# Patient Record
Sex: FEMALE | Race: WHITE | NOT HISPANIC OR LATINO | Employment: OTHER | URBAN - METROPOLITAN AREA
[De-identification: names, ages, dates, MRNs, and addresses within clinical notes are randomized per-mention and may not be internally consistent; named-entity substitution may affect disease eponyms.]

---

## 2020-11-03 PROBLEM — I48.91 AF (ATRIAL FIBRILLATION) (HCC): Status: RESOLVED | Noted: 2020-11-03 | Resolved: 2020-11-03

## 2023-10-10 PROBLEM — E11.9 DM2 (DIABETES MELLITUS, TYPE 2) (MULTI): Status: ACTIVE | Noted: 2023-10-10

## 2023-10-10 PROBLEM — I25.10 CAD (CORONARY ARTERY DISEASE): Status: ACTIVE | Noted: 2023-10-10

## 2023-10-10 PROBLEM — I10 HTN (HYPERTENSION), BENIGN: Status: ACTIVE | Noted: 2023-10-10

## 2023-10-10 PROBLEM — K21.9 GERD (GASTROESOPHAGEAL REFLUX DISEASE): Status: ACTIVE | Noted: 2023-10-10

## 2023-10-10 PROBLEM — F32.A ANXIETY AND DEPRESSION: Status: ACTIVE | Noted: 2023-10-10

## 2023-10-10 PROBLEM — E78.5 HYPERLIPIDEMIA: Status: ACTIVE | Noted: 2023-10-10

## 2023-10-10 PROBLEM — R53.81 PHYSICAL DECONDITIONING: Status: ACTIVE | Noted: 2023-10-10

## 2023-10-10 PROBLEM — S06.5XAA SDH (SUBDURAL HEMATOMA) (MULTI): Status: ACTIVE | Noted: 2023-10-10

## 2023-10-10 PROBLEM — I48.91 ATRIAL FIBRILLATION (MULTI): Status: ACTIVE | Noted: 2023-10-10

## 2023-10-10 PROBLEM — F41.9 ANXIETY AND DEPRESSION: Status: ACTIVE | Noted: 2023-10-10

## 2023-10-10 PROBLEM — I50.9 CHF (CONGESTIVE HEART FAILURE) (MULTI): Status: ACTIVE | Noted: 2023-10-10

## 2023-10-10 PROBLEM — M10.9 GOUT, ARTHRITIS: Status: ACTIVE | Noted: 2023-10-10

## 2023-10-10 RX ORDER — ATORVASTATIN CALCIUM 20 MG/1
1 TABLET, FILM COATED ORAL DAILY
COMMUNITY
End: 2023-12-26 | Stop reason: HOSPADM

## 2023-10-10 RX ORDER — FLUOXETINE 20 MG/1
1 TABLET ORAL DAILY
COMMUNITY

## 2023-10-10 RX ORDER — FUROSEMIDE 20 MG/1
1 TABLET ORAL DAILY
COMMUNITY

## 2023-10-10 RX ORDER — CETIRIZINE HYDROCHLORIDE 10 MG/1
1 TABLET ORAL DAILY
COMMUNITY
End: 2023-12-26 | Stop reason: HOSPADM

## 2023-10-10 RX ORDER — LEVOTHYROXINE SODIUM 75 UG/1
1 TABLET ORAL DAILY
COMMUNITY

## 2023-10-10 RX ORDER — GLIPIZIDE 10 MG/1
TABLET ORAL
COMMUNITY
End: 2024-01-25 | Stop reason: HOSPADM

## 2023-10-10 RX ORDER — CYANOCOBALAMIN (VITAMIN B-12) 500 MCG
1 TABLET ORAL DAILY
COMMUNITY

## 2023-10-10 RX ORDER — CARVEDILOL 12.5 MG/1
TABLET ORAL 2 TIMES DAILY
COMMUNITY

## 2023-10-11 ENCOUNTER — ANCILLARY PROCEDURE (OUTPATIENT)
Dept: RADIOLOGY | Facility: CLINIC | Age: 78
End: 2023-10-11
Payer: MEDICARE

## 2023-10-11 ENCOUNTER — OFFICE VISIT (OUTPATIENT)
Dept: ORTHOPEDIC SURGERY | Facility: CLINIC | Age: 78
End: 2023-10-11
Payer: MEDICARE

## 2023-10-11 DIAGNOSIS — S32.040S COMPRESSION FRACTURE OF L4 LUMBAR VERTEBRA, SEQUELA: ICD-10-CM

## 2023-10-11 DIAGNOSIS — S32.030S COMPRESSION FRACTURE OF L3 LUMBAR VERTEBRA, SEQUELA: ICD-10-CM

## 2023-10-11 PROCEDURE — 99214 OFFICE O/P EST MOD 30 MIN: CPT | Performed by: PHYSICIAN ASSISTANT

## 2023-10-11 PROCEDURE — 72020 X-RAY EXAM OF SPINE 1 VIEW: CPT | Performed by: ORTHOPAEDIC SURGERY

## 2023-10-11 PROCEDURE — 99214 OFFICE O/P EST MOD 30 MIN: CPT | Mod: PO | Performed by: PHYSICIAN ASSISTANT

## 2023-10-11 PROCEDURE — 70120 X-RAY EXAM OF MASTOIDS: CPT | Mod: FY

## 2023-10-11 NOTE — PROGRESS NOTES
New patient to us established patient to the practice seen as a consult in the hospital patient is here with her son after a fall and admission to the hospital for compression fracture she states she still has some low back pain.  There is no radiculopathy to the legs or paresthesia no bowel or bladder complaints no saddle anesthesia she has an LSO brace which causes more pain when she wears due to hernia surgery she has had she denies any type of spine surgery.  Her pain is about a 5 on a scale 1-10 she is in a nursing facility at the present    Past medical history review of systems reviewed and no change    Physical exam insert const no lymphangitis or lymphadenopathy in the examined extremities.  Good perfusion to the extremities ×4.  Radial and dorsalis pedis pulses 2+.  Capillary refill to all 4 digits brisk.  No distal edema x 4.  Patient ambulates with a roller walker examination of the neck reveals no swelling, step-off, or point tenderness.  Range of motion with flexion, extension, side bending and rotation is well maintained without crepitance, instability, or exacerbation of pain.  Strength is within normal limits.  There is decreased range of motion flexion extension rotation lumbar spine tender in the lumbar spinal musculature good strength no instability examination of the upper extremities reveals no point tenderness, swelling, or deformity.  Range of motion of the shoulders, elbows, wrists, and fingers are all full without crepitance, instability, or exacerbation of pain.  Strength is 5/5 throughout.  Examination of the lower extremities reveals no point tenderness, swelling, or deformity.  Range of motion of the hips, knees, and ankles are full without crepitance, instability, or exacerbation of pain.  Strength is 5/5 throughout.  No redness, abrasions, or lesions on all 4 extremities, head and neck, or trunk.  Gross sensation intact in the extremities ×4.  Deep tendon reflexes 2+ and symmetric  bilaterally.  Fermín, clonus, and Babinski were negative.  Straight leg raise negative.  Affect normal.  Alert and oriented ×3.  Coordination normal.    CAT scans cervical thoracic and lumbar spine and reports were reviewed from the hospital showing L3 and L4 superior endplate compression fractures.  X-rays taken today lateral view shows L3 and L4 superior endplate compression fractures with no increase in fracture compared to prior CT scan.    Assessment this is a patient with acute compression fractures L3 and L4 we talked to the son and the patient extensively about treatment options.  We talked about continuing with the brace and giving this time to heal versus a kyphoplasty and the risk of adjacent level fractures with the procedure we also talked to her about not wearing the brace if it is causing more discomfort and limiting her bending and twisting and lifting    Plan at this point or just can give this some more time to heal we will get a treat this conservatively.  She is can wear the brace as needed if is not causing discomfort otherwise she is to limit her activity with ambulation.  I will get a follow-up with the patient as needed if she has an increase in pain she will return she will need a repeat lateral x-ray

## 2023-11-08 ENCOUNTER — OFFICE VISIT (OUTPATIENT)
Dept: WOUND CARE | Facility: CLINIC | Age: 78
End: 2023-11-08
Payer: MEDICARE

## 2023-11-08 PROCEDURE — 99213 OFFICE O/P EST LOW 20 MIN: CPT | Mod: PO,25

## 2023-11-08 PROCEDURE — 11042 DBRDMT SUBQ TIS 1ST 20SQCM/<: CPT | Mod: PO

## 2023-11-22 ENCOUNTER — APPOINTMENT (OUTPATIENT)
Dept: WOUND CARE | Facility: CLINIC | Age: 78
End: 2023-11-22
Payer: MEDICARE

## 2023-12-06 ENCOUNTER — APPOINTMENT (OUTPATIENT)
Dept: RADIOLOGY | Facility: HOSPITAL | Age: 78
DRG: 330 | End: 2023-12-06
Payer: MEDICARE

## 2023-12-06 ENCOUNTER — APPOINTMENT (OUTPATIENT)
Dept: CARDIOLOGY | Facility: HOSPITAL | Age: 78
DRG: 330 | End: 2023-12-06
Payer: MEDICARE

## 2023-12-06 ENCOUNTER — HOSPITAL ENCOUNTER (INPATIENT)
Facility: HOSPITAL | Age: 78
LOS: 20 days | Discharge: NURSING, REHAB OR LONG-TERM CARE | DRG: 330 | End: 2023-12-26
Attending: EMERGENCY MEDICINE | Admitting: INTERNAL MEDICINE
Payer: MEDICARE

## 2023-12-06 DIAGNOSIS — K56.609 SMALL BOWEL OBSTRUCTION (MULTI): Primary | ICD-10-CM

## 2023-12-06 LAB
ALBUMIN SERPL BCP-MCNC: 3.8 G/DL (ref 3.4–5)
ALP SERPL-CCNC: 64 U/L (ref 33–136)
ALT SERPL W P-5'-P-CCNC: 9 U/L (ref 7–45)
ANION GAP SERPL CALC-SCNC: 16 MMOL/L (ref 10–20)
AST SERPL W P-5'-P-CCNC: 21 U/L (ref 9–39)
BASOPHILS # BLD AUTO: 0.02 X10*3/UL (ref 0–0.1)
BASOPHILS NFR BLD AUTO: 0.3 %
BILIRUB DIRECT SERPL-MCNC: 0.2 MG/DL (ref 0–0.3)
BILIRUB SERPL-MCNC: 1.2 MG/DL (ref 0–1.2)
BUN SERPL-MCNC: 72 MG/DL (ref 6–23)
CALCIUM SERPL-MCNC: 9.2 MG/DL (ref 8.6–10.3)
CARDIAC TROPONIN I PNL SERPL HS: 30 NG/L (ref 0–13)
CARDIAC TROPONIN I PNL SERPL HS: 32 NG/L (ref 0–13)
CARDIAC TROPONIN I PNL SERPL HS: 33 NG/L (ref 0–13)
CHLORIDE SERPL-SCNC: 88 MMOL/L (ref 98–107)
CO2 SERPL-SCNC: 33 MMOL/L (ref 21–32)
CREAT SERPL-MCNC: 1.81 MG/DL (ref 0.5–1.05)
EOSINOPHIL # BLD AUTO: 0.05 X10*3/UL (ref 0–0.4)
EOSINOPHIL NFR BLD AUTO: 0.9 %
ERYTHROCYTE [DISTWIDTH] IN BLOOD BY AUTOMATED COUNT: 15.2 % (ref 11.5–14.5)
GFR SERPL CREATININE-BSD FRML MDRD: 29 ML/MIN/1.73M*2
GLUCOSE BLD MANUAL STRIP-MCNC: 108 MG/DL (ref 74–99)
GLUCOSE BLD MANUAL STRIP-MCNC: 127 MG/DL (ref 74–99)
GLUCOSE SERPL-MCNC: 168 MG/DL (ref 74–99)
HCT VFR BLD AUTO: 37.9 % (ref 36–46)
HGB BLD-MCNC: 12.9 G/DL (ref 12–16)
HOLD SPECIMEN: NORMAL
HOLD SPECIMEN: NORMAL
IMM GRANULOCYTES # BLD AUTO: 0.02 X10*3/UL (ref 0–0.5)
IMM GRANULOCYTES NFR BLD AUTO: 0.3 % (ref 0–0.9)
LACTATE SERPL-SCNC: 1.5 MMOL/L (ref 0.4–2)
LACTATE SERPL-SCNC: 2.5 MMOL/L (ref 0.4–2)
LIPASE SERPL-CCNC: 60 U/L (ref 9–82)
LYMPHOCYTES # BLD AUTO: 0.74 X10*3/UL (ref 0.8–3)
LYMPHOCYTES NFR BLD AUTO: 12.8 %
MAGNESIUM SERPL-MCNC: 1.61 MG/DL (ref 1.6–2.4)
MCH RBC QN AUTO: 29.7 PG (ref 26–34)
MCHC RBC AUTO-ENTMCNC: 34 G/DL (ref 32–36)
MCV RBC AUTO: 87 FL (ref 80–100)
MONOCYTES # BLD AUTO: 0.4 X10*3/UL (ref 0.05–0.8)
MONOCYTES NFR BLD AUTO: 6.9 %
NEUTROPHILS # BLD AUTO: 4.56 X10*3/UL (ref 1.6–5.5)
NEUTROPHILS NFR BLD AUTO: 78.8 %
NRBC BLD-RTO: 0 /100 WBCS (ref 0–0)
PLATELET # BLD AUTO: 279 X10*3/UL (ref 150–450)
POTASSIUM SERPL-SCNC: 3.3 MMOL/L (ref 3.5–5.3)
PROT SERPL-MCNC: 6.9 G/DL (ref 6.4–8.2)
RBC # BLD AUTO: 4.35 X10*6/UL (ref 4–5.2)
SODIUM SERPL-SCNC: 134 MMOL/L (ref 136–145)
TSH SERPL-ACNC: 3.34 MIU/L (ref 0.44–3.98)
WBC # BLD AUTO: 5.8 X10*3/UL (ref 4.4–11.3)

## 2023-12-06 PROCEDURE — 96375 TX/PRO/DX INJ NEW DRUG ADDON: CPT

## 2023-12-06 PROCEDURE — 85025 COMPLETE CBC W/AUTO DIFF WBC: CPT | Performed by: EMERGENCY MEDICINE

## 2023-12-06 PROCEDURE — 71045 X-RAY EXAM CHEST 1 VIEW: CPT

## 2023-12-06 PROCEDURE — 84443 ASSAY THYROID STIM HORMONE: CPT | Performed by: INTERNAL MEDICINE

## 2023-12-06 PROCEDURE — 80048 BASIC METABOLIC PNL TOTAL CA: CPT | Performed by: EMERGENCY MEDICINE

## 2023-12-06 PROCEDURE — 71045 X-RAY EXAM CHEST 1 VIEW: CPT | Mod: FY

## 2023-12-06 PROCEDURE — 84484 ASSAY OF TROPONIN QUANT: CPT | Performed by: INTERNAL MEDICINE

## 2023-12-06 PROCEDURE — 36415 COLL VENOUS BLD VENIPUNCTURE: CPT | Performed by: EMERGENCY MEDICINE

## 2023-12-06 PROCEDURE — 83605 ASSAY OF LACTIC ACID: CPT | Performed by: EMERGENCY MEDICINE

## 2023-12-06 PROCEDURE — 74176 CT ABD & PELVIS W/O CONTRAST: CPT

## 2023-12-06 PROCEDURE — 93005 ELECTROCARDIOGRAM TRACING: CPT

## 2023-12-06 PROCEDURE — 82248 BILIRUBIN DIRECT: CPT | Performed by: EMERGENCY MEDICINE

## 2023-12-06 PROCEDURE — 74176 CT ABD & PELVIS W/O CONTRAST: CPT | Performed by: RADIOLOGY

## 2023-12-06 PROCEDURE — 1210000001 HC SEMI-PRIVATE ROOM DAILY

## 2023-12-06 PROCEDURE — 84484 ASSAY OF TROPONIN QUANT: CPT | Performed by: EMERGENCY MEDICINE

## 2023-12-06 PROCEDURE — 2500000004 HC RX 250 GENERAL PHARMACY W/ HCPCS (ALT 636 FOR OP/ED): Performed by: EMERGENCY MEDICINE

## 2023-12-06 PROCEDURE — 2500000005 HC RX 250 GENERAL PHARMACY W/O HCPCS: Performed by: INTERNAL MEDICINE

## 2023-12-06 PROCEDURE — 82947 ASSAY GLUCOSE BLOOD QUANT: CPT

## 2023-12-06 PROCEDURE — 99285 EMERGENCY DEPT VISIT HI MDM: CPT | Performed by: EMERGENCY MEDICINE

## 2023-12-06 PROCEDURE — 99222 1ST HOSP IP/OBS MODERATE 55: CPT | Performed by: SURGERY

## 2023-12-06 PROCEDURE — 71045 X-RAY EXAM CHEST 1 VIEW: CPT | Performed by: RADIOLOGY

## 2023-12-06 PROCEDURE — 2500000004 HC RX 250 GENERAL PHARMACY W/ HCPCS (ALT 636 FOR OP/ED): Performed by: INTERNAL MEDICINE

## 2023-12-06 PROCEDURE — 83735 ASSAY OF MAGNESIUM: CPT | Performed by: EMERGENCY MEDICINE

## 2023-12-06 PROCEDURE — 99223 1ST HOSP IP/OBS HIGH 75: CPT | Performed by: INTERNAL MEDICINE

## 2023-12-06 PROCEDURE — 96374 THER/PROPH/DIAG INJ IV PUSH: CPT

## 2023-12-06 PROCEDURE — 83690 ASSAY OF LIPASE: CPT | Performed by: EMERGENCY MEDICINE

## 2023-12-06 RX ORDER — ONDANSETRON 4 MG/1
4 TABLET, FILM COATED ORAL EVERY 8 HOURS PRN
Status: DISCONTINUED | OUTPATIENT
Start: 2023-12-06 | End: 2023-12-26 | Stop reason: HOSPADM

## 2023-12-06 RX ORDER — DEXTROSE MONOHYDRATE 100 MG/ML
0.3 INJECTION, SOLUTION INTRAVENOUS ONCE AS NEEDED
Status: DISCONTINUED | OUTPATIENT
Start: 2023-12-06 | End: 2023-12-26 | Stop reason: HOSPADM

## 2023-12-06 RX ORDER — ONDANSETRON HYDROCHLORIDE 2 MG/ML
4 INJECTION, SOLUTION INTRAVENOUS ONCE
Status: COMPLETED | OUTPATIENT
Start: 2023-12-06 | End: 2023-12-06

## 2023-12-06 RX ORDER — SODIUM CHLORIDE 9 MG/ML
75 INJECTION, SOLUTION INTRAVENOUS CONTINUOUS
Status: DISCONTINUED | OUTPATIENT
Start: 2023-12-06 | End: 2023-12-07

## 2023-12-06 RX ORDER — INSULIN LISPRO 100 [IU]/ML
0-5 INJECTION, SOLUTION INTRAVENOUS; SUBCUTANEOUS EVERY 4 HOURS
Status: DISCONTINUED | OUTPATIENT
Start: 2023-12-06 | End: 2023-12-22

## 2023-12-06 RX ORDER — DEXTROSE 50 % IN WATER (D50W) INTRAVENOUS SYRINGE
25
Status: DISCONTINUED | OUTPATIENT
Start: 2023-12-06 | End: 2023-12-26 | Stop reason: HOSPADM

## 2023-12-06 RX ORDER — POTASSIUM CHLORIDE 14.9 MG/ML
20 INJECTION INTRAVENOUS
Status: COMPLETED | OUTPATIENT
Start: 2023-12-06 | End: 2023-12-06

## 2023-12-06 RX ORDER — ONDANSETRON HYDROCHLORIDE 2 MG/ML
4 INJECTION, SOLUTION INTRAVENOUS EVERY 8 HOURS PRN
Status: DISCONTINUED | OUTPATIENT
Start: 2023-12-06 | End: 2023-12-26 | Stop reason: HOSPADM

## 2023-12-06 RX ORDER — METOPROLOL TARTRATE 1 MG/ML
2.5 INJECTION, SOLUTION INTRAVENOUS EVERY 6 HOURS
Status: DISCONTINUED | OUTPATIENT
Start: 2023-12-06 | End: 2023-12-26 | Stop reason: HOSPADM

## 2023-12-06 RX ADMIN — POTASSIUM CHLORIDE 20 MEQ: 200 INJECTION, SOLUTION INTRAVENOUS at 20:30

## 2023-12-06 RX ADMIN — ONDANSETRON 4 MG: 2 INJECTION INTRAMUSCULAR; INTRAVENOUS at 13:27

## 2023-12-06 RX ADMIN — METOPROLOL TARTRATE 2.5 MG: 5 INJECTION INTRAVENOUS at 17:46

## 2023-12-06 RX ADMIN — SODIUM CHLORIDE 500 ML: 9 INJECTION, SOLUTION INTRAVENOUS at 12:50

## 2023-12-06 RX ADMIN — PIPERACILLIN SODIUM AND TAZOBACTAM SODIUM 3.38 G: 3; .375 INJECTION, SOLUTION INTRAVENOUS at 14:04

## 2023-12-06 RX ADMIN — SODIUM CHLORIDE 1000 ML: 9 INJECTION, SOLUTION INTRAVENOUS at 13:27

## 2023-12-06 RX ADMIN — SODIUM CHLORIDE 75 ML/HR: 9 INJECTION, SOLUTION INTRAVENOUS at 17:46

## 2023-12-06 RX ADMIN — POTASSIUM CHLORIDE 20 MEQ: 200 INJECTION, SOLUTION INTRAVENOUS at 17:46

## 2023-12-06 ASSESSMENT — PAIN SCALES - GENERAL
PAINLEVEL_OUTOF10: 5 - MODERATE PAIN
PAINLEVEL_OUTOF10: 9

## 2023-12-06 ASSESSMENT — COLUMBIA-SUICIDE SEVERITY RATING SCALE - C-SSRS
6. HAVE YOU EVER DONE ANYTHING, STARTED TO DO ANYTHING, OR PREPARED TO DO ANYTHING TO END YOUR LIFE?: NO
2. HAVE YOU ACTUALLY HAD ANY THOUGHTS OF KILLING YOURSELF?: NO
1. IN THE PAST MONTH, HAVE YOU WISHED YOU WERE DEAD OR WISHED YOU COULD GO TO SLEEP AND NOT WAKE UP?: NO

## 2023-12-06 ASSESSMENT — ACTIVITIES OF DAILY LIVING (ADL)
TOILETING: NEEDS ASSISTANCE
WALKS IN HOME: NEEDS ASSISTANCE
HEARING - RIGHT EAR: FUNCTIONAL
FEEDING YOURSELF: NEEDS ASSISTANCE
JUDGMENT_ADEQUATE_SAFELY_COMPLETE_DAILY_ACTIVITIES: YES
PATIENT'S MEMORY ADEQUATE TO SAFELY COMPLETE DAILY ACTIVITIES?: YES
BATHING: NEEDS ASSISTANCE
HEARING - LEFT EAR: FUNCTIONAL
ADEQUATE_TO_COMPLETE_ADL: YES
DRESSING YOURSELF: NEEDS ASSISTANCE
GROOMING: NEEDS ASSISTANCE
ASSISTIVE_DEVICE: DENTURES PARTIAL;CANE

## 2023-12-06 ASSESSMENT — ENCOUNTER SYMPTOMS
VOMITING: 1
EYES NEGATIVE: 1
ABDOMINAL PAIN: 1
CONSTITUTIONAL NEGATIVE: 1
CARDIOVASCULAR NEGATIVE: 1
NAUSEA: 1
MUSCULOSKELETAL NEGATIVE: 1
RESPIRATORY NEGATIVE: 1
ENDOCRINE NEGATIVE: 1
ALLERGIC/IMMUNOLOGIC NEGATIVE: 1

## 2023-12-06 ASSESSMENT — PAIN DESCRIPTION - FREQUENCY: FREQUENCY: CONSTANT/CONTINUOUS

## 2023-12-06 ASSESSMENT — PAIN DESCRIPTION - LOCATION: LOCATION: ABDOMEN

## 2023-12-06 ASSESSMENT — PAIN - FUNCTIONAL ASSESSMENT: PAIN_FUNCTIONAL_ASSESSMENT: 0-10

## 2023-12-06 ASSESSMENT — PAIN DESCRIPTION - DESCRIPTORS: DESCRIPTORS: ACHING;PRESSURE

## 2023-12-06 ASSESSMENT — PAIN DESCRIPTION - PAIN TYPE: TYPE: ACUTE PAIN

## 2023-12-06 NOTE — H&P
History Of Present Illness  Harper Laar is a 78 y.o. female presenting with past medical history of type 2 diabetes mellitus, fatty liver, hyperlipidemia, hypertension, A-fib status post ablation, hypothyroidism, anxiety and depression, history of GI bleed comes to the hospital with abdominal pain and vomiting for past few days.  The patient is a poor historian and per chart the patient was seen by the PCP and was given Zofran which seemed to have been ineffective.  Patient states she had a bowel movement yesterday.  She also reports crampy abdominal pain.  She denies any fever or chills chest pain or cough.  CT abdomen pelvis in ED was concerning for small bowel obstruction and the patient was seen by Dr. Lozada in ED and plans for NG tube insertion at this time.  Patient is admitted for further care and management.     Past Medical History  Past Medical History:   Diagnosis Date    Atrial fibrillation (CMS/HCC)     Diabetes mellitus (CMS/HCC)        Surgical History  Past Surgical History:   Procedure Laterality Date    CHOLECYSTECTOMY      HIATAL HERNIA REPAIR      HYSTERECTOMY          Social History  She reports that she has never smoked. She has never been exposed to tobacco smoke. She has never used smokeless tobacco. She reports that she does not drink alcohol and does not use drugs.    Family History  No family history on file.     Allergies  Diphenhydramine hcl, Morphine, Pentazocine, Propoxyphene, Propoxyphene n-acetaminophen, and Ranitidine    Review of Systems   Constitutional: Negative.    HENT: Negative.     Eyes: Negative.    Respiratory: Negative.     Cardiovascular: Negative.    Gastrointestinal:  Positive for abdominal pain, nausea and vomiting.   Endocrine: Negative.    Musculoskeletal: Negative.    Skin: Negative.    Allergic/Immunologic: Negative.    All other systems reviewed and are negative.       Physical Exam     Last Recorded Vitals  Blood pressure 121/61, pulse 89, temperature 36 °C  "(96.8 °F), temperature source Temporal, resp. rate 20, height 1.6 m (5' 3\"), weight 97.5 kg (215 lb), SpO2 100 %.    Relevant Results        78 y.o. female presenting with past medical history of type 2 diabetes mellitus, fatty liver, hyperlipidemia, hypertension, A-fib status post ablation, hypothyroidism, anxiety and depression, history of GI bleed comes to the hospital with abdominal pain and vomiting for past few days.     Assessment/Plan   Principal Problem:    Small bowel obstruction (CMS/HCC)      Small bowel obstruction  -As seen on CT scan of abdomen pelvis.  -Continue with IV hydration and maintain n.p.o. status at this time.  -Surgery has been consulted and NG tube is planned at this time.  -No acute surgical intervention at this time.    SOCORRO with underlying CKD stage III  -Creatinine noted 1.81 baseline is around 1.1.   -Continue with IV hydration.    Hypokalemia  -Replace IV and monitor.     Elevated troponin  Atrial fibrillation  -Serial troponin and serial EKGs.   -Since NPO will add IV lopressor every 6 hours. Rate not well controlled.   -Consult cardiology.    Hypothyroidism  -Check TSH.  Last 1 about 3 months ago was 2.70.  -Resume oral Synthroid once able.    Chronic systolic congestive heart failure  -Last echo from 2021 shows LVEF of 40%.  -Gentle IV fluid rehydration.    Diabetes mellitus type 2  -Hold home oral antidiabetics  -Continue with sliding scale insulin n.p.o. protocol.    VTE prophylaxis: SCDs patient history of GI bleed        I spent 45 minutes in the professional and overall care of this patient.      Brittany Pearson MD    "

## 2023-12-06 NOTE — CONSULTS
"Reason For Consult  SBO    History Of Present Illness    Harper Lara is a 78 y.o. female with SBO; patient is a poor historian; history of open cholecystectomy, hysterectomy, SBR and VHR repair x 2. In June 2010, she underwent laparotomy, small bowel resection and repair of recurrent ventral hernia at the [facility]. Per her, she was brought to the hospital by her granddaughter. She reports diffuse, sharp, constant abdominal pain for at least 3-4 days with no relieving factor; \"I just hurt everywhere\". Reports two vomiting episodes today. Per her, she has not had any bowel movement for 1 wk. Denies fevers and chills. CT abd/pelvis shows fat containing ventral hernia and dilated loops of small bowel consistent with SBO.    Past Medical History  Type 2 diabetes mellitus, fatty liver, hyperlipidemia, hypertension, A-fib status post ablation, hypothyroidism, anxiety and depression, history of GI bleed    Surgical History  Open cholecystedctomy, hysterectomy and VHR repair x 2 and SBR ( In June 2010, she underwent laparotomy, small bowel resection and repair of recurrent ventral hernia at the [facility])       Social History  She reports that she has never smoked. She has never been exposed to tobacco smoke. She has never used smokeless tobacco. She reports that she does not drink alcohol and does not use drugs.; she lives with      Family History  No family history on file.     Allergies  Diphenhydramine hcl, Morphine, Pentazocine, Propoxyphene, Propoxyphene n-acetaminophen, and Ranitidine    Review of Systems  Constitutional: Negative.    HENT: Negative.     Eyes: Negative.    Respiratory: Negative.     Cardiovascular: Negative.    Gastrointestinal:  Positive for abdominal pain, nausea and vomiting.   Endocrine: Negative.    Musculoskeletal: Negative.    Skin: Negative.    Allergic/Immunologic: Negative.    All other systems reviewed and are negative.       Physical Exam  Constitutional: no acute " "distress, well appearing and well nourished  Eyes: conjunctiva and lids with no erythema, swelling or discharge; EOMI  Ears, Nose, Mouth and Throat: external inspection of ears and nose are normal  Neck: supple, no mass or adenopathy, full range of motion; thyroid not enlarged and no palpable nodules  Pulmonary: normal respiratory effort; clear to auscultation bilaterally, no wheezes or bronchi   Cardiovascular: regular rate and rhythm, no murmurs or extra-heart sounds; pedal pulses are normal; no extremities edema or varicosities, no peripheral edema  Abdomen: soft, distended, moderatel diffuse tenderness; healed right subcostal and midline incision, no palpable hernia defect,  Musculoskeletal: digits and nails normal without clubbing or cyanosis; Joints, bones and muscles are normal with normal range of motion  Skin: normal without rashes or lesion  Neurologic: cranial nerve II-XII intact grossly  Psychiatric: oriented to person, place and time     Last Recorded Vitals  Blood pressure 135/60, pulse 88, temperature 36 °C (96.8 °F), temperature source Temporal, resp. rate 16, height 1.6 m (5' 3\"), weight 97.5 kg (215 lb), SpO2 97 %.    Relevant Results  Results for orders placed or performed during the hospital encounter of 12/06/23 (from the past 24 hour(s))   Lipase   Result Value Ref Range    Lipase 60 9 - 82 U/L   Lactate   Result Value Ref Range    Lactate 2.5 (H) 0.4 - 2.0 mmol/L   Hepatic Function Panel   Result Value Ref Range    Albumin 3.8 3.4 - 5.0 g/dL    Bilirubin, Total 1.2 0.0 - 1.2 mg/dL    Bilirubin, Direct 0.2 0.0 - 0.3 mg/dL    Alkaline Phosphatase 64 33 - 136 U/L    ALT 9 7 - 45 U/L    AST 21 9 - 39 U/L    Total Protein 6.9 6.4 - 8.2 g/dL   Magnesium   Result Value Ref Range    Magnesium 1.61 1.60 - 2.40 mg/dL   Basic Metabolic Panel   Result Value Ref Range    Glucose 168 (H) 74 - 99 mg/dL    Sodium 134 (L) 136 - 145 mmol/L    Potassium 3.3 (L) 3.5 - 5.3 mmol/L    Chloride 88 (L) 98 - 107 mmol/L "    Bicarbonate 33 (H) 21 - 32 mmol/L    Anion Gap 16 10 - 20 mmol/L    Urea Nitrogen 72 (H) 6 - 23 mg/dL    Creatinine 1.81 (H) 0.50 - 1.05 mg/dL    eGFR 29 (L) >60 mL/min/1.73m*2    Calcium 9.2 8.6 - 10.3 mg/dL   CBC and Auto Differential   Result Value Ref Range    WBC 5.8 4.4 - 11.3 x10*3/uL    nRBC 0.0 0.0 - 0.0 /100 WBCs    RBC 4.35 4.00 - 5.20 x10*6/uL    Hemoglobin 12.9 12.0 - 16.0 g/dL    Hematocrit 37.9 36.0 - 46.0 %    MCV 87 80 - 100 fL    MCH 29.7 26.0 - 34.0 pg    MCHC 34.0 32.0 - 36.0 g/dL    RDW 15.2 (H) 11.5 - 14.5 %    Platelets 279 150 - 450 x10*3/uL    Neutrophils % 78.8 40.0 - 80.0 %    Immature Granulocytes %, Automated 0.3 0.0 - 0.9 %    Lymphocytes % 12.8 13.0 - 44.0 %    Monocytes % 6.9 2.0 - 10.0 %    Eosinophils % 0.9 0.0 - 6.0 %    Basophils % 0.3 0.0 - 2.0 %    Neutrophils Absolute 4.56 1.60 - 5.50 x10*3/uL    Immature Granulocytes Absolute, Automated 0.02 0.00 - 0.50 x10*3/uL    Lymphocytes Absolute 0.74 (L) 0.80 - 3.00 x10*3/uL    Monocytes Absolute 0.40 0.05 - 0.80 x10*3/uL    Eosinophils Absolute 0.05 0.00 - 0.40 x10*3/uL    Basophils Absolute 0.02 0.00 - 0.10 x10*3/uL   Troponin I, High Sensitivity   Result Value Ref Range    Troponin I, High Sensitivity 30 (H) 0 - 13 ng/L   Light Blue Top   Result Value Ref Range    Extra Tube Hold for add-ons.    Lavender Top   Result Value Ref Range    Extra Tube Hold for add-ons.    Lactate   Result Value Ref Range    Lactate 1.5 0.4 - 2.0 mmol/L   Troponin I, High Sensitivity   Result Value Ref Range    Troponin I, High Sensitivity 33 (H) 0 - 13 ng/L   TSH with reflex to Free T4 if abnormal   Result Value Ref Range    Thyroid Stimulating Hormone 3.34 0.44 - 3.98 mIU/L    XR chest 1 view    Result Date: 12/6/2023  Interpreted By:  Jonah Sweet, STUDY: XR CHEST 1 VIEW;  12/6/2023 3:16 pm   INDICATION: Signs/Symptoms:confirm NG placement.   COMPARISON: Earlier same day   ACCESSION NUMBER(S): [id number]   ORDERING CLINICIAN: ANTONIO KILPATRICK    FINDINGS: Please see the impression       1.  NG tube ends in the gastric fundus.   Signed by: Jonah Sweet 12/6/2023 3:20 PM Dictation workstation:   [id number]    CT abdomen pelvis wo IV contrast    Result Date: 12/6/2023  Interpreted By:  Carlos Augustin, STUDY: CT ABDOMEN PELVIS WO IV CONTRAST;  12/6/2023 11:59 am   INDICATION: Signs/Symptoms:vomiting.   COMPARISON: Selected examinations as far back as July 25, 2021 CT abdomen and pelvis including September 20, 2023 CT chest abdomen and pelvis.   ACCESSION NUMBER(S): [id number]   ORDERING CLINICIAN: ANTONIO KILPATRICK   TECHNIQUE: CT of the abdomen and pelvis was performed. Contiguous axial images were obtained at 3 mm slice thickness through the abdomen and pelvis. Coronal and sagittal reconstructions at 3 mm slice thickness were performed.  No intravenous or oral contrast agents were administered.   FINDINGS: Please note that the evaluation of vessels, lymph nodes and organs is limited without intravenous contrast.   LOWER CHEST: Newly seen elevation left hemidiaphragm related to gastric distension. No pneumonia, edema, or effusion. There is increased mural prominence distal esophagus of indeterminate cause, potentially related to underdistention or inflammation.   ABDOMEN:   LIVER: The liver demonstrates homogeneous attenuation without evidence of focal liver lesions.   BILE DUCTS: Unchanged prominence of the intra and extrahepatic bile ducts without evident obstructing mass or calculus compatible with age and prior cholecystectomy.   GALLBLADDER: Absent   PANCREAS: Grossly unchanged with partial fatty replacement.   SPLEEN: Within normal limits.   ADRENAL GLANDS: Within normal limits.   Unchanged small homogeneous hypodensities most compatible with cysts. No evident hydronephrosis, or calculus.   PELVIS:   BLADDER: Unremarkable   REPRODUCTIVE ORGANS: No evident pelvic mass. The uterus and ovaries are not identified.   BOWEL: There is newly seen marked  dilation of the stomach and portion of the small bowel with decompressed distal small bowel. There is evident tapering anterior pelvis just to the left of midline series 21, image 120. The pattern favors a mechanical obstruction potentially related to adhesion. There is a small nearby left anterior fat containing hernia series 21, image 104. There is mild fluid within the hernia sac image 111 there are multiple adjacent densities likely suture and or calcifications anterior peritoneal cavity. No apparent pneumatosis. The dilated fluid-filled small bowel measures up to 5.7 cm caliber series 21, image 90. The decompressed distal small bowel measures less than 1 cm. The colon is predominantly decompressed. Patient is status post surgery with suture near the ileocolonic junction.   VESSELS: Normal caliber. Minimal scattered arterial vascular calcifications.   PERITONEUM/RETROPERITONEUM/LYMPH NODES: No ascites or free air, no fluid collection.  No enlarged mesenteric lymph nodes.   BONES: Compared with September 20 newly seen mild inferior compressive deformity L2 with new bandlike sclerosis midportion compatible with impaction deformity or healing response without significant retropulsion. Stable deformity superior L3. There is osseous demineralization. There are scattered degenerative changes.       1.  Findings compatible with acute mechanical small bowel obstruction potentially related to adhesion. 2. There is an adjacent small left-sided ventral wall hernia which contains minimal fat and fluid within the hernia sac which may or may not be related. Attention recommended on clinical assessment. 3. Newly seen mild superior likely recent superior L2 compressive deformity.     Signed by: Carlos Augustin 12/6/2023 12:24 PM Dictation workstation:   [id number]    XR chest 1 view    Result Date: 12/6/2023  Interpreted By:  Carlos Augustin, STUDY: XR CHEST 1 VIEW;  12/6/2023 11:58 am   INDICATION: .   COMPARISON: December  19, 2021 chest radiograph. September 20, 2023 CT chest abdomen and pelvis. Same day CT abdomen and pelvis   ACCESSION NUMBER(S): [id number]   ORDERING CLINICIAN: ANTONIO KILPATRICK   FINDINGS: AP radiograph of the chest was provided.       CARDIOMEDIASTINAL SILHOUETTE: Unchanged cardiomediastinal silhouette.   LUNGS: Lungs are clear.   ABDOMEN: Newly seen marked gaseous distention of the stomach distention of portion of the bowel.   BONES: No acute osseous changes.       1.  Abnormal distention of the stomach and bowel. 2. The lungs are clear.       MACRO: None   Signed by: Carlos Augustin 12/6/2023 12:07 PM Dictation workstation:   [id number]       Assessment/Plan   78-year-old female patient history of multiple medical problems and multiple abdominal surgeries (open cholecystectomy, hysterectomy, small bowel resection and recurrent ventral hernia repair x 2) with SBO.  I have reviewed the labs and CT abdomen/pelvis.  Dilated loops of small bowel.  Nasogastric tube will be placed/IVF/ serial abdominal exam.  We talked about indications of surgery which are evidence of sepsis or failure of the bowel obstruction to resolve.  All questions answered.        Manuel Lozada MD

## 2023-12-06 NOTE — ED PROVIDER NOTES
HPI   Chief Complaint   Patient presents with    Abdominal Pain     Abdominal pain and vomiting.  States she saw her PCP on Monday and he changed her medication.       HPI: 78-year-old female poor historian states that she has been having abdominal pain and vomiting for 4 days.  She states that she saw her doctor at [facility] on Monday.  They gave her Zofran.  She states that she does not think it has been helping.  She is denying chest pain.  She denies any shortness of breath.  She denies any swelling in her legs.  She denies any falls.  She denies any numbness tingling or weakness.    Family HX: Denies any significant/pertinent family history.  Social Hx: Denies ETOH or drug use.  Review of systems:  Gen.: No weight loss, fatigue, anorexia, insomnia, fever.   Eyes: No vision loss, double vision, drainage, eye pain.   ENT: No pharyngitis, dry mouth.   Cardiac: No chest pain, palpitations, syncope, near syncope.   Pulmonary: No shortness of breath, cough, hemoptysis.   Heme/lymph: No swollen glands, fever, bleeding.   GI: No abdominal pain, change in bowel habits, melena, hematemesis, hematochezia, nausea, vomiting, diarrhea.   : No discharge, dysuria, frequency, urgency, hematuria.   Musculoskeletal: No limb pain, joint pain, joint swelling.   Skin: No rashes.   Psych: No depression, anxiety, suicidality, homicidality.   Review of systems is otherwise negative unless stated above or in history of present illness.    Physical Exam:    Appearance: Alert, oriented , cooperative,  in no acute distress. Well nourished & well hydrated.    Skin: Intact,  dry skin, no lesions, rash, petechiae or purpura.     Eyes: PERRLA, EOMs intact,  Conjunctiva pink with no redness or exudates. Eyelids without lesions. No scleral icterus.     ENT: Hearing grossly intact. External auditory canals patent, tympanic membranes intact with visible landmarks. Nares patent, mucus membranes moist. Dentition without lesions. Pharynx clear, uvula  midline.     Neck: Supple, without meningismus. Thyroid not palpable. Trachea at midline. No lymphadenopathy.    Pulmonary: Clear bilaterally with good chest wall excursion. No rales, rhonchi or wheezing. No accessory muscle use or stridor.    Cardiac: Irregular without murmur, rub, gallop or extrasystole. No JVD, Carotids without bruits.    Abdomen: Diffusely tender no palpable organomegaly.  No rebound or guarding.  No CVA tenderness.    Genitourinary: Exam deferred.    Musculoskeletal: Full range of motion. no pain, edema, or deformity. Pulses full and equal. No cyanosis, clubbing, or edema.    Neurological:  Cranial nerves II through XII are grossly intact, finger-nose touch is normal, normal sensation, no weakness, no focal findings identified.    Psychiatric: Appropriate mood and affect.     Medical Decision-Making:  Testing: EKG was obtained nightly interpreted by me shows A-fib rate of 89 without evidence of obvious ST elevations and a bifascicular block.  Abs are reviewed.  CT abdomen pelvis was concerning for bowel obstruction.  I did speak to Dr. Lozada.  He did evaluate the patient at the bedside.  At this time it is not felt that this is a surgical abdomen.  Patient will have an NG tube.  She was empirically covered with Zosyn given IV fluids and will be admitted to Dr. Gonzalez here with medicine  Treatment:   Reevaluation:   Plan: AdmitPatient and family/friend/caregiver are in agreement with this plan.   Impression:   1. sbo  2.  Labs Reviewed  LACTATE - Abnormal     Lactate                       2.5 (*)                  Narrative: Venipuncture immediately after or during the administration of Metamizole may lead to falsely low results. Testing should be performed immediately                prior to Metamizole dosing.  BASIC METABOLIC PANEL - Abnormal     Glucose                       168 (*)                Sodium                        134 (*)                Potassium                     3.3 (*)                 Chloride                      88 (*)                 Bicarbonate                   33 (*)                 Anion Gap                     16                     Urea Nitrogen                 72 (*)                 Creatinine                    1.81 (*)               eGFR                          29 (*)                 Calcium                       9.2                 CBC WITH AUTO DIFFERENTIAL - Abnormal     WBC                           5.8                    nRBC                          0.0                    RBC                           4.35                   Hemoglobin                    12.9                   Hematocrit                    37.9                   MCV                           87                     MCH                           29.7                   MCHC                          34.0                   RDW                           15.2 (*)               Platelets                     279                    Neutrophils %                 78.8                   Immature Granulocytes %, Automated   0.3                    Lymphocytes %                 12.8                   Monocytes %                   6.9                    Eosinophils %                 0.9                    Basophils %                   0.3                    Neutrophils Absolute          4.56                   Immature Granulocytes Absolute, Au*   0.02                   Lymphocytes Absolute          0.74 (*)               Monocytes Absolute            0.40                   Eosinophils Absolute          0.05                   Basophils Absolute            0.02                TROPONIN I, HIGH SENSITIVITY - Abnormal     Troponin I, High Sensitivity   30 (*)                   Narrative: Less than 99th percentile of normal range cutoff-                Female and children under 18 years old <14 ng/L; Male <21 ng/L: Negative                Repeat testing should be performed if clinically indicated.                                  Female and children under 18 years old 14-50 ng/L; Male 21-50 ng/L:                Consistent with possible cardiac damage and possible increased clinical                 risk. Serial measurements may help to assess extent of myocardial damage.                                 >50 ng/L: Consistent with cardiac damage, increased clinical risk and                myocardial infarction. Serial measurements may help assess extent of                 myocardial damage.                                  NOTE: Children less than 1 year old may have higher baseline troponin                 levels and results should be interpreted in conjunction with the overall                 clinical context.                                 NOTE: Troponin I testing is performed using a different                 testing methodology at [facility] than at other                 [facility]. Direct result comparisons should only                 be made within the same method.  TROPONIN I, HIGH SENSITIVITY - Abnormal     Troponin I, High Sensitivity   33 (*)                   Narrative: Less than 99th percentile of normal range cutoff-                Female and children under 18 years old <14 ng/L; Male <21 ng/L: Negative                Repeat testing should be performed if clinically indicated.                                 Female and children under 18 years old 14-50 ng/L; Male 21-50 ng/L:                Consistent with possible cardiac damage and possible increased clinical                 risk. Serial measurements may help to assess extent of myocardial damage.                                 >50 ng/L: Consistent with cardiac damage, increased clinical risk and                myocardial infarction. Serial measurements may help assess extent of                 myocardial damage.                                  NOTE: Children less than 1 year old may have higher baseline troponin                 levels and results should be  interpreted in conjunction with the overall                 clinical context.                                 NOTE: Troponin I testing is performed using a different                 testing methodology at [facility] than at other                 [facility] hospitals. Direct result comparisons should only                 be made within the same method.  LIPASE - Normal     Lipase                        60                       Narrative: Venipuncture immediately after or during the administration of Metamizole may lead to falsely low results. Testing should be performed immediately prior to Metamizole dosing.  HEPATIC FUNCTION PANEL - Normal     Albumin                       3.8                    Bilirubin, Total              1.2                    Bilirubin, Direct             0.2                    Alkaline Phosphatase          64                     ALT                           9                      AST                           21                     Total Protein                 6.9                 MAGNESIUM - Normal     Magnesium                     1.61                LACTATE - Normal     Lactate                       1.5                      Narrative: Venipuncture immediately after or during the administration of Metamizole may lead to falsely low results. Testing should be performed immediately                prior to Metamizole dosing.  URINALYSIS WITH REFLEX MICROSCOPIC AND CULTURE     CT abdomen pelvis wo IV contrast   Final Result    1.  Findings compatible with acute mechanical small bowel obstruction    potentially related to adhesion.    2. There is an adjacent small left-sided ventral wall hernia which    contains minimal fat and fluid within the hernia sac which may or may    not be related. Attention recommended on clinical assessment.    3. Newly seen mild superior likely recent superior L2 compressive    deformity.                Signed by: Carlos Augustin 12/6/2023 12:24 PM     Dictation workstation:   [id number]     XR chest 1 view   Final Result    1.  Abnormal distention of the stomach and bowel.    2. The lungs are clear.                      MACRO:    None          Signed by: Carlos Augustin 12/6/2023 12:07 PM    Dictation workstation:   [id number]                                  No data recorded                Patient History   Past Medical History:   Diagnosis Date    Atrial fibrillation (CMS/HCC)     Diabetes mellitus (CMS/HCC)      Past Surgical History:   Procedure Laterality Date    CHOLECYSTECTOMY      HIATAL HERNIA REPAIR      HYSTERECTOMY       No family history on file.  Social History     Tobacco Use    Smoking status: Never     Passive exposure: Never    Smokeless tobacco: Never   Vaping Use    Vaping Use: Never used   Substance Use Topics    Alcohol use: Never    Drug use: Never       Physical Exam   ED Triage Vitals [12/06/23 1117]   Temp Heart Rate Resp BP   36 °C (96.8 °F) 104 20 115/69      SpO2 Temp Source Heart Rate Source Patient Position   98 % Temporal Monitor --      BP Location FiO2 (%)     -- --       Physical Exam    ED Course & MDM   Diagnoses as of 12/06/23 1458   Small bowel obstruction (CMS/HCC)       Medical Decision Making      Procedure  Procedures     Teodora Gurrola MD  12/06/23 1450

## 2023-12-06 NOTE — H&P (VIEW-ONLY)
"Reason For Consult  SBO    History Of Present Illness    Harper Lara is a 78 y.o. female with SBO; patient is a poor historian; history of open cholecystectomy, hysterectomy, SBR and VHR repair x 2. In June 2010, she underwent laparotomy, small bowel resection and repair of recurrent ventral hernia at the [facility]. Per her, she was brought to the hospital by her granddaughter. She reports diffuse, sharp, constant abdominal pain for at least 3-4 days with no relieving factor; \"I just hurt everywhere\". Reports two vomiting episodes today. Per her, she has not had any bowel movement for 1 wk. Denies fevers and chills. CT abd/pelvis shows fat containing ventral hernia and dilated loops of small bowel consistent with SBO.    Past Medical History  Type 2 diabetes mellitus, fatty liver, hyperlipidemia, hypertension, A-fib status post ablation, hypothyroidism, anxiety and depression, history of GI bleed    Surgical History  Open cholecystedctomy, hysterectomy and VHR repair x 2 and SBR ( In June 2010, she underwent laparotomy, small bowel resection and repair of recurrent ventral hernia at the [facility])       Social History  She reports that she has never smoked. She has never been exposed to tobacco smoke. She has never used smokeless tobacco. She reports that she does not drink alcohol and does not use drugs.; she lives with      Family History  No family history on file.     Allergies  Diphenhydramine hcl, Morphine, Pentazocine, Propoxyphene, Propoxyphene n-acetaminophen, and Ranitidine    Review of Systems  Constitutional: Negative.    HENT: Negative.     Eyes: Negative.    Respiratory: Negative.     Cardiovascular: Negative.    Gastrointestinal:  Positive for abdominal pain, nausea and vomiting.   Endocrine: Negative.    Musculoskeletal: Negative.    Skin: Negative.    Allergic/Immunologic: Negative.    All other systems reviewed and are negative.       Physical Exam  Constitutional: no acute " "distress, well appearing and well nourished  Eyes: conjunctiva and lids with no erythema, swelling or discharge; EOMI  Ears, Nose, Mouth and Throat: external inspection of ears and nose are normal  Neck: supple, no mass or adenopathy, full range of motion; thyroid not enlarged and no palpable nodules  Pulmonary: normal respiratory effort; clear to auscultation bilaterally, no wheezes or bronchi   Cardiovascular: regular rate and rhythm, no murmurs or extra-heart sounds; pedal pulses are normal; no extremities edema or varicosities, no peripheral edema  Abdomen: soft, distended, moderatel diffuse tenderness; healed right subcostal and midline incision, no palpable hernia defect,  Musculoskeletal: digits and nails normal without clubbing or cyanosis; Joints, bones and muscles are normal with normal range of motion  Skin: normal without rashes or lesion  Neurologic: cranial nerve II-XII intact grossly  Psychiatric: oriented to person, place and time     Last Recorded Vitals  Blood pressure 135/60, pulse 88, temperature 36 °C (96.8 °F), temperature source Temporal, resp. rate 16, height 1.6 m (5' 3\"), weight 97.5 kg (215 lb), SpO2 97 %.    Relevant Results  Results for orders placed or performed during the hospital encounter of 12/06/23 (from the past 24 hour(s))   Lipase   Result Value Ref Range    Lipase 60 9 - 82 U/L   Lactate   Result Value Ref Range    Lactate 2.5 (H) 0.4 - 2.0 mmol/L   Hepatic Function Panel   Result Value Ref Range    Albumin 3.8 3.4 - 5.0 g/dL    Bilirubin, Total 1.2 0.0 - 1.2 mg/dL    Bilirubin, Direct 0.2 0.0 - 0.3 mg/dL    Alkaline Phosphatase 64 33 - 136 U/L    ALT 9 7 - 45 U/L    AST 21 9 - 39 U/L    Total Protein 6.9 6.4 - 8.2 g/dL   Magnesium   Result Value Ref Range    Magnesium 1.61 1.60 - 2.40 mg/dL   Basic Metabolic Panel   Result Value Ref Range    Glucose 168 (H) 74 - 99 mg/dL    Sodium 134 (L) 136 - 145 mmol/L    Potassium 3.3 (L) 3.5 - 5.3 mmol/L    Chloride 88 (L) 98 - 107 mmol/L "    Bicarbonate 33 (H) 21 - 32 mmol/L    Anion Gap 16 10 - 20 mmol/L    Urea Nitrogen 72 (H) 6 - 23 mg/dL    Creatinine 1.81 (H) 0.50 - 1.05 mg/dL    eGFR 29 (L) >60 mL/min/1.73m*2    Calcium 9.2 8.6 - 10.3 mg/dL   CBC and Auto Differential   Result Value Ref Range    WBC 5.8 4.4 - 11.3 x10*3/uL    nRBC 0.0 0.0 - 0.0 /100 WBCs    RBC 4.35 4.00 - 5.20 x10*6/uL    Hemoglobin 12.9 12.0 - 16.0 g/dL    Hematocrit 37.9 36.0 - 46.0 %    MCV 87 80 - 100 fL    MCH 29.7 26.0 - 34.0 pg    MCHC 34.0 32.0 - 36.0 g/dL    RDW 15.2 (H) 11.5 - 14.5 %    Platelets 279 150 - 450 x10*3/uL    Neutrophils % 78.8 40.0 - 80.0 %    Immature Granulocytes %, Automated 0.3 0.0 - 0.9 %    Lymphocytes % 12.8 13.0 - 44.0 %    Monocytes % 6.9 2.0 - 10.0 %    Eosinophils % 0.9 0.0 - 6.0 %    Basophils % 0.3 0.0 - 2.0 %    Neutrophils Absolute 4.56 1.60 - 5.50 x10*3/uL    Immature Granulocytes Absolute, Automated 0.02 0.00 - 0.50 x10*3/uL    Lymphocytes Absolute 0.74 (L) 0.80 - 3.00 x10*3/uL    Monocytes Absolute 0.40 0.05 - 0.80 x10*3/uL    Eosinophils Absolute 0.05 0.00 - 0.40 x10*3/uL    Basophils Absolute 0.02 0.00 - 0.10 x10*3/uL   Troponin I, High Sensitivity   Result Value Ref Range    Troponin I, High Sensitivity 30 (H) 0 - 13 ng/L   Light Blue Top   Result Value Ref Range    Extra Tube Hold for add-ons.    Lavender Top   Result Value Ref Range    Extra Tube Hold for add-ons.    Lactate   Result Value Ref Range    Lactate 1.5 0.4 - 2.0 mmol/L   Troponin I, High Sensitivity   Result Value Ref Range    Troponin I, High Sensitivity 33 (H) 0 - 13 ng/L   TSH with reflex to Free T4 if abnormal   Result Value Ref Range    Thyroid Stimulating Hormone 3.34 0.44 - 3.98 mIU/L    XR chest 1 view    Result Date: 12/6/2023  Interpreted By:  Jonah Sweet, STUDY: XR CHEST 1 VIEW;  12/6/2023 3:16 pm   INDICATION: Signs/Symptoms:confirm NG placement.   COMPARISON: Earlier same day   ACCESSION NUMBER(S): [id number]   ORDERING CLINICIAN: ANTONIO KILPATRICK    FINDINGS: Please see the impression       1.  NG tube ends in the gastric fundus.   Signed by: Jonah Sweet 12/6/2023 3:20 PM Dictation workstation:   [id number]    CT abdomen pelvis wo IV contrast    Result Date: 12/6/2023  Interpreted By:  Carlso Augustin, STUDY: CT ABDOMEN PELVIS WO IV CONTRAST;  12/6/2023 11:59 am   INDICATION: Signs/Symptoms:vomiting.   COMPARISON: Selected examinations as far back as July 25, 2021 CT abdomen and pelvis including September 20, 2023 CT chest abdomen and pelvis.   ACCESSION NUMBER(S): [id number]   ORDERING CLINICIAN: ANTONIO KILPATRICK   TECHNIQUE: CT of the abdomen and pelvis was performed. Contiguous axial images were obtained at 3 mm slice thickness through the abdomen and pelvis. Coronal and sagittal reconstructions at 3 mm slice thickness were performed.  No intravenous or oral contrast agents were administered.   FINDINGS: Please note that the evaluation of vessels, lymph nodes and organs is limited without intravenous contrast.   LOWER CHEST: Newly seen elevation left hemidiaphragm related to gastric distension. No pneumonia, edema, or effusion. There is increased mural prominence distal esophagus of indeterminate cause, potentially related to underdistention or inflammation.   ABDOMEN:   LIVER: The liver demonstrates homogeneous attenuation without evidence of focal liver lesions.   BILE DUCTS: Unchanged prominence of the intra and extrahepatic bile ducts without evident obstructing mass or calculus compatible with age and prior cholecystectomy.   GALLBLADDER: Absent   PANCREAS: Grossly unchanged with partial fatty replacement.   SPLEEN: Within normal limits.   ADRENAL GLANDS: Within normal limits.   Unchanged small homogeneous hypodensities most compatible with cysts. No evident hydronephrosis, or calculus.   PELVIS:   BLADDER: Unremarkable   REPRODUCTIVE ORGANS: No evident pelvic mass. The uterus and ovaries are not identified.   BOWEL: There is newly seen marked  dilation of the stomach and portion of the small bowel with decompressed distal small bowel. There is evident tapering anterior pelvis just to the left of midline series 21, image 120. The pattern favors a mechanical obstruction potentially related to adhesion. There is a small nearby left anterior fat containing hernia series 21, image 104. There is mild fluid within the hernia sac image 111 there are multiple adjacent densities likely suture and or calcifications anterior peritoneal cavity. No apparent pneumatosis. The dilated fluid-filled small bowel measures up to 5.7 cm caliber series 21, image 90. The decompressed distal small bowel measures less than 1 cm. The colon is predominantly decompressed. Patient is status post surgery with suture near the ileocolonic junction.   VESSELS: Normal caliber. Minimal scattered arterial vascular calcifications.   PERITONEUM/RETROPERITONEUM/LYMPH NODES: No ascites or free air, no fluid collection.  No enlarged mesenteric lymph nodes.   BONES: Compared with September 20 newly seen mild inferior compressive deformity L2 with new bandlike sclerosis midportion compatible with impaction deformity or healing response without significant retropulsion. Stable deformity superior L3. There is osseous demineralization. There are scattered degenerative changes.       1.  Findings compatible with acute mechanical small bowel obstruction potentially related to adhesion. 2. There is an adjacent small left-sided ventral wall hernia which contains minimal fat and fluid within the hernia sac which may or may not be related. Attention recommended on clinical assessment. 3. Newly seen mild superior likely recent superior L2 compressive deformity.     Signed by: Carlos Augustin 12/6/2023 12:24 PM Dictation workstation:   [id number]    XR chest 1 view    Result Date: 12/6/2023  Interpreted By:  Carlos Augustin, STUDY: XR CHEST 1 VIEW;  12/6/2023 11:58 am   INDICATION: .   COMPARISON: December  19, 2021 chest radiograph. September 20, 2023 CT chest abdomen and pelvis. Same day CT abdomen and pelvis   ACCESSION NUMBER(S): [id number]   ORDERING CLINICIAN: ANTONIO KILPATRICK   FINDINGS: AP radiograph of the chest was provided.       CARDIOMEDIASTINAL SILHOUETTE: Unchanged cardiomediastinal silhouette.   LUNGS: Lungs are clear.   ABDOMEN: Newly seen marked gaseous distention of the stomach distention of portion of the bowel.   BONES: No acute osseous changes.       1.  Abnormal distention of the stomach and bowel. 2. The lungs are clear.       MACRO: None   Signed by: Carlos Augustin 12/6/2023 12:07 PM Dictation workstation:   [id number]       Assessment/Plan   78-year-old female patient history of multiple medical problems and multiple abdominal surgeries (open cholecystectomy, hysterectomy, small bowel resection and recurrent ventral hernia repair x 2) with SBO.  I have reviewed the labs and CT abdomen/pelvis.  Dilated loops of small bowel.  Nasogastric tube will be placed/IVF/ serial abdominal exam.  We talked about indications of surgery which are evidence of sepsis or failure of the bowel obstruction to resolve.  All questions answered.        Manuel Lozada MD

## 2023-12-07 ENCOUNTER — APPOINTMENT (OUTPATIENT)
Dept: RADIOLOGY | Facility: HOSPITAL | Age: 78
DRG: 330 | End: 2023-12-07
Payer: MEDICARE

## 2023-12-07 LAB
ALBUMIN SERPL BCP-MCNC: 3 G/DL (ref 3.4–5)
ALP SERPL-CCNC: 48 U/L (ref 33–136)
ALT SERPL W P-5'-P-CCNC: 8 U/L (ref 7–45)
ANION GAP SERPL CALC-SCNC: 14 MMOL/L (ref 10–20)
AST SERPL W P-5'-P-CCNC: 16 U/L (ref 9–39)
BILIRUB SERPL-MCNC: 0.7 MG/DL (ref 0–1.2)
BUN SERPL-MCNC: 70 MG/DL (ref 6–23)
CALCIUM SERPL-MCNC: 8.3 MG/DL (ref 8.6–10.3)
CHLORIDE SERPL-SCNC: 97 MMOL/L (ref 98–107)
CO2 SERPL-SCNC: 32 MMOL/L (ref 21–32)
CREAT SERPL-MCNC: 1.74 MG/DL (ref 0.5–1.05)
ERYTHROCYTE [DISTWIDTH] IN BLOOD BY AUTOMATED COUNT: 15.3 % (ref 11.5–14.5)
GFR SERPL CREATININE-BSD FRML MDRD: 30 ML/MIN/1.73M*2
GLUCOSE BLD MANUAL STRIP-MCNC: 106 MG/DL (ref 74–99)
GLUCOSE BLD MANUAL STRIP-MCNC: 107 MG/DL (ref 74–99)
GLUCOSE BLD MANUAL STRIP-MCNC: 109 MG/DL (ref 74–99)
GLUCOSE BLD MANUAL STRIP-MCNC: 110 MG/DL (ref 74–99)
GLUCOSE BLD MANUAL STRIP-MCNC: 114 MG/DL (ref 74–99)
GLUCOSE BLD MANUAL STRIP-MCNC: 130 MG/DL (ref 74–99)
GLUCOSE SERPL-MCNC: 107 MG/DL (ref 74–99)
HCT VFR BLD AUTO: 29.8 % (ref 36–46)
HGB BLD-MCNC: 9.6 G/DL (ref 12–16)
HOLD SPECIMEN: NORMAL
MAGNESIUM SERPL-MCNC: 1.66 MG/DL (ref 1.6–2.4)
MCH RBC QN AUTO: 29.2 PG (ref 26–34)
MCHC RBC AUTO-ENTMCNC: 32.2 G/DL (ref 32–36)
MCV RBC AUTO: 91 FL (ref 80–100)
NRBC BLD-RTO: 0 /100 WBCS (ref 0–0)
PHOSPHATE SERPL-MCNC: 3.5 MG/DL (ref 2.5–4.9)
PLATELET # BLD AUTO: 175 X10*3/UL (ref 150–450)
POTASSIUM SERPL-SCNC: 3 MMOL/L (ref 3.5–5.3)
PROT SERPL-MCNC: 5.4 G/DL (ref 6.4–8.2)
RBC # BLD AUTO: 3.29 X10*6/UL (ref 4–5.2)
SODIUM SERPL-SCNC: 140 MMOL/L (ref 136–145)
WBC # BLD AUTO: 4.3 X10*3/UL (ref 4.4–11.3)

## 2023-12-07 PROCEDURE — 1210000001 HC SEMI-PRIVATE ROOM DAILY

## 2023-12-07 PROCEDURE — 85027 COMPLETE CBC AUTOMATED: CPT | Performed by: INTERNAL MEDICINE

## 2023-12-07 PROCEDURE — 80053 COMPREHEN METABOLIC PANEL: CPT | Performed by: INTERNAL MEDICINE

## 2023-12-07 PROCEDURE — 74250 X-RAY XM SM INT 1CNTRST STD: CPT

## 2023-12-07 PROCEDURE — 99232 SBSQ HOSP IP/OBS MODERATE 35: CPT | Performed by: INTERNAL MEDICINE

## 2023-12-07 PROCEDURE — 36415 COLL VENOUS BLD VENIPUNCTURE: CPT | Performed by: INTERNAL MEDICINE

## 2023-12-07 PROCEDURE — 84100 ASSAY OF PHOSPHORUS: CPT | Performed by: INTERNAL MEDICINE

## 2023-12-07 PROCEDURE — 82947 ASSAY GLUCOSE BLOOD QUANT: CPT

## 2023-12-07 PROCEDURE — 74250 X-RAY XM SM INT 1CNTRST STD: CPT | Performed by: RADIOLOGY

## 2023-12-07 PROCEDURE — 99231 SBSQ HOSP IP/OBS SF/LOW 25: CPT | Performed by: SURGERY

## 2023-12-07 PROCEDURE — 83735 ASSAY OF MAGNESIUM: CPT | Performed by: INTERNAL MEDICINE

## 2023-12-07 PROCEDURE — 2500000004 HC RX 250 GENERAL PHARMACY W/ HCPCS (ALT 636 FOR OP/ED): Performed by: INTERNAL MEDICINE

## 2023-12-07 PROCEDURE — 2550000001 HC RX 255 CONTRASTS: Performed by: REGISTERED NURSE

## 2023-12-07 PROCEDURE — 2500000005 HC RX 250 GENERAL PHARMACY W/O HCPCS: Performed by: INTERNAL MEDICINE

## 2023-12-07 RX ORDER — POTASSIUM CHLORIDE 14.9 MG/ML
20 INJECTION INTRAVENOUS ONCE
Status: COMPLETED | OUTPATIENT
Start: 2023-12-07 | End: 2023-12-07

## 2023-12-07 RX ORDER — SODIUM CHLORIDE AND POTASSIUM CHLORIDE 150; 450 MG/100ML; MG/100ML
150 INJECTION, SOLUTION INTRAVENOUS CONTINUOUS
Status: DISCONTINUED | OUTPATIENT
Start: 2023-12-07 | End: 2023-12-09

## 2023-12-07 RX ADMIN — METOPROLOL TARTRATE 2.5 MG: 5 INJECTION INTRAVENOUS at 12:37

## 2023-12-07 RX ADMIN — METOPROLOL TARTRATE 2.5 MG: 5 INJECTION INTRAVENOUS at 22:32

## 2023-12-07 RX ADMIN — POTASSIUM CHLORIDE 20 MEQ: 14.9 INJECTION, SOLUTION INTRAVENOUS at 12:24

## 2023-12-07 RX ADMIN — DIATRIZOATE MEGLUMINE AND DIATRIZOATE SODIUM 60 ML: 660; 100 LIQUID ORAL; RECTAL at 09:46

## 2023-12-07 RX ADMIN — DIATRIZOATE MEGLUMINE AND DIATRIZOATE SODIUM 60 ML: 660; 100 LIQUID ORAL; RECTAL at 10:00

## 2023-12-07 RX ADMIN — DIATRIZOATE MEGLUMINE AND DIATRIZOATE SODIUM 60 ML: 660; 100 LIQUID ORAL; RECTAL at 11:02

## 2023-12-07 RX ADMIN — DIATRIZOATE MEGLUMINE AND DIATRIZOATE SODIUM 60 ML: 660; 100 LIQUID ORAL; RECTAL at 10:17

## 2023-12-07 RX ADMIN — DIATRIZOATE MEGLUMINE AND DIATRIZOATE SODIUM 60 ML: 660; 100 LIQUID ORAL; RECTAL at 10:46

## 2023-12-07 RX ADMIN — ONDANSETRON 4 MG: 2 INJECTION INTRAMUSCULAR; INTRAVENOUS at 17:48

## 2023-12-07 RX ADMIN — POTASSIUM CHLORIDE AND SODIUM CHLORIDE 100 ML/HR: 450; 150 INJECTION, SOLUTION INTRAVENOUS at 12:19

## 2023-12-07 RX ADMIN — METOPROLOL TARTRATE 2.5 MG: 5 INJECTION INTRAVENOUS at 00:37

## 2023-12-07 RX ADMIN — METOPROLOL TARTRATE 2.5 MG: 5 INJECTION INTRAVENOUS at 06:13

## 2023-12-07 RX ADMIN — DIATRIZOATE MEGLUMINE AND DIATRIZOATE SODIUM 60 ML: 660; 100 LIQUID ORAL; RECTAL at 10:32

## 2023-12-07 SDOH — SOCIAL STABILITY: SOCIAL INSECURITY: WERE YOU ABLE TO COMPLETE ALL THE BEHAVIORAL HEALTH SCREENINGS?: YES

## 2023-12-07 SDOH — SOCIAL STABILITY: SOCIAL INSECURITY: ARE YOU OR HAVE YOU BEEN THREATENED OR ABUSED PHYSICALLY, EMOTIONALLY, OR SEXUALLY BY ANYONE?: NO

## 2023-12-07 SDOH — SOCIAL STABILITY: SOCIAL INSECURITY: ARE THERE ANY APPARENT SIGNS OF INJURIES/BEHAVIORS THAT COULD BE RELATED TO ABUSE/NEGLECT?: NO

## 2023-12-07 SDOH — SOCIAL STABILITY: SOCIAL INSECURITY: DO YOU FEEL ANYONE HAS EXPLOITED OR TAKEN ADVANTAGE OF YOU FINANCIALLY OR OF YOUR PERSONAL PROPERTY?: NO

## 2023-12-07 SDOH — SOCIAL STABILITY: SOCIAL INSECURITY: DOES ANYONE TRY TO KEEP YOU FROM HAVING/CONTACTING OTHER FRIENDS OR DOING THINGS OUTSIDE YOUR HOME?: NO

## 2023-12-07 SDOH — SOCIAL STABILITY: SOCIAL INSECURITY: ABUSE: ADULT

## 2023-12-07 SDOH — SOCIAL STABILITY: SOCIAL INSECURITY: DO YOU FEEL UNSAFE GOING BACK TO THE PLACE WHERE YOU ARE LIVING?: NO

## 2023-12-07 SDOH — SOCIAL STABILITY: SOCIAL INSECURITY: HAVE YOU HAD THOUGHTS OF HARMING ANYONE ELSE?: NO

## 2023-12-07 SDOH — SOCIAL STABILITY: SOCIAL INSECURITY: HAS ANYONE EVER THREATENED TO HURT YOUR FAMILY OR YOUR PETS?: NO

## 2023-12-07 ASSESSMENT — COGNITIVE AND FUNCTIONAL STATUS - GENERAL
MOVING TO AND FROM BED TO CHAIR: A LITTLE
TURNING FROM BACK TO SIDE WHILE IN FLAT BAD: A LITTLE
WALKING IN HOSPITAL ROOM: A LITTLE
PERSONAL GROOMING: A LITTLE
MOVING FROM LYING ON BACK TO SITTING ON SIDE OF FLAT BED WITH BEDRAILS: A LITTLE
TURNING FROM BACK TO SIDE WHILE IN FLAT BAD: A LITTLE
MOVING TO AND FROM BED TO CHAIR: A LITTLE
DRESSING REGULAR LOWER BODY CLOTHING: A LITTLE
DAILY ACTIVITIY SCORE: 19
HELP NEEDED FOR BATHING: A LITTLE
MOBILITY SCORE: 18
TOILETING: A LITTLE
HELP NEEDED FOR BATHING: A LITTLE
STANDING UP FROM CHAIR USING ARMS: A LITTLE
DRESSING REGULAR UPPER BODY CLOTHING: A LITTLE
WALKING IN HOSPITAL ROOM: A LITTLE
DRESSING REGULAR LOWER BODY CLOTHING: A LITTLE
DAILY ACTIVITIY SCORE: 19
PERSONAL GROOMING: A LITTLE
PATIENT BASELINE BEDBOUND: NO
STANDING UP FROM CHAIR USING ARMS: A LITTLE
CLIMB 3 TO 5 STEPS WITH RAILING: A LITTLE
MOBILITY SCORE: 18
TOILETING: A LITTLE
DRESSING REGULAR UPPER BODY CLOTHING: A LITTLE
CLIMB 3 TO 5 STEPS WITH RAILING: A LITTLE
MOVING FROM LYING ON BACK TO SITTING ON SIDE OF FLAT BED WITH BEDRAILS: A LITTLE

## 2023-12-07 ASSESSMENT — PATIENT HEALTH QUESTIONNAIRE - PHQ9
SUM OF ALL RESPONSES TO PHQ9 QUESTIONS 1 & 2: 0
1. LITTLE INTEREST OR PLEASURE IN DOING THINGS: NOT AT ALL
2. FEELING DOWN, DEPRESSED OR HOPELESS: NOT AT ALL

## 2023-12-07 ASSESSMENT — PAIN SCALES - GENERAL: PAINLEVEL_OUTOF10: 0 - NO PAIN

## 2023-12-07 ASSESSMENT — PAIN - FUNCTIONAL ASSESSMENT: PAIN_FUNCTIONAL_ASSESSMENT: 0-10

## 2023-12-07 ASSESSMENT — LIFESTYLE VARIABLES
HOW OFTEN DO YOU HAVE A DRINK CONTAINING ALCOHOL: NEVER
HOW OFTEN DO YOU HAVE 6 OR MORE DRINKS ON ONE OCCASION: NEVER
AUDIT-C TOTAL SCORE: 0
AUDIT-C TOTAL SCORE: 0
SKIP TO QUESTIONS 9-10: 1
HOW MANY STANDARD DRINKS CONTAINING ALCOHOL DO YOU HAVE ON A TYPICAL DAY: PATIENT DOES NOT DRINK

## 2023-12-07 NOTE — PROGRESS NOTES
"Harper Lara is a 78 y.o. female on day 1 of admission presenting with Small bowel obstruction (CMS/HCC).     Subjective   Patient resting comfortably in bed, no acute events overnight.       Objective     Physical Exam  Vitals reviewed.   Constitutional:       Appearance: Normal appearance.   HENT:      Head: Normocephalic.      Nose: Nose normal.      Mouth/Throat:      Mouth: Mucous membranes are moist.   Eyes:      Pupils: Pupils are equal, round, and reactive to light.   Cardiovascular:      Rate and Rhythm: Normal rate and regular rhythm.   Pulmonary:      Effort: Pulmonary effort is normal.      Breath sounds: Normal breath sounds.   Abdominal:      General: Abdomen is flat. Bowel sounds are decreased.      Palpations: Abdomen is soft.   Musculoskeletal:         General: Normal range of motion.      Cervical back: Normal range of motion.   Skin:     General: Skin is warm.      Capillary Refill: Capillary refill takes less than 2 seconds.   Neurological:      General: No focal deficit present.      Mental Status: She is alert and oriented to person, place, and time.   Psychiatric:         Mood and Affect: Mood normal.       Last Recorded Vitals  Blood pressure 124/60, pulse 83, temperature 36.3 °C (97.3 °F), resp. rate 16, height 1.6 m (5' 3\"), weight 97.5 kg (215 lb), SpO2 96 %.  Intake/Output last 3 Shifts:  I/O last 3 completed shifts:  In: 1650 (16.9 mL/kg) [IV Piggyback:1650]  Out: 2200 (22.6 mL/kg) [Emesis/NG output:300; Other:1900]  Weight: 97.5 kg     Relevant Results  Lab Results   Component Value Date    GLUCOSE 107 (H) 12/07/2023    CALCIUM 8.3 (L) 12/07/2023     12/07/2023    K 3.0 (L) 12/07/2023    CO2 32 12/07/2023    CL 97 (L) 12/07/2023    BUN 70 (H) 12/07/2023    CREATININE 1.74 (H) 12/07/2023      Lab Results   Component Value Date    WBC 4.3 (L) 12/07/2023    HGB 9.6 (L) 12/07/2023    HCT 29.8 (L) 12/07/2023    MCV 91 12/07/2023     12/07/2023     Assessment/Plan "   Subjective  Patient resting comfortably in bed.  Patient denies nausea, vomiting, abdominal pain.  Patient states she is not passing gas, last bowel movement was on Tuesday.  She states that she is definitely feeling better than she did yesterday.  Patient has 450 NG output in canister.  Spoke of POC with patient, and left her know an SBFT was ordered.     Impression  SBO    Plan  -Pain control  -Nausea control  -NPO/IVF/NG  -SBFT today    Principal Problem:    Small bowel obstruction (CMS/HCC)    Plan of care was discussed extensively with patient. Patient verbalized understanding through teach back method. All questions and concerns addressed upon examination.     Of note, this documentation is completed using the Dragon Dictation system (voice recognition software). There may be spelling and/or grammatical errors that were not corrected prior to final submission.      YARITZA Narvaez-CNP    Patient seen and examined. I have reviewed and discussed APRN note. SBFT in process; abdominal pain better; no flatus or bowel movement; continue NPO/IVF/NG; further recommendation after the SBFT result

## 2023-12-07 NOTE — PROGRESS NOTES
Harper Lara is a 78 y.o. female on day 1 of admission presenting with Small bowel obstruction (CMS/HCC).      Subjective   Patient seen and evaluated today.  She states she feels better since coming to the hospital.  She is undergoing small bowel series.  NG tube in place with significant amount of of greenish drainage.  Patient denies any mild pain and has not had any bowel movements.       Objective     Last Recorded Vitals  /62   Pulse 84   Temp 36.3 °C (97.3 °F)   Resp 16   Wt 97.5 kg (215 lb)   SpO2 96%   Intake/Output last 3 Shifts:    Intake/Output Summary (Last 24 hours) at 12/7/2023 1355  Last data filed at 12/7/2023 0700  Gross per 24 hour   Intake 1650 ml   Output 2450 ml   Net -800 ml       Admission Weight  Weight: 97.5 kg (215 lb) (12/06/23 1117)    Daily Weight  12/06/23 : 97.5 kg (215 lb)    Image Results  XR chest 1 view  Narrative: Interpreted By:  Jonah Sweet,   STUDY:  XR CHEST 1 VIEW;  12/6/2023 3:16 pm      INDICATION:  Signs/Symptoms:confirm NG placement.      COMPARISON:  Earlier same day      ACCESSION NUMBER(S):  [id number]      ORDERING CLINICIAN:  ANTONIO KILPATRICK      FINDINGS:  Please see the impression      Impression: 1.  NG tube ends in the gastric fundus.      Signed by: Jonah Sweet 12/6/2023 3:20 PM  Dictation workstation:   [id number]  CT abdomen pelvis wo IV contrast  Narrative: Interpreted By:  Carlos Augustin,   STUDY:  CT ABDOMEN PELVIS WO IV CONTRAST;  12/6/2023 11:59 am      INDICATION:  Signs/Symptoms:vomiting.      COMPARISON:  Selected examinations as far back as July 25, 2021 CT abdomen and  pelvis including September 20, 2023 CT chest abdomen and pelvis.      ACCESSION NUMBER(S):  [id number]      ORDERING CLINICIAN:  ANTONIO KILPATRICK      TECHNIQUE:  CT of the abdomen and pelvis was performed. Contiguous axial images  were obtained at 3 mm slice thickness through the abdomen and pelvis.  Coronal and sagittal reconstructions at 3 mm slice thickness  were  performed.  No intravenous or oral contrast agents were administered.      FINDINGS:  Please note that the evaluation of vessels, lymph nodes and organs is  limited without intravenous contrast.      LOWER CHEST:  Newly seen elevation left hemidiaphragm related to gastric  distension. No pneumonia, edema, or effusion. There is increased  mural prominence distal esophagus of indeterminate cause, potentially  related to underdistention or inflammation.      ABDOMEN:      LIVER:  The liver demonstrates homogeneous attenuation without evidence of  focal liver lesions.      BILE DUCTS:  Unchanged prominence of the intra and extrahepatic bile ducts without  evident obstructing mass or calculus compatible with age and prior  cholecystectomy.      GALLBLADDER:  Absent      PANCREAS:  Grossly unchanged with partial fatty replacement.      SPLEEN:  Within normal limits.      ADRENAL GLANDS:  Within normal limits.      Unchanged small homogeneous hypodensities most compatible with cysts.  No evident hydronephrosis, or calculus.      PELVIS:      BLADDER:  Unremarkable      REPRODUCTIVE ORGANS:  No evident pelvic mass. The uterus and ovaries are not identified.      BOWEL:  There is newly seen marked dilation of the stomach and portion of the  small bowel with decompressed distal small bowel. There is evident  tapering anterior pelvis just to the left of midline series 21, image  120. The pattern favors a mechanical obstruction potentially related  to adhesion. There is a small nearby left anterior fat containing  hernia series 21, image 104. There is mild fluid within the hernia  sac image 111 there are multiple adjacent densities likely suture and  or calcifications anterior peritoneal cavity. No apparent  pneumatosis. The dilated fluid-filled small bowel measures up to 5.7  cm caliber series 21, image 90. The decompressed distal small bowel  measures less than 1 cm. The colon is predominantly decompressed.  Patient is  status post surgery with suture near the ileocolonic  junction.      VESSELS:  Normal caliber. Minimal scattered arterial vascular calcifications.      PERITONEUM/RETROPERITONEUM/LYMPH NODES:  No ascites or free air, no fluid collection.  No enlarged mesenteric  lymph nodes.      BONES:  Compared with September 20 newly seen mild inferior compressive  deformity L2 with new bandlike sclerosis midportion compatible with  impaction deformity or healing response without significant  retropulsion. Stable deformity superior L3. There is osseous  demineralization. There are scattered degenerative changes.      Impression: 1.  Findings compatible with acute mechanical small bowel obstruction  potentially related to adhesion.  2. There is an adjacent small left-sided ventral wall hernia which  contains minimal fat and fluid within the hernia sac which may or may  not be related. Attention recommended on clinical assessment.  3. Newly seen mild superior likely recent superior L2 compressive  deformity.          Signed by: Carlos Augustin 12/6/2023 12:24 PM  Dictation workstation:   [id number]  XR chest 1 view  Narrative: Interpreted By:  Carlos Augustin,   STUDY:  XR CHEST 1 VIEW;  12/6/2023 11:58 am      INDICATION:  .      COMPARISON:  December 19, 2021 chest radiograph. September 20, 2023 CT chest  abdomen and pelvis. Same day CT abdomen and pelvis      ACCESSION NUMBER(S):  [id number]      ORDERING CLINICIAN:  ANTONIO KILPATRICK      FINDINGS:  AP radiograph of the chest was provided.              CARDIOMEDIASTINAL SILHOUETTE:  Unchanged cardiomediastinal silhouette.      LUNGS:  Lungs are clear.      ABDOMEN:  Newly seen marked gaseous distention of the stomach distention of  portion of the bowel.      BONES:  No acute osseous changes.      Impression: 1.  Abnormal distention of the stomach and bowel.  2. The lungs are clear.              MACRO:  None      Signed by: Carlos Augustin 12/6/2023 12:07 PM  Dictation  workstation:   [id number]      Physical Exam  Constitutional:       Appearance: She is obese.   HENT:      Head: Normocephalic and atraumatic.      Nose: Nose normal.      Mouth/Throat:      Mouth: Mucous membranes are dry.   Eyes:      Extraocular Movements: Extraocular movements intact.      Conjunctiva/sclera: Conjunctivae normal.   Cardiovascular:      Rate and Rhythm: Normal rate and regular rhythm.      Pulses: Normal pulses.      Heart sounds: Normal heart sounds.   Pulmonary:      Effort: Pulmonary effort is normal.      Breath sounds: Normal breath sounds.   Abdominal:      General: Bowel sounds are normal.      Palpations: Abdomen is soft.      Tenderness: There is abdominal tenderness.      Comments: NG tube in place   Musculoskeletal:         General: Normal range of motion.      Cervical back: Normal range of motion and neck supple.   Skin:     General: Skin is warm and dry.   Neurological:      Mental Status: She is alert. Mental status is at baseline.   Psychiatric:         Mood and Affect: Mood normal.         Relevant Results               Assessment/Plan          78 y.o. female presenting with past medical history of type 2 diabetes mellitus, fatty liver, hyperlipidemia, hypertension, A-fib status post ablation, hypothyroidism, anxiety and depression, history of GI bleed comes to the hospital with abdominal pain and vomiting for past few days.  Patient found to have SBO on the imaging.        Principal Problem:    Small bowel obstruction (CMS/HCC)    Small bowel obstruction  -As seen on CT scan of abdomen pelvis.  -Continue with IV hydration and maintain n.p.o. status at this time.  Avoid switch to potassium containing fluids.  -Surgery has been consulted and NG tube is planned at this time.  Patient undergoing small bowel follow-through today.  -No acute surgical intervention at this time.     SOCORRO with underlying CKD stage III  -Creatinine noted 1.81 baseline is around 1.1.  Slowly  improving  -Continue with IV hydration.     Hypokalemia  -Replace IV and monitor.      Elevated troponin  Atrial fibrillation  -Serial troponin and serial EKGs.   -Since NPO will add IV lopressor every 6 hours. Rate not well controlled.   -The patient is chest pain-free at this time.  Continue to monitor.     Hypothyroidism  -Check TSH.  Last 1 about 3 months ago was 2.70.  -Resume oral Synthroid once able.     Chronic systolic congestive heart failure  -Last echo from 2021 shows LVEF of 40%.  -Gentle IV fluid hydration     Diabetes mellitus type 2  -Hold home oral antidiabetics  -Continue with sliding scale insulin n.p.o. protocol.     VTE prophylaxis: SCDs patient history of GI bleed              Brittany Pearson MD

## 2023-12-08 ENCOUNTER — ANESTHESIA (OUTPATIENT)
Dept: OPERATING ROOM | Facility: HOSPITAL | Age: 78
DRG: 330 | End: 2023-12-08
Payer: MEDICARE

## 2023-12-08 ENCOUNTER — ANESTHESIA EVENT (OUTPATIENT)
Dept: OPERATING ROOM | Facility: HOSPITAL | Age: 78
DRG: 330 | End: 2023-12-08
Payer: MEDICARE

## 2023-12-08 ENCOUNTER — APPOINTMENT (OUTPATIENT)
Dept: RADIOLOGY | Facility: HOSPITAL | Age: 78
DRG: 330 | End: 2023-12-08
Payer: MEDICARE

## 2023-12-08 ENCOUNTER — PREP FOR PROCEDURE (OUTPATIENT)
Dept: SURGERY | Facility: HOSPITAL | Age: 78
End: 2023-12-08

## 2023-12-08 PROBLEM — N17.9 ACUTE KIDNEY INJURY (NONTRAUMATIC) (CMS-HCC): Status: ACTIVE | Noted: 2023-12-08

## 2023-12-08 PROBLEM — N18.9 CHRONIC RENAL INSUFFICIENCY: Status: ACTIVE | Noted: 2023-12-08

## 2023-12-08 LAB
ANION GAP SERPL CALC-SCNC: 21 MMOL/L (ref 10–20)
BASOPHILS # BLD AUTO: 0.02 X10*3/UL (ref 0–0.1)
BASOPHILS NFR BLD AUTO: 0.4 %
BUN SERPL-MCNC: 71 MG/DL (ref 6–23)
CALCIUM SERPL-MCNC: 9.2 MG/DL (ref 8.6–10.3)
CHLORIDE SERPL-SCNC: 97 MMOL/L (ref 98–107)
CO2 SERPL-SCNC: 32 MMOL/L (ref 21–32)
CREAT SERPL-MCNC: 1.79 MG/DL (ref 0.5–1.05)
EOSINOPHIL # BLD AUTO: 0.02 X10*3/UL (ref 0–0.4)
EOSINOPHIL NFR BLD AUTO: 0.4 %
ERYTHROCYTE [DISTWIDTH] IN BLOOD BY AUTOMATED COUNT: 15.7 % (ref 11.5–14.5)
GFR SERPL CREATININE-BSD FRML MDRD: 29 ML/MIN/1.73M*2
GLUCOSE BLD MANUAL STRIP-MCNC: 127 MG/DL (ref 74–99)
GLUCOSE BLD MANUAL STRIP-MCNC: 136 MG/DL (ref 74–99)
GLUCOSE BLD MANUAL STRIP-MCNC: 136 MG/DL (ref 74–99)
GLUCOSE BLD MANUAL STRIP-MCNC: 142 MG/DL (ref 74–99)
GLUCOSE BLD MANUAL STRIP-MCNC: 163 MG/DL (ref 74–99)
GLUCOSE SERPL-MCNC: 131 MG/DL (ref 74–99)
HCT VFR BLD AUTO: 34.9 % (ref 36–46)
HGB BLD-MCNC: 11 G/DL (ref 12–16)
HOLD SPECIMEN: NORMAL
IMM GRANULOCYTES # BLD AUTO: 0.04 X10*3/UL (ref 0–0.5)
IMM GRANULOCYTES NFR BLD AUTO: 0.8 % (ref 0–0.9)
LYMPHOCYTES # BLD AUTO: 0.7 X10*3/UL (ref 0.8–3)
LYMPHOCYTES NFR BLD AUTO: 14.3 %
MCH RBC QN AUTO: 28.8 PG (ref 26–34)
MCHC RBC AUTO-ENTMCNC: 31.5 G/DL (ref 32–36)
MCV RBC AUTO: 91 FL (ref 80–100)
MONOCYTES # BLD AUTO: 0.38 X10*3/UL (ref 0.05–0.8)
MONOCYTES NFR BLD AUTO: 7.8 %
NEUTROPHILS # BLD AUTO: 3.74 X10*3/UL (ref 1.6–5.5)
NEUTROPHILS NFR BLD AUTO: 76.3 %
NRBC BLD-RTO: 0 /100 WBCS (ref 0–0)
PLATELET # BLD AUTO: 212 X10*3/UL (ref 150–450)
POTASSIUM SERPL-SCNC: 3.4 MMOL/L (ref 3.5–5.3)
RBC # BLD AUTO: 3.82 X10*6/UL (ref 4–5.2)
SODIUM SERPL-SCNC: 147 MMOL/L (ref 136–145)
WBC # BLD AUTO: 4.9 X10*3/UL (ref 4.4–11.3)

## 2023-12-08 PROCEDURE — 7100000002 HC RECOVERY ROOM TIME - EACH INCREMENTAL 1 MINUTE: Performed by: SURGERY

## 2023-12-08 PROCEDURE — 82947 ASSAY GLUCOSE BLOOD QUANT: CPT

## 2023-12-08 PROCEDURE — 1210000001 HC SEMI-PRIVATE ROOM DAILY

## 2023-12-08 PROCEDURE — 36415 COLL VENOUS BLD VENIPUNCTURE: CPT | Performed by: INTERNAL MEDICINE

## 2023-12-08 PROCEDURE — 2500000004 HC RX 250 GENERAL PHARMACY W/ HCPCS (ALT 636 FOR OP/ED): Performed by: SURGERY

## 2023-12-08 PROCEDURE — 0DNU4ZZ RELEASE OMENTUM, PERCUTANEOUS ENDOSCOPIC APPROACH: ICD-10-PCS | Performed by: SURGERY

## 2023-12-08 PROCEDURE — 96372 THER/PROPH/DIAG INJ SC/IM: CPT | Performed by: SURGERY

## 2023-12-08 PROCEDURE — 0DNB4ZZ RELEASE ILEUM, PERCUTANEOUS ENDOSCOPIC APPROACH: ICD-10-PCS | Performed by: SURGERY

## 2023-12-08 PROCEDURE — 99232 SBSQ HOSP IP/OBS MODERATE 35: CPT | Performed by: INTERNAL MEDICINE

## 2023-12-08 PROCEDURE — 85025 COMPLETE CBC W/AUTO DIFF WBC: CPT | Performed by: INTERNAL MEDICINE

## 2023-12-08 PROCEDURE — 0DN84ZZ RELEASE SMALL INTESTINE, PERCUTANEOUS ENDOSCOPIC APPROACH: ICD-10-PCS | Performed by: SURGERY

## 2023-12-08 PROCEDURE — 44180 LAP ENTEROLYSIS: CPT | Performed by: SURGERY

## 2023-12-08 PROCEDURE — 3600000004 HC OR TIME - INITIAL BASE CHARGE - PROCEDURE LEVEL FOUR: Performed by: SURGERY

## 2023-12-08 PROCEDURE — 3700000002 HC GENERAL ANESTHESIA TIME - EACH INCREMENTAL 1 MINUTE: Performed by: SURGERY

## 2023-12-08 PROCEDURE — 2500000005 HC RX 250 GENERAL PHARMACY W/O HCPCS: Performed by: SURGERY

## 2023-12-08 PROCEDURE — A4217 STERILE WATER/SALINE, 500 ML: HCPCS | Performed by: SURGERY

## 2023-12-08 PROCEDURE — 2500000005 HC RX 250 GENERAL PHARMACY W/O HCPCS: Performed by: INTERNAL MEDICINE

## 2023-12-08 PROCEDURE — 2500000004 HC RX 250 GENERAL PHARMACY W/ HCPCS (ALT 636 FOR OP/ED): Performed by: INTERNAL MEDICINE

## 2023-12-08 PROCEDURE — 74018 RADEX ABDOMEN 1 VIEW: CPT | Performed by: RADIOLOGY

## 2023-12-08 PROCEDURE — 2720000007 HC OR 272 NO HCPCS: Performed by: SURGERY

## 2023-12-08 PROCEDURE — 80048 BASIC METABOLIC PNL TOTAL CA: CPT | Performed by: INTERNAL MEDICINE

## 2023-12-08 PROCEDURE — 2500000005 HC RX 250 GENERAL PHARMACY W/O HCPCS: Performed by: NURSE ANESTHETIST, CERTIFIED REGISTERED

## 2023-12-08 PROCEDURE — 2500000005 HC RX 250 GENERAL PHARMACY W/O HCPCS: Performed by: ANESTHESIOLOGY

## 2023-12-08 PROCEDURE — 3600000009 HC OR TIME - EACH INCREMENTAL 1 MINUTE - PROCEDURE LEVEL FOUR: Performed by: SURGERY

## 2023-12-08 PROCEDURE — 2500000004 HC RX 250 GENERAL PHARMACY W/ HCPCS (ALT 636 FOR OP/ED): Performed by: NURSE ANESTHETIST, CERTIFIED REGISTERED

## 2023-12-08 PROCEDURE — 7100000001 HC RECOVERY ROOM TIME - INITIAL BASE CHARGE: Performed by: SURGERY

## 2023-12-08 PROCEDURE — 94760 N-INVAS EAR/PLS OXIMETRY 1: CPT

## 2023-12-08 PROCEDURE — 3700000001 HC GENERAL ANESTHESIA TIME - INITIAL BASE CHARGE: Performed by: SURGERY

## 2023-12-08 PROCEDURE — 74018 RADEX ABDOMEN 1 VIEW: CPT

## 2023-12-08 RX ORDER — SUCCINYLCHOLINE CHLORIDE 100 MG/5ML
SYRINGE (ML) INTRAVENOUS AS NEEDED
Status: DISCONTINUED | OUTPATIENT
Start: 2023-12-08 | End: 2023-12-08

## 2023-12-08 RX ORDER — CEFAZOLIN SODIUM 2 G/100ML
2 INJECTION, SOLUTION INTRAVENOUS ONCE
Status: COMPLETED | OUTPATIENT
Start: 2023-12-08 | End: 2023-12-08

## 2023-12-08 RX ORDER — MEPERIDINE HYDROCHLORIDE 25 MG/ML
12.5 INJECTION INTRAMUSCULAR; INTRAVENOUS; SUBCUTANEOUS EVERY 10 MIN PRN
Status: DISCONTINUED | OUTPATIENT
Start: 2023-12-08 | End: 2023-12-08 | Stop reason: HOSPADM

## 2023-12-08 RX ORDER — SODIUM CHLORIDE, SODIUM LACTATE, POTASSIUM CHLORIDE, CALCIUM CHLORIDE 600; 310; 30; 20 MG/100ML; MG/100ML; MG/100ML; MG/100ML
100 INJECTION, SOLUTION INTRAVENOUS CONTINUOUS
Status: DISCONTINUED | OUTPATIENT
Start: 2023-12-08 | End: 2023-12-08 | Stop reason: HOSPADM

## 2023-12-08 RX ORDER — ETOMIDATE 2 MG/ML
INJECTION INTRAVENOUS AS NEEDED
Status: DISCONTINUED | OUTPATIENT
Start: 2023-12-08 | End: 2023-12-08

## 2023-12-08 RX ORDER — PHENYLEPHRINE HCL IN 0.9% NACL 1 MG/10 ML
SYRINGE (ML) INTRAVENOUS AS NEEDED
Status: DISCONTINUED | OUTPATIENT
Start: 2023-12-08 | End: 2023-12-08

## 2023-12-08 RX ORDER — SODIUM CHLORIDE, SODIUM LACTATE, POTASSIUM CHLORIDE, CALCIUM CHLORIDE 600; 310; 30; 20 MG/100ML; MG/100ML; MG/100ML; MG/100ML
INJECTION, SOLUTION INTRAVENOUS CONTINUOUS PRN
Status: DISCONTINUED | OUTPATIENT
Start: 2023-12-08 | End: 2023-12-08

## 2023-12-08 RX ORDER — SODIUM CHLORIDE 0.9 G/100ML
IRRIGANT IRRIGATION AS NEEDED
Status: DISCONTINUED | OUTPATIENT
Start: 2023-12-08 | End: 2023-12-08 | Stop reason: HOSPADM

## 2023-12-08 RX ORDER — PROPOFOL 10 MG/ML
INJECTION, EMULSION INTRAVENOUS AS NEEDED
Status: DISCONTINUED | OUTPATIENT
Start: 2023-12-08 | End: 2023-12-08

## 2023-12-08 RX ORDER — NYSTATIN 100000 [USP'U]/G
1 POWDER TOPICAL AS NEEDED
COMMUNITY

## 2023-12-08 RX ORDER — PANTOPRAZOLE SODIUM 40 MG/1
40 TABLET, DELAYED RELEASE ORAL
COMMUNITY
End: 2024-01-25 | Stop reason: HOSPADM

## 2023-12-08 RX ORDER — GENTAMICIN SULFATE 1 MG/G
1 OINTMENT TOPICAL 3 TIMES DAILY
COMMUNITY

## 2023-12-08 RX ORDER — LANOLIN ALCOHOL/MO/W.PET/CERES
1000 CREAM (GRAM) TOPICAL DAILY
COMMUNITY

## 2023-12-08 RX ORDER — LIDOCAINE HYDROCHLORIDE 20 MG/ML
INJECTION, SOLUTION INFILTRATION; PERINEURAL AS NEEDED
Status: DISCONTINUED | OUTPATIENT
Start: 2023-12-08 | End: 2023-12-08

## 2023-12-08 RX ORDER — ATORVASTATIN CALCIUM 40 MG/1
40 TABLET, FILM COATED ORAL DAILY
COMMUNITY

## 2023-12-08 RX ORDER — FENTANYL CITRATE 50 UG/ML
INJECTION, SOLUTION INTRAMUSCULAR; INTRAVENOUS AS NEEDED
Status: DISCONTINUED | OUTPATIENT
Start: 2023-12-08 | End: 2023-12-08

## 2023-12-08 RX ORDER — TRIAMTERENE AND HYDROCHLOROTHIAZIDE 37.5; 25 MG/1; MG/1
1 CAPSULE ORAL EVERY MORNING
COMMUNITY

## 2023-12-08 RX ORDER — HYDROMORPHONE HYDROCHLORIDE 1 MG/ML
INJECTION, SOLUTION INTRAMUSCULAR; INTRAVENOUS; SUBCUTANEOUS AS NEEDED
Status: DISCONTINUED | OUTPATIENT
Start: 2023-12-08 | End: 2023-12-08

## 2023-12-08 RX ORDER — METFORMIN HYDROCHLORIDE 500 MG/1
500 TABLET ORAL
COMMUNITY
End: 2024-01-25 | Stop reason: HOSPADM

## 2023-12-08 RX ORDER — AMLODIPINE BESYLATE 5 MG/1
5 TABLET ORAL DAILY
COMMUNITY

## 2023-12-08 RX ORDER — FAMOTIDINE 10 MG/ML
INJECTION INTRAVENOUS AS NEEDED
Status: DISCONTINUED | OUTPATIENT
Start: 2023-12-08 | End: 2023-12-08

## 2023-12-08 RX ORDER — HEPARIN SODIUM 5000 [USP'U]/ML
5000 INJECTION, SOLUTION INTRAVENOUS; SUBCUTANEOUS ONCE
Status: COMPLETED | OUTPATIENT
Start: 2023-12-08 | End: 2023-12-08

## 2023-12-08 RX ORDER — BENZTROPINE MESYLATE 1 MG/1
1 TABLET ORAL DAILY
COMMUNITY

## 2023-12-08 RX ORDER — ONDANSETRON 4 MG/1
4 TABLET, ORALLY DISINTEGRATING ORAL EVERY 8 HOURS PRN
Status: ON HOLD | COMMUNITY
End: 2023-12-26 | Stop reason: SDUPTHER

## 2023-12-08 RX ORDER — BISMUTH SUBSALICYLATE 262 MG
1 TABLET,CHEWABLE ORAL DAILY
COMMUNITY

## 2023-12-08 RX ORDER — CALCIUM CARBONATE 300MG(750)
400 TABLET,CHEWABLE ORAL DAILY
COMMUNITY

## 2023-12-08 RX ORDER — FENTANYL CITRATE 50 UG/ML
25 INJECTION, SOLUTION INTRAMUSCULAR; INTRAVENOUS EVERY 5 MIN PRN
Status: DISCONTINUED | OUTPATIENT
Start: 2023-12-08 | End: 2023-12-08 | Stop reason: HOSPADM

## 2023-12-08 RX ORDER — ROCURONIUM BROMIDE 10 MG/ML
INJECTION, SOLUTION INTRAVENOUS AS NEEDED
Status: DISCONTINUED | OUTPATIENT
Start: 2023-12-08 | End: 2023-12-08

## 2023-12-08 RX ORDER — FENTANYL CITRATE 50 UG/ML
50 INJECTION, SOLUTION INTRAMUSCULAR; INTRAVENOUS EVERY 5 MIN PRN
Status: DISCONTINUED | OUTPATIENT
Start: 2023-12-08 | End: 2023-12-08 | Stop reason: HOSPADM

## 2023-12-08 RX ORDER — DEXAMETHASONE SODIUM PHOSPHATE 4 MG/ML
INJECTION, SOLUTION INTRA-ARTICULAR; INTRALESIONAL; INTRAMUSCULAR; INTRAVENOUS; SOFT TISSUE AS NEEDED
Status: DISCONTINUED | OUTPATIENT
Start: 2023-12-08 | End: 2023-12-08

## 2023-12-08 RX ORDER — BUPIVACAINE HCL/EPINEPHRINE 0.25-.0005
VIAL (ML) INJECTION AS NEEDED
Status: DISCONTINUED | OUTPATIENT
Start: 2023-12-08 | End: 2023-12-08 | Stop reason: HOSPADM

## 2023-12-08 RX ADMIN — ETOMIDATE 20 MG: 2 INJECTION INTRAVENOUS at 15:04

## 2023-12-08 RX ADMIN — LIDOCAINE HYDROCHLORIDE 60 MG: 20 INJECTION, SOLUTION INFILTRATION; PERINEURAL at 15:04

## 2023-12-08 RX ADMIN — PROPOFOL 50 MG: 10 INJECTION, EMULSION INTRAVENOUS at 15:35

## 2023-12-08 RX ADMIN — POTASSIUM CHLORIDE AND SODIUM CHLORIDE 150 ML/HR: 450; 150 INJECTION, SOLUTION INTRAVENOUS at 21:38

## 2023-12-08 RX ADMIN — DEXAMETHASONE SODIUM PHOSPHATE 8 MG: 4 INJECTION, SOLUTION INTRAMUSCULAR; INTRAVENOUS at 15:27

## 2023-12-08 RX ADMIN — FENTANYL CITRATE 50 MCG: 50 INJECTION, SOLUTION INTRAMUSCULAR; INTRAVENOUS at 15:04

## 2023-12-08 RX ADMIN — ROCURONIUM BROMIDE 60 MG: 10 SOLUTION INTRAVENOUS at 15:04

## 2023-12-08 RX ADMIN — Medication 100 MG: at 15:04

## 2023-12-08 RX ADMIN — HYDROMORPHONE HYDROCHLORIDE 1 MG: 1 INJECTION, SOLUTION INTRAMUSCULAR; INTRAVENOUS; SUBCUTANEOUS at 15:35

## 2023-12-08 RX ADMIN — Medication 200 MCG: at 16:50

## 2023-12-08 RX ADMIN — ONDANSETRON 4 MG: 2 INJECTION INTRAMUSCULAR; INTRAVENOUS at 15:27

## 2023-12-08 RX ADMIN — FENTANYL CITRATE 50 MCG: 50 INJECTION, SOLUTION INTRAMUSCULAR; INTRAVENOUS at 15:41

## 2023-12-08 RX ADMIN — PROPOFOL 100 MG: 10 INJECTION, EMULSION INTRAVENOUS at 15:39

## 2023-12-08 RX ADMIN — HEPARIN SODIUM 5000 UNITS: 5000 INJECTION INTRAVENOUS; SUBCUTANEOUS at 13:06

## 2023-12-08 RX ADMIN — CEFAZOLIN SODIUM 2 G: 2 INJECTION, SOLUTION INTRAVENOUS at 15:03

## 2023-12-08 RX ADMIN — FENTANYL CITRATE 50 MCG: 50 INJECTION, SOLUTION INTRAMUSCULAR; INTRAVENOUS at 15:21

## 2023-12-08 RX ADMIN — PROPOFOL 50 MG: 10 INJECTION, EMULSION INTRAVENOUS at 15:33

## 2023-12-08 RX ADMIN — POTASSIUM CHLORIDE AND SODIUM CHLORIDE 100 ML/HR: 450; 150 INJECTION, SOLUTION INTRAVENOUS at 02:45

## 2023-12-08 RX ADMIN — FENTANYL CITRATE 50 MCG: 50 INJECTION, SOLUTION INTRAMUSCULAR; INTRAVENOUS at 15:48

## 2023-12-08 RX ADMIN — SODIUM CHLORIDE, SODIUM LACTATE, POTASSIUM CHLORIDE, AND CALCIUM CHLORIDE: 600; 310; 30; 20 INJECTION, SOLUTION INTRAVENOUS at 14:48

## 2023-12-08 RX ADMIN — SODIUM CHLORIDE, SODIUM LACTATE, POTASSIUM CHLORIDE, CALCIUM CHLORIDE: 600; 310; 30; 20 INJECTION, SOLUTION INTRAVENOUS at 15:46

## 2023-12-08 RX ADMIN — SODIUM CHLORIDE, SODIUM LACTATE, POTASSIUM CHLORIDE, AND CALCIUM CHLORIDE: 600; 310; 30; 20 INJECTION, SOLUTION INTRAVENOUS at 15:46

## 2023-12-08 RX ADMIN — METOPROLOL TARTRATE 2.5 MG: 5 INJECTION INTRAVENOUS at 04:46

## 2023-12-08 RX ADMIN — SODIUM CHLORIDE: 9 INJECTION, SOLUTION INTRAVENOUS at 15:07

## 2023-12-08 RX ADMIN — FAMOTIDINE 20 MG: 10 INJECTION, SOLUTION INTRAVENOUS at 15:27

## 2023-12-08 RX ADMIN — SUGAMMADEX 200 MG: 100 INJECTION, SOLUTION INTRAVENOUS at 17:37

## 2023-12-08 RX ADMIN — ROCURONIUM BROMIDE 40 MG: 10 SOLUTION INTRAVENOUS at 16:11

## 2023-12-08 ASSESSMENT — COGNITIVE AND FUNCTIONAL STATUS - GENERAL
HELP NEEDED FOR BATHING: TOTAL
MOBILITY SCORE: 6
DAILY ACTIVITIY SCORE: 7
TOILETING: TOTAL
CLIMB 3 TO 5 STEPS WITH RAILING: TOTAL
MOVING FROM LYING ON BACK TO SITTING ON SIDE OF FLAT BED WITH BEDRAILS: TOTAL
PERSONAL GROOMING: TOTAL
WALKING IN HOSPITAL ROOM: TOTAL
TURNING FROM BACK TO SIDE WHILE IN FLAT BAD: TOTAL
DRESSING REGULAR LOWER BODY CLOTHING: TOTAL
EATING MEALS: A LOT
STANDING UP FROM CHAIR USING ARMS: TOTAL
MOVING TO AND FROM BED TO CHAIR: TOTAL
DRESSING REGULAR UPPER BODY CLOTHING: TOTAL

## 2023-12-08 ASSESSMENT — PAIN SCALES - GENERAL: PAINLEVEL_OUTOF10: 0 - NO PAIN

## 2023-12-08 NOTE — INTERVAL H&P NOTE
H&P reviewed. The patient was examined and there are no changes to the H&P.    Since admission, patient continues to have high NG output; SBFT shows no progression of contrast in colon in 24 hrs and showing high grade SBO; I discussed findings with patient and family members (step daughter and ). She is consented for diagnostic laparoscopy, possible laparotomy, possible bowel resection. I explained to them the procedure, risks and complications which include but not limited to bleeding, infection, bowel injury, MI, stroke and blood clot; all questions answered and willing to proceed.

## 2023-12-08 NOTE — PROGRESS NOTES
"Harper Lara is a 78 y.o. female on day 2 of admission presenting with Small bowel obstruction (CMS/HCC).     Subjective   Patient was resting in bed. Nursing reported to Dr. Coe had 5L from NG tube overnight. Dr. Persaudncreased fluids.    Objective     Physical Exam  Constitutional:       General: She is not in acute distress.     Appearance: Normal appearance. She is not toxic-appearing.   HENT:      Head: Normocephalic and atraumatic.      Nose: Nose normal.      Mouth/Throat:      Pharynx: Oropharynx is clear.   Eyes:      Extraocular Movements: Extraocular movements intact.      Conjunctiva/sclera: Conjunctivae normal.      Pupils: Pupils are equal, round, and reactive to light.   Cardiovascular:      Rate and Rhythm: Normal rate and regular rhythm.      Pulses: Normal pulses.      Heart sounds: Normal heart sounds.   Pulmonary:      Effort: Pulmonary effort is normal.      Breath sounds: Normal breath sounds.   Abdominal:      General: Abdomen is flat. Bowel sounds are decreased. There is distension.      Palpations: Abdomen is soft.      Tenderness: There is no abdominal tenderness.      Comments: NG Tube with 300mL output   Musculoskeletal:         General: Normal range of motion.      Cervical back: Normal range of motion and neck supple.   Skin:     General: Skin is warm and dry.      Capillary Refill: Capillary refill takes less than 2 seconds.      Coloration: Skin is pale.   Neurological:      General: No focal deficit present.      Mental Status: She is alert and oriented to person, place, and time. Mental status is at baseline.   Psychiatric:         Mood and Affect: Mood normal.         Behavior: Behavior normal.         Last Recorded Vitals  Blood pressure 106/54, pulse 82, temperature 36.2 °C (97.2 °F), temperature source Temporal, resp. rate 16, height 1.6 m (5' 3\"), weight 97.5 kg (215 lb), SpO2 97 %.  Intake/Output last 3 Shifts:  I/O last 3 completed shifts:  In: 1083.3 (11.1 mL/kg) " [I.V.:983.3 (10.1 mL/kg); IV Piggyback:100]  Out: 7400 (75.9 mL/kg) [Urine:250 (0.1 mL/kg/hr); Emesis/NG output:6550; Other:600]  Weight: 97.5 kg     Relevant Results  Lab Results   Component Value Date    WBC 4.9 12/08/2023    HGB 11.0 (L) 12/08/2023    HCT 34.9 (L) 12/08/2023    MCV 91 12/08/2023     12/08/2023     Lab Results   Component Value Date    GLUCOSE 131 (H) 12/08/2023    CALCIUM 9.2 12/08/2023     (H) 12/08/2023    K 3.4 (L) 12/08/2023    CO2 32 12/08/2023    CL 97 (L) 12/08/2023    BUN 71 (H) 12/08/2023    CREATININE 1.79 (H) 12/08/2023     Results for orders placed or performed during the hospital encounter of 12/06/23 (from the past 24 hour(s))   POCT GLUCOSE   Result Value Ref Range    POCT Glucose 109 (H) 74 - 99 mg/dL   POCT GLUCOSE   Result Value Ref Range    POCT Glucose 107 (H) 74 - 99 mg/dL   POCT GLUCOSE   Result Value Ref Range    POCT Glucose 130 (H) 74 - 99 mg/dL   POCT GLUCOSE   Result Value Ref Range    POCT Glucose 142 (H) 74 - 99 mg/dL   POCT GLUCOSE   Result Value Ref Range    POCT Glucose 136 (H) 74 - 99 mg/dL   CBC and Auto Differential   Result Value Ref Range    WBC 4.9 4.4 - 11.3 x10*3/uL    nRBC 0.0 0.0 - 0.0 /100 WBCs    RBC 3.82 (L) 4.00 - 5.20 x10*6/uL    Hemoglobin 11.0 (L) 12.0 - 16.0 g/dL    Hematocrit 34.9 (L) 36.0 - 46.0 %    MCV 91 80 - 100 fL    MCH 28.8 26.0 - 34.0 pg    MCHC 31.5 (L) 32.0 - 36.0 g/dL    RDW 15.7 (H) 11.5 - 14.5 %    Platelets 212 150 - 450 x10*3/uL    Neutrophils % 76.3 40.0 - 80.0 %    Immature Granulocytes %, Automated 0.8 0.0 - 0.9 %    Lymphocytes % 14.3 13.0 - 44.0 %    Monocytes % 7.8 2.0 - 10.0 %    Eosinophils % 0.4 0.0 - 6.0 %    Basophils % 0.4 0.0 - 2.0 %    Neutrophils Absolute 3.74 1.60 - 5.50 x10*3/uL    Immature Granulocytes Absolute, Automated 0.04 0.00 - 0.50 x10*3/uL    Lymphocytes Absolute 0.70 (L) 0.80 - 3.00 x10*3/uL    Monocytes Absolute 0.38 0.05 - 0.80 x10*3/uL    Eosinophils Absolute 0.02 0.00 - 0.40 x10*3/uL     Basophils Absolute 0.02 0.00 - 0.10 x10*3/uL   Basic Metabolic Panel   Result Value Ref Range    Glucose 131 (H) 74 - 99 mg/dL    Sodium 147 (H) 136 - 145 mmol/L    Potassium 3.4 (L) 3.5 - 5.3 mmol/L    Chloride 97 (L) 98 - 107 mmol/L    Bicarbonate 32 21 - 32 mmol/L    Anion Gap 21 (H) 10 - 20 mmol/L    Urea Nitrogen 71 (H) 6 - 23 mg/dL    Creatinine 1.79 (H) 0.50 - 1.05 mg/dL    eGFR 29 (L) >60 mL/min/1.73m*2    Calcium 9.2 8.6 - 10.3 mg/dL   SST TOP   Result Value Ref Range    Extra Tube Hold for add-ons.    POCT GLUCOSE   Result Value Ref Range    POCT Glucose 127 (H) 74 - 99 mg/dL     No current facility-administered medications on file prior to encounter.     Current Outpatient Medications on File Prior to Encounter   Medication Sig Dispense Refill    atorvastatin (Lipitor) 20 mg tablet Take 1 tablet (20 mg) by mouth once daily.      calcium polycarbophiL (Fibercon) 625 mg tablet Take by mouth twice a day.      carvedilol (Coreg) 12.5 mg tablet Take by mouth twice a day.      cetirizine (ZyrTEC) 10 mg tablet Take 1 tablet (10 mg) by mouth once daily.      cholecalciferol (Vitamin D-3) 10 MCG (400 UNIT) tablet Take 1 tablet (10 mcg) by mouth once daily.      FLUoxetine (PROzac) 20 mg tablet Take 1 tablet (20 mg) by mouth once daily.      furosemide (Lasix) 20 mg tablet Take 1 tablet (20 mg) by mouth once daily.      glipiZIDE (Glucotrol) 10 mg tablet 1 tab(s) orally 2 times a day (before meals)      levothyroxine (Synthroid, Levoxyl) 75 mcg tablet Take 1 tablet (75 mcg) by mouth once daily.     === 12/06/23 ===    CT ABDOMEN PELVIS WO IV CONTRAST    - Impression -  1.  Findings compatible with acute mechanical small bowel obstruction  potentially related to adhesion.  2. There is an adjacent small left-sided ventral wall hernia which  contains minimal fat and fluid within the hernia sac which may or may  not be related. Attention recommended on clinical assessment.  3. Newly seen mild superior likely recent  superior L2 compressive  deformity.      Signed by: Carlos Augustin 12/6/2023 12:24 PM  Dictation workstation:   [id number]        Assessment/Plan   Principal Problem:    Small bowel obstruction (CMS/HCC)  Active Problems:    Acute kidney injury (nontraumatic) (CMS/HCC)    Chronic renal insufficiency    Assessment  Patient resting in bed this morning. She denies any nausea, vomiting, or abdominal pain. Patient also reports no flatus or bowel movements. Overnight, nursing informed Dr. Zamora patient had 5L output from NG tube. This morning she had 300mL output. SBFT was still pending this morning, called and spoke with radiologist to have a stat read which showed a high grade small bowel obstruction. After discussing with Dr. Lozada, patient was agreeable to surgery later today.    Impression  SBO    Plan  NPO/IVF/NG  Pain control  Nausea control  OR today for laparoscopic small bowel resection with possible colostomy.     Further recommendations per Dr. Neville Bales PA-C    Patient seen and examined. Persistent SBO on SBFT; going to surgery. I explained to her the procedure, risks and complications which include but not limited to bleeding, infection, bowel injury, MI, stroke and blood clot;

## 2023-12-08 NOTE — PROGRESS NOTES
Harper Lara is a 78 y.o. female on day 2 of admission presenting with Small bowel obstruction (CMS/HCC).      Subjective   Patient seen and evaluated today.  She states she feels better since coming to the hospital.  Underwent small bowel series yesterday that showed persistent bowel obstruction.  She has not had any bowel movement and is not passing gas.  Continues to have NG tube in place.  Plans for OR today.       Objective     Last Recorded Vitals  /56   Pulse 79   Temp 36.7 °C (98.1 °F)   Resp 16   Wt 97.5 kg (215 lb)   SpO2 96%   Intake/Output last 3 Shifts:    Intake/Output Summary (Last 24 hours) at 12/8/2023 1119  Last data filed at 12/8/2023 0401  Gross per 24 hour   Intake 983.33 ml   Output 6550 ml   Net -5566.67 ml         Admission Weight  Weight: 97.5 kg (215 lb) (12/06/23 1117)    Daily Weight  12/06/23 : 97.5 kg (215 lb)    Image Results  FL small bowel series  Narrative: Interpreted By:  Jimbo Andersen,   STUDY:  FL SMALL BOWEL SERIES; ;  12/7/2023 8:25 pm      INDICATION:  Signs/Symptoms:SBO.      COMPARISON:  CT abdomen/pelvis of 12/06/2023.      ACCESSION NUMBER(S):  [id number]      ORDERING CLINICIAN:  VIJI LUNA      TECHNIQUE:  Small bowel series was performed with administration of enteric  contrast through an NG tube. Sequential radiographs of the abdomen  were obtained over a 10 hour period.      FINDINGS:   view of the abdomen shows clear lung bases and NG tube tip  projecting at the gastric fundus level. There are moderately dilated  central abdominal small bowel segments similar in configuration to  prior CT with an overall bowel gas pattern suggestive of obstruction.  A right lower quadrant suture line is present.      Enteric contrast was administered through the NG tube and promptly  opacified the stomach. There was no progression of contrast from the  stomach over the initial 4 hours of the exam.      On 6 hour image, there was passage of a portion of the  contrast from  the stomach, now opacifying the moderately dilated central abdominal  small bowel segments. On subsequent images at 8 hour and 10 hour,  there has been no further progression of the enteric contrast. The  enteric contrast remains within the dilated stomach as well as within  the dilated central abdominal small bowel. Contrast has not reached  the distal decompressed small bowel segments or colon.      Impression: Small-bowel series compatible with high-grade small bowel obstruction  similar to prior CT.          MACRO:  None      Signed by: Jimbo Andersen 12/8/2023 8:03 AM  Dictation workstation:   [id number]      Physical Exam  Constitutional:       Appearance: She is obese.   HENT:      Head: Normocephalic and atraumatic.      Nose: Nose normal.      Mouth/Throat:      Mouth: Mucous membranes are dry.   Eyes:      Extraocular Movements: Extraocular movements intact.      Conjunctiva/sclera: Conjunctivae normal.   Cardiovascular:      Rate and Rhythm: Normal rate and regular rhythm.      Pulses: Normal pulses.      Heart sounds: Normal heart sounds.   Pulmonary:      Effort: Pulmonary effort is normal.      Breath sounds: Normal breath sounds.   Abdominal:      General: Bowel sounds are normal.      Palpations: Abdomen is soft.      Tenderness: There is abdominal tenderness.      Comments: NG tube in place   Musculoskeletal:         General: Normal range of motion.      Cervical back: Normal range of motion and neck supple.   Skin:     General: Skin is warm and dry.   Neurological:      Mental Status: She is alert. Mental status is at baseline.   Psychiatric:         Mood and Affect: Mood normal.         Relevant Results               Assessment/Plan          78 y.o. female presenting with past medical history of type 2 diabetes mellitus, fatty liver, hyperlipidemia, hypertension, A-fib status post ablation, hypothyroidism, anxiety and depression, history of GI bleed comes to the hospital with  abdominal pain and vomiting for past few days.  Patient found to have SBO on the imaging.  SBFT done showed high-grade obstruction.  Plans for OR        Principal Problem:    Small bowel obstruction (CMS/HCC)  Active Problems:    Acute kidney injury (nontraumatic) (CMS/HCC)    Chronic renal insufficiency    Small bowel obstruction  -As seen on CT scan of abdomen pelvis.  -Continue with IV hydration and maintain n.p.o. status at this time.  IV fluids switched to to potassium containing fluids.  -Surgery has been consulted and NG tube in place.  Patient underwent SBFT that showed high-grade obstruction.  -Per surgery OR today for laparoscopic small bowel resection with possible colostomy.      SOCORRO with underlying CKD stage III  -Creatinine noted 1.81 baseline is around 1.1.  Slowly improving  -Continue with IV hydration.     Hypokalemia  -Replace IV and monitor.      Elevated troponin  Atrial fibrillation  -Serial troponin and serial EKGs.   -Since NPO will add IV lopressor every 6 hours. Rate controlled at this time  -The patient is chest pain-free at this time.  Continue to monitor.     Hypothyroidism  -Resume oral Synthroid once able.     Chronic systolic congestive heart failure  -Last echo from 2021 shows LVEF of 40%.  -Monitor volume status     Diabetes mellitus type 2  -Hold home oral antidiabetics  -Continue with sliding scale insulin n.p.o. protocol.     VTE prophylaxis: SCDs patient history of GI bleed              Brittany Pearson MD

## 2023-12-08 NOTE — NURSING NOTE
Pt had approximately 5000cc ng output since 2030 last evening after SBFT. Dr. Ruiz notified and orders to increase IV fluids to 150cc/hr

## 2023-12-08 NOTE — ANESTHESIA PROCEDURE NOTES
Airway  Date/Time: 12/8/2023 3:04 PM  Urgency: elective    Airway not difficult    Staffing  Performed: CRNA   Authorized by: Salas Sharma DO    Performed by: ROSELYN Wesley  Patient location during procedure: OR    Indications and Patient Condition  Indications for airway management: anesthesia  Spontaneous Ventilation: absent  Sedation level: deep  Preoxygenated: yes  Patient position: sniffing  MILS maintained throughout  Mask difficulty assessment: 1 - vent by mask  Planned trial extubation    Final Airway Details  Final airway type: endotracheal airway      Successful airway: ETT  Cuffed: yes   Successful intubation technique: video laryngoscopy  Facilitating devices/methods: intubating stylet  Endotracheal tube insertion site: oral  Blade size: #3  ETT size (mm): 7.0  Cormack-Lehane Classification: grade IIa - partial view of glottis  Placement verified by: chest auscultation and capnometry   Cuff volume (mL): 7  Measured from: lips  ETT to lips (cm): 22  Number of attempts at approach: 1  Ventilation between attempts: BVM  Number of other approaches attempted: 0

## 2023-12-08 NOTE — ANESTHESIA PREPROCEDURE EVALUATION
Patient: Harper Lara    Procedure Information       Date/Time: 12/08/23 0900    Procedure: Resection Laparoscopy Small Intestine    Location: [facility]    Surgeons: Manuel Lozada MD            Relevant Problems   Cardiovascular   (+) Atrial fibrillation (CMS/HCC)   (+) CAD (coronary artery disease)   (+) CHF (congestive heart failure) (CMS/HCC)   (+) HTN (hypertension), benign   (+) Hyperlipidemia      Endocrine   (+) DM2 (diabetes mellitus, type 2) (CMS/HCC)      GI   (+) GERD (gastroesophageal reflux disease)      /Renal   (+) Acute kidney injury (nontraumatic) (CMS/HCC)   (+) Chronic renal insufficiency      Neuro/Psych   (+) Anxiety and depression      Musculoskeletal   (+) Gout, arthritis      Other   (+) Gout, arthritis       Clinical information reviewed:    Allergies                NPO Detail:  No data recorded     Physical Exam    Airway  Mallampati: I     Cardiovascular   Rhythm: regular  Rate: normal     Dental    Pulmonary - normal exam     Abdominal - normal exam           Anesthesia Plan    ASA 4 - emergent     general     intravenous induction   Postoperative administration of opioids is intended.  Anesthetic plan and risks discussed with patient.    
yes

## 2023-12-08 NOTE — ANESTHESIA PROCEDURE NOTES
Arterial Line:    Date/Time: 12/8/2023 12:40 PM    Staffing  Performed: attending   Authorized by: Salas Sharma DO    Performed by: Salas Sharma DO    An arterial line was placed. Procedure performed using surface landmarks.in the pre-op for the following indication(s): continuous blood pressure monitoring and blood sampling needed.    A 20 gauge (size), 1 and 3/4 inch (length), Angiocath (type) catheter was placed into the Left radial artery, secured by Tegadewolf   Seldinger technique used.  Events:  patient tolerated procedure well with no complications.

## 2023-12-08 NOTE — CONSULTS
RD consulted for MST = 2 (wt loss, poor appetite). Documented being a poor historian. Limited wt records available, noted 3.1 kg wt gain x1 year. Found with SBO. Has had high NG output. In OR at time of RD attempts to visit, no family in room. Per EMR, had abdominal pain and vomiting x4 days PTA. Seen by this RD in July 2023, where it was reported that had good appetite/intakes. Has pressure injury documented in bilateral buttocks, no stage documented. Stage 2 pressure injury documented in July. To continue to monitor for diet advancement and complete full nutritional assessment when pt available and as RD schedule allows.      12/12/22: 94.4 kg  12/6/23: 97.5 kg

## 2023-12-09 LAB
ANION GAP SERPL CALC-SCNC: 16 MMOL/L (ref 10–20)
BASOPHILS # BLD AUTO: 0.01 X10*3/UL (ref 0–0.1)
BASOPHILS # BLD AUTO: NORMAL 10*3/UL
BASOPHILS NFR BLD AUTO: 0.2 %
BASOPHILS NFR BLD AUTO: NORMAL %
BUN SERPL-MCNC: 59 MG/DL (ref 6–23)
CALCIUM SERPL-MCNC: 8.2 MG/DL (ref 8.6–10.3)
CHLORIDE SERPL-SCNC: 105 MMOL/L (ref 98–107)
CO2 SERPL-SCNC: 30 MMOL/L (ref 21–32)
CREAT SERPL-MCNC: 1.45 MG/DL (ref 0.5–1.05)
EOSINOPHIL # BLD AUTO: 0.01 X10*3/UL (ref 0–0.4)
EOSINOPHIL # BLD AUTO: NORMAL 10*3/UL
EOSINOPHIL NFR BLD AUTO: 0.2 %
EOSINOPHIL NFR BLD AUTO: NORMAL %
ERYTHROCYTE [DISTWIDTH] IN BLOOD BY AUTOMATED COUNT: 15.5 % (ref 11.5–14.5)
ERYTHROCYTE [DISTWIDTH] IN BLOOD BY AUTOMATED COUNT: NORMAL %
GFR SERPL CREATININE-BSD FRML MDRD: 37 ML/MIN/1.73M*2
GLUCOSE BLD MANUAL STRIP-MCNC: 102 MG/DL (ref 74–99)
GLUCOSE BLD MANUAL STRIP-MCNC: 116 MG/DL (ref 74–99)
GLUCOSE BLD MANUAL STRIP-MCNC: 132 MG/DL (ref 74–99)
GLUCOSE BLD MANUAL STRIP-MCNC: 134 MG/DL (ref 74–99)
GLUCOSE BLD MANUAL STRIP-MCNC: 141 MG/DL (ref 74–99)
GLUCOSE BLD MANUAL STRIP-MCNC: 168 MG/DL (ref 74–99)
GLUCOSE SERPL-MCNC: 110 MG/DL (ref 74–99)
HCT VFR BLD AUTO: 30.3 % (ref 36–46)
HCT VFR BLD AUTO: NORMAL %
HGB BLD-MCNC: 9.4 G/DL (ref 12–16)
HGB BLD-MCNC: NORMAL G/DL
HOLD SPECIMEN: NORMAL
HOLD SPECIMEN: NORMAL
IMM GRANULOCYTES # BLD AUTO: 0.07 X10*3/UL (ref 0–0.5)
IMM GRANULOCYTES # BLD AUTO: NORMAL 10*3/UL
IMM GRANULOCYTES NFR BLD AUTO: 1.2 % (ref 0–0.9)
IMM GRANULOCYTES NFR BLD AUTO: NORMAL %
LYMPHOCYTES # BLD AUTO: 0.97 X10*3/UL (ref 0.8–3)
LYMPHOCYTES # BLD AUTO: NORMAL 10*3/UL
LYMPHOCYTES NFR BLD AUTO: 16 %
LYMPHOCYTES NFR BLD AUTO: NORMAL %
MCH RBC QN AUTO: 29.4 PG (ref 26–34)
MCH RBC QN AUTO: NORMAL PG
MCHC RBC AUTO-ENTMCNC: 31 G/DL (ref 32–36)
MCHC RBC AUTO-ENTMCNC: NORMAL G/DL
MCV RBC AUTO: 95 FL (ref 80–100)
MCV RBC AUTO: NORMAL FL
MONOCYTES # BLD AUTO: 0.43 X10*3/UL (ref 0.05–0.8)
MONOCYTES # BLD AUTO: NORMAL 10*3/UL
MONOCYTES NFR BLD AUTO: 7.1 %
MONOCYTES NFR BLD AUTO: NORMAL %
NEUTROPHILS # BLD AUTO: 4.56 X10*3/UL (ref 1.6–5.5)
NEUTROPHILS # BLD AUTO: NORMAL 10*3/UL
NEUTROPHILS NFR BLD AUTO: 75.3 %
NEUTROPHILS NFR BLD AUTO: NORMAL %
NRBC BLD-RTO: 0 /100 WBCS (ref 0–0)
NRBC BLD-RTO: NORMAL /100{WBCS}
PLATELET # BLD AUTO: 184 X10*3/UL (ref 150–450)
PLATELET # BLD AUTO: NORMAL 10*3/UL
POTASSIUM SERPL-SCNC: 3.8 MMOL/L (ref 3.5–5.3)
RBC # BLD AUTO: 3.2 X10*6/UL (ref 4–5.2)
RBC # BLD AUTO: NORMAL 10*6/UL
SODIUM SERPL-SCNC: 147 MMOL/L (ref 136–145)
WBC # BLD AUTO: 6.1 X10*3/UL (ref 4.4–11.3)
WBC # BLD AUTO: NORMAL 10*3/UL

## 2023-12-09 PROCEDURE — 99231 SBSQ HOSP IP/OBS SF/LOW 25: CPT

## 2023-12-09 PROCEDURE — 36415 COLL VENOUS BLD VENIPUNCTURE: CPT | Performed by: INTERNAL MEDICINE

## 2023-12-09 PROCEDURE — 99232 SBSQ HOSP IP/OBS MODERATE 35: CPT | Performed by: INTERNAL MEDICINE

## 2023-12-09 PROCEDURE — 2500000005 HC RX 250 GENERAL PHARMACY W/O HCPCS: Performed by: INTERNAL MEDICINE

## 2023-12-09 PROCEDURE — 1210000001 HC SEMI-PRIVATE ROOM DAILY

## 2023-12-09 PROCEDURE — 2500000004 HC RX 250 GENERAL PHARMACY W/ HCPCS (ALT 636 FOR OP/ED): Performed by: SURGERY

## 2023-12-09 PROCEDURE — 85025 COMPLETE CBC W/AUTO DIFF WBC: CPT | Performed by: INTERNAL MEDICINE

## 2023-12-09 PROCEDURE — 80048 BASIC METABOLIC PNL TOTAL CA: CPT | Performed by: INTERNAL MEDICINE

## 2023-12-09 PROCEDURE — 82947 ASSAY GLUCOSE BLOOD QUANT: CPT

## 2023-12-09 PROCEDURE — 97161 PT EVAL LOW COMPLEX 20 MIN: CPT | Mod: GP | Performed by: PHYSICAL THERAPIST

## 2023-12-09 PROCEDURE — 2500000004 HC RX 250 GENERAL PHARMACY W/ HCPCS (ALT 636 FOR OP/ED): Performed by: INTERNAL MEDICINE

## 2023-12-09 PROCEDURE — 94760 N-INVAS EAR/PLS OXIMETRY 1: CPT

## 2023-12-09 PROCEDURE — 2500000002 HC RX 250 W HCPCS SELF ADMINISTERED DRUGS (ALT 637 FOR MEDICARE OP, ALT 636 FOR OP/ED): Performed by: INTERNAL MEDICINE

## 2023-12-09 RX ORDER — LEVOTHYROXINE SODIUM 20 UG/ML
50 INJECTION, SOLUTION INTRAVENOUS
Status: DISCONTINUED | OUTPATIENT
Start: 2023-12-09 | End: 2023-12-12

## 2023-12-09 RX ORDER — DEXTROSE MONOHYDRATE, SODIUM CHLORIDE, AND POTASSIUM CHLORIDE 50; 1.49; 2.25 G/1000ML; G/1000ML; G/1000ML
125 INJECTION, SOLUTION INTRAVENOUS CONTINUOUS
Status: DISCONTINUED | OUTPATIENT
Start: 2023-12-09 | End: 2023-12-11

## 2023-12-09 RX ADMIN — POTASSIUM CHLORIDE AND SODIUM CHLORIDE 150 ML/HR: 450; 150 INJECTION, SOLUTION INTRAVENOUS at 06:24

## 2023-12-09 RX ADMIN — POTASSIUM CHLORIDE, DEXTROSE MONOHYDRATE AND SODIUM CHLORIDE 125 ML/HR: 150; 5; 200 INJECTION, SOLUTION INTRAVENOUS at 22:16

## 2023-12-09 RX ADMIN — POTASSIUM CHLORIDE, DEXTROSE MONOHYDRATE AND SODIUM CHLORIDE 125 ML/HR: 150; 5; 200 INJECTION, SOLUTION INTRAVENOUS at 14:38

## 2023-12-09 RX ADMIN — INSULIN LISPRO 1 UNITS: 100 INJECTION, SOLUTION INTRAVENOUS; SUBCUTANEOUS at 21:49

## 2023-12-09 RX ADMIN — LEVOTHYROXINE SODIUM 50 MCG: 20 INJECTION, SOLUTION INTRAVENOUS at 13:46

## 2023-12-09 RX ADMIN — METOPROLOL TARTRATE 2.5 MG: 5 INJECTION INTRAVENOUS at 22:12

## 2023-12-09 RX ADMIN — METOPROLOL TARTRATE 2.5 MG: 5 INJECTION INTRAVENOUS at 17:26

## 2023-12-09 RX ADMIN — METOPROLOL TARTRATE 2.5 MG: 5 INJECTION INTRAVENOUS at 11:34

## 2023-12-09 ASSESSMENT — COGNITIVE AND FUNCTIONAL STATUS - GENERAL
EATING MEALS: TOTAL
PERSONAL GROOMING: TOTAL
MOVING TO AND FROM BED TO CHAIR: A LITTLE
MOBILITY SCORE: 15
MOVING TO AND FROM BED TO CHAIR: TOTAL
TURNING FROM BACK TO SIDE WHILE IN FLAT BAD: A LOT
MOVING FROM LYING ON BACK TO SITTING ON SIDE OF FLAT BED WITH BEDRAILS: A LITTLE
TURNING FROM BACK TO SIDE WHILE IN FLAT BAD: A LITTLE
CLIMB 3 TO 5 STEPS WITH RAILING: A LOT
STANDING UP FROM CHAIR USING ARMS: TOTAL
STANDING UP FROM CHAIR USING ARMS: A LOT
WALKING IN HOSPITAL ROOM: A LITTLE
MOVING FROM LYING ON BACK TO SITTING ON SIDE OF FLAT BED WITH BEDRAILS: A LITTLE
DAILY ACTIVITIY SCORE: 6
MOBILITY SCORE: 10
DRESSING REGULAR LOWER BODY CLOTHING: TOTAL
CLIMB 3 TO 5 STEPS WITH RAILING: TOTAL
WALKING IN HOSPITAL ROOM: TOTAL
DRESSING REGULAR UPPER BODY CLOTHING: TOTAL
HELP NEEDED FOR BATHING: TOTAL
TOILETING: TOTAL

## 2023-12-09 ASSESSMENT — PAIN SCALES - GENERAL
PAINLEVEL_OUTOF10: 0 - NO PAIN
PAIN_LEVEL: 2
PAINLEVEL_OUTOF10: 0 - NO PAIN

## 2023-12-09 ASSESSMENT — ACTIVITIES OF DAILY LIVING (ADL): LACK_OF_TRANSPORTATION: PATIENT DECLINED

## 2023-12-09 ASSESSMENT — PAIN - FUNCTIONAL ASSESSMENT: PAIN_FUNCTIONAL_ASSESSMENT: 0-10

## 2023-12-09 NOTE — CARE PLAN
The patient's goals for the shift include      The clinical goals for the shift include patient will be hemodynamically stable through shift      Problem: Pain  Goal: My pain/discomfort is manageable  12/9/2023 0512 by Radha Barrett RN  Outcome: Progressing  12/9/2023 0510 by Radha Barrett RN  Outcome: Progressing     Problem: Safety  Goal: Patient will be injury free during hospitalization  12/9/2023 0512 by Radha Barrett RN  Outcome: Progressing  12/9/2023 0510 by Radha Barrett RN  Outcome: Progressing  Goal: I will remain free of falls  12/9/2023 0512 by Radha Barrett RN  Outcome: Progressing  12/9/2023 0510 by Radha Barrett RN  Outcome: Progressing     Problem: Daily Care  Goal: Daily care needs are met  12/9/2023 0512 by Radha Barrett RN  Outcome: Progressing  12/9/2023 0510 by Radha Barrett RN  Outcome: Progressing     Problem: Psychosocial Needs  Goal: Demonstrates ability to cope with hospitalization/illness  12/9/2023 0512 by Radha Barrett RN  Outcome: Progressing  12/9/2023 0510 by Radha Barrett RN  Outcome: Progressing  Goal: Collaborate with me, my family, and caregiver to identify my specific goals  12/9/2023 0512 by Radha Barrett RN  Outcome: Progressing  12/9/2023 0510 by Radha Barrett RN  Outcome: Progressing     Problem: Discharge Barriers  Goal: My discharge needs are met  12/9/2023 0512 by Radha Barrett RN  Outcome: Progressing  12/9/2023 0510 by Radha Barrett RN  Outcome: Progressing     Problem: Skin  Goal: Decreased wound size/increased tissue granulation at next dressing change  12/9/2023 0512 by Radha Barrett RN  Outcome: Progressing  Flowsheets (Taken 12/9/2023 0512)  Decreased wound size/increased tissue granulation at next dressing change: Promote sleep for wound healing  12/9/2023 0511 by Radha Barrett RN  Flowsheets (Taken 12/9/2023 0511)  Decreased wound size/increased tissue granulation at  next dressing change: Promote sleep for wound healing  Goal: Participates in plan/prevention/treatment measures  12/9/2023 0512 by Radha Barrett RN  Outcome: Progressing  Flowsheets (Taken 12/9/2023 0511)  Participates in plan/prevention/treatment measures: Increase activity/out of bed for meals  12/9/2023 0511 by Radha Barrett RN  Flowsheets (Taken 12/9/2023 0511)  Participates in plan/prevention/treatment measures: Increase activity/out of bed for meals  Goal: Prevent/manage excess moisture  12/9/2023 0512 by Radha Barrett RN  Outcome: Progressing  Flowsheets (Taken 12/9/2023 0511)  Prevent/manage excess moisture: Cleanse incontinence/protect with barrier cream  12/9/2023 0511 by Radha Barrett RN  Flowsheets (Taken 12/9/2023 0511)  Prevent/manage excess moisture: Cleanse incontinence/protect with barrier cream  Goal: Prevent/minimize sheer/friction injuries  12/9/2023 0512 by Radha Barrett RN  Outcome: Progressing  Flowsheets (Taken 12/9/2023 0511)  Prevent/minimize sheer/friction injuries: Increase activity/out of bed for meals  12/9/2023 0511 by Radha Barrett RN  Flowsheets (Taken 12/9/2023 0511)  Prevent/minimize sheer/friction injuries: Increase activity/out of bed for meals  Goal: Promote/optimize nutrition  12/9/2023 0512 by Radha Barrett RN  Outcome: Progressing  Flowsheets (Taken 12/9/2023 0511)  Promote/optimize nutrition: Consume > 50% meals/supplements  12/9/2023 0511 by Radha Barrett RN  Flowsheets (Taken 12/9/2023 0511)  Promote/optimize nutrition: Consume > 50% meals/supplements  Goal: Promote skin healing  12/9/2023 0512 by Radha Barrett RN  Outcome: Progressing  Flowsheets (Taken 12/9/2023 0511)  Promote skin healing: Turn/reposition every 2 hours/use positioning/transfer devices  12/9/2023 0511 by Radha Barrett RN  Flowsheets (Taken 12/9/2023 0511)  Promote skin healing: Turn/reposition every 2 hours/use positioning/transfer devices

## 2023-12-09 NOTE — PROGRESS NOTES
Harper Lara is a 78 y.o. female on day 3 of admission presenting with Small bowel obstruction (CMS/HCC).      Subjective   Patient seen and evaluated today.  Patient is in tears today as she states that she wants to be able to walk around.  Support was provided.  The patient otherwise denies any bowel movements or flatus at this time.  NG tube is in place with greenish drainage noted.       Objective     Last Recorded Vitals  /58 (BP Location: Left arm, Patient Position: Lying)   Pulse 68   Temp 35.4 °C (95.7 °F) (Temporal)   Resp 14   Wt 97.5 kg (215 lb)   SpO2 97%   Intake/Output last 3 Shifts:    Intake/Output Summary (Last 24 hours) at 12/9/2023 1308  Last data filed at 12/8/2023 1800  Gross per 24 hour   Intake 7500 ml   Output 700 ml   Net 6800 ml         Admission Weight  Weight: 97.5 kg (215 lb) (12/06/23 1117)    Daily Weight  12/06/23 : 97.5 kg (215 lb)    Image Results  XR abdomen 1 view  Narrative: Interpreted By:  David Collado,   STUDY:  Abdominal series dated  12/8/2023.      INDICATION:  Enteric tube placement.      COMPARISON:  None.      ACCESSION NUMBER(S):  [id number]      ORDERING CLINICIAN:  GRZEGORZ TABOR      TECHNIQUE:  AP radiograph of the abdomen with subtraction imaging.      FINDINGS:  Exam is limited due to incomplete inclusion the entirety of the  abdomen in the field of view. There is an enteric tube with the tip  and side port projecting over the left upper quadrant. Reticular  increased density is seen over the left upper quadrant which may be  related to contrast installation. There is some increased density  over the lower abdomen which could relate to some of the oral  contrast from prior small-bowel follow-through; otherwise, the  majority of this contrast does not appear to be present. There is a  prominent loop of bowel over the mid abdomen measuring up to 5.6 cm.  This likely represents a loop of small bowel. Surgical changes are  seen over the right lower  quadrant. Visualized lung bases are clear.  Degenerative changes seen of the spine.      Impression: 1. Enteric tube as above.  2. Dilated loop of bowel in the mid abdomen likely representing a  dilated loop of small bowel compatible with obstructive process  further discussed on the 12/06/2023 CT and the 12/07/2023 small-bowel  follow-through.  3. Although there may be some retained oral contrast from prior  small-bowel follow-through the majority of this contrast is not  evident. It is uncertain whether this is due to passage versus  enteric tube suction of the contrast. Clinical correlation may be  helpful.      Signed by: David Collado 12/8/2023 9:42 PM  Dictation workstation:   [id number]  FL small bowel series  Narrative: Interpreted By:  Jimbo Andersen,   STUDY:  FL SMALL BOWEL SERIES; ;  12/7/2023 8:25 pm      INDICATION:  Signs/Symptoms:SBO.      COMPARISON:  CT abdomen/pelvis of 12/06/2023.      ACCESSION NUMBER(S):  [id number]      ORDERING CLINICIAN:  VIJI LUNA      TECHNIQUE:  Small bowel series was performed with administration of enteric  contrast through an NG tube. Sequential radiographs of the abdomen  were obtained over a 10 hour period.      FINDINGS:   view of the abdomen shows clear lung bases and NG tube tip  projecting at the gastric fundus level. There are moderately dilated  central abdominal small bowel segments similar in configuration to  prior CT with an overall bowel gas pattern suggestive of obstruction.  A right lower quadrant suture line is present.      Enteric contrast was administered through the NG tube and promptly  opacified the stomach. There was no progression of contrast from the  stomach over the initial 4 hours of the exam.      On 6 hour image, there was passage of a portion of the contrast from  the stomach, now opacifying the moderately dilated central abdominal  small bowel segments. On subsequent images at 8 hour and 10 hour,  there has been no  further progression of the enteric contrast. The  enteric contrast remains within the dilated stomach as well as within  the dilated central abdominal small bowel. Contrast has not reached  the distal decompressed small bowel segments or colon.      Impression: Small-bowel series compatible with high-grade small bowel obstruction  similar to prior CT.          MACRO:  None      Signed by: Jimbo Andersen 12/8/2023 8:03 AM  Dictation workstation:   [id number]      Physical Exam  Constitutional:       Appearance: She is obese.   HENT:      Head: Normocephalic and atraumatic.      Nose: Nose normal.      Mouth/Throat:      Mouth: Mucous membranes are dry.   Eyes:      Extraocular Movements: Extraocular movements intact.      Conjunctiva/sclera: Conjunctivae normal.   Cardiovascular:      Rate and Rhythm: Normal rate and regular rhythm.      Pulses: Normal pulses.      Heart sounds: Normal heart sounds.   Pulmonary:      Effort: Pulmonary effort is normal.      Breath sounds: Normal breath sounds.   Abdominal:      General: Bowel sounds are normal.      Palpations: Abdomen is soft.      Tenderness: There is abdominal tenderness.      Comments: NG tube in place   Musculoskeletal:         General: Normal range of motion.      Cervical back: Normal range of motion and neck supple.   Skin:     General: Skin is warm and dry.   Neurological:      Mental Status: She is alert. Mental status is at baseline.   Psychiatric:         Mood and Affect: Mood normal.         Relevant Results               Assessment/Plan          78 y.o. female presenting with past medical history of type 2 diabetes mellitus, fatty liver, hyperlipidemia, hypertension, A-fib status post ablation, hypothyroidism, anxiety and depression, history of GI bleed comes to the hospital with abdominal pain and vomiting for past few days.  Patient found to have SBO on the imaging.  SBFT done showed high-grade obstruction.  On 12/8/2023 the patient underwent  extensive laparoscopic lysis of adhesions for 2 hours and small intestinal resection and anastomosis.        Principal Problem:    Small bowel obstruction (CMS/HCC)  Active Problems:    Acute kidney injury (nontraumatic) (CMS/HCC)    Chronic renal insufficiency    Small bowel obstruction  -As seen on CT scan of abdomen pelvis and also on SBFT.  -Status post extensive laparoscopic lysis of adhesions and laparoscopic small intestine resection and anastomosis on 12/8/2023.  -Continue with IV hydration and maintain n.p.o. status at this time.  IV fluids switched to to potassium containing fluids.  -Surgery has been consulted and NG tube in place.  Awaiting return of bowel function at this time.     SOCORRO with underlying CKD stage III  -Creatinine noted 1.81 baseline is around 1.1.  Slowly improving  -Continue with IV hydration.    Hypernatremia  -Will adjust IV fluids and continue to maintain potassium and IV fluids.     Hypokalemia  -Replace IV and monitor.      Elevated troponin  Atrial fibrillation  -Serial troponin and serial EKGs.   -Since NPO will add IV lopressor every 6 hours. Rate controlled at this time  -The patient is chest pain-free at this time.  Continue to monitor.     Hypothyroidism  -Not able to resume oral Synthroid at this time.  Will continue with reduced dose of IV Synthroid.     Chronic systolic congestive heart failure  -Last echo from 2021 shows LVEF of 40%.  -Monitor volume status     Diabetes mellitus type 2  -Hold home oral antidiabetics  -Continue with sliding scale insulin n.p.o. protocol.     VTE prophylaxis: SCDs patient history of GI bleed              Brittany Pearson MD

## 2023-12-09 NOTE — PROGRESS NOTES
"Harper Lara is a 78 y.o. female on day 3 of admission presenting with Small bowel obstruction (CMS/HCC).     Subjective   Patient was resting in bed.  No acute events overnight.    Objective     Physical Exam  Constitutional:       General: She is not in acute distress.     Appearance: Normal appearance. She is not toxic-appearing.   HENT:      Head: Normocephalic and atraumatic.      Nose: Nose normal.      Mouth/Throat:      Pharynx: Oropharynx is clear.   Eyes:      Extraocular Movements: Extraocular movements intact.      Conjunctiva/sclera: Conjunctivae normal.      Pupils: Pupils are equal, round, and reactive to light.   Cardiovascular:      Rate and Rhythm: Normal rate and regular rhythm.      Pulses: Normal pulses.      Heart sounds: Normal heart sounds.   Pulmonary:      Effort: Pulmonary effort is normal.      Breath sounds: Normal breath sounds.   Abdominal:      General: Abdomen is flat. Bowel sounds are decreased. There is no distension.      Palpations: Abdomen is soft.      Tenderness: There is abdominal tenderness (Mild).      Comments: NG Tube    Musculoskeletal:         General: Normal range of motion.      Cervical back: Normal range of motion and neck supple.   Skin:     General: Skin is warm and dry.      Capillary Refill: Capillary refill takes less than 2 seconds.   Neurological:      General: No focal deficit present.      Mental Status: She is alert and oriented to person, place, and time. Mental status is at baseline.   Psychiatric:         Mood and Affect: Mood normal.         Behavior: Behavior normal.         Last Recorded Vitals  Blood pressure 119/58, pulse 68, temperature 35.4 °C (95.7 °F), temperature source Temporal, resp. rate 14, height 1.6 m (5' 3\"), weight 97.5 kg (215 lb), SpO2 97 %.  Intake/Output last 3 Shifts:  I/O last 3 completed shifts:  In: 8483.3 (87 mL/kg) [I.V.:7483.3 (76.7 mL/kg); IV Piggyback:1000]  Out: 7250 (74.3 mL/kg) [Urine:950 (0.3 mL/kg/hr); Emesis/NG " output:9140]  Weight: 97.5 kg     Relevant Results  Lab Results   Component Value Date    WBC 6.1 12/09/2023    HGB 9.4 (L) 12/09/2023    HCT 30.3 (L) 12/09/2023    MCV 95 12/09/2023     12/09/2023     Lab Results   Component Value Date    GLUCOSE 110 (H) 12/09/2023    CALCIUM 8.2 (L) 12/09/2023     (H) 12/09/2023    K 3.8 12/09/2023    CO2 30 12/09/2023     12/09/2023    BUN 59 (H) 12/09/2023    CREATININE 1.45 (H) 12/09/2023     Results for orders placed or performed during the hospital encounter of 12/06/23 (from the past 24 hour(s))   POCT GLUCOSE   Result Value Ref Range    POCT Glucose 163 (H) 74 - 99 mg/dL   POCT GLUCOSE   Result Value Ref Range    POCT Glucose 136 (H) 74 - 99 mg/dL   POCT GLUCOSE   Result Value Ref Range    POCT Glucose 134 (H) 74 - 99 mg/dL   POCT GLUCOSE   Result Value Ref Range    POCT Glucose 132 (H) 74 - 99 mg/dL   CBC and Auto Differential   Result Value Ref Range    WBC      nRBC      RBC      Hemoglobin      Hematocrit      MCV      MCH      MCHC      RDW      Platelets      Neutrophils %      Immature Granulocytes %, Automated      Lymphocytes %      Monocytes %      Eosinophils %      Basophils %      Neutrophils Absolute      Immature Granulocytes Absolute, Automated      Lymphocytes Absolute      Monocytes Absolute      Eosinophils Absolute      Basophils Absolute     SST TOP   Result Value Ref Range    Extra Tube Hold for add-ons.    POCT GLUCOSE   Result Value Ref Range    POCT Glucose 116 (H) 74 - 99 mg/dL   Basic Metabolic Panel   Result Value Ref Range    Glucose 110 (H) 74 - 99 mg/dL    Sodium 147 (H) 136 - 145 mmol/L    Potassium 3.8 3.5 - 5.3 mmol/L    Chloride 105 98 - 107 mmol/L    Bicarbonate 30 21 - 32 mmol/L    Anion Gap 16 10 - 20 mmol/L    Urea Nitrogen 59 (H) 6 - 23 mg/dL    Creatinine 1.45 (H) 0.50 - 1.05 mg/dL    eGFR 37 (L) >60 mL/min/1.73m*2    Calcium 8.2 (L) 8.6 - 10.3 mg/dL   CBC and Auto Differential   Result Value Ref Range    WBC 6.1  4.4 - 11.3 x10*3/uL    nRBC 0.0 0.0 - 0.0 /100 WBCs    RBC 3.20 (L) 4.00 - 5.20 x10*6/uL    Hemoglobin 9.4 (L) 12.0 - 16.0 g/dL    Hematocrit 30.3 (L) 36.0 - 46.0 %    MCV 95 80 - 100 fL    MCH 29.4 26.0 - 34.0 pg    MCHC 31.0 (L) 32.0 - 36.0 g/dL    RDW 15.5 (H) 11.5 - 14.5 %    Platelets 184 150 - 450 x10*3/uL    Neutrophils % 75.3 40.0 - 80.0 %    Immature Granulocytes %, Automated 1.2 (H) 0.0 - 0.9 %    Lymphocytes % 16.0 13.0 - 44.0 %    Monocytes % 7.1 2.0 - 10.0 %    Eosinophils % 0.2 0.0 - 6.0 %    Basophils % 0.2 0.0 - 2.0 %    Neutrophils Absolute 4.56 1.60 - 5.50 x10*3/uL    Immature Granulocytes Absolute, Automated 0.07 0.00 - 0.50 x10*3/uL    Lymphocytes Absolute 0.97 0.80 - 3.00 x10*3/uL    Monocytes Absolute 0.43 0.05 - 0.80 x10*3/uL    Eosinophils Absolute 0.01 0.00 - 0.40 x10*3/uL    Basophils Absolute 0.01 0.00 - 0.10 x10*3/uL     No current facility-administered medications on file prior to encounter.     Current Outpatient Medications on File Prior to Encounter   Medication Sig Dispense Refill    amLODIPine (Norvasc) 5 mg tablet Take 1 tablet (5 mg) by mouth once daily.      atorvastatin (Lipitor) 20 mg tablet Take 1 tablet (20 mg) by mouth once daily.      atorvastatin (Lipitor) 40 mg tablet Take 1 tablet (40 mg) by mouth once daily.      benztropine (Cogentin) 1 mg tablet Take 1 tablet (1 mg) by mouth once daily.      calcium polycarbophiL (Fibercon) 625 mg tablet Take by mouth twice a day.      carvedilol (Coreg) 12.5 mg tablet Take by mouth twice a day.      cetirizine (ZyrTEC) 10 mg tablet Take 1 tablet (10 mg) by mouth once daily.      cholecalciferol (Vitamin D-3) 10 MCG (400 UNIT) tablet Take 1 tablet (10 mcg) by mouth once daily.      cyanocobalamin (Vitamin B-12) 1,000 mcg tablet Take 1 tablet (1,000 mcg) by mouth once daily.      docosahexaenoic acid/epa (FISH OIL ORAL) Take 1 capsule by mouth once daily.      FLUoxetine (PROzac) 20 mg tablet Take 1 tablet (20 mg) by mouth once daily.       furosemide (Lasix) 20 mg tablet Take 1 tablet (20 mg) by mouth once daily.      gentamicin (Garamycin) 0.1 % ointment Apply 1 Application topically 3 times a day. To bed sores      glipiZIDE (Glucotrol) 10 mg tablet 1 tab(s) orally 2 times a day (before meals)      levothyroxine (Synthroid, Levoxyl) 75 mcg tablet Take 1 tablet (75 mcg) by mouth once daily.      magnesium oxide (Mag-Ox) 400 mg tablet Take 1 tablet (400 mg) by mouth once daily.      metFORMIN (Glucophage) 500 mg tablet Take 1 tablet (500 mg) by mouth 2 times a day with meals.      multivitamin tablet Take 1 tablet by mouth once daily.      nystatin (Mycostatin) 100,000 unit/gram powder Apply 1 Application topically if needed.      ondansetron ODT (Zofran-ODT) 4 mg disintegrating tablet Take 1 tablet (4 mg) by mouth every 8 hours if needed for nausea or vomiting.      pantoprazole (ProtoNix) 40 mg EC tablet Take 1 tablet (40 mg) by mouth once daily in the morning. Take before meals. Do not crush, chew, or split.      rivaroxaban (Xarelto) 15 mg tablet Take 1 tablet (15 mg) by mouth once daily in the evening. Take with meals. Take with food.      triamterene-hydrochlorothiazid (Dyazide) 37.5-25 mg capsule Take 1 capsule by mouth once daily in the morning.     === 12/06/23 ===    CT ABDOMEN PELVIS WO IV CONTRAST    - Impression -  1.  Findings compatible with acute mechanical small bowel obstruction  potentially related to adhesion.  2. There is an adjacent small left-sided ventral wall hernia which  contains minimal fat and fluid within the hernia sac which may or may  not be related. Attention recommended on clinical assessment.  3. Newly seen mild superior likely recent superior L2 compressive  deformity.      Signed by: Carlos Augustin 12/6/2023 12:24 PM  Dictation workstation:   [id number]        Assessment/Plan   Principal Problem:    Small bowel obstruction (CMS/HCC)  Active Problems:    Acute kidney injury (nontraumatic) (CMS/HCC)     Chronic renal insufficiency    Assessment  Patient resting in bed this morning. She denies any nausea, vomiting, or abdominal pain. Patient also reports no flatus or bowel movements.  Patient reports that her pain is well-controlled and that she is feeling better.  Abdominal incisions are clean, dry, intact.  Mild tenderness to palpation on examination.  Patient is eager to get up and walk.    Impression  POD 1 small intestine resection.    Plan  NPO/IVF/NG  Pain control  Nausea control  Encourage ambulation.  PT/OT consult  SW consult    Further recommendations per Dr. Teresita Bales PA-C

## 2023-12-09 NOTE — OP NOTE
EXTENSIVE LAPAROSCOPIC LYSIS OF ADHESIONS Operative Note     Date: 2023  OR Location: [facility]    Name: Harper Lara, : 1945, Age: 78 y.o., MRN: [id number], Sex: female    Diagnosis  Pre-op Diagnosis     * Small bowel obstruction (CMS/HCC) [K56.609] Post-op Diagnosis     * Small bowel obstruction (CMS/HCC) [K56.609]     Procedures  EXTENSIVE LAPAROSCOPIC LYSIS OF ADHESIONS for 2 hrs  56785 - [place] LAPAROSCOPY SMALL INTESTINE RESCJ & ANASTOMOSIS      Surgeons      * Manuel Lozada - Primary    Resident/Fellow/Other Assistant:  Surgeon(s) and Role:    Procedure Summary  Anesthesia: General  ASA: IV  Anesthesia Staff: Anesthesiologist: Alberto Blankenship MD; Salas Sharma DO  CRNA: Vero Mendoza APRN-CRNA; YARITZA Wesley-CRNA  Estimated Blood Loss: minimal   Intra-op Medications:   Medication Name Total Dose   insulin lispro (HumaLOG) injection 0-5 Units Cannot be calculated   heparin (porcine) injection 5,000 Units 5,000 Units              Anesthesia Record               Intraprocedure I/O Totals          Intake    NaCl 0.9 % 1000.00 mL    lactated Ringer's infusion 3300.00 mL    Total Intake 4300 mL          Specimen: No specimens collected     Staff:   Circulator: Marcy Rogers RN  Relief Circulator: Bhargavi Allen RN  Scrub Person: Danae Reno         Drains and/or Catheters:   NG/OG/Feeding Tube NG - [place] sump 16 Fr Right nostril (Active)   Tube Status Low intermittent suction 23 1803   Placement Verification Gastric contents 23 0750   Gastric Aspirate Grassy green (gastric) 23 0750   Distal Tube Measurement 70 cm 23 0750   Site Assessment Clean;Dry;Intact 23 0750   Drainage Appearance Green;Clear 23 0750   Response To Intervention No resistance met 23 1512   Tube Securement Taped to nostril center 23 0750   Output (mL) 1400 mL 23 0401       Tourniquet Times:         Implants:     Findings: dense bowel and greater omentum adhesions, small  bowel densely adherent to intra-peritoneal mesh, constricting adhesive band across the proximal TI causing obstruction; previous distal small bowel anastomosis intact    NDICATION FOR PROCEDURE:    79 y/o female patient with persistent SBO. History of open cholecystectomy, hysterectomy, laparotomy with SBR, two ventral hernias repair with mesh. She was admitted with SBO; NG placed; SBFT showed high grade SBO with no progression of contrast in colon after 24 hrs. The patient was consented for diagnostic laparoscopy, possible laparotomy, possible bowel resection and possible colostomy creation. I explained the procedure, the risks and complications which include but not limited to bleeding, infection, bowel injury, injury to the surrounding structures, Covid 19 infection, MI, stroke and blood clot. All questions were answered and willing to proceed.    DETAILS OF PROCEDURE:    After informed consent was obtained, the patient was brought into the operating room and placed in supine position.  Huddle and time-out were performed.  General anesthesia was obtained without difficulty. Bilateral arms were tucked and padded.  Arterial line placed. Healed right subcostal and midline incisions. The abdomen was prepped and draped in a normal sterile fashion using ChloraPrep.  She got 5000 units of subQ heparin and Ancef. A stab incision was made in LUQ. The 5 mm optical trocar was placed. Pneumoperitoneum was obtained with CO2 at 15 mmHg.  There was no evidence of iatrogenic injury. I placed 5 mm trocar in the left mid abdominal region. On inspection, loops of bowel and greater omentum plaster to abdominal wall. Using the Ligasure and suction device, the epigastric region was clear allowing placement of additional 5 mm trocar;  further adhesions taken down in the LLQ allowing for placement of another 5 mm trocar. Using the Endoshears, Ligasure and suction device, extensive lysis of adhesions performed freeing the loops of bowel  and greater omentum from abdominal wall. Part of the mesh was left on the small bowel to prevent enterotomy; 2 hrs was spent on lysis of adhesions. Hemostasis obtained with Ligasure and Bipolar. The patient was placed in Trendelenburg position with the right side up. Very dilated proximal loops of small bowel and decompressed terminal ileum. Starting at the distal TI after freeing it from pelvic sidewall, it was ran to the proximal TI where a adhesive constricting band was causing obstruction. The band was from the mesentery to the abdominal wall trapping the loop of small bowel. The bowel was viable and peristaltic. Other non-obstructing interloop adhesions were taken down. The bowel was inspected with no evidence of bowel injury; the operative field irrigated and aspirated. The CO2 was evacuated. Skin was reapproximated with 4-0 Monocryl in a subcuticular fashion. LiquiBand was placed. The instruments, sponge and needle counts were correct.  The patient was extubated.  I spoke to daughter. All questions.      Manuel Neville  Phone Number: [phone]
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
## 2023-12-09 NOTE — CARE PLAN
The patient's goals for the shift include      The clinical goals for the shift include patient will be hemodynamically stable through shift      Problem: Pain  Goal: My pain/discomfort is manageable  Outcome: Progressing     Problem: Safety  Goal: Patient will be injury free during hospitalization  Outcome: Progressing  Goal: I will remain free of falls  Outcome: Progressing     Problem: Daily Care  Goal: Daily care needs are met  Outcome: Progressing     Problem: Psychosocial Needs  Goal: Demonstrates ability to cope with hospitalization/illness  Outcome: Progressing  Goal: Collaborate with me, my family, and caregiver to identify my specific goals  Outcome: Progressing     Problem: Discharge Barriers  Goal: My discharge needs are met  Outcome: Progressing

## 2023-12-09 NOTE — CARE PLAN
Problem: Pain  Goal: My pain/discomfort is manageable  12/9/2023 1508 by Savannah Mcgill RN  Outcome: Progressing  12/9/2023 1506 by Savannah Mcgill RN  Flowsheets (Taken 12/9/2023 1506)  Resident's pain/discomfort is manageable:   Include resident/family/caregiver in decisions related to pain management   Offer non-pharmacological pain management interventions   Administer pain medication prior to activities that may trigger pain   Identify and avoid pain triggers     Problem: Safety  Goal: Patient will be injury free during hospitalization  Outcome: Progressing  Goal: I will remain free of falls  12/9/2023 1508 by Savannah Mcgill RN  Outcome: Progressing  12/9/2023 1506 by Savannah Mcgill RN  Flowsheets (Taken 12/9/2023 1506)  Resident will remain free of falls:   Apply bed/chair alarms as appropriate   Assess and monitor medications that may increase fall risk   Visual checks per facility policy   Consult with physical therapy as needed     Problem: Daily Care  Goal: Daily care needs are met  12/9/2023 1508 by Savannah Mcgill RN  Outcome: Progressing  12/9/2023 1506 by Savannah Mcgill RN  Flowsheets (Taken 12/9/2023 1506)  Daily care needs are met:   Assess and monitor ability to perform self care and identify potential discharge needs   Assist patient with activities of daily living as needed   Provide mouth care   Assess skin integrity/risk for skin breakdown   Encourage independent activity per ability   Include patient/family/caregiver in decisions related to daily care     Problem: Psychosocial Needs  Goal: Demonstrates ability to cope with hospitalization/illness  12/9/2023 1508 by Savannah Mcgill RN  Outcome: Progressing  12/9/2023 1506 by Savannah Mcgill RN  Flowsheets (Taken 12/9/2023 1506)  Demonstrates ability to cope with hospitalization/illness:   Encourage verbalization of feelings/concerns/expectations   Assist resident to identify and practice own strengths and abilities   Encourage  participation in diversional activities   Include resident/family/caregiver in decisions related to psychosocial needs   Provide low-stimulation environment as needed   Encourage resident to set and complete small goals for self   Reinforce positive adaptation of new coping behaviors  Goal: Collaborate with me, my family, and caregiver to identify my specific goals  12/9/2023 1508 by Savannah Mcgill RN  Outcome: Progressing  12/9/2023 1506 by Savannah Mcgill RN  Flowsheets (Taken 12/7/2023 0032 by Jamarcus Tellez RN)  Cultural Requests During Hospitalization: denies  Spiritual Requests During Hospitalization: denies     Problem: Discharge Barriers  Goal: My discharge needs are met  12/9/2023 1508 by Savannah Mcgill RN  Outcome: Progressing  12/9/2023 1506 by Savannah Mcgill RN  Flowsheets (Taken 12/9/2023 1506)  Resident's discharge needs are met:   Identify potential discharge barriers on admission and throughout stay   Involve resident/family/caregiver in discharge planning process     Problem: Skin  Goal: Decreased wound size/increased tissue granulation at next dressing change  12/9/2023 1508 by Savannah Mcgill RN  Outcome: Progressing  12/9/2023 1506 by Savannah Mcgill RN  Flowsheets (Taken 12/9/2023 1506)  Decreased wound size/increased tissue granulation at next dressing change: Promote sleep for wound healing  Goal: Participates in plan/prevention/treatment measures  12/9/2023 1508 by Savannah Mcgill RN  Outcome: Progressing  12/9/2023 1506 by Savannah Mcgill RN  Flowsheets (Taken 12/9/2023 1506)  Participates in plan/prevention/treatment measures:   Discuss with provider PT/OT consult   Elevate heels   Increase activity/out of bed for meals  Goal: Prevent/manage excess moisture  12/9/2023 1508 by Savannah Mcgill RN  Outcome: Progressing  12/9/2023 1506 by Savannah Mcgill RN  Flowsheets (Taken 12/9/2023 1506)  Prevent/manage excess moisture: Cleanse incontinence/protect with barrier cream  Goal:  Prevent/minimize sheer/friction injuries  12/9/2023 1508 by Savannah Mcgill RN  Outcome: Progressing  12/9/2023 1506 by Savannah Mcgill RN  Flowsheets (Taken 12/9/2023 1506)  Prevent/minimize sheer/friction injuries:   Complete micro-shifts as needed if patient unable. Adjust patient position to relieve pressure points, not a full turn   Increase activity/out of bed for meals   Use pull sheet   HOB 30 degrees or less   Turn/reposition every 2 hours/use positioning/transfer devices  Goal: Promote/optimize nutrition  12/9/2023 1508 by Savannah Mcgill RN  Outcome: Progressing  12/9/2023 1506 by Savannah Mcgill RN  Flowsheets (Taken 12/9/2023 1506)  Promote/optimize nutrition:   Consume > 50% meals/supplements   Offer water/supplements/favorite foods  Goal: Promote skin healing  12/9/2023 1508 by Savannah Mcglil RN  Outcome: Progressing  12/9/2023 1506 by Savannah Mcgill RN  Flowsheets (Taken 12/9/2023 1506)  Promote skin healing: Turn/reposition every 2 hours/use positioning/transfer devices     Problem: Pain - Adult  Goal: Verbalizes/displays adequate comfort level or baseline comfort level  12/9/2023 1508 by Savannah Mcgill RN  Outcome: Progressing  12/9/2023 1506 by Savannah Mcgill RN  Flowsheets (Taken 12/9/2023 1506)  Verbalizes/displays adequate comfort level or baseline comfort level:   Encourage patient to monitor pain and request assistance   Assess pain using appropriate pain scale   Administer analgesics based on type and severity of pain and evaluate response   Implement non-pharmacological measures as appropriate and evaluate response   Consider cultural and social influences on pain and pain management   Notify Licensed Independent Practitioner if interventions unsuccessful or patient reports new pain     Problem: Safety - Adult  Goal: Free from fall injury  12/9/2023 1508 by Savannah Mcgill RN  Outcome: Progressing  12/9/2023 1506 by Savannah Mcgill RN  Flowsheets (Taken 12/9/2023 1506)  Free  from fall injury: Instruct family/caregiver on patient safety     Problem: Discharge Planning  Goal: Discharge to home or other facility with appropriate resources  12/9/2023 1508 by Savannah Mcgill RN  Outcome: Progressing  12/9/2023 1506 by Savannah Mcgill RN  Flowsheets (Taken 12/9/2023 1506)  Discharge to home or other facility with appropriate resources:   Identify barriers to discharge with patient and caregiver   Arrange for needed discharge resources and transportation as appropriate   Identify discharge learning needs (meds, wound care, etc)   Refer to discharge planning if patient needs post-hospital services based on physician order or complex needs related to functional status, cognitive ability or social support system     Problem: Chronic Conditions and Co-morbidities  Goal: Patient's chronic conditions and co-morbidity symptoms are monitored and maintained or improved  12/9/2023 1508 by Savannah Mcgill RN  Outcome: Progressing  12/9/2023 1506 by Savannah Mcgill RN  Flowsheets (Taken 12/9/2023 1506)  Care Plan - Patient's Chronic Conditions and Co-Morbidity Symptoms are Monitored and Maintained or Improved:   Monitor and assess patient's chronic conditions and comorbid symptoms for stability, deterioration, or improvement   Collaborate with multidisciplinary team to address chronic and comorbid conditions and prevent exacerbation or deterioration   Update acute care plan with appropriate goals if chronic or comorbid symptoms are exacerbated and prevent overall improvement and discharge     Problem: Diabetes  Goal: Maintain electrolyte levels within acceptable range throughout shift  12/9/2023 1508 by Savannah Mcgill RN  Outcome: Progressing  12/9/2023 1506 by Savannah Mcgill RN  Flowsheets (Taken 12/9/2023 1506)  Maintain electrolyte levels within acceptable range throughout shift:   Med administration/monitoring of effect   Monitor urine output  Goal: Maintain glucose levels >70mg/dl to  <250mg/dl throughout shift  12/9/2023 1508 by Savannah Mcgill RN  Outcome: Progressing  12/9/2023 1506 by Savannah Mcgill RN  Flowsheets (Taken 12/9/2023 1506)  Maintain glucose levels >70mg/dl to <250mg/dl throughout shift: Med administration/monitoring of effect  Goal: Learn about and adhere to nutrition recommendations by end of shift  12/9/2023 1508 by Savannah Mcgill RN  Outcome: Progressing  12/9/2023 1506 by Savannah Mcgill RN  Flowsheets (Taken 12/9/2023 1506)  Learn about and adhere to nutrition recommendations by end of shift: Ensure/encourage compliance with appropriate diet  Goal: Vital signs within normal range for age by end of shift  12/9/2023 1508 by Savannah Mcgill RN  Outcome: Progressing  12/9/2023 1506 by Savannah Mcgill RN  Flowsheets (Taken 12/9/2023 1506)  Vital signs within normal range for age by end of shift: Med administration/monitoring of effect   The patient's goals for the shift include      The clinical goals for the shift include NG tube drainage will decrease by the end of this shift.  Patient made progress toward care plan goals.

## 2023-12-10 LAB
ANION GAP SERPL CALC-SCNC: 9 MMOL/L (ref 10–20)
BASOPHILS # BLD AUTO: 0.01 X10*3/UL (ref 0–0.1)
BASOPHILS NFR BLD AUTO: 0.2 %
BUN SERPL-MCNC: 41 MG/DL (ref 6–23)
CALCIUM SERPL-MCNC: 8.2 MG/DL (ref 8.6–10.3)
CHLORIDE SERPL-SCNC: 105 MMOL/L (ref 98–107)
CO2 SERPL-SCNC: 31 MMOL/L (ref 21–32)
CREAT SERPL-MCNC: 1.14 MG/DL (ref 0.5–1.05)
EOSINOPHIL # BLD AUTO: 0.04 X10*3/UL (ref 0–0.4)
EOSINOPHIL NFR BLD AUTO: 0.9 %
ERYTHROCYTE [DISTWIDTH] IN BLOOD BY AUTOMATED COUNT: 15.6 % (ref 11.5–14.5)
GFR SERPL CREATININE-BSD FRML MDRD: 50 ML/MIN/1.73M*2
GLUCOSE BLD MANUAL STRIP-MCNC: 144 MG/DL (ref 74–99)
GLUCOSE BLD MANUAL STRIP-MCNC: 150 MG/DL (ref 74–99)
GLUCOSE BLD MANUAL STRIP-MCNC: 163 MG/DL (ref 74–99)
GLUCOSE BLD MANUAL STRIP-MCNC: 174 MG/DL (ref 74–99)
GLUCOSE SERPL-MCNC: 149 MG/DL (ref 74–99)
HCT VFR BLD AUTO: 27.5 % (ref 36–46)
HGB BLD-MCNC: 8.5 G/DL (ref 12–16)
HOLD SPECIMEN: NORMAL
IMM GRANULOCYTES # BLD AUTO: 0.06 X10*3/UL (ref 0–0.5)
IMM GRANULOCYTES NFR BLD AUTO: 1.4 % (ref 0–0.9)
IRON SATN MFR SERPL: 11 % (ref 25–45)
IRON SERPL-MCNC: 16 UG/DL (ref 35–150)
LYMPHOCYTES # BLD AUTO: 1.05 X10*3/UL (ref 0.8–3)
LYMPHOCYTES NFR BLD AUTO: 24.2 %
MCH RBC QN AUTO: 28.9 PG (ref 26–34)
MCHC RBC AUTO-ENTMCNC: 30.9 G/DL (ref 32–36)
MCV RBC AUTO: 94 FL (ref 80–100)
MONOCYTES # BLD AUTO: 0.28 X10*3/UL (ref 0.05–0.8)
MONOCYTES NFR BLD AUTO: 6.5 %
NEUTROPHILS # BLD AUTO: 2.9 X10*3/UL (ref 1.6–5.5)
NEUTROPHILS NFR BLD AUTO: 66.8 %
NRBC BLD-RTO: 0 /100 WBCS (ref 0–0)
PLATELET # BLD AUTO: 149 X10*3/UL (ref 150–450)
POTASSIUM SERPL-SCNC: 3.6 MMOL/L (ref 3.5–5.3)
RBC # BLD AUTO: 2.94 X10*6/UL (ref 4–5.2)
SODIUM SERPL-SCNC: 141 MMOL/L (ref 136–145)
TIBC SERPL-MCNC: 147 UG/DL (ref 240–445)
UIBC SERPL-MCNC: 131 UG/DL (ref 110–370)
WBC # BLD AUTO: 4.3 X10*3/UL (ref 4.4–11.3)

## 2023-12-10 PROCEDURE — 99232 SBSQ HOSP IP/OBS MODERATE 35: CPT

## 2023-12-10 PROCEDURE — 2500000004 HC RX 250 GENERAL PHARMACY W/ HCPCS (ALT 636 FOR OP/ED): Performed by: INTERNAL MEDICINE

## 2023-12-10 PROCEDURE — 83540 ASSAY OF IRON: CPT | Performed by: INTERNAL MEDICINE

## 2023-12-10 PROCEDURE — 82728 ASSAY OF FERRITIN: CPT | Mod: ELYLAB | Performed by: INTERNAL MEDICINE

## 2023-12-10 PROCEDURE — 1210000001 HC SEMI-PRIVATE ROOM DAILY

## 2023-12-10 PROCEDURE — 82947 ASSAY GLUCOSE BLOOD QUANT: CPT | Mod: 59

## 2023-12-10 PROCEDURE — 82947 ASSAY GLUCOSE BLOOD QUANT: CPT | Mod: 91

## 2023-12-10 PROCEDURE — 36415 COLL VENOUS BLD VENIPUNCTURE: CPT | Performed by: INTERNAL MEDICINE

## 2023-12-10 PROCEDURE — 80048 BASIC METABOLIC PNL TOTAL CA: CPT | Performed by: INTERNAL MEDICINE

## 2023-12-10 PROCEDURE — 2500000005 HC RX 250 GENERAL PHARMACY W/O HCPCS: Performed by: INTERNAL MEDICINE

## 2023-12-10 PROCEDURE — 99232 SBSQ HOSP IP/OBS MODERATE 35: CPT | Performed by: INTERNAL MEDICINE

## 2023-12-10 PROCEDURE — 85025 COMPLETE CBC W/AUTO DIFF WBC: CPT | Performed by: INTERNAL MEDICINE

## 2023-12-10 RX ADMIN — POTASSIUM CHLORIDE, DEXTROSE MONOHYDRATE AND SODIUM CHLORIDE 125 ML/HR: 150; 5; 200 INJECTION, SOLUTION INTRAVENOUS at 06:28

## 2023-12-10 RX ADMIN — METOPROLOL TARTRATE 2.5 MG: 5 INJECTION INTRAVENOUS at 16:16

## 2023-12-10 RX ADMIN — METOPROLOL TARTRATE 2.5 MG: 5 INJECTION INTRAVENOUS at 10:57

## 2023-12-10 RX ADMIN — INSULIN LISPRO 1 UNITS: 100 INJECTION, SOLUTION INTRAVENOUS; SUBCUTANEOUS at 04:40

## 2023-12-10 RX ADMIN — POTASSIUM CHLORIDE, DEXTROSE MONOHYDRATE AND SODIUM CHLORIDE 125 ML/HR: 150; 5; 200 INJECTION, SOLUTION INTRAVENOUS at 22:15

## 2023-12-10 RX ADMIN — LEVOTHYROXINE SODIUM 50 MCG: 20 INJECTION, SOLUTION INTRAVENOUS at 09:05

## 2023-12-10 ASSESSMENT — COGNITIVE AND FUNCTIONAL STATUS - GENERAL
DAILY ACTIVITIY SCORE: 6
CLIMB 3 TO 5 STEPS WITH RAILING: A LOT
CLIMB 3 TO 5 STEPS WITH RAILING: A LOT
MOVING TO AND FROM BED TO CHAIR: A LOT
DAILY ACTIVITIY SCORE: 9
DRESSING REGULAR UPPER BODY CLOTHING: A LOT
MOVING TO AND FROM BED TO CHAIR: A LITTLE
TOILETING: TOTAL
MOBILITY SCORE: 15
EATING MEALS: TOTAL
DRESSING REGULAR LOWER BODY CLOTHING: A LOT
EATING MEALS: TOTAL
PERSONAL GROOMING: A LOT
STANDING UP FROM CHAIR USING ARMS: A LOT
TURNING FROM BACK TO SIDE WHILE IN FLAT BAD: A LOT
WALKING IN HOSPITAL ROOM: A LITTLE
DAILY ACTIVITIY SCORE: 9
MOVING FROM LYING ON BACK TO SITTING ON SIDE OF FLAT BED WITH BEDRAILS: A LITTLE
HELP NEEDED FOR BATHING: TOTAL
DRESSING REGULAR UPPER BODY CLOTHING: A LOT
CLIMB 3 TO 5 STEPS WITH RAILING: A LOT
TURNING FROM BACK TO SIDE WHILE IN FLAT BAD: A LOT
WALKING IN HOSPITAL ROOM: A LITTLE
MOBILITY SCORE: 14
DRESSING REGULAR UPPER BODY CLOTHING: TOTAL
EATING MEALS: TOTAL
STANDING UP FROM CHAIR USING ARMS: A LOT
MOBILITY SCORE: 14
TOILETING: TOTAL
STANDING UP FROM CHAIR USING ARMS: A LOT
HELP NEEDED FOR BATHING: TOTAL
MOVING TO AND FROM BED TO CHAIR: A LOT
TOILETING: TOTAL
TURNING FROM BACK TO SIDE WHILE IN FLAT BAD: A LOT
PERSONAL GROOMING: TOTAL
DRESSING REGULAR LOWER BODY CLOTHING: TOTAL
HELP NEEDED FOR BATHING: TOTAL
DRESSING REGULAR LOWER BODY CLOTHING: A LOT
WALKING IN HOSPITAL ROOM: A LITTLE
PERSONAL GROOMING: A LOT
MOVING FROM LYING ON BACK TO SITTING ON SIDE OF FLAT BED WITH BEDRAILS: A LITTLE
MOVING FROM LYING ON BACK TO SITTING ON SIDE OF FLAT BED WITH BEDRAILS: A LITTLE

## 2023-12-10 ASSESSMENT — PAIN SCALES - GENERAL
PAINLEVEL_OUTOF10: 0 - NO PAIN
PAINLEVEL_OUTOF10: 0 - NO PAIN

## 2023-12-10 ASSESSMENT — PAIN - FUNCTIONAL ASSESSMENT: PAIN_FUNCTIONAL_ASSESSMENT: 0-10

## 2023-12-10 NOTE — PROGRESS NOTES
Harper Lara is a 78 y.o. female on day 4 of admission presenting with Small bowel obstruction (CMS/HCC).      Subjective   Patient seen and evaluated today.  Patient states she has not had any bowel movements or any flatus.  Bowel sounds are hypoactive.        Objective     Last Recorded Vitals  /62   Pulse 67   Temp 36.1 °C (97 °F) (Temporal)   Resp 16   Wt 97.5 kg (215 lb)   SpO2 95%   Intake/Output last 3 Shifts:    Intake/Output Summary (Last 24 hours) at 12/10/2023 1402  Last data filed at 12/10/2023 1311  Gross per 24 hour   Intake 2818.75 ml   Output 500 ml   Net 2318.75 ml         Admission Weight  Weight: 97.5 kg (215 lb) (12/06/23 1117)    Daily Weight  12/06/23 : 97.5 kg (215 lb)    Image Results  XR abdomen 1 view  Narrative: Interpreted By:  David Collado,   STUDY:  Abdominal series dated  12/8/2023.      INDICATION:  Enteric tube placement.      COMPARISON:  None.      ACCESSION NUMBER(S):  [id number]      ORDERING CLINICIAN:  GRZEGORZ TABOR      TECHNIQUE:  AP radiograph of the abdomen with subtraction imaging.      FINDINGS:  Exam is limited due to incomplete inclusion the entirety of the  abdomen in the field of view. There is an enteric tube with the tip  and side port projecting over the left upper quadrant. Reticular  increased density is seen over the left upper quadrant which may be  related to contrast installation. There is some increased density  over the lower abdomen which could relate to some of the oral  contrast from prior small-bowel follow-through; otherwise, the  majority of this contrast does not appear to be present. There is a  prominent loop of bowel over the mid abdomen measuring up to 5.6 cm.  This likely represents a loop of small bowel. Surgical changes are  seen over the right lower quadrant. Visualized lung bases are clear.  Degenerative changes seen of the spine.      Impression: 1. Enteric tube as above.  2. Dilated loop of bowel in the mid abdomen likely  representing a  dilated loop of small bowel compatible with obstructive process  further discussed on the 12/06/2023 CT and the 12/07/2023 small-bowel  follow-through.  3. Although there may be some retained oral contrast from prior  small-bowel follow-through the majority of this contrast is not  evident. It is uncertain whether this is due to passage versus  enteric tube suction of the contrast. Clinical correlation may be  helpful.      Signed by: David Collado 12/8/2023 9:42 PM  Dictation workstation:   [id number]  FL small bowel series  Narrative: Interpreted By:  Jimbo Andersen,   STUDY:  FL SMALL BOWEL SERIES; ;  12/7/2023 8:25 pm      INDICATION:  Signs/Symptoms:SBO.      COMPARISON:  CT abdomen/pelvis of 12/06/2023.      ACCESSION NUMBER(S):  [id number]      ORDERING CLINICIAN:  VIJI LUNA      TECHNIQUE:  Small bowel series was performed with administration of enteric  contrast through an NG tube. Sequential radiographs of the abdomen  were obtained over a 10 hour period.      FINDINGS:   view of the abdomen shows clear lung bases and NG tube tip  projecting at the gastric fundus level. There are moderately dilated  central abdominal small bowel segments similar in configuration to  prior CT with an overall bowel gas pattern suggestive of obstruction.  A right lower quadrant suture line is present.      Enteric contrast was administered through the NG tube and promptly  opacified the stomach. There was no progression of contrast from the  stomach over the initial 4 hours of the exam.      On 6 hour image, there was passage of a portion of the contrast from  the stomach, now opacifying the moderately dilated central abdominal  small bowel segments. On subsequent images at 8 hour and 10 hour,  there has been no further progression of the enteric contrast. The  enteric contrast remains within the dilated stomach as well as within  the dilated central abdominal small bowel. Contrast has not  reached  the distal decompressed small bowel segments or colon.      Impression: Small-bowel series compatible with high-grade small bowel obstruction  similar to prior CT.          MACRO:  None      Signed by: Jimbo Andersen 12/8/2023 8:03 AM  Dictation workstation:   [id number]      Physical Exam  Constitutional:       Appearance: She is obese.   HENT:      Head: Normocephalic and atraumatic.      Nose: Nose normal.      Mouth/Throat:      Mouth: Mucous membranes are dry.   Eyes:      Extraocular Movements: Extraocular movements intact.      Conjunctiva/sclera: Conjunctivae normal.   Cardiovascular:      Rate and Rhythm: Normal rate and regular rhythm.      Pulses: Normal pulses.      Heart sounds: Normal heart sounds.   Pulmonary:      Effort: Pulmonary effort is normal.      Breath sounds: Normal breath sounds.   Abdominal:      Palpations: Abdomen is soft.      Tenderness: There is abdominal tenderness.      Comments: NG tube in place.   Musculoskeletal:         General: Normal range of motion.      Cervical back: Normal range of motion and neck supple.   Skin:     General: Skin is warm and dry.   Neurological:      Mental Status: She is alert. Mental status is at baseline.   Psychiatric:         Mood and Affect: Mood normal.         Relevant Results               Assessment/Plan          78 y.o. female presenting with past medical history of type 2 diabetes mellitus, fatty liver, hyperlipidemia, hypertension, A-fib status post ablation, hypothyroidism, anxiety and depression, history of GI bleed comes to the hospital with abdominal pain and vomiting for past few days.  Patient found to have SBO on the imaging.  SBFT done showed high-grade obstruction.  On 12/8/2023 the patient underwent extensive laparoscopic lysis of adhesions for 2 hours and small intestinal resection and anastomosis.        Principal Problem:    Small bowel obstruction (CMS/HCC)  Active Problems:    Acute kidney injury (nontraumatic)  (CMS/HCC)    Chronic renal insufficiency    Small bowel obstruction  -As seen on CT scan of abdomen pelvis and also on SBFT.  -Status post extensive laparoscopic lysis of adhesions and laparoscopic small intestine resection and anastomosis on 12/8/2023.  -Continue with IV hydration and maintain n.p.o. status at this time.  IV fluids switched to to potassium containing fluids.  -Surgery has been consulted and NG tube in place.  Awaiting return of bowel function at this time.     SOCORRO with underlying CKD stage III  -Creatinine noted 1.81 baseline is around 1.1.  Slowly improving  -Continue with IV hydration.    Hypernatremia  -Will adjust IV fluids and continue to maintain potassium and IV fluids.     Hypokalemia  -Replace IV and monitor.      Atrial fibrillation  -Since NPO will add IV lopressor every 6 hours. Rate controlled at this time  -The patient is chest pain-free at this time.  Continue to monitor.     Hypothyroidism  -Not able to resume oral Synthroid at this time.  Will continue with reduced dose of IV Synthroid.     Chronic systolic congestive heart failure  -Last echo from 2021 shows LVEF of 40%.  -Monitor volume status     Diabetes mellitus type 2  -Hold home oral antidiabetics  -Continue with sliding scale insulin n.p.o. protocol.     VTE prophylaxis: SCDs patient history of GI bleed    PT OT            Brittany Pearson MD

## 2023-12-10 NOTE — CARE PLAN
The patient's goals for the shift include  pain control    The clinical goals for the shift include NG tube drainage will decrease by the end of shift    Problem: Pain  Goal: My pain/discomfort is manageable  12/10/2023 1029 by Leda Crowley RN  Outcome: Progressing  12/10/2023 1029 by Leda Crowley RN  Outcome: Progressing     Problem: Safety  Goal: Patient will be injury free during hospitalization  12/10/2023 1029 by Leda Crowley RN  Outcome: Progressing  12/10/2023 1029 by Leda Crowley RN  Outcome: Progressing  Goal: I will remain free of falls  12/10/2023 1029 by Leda Crowley RN  Outcome: Progressing  12/10/2023 1029 by Leda Crowley RN  Outcome: Progressing     Problem: Daily Care  Goal: Daily care needs are met  12/10/2023 1029 by Leda Crowley RN  Outcome: Progressing  12/10/2023 1029 by Leda Crowley RN  Outcome: Progressing     Problem: Psychosocial Needs  Goal: Demonstrates ability to cope with hospitalization/illness  12/10/2023 1029 by Leda Crowley RN  Outcome: Progressing  12/10/2023 1029 by Leda Crowley RN  Outcome: Progressing  Goal: Collaborate with me, my family, and caregiver to identify my specific goals  12/10/2023 1029 by Leda Crowley RN  Outcome: Progressing  12/10/2023 1029 by Leda Crowley RN  Outcome: Progressing     Problem: Psychosocial Needs  Goal: Demonstrates ability to cope with hospitalization/illness  12/10/2023 1029 by Leda Crowley RN  Outcome: Progressing  12/10/2023 1029 by Leda Crwoley RN  Outcome: Progressing  Goal: Collaborate with me, my family, and caregiver to identify my specific goals  12/10/2023 1029 by Leda Crowley RN  Outcome: Progressing  12/10/2023 1029 by Leda Crowley RN  Outcome: Progressing     Problem: Discharge Barriers  Goal: My discharge needs are met  12/10/2023 1029 by Leda Crowley RN  Outcome: Progressing  12/10/2023 1029 by Leda Crowley RN  Outcome: Progressing     Problem: Skin  Goal: Decreased wound size/increased tissue  granulation at next dressing change  12/10/2023 1029 by Leda Crowley RN  Outcome: Progressing  12/10/2023 1029 by Leda Crowley RN  Outcome: Progressing  Flowsheets (Taken 12/10/2023 1029)  Decreased wound size/increased tissue granulation at next dressing change:   Promote sleep for wound healing   Protective dressings over bony prominences  Goal: Participates in plan/prevention/treatment measures  12/10/2023 1029 by Leda Crowley RN  Outcome: Progressing  12/10/2023 1029 by Leda Crowley RN  Outcome: Progressing  Flowsheets (Taken 12/10/2023 1029)  Participates in plan/prevention/treatment measures:   Elevate heels   Increase activity/out of bed for meals  Goal: Prevent/manage excess moisture  12/10/2023 1029 by Leda Crowley RN  Outcome: Progressing  12/10/2023 1029 by Leda Crowley RN  Outcome: Progressing  Flowsheets (Taken 12/10/2023 1029)  Prevent/manage excess moisture:   Monitor for/manage infection if present   Moisturize dry skin  Goal: Prevent/minimize sheer/friction injuries  12/10/2023 1029 by Leda Crowley RN  Outcome: Progressing  12/10/2023 1029 by Leda Crowley RN  Outcome: Progressing  Flowsheets (Taken 12/10/2023 1029)  Prevent/minimize sheer/friction injuries:   Increase activity/out of bed for meals   Use pull sheet   HOB 30 degrees or less   Turn/reposition every 2 hours/use positioning/transfer devices  Goal: Promote/optimize nutrition  12/10/2023 1029 by Leda Crowley RN  Outcome: Progressing  12/10/2023 1029 by Leda Crowley RN  Outcome: Progressing  Flowsheets (Taken 12/10/2023 1029)  Promote/optimize nutrition: Discuss with provider if NPO > 2 days  Goal: Promote skin healing  12/10/2023 1029 by Leda Crowley RN  Outcome: Progressing  12/10/2023 1029 by Leda Crowley RN  Outcome: Progressing  Flowsheets (Taken 12/10/2023 1029)  Promote skin healing:   Turn/reposition every 2 hours/use positioning/transfer devices   Assess skin/pad under line(s)/device(s)     Problem: Pain -  Adult  Goal: Verbalizes/displays adequate comfort level or baseline comfort level  12/10/2023 1029 by Leda Crowley RN  Outcome: Progressing  12/10/2023 1029 by Leda Crowley RN  Outcome: Progressing     Problem: Safety - Adult  Goal: Free from fall injury  12/10/2023 1029 by Leda Crowley RN  Outcome: Progressing  12/10/2023 1029 by Leda Crowley RN  Outcome: Progressing     Problem: Discharge Planning  Goal: Discharge to home or other facility with appropriate resources  12/10/2023 1029 by Leda Crowley RN  Outcome: Progressing  12/10/2023 1029 by Leda Crowley RN  Outcome: Progressing     Problem: Chronic Conditions and Co-morbidities  Goal: Patient's chronic conditions and co-morbidity symptoms are monitored and maintained or improved  12/10/2023 1029 by Leda Crowley RN  Outcome: Progressing  12/10/2023 1029 by Leda Crowley RN  Outcome: Progressing     Problem: Chronic Conditions and Co-morbidities  Goal: Patient's chronic conditions and co-morbidity symptoms are monitored and maintained or improved  12/10/2023 1029 by Leda Crowley RN  Outcome: Progressing  12/10/2023 1029 by Leda Crowley RN  Outcome: Progressing     Problem: Diabetes  Goal: Maintain electrolyte levels within acceptable range throughout shift  12/10/2023 1029 by Leda Crowley RN  Outcome: Progressing  12/10/2023 1029 by Leda Crowley RN  Outcome: Progressing  Goal: Maintain glucose levels >70mg/dl to <250mg/dl throughout shift  12/10/2023 1029 by Leda Crowley RN  Outcome: Progressing  12/10/2023 1029 by Leda Crowley RN  Outcome: Progressing  Goal: Learn about and adhere to nutrition recommendations by end of shift  12/10/2023 1029 by Leda Crowley RN  Outcome: Progressing  12/10/2023 1029 by Leda Crowley RN  Outcome: Progressing  Goal: Vital signs within normal range for age by end of shift  12/10/2023 1029 by Leda Crowley RN  Outcome: Progressing  12/10/2023 1029 by Leda Crowley RN  Outcome: Progressing

## 2023-12-10 NOTE — PROGRESS NOTES
"Harper Lara is a 78 y.o. female on day 4 of admission presenting with Small bowel obstruction (CMS/HCC).     Subjective   Patient was seen and examined this morning.  She is resting in bed.  She denies abdominal pain, nausea, vomiting, or any other symptoms.  She states she has not passed gas or had a bowel movement.       Objective     Physical Exam  Vitals reviewed.   Constitutional:       General: She is not in acute distress.  HENT:      Head: Normocephalic and atraumatic.      Mouth/Throat:      Mouth: Mucous membranes are moist.   Eyes:      Conjunctiva/sclera: Conjunctivae normal.   Pulmonary:      Effort: Pulmonary effort is normal. No respiratory distress.   Abdominal:      General: Abdomen is flat. Bowel sounds are decreased. There is no distension.      Palpations: Abdomen is soft.      Tenderness: There is abdominal tenderness (generalized). There is no guarding.      Comments: Abdominal incisions are clean, dry, and intact.  Approximately 750 cc dark brown output from NG tube.   Musculoskeletal:         General: No swelling.   Skin:     General: Skin is warm and dry.      Capillary Refill: Capillary refill takes less than 2 seconds.   Neurological:      Mental Status: She is alert.   Psychiatric:         Mood and Affect: Mood normal.         Behavior: Behavior normal.         Last Recorded Vitals  Blood pressure 137/62, pulse 67, temperature 36.1 °C (97 °F), temperature source Temporal, resp. rate 16, height 1.6 m (5' 3\"), weight 97.5 kg (215 lb), SpO2 95 %.  Intake/Output last 3 Shifts:  I/O last 3 completed shifts:  In: 1241.3 (12.7 mL/kg) [I.V.:1022.5 (10.5 mL/kg); IV Piggyback:218.8]  Out: 500 (5.1 mL/kg) [Emesis/NG output:500]  Weight: 97.5 kg     Relevant Results  Lab Results   Component Value Date    WBC 4.3 (L) 12/10/2023    HGB 8.5 (L) 12/10/2023    HCT 27.5 (L) 12/10/2023    MCV 94 12/10/2023     (L) 12/10/2023     Lab Results   Component Value Date    GLUCOSE 149 (H) 12/10/2023    " CALCIUM 8.2 (L) 12/10/2023     12/10/2023    K 3.6 12/10/2023    CO2 31 12/10/2023     12/10/2023    BUN 41 (H) 12/10/2023    CREATININE 1.14 (H) 12/10/2023        Scheduled medications  insulin lispro, 0-5 Units, subcutaneous, q4h  levothyroxine, 50 mcg, intravenous, q24h ORLY  metoprolol, 2.5 mg, intravenous, q6h      Continuous medications  potassium chloride-D5-0.2%NaCl, 125 mL/hr, Last Rate: 125 mL/hr (12/10/23 1311)      PRN medications  PRN medications: dextrose 10 % in water (D10W), dextrose, glucagon, ondansetron **OR** ondansetron        Assessment/Plan     Principal Problem:    Small bowel obstruction (CMS/HCC)  Active Problems:    Acute kidney injury (nontraumatic) (CMS/HCC)    Chronic renal insufficiency    Subjective  Patient resting in bed.  She denies abdominal pain, nausea, vomiting.  She has not passed gas or had a bowel movement yet.  On exam, bowel sounds are decreased, she has generalized abdominal tenderness, abdominal incisions are CDI, approximately 750 cc dark brown output from NG tube.  500 cc output from NG was recorded overnight.    Impression  POD 2 small intestine resection.     Plan  NPO/IVF/NG  Pain control  Nausea control  Encourage ambulation.  PT/OT consult  SW consult     Further recommendations per Dr. Teresita Duffy PA-C

## 2023-12-11 ENCOUNTER — APPOINTMENT (OUTPATIENT)
Dept: RADIOLOGY | Facility: HOSPITAL | Age: 78
DRG: 330 | End: 2023-12-11
Payer: MEDICARE

## 2023-12-11 LAB
ANION GAP SERPL CALC-SCNC: <7 MMOL/L (ref 10–20)
BASOPHILS # BLD AUTO: 0.01 X10*3/UL (ref 0–0.1)
BASOPHILS NFR BLD AUTO: 0.2 %
BUN SERPL-MCNC: 24 MG/DL (ref 6–23)
CALCIUM SERPL-MCNC: 8.1 MG/DL (ref 8.6–10.3)
CHLORIDE SERPL-SCNC: 104 MMOL/L (ref 98–107)
CHOLEST SERPL-MCNC: 73 MG/DL (ref 0–199)
CHOLESTEROL/HDL RATIO: 3.1
CO2 SERPL-SCNC: 28 MMOL/L (ref 21–32)
CREAT SERPL-MCNC: 0.91 MG/DL (ref 0.5–1.05)
EOSINOPHIL # BLD AUTO: 0.1 X10*3/UL (ref 0–0.4)
EOSINOPHIL NFR BLD AUTO: 2.1 %
ERYTHROCYTE [DISTWIDTH] IN BLOOD BY AUTOMATED COUNT: 15.5 % (ref 11.5–14.5)
FERRITIN SERPL-MCNC: 687 NG/ML (ref 8–150)
GFR SERPL CREATININE-BSD FRML MDRD: 66 ML/MIN/1.73M*2
GLUCOSE BLD MANUAL STRIP-MCNC: 122 MG/DL (ref 74–99)
GLUCOSE BLD MANUAL STRIP-MCNC: 122 MG/DL (ref 74–99)
GLUCOSE BLD MANUAL STRIP-MCNC: 131 MG/DL (ref 74–99)
GLUCOSE BLD MANUAL STRIP-MCNC: 141 MG/DL (ref 74–99)
GLUCOSE BLD MANUAL STRIP-MCNC: 141 MG/DL (ref 74–99)
GLUCOSE BLD MANUAL STRIP-MCNC: 153 MG/DL (ref 74–99)
GLUCOSE SERPL-MCNC: 144 MG/DL (ref 74–99)
HCT VFR BLD AUTO: 28.4 % (ref 36–46)
HDLC SERPL-MCNC: 23.7 MG/DL
HGB BLD-MCNC: 8.8 G/DL (ref 12–16)
HOLD SPECIMEN: NORMAL
IMM GRANULOCYTES # BLD AUTO: 0.04 X10*3/UL (ref 0–0.5)
IMM GRANULOCYTES NFR BLD AUTO: 0.8 % (ref 0–0.9)
LDLC SERPL CALC-MCNC: 18 MG/DL
LYMPHOCYTES # BLD AUTO: 0.91 X10*3/UL (ref 0.8–3)
LYMPHOCYTES NFR BLD AUTO: 19.1 %
MCH RBC QN AUTO: 28.8 PG (ref 26–34)
MCHC RBC AUTO-ENTMCNC: 31 G/DL (ref 32–36)
MCV RBC AUTO: 93 FL (ref 80–100)
MONOCYTES # BLD AUTO: 0.19 X10*3/UL (ref 0.05–0.8)
MONOCYTES NFR BLD AUTO: 4 %
NEUTROPHILS # BLD AUTO: 3.52 X10*3/UL (ref 1.6–5.5)
NEUTROPHILS NFR BLD AUTO: 73.8 %
NON HDL CHOLESTEROL: 49 MG/DL (ref 0–149)
NRBC BLD-RTO: 0 /100 WBCS (ref 0–0)
PLATELET # BLD AUTO: 146 X10*3/UL (ref 150–450)
POTASSIUM SERPL-SCNC: 4 MMOL/L (ref 3.5–5.3)
RBC # BLD AUTO: 3.06 X10*6/UL (ref 4–5.2)
SODIUM SERPL-SCNC: 134 MMOL/L (ref 136–145)
TRIGL SERPL-MCNC: 155 MG/DL (ref 0–149)
VLDL: 31 MG/DL (ref 0–40)
WBC # BLD AUTO: 4.8 X10*3/UL (ref 4.4–11.3)

## 2023-12-11 PROCEDURE — 2550000001 HC RX 255 CONTRASTS: Performed by: SURGERY

## 2023-12-11 PROCEDURE — 97530 THERAPEUTIC ACTIVITIES: CPT | Mod: GP,CQ

## 2023-12-11 PROCEDURE — 85025 COMPLETE CBC W/AUTO DIFF WBC: CPT | Performed by: INTERNAL MEDICINE

## 2023-12-11 PROCEDURE — 99232 SBSQ HOSP IP/OBS MODERATE 35: CPT | Performed by: INTERNAL MEDICINE

## 2023-12-11 PROCEDURE — 2500000004 HC RX 250 GENERAL PHARMACY W/ HCPCS (ALT 636 FOR OP/ED): Performed by: INTERNAL MEDICINE

## 2023-12-11 PROCEDURE — 80048 BASIC METABOLIC PNL TOTAL CA: CPT | Performed by: INTERNAL MEDICINE

## 2023-12-11 PROCEDURE — 74250 X-RAY XM SM INT 1CNTRST STD: CPT

## 2023-12-11 PROCEDURE — 2720000007 HC OR 272 NO HCPCS

## 2023-12-11 PROCEDURE — 80061 LIPID PANEL: CPT | Performed by: INTERNAL MEDICINE

## 2023-12-11 PROCEDURE — 1210000001 HC SEMI-PRIVATE ROOM DAILY

## 2023-12-11 PROCEDURE — 2500000005 HC RX 250 GENERAL PHARMACY W/O HCPCS: Performed by: INTERNAL MEDICINE

## 2023-12-11 PROCEDURE — 36415 COLL VENOUS BLD VENIPUNCTURE: CPT | Performed by: INTERNAL MEDICINE

## 2023-12-11 PROCEDURE — 76937 US GUIDE VASCULAR ACCESS: CPT

## 2023-12-11 PROCEDURE — 82947 ASSAY GLUCOSE BLOOD QUANT: CPT | Mod: 59

## 2023-12-11 PROCEDURE — 36569 INSJ PICC 5 YR+ W/O IMAGING: CPT

## 2023-12-11 RX ORDER — SODIUM CHLORIDE, SODIUM LACTATE, POTASSIUM CHLORIDE, CALCIUM CHLORIDE 600; 310; 30; 20 MG/100ML; MG/100ML; MG/100ML; MG/100ML
100 INJECTION, SOLUTION INTRAVENOUS CONTINUOUS
Status: DISCONTINUED | OUTPATIENT
Start: 2023-12-11 | End: 2023-12-11

## 2023-12-11 RX ORDER — LIDOCAINE HYDROCHLORIDE 10 MG/ML
5 INJECTION INFILTRATION; PERINEURAL ONCE
Status: DISCONTINUED | OUTPATIENT
Start: 2023-12-11 | End: 2023-12-17

## 2023-12-11 RX ORDER — DEXTROSE, SODIUM CHLORIDE, SODIUM LACTATE, POTASSIUM CHLORIDE, AND CALCIUM CHLORIDE 5; .6; .31; .03; .02 G/100ML; G/100ML; G/100ML; G/100ML; G/100ML
100 INJECTION, SOLUTION INTRAVENOUS CONTINUOUS
Status: DISCONTINUED | OUTPATIENT
Start: 2023-12-11 | End: 2023-12-11

## 2023-12-11 RX ADMIN — I.V. FAT EMULSION 250 ML: 20 EMULSION INTRAVENOUS at 21:40

## 2023-12-11 RX ADMIN — DIATRIZOATE MEGLUMINE AND DIATRIZOATE SODIUM 60 ML: 660; 100 LIQUID ORAL; RECTAL at 19:45

## 2023-12-11 RX ADMIN — LEVOTHYROXINE SODIUM 50 MCG: 20 INJECTION, SOLUTION INTRAVENOUS at 11:12

## 2023-12-11 RX ADMIN — DIATRIZOATE MEGLUMINE AND DIATRIZOATE SODIUM 60 ML: 660; 100 LIQUID ORAL; RECTAL at 19:00

## 2023-12-11 RX ADMIN — DIATRIZOATE MEGLUMINE AND DIATRIZOATE SODIUM 60 ML: 660; 100 LIQUID ORAL; RECTAL at 20:15

## 2023-12-11 RX ADMIN — SODIUM CHLORIDE, SODIUM LACTATE, POTASSIUM CHLORIDE, CALCIUM CHLORIDE AND DEXTROSE MONOHYDRATE 100 ML/HR: 5; 600; 310; 30; 20 INJECTION, SOLUTION INTRAVENOUS at 15:55

## 2023-12-11 RX ADMIN — POTASSIUM CHLORIDE, DEXTROSE MONOHYDRATE AND SODIUM CHLORIDE 125 ML/HR: 150; 5; 200 INJECTION, SOLUTION INTRAVENOUS at 05:49

## 2023-12-11 RX ADMIN — DIATRIZOATE MEGLUMINE AND DIATRIZOATE SODIUM 60 ML: 660; 100 LIQUID ORAL; RECTAL at 20:00

## 2023-12-11 RX ADMIN — ASCORBIC ACID, VITAMIN A PALMITATE, CHOLECALCIFEROL, THIAMINE HYDROCHLORIDE, RIBOFLAVIN-5 PHOSPHATE SODIUM, PYRIDOXINE HYDROCHLORIDE, NIACINAMIDE, DEXPANTHENOL, ALPHA-TOCOPHEROL ACETATE, VITAMIN K1, FOLIC ACID, BIOTIN, CYANOCOBALAMIN: 200; 3300; 200; 6; 3.6; 6; 40; 15; 10; 150; 600; 60; 5 INJECTION, SOLUTION INTRAVENOUS at 21:41

## 2023-12-11 RX ADMIN — DIATRIZOATE MEGLUMINE AND DIATRIZOATE SODIUM 60 ML: 660; 100 LIQUID ORAL; RECTAL at 19:30

## 2023-12-11 RX ADMIN — METOPROLOL TARTRATE 2.5 MG: 5 INJECTION INTRAVENOUS at 04:49

## 2023-12-11 RX ADMIN — METOPROLOL TARTRATE 2.5 MG: 5 INJECTION INTRAVENOUS at 23:24

## 2023-12-11 RX ADMIN — DIATRIZOATE MEGLUMINE AND DIATRIZOATE SODIUM 60 ML: 660; 100 LIQUID ORAL; RECTAL at 19:15

## 2023-12-11 ASSESSMENT — COGNITIVE AND FUNCTIONAL STATUS - GENERAL
TOILETING: TOTAL
WALKING IN HOSPITAL ROOM: A LITTLE
MOBILITY SCORE: 15
TURNING FROM BACK TO SIDE WHILE IN FLAT BAD: A LOT
CLIMB 3 TO 5 STEPS WITH RAILING: A LOT
DAILY ACTIVITIY SCORE: 9
EATING MEALS: TOTAL
MOBILITY SCORE: 15
MOVING FROM LYING ON BACK TO SITTING ON SIDE OF FLAT BED WITH BEDRAILS: A LOT
TURNING FROM BACK TO SIDE WHILE IN FLAT BAD: A LOT
STANDING UP FROM CHAIR USING ARMS: A LITTLE
DRESSING REGULAR UPPER BODY CLOTHING: A LOT
WALKING IN HOSPITAL ROOM: A LITTLE
HELP NEEDED FOR BATHING: TOTAL
MOVING TO AND FROM BED TO CHAIR: A LITTLE
STANDING UP FROM CHAIR USING ARMS: A LITTLE
DRESSING REGULAR LOWER BODY CLOTHING: A LOT
CLIMB 3 TO 5 STEPS WITH RAILING: A LOT
MOVING TO AND FROM BED TO CHAIR: A LITTLE
MOVING FROM LYING ON BACK TO SITTING ON SIDE OF FLAT BED WITH BEDRAILS: A LOT
PERSONAL GROOMING: A LOT

## 2023-12-11 ASSESSMENT — PAIN SCALES - GENERAL
PAINLEVEL_OUTOF10: 0 - NO PAIN
PAINLEVEL_OUTOF10: 0 - NO PAIN

## 2023-12-11 ASSESSMENT — PAIN - FUNCTIONAL ASSESSMENT
PAIN_FUNCTIONAL_ASSESSMENT: 0-10
PAIN_FUNCTIONAL_ASSESSMENT: 0-10

## 2023-12-11 NOTE — PROGRESS NOTES
Harper Lara is a 78 y.o. female on day 5 of admission presenting with Small bowel obstruction (CMS/HCC).      Subjective   Patient seen and evaluated today.  Patient states she has not had any bowel movements or any flatus.  Bowel sounds are hypoactive.  The patient noted to be in tears today.  Support was provided.       Objective     Last Recorded Vitals  /58   Pulse 71   Temp 36.7 °C (98.1 °F)   Resp 15   Wt 97.5 kg (215 lb)   SpO2 99%   Intake/Output last 3 Shifts:    Intake/Output Summary (Last 24 hours) at 12/11/2023 1321  Last data filed at 12/11/2023 0549  Gross per 24 hour   Intake 2079.16 ml   Output 2450 ml   Net -370.84 ml         Admission Weight  Weight: 97.5 kg (215 lb) (12/06/23 1117)    Daily Weight  12/06/23 : 97.5 kg (215 lb)    Image Results  XR abdomen 1 view  Narrative: Interpreted By:  David Collado,   STUDY:  Abdominal series dated  12/8/2023.      INDICATION:  Enteric tube placement.      COMPARISON:  None.      ACCESSION NUMBER(S):  [id number]      ORDERING CLINICIAN:  GRZEGORZ TABOR      TECHNIQUE:  AP radiograph of the abdomen with subtraction imaging.      FINDINGS:  Exam is limited due to incomplete inclusion the entirety of the  abdomen in the field of view. There is an enteric tube with the tip  and side port projecting over the left upper quadrant. Reticular  increased density is seen over the left upper quadrant which may be  related to contrast installation. There is some increased density  over the lower abdomen which could relate to some of the oral  contrast from prior small-bowel follow-through; otherwise, the  majority of this contrast does not appear to be present. There is a  prominent loop of bowel over the mid abdomen measuring up to 5.6 cm.  This likely represents a loop of small bowel. Surgical changes are  seen over the right lower quadrant. Visualized lung bases are clear.  Degenerative changes seen of the spine.      Impression: 1. Enteric tube as  above.  2. Dilated loop of bowel in the mid abdomen likely representing a  dilated loop of small bowel compatible with obstructive process  further discussed on the 12/06/2023 CT and the 12/07/2023 small-bowel  follow-through.  3. Although there may be some retained oral contrast from prior  small-bowel follow-through the majority of this contrast is not  evident. It is uncertain whether this is due to passage versus  enteric tube suction of the contrast. Clinical correlation may be  helpful.      Signed by: David Collado 12/8/2023 9:42 PM  Dictation workstation:   [id number]  FL small bowel series  Narrative: Interpreted By:  Jimbo Andersen,   STUDY:  FL SMALL BOWEL SERIES; ;  12/7/2023 8:25 pm      INDICATION:  Signs/Symptoms:SBO.      COMPARISON:  CT abdomen/pelvis of 12/06/2023.      ACCESSION NUMBER(S):  [id number]      ORDERING CLINICIAN:  VIJI LUNA      TECHNIQUE:  Small bowel series was performed with administration of enteric  contrast through an NG tube. Sequential radiographs of the abdomen  were obtained over a 10 hour period.      FINDINGS:   view of the abdomen shows clear lung bases and NG tube tip  projecting at the gastric fundus level. There are moderately dilated  central abdominal small bowel segments similar in configuration to  prior CT with an overall bowel gas pattern suggestive of obstruction.  A right lower quadrant suture line is present.      Enteric contrast was administered through the NG tube and promptly  opacified the stomach. There was no progression of contrast from the  stomach over the initial 4 hours of the exam.      On 6 hour image, there was passage of a portion of the contrast from  the stomach, now opacifying the moderately dilated central abdominal  small bowel segments. On subsequent images at 8 hour and 10 hour,  there has been no further progression of the enteric contrast. The  enteric contrast remains within the dilated stomach as well as within  the  dilated central abdominal small bowel. Contrast has not reached  the distal decompressed small bowel segments or colon.      Impression: Small-bowel series compatible with high-grade small bowel obstruction  similar to prior CT.          MACRO:  None      Signed by: Jimbo Andersen 12/8/2023 8:03 AM  Dictation workstation:   [id number]      Physical Exam  Constitutional:       Appearance: She is obese.   HENT:      Head: Normocephalic and atraumatic.      Nose: Nose normal.      Mouth/Throat:      Mouth: Mucous membranes are dry.   Eyes:      Extraocular Movements: Extraocular movements intact.      Conjunctiva/sclera: Conjunctivae normal.   Cardiovascular:      Rate and Rhythm: Normal rate and regular rhythm.      Pulses: Normal pulses.      Heart sounds: Normal heart sounds.   Pulmonary:      Effort: Pulmonary effort is normal.      Breath sounds: Normal breath sounds.   Abdominal:      Palpations: Abdomen is soft.      Tenderness: There is abdominal tenderness.      Comments: NG tube in place.   Musculoskeletal:         General: Normal range of motion.      Cervical back: Normal range of motion and neck supple.   Skin:     General: Skin is warm and dry.   Neurological:      Mental Status: She is alert. Mental status is at baseline.   Psychiatric:         Mood and Affect: Mood normal.         Relevant Results               Assessment/Plan          78 y.o. female presenting with past medical history of type 2 diabetes mellitus, fatty liver, hyperlipidemia, hypertension, A-fib status post ablation, hypothyroidism, anxiety and depression, history of GI bleed comes to the hospital with abdominal pain and vomiting for past few days.  Patient found to have SBO on the imaging.  SBFT done showed high-grade obstruction.  On 12/8/2023 the patient underwent extensive laparoscopic lysis of adhesions for 2 hours and small intestinal resection and anastomosis.  Patient is POD #3 and no return of bowel function yet.  Continue  with IV hydration.  Will reach out to dietary for  parenteral nutrition recommendations at this time.        Principal Problem:    Small bowel obstruction (CMS/HCC)  Active Problems:    Acute kidney injury (nontraumatic) (CMS/HCC)    Chronic renal insufficiency    Small bowel obstruction  -As seen on CT scan of abdomen pelvis and also on SBFT.  -Status post extensive laparoscopic lysis of adhesions and laparoscopic small intestine resection and anastomosis on 12/8/2023.  -Continue with IV hydration and maintain n.p.o. status at this time.  IV fluids switched to to potassium containing fluids.  -Surgery has been consulted and NG tube in place.  Awaiting return of bowel function at this time.  -Due to prolonged n.p.o. status will consult dietary for parenteral nutrition recommendations.     SOCORRO with underlying CKD stage III  -Creatinine noted 1.81 baseline is around 1.1.  Improved  -Continue with IV hydration.    Hypernatremia  -Will adjust IV fluids and continue to maintain potassium and IV fluids.     Hypokalemia  -Replace IV and monitor.      Atrial fibrillation  -Since NPO will add IV lopressor every 6 hours. Rate controlled at this time  -The patient is chest pain-free at this time.  Continue to monitor.     Hypothyroidism  -Not able to resume oral Synthroid at this time.  Will continue with reduced dose of IV Synthroid.     Chronic systolic congestive heart failure  -Last echo from 2021 shows LVEF of 40%.  -Monitor volume status     Diabetes mellitus type 2  -Hold home oral antidiabetics  -Continue with sliding scale insulin n.p.o. protocol.     VTE prophylaxis: SCDs patient history of GI bleed    PT OT            Brittany Pearson MD

## 2023-12-11 NOTE — PROGRESS NOTES
"Harper Lara is a 78 y.o. female on day 5 of admission presenting with Small bowel obstruction (CMS/HCC).     Subjective   Patient was seen and examined this morning.  No acute events overnight       Objective     Physical Exam  Vitals reviewed.   Constitutional:       General: She is not in acute distress.  HENT:      Head: Normocephalic and atraumatic.      Mouth/Throat:      Mouth: Mucous membranes are moist.   Eyes:      Conjunctiva/sclera: Conjunctivae normal.   Pulmonary:      Effort: Pulmonary effort is normal. No respiratory distress.   Abdominal:      General: Abdomen is flat. Bowel sounds are decreased. There is no distension.      Palpations: Abdomen is soft.      Tenderness: There is abdominal tenderness (generalized). There is no guarding.      Comments: Abdominal incisions are clean, dry, and intact.  Approximately 750 cc dark brown output from NG tube.   Musculoskeletal:         General: No swelling.   Skin:     General: Skin is warm and dry.      Capillary Refill: Capillary refill takes less than 2 seconds.   Neurological:      Mental Status: She is alert.   Psychiatric:         Mood and Affect: Mood normal.         Behavior: Behavior normal.         Last Recorded Vitals  Blood pressure 124/58, pulse 71, temperature 36.7 °C (98.1 °F), resp. rate 15, height 1.6 m (5' 3\"), weight 97.5 kg (215 lb), SpO2 99 %.  Intake/Output last 3 Shifts:  I/O last 3 completed shifts:  In: 4679.2 (48 mL/kg) [IV Piggyback:4679.2]  Out: 2950 (30.2 mL/kg) [Urine:650 (0.2 mL/kg/hr); Emesis/NG output:2300]  Weight: 97.5 kg     Relevant Results  Lab Results   Component Value Date    WBC 4.8 12/11/2023    HGB 8.8 (L) 12/11/2023    HCT 28.4 (L) 12/11/2023    MCV 93 12/11/2023     (L) 12/11/2023     Lab Results   Component Value Date    GLUCOSE 144 (H) 12/11/2023    CALCIUM 8.1 (L) 12/11/2023     (L) 12/11/2023    K 4.0 12/11/2023    CO2 28 12/11/2023     12/11/2023    BUN 24 (H) 12/11/2023    CREATININE " 0.91 12/11/2023        Scheduled medications  insulin lispro, 0-5 Units, subcutaneous, q4h  levothyroxine, 50 mcg, intravenous, q24h ORLY  metoprolol, 2.5 mg, intravenous, q6h      Continuous medications  dextrose 5 % and lactated Ringer's, 100 mL/hr      PRN medications  PRN medications: dextrose 10 % in water (D10W), dextrose, glucagon, ondansetron **OR** ondansetron        Assessment/Plan     Principal Problem:    Small bowel obstruction (CMS/HCC)  Active Problems:    Acute kidney injury (nontraumatic) (CMS/HCC)    Chronic renal insufficiency    Subjective  Patient resting in bed.  She denies abdominal pain, nausea, vomiting.  She has not passed gas or had a bowel movement yet.  On exam, bowel sounds are decreased, abdominal tenderness is improving, abdominal incisions are CDI, approximately 800 cc dark output from NG tube.  600 cc output from NG was recorded overnight.  Patient is eager to get up and ambulate, wants to improve so she can go home.     Impression  POD 3 small intestine resection.     Plan  NPO/IVF/NG -continue to measure I/O  Pain control  Nausea control  Encourage ambulation.  PT/OT consult  SW consult     Further recommendations per Dr. Teresita Bales, PAVicenteC    Patient is seen and examined.  I discussed and reviewed the APRN note.  Still no return of bowel function since extensive laparoscopic lysis of adhesions on Friday.  I will obtain small bowel follow-through.  Plans discussed with patient and

## 2023-12-11 NOTE — CONSULTS
"Nutrition Follow Up Assessment:   Nutrition Assessment    Reason for Assessment: TPN recommendations    Patient is a 78 y.o. female presenting with: small bowel obstruction    Nutrition History:  Energy Intake:  (NPO since 12/6)  Food and Nutrient History: RD consulted for nutrition assessment/recommendation and parenteral assessment/recommendation. S/p extensive lysis of adhesions, small intestine resection and anastomosis on 12/8. Remains NPO with NG to suction. Pt states last meal was 12/5, unable to remember what meal consisted of. Currently documented as being disoriented to situation. Reports wt loss, says was 300 lbs recently then later stated that last weighed this when children were born. As previously stated, wt records are limited though reflect a 3.1 kg wt gain x 1 year. Explained plans for PPN and lipids, pt denies questions at this time.  Vitamin/Herbal Supplement Use: 625 mg Fibercon BID, vitamin D, 1000 mcg vitamin B12, fish oil, 400 mg Mag-Ox, multivitamin  Food Allergies/Intolerances:  None  GI Symptoms: Abdominal pain  Oral Problems: Chewing difficulty - missing teeth. Will need Easy to Chew diet once solid diet indicated.       Anthropometrics:  Height: 160 cm (5' 3\")   Weight: 97.5 kg (215 lb)   BMI (Calculated): 38.09           Weight History:  Weight History / % Weight Change: 12/12/22: 94.4 kg; 12/6/23: 97.5 kg  Significant Weight Loss: No    Nutrition Focused Physical Exam Findings:  defer: RN and nursing assistant working with pt    Edema:  Edema: none  Physical Findings:  Skin: Positive (pressure injury documented in bilateral buttocks, no stage documented. Stage 2 pressure injury documented in July.)    Nutrition Significant Labs:  BMP Trend:   Results from last 7 days   Lab Units 12/11/23  0725 12/10/23  0558 12/09/23  0911 12/08/23  0709   GLUCOSE mg/dL 144* 149* 110* 131*   CALCIUM mg/dL 8.1* 8.2* 8.2* 9.2   SODIUM mmol/L 134* 141 147* 147*   POTASSIUM mmol/L 4.0 3.6 3.8 3.4*   CO2 " mmol/L 28 31 30 32   CHLORIDE mmol/L 104 105 105 97*   BUN mg/dL 24* 41* 59* 71*   CREATININE mg/dL 0.91 1.14* 1.45* 1.79*        Nutrition Specific Medications: Reviewed. Recommend MD adjust/discontinue IVF once PPN initiated      I/O:   Last BM Date: 12/05/23;          Dietary Orders (From admission, onward)       Start     Ordered    12/06/23 1640  NPO Diet; Effective now  Diet effective now         12/06/23 1639                     Estimated Needs:     MSJ with 1.2 activity factor, 1.2 stress factor    2065 kcal  Total Protein Estimated Needs (g): 68 g  Method for Estimating Needs: 52-68g (1-1.3 g/kg IBW)   Total Fluid Needs: per MD          Nutrition Diagnosis   Malnutrition Diagnosis  Patient has Malnutrition Diagnosis: No (insufficient data to diagnose at this time)    Nutrition Diagnosis  Patient has Nutrition Diagnosis: Yes  Diagnosis Status (1): New  Nutrition Diagnosis 1: Inadequate oral intake  Related to (1): altered GI function  As Evidenced by (1): NPO since 12/6, need for PN       Nutrition Interventions/Recommendations         Nutrition Prescription:  Individualized Nutrition Prescription Provided for : Recommend PPN (Clinimix 4.25% amino acids, 5% dextrose) with goal rate of 70 ml/hr (provides 1680 ml total volume, 571 kcal, 71g protein. Recommend 250 ml 20% lipids 3 times per week for an additional 214 kcal per day on average. Recommend drawing triglyceride level and discontinue lipids if triglycerides are >400 MD to adjust or discontinue IVF once PPN initiated.        Nutrition Interventions:   Food and/or Nutrient Delivery Interventions  Interventions: Parenteral nutrition/ IV fluids    Coordination of Nutrition Care by a Nutrition Professional  Collaboration and Referral of Nutrition Care: Collaboration by nutrition professional with other providers (RN at bedside; secure chat sent to MD with recommendations)    Nutrition Education:   Discussed why PPN and lipids were indicated.       Nutrition  Monitoring and Evaluation   Food/Nutrient Related History Monitoring  Monitoring and Evaluation Plan: Enteral and parenteral nutrition intake  Enteral and Parenteral Nutrition Intake: Parenteral nutrition intake, Parenteral nutrition formula/solution  Criteria: tolerate PPN and lipids without adverse effects on electrolytes or glucose    Body Composition/Growth/Weight History  Monitoring and Evaluation Plan: Weight  Weight: Measured weight  Criteria: maintain stable wt, reweigh pt at least every 5 days    Biochemical Data, Medical Tests and Procedures  Monitoring and Evaluation Plan: Electrolyte/renal panel, Glucose/endocrine profile  Criteria: WNL  Glucose/Endocrine Profile: Glucose, casual  Criteria: BG within desired limits    Time Spent/Follow-up Reminder:   Time Spent (min): 45 minutes  Last Date of Nutrition Visit: 12/11/23  Nutrition Follow-Up Needed?: 3-5 days  Follow up Comment: PPN and lipids 12/11

## 2023-12-11 NOTE — PROGRESS NOTES
"Physical Therapy    Physical Therapy Treatment    Patient Name: Harper Lara  MRN: [id number]  Today's Date: 12/11/2023  Time Calculation  Start Time: 1335  Stop Time: 1404  Time Calculation (min): 29 min       Assessment/Plan   End of Session Communication: Bedside nurse  End of Session Patient Position:  Patient up in chair after treatment.  Call light and tray in reach. No alarm box available.  RN aware.    PT Plan  Inpatient/Swing Bed or Outpatient: Inpatient  Treatment/Interventions: Bed mobility, Transfer training, Gait training, Balance training, Strengthening, Home exercise program, Therapeutic exercise, Therapeutic activity  PT Plan: Skilled PT  PT Frequency: 4 times per week  PT Discharge Recommendations: Moderate intensity level of continued care  Equipment Recommended upon Discharge: Wheeled walker PT Recommended Transfer Status: Assist x1, Assistive device    General Visit Information:   PT  Visit  PT Received On: 12/11/23    General  Prior to Session Communication:  Cleared by RN for PT.  Patient Position Received:  Patient presents in bed, eager to get up and participate with PT  General Comment: To ED 12/6 with abdominal pain and vomiting; pt. found to have SBO and SOCORRO on CKD. Pt. underwent extensive laporascopic lysis of adhesions with small intestine resection and anastomosis 12/8 with Dr. Lozada; CT abd- Findings compatible with acute mechanical small bowel obstruction  potentially related to adhesion. Newly seen mild superior likely recent superior L2 compressive  deformity.    General Observations:   General Observation:  Patient presents with NG tube connected to suction, purwick, IV.  Alarm not on at start of treatment    Precautions:  Medical Precautions: Fall precautions  Post-Surgical Precautions: Abdominal surgery precautions      Pain:  Pain Assessment: 0-10  Pain Score:  Rates pain 7-8/10 in her abdomen.  \"It hurts where they cut me\"    Cognition:  Overall Cognitive Status:  (pleasant and " cooperative)    Treatments:  Therapeutic Exercise Performed:  seated LE ther-ex: AP, heel raises, LAQ, marching, hip add (pillow squeeze) x10        Bed Mobility  Bed Mobility:  (supine-->sit: mod x1  HOB raised slightly.  Assist needed to bring trunk into upright position.  No c/o dizziness with change in position.)    Ambulation/Gait Training  Ambulation/Gait Training Performed:  (Amb limited by NG tube.  Patient amb 8' with ww and min x1.  Patient adopts fwd flexed posture, head down.  Decreased step length bilat.)    Transfers  Transfer:  sit-->stand: min x1  One stand performed from EOB with v/c for hand placement.    stand-->sit: min x1 to line up with chair and line management.  V/c to reach hands back prior to sitting.      Outcome Measures:  [facility] Basic Mobility  Turning from your back to your side while in a flat bed without using bedrails: A lot  Moving from lying on your back to sitting on the side of a flat bed without using bedrails: A lot  Moving to and from bed to chair (including a wheelchair): A little  Standing up from a chair using your arms (e.g. wheelchair or bedside chair): A little  To walk in hospital room: A little  Climbing 3-5 steps with railing: A lot  Basic Mobility - Total Score: 15    Education Documentation  Precautions, taught by Alison Boateng PTA at 12/11/2023  2:32 PM.  Learner: Patient  Readiness: Acceptance  Method: Explanation, Demonstration  Response: Verbalizes Understanding, Needs Reinforcement    Home Exercise Program, taught by Alison Boateng PTA at 12/11/2023  2:32 PM.  Learner: Patient  Readiness: Acceptance  Method: Explanation, Demonstration  Response: Verbalizes Understanding, Needs Reinforcement    Mobility Training, taught by Alison Boateng PTA at 12/11/2023  2:32 PM.  Learner: Patient  Readiness: Acceptance  Method: Explanation, Demonstration  Response: Verbalizes Understanding, Needs Reinforcement         Encounter Problems       Encounter Problems (Active)        Impaired mobility        Perform all bed mobility with supervision  (Progressing)       Start:  12/09/23    Expected End:  12/23/23            Perform all transfers with ww and SBA (Progressing)       Start:  12/09/23    Expected End:  12/23/23            Patient will ambulate >/= 50 ft. with ww and SBA (Progressing)       Start:  12/09/23    Expected End:  12/23/23            Patient will perform BLE HEP with supervision x10-20 reps x 1-2 sets  (Progressing)       Start:  12/09/23    Expected End:  12/23/23

## 2023-12-12 LAB
ANION GAP SERPL CALC-SCNC: 13 MMOL/L (ref 10–20)
BASOPHILS # BLD AUTO: 0.02 X10*3/UL (ref 0–0.1)
BASOPHILS NFR BLD AUTO: 0.3 %
BUN SERPL-MCNC: 25 MG/DL (ref 6–23)
CALCIUM SERPL-MCNC: 9 MG/DL (ref 8.6–10.3)
CHLORIDE SERPL-SCNC: 106 MMOL/L (ref 98–107)
CO2 SERPL-SCNC: 29 MMOL/L (ref 21–32)
CREAT SERPL-MCNC: 0.99 MG/DL (ref 0.5–1.05)
EOSINOPHIL # BLD AUTO: 0.08 X10*3/UL (ref 0–0.4)
EOSINOPHIL NFR BLD AUTO: 1 %
ERYTHROCYTE [DISTWIDTH] IN BLOOD BY AUTOMATED COUNT: 15.5 % (ref 11.5–14.5)
GFR SERPL CREATININE-BSD FRML MDRD: 59 ML/MIN/1.73M*2
GLUCOSE BLD MANUAL STRIP-MCNC: 124 MG/DL (ref 74–99)
GLUCOSE BLD MANUAL STRIP-MCNC: 127 MG/DL (ref 74–99)
GLUCOSE BLD MANUAL STRIP-MCNC: 135 MG/DL (ref 74–99)
GLUCOSE BLD MANUAL STRIP-MCNC: 136 MG/DL (ref 74–99)
GLUCOSE BLD MANUAL STRIP-MCNC: 141 MG/DL (ref 74–99)
GLUCOSE BLD MANUAL STRIP-MCNC: 144 MG/DL (ref 74–99)
GLUCOSE BLD MANUAL STRIP-MCNC: 176 MG/DL (ref 74–99)
GLUCOSE SERPL-MCNC: 145 MG/DL (ref 74–99)
HCT VFR BLD AUTO: 31.7 % (ref 36–46)
HGB BLD-MCNC: 10.3 G/DL (ref 12–16)
HOLD SPECIMEN: NORMAL
IMM GRANULOCYTES # BLD AUTO: 0.06 X10*3/UL (ref 0–0.5)
IMM GRANULOCYTES NFR BLD AUTO: 0.8 % (ref 0–0.9)
LYMPHOCYTES # BLD AUTO: 0.77 X10*3/UL (ref 0.8–3)
LYMPHOCYTES NFR BLD AUTO: 9.8 %
MCH RBC QN AUTO: 29.3 PG (ref 26–34)
MCHC RBC AUTO-ENTMCNC: 32.5 G/DL (ref 32–36)
MCV RBC AUTO: 90 FL (ref 80–100)
MONOCYTES # BLD AUTO: 0.21 X10*3/UL (ref 0.05–0.8)
MONOCYTES NFR BLD AUTO: 2.7 %
NEUTROPHILS # BLD AUTO: 6.7 X10*3/UL (ref 1.6–5.5)
NEUTROPHILS NFR BLD AUTO: 85.4 %
NRBC BLD-RTO: 0 /100 WBCS (ref 0–0)
PLATELET # BLD AUTO: 163 X10*3/UL (ref 150–450)
POTASSIUM SERPL-SCNC: 3.5 MMOL/L (ref 3.5–5.3)
RBC # BLD AUTO: 3.52 X10*6/UL (ref 4–5.2)
SODIUM SERPL-SCNC: 144 MMOL/L (ref 136–145)
WBC # BLD AUTO: 7.8 X10*3/UL (ref 4.4–11.3)

## 2023-12-12 PROCEDURE — 82947 ASSAY GLUCOSE BLOOD QUANT: CPT

## 2023-12-12 PROCEDURE — 36415 COLL VENOUS BLD VENIPUNCTURE: CPT | Performed by: INTERNAL MEDICINE

## 2023-12-12 PROCEDURE — 2500000005 HC RX 250 GENERAL PHARMACY W/O HCPCS: Performed by: INTERNAL MEDICINE

## 2023-12-12 PROCEDURE — 74250 X-RAY XM SM INT 1CNTRST STD: CPT | Performed by: RADIOLOGY

## 2023-12-12 PROCEDURE — 99233 SBSQ HOSP IP/OBS HIGH 50: CPT | Performed by: INTERNAL MEDICINE

## 2023-12-12 PROCEDURE — 2500000001 HC RX 250 WO HCPCS SELF ADMINISTERED DRUGS (ALT 637 FOR MEDICARE OP): Performed by: INTERNAL MEDICINE

## 2023-12-12 PROCEDURE — 97165 OT EVAL LOW COMPLEX 30 MIN: CPT | Mod: GO

## 2023-12-12 PROCEDURE — 85025 COMPLETE CBC W/AUTO DIFF WBC: CPT | Performed by: INTERNAL MEDICINE

## 2023-12-12 PROCEDURE — 80048 BASIC METABOLIC PNL TOTAL CA: CPT | Performed by: INTERNAL MEDICINE

## 2023-12-12 PROCEDURE — 97530 THERAPEUTIC ACTIVITIES: CPT | Mod: GP,CQ

## 2023-12-12 PROCEDURE — 1210000001 HC SEMI-PRIVATE ROOM DAILY

## 2023-12-12 RX ORDER — ATORVASTATIN CALCIUM 20 MG/1
40 TABLET, FILM COATED ORAL NIGHTLY
Status: DISCONTINUED | OUTPATIENT
Start: 2023-12-12 | End: 2023-12-26 | Stop reason: HOSPADM

## 2023-12-12 RX ORDER — LEVOTHYROXINE SODIUM 75 UG/1
75 TABLET ORAL DAILY
Status: DISCONTINUED | OUTPATIENT
Start: 2023-12-12 | End: 2023-12-26 | Stop reason: HOSPADM

## 2023-12-12 RX ADMIN — LEVOTHYROXINE SODIUM 50 MCG: 20 INJECTION, SOLUTION INTRAVENOUS at 11:52

## 2023-12-12 RX ADMIN — METOPROLOL TARTRATE 2.5 MG: 5 INJECTION INTRAVENOUS at 22:27

## 2023-12-12 RX ADMIN — INSULIN LISPRO 1 UNITS: 100 INJECTION, SOLUTION INTRAVENOUS; SUBCUTANEOUS at 20:35

## 2023-12-12 RX ADMIN — ATORVASTATIN CALCIUM 40 MG: 20 TABLET, FILM COATED ORAL at 20:18

## 2023-12-12 RX ADMIN — METOPROLOL TARTRATE 2.5 MG: 5 INJECTION INTRAVENOUS at 04:34

## 2023-12-12 RX ADMIN — ASCORBIC ACID, VITAMIN A PALMITATE, CHOLECALCIFEROL, THIAMINE HYDROCHLORIDE, RIBOFLAVIN-5 PHOSPHATE SODIUM, PYRIDOXINE HYDROCHLORIDE, NIACINAMIDE, DEXPANTHENOL, ALPHA-TOCOPHEROL ACETATE, VITAMIN K1, FOLIC ACID, BIOTIN, CYANOCOBALAMIN: 200; 3300; 200; 6; 3.6; 6; 40; 15; 10; 150; 600; 60; 5 INJECTION, SOLUTION INTRAVENOUS at 22:28

## 2023-12-12 ASSESSMENT — COGNITIVE AND FUNCTIONAL STATUS - GENERAL
STANDING UP FROM CHAIR USING ARMS: A LITTLE
MOVING FROM LYING ON BACK TO SITTING ON SIDE OF FLAT BED WITH BEDRAILS: A LOT
DRESSING REGULAR UPPER BODY CLOTHING: A LITTLE
TURNING FROM BACK TO SIDE WHILE IN FLAT BAD: A LOT
PERSONAL GROOMING: A LOT
WALKING IN HOSPITAL ROOM: A LOT
HELP NEEDED FOR BATHING: TOTAL
DAILY ACTIVITIY SCORE: 9
DAILY ACTIVITIY SCORE: 16
TURNING FROM BACK TO SIDE WHILE IN FLAT BAD: A LITTLE
MOBILITY SCORE: 15
MOVING TO AND FROM BED TO CHAIR: A LITTLE
STANDING UP FROM CHAIR USING ARMS: A LITTLE
DRESSING REGULAR UPPER BODY CLOTHING: A LOT
HELP NEEDED FOR BATHING: A LOT
MOBILITY SCORE: 15
DRESSING REGULAR LOWER BODY CLOTHING: A LOT
MOVING FROM LYING ON BACK TO SITTING ON SIDE OF FLAT BED WITH BEDRAILS: A LOT
TOILETING: A LOT
TURNING FROM BACK TO SIDE WHILE IN FLAT BAD: A LOT
CLIMB 3 TO 5 STEPS WITH RAILING: A LOT
MOBILITY SCORE: 14
CLIMB 3 TO 5 STEPS WITH RAILING: A LOT
WALKING IN HOSPITAL ROOM: A LITTLE
HELP NEEDED FOR BATHING: A LOT
PERSONAL GROOMING: A LITTLE
DRESSING REGULAR LOWER BODY CLOTHING: A LOT
EATING MEALS: TOTAL
PERSONAL GROOMING: A LOT
WALKING IN HOSPITAL ROOM: A LITTLE
DAILY ACTIVITIY SCORE: 15
MOVING TO AND FROM BED TO CHAIR: A LITTLE
MOVING FROM LYING ON BACK TO SITTING ON SIDE OF FLAT BED WITH BEDRAILS: A LOT
MOVING TO AND FROM BED TO CHAIR: A LITTLE
DRESSING REGULAR LOWER BODY CLOTHING: A LOT
TOILETING: A LOT
TOILETING: TOTAL
CLIMB 3 TO 5 STEPS WITH RAILING: TOTAL
DRESSING REGULAR UPPER BODY CLOTHING: A LITTLE
STANDING UP FROM CHAIR USING ARMS: A LITTLE

## 2023-12-12 ASSESSMENT — PAIN - FUNCTIONAL ASSESSMENT
PAIN_FUNCTIONAL_ASSESSMENT: 0-10

## 2023-12-12 ASSESSMENT — PAIN SCALES - GENERAL
PAINLEVEL_OUTOF10: 0 - NO PAIN
PAINLEVEL_OUTOF10: 0 - NO PAIN

## 2023-12-12 ASSESSMENT — ACTIVITIES OF DAILY LIVING (ADL)
BATHING_ASSISTANCE: MODERATE
ADL_ASSISTANCE: NEEDS ASSISTANCE

## 2023-12-12 NOTE — PROGRESS NOTES
12/12/23 1649   Discharge Planning   Living Arrangements Spouse/significant other   Support Systems Spouse/significant other   Type of Residence Private residence   Who is requesting discharge planning? Provider   Patient expects to be discharged to: pt preference is Home with [facility]   Does the patient need discharge transport arranged? Yes   RoundTrip coordination needed? Yes   Has discharge transport been arranged? No   Patient Choice   Provider Choice list and CMS website (https://medicare.gov/care-compare#search) for post-acute Quality and Resource Measure Data were provided and reviewed with: Patient     Met with pt sitting up in chair in room. [facility] PT 15 OT 16.  On 12/8/2023 the patient underwent extensive laparoscopic lysis of adhesions for 2 hours and small intestinal resection and anastomosis . IV clinimix. Surgical consult. Pt uses walker at home. Preference is for dc home & hopes to cont to improve with therapy before discharge but if SNF needed would consider  [facility]. Care transitions to cont to follow pt for dc needs.

## 2023-12-12 NOTE — PROGRESS NOTES
Harper Lara is a 78 y.o. female on day 6 of admission presenting with Small bowel obstruction (CMS/HCC).      Subjective   Had a BM last night. Tolerated clear liquids this AM. No abd pain. No n/v.       Objective     Last Recorded Vitals  /58 (Patient Position: Sitting)   Pulse 84   Temp 36.2 °C (97.2 °F) (Temporal)   Resp 16   Wt 97.5 kg (215 lb)   SpO2 96%   Intake/Output last 3 Shifts:  No intake or output data in the 24 hours ending 12/12/23 1331      Admission Weight  Weight: 97.5 kg (215 lb) (12/06/23 1117)    Daily Weight  12/06/23 : 97.5 kg (215 lb)    Image Results  FL small bowel series  Narrative: Interpreted By:  Salas Altamirano,   STUDY:  FL SMALL BOWEL SERIES;  12/12/2023 8:31 am      INDICATION:  Signs/Symptoms:SBO.      COMPARISON:  Previous small bowel series of 12/07/2023 and CT examination of  12/06/2023.      ACCESSION NUMBER(S):  [id number]      ORDERING CLINICIAN:  TARAN TRAYLOR      TECHNIQUE:  A small bowel series is performed utilizing 360 cc of Gastrografin  through a pre-existing nasogastric tube. Serial radiographs of the  abdomen and pelvis are performed following the infusion of contrast.      FINDINGS:  2 preliminary radiographs of the abdomen and pelvis demonstrate  surgical sutures in the right lower quadrant. Dilated air-filled  small bowel loops are identified in the left abdomen reaching 5.8 cm  in diameter. A nasogastric tube tip on the imaged performed 4:40 p.m.  is located in the distal esophagus. A subsequent radiograph performed  5:09 p.m. confirms the tip of the tube coiled in the stomach fundus.      Following the ingestion of contrast there is prompt opacification of  the stomach fundus and proximal body. Contrast remains in the  proximal stomach from 0 minutes 290 minutes after contrast ingestion.      A 3 hour portable radiograph demonstrates contrast throughout the  small bowel. There is mild small bowel dilatation reaching 4.2 cm.  Contrast is  identified in the colon from the cecum to the proximal  descending colon.      A 6 hour portable radiograph demonstrates contrast throughout the  stomach, small bowel, and colon to the level of the rectum.  Small-bowel distention in the left abdomen reaches 4.8 cm.      The visualized small and large bowel full patterns are within normal  limits. There is no obvious filling defects or displacement of  opacified bowel. There is no sign of contrast extravasation.      Impression: Initial delay passage of contrast from the stomach from 0-90 minutes.      On the 3 hour delayed image there is contrast throughout the small  bowel and colon. The small bowel transit time is likely between 2 and  3 hours.      Persistent proximal small bowel distention reaching 4.8 cm on delayed  images. This appearance could be related to adynamic postoperative  ileus. Residual small-bowel obstruction is less likely. Continued  clinical surveillance is recommended.      Signed by: Salas Altamirano 12/12/2023 9:48 AM  Dictation workstation:   [id number]      Physical Exam  Constitutional:       Appearance: She is obese.   HENT:      Head: Normocephalic and atraumatic.      Nose: Nose normal.      Mouth/Throat:      Mouth: Mucous membranes are dry.   Eyes:      Extraocular Movements: Extraocular movements intact.      Conjunctiva/sclera: Conjunctivae normal.   Cardiovascular:      Rate and Rhythm: Normal rate and regular rhythm.      Pulses: Normal pulses.      Heart sounds: Normal heart sounds.   Pulmonary:      Effort: Pulmonary effort is normal.      Breath sounds: Normal breath sounds.   Abdominal:      Palpations: Abdomen is soft.      Tenderness: There is abdominal tenderness.      Comments: NG tube in place.   Musculoskeletal:         General: Normal range of motion.      Cervical back: Normal range of motion and neck supple.   Skin:     General: Skin is warm and dry.   Neurological:      Mental Status: She is alert. Mental status  is at baseline.   Psychiatric:         Mood and Affect: Mood normal.         Relevant Results               Assessment/Plan          78 y.o. female presenting with past medical history of type 2 diabetes mellitus, fatty liver, hyperlipidemia, hypertension, A-fib status post ablation, hypothyroidism, anxiety and depression, history of GI bleed comes to the hospital with abdominal pain and vomiting for past few days.  Patient found to have SBO on the imaging.  SBFT done showed high-grade obstruction.  On 12/8/2023 the patient underwent extensive laparoscopic lysis of adhesions for 2 hours and small intestinal resection and anastomosis.  Patient is POD #3 and no return of bowel function yet.  Continue with IV hydration.  Will reach out to dietary for  parenteral nutrition recommendations at this time.        Principal Problem:    Small bowel obstruction (CMS/HCC)  Active Problems:    Acute kidney injury (nontraumatic) (CMS/HCC)    Chronic renal insufficiency    Small bowel obstruction  -As seen on CT scan of abdomen pelvis and also on SBFT.  -Status post extensive laparoscopic lysis of adhesions and laparoscopic small intestine resection and anastomosis on 12/8/2023.  -Continue with IV hydration and maintain n.p.o. status at this time.  IV fluids switched to to potassium containing fluids.  -Surgery has been consulted and NG tube in place.  Awaiting return of bowel function at this time.  -Due to prolonged n.p.o. status will consult dietary for parenteral nutrition recommendations.     SOCORRO with underlying CKD stage III  -Creatinine noted 1.81 baseline is around 1.1.  Improved  -Continue with IV hydration.    Hypernatremia  -Will adjust IV fluids and continue to maintain potassium and IV fluids.     Hypokalemia  -Replace IV and monitor.      Atrial fibrillation  -Since NPO will add IV lopressor every 6 hours. Rate controlled at this time  -The patient is chest pain-free at this time.  Continue to monitor.      Hypothyroidism  -Not able to resume oral Synthroid at this time.  Will continue with reduced dose of IV Synthroid.     Chronic systolic congestive heart failure  -Last echo from 2021 shows LVEF of 40%.  -Monitor volume status     Diabetes mellitus type 2  -Hold home oral antidiabetics  -Continue with sliding scale insulin n.p.o. protocol.     VTE prophylaxis: SCDs patient history of GI bleed    PT OT     12/12/23:  Had a BM  Abdominal pain resolved  Tolerating clears  Diet advancement per surgery  Finish PPN today but if intake increasing will discontinue  Will start to resume oral meds if able  D/w surgery resuming her xarelto or at least starting some DVT ppx       Mamadou Hall MD

## 2023-12-12 NOTE — PROGRESS NOTES
Occupational Therapy    Evaluation    Patient Name: Harper Lara  MRN: [id number]  Today's Date: 12/12/2023  Time Calculation  Start Time: 1136  Stop Time: 1149  Time Calculation (min): 13 min        Assessment:  Evaluation/Treatment Tolerance: Patient tolerated treatment well  End of Session Communication: Bedside nurse  End of Session Patient Position: Up in chair, Alarm on  OT Assessment Results: Decreased ADL status, Decreased endurance  Evaluation/Treatment Tolerance: Patient tolerated treatment well  Plan:  Treatment Interventions: ADL retraining, Endurance training  OT Frequency: 2 times per week  OT Discharge Recommendations: Moderate intensity level of continued care  OT - OK to Discharge: Yes (when medically stable)      Subjective   Current Problem:  1. Small bowel obstruction (CMS/HCC)  Case Request Operating Room: Resection Laparoscopy Small Intestine    Case Request Operating Room: Resection Laparoscopy Small Intestine        General:  General  Reason for Referral: ADL impairment  Referred By: Nii OT/PT 12/9  Past Medical History Relevant to Rehab: SDH, CKD, CHF, CAD, HLD, HTN  Family/Caregiver Present: No  Prior to Session Communication: Bedside nurse  Patient Position Received:  (up with PTA on BSC)  General Comment: To ED 12/6 with abdominal pain and vomiting; pt. found to have SBO and SOCORRO on CKD. Pt. underwent extensive laporascopic lysis of adhesions with small intestine resection and anastomosis 12/8 with Dr. Lozada; CT abd- Findings compatible with acute mechanical small bowel obstruction  potentially related to adhesion. Newly seen mild superior likely recent superior L2 compressive  deformity.  Precautions:  Medical Precautions: Fall precautions  Post-Surgical Precautions: Abdominal surgery precautions  Precautions Comment: Has NG; IV; telemetry       Pain:  Pain Assessment  Pain Assessment: 0-10  Pain Score:  (Did not numerically rate.)  Pain Type: Acute pain  Pain Location: Leg  (L)    Objective   Cognition:  Overall Cognitive Status: Within Functional Limits           Home Living:  Type of Home: House  Lives With: Spouse  Home Adaptive Equipment: Cane, Walker rolling or standard  Home Layout: One level  Bathroom Shower/Tub: Tub/shower unit  Bathroom Equipment: Grab bars in shower  Prior Function:  Level of [place]: Needs assistance with ADLs, Needs assistance with homemaking  Receives Help From: Neighbor  ADL Assistance: Needs assistance  Homemaking Assistance:  (shares cooking with spouse.  and neighbor take care of grocery shopping, cleaning, and laundry)  Ambulatory Assistance:  (uses WW inside, QC outside PTA)  IADL History:  IADL Comments: neighbor assists  ADL:  Eating Assistance: Independent  Grooming Assistance: Minimal  Bathing Assistance: Moderate  UE Dressing Assistance: Stand by  LE Dressing Assistance: Moderate  Toileting Assistance with Device: Moderate    Bed Mobility/Transfers: Bed Mobility  Bed Mobility: No    Transfers  Transfer: Yes  Transfer 1  Technique 1: Sit to stand  Transfer Device 1: Walker  Transfer Level of Assistance 1: Minimum assistance  Trials/Comments 1: Assist to steady and balance; VCs for hand placement  Transfers 2  Technique 2: Stand pivot  Transfer Device 2: Walker  Transfer Level of Assistance 2: Minimum assistance      Ambulation/Gait Training:  Ambulation/Gait Training  Ambulation/Gait Training Performed:  (~30 feet in room for ADL tasks with WW; Min A for steadying and balance.)       Standing Balance:  Dynamic Standing Balance  Dynamic Standing-Comments: fair during functional transfers, mobility, and ADLs     Strength:  Strength Comments: BUEs 3+/5 MMT       Extremities: RUE   RUE : Within Functional Limits   LUE: Within functional limits       Outcome Measures:[facility] Daily Activity  Putting on and taking off regular lower body clothing: A lot  Bathing (including washing, rinsing, drying): A lot  Putting on and taking off regular  upper body clothing: A little  Toileting, which includes using toilet, bedpan or urinal: A lot  Taking care of personal grooming such as brushing teeth: A little  Eating Meals: None  Daily Activity - Total Score: 16        Education Documentation  ADL Training, taught by Augustina Luna OT at 12/12/2023  2:54 PM.  Learner: Patient  Readiness: Eager  Method: Explanation  Response: Verbalizes Understanding, Needs Reinforcement    Education Comments  No comments found.        IP EDUCATION:  Education  Individual(s) Educated: Patient  Education Provided: Diagnosis & Precautions, Risk and benefits of OT discussed with patient or other, POC discussed and agreed upon  Plan of Care Discussed and Agreed Upon: yes    Goals:  Encounter Problems       Encounter Problems (Active)       OT Goals       Mod I for all functional transfers  (Progressing)       Start:  12/12/23    Expected End:  12/26/23            SBA for LB dressing with AE as needed  (Progressing)       Start:  12/12/23    Expected End:  12/26/23            SBA for toileting tasks and clothing mgmt  (Progressing)       Start:  12/12/23    Expected End:  12/26/23            Fair + to good dyn standing balance for ADL tasks  (Progressing)       Start:  12/12/23    Expected End:  12/26/23

## 2023-12-12 NOTE — PROGRESS NOTES
"Harper Lara is a 78 y.o. female on day 6 of admission presenting with Small bowel obstruction (CMS/HCC).     Subjective   Patient was seen and examined this morning.  She complains of mild abdominal pain.  She states last night she had 3 or 4 bowel movements but is unsure of the consistency.  She has not passed gas yet.  She denies fever, nausea, or vomiting.       Objective     Physical Exam  Vitals reviewed.   Constitutional:       General: She is not in acute distress.  HENT:      Head: Normocephalic and atraumatic.      Mouth/Throat:      Mouth: Mucous membranes are dry.   Eyes:      Conjunctiva/sclera: Conjunctivae normal.   Pulmonary:      Effort: Pulmonary effort is normal. No respiratory distress.   Abdominal:      General: Abdomen is flat.      Palpations: Abdomen is soft.      Tenderness: There is abdominal tenderness (diffuse). There is no guarding.      Comments: +BS x4.  Abdominal incisions are clean, dry, and intact.  NG tube is in place but canister is empty.  Output was not documented overnight during SBFT study.   Musculoskeletal:         General: No swelling.   Skin:     General: Skin is warm and dry.      Capillary Refill: Capillary refill takes less than 2 seconds.   Neurological:      Mental Status: She is alert.   Psychiatric:         Mood and Affect: Mood normal.         Behavior: Behavior normal.         Last Recorded Vitals  Blood pressure 130/63, pulse 75, temperature 36.2 °C (97.2 °F), resp. rate 17, height 1.6 m (5' 3\"), weight 97.5 kg (215 lb), SpO2 96 %.  Intake/Output last 3 Shifts:  I/O last 3 completed shifts:  In: 1697.9 (17.4 mL/kg) [IV Piggyback:1697.9]  Out: 1250 (12.8 mL/kg) [Urine:650 (0.2 mL/kg/hr); Emesis/NG output:600]  Weight: 97.5 kg     Relevant Results  Lab Results   Component Value Date    WBC 7.8 12/12/2023    HGB 10.3 (L) 12/12/2023    HCT 31.7 (L) 12/12/2023    MCV 90 12/12/2023     12/12/2023     Lab Results   Component Value Date    GLUCOSE 145 (H) " 12/12/2023    CALCIUM 9.0 12/12/2023     12/12/2023    K 3.5 12/12/2023    CO2 29 12/12/2023     12/12/2023    BUN 25 (H) 12/12/2023    CREATININE 0.99 12/12/2023        Scheduled medications  fat emulsion-plant based, 250 mL, intravenous, Once per day on Mon Wed Fri  insulin lispro, 0-5 Units, subcutaneous, q4h  levothyroxine, 50 mcg, intravenous, q24h ORLY  lidocaine, 5 mL, infiltration, Once  lidocaine, 5 mL, infiltration, Once  metoprolol, 2.5 mg, intravenous, q6h      Continuous medications  Adult Clinimix Parenteral Nutrition, 70 mL/hr, Last Rate: 70 mL/hr (12/11/23 2141)      PRN medications  PRN medications: dextrose 10 % in water (D10W), dextrose, glucagon, ondansetron **OR** ondansetron        Assessment/Plan     Principal Problem:    Small bowel obstruction (CMS/HCC)  Active Problems:    Acute kidney injury (nontraumatic) (CMS/HCC)    Chronic renal insufficiency    Subjective  Patient resting in bed.  She complains of mild abdominal pain.  She states last night she had 3 or 4 bowel movements, unsure of the consistency.  She states she has not passed gas.  She denies nausea or vomiting.  Hemoglobin was 8.8 yesterday and is improved at 10.3 today.  She had a large amount of output from her NG tube yesterday.  Small bowel follow-through images were reviewed and contrast was seen in the colon.  Final results showed initial delay of passage of contrast from the stomach for 90 minutes, on 3-hour delayed image there is contrast throughout small bowel and colon, persistent proximal small bowel distention could be related to postop ileus, residual SBO less likely. No output from NG tube has been documented overnight or this morning since the study.    Impression  POD 3 small intestine resection    Plan  NG tube clamped to suction and clear liquids ordered for clamp trial  IVF  Pain control  Nausea control  Encourage ambulation  PT/OT consult  Social Work consult    Further recommendations per   Neville Duffy PA-C    Patient seen and examined; multiple loose bowel movements; contrast in colon; will remove NG and start clear liquid diet with Ensure; plans discussed with patient

## 2023-12-12 NOTE — PROGRESS NOTES
Physical Therapy    Physical Therapy Treatment    Patient Name: Harper Lara  MRN: [id number]  Today's Date: 12/12/2023  Time Calculation  Start Time: 1117  Stop Time: 1144  Time Calculation (min): 27 min       Assessment/Plan   End of Session Communication: Bedside nurse (Notified RN patient used BSC)  End of Session Patient Position:  Patient in chair after treatment, call light and tray with lunch in reach. Chair alarm in place.    PT Plan  Inpatient/Swing Bed or Outpatient: Inpatient  Treatment/Interventions: Bed mobility, Transfer training, Gait training, Balance training, Strengthening, Home exercise program, Therapeutic exercise, Therapeutic activity  PT Plan: Skilled PT  PT Frequency: 4 times per week  PT Discharge Recommendations: Moderate intensity level of continued care  Equipment Recommended upon Discharge: Wheeled walker PT Recommended Transfer Status: Assist x1, Assistive device    General Visit Information:   PT  Visit  PT Received On: 12/12/23    General  Prior to Session Communication:  Cleared by RN for PT  Patient Position Received:  Presents in bed, eager to get up and participate with therapy    General Comment: To ED 12/6 with abdominal pain and vomiting; pt. found to have SBO and SOCORRO on CKD. Pt. underwent extensive laporascopic lysis of adhesions with small intestine resection and anastomosis 12/8 with Dr. Lozada; CT abd- Findings compatible with acute mechanical small bowel obstruction  potentially related to adhesion. Newly seen mild superior likely recent superior L2 compressive  deformity.    General Observations:   General Observation:  NG tube (clamped off); IV; alarm    Precautions:  Medical Precautions: Fall precautions  Post-Surgical Precautions: Abdominal surgery precautions    Pain:  Pain Assessment: 0-10  Pain Score:  (c/o left hip/thigh pain.  No abdominal pain)    Cognition:  Overall Cognitive Status:  (pleasant and cooperative)    Bed Mobility  Bed Mobility:  supine-->sit: mod  x1 for trunk with HOB raised and use of bedrail.  Denies dizziness with change in position, but requesting to sit EOB for a few minutes.    Ambulation/Gait Training  Ambulation/Gait Training Performed:  Patient amb bed-->BSC with ww and min x1.  She then amb an additional 30' with ww and min x1.  Kypotic posture, head down.  Decreased step length bilat.    Transfers  Transfer:  sit-->stand: min x1  Three stands performed (EOB x2; BSC)  Patient demos good hand placement without needing cues.      Outcome Measures:  [facility] Basic Mobility  Turning from your back to your side while in a flat bed without using bedrails: A lot  Moving from lying on your back to sitting on the side of a flat bed without using bedrails: A lot  Moving to and from bed to chair (including a wheelchair): A little  Standing up from a chair using your arms (e.g. wheelchair or bedside chair): A little  To walk in hospital room: A little  Climbing 3-5 steps with railing: A lot  Basic Mobility - Total Score: 15    Education Documentation  Precautions, taught by Alison Boateng PTA at 12/12/2023  1:48 PM.  Learner: Patient  Readiness: Acceptance  Method: Explanation  Response: Demonstrated Understanding, Needs Reinforcement    Home Exercise Program, taught by Alison Boateng PTA at 12/12/2023  1:48 PM.  Learner: Patient  Readiness: Acceptance  Method: Explanation  Response: Demonstrated Understanding, Needs Reinforcement    Mobility Training, taught by Alison Boateng PTA at 12/12/2023  1:48 PM.  Learner: Patient  Readiness: Acceptance  Method: Explanation  Response: Demonstrated Understanding, Needs Reinforcement      Encounter Problems       Encounter Problems (Active)       Impaired mobility        Perform all bed mobility with supervision  (Progressing)       Start:  12/09/23    Expected End:  12/23/23            Perform all transfers with ww and SBA (Progressing)       Start:  12/09/23    Expected End:  12/23/23            Patient will ambulate >/=  50 ft. with ww and SBA (Progressing)       Start:  12/09/23    Expected End:  12/23/23            Patient will perform BLE HEP with supervision x10-20 reps x 1-2 sets  (Not Progressing)       Start:  12/09/23    Expected End:  12/23/23

## 2023-12-12 NOTE — NURSING NOTE
Pt has been A&Ox4. Pt had to have IVs replaced by the IV Team under US. Pt. Has been cooperative and pleasant. Pt continues to be on intermittent suction with a grassy green output. Pt has been asking to eat. Dietitian came to talk to patient about nutrition. Pt went for testing down in Xray. Xray called to ask this nurse to come down to advance NG tube back to original measurement and to check IV due to the alarm sounding re: occulusion. This nurse addressed those issues.

## 2023-12-13 LAB
ANION GAP SERPL CALC-SCNC: 14 MMOL/L (ref 10–20)
BASOPHILS # BLD AUTO: 0.02 X10*3/UL (ref 0–0.1)
BASOPHILS NFR BLD AUTO: 0.2 %
BUN SERPL-MCNC: 33 MG/DL (ref 6–23)
CALCIUM SERPL-MCNC: 8.4 MG/DL (ref 8.6–10.3)
CHLORIDE SERPL-SCNC: 101 MMOL/L (ref 98–107)
CO2 SERPL-SCNC: 26 MMOL/L (ref 21–32)
CREAT SERPL-MCNC: 0.95 MG/DL (ref 0.5–1.05)
EOSINOPHIL # BLD AUTO: 0.13 X10*3/UL (ref 0–0.4)
EOSINOPHIL NFR BLD AUTO: 1.6 %
ERYTHROCYTE [DISTWIDTH] IN BLOOD BY AUTOMATED COUNT: 15.5 % (ref 11.5–14.5)
GFR SERPL CREATININE-BSD FRML MDRD: 62 ML/MIN/1.73M*2
GLUCOSE BLD MANUAL STRIP-MCNC: 124 MG/DL (ref 74–99)
GLUCOSE BLD MANUAL STRIP-MCNC: 131 MG/DL (ref 74–99)
GLUCOSE BLD MANUAL STRIP-MCNC: 139 MG/DL (ref 74–99)
GLUCOSE BLD MANUAL STRIP-MCNC: 140 MG/DL (ref 74–99)
GLUCOSE BLD MANUAL STRIP-MCNC: 148 MG/DL (ref 74–99)
GLUCOSE BLD MANUAL STRIP-MCNC: 172 MG/DL (ref 74–99)
GLUCOSE SERPL-MCNC: 150 MG/DL (ref 74–99)
HCT VFR BLD AUTO: 30.2 % (ref 36–46)
HGB BLD-MCNC: 9.7 G/DL (ref 12–16)
HOLD SPECIMEN: NORMAL
IMM GRANULOCYTES # BLD AUTO: 0.04 X10*3/UL (ref 0–0.5)
IMM GRANULOCYTES NFR BLD AUTO: 0.5 % (ref 0–0.9)
LYMPHOCYTES # BLD AUTO: 0.95 X10*3/UL (ref 0.8–3)
LYMPHOCYTES NFR BLD AUTO: 11.5 %
MCH RBC QN AUTO: 29.1 PG (ref 26–34)
MCHC RBC AUTO-ENTMCNC: 32.1 G/DL (ref 32–36)
MCV RBC AUTO: 91 FL (ref 80–100)
MONOCYTES # BLD AUTO: 0.28 X10*3/UL (ref 0.05–0.8)
MONOCYTES NFR BLD AUTO: 3.4 %
NEUTROPHILS # BLD AUTO: 6.81 X10*3/UL (ref 1.6–5.5)
NEUTROPHILS NFR BLD AUTO: 82.8 %
NRBC BLD-RTO: 0 /100 WBCS (ref 0–0)
PLATELET # BLD AUTO: 144 X10*3/UL (ref 150–450)
POTASSIUM SERPL-SCNC: 3.5 MMOL/L (ref 3.5–5.3)
RBC # BLD AUTO: 3.33 X10*6/UL (ref 4–5.2)
SODIUM SERPL-SCNC: 137 MMOL/L (ref 136–145)
WBC # BLD AUTO: 8.2 X10*3/UL (ref 4.4–11.3)

## 2023-12-13 PROCEDURE — 99232 SBSQ HOSP IP/OBS MODERATE 35: CPT

## 2023-12-13 PROCEDURE — 36415 COLL VENOUS BLD VENIPUNCTURE: CPT | Performed by: INTERNAL MEDICINE

## 2023-12-13 PROCEDURE — 80048 BASIC METABOLIC PNL TOTAL CA: CPT | Performed by: INTERNAL MEDICINE

## 2023-12-13 PROCEDURE — 94760 N-INVAS EAR/PLS OXIMETRY 1: CPT

## 2023-12-13 PROCEDURE — 82947 ASSAY GLUCOSE BLOOD QUANT: CPT | Mod: 59

## 2023-12-13 PROCEDURE — 1210000001 HC SEMI-PRIVATE ROOM DAILY

## 2023-12-13 PROCEDURE — 2500000004 HC RX 250 GENERAL PHARMACY W/ HCPCS (ALT 636 FOR OP/ED): Performed by: INTERNAL MEDICINE

## 2023-12-13 PROCEDURE — 2500000001 HC RX 250 WO HCPCS SELF ADMINISTERED DRUGS (ALT 637 FOR MEDICARE OP): Performed by: INTERNAL MEDICINE

## 2023-12-13 PROCEDURE — 97530 THERAPEUTIC ACTIVITIES: CPT | Mod: GP | Performed by: PHYSICAL THERAPIST

## 2023-12-13 PROCEDURE — 99233 SBSQ HOSP IP/OBS HIGH 50: CPT | Performed by: INTERNAL MEDICINE

## 2023-12-13 PROCEDURE — 85025 COMPLETE CBC W/AUTO DIFF WBC: CPT | Performed by: INTERNAL MEDICINE

## 2023-12-13 PROCEDURE — 2500000005 HC RX 250 GENERAL PHARMACY W/O HCPCS: Performed by: INTERNAL MEDICINE

## 2023-12-13 PROCEDURE — 97110 THERAPEUTIC EXERCISES: CPT | Mod: GP | Performed by: PHYSICAL THERAPIST

## 2023-12-13 RX ADMIN — RIVAROXABAN 15 MG: 15 TABLET, FILM COATED ORAL at 16:37

## 2023-12-13 RX ADMIN — METOPROLOL TARTRATE 2.5 MG: 5 INJECTION INTRAVENOUS at 16:36

## 2023-12-13 RX ADMIN — INSULIN LISPRO 1 UNITS: 100 INJECTION, SOLUTION INTRAVENOUS; SUBCUTANEOUS at 12:23

## 2023-12-13 RX ADMIN — METOPROLOL TARTRATE 2.5 MG: 5 INJECTION INTRAVENOUS at 10:51

## 2023-12-13 RX ADMIN — LEVOTHYROXINE SODIUM 75 MCG: 75 TABLET ORAL at 05:28

## 2023-12-13 RX ADMIN — I.V. FAT EMULSION 250 ML: 20 EMULSION INTRAVENOUS at 10:35

## 2023-12-13 RX ADMIN — METOPROLOL TARTRATE 2.5 MG: 5 INJECTION INTRAVENOUS at 04:31

## 2023-12-13 RX ADMIN — ONDANSETRON 4 MG: 2 INJECTION INTRAMUSCULAR; INTRAVENOUS at 13:27

## 2023-12-13 ASSESSMENT — PAIN SCALES - WONG BAKER: WONGBAKER_NUMERICALRESPONSE: NO HURT

## 2023-12-13 ASSESSMENT — PAIN SCALES - GENERAL: PAINLEVEL_OUTOF10: 0 - NO PAIN

## 2023-12-13 ASSESSMENT — COGNITIVE AND FUNCTIONAL STATUS - GENERAL
WALKING IN HOSPITAL ROOM: A LITTLE
CLIMB 3 TO 5 STEPS WITH RAILING: A LOT
MOVING FROM LYING ON BACK TO SITTING ON SIDE OF FLAT BED WITH BEDRAILS: A LOT
STANDING UP FROM CHAIR USING ARMS: A LITTLE
TURNING FROM BACK TO SIDE WHILE IN FLAT BAD: A LOT
MOBILITY SCORE: 15
MOVING TO AND FROM BED TO CHAIR: A LITTLE

## 2023-12-13 NOTE — PROGRESS NOTES
"Harper Lara is a 78 y.o. female on day 7 of admission presenting with Small bowel obstruction (CMS/HCC).  POD 5 laparoscopy with small intestine resection    Subjective   78-year-old female resting comfortably in bed.  No acute events overnight       Objective     Physical Exam  Vitals and nursing note reviewed.   Constitutional:       General: She is not in acute distress.     Appearance: Normal appearance.   HENT:      Head: Normocephalic and atraumatic.      Nose: Nose normal.      Mouth/Throat:      Mouth: Mucous membranes are moist.   Eyes:      Pupils: Pupils are equal, round, and reactive to light.   Cardiovascular:      Rate and Rhythm: Normal rate and regular rhythm.      Pulses: Normal pulses.      Heart sounds: Normal heart sounds, S1 normal and S2 normal.   Pulmonary:      Effort: Pulmonary effort is normal.      Breath sounds: Normal breath sounds.   Abdominal:      General: Bowel sounds are normal.      Palpations: Abdomen is soft.      Tenderness: There is abdominal tenderness.      Comments: Laparoscopy incisions are clean, dry, and intact.  Mild abdominal tenderness to palpation.   Musculoskeletal:         General: Normal range of motion.   Skin:     General: Skin is warm and dry.      Capillary Refill: Capillary refill takes less than 2 seconds.   Neurological:      General: No focal deficit present.      Mental Status: She is alert and oriented to person, place, and time.   Psychiatric:         Mood and Affect: Mood normal.         Behavior: Behavior normal. Behavior is cooperative.         Last Recorded Vitals  Blood pressure 126/60, pulse 80, temperature 36.6 °C (97.9 °F), resp. rate 18, height 1.6 m (5' 3\"), weight 97.5 kg (215 lb), SpO2 99 %.  Intake/Output last 3 Shifts:  I/O last 3 completed shifts:  In: 2537.4 (26 mL/kg)   Out: - (0 mL/kg)   Weight: 97.5 kg     Medications  Scheduled medications  atorvastatin, 40 mg, oral, Nightly  fat emulsion-plant based, 250 mL, intravenous, Once per " day on Mon Wed Fri  insulin lispro, 0-5 Units, subcutaneous, q4h  levothyroxine, 75 mcg, oral, Daily  lidocaine, 5 mL, infiltration, Once  lidocaine, 5 mL, infiltration, Once  metoprolol, 2.5 mg, intravenous, q6h      Continuous medications  Adult Clinimix Parenteral Nutrition, 70 mL/hr, Last Rate: 70 mL/hr (12/13/23 0623)      PRN medications  PRN medications: dextrose 10 % in water (D10W), dextrose, glucagon, ondansetron **OR** ondansetron   Relevant Results  Results for orders placed or performed during the hospital encounter of 12/06/23 (from the past 24 hour(s))   POCT GLUCOSE   Result Value Ref Range    POCT Glucose 141 (H) 74 - 99 mg/dL   POCT GLUCOSE   Result Value Ref Range    POCT Glucose 124 (H) 74 - 99 mg/dL   POCT GLUCOSE   Result Value Ref Range    POCT Glucose 176 (H) 74 - 99 mg/dL   POCT GLUCOSE   Result Value Ref Range    POCT Glucose 127 (H) 74 - 99 mg/dL   POCT GLUCOSE   Result Value Ref Range    POCT Glucose 148 (H) 74 - 99 mg/dL   Basic Metabolic Panel   Result Value Ref Range    Glucose 150 (H) 74 - 99 mg/dL    Sodium 137 136 - 145 mmol/L    Potassium 3.5 3.5 - 5.3 mmol/L    Chloride 101 98 - 107 mmol/L    Bicarbonate 26 21 - 32 mmol/L    Anion Gap 14 10 - 20 mmol/L    Urea Nitrogen 33 (H) 6 - 23 mg/dL    Creatinine 0.95 0.50 - 1.05 mg/dL    eGFR 62 >60 mL/min/1.73m*2    Calcium 8.4 (L) 8.6 - 10.3 mg/dL   CBC and Auto Differential   Result Value Ref Range    WBC 8.2 4.4 - 11.3 x10*3/uL    nRBC 0.0 0.0 - 0.0 /100 WBCs    RBC 3.33 (L) 4.00 - 5.20 x10*6/uL    Hemoglobin 9.7 (L) 12.0 - 16.0 g/dL    Hematocrit 30.2 (L) 36.0 - 46.0 %    MCV 91 80 - 100 fL    MCH 29.1 26.0 - 34.0 pg    MCHC 32.1 32.0 - 36.0 g/dL    RDW 15.5 (H) 11.5 - 14.5 %    Platelets 144 (L) 150 - 450 x10*3/uL    Neutrophils % 82.8 40.0 - 80.0 %    Immature Granulocytes %, Automated 0.5 0.0 - 0.9 %    Lymphocytes % 11.5 13.0 - 44.0 %    Monocytes % 3.4 2.0 - 10.0 %    Eosinophils % 1.6 0.0 - 6.0 %    Basophils % 0.2 0.0 - 2.0 %     Neutrophils Absolute 6.81 (H) 1.60 - 5.50 x10*3/uL    Immature Granulocytes Absolute, Automated 0.04 0.00 - 0.50 x10*3/uL    Lymphocytes Absolute 0.95 0.80 - 3.00 x10*3/uL    Monocytes Absolute 0.28 0.05 - 0.80 x10*3/uL    Eosinophils Absolute 0.13 0.00 - 0.40 x10*3/uL    Basophils Absolute 0.02 0.00 - 0.10 x10*3/uL   SST TOP   Result Value Ref Range    Extra Tube Hold for add-ons.    POCT GLUCOSE   Result Value Ref Range    POCT Glucose 124 (H) 74 - 99 mg/dL          Assessment/Plan   78-year-old female resting comfortably in bed.  States that she has some mild abdominal tenderness to palpation but is otherwise feeling much better.  She reports that she is tolerating clear liquid diet well without nausea or vomiting.    Principal Problem:    Small bowel obstruction (CMS/HCC)  Active Problems:    Acute kidney injury (nontraumatic) (CMS/HCC)    Chronic renal insufficiency    Plan  - Advance to full liquid diet  - May resume Xarelto  - KAT hose  - Pain control  - Nausea control  - Encourage ambulation  - Appreciate PT/OT consult         I spent 36 minutes in the professional and overall care of this patient.      Tiffany Judge, APRN-CNP     Patient seen and examined. I reviewed and discussed APRN note; having flatus and loose bowel movements; had nausea and vomiting after trying the full liquid diet today; patient made NPO; continue PPN; ok to resume Xarelto

## 2023-12-13 NOTE — PROGRESS NOTES
Physical Therapy    Physical Therapy Treatment    Patient Name: Harper Lara  MRN: [id number]  Today's Date: 12/13/2023  Time Calculation  Start Time: 1059  Stop Time: 1130  Time Calculation (min): 31 min       Assessment/Plan   PT Assessment  End of Session Communication: Physician, Bedside nurse  End of Session Patient Position: Up in chair, Alarm on  PT Plan  Inpatient/Swing Bed or Outpatient: Inpatient  PT Plan  Treatment/Interventions: Bed mobility, Transfer training, Gait training, Balance training, Strengthening, Home exercise program, Therapeutic exercise, Therapeutic activity  PT Plan: Skilled PT  PT Frequency: 4 times per week  PT Discharge Recommendations: Moderate intensity level of continued care  Equipment Recommended upon Discharge: Wheeled walker  PT Recommended Transfer Status: Assist x1, Assistive device  PT - OK to Discharge: Yes (to next level of care when medically stable)      General Visit Information:   PT  Visit  PT Received On: 12/13/23  General  Prior to Session Communication: Bedside nurse  Patient Position Received: Bed, 3 rail up, Alarm on (TPN, IV intact)  General Comment: Pt agreeable to OOB activity. Pt. requesting to use BSC prior to transferring to recliner.    Subjective   Precautions:  Precautions  Medical Precautions: Fall precautions  Post-Surgical Precautions: Abdominal surgery precautions  Vital Signs:       Objective   Pain:  Pain Assessment  Pain Score:  (Pt. did not rate LE pain, however, pain appears mild and does not appear to limit pt's mobility)  Pain Location: Leg (lateral thigh)  Pain Orientation: Left  Cognition:  Cognition  Overall Cognitive Status: Within Functional Limits  Orientation Level: Oriented X4 (however, appears forgetful)  Following Commands: Follows one step commands with increased time  Processing Speed: Delayed  Treatments:  Therapeutic Exercise  Therapeutic Exercise Performed: Yes (seated BLE APs, LAQ and hip flex x10-15 reps)    Therapeutic  Activity  Therapeutic Activity Performed: Yes (Static stand with ww support for ~1-2 min with SBA)    Bed Mobility  Bed Mobility: Yes (supine to sit with HOB slightly elevated and modAx1 for trunk lifting assistance)    Ambulation/Gait Training  Ambulation/Gait Training Performed: Yes (Bed to BSC and BSC to recliner ~3-5 ft x2 with ww and minAx1; flexed posture, slow pace; distance limited due to concern with stool incontinence)  Transfers  Transfer: Yes (sit to/from stand with ww and minAx1)    Outcome Measures:  [facility] Basic Mobility  Turning from your back to your side while in a flat bed without using bedrails: A lot  Moving from lying on your back to sitting on the side of a flat bed without using bedrails: A lot  Moving to and from bed to chair (including a wheelchair): A little  Standing up from a chair using your arms (e.g. wheelchair or bedside chair): A little  To walk in hospital room: A little  Climbing 3-5 steps with railing: A lot  Basic Mobility - Total Score: 15    Education Documentation  No documentation found.  Education Comments  No comments found.        OP EDUCATION:       Encounter Problems       Encounter Problems (Active)       Impaired mobility        Perform all bed mobility with supervision  (Progressing)       Start:  12/09/23    Expected End:  12/15/23            Perform all transfers with ww and SBA (Progressing)       Start:  12/09/23    Expected End:  12/15/23            Patient will ambulate >/= 50 ft. with ww and SBA (Progressing)       Start:  12/09/23    Expected End:  12/15/23            Patient will perform BLE HEP with supervision x10-20 reps x 1-2 sets  (Not Progressing)       Start:  12/09/23    Expected End:  12/15/23               Pain - Adult

## 2023-12-13 NOTE — PROGRESS NOTES
Harper Lara is a 78 y.o. female on day 7 of admission presenting with Small bowel obstruction (CMS/HCC).      Subjective   Had another BM this morning. Passing flatus. Tolerating liquids. No abd pain at rest. No chest pain or dyspnea.        Objective     Last Recorded Vitals  /63 (BP Location: Left arm, Patient Position: Sitting)   Pulse 72   Temp 36.5 °C (97.7 °F) (Temporal)   Resp 16   Wt 97.5 kg (215 lb)   SpO2 96%   Intake/Output last 3 Shifts:    Intake/Output Summary (Last 24 hours) at 12/13/2023 1213  Last data filed at 12/13/2023 0623  Gross per 24 hour   Intake 2537.43 ml   Output --   Net 2537.43 ml         Admission Weight  Weight: 97.5 kg (215 lb) (12/06/23 1117)    Daily Weight  12/06/23 : 97.5 kg (215 lb)    Image Results  FL small bowel series  Narrative: Interpreted By:  Salas Altamirano,   STUDY:  FL SMALL BOWEL SERIES;  12/12/2023 8:31 am      INDICATION:  Signs/Symptoms:SBO.      COMPARISON:  Previous small bowel series of 12/07/2023 and CT examination of  12/06/2023.      ACCESSION NUMBER(S):  [id number]      ORDERING CLINICIAN:  TARAN TRAYLOR      TECHNIQUE:  A small bowel series is performed utilizing 360 cc of Gastrografin  through a pre-existing nasogastric tube. Serial radiographs of the  abdomen and pelvis are performed following the infusion of contrast.      FINDINGS:  2 preliminary radiographs of the abdomen and pelvis demonstrate  surgical sutures in the right lower quadrant. Dilated air-filled  small bowel loops are identified in the left abdomen reaching 5.8 cm  in diameter. A nasogastric tube tip on the imaged performed 4:40 p.m.  is located in the distal esophagus. A subsequent radiograph performed  5:09 p.m. confirms the tip of the tube coiled in the stomach fundus.      Following the ingestion of contrast there is prompt opacification of  the stomach fundus and proximal body. Contrast remains in the  proximal stomach from 0 minutes 290 minutes after contrast  ingestion.      A 3 hour portable radiograph demonstrates contrast throughout the  small bowel. There is mild small bowel dilatation reaching 4.2 cm.  Contrast is identified in the colon from the cecum to the proximal  descending colon.      A 6 hour portable radiograph demonstrates contrast throughout the  stomach, small bowel, and colon to the level of the rectum.  Small-bowel distention in the left abdomen reaches 4.8 cm.      The visualized small and large bowel full patterns are within normal  limits. There is no obvious filling defects or displacement of  opacified bowel. There is no sign of contrast extravasation.      Impression: Initial delay passage of contrast from the stomach from 0-90 minutes.      On the 3 hour delayed image there is contrast throughout the small  bowel and colon. The small bowel transit time is likely between 2 and  3 hours.      Persistent proximal small bowel distention reaching 4.8 cm on delayed  images. This appearance could be related to adynamic postoperative  ileus. Residual small-bowel obstruction is less likely. Continued  clinical surveillance is recommended.      Signed by: Salas Altamirano 12/12/2023 9:48 AM  Dictation workstation:   [id number]      Physical Exam  Constitutional:       Appearance: She is obese.   HENT:      Head: Normocephalic and atraumatic.      Nose: Nose normal.      Mouth/Throat:      Mouth: Mucous membranes are dry.   Eyes:      Extraocular Movements: Extraocular movements intact.      Conjunctiva/sclera: Conjunctivae normal.   Cardiovascular:      Rate and Rhythm: Normal rate and regular rhythm.      Pulses: Normal pulses.      Heart sounds: Normal heart sounds.   Pulmonary:      Effort: Pulmonary effort is normal.      Breath sounds: Normal breath sounds.   Abdominal:      Palpations: Abdomen is soft.      Tenderness: There is abdominal tenderness.      Comments: NG tube in place.   Musculoskeletal:         General: Normal range of motion.       Cervical back: Normal range of motion and neck supple.   Skin:     General: Skin is warm and dry.   Neurological:      Mental Status: She is alert. Mental status is at baseline.   Psychiatric:         Mood and Affect: Mood normal.         Relevant Results               Assessment/Plan          78 y.o. female presenting with past medical history of type 2 diabetes mellitus, fatty liver, hyperlipidemia, hypertension, A-fib status post ablation, hypothyroidism, anxiety and depression, history of GI bleed comes to the hospital with abdominal pain and vomiting for past few days.  Patient found to have SBO on the imaging.  SBFT done showed high-grade obstruction.  On 12/8/2023 the patient underwent extensive laparoscopic lysis of adhesions for 2 hours and small intestinal resection and anastomosis.  Patient is POD #3 and no return of bowel function yet.  Continue with IV hydration.  Will reach out to dietary for  parenteral nutrition recommendations at this time.        Principal Problem:    Small bowel obstruction (CMS/HCC)  Active Problems:    Acute kidney injury (nontraumatic) (CMS/HCC)    Chronic renal insufficiency    Small bowel obstruction  -As seen on CT scan of abdomen pelvis and also on SBFT.  -Status post extensive laparoscopic lysis of adhesions and laparoscopic small intestine resection and anastomosis on 12/8/2023.  -Continue with IV hydration and maintain n.p.o. status at this time.  IV fluids switched to to potassium containing fluids.  -Surgery has been consulted and NG tube in place.  Awaiting return of bowel function at this time.  -Due to prolonged n.p.o. status will consult dietary for parenteral nutrition recommendations.     SOCORRO with underlying CKD stage III  -Creatinine noted 1.81 baseline is around 1.1.  Improved  -Continue with IV hydration.    Hypernatremia  -Will adjust IV fluids and continue to maintain potassium and IV fluids.     Hypokalemia  -Replace IV and monitor.      Atrial  fibrillation  -Since NPO will add IV lopressor every 6 hours. Rate controlled at this time  -The patient is chest pain-free at this time.  Continue to monitor.     Hypothyroidism  -Not able to resume oral Synthroid at this time.  Will continue with reduced dose of IV Synthroid.     Chronic systolic congestive heart failure  -Last echo from 2021 shows LVEF of 40%.  -Monitor volume status     Diabetes mellitus type 2  -Hold home oral antidiabetics  -Continue with sliding scale insulin n.p.o. protocol.     VTE prophylaxis: SCDs patient history of GI bleed    PT OT     12/13/23:  Bowel function returning  Diet advancing per surgery  Stop PPN  D/w PT, TCC, will plan for SNF at this point  D/w surg - ok to resume xarelto       Mamadou Hall MD

## 2023-12-13 NOTE — CARE PLAN
The patient's goals for the shift include      The clinical goals for the shift include patient will be hemodynamically stable through shift      Problem: Pain  Goal: My pain/discomfort is manageable  12/13/2023 0413 by Radha Barrett RN  Outcome: Progressing  12/13/2023 0413 by Radha Barrett RN  Outcome: Progressing     Problem: Safety  Goal: Patient will be injury free during hospitalization  12/13/2023 0413 by Radha Barrett RN  Outcome: Progressing  12/13/2023 0413 by Radha Barrett RN  Outcome: Progressing  Goal: I will remain free of falls  12/13/2023 0413 by Radha Barrett RN  Outcome: Progressing  12/13/2023 0413 by Radha Barrett RN  Outcome: Progressing     Problem: Daily Care  Goal: Daily care needs are met  12/13/2023 0413 by Radha Barrett RN  Outcome: Progressing  12/13/2023 0413 by Radha Barrett RN  Outcome: Progressing     Problem: Psychosocial Needs  Goal: Demonstrates ability to cope with hospitalization/illness  12/13/2023 0413 by Radha Barrett RN  Outcome: Progressing  12/13/2023 0413 by Radha Barrett RN  Outcome: Progressing  Goal: Collaborate with me, my family, and caregiver to identify my specific goals  12/13/2023 0413 by Radha Barrett RN  Outcome: Progressing  12/13/2023 0413 by Radha Barrett RN  Outcome: Progressing     Problem: Discharge Barriers  Goal: My discharge needs are met  12/13/2023 0413 by Radha Barrett RN  Outcome: Progressing  12/13/2023 0413 by Radha Barrett RN  Outcome: Progressing     Problem: Skin  Goal: Decreased wound size/increased tissue granulation at next dressing change  12/13/2023 0413 by Radha Barrett RN  Outcome: Progressing  Flowsheets (Taken 12/13/2023 0413)  Decreased wound size/increased tissue granulation at next dressing change: Promote sleep for wound healing  12/13/2023 0413 by Radha Barrett RN  Outcome: Progressing  Flowsheets (Taken 12/13/2023 0413)  Decreased  wound size/increased tissue granulation at next dressing change: Promote sleep for wound healing  Goal: Participates in plan/prevention/treatment measures  12/13/2023 0413 by Radha Barrett RN  Outcome: Progressing  Flowsheets (Taken 12/13/2023 0413)  Participates in plan/prevention/treatment measures: Increase activity/out of bed for meals  12/13/2023 0413 by Radha Barrett RN  Outcome: Progressing  Flowsheets (Taken 12/13/2023 0413)  Participates in plan/prevention/treatment measures: Increase activity/out of bed for meals  Goal: Prevent/manage excess moisture  12/13/2023 0413 by Radha Barrett RN  Outcome: Progressing  Flowsheets (Taken 12/13/2023 0413)  Prevent/manage excess moisture: Cleanse incontinence/protect with barrier cream  12/13/2023 0413 by Radha Barrett RN  Outcome: Progressing  Flowsheets (Taken 12/13/2023 0413)  Prevent/manage excess moisture: Cleanse incontinence/protect with barrier cream  Goal: Prevent/minimize sheer/friction injuries  12/13/2023 0413 by Radha Barrett RN  Outcome: Progressing  Flowsheets (Taken 12/13/2023 0413)  Prevent/minimize sheer/friction injuries: Complete micro-shifts as needed if patient unable. Adjust patient position to relieve pressure points, not a full turn  12/13/2023 0413 by Radha Barrett RN  Outcome: Progressing  Flowsheets (Taken 12/13/2023 0413)  Prevent/minimize sheer/friction injuries: Complete micro-shifts as needed if patient unable. Adjust patient position to relieve pressure points, not a full turn  Goal: Promote/optimize nutrition  12/13/2023 0413 by Radha Barrett RN  Outcome: Progressing  Flowsheets (Taken 12/13/2023 0413)  Promote/optimize nutrition: Consume > 50% meals/supplements  12/13/2023 0413 by Radha Barrett RN  Outcome: Progressing  Flowsheets (Taken 12/13/2023 0413)  Promote/optimize nutrition: Consume > 50% meals/supplements  Goal: Promote skin healing  12/13/2023 0413 by Radha Barrett  RN  Outcome: Progressing  Flowsheets (Taken 12/13/2023 0413)  Promote skin healing: Turn/reposition every 2 hours/use positioning/transfer devices  12/13/2023 0413 by Radha Barrett RN  Outcome: Progressing  Flowsheets (Taken 12/13/2023 0413)  Promote skin healing: Turn/reposition every 2 hours/use positioning/transfer devices     Problem: Pain - Adult  Goal: Verbalizes/displays adequate comfort level or baseline comfort level  12/13/2023 0413 by Radha Barrett RN  Outcome: Progressing  12/13/2023 0413 by Radha Barrett RN  Outcome: Progressing     Problem: Safety - Adult  Goal: Free from fall injury  12/13/2023 0413 by Radha Barrett RN  Outcome: Progressing  12/13/2023 0413 by Radha Barrett RN  Outcome: Progressing     Problem: Discharge Planning  Goal: Discharge to home or other facility with appropriate resources  12/13/2023 0413 by Radha Barrett RN  Outcome: Progressing  12/13/2023 0413 by Radha Barrett RN  Outcome: Progressing     Problem: Chronic Conditions and Co-morbidities  Goal: Patient's chronic conditions and co-morbidity symptoms are monitored and maintained or improved  12/13/2023 0413 by Radha Barrett RN  Outcome: Progressing  12/13/2023 0413 by Radha Barrett RN  Outcome: Progressing     Problem: Diabetes  Goal: Maintain electrolyte levels within acceptable range throughout shift  12/13/2023 0413 by Radha Barrett RN  Outcome: Progressing  12/13/2023 0413 by Radha Barrett RN  Outcome: Progressing  Goal: Maintain glucose levels >70mg/dl to <250mg/dl throughout shift  12/13/2023 0413 by Radha Barrett RN  Outcome: Progressing  12/13/2023 0413 by Radha Barrett RN  Outcome: Progressing  Goal: Learn about and adhere to nutrition recommendations by end of shift  12/13/2023 0413 by Radha Barrett RN  Outcome: Progressing  12/13/2023 0413 by Radha Barrett RN  Outcome: Progressing  Goal: Vital signs within normal range for age  by end of shift  12/13/2023 0413 by Radha Barrett RN  Outcome: Progressing  12/13/2023 0413 by Radha Barrett RN  Outcome: Progressing

## 2023-12-13 NOTE — CARE PLAN
The patient's goals for the shift include      The clinical goals for the shift include Patient will tolerate diet with no nausea/vomiting    Over the shift, the patient will continue to progress toward clinical goal and plans of care.

## 2023-12-13 NOTE — PROGRESS NOTES
12/13/23 1143   Discharge Planning   Home or Post Acute Services Post acute facilities (Rehab/SNF/etc)   Type of Post Acute Facility Services Skilled nursing   Patient expects to be discharged to: [facility]   Does the patient need discharge transport arranged? Yes   RoundTrip coordination needed? Yes   Has discharge transport been arranged? No   Patient Choice   Provider Choice list and CMS website (https://medicare.gov/care-compare#search) for post-acute Quality and Resource Measure Data were provided and reviewed with: Patient   Patient / Family choosing to utilize agency / facility established prior to hospitalization No     [facility] of 14/15, recommending moderate level intensity needs at dc. Pt wishes to speak with her  in private about dc to SNF. States she knows he will not want her to go but she feels she needs to. Pt originally chose [facility] but now states she prefers [facility]. Referral sent to both.

## 2023-12-14 LAB
ANION GAP SERPL CALC-SCNC: 12 MMOL/L (ref 10–20)
BASOPHILS # BLD AUTO: 0.03 X10*3/UL (ref 0–0.1)
BASOPHILS NFR BLD AUTO: 0.5 %
BUN SERPL-MCNC: 37 MG/DL (ref 6–23)
CALCIUM SERPL-MCNC: 7.9 MG/DL (ref 8.6–10.3)
CHLORIDE SERPL-SCNC: 99 MMOL/L (ref 98–107)
CO2 SERPL-SCNC: 27 MMOL/L (ref 21–32)
CREAT SERPL-MCNC: 1.02 MG/DL (ref 0.5–1.05)
EOSINOPHIL # BLD AUTO: 0.11 X10*3/UL (ref 0–0.4)
EOSINOPHIL NFR BLD AUTO: 1.9 %
ERYTHROCYTE [DISTWIDTH] IN BLOOD BY AUTOMATED COUNT: 15.4 % (ref 11.5–14.5)
GFR SERPL CREATININE-BSD FRML MDRD: 57 ML/MIN/1.73M*2
GLUCOSE BLD MANUAL STRIP-MCNC: 124 MG/DL (ref 74–99)
GLUCOSE BLD MANUAL STRIP-MCNC: 126 MG/DL (ref 74–99)
GLUCOSE BLD MANUAL STRIP-MCNC: 143 MG/DL (ref 74–99)
GLUCOSE BLD MANUAL STRIP-MCNC: 147 MG/DL (ref 74–99)
GLUCOSE BLD MANUAL STRIP-MCNC: 160 MG/DL (ref 74–99)
GLUCOSE BLD MANUAL STRIP-MCNC: 161 MG/DL (ref 74–99)
GLUCOSE SERPL-MCNC: 134 MG/DL (ref 74–99)
HCT VFR BLD AUTO: 27.3 % (ref 36–46)
HGB BLD-MCNC: 8.8 G/DL (ref 12–16)
HOLD SPECIMEN: NORMAL
IMM GRANULOCYTES # BLD AUTO: 0.03 X10*3/UL (ref 0–0.5)
IMM GRANULOCYTES NFR BLD AUTO: 0.5 % (ref 0–0.9)
LYMPHOCYTES # BLD AUTO: 1.06 X10*3/UL (ref 0.8–3)
LYMPHOCYTES NFR BLD AUTO: 18.3 %
MCH RBC QN AUTO: 29.1 PG (ref 26–34)
MCHC RBC AUTO-ENTMCNC: 32.2 G/DL (ref 32–36)
MCV RBC AUTO: 90 FL (ref 80–100)
MONOCYTES # BLD AUTO: 0.35 X10*3/UL (ref 0.05–0.8)
MONOCYTES NFR BLD AUTO: 6 %
NEUTROPHILS # BLD AUTO: 4.21 X10*3/UL (ref 1.6–5.5)
NEUTROPHILS NFR BLD AUTO: 72.8 %
NRBC BLD-RTO: 0 /100 WBCS (ref 0–0)
PLATELET # BLD AUTO: 134 X10*3/UL (ref 150–450)
POTASSIUM SERPL-SCNC: 3.3 MMOL/L (ref 3.5–5.3)
RBC # BLD AUTO: 3.02 X10*6/UL (ref 4–5.2)
SODIUM SERPL-SCNC: 135 MMOL/L (ref 136–145)
WBC # BLD AUTO: 5.8 X10*3/UL (ref 4.4–11.3)

## 2023-12-14 PROCEDURE — 97530 THERAPEUTIC ACTIVITIES: CPT | Mod: GO,CO

## 2023-12-14 PROCEDURE — 82947 ASSAY GLUCOSE BLOOD QUANT: CPT

## 2023-12-14 PROCEDURE — 99232 SBSQ HOSP IP/OBS MODERATE 35: CPT

## 2023-12-14 PROCEDURE — 36415 COLL VENOUS BLD VENIPUNCTURE: CPT

## 2023-12-14 PROCEDURE — 82374 ASSAY BLOOD CARBON DIOXIDE: CPT

## 2023-12-14 PROCEDURE — 2500000001 HC RX 250 WO HCPCS SELF ADMINISTERED DRUGS (ALT 637 FOR MEDICARE OP): Performed by: INTERNAL MEDICINE

## 2023-12-14 PROCEDURE — 99232 SBSQ HOSP IP/OBS MODERATE 35: CPT | Performed by: INTERNAL MEDICINE

## 2023-12-14 PROCEDURE — 1210000001 HC SEMI-PRIVATE ROOM DAILY

## 2023-12-14 PROCEDURE — 85025 COMPLETE CBC W/AUTO DIFF WBC: CPT

## 2023-12-14 PROCEDURE — 2500000004 HC RX 250 GENERAL PHARMACY W/ HCPCS (ALT 636 FOR OP/ED): Performed by: INTERNAL MEDICINE

## 2023-12-14 PROCEDURE — 2500000005 HC RX 250 GENERAL PHARMACY W/O HCPCS: Performed by: INTERNAL MEDICINE

## 2023-12-14 PROCEDURE — 94760 N-INVAS EAR/PLS OXIMETRY 1: CPT

## 2023-12-14 PROCEDURE — 2500000002 HC RX 250 W HCPCS SELF ADMINISTERED DRUGS (ALT 637 FOR MEDICARE OP, ALT 636 FOR OP/ED): Performed by: INTERNAL MEDICINE

## 2023-12-14 RX ADMIN — ASCORBIC ACID, VITAMIN A PALMITATE, CHOLECALCIFEROL, THIAMINE HYDROCHLORIDE, RIBOFLAVIN-5 PHOSPHATE SODIUM, PYRIDOXINE HYDROCHLORIDE, NIACINAMIDE, DEXPANTHENOL, ALPHA-TOCOPHEROL ACETATE, VITAMIN K1, FOLIC ACID, BIOTIN, CYANOCOBALAMIN: 200; 3300; 200; 6; 3.6; 6; 40; 15; 10; 150; 600; 60; 5 INJECTION, SOLUTION INTRAVENOUS at 01:37

## 2023-12-14 RX ADMIN — METOPROLOL TARTRATE 2.5 MG: 5 INJECTION INTRAVENOUS at 16:52

## 2023-12-14 RX ADMIN — INSULIN LISPRO 1 UNITS: 100 INJECTION, SOLUTION INTRAVENOUS; SUBCUTANEOUS at 16:52

## 2023-12-14 RX ADMIN — RIVAROXABAN 15 MG: 15 TABLET, FILM COATED ORAL at 16:52

## 2023-12-14 RX ADMIN — METOPROLOL TARTRATE 2.5 MG: 5 INJECTION INTRAVENOUS at 11:14

## 2023-12-14 RX ADMIN — METOPROLOL TARTRATE 2.5 MG: 5 INJECTION INTRAVENOUS at 04:30

## 2023-12-14 RX ADMIN — ATORVASTATIN CALCIUM 40 MG: 20 TABLET, FILM COATED ORAL at 22:36

## 2023-12-14 ASSESSMENT — PAIN - FUNCTIONAL ASSESSMENT: PAIN_FUNCTIONAL_ASSESSMENT: 0-10

## 2023-12-14 ASSESSMENT — COGNITIVE AND FUNCTIONAL STATUS - GENERAL
DRESSING REGULAR UPPER BODY CLOTHING: A LITTLE
STANDING UP FROM CHAIR USING ARMS: A LITTLE
STANDING UP FROM CHAIR USING ARMS: A LITTLE
MOVING TO AND FROM BED TO CHAIR: A LITTLE
CLIMB 3 TO 5 STEPS WITH RAILING: A LOT
MOVING FROM LYING ON BACK TO SITTING ON SIDE OF FLAT BED WITH BEDRAILS: A LOT
HELP NEEDED FOR BATHING: A LITTLE
DRESSING REGULAR LOWER BODY CLOTHING: A LOT
WALKING IN HOSPITAL ROOM: A LITTLE
DRESSING REGULAR LOWER BODY CLOTHING: A LOT
MOBILITY SCORE: 15
PERSONAL GROOMING: A LITTLE
DRESSING REGULAR UPPER BODY CLOTHING: A LITTLE
WALKING IN HOSPITAL ROOM: A LITTLE
MOBILITY SCORE: 15
DAILY ACTIVITIY SCORE: 16
WALKING IN HOSPITAL ROOM: A LITTLE
PERSONAL GROOMING: A LITTLE
MOVING TO AND FROM BED TO CHAIR: A LITTLE
EATING MEALS: A LITTLE
TOILETING: A LOT
TOILETING: A LOT
MOVING FROM LYING ON BACK TO SITTING ON SIDE OF FLAT BED WITH BEDRAILS: A LOT
HELP NEEDED FOR BATHING: A LOT
STANDING UP FROM CHAIR USING ARMS: A LITTLE
TURNING FROM BACK TO SIDE WHILE IN FLAT BAD: A LOT
TURNING FROM BACK TO SIDE WHILE IN FLAT BAD: A LOT
MOVING TO AND FROM BED TO CHAIR: A LITTLE
CLIMB 3 TO 5 STEPS WITH RAILING: A LOT
TURNING FROM BACK TO SIDE WHILE IN FLAT BAD: A LOT
DRESSING REGULAR UPPER BODY CLOTHING: A LITTLE
PERSONAL GROOMING: A LOT
CLIMB 3 TO 5 STEPS WITH RAILING: A LOT
EATING MEALS: A LITTLE
MOBILITY SCORE: 15
MOVING FROM LYING ON BACK TO SITTING ON SIDE OF FLAT BED WITH BEDRAILS: A LOT
DAILY ACTIVITIY SCORE: 16
HELP NEEDED FOR BATHING: A LITTLE
TOILETING: A LOT

## 2023-12-14 ASSESSMENT — PAIN SCALES - WONG BAKER: WONGBAKER_NUMERICALRESPONSE: NO HURT

## 2023-12-14 ASSESSMENT — PAIN SCALES - GENERAL: PAINLEVEL_OUTOF10: 10 - WORST POSSIBLE PAIN

## 2023-12-14 NOTE — PROGRESS NOTES
"Harper Lara is a 78 y.o. female on day 8 of admission presenting with Small bowel obstruction (CMS/HCC).  POD 5 laparoscopy with small intestine resection    Subjective   78-year-old female resting comfortably in bed.  No acute events overnight.       Objective     Physical Exam  Vitals and nursing note reviewed.   Constitutional:       General: She is not in acute distress.     Appearance: Normal appearance.   HENT:      Head: Normocephalic and atraumatic.      Nose: Nose normal.      Mouth/Throat:      Mouth: Mucous membranes are moist.   Eyes:      Pupils: Pupils are equal, round, and reactive to light.   Cardiovascular:      Rate and Rhythm: Normal rate and regular rhythm.      Pulses: Normal pulses.      Heart sounds: Normal heart sounds, S1 normal and S2 normal.   Pulmonary:      Effort: Pulmonary effort is normal.      Breath sounds: Normal breath sounds.   Abdominal:      General: Bowel sounds are normal.      Palpations: Abdomen is soft.      Tenderness: There is abdominal tenderness.      Comments: Laparoscopy incisions are clean, dry, and intact.  Mild abdominal tenderness to palpation.   Musculoskeletal:         General: Normal range of motion.   Skin:     General: Skin is warm and dry.      Capillary Refill: Capillary refill takes less than 2 seconds.   Neurological:      General: No focal deficit present.      Mental Status: She is alert and oriented to person, place, and time.      Sensory: No sensory deficit.   Psychiatric:         Mood and Affect: Mood normal.         Behavior: Behavior normal. Behavior is cooperative.         Last Recorded Vitals  Blood pressure 128/59, pulse 71, temperature 36.1 °C (97 °F), temperature source Temporal, resp. rate 13, height 1.6 m (5' 3\"), weight 97.5 kg (215 lb), SpO2 98 %.  Intake/Output last 3 Shifts:  I/O last 3 completed shifts:  In: 2887.4 (29.6 mL/kg)   Out: - (0 mL/kg)   Weight: 97.5 kg     Medications  Scheduled medications  atorvastatin, 40 mg, oral, " Nightly  fat emulsion-plant based, 250 mL, intravenous, Once per day on Mon Wed Fri  insulin lispro, 0-5 Units, subcutaneous, q4h  levothyroxine, 75 mcg, oral, Daily  lidocaine, 5 mL, infiltration, Once  lidocaine, 5 mL, infiltration, Once  metoprolol, 2.5 mg, intravenous, q6h  rivaroxaban, 15 mg, oral, Daily with evening meal      Continuous medications  Adult Clinimix Parenteral Nutrition, 70 mL/hr, Last Rate: 70 mL/hr (12/14/23 0137)      PRN medications  PRN medications: dextrose 10 % in water (D10W), dextrose, glucagon, ondansetron **OR** ondansetron   Relevant Results  Results for orders placed or performed during the hospital encounter of 12/06/23 (from the past 24 hour(s))   POCT GLUCOSE   Result Value Ref Range    POCT Glucose 172 (H) 74 - 99 mg/dL   POCT GLUCOSE   Result Value Ref Range    POCT Glucose 140 (H) 74 - 99 mg/dL   POCT GLUCOSE   Result Value Ref Range    POCT Glucose 139 (H) 74 - 99 mg/dL   POCT GLUCOSE   Result Value Ref Range    POCT Glucose 131 (H) 74 - 99 mg/dL   POCT GLUCOSE   Result Value Ref Range    POCT Glucose 126 (H) 74 - 99 mg/dL   POCT GLUCOSE   Result Value Ref Range    POCT Glucose 147 (H) 74 - 99 mg/dL   Basic Metabolic Panel   Result Value Ref Range    Glucose 134 (H) 74 - 99 mg/dL    Sodium 135 (L) 136 - 145 mmol/L    Potassium 3.3 (L) 3.5 - 5.3 mmol/L    Chloride 99 98 - 107 mmol/L    Bicarbonate 27 21 - 32 mmol/L    Anion Gap 12 10 - 20 mmol/L    Urea Nitrogen 37 (H) 6 - 23 mg/dL    Creatinine 1.02 0.50 - 1.05 mg/dL    eGFR 57 (L) >60 mL/min/1.73m*2    Calcium 7.9 (L) 8.6 - 10.3 mg/dL   CBC and Auto Differential   Result Value Ref Range    WBC 5.8 4.4 - 11.3 x10*3/uL    nRBC 0.0 0.0 - 0.0 /100 WBCs    RBC 3.02 (L) 4.00 - 5.20 x10*6/uL    Hemoglobin 8.8 (L) 12.0 - 16.0 g/dL    Hematocrit 27.3 (L) 36.0 - 46.0 %    MCV 90 80 - 100 fL    MCH 29.1 26.0 - 34.0 pg    MCHC 32.2 32.0 - 36.0 g/dL    RDW 15.4 (H) 11.5 - 14.5 %    Platelets 134 (L) 150 - 450 x10*3/uL    Neutrophils %  72.8 40.0 - 80.0 %    Immature Granulocytes %, Automated 0.5 0.0 - 0.9 %    Lymphocytes % 18.3 13.0 - 44.0 %    Monocytes % 6.0 2.0 - 10.0 %    Eosinophils % 1.9 0.0 - 6.0 %    Basophils % 0.5 0.0 - 2.0 %    Neutrophils Absolute 4.21 1.60 - 5.50 x10*3/uL    Immature Granulocytes Absolute, Automated 0.03 0.00 - 0.50 x10*3/uL    Lymphocytes Absolute 1.06 0.80 - 3.00 x10*3/uL    Monocytes Absolute 0.35 0.05 - 0.80 x10*3/uL    Eosinophils Absolute 0.11 0.00 - 0.40 x10*3/uL    Basophils Absolute 0.03 0.00 - 0.10 x10*3/uL   SST TOP   Result Value Ref Range    Extra Tube Hold for add-ons.    POCT GLUCOSE   Result Value Ref Range    POCT Glucose 143 (H) 74 - 99 mg/dL          Assessment/Plan   78-year-old female resting comfortably in bed.  States that she has some mild abdominal tenderness to palpation but is otherwise feeling much better.  Yesterday she had nausea and vomiting with full liquid diet and was placed back on n.p.o. status with PPN.  I spoke with the nurse this morning about ambulating the patient.  Patient states that she last had a bowel movement yesterday evening.  Principal Problem:    Small bowel obstruction (CMS/HCC)  Active Problems:    Acute kidney injury (nontraumatic) (CMS/HCC)    Chronic renal insufficiency    Plan  -Strict n.p.o.  - Maintain PPN  - KAT hose  - Pain control  - Nausea control  - Encourage ambulation  - Appreciate PT/OT consult         I spent 36 minutes in the professional and overall care of this patient.      Tiffany Judge, APRN-CNP     Patient seen and examined. I have reviewed and discussed APRN note; denies nausea and vomiting today; having flatus and had loose bowel movement overnight; pain better; will start clear liquid diet

## 2023-12-14 NOTE — CARE PLAN
Problem: Pain  Goal: My pain/discomfort is manageable  Outcome: Progressing     Problem: Safety  Goal: Patient will be injury free during hospitalization  Outcome: Progressing  Goal: I will remain free of falls  Outcome: Progressing     Problem: Daily Care  Goal: Daily care needs are met  Outcome: Progressing     Problem: Psychosocial Needs  Goal: Demonstrates ability to cope with hospitalization/illness  Outcome: Progressing  Goal: Collaborate with me, my family, and caregiver to identify my specific goals  Outcome: Progressing     Problem: Discharge Barriers  Goal: My discharge needs are met  Outcome: Progressing     Problem: Skin  Goal: Decreased wound size/increased tissue granulation at next dressing change  Outcome: Progressing  Goal: Participates in plan/prevention/treatment measures  Outcome: Progressing  Goal: Prevent/manage excess moisture  Outcome: Progressing  Goal: Prevent/minimize sheer/friction injuries  Outcome: Progressing  Goal: Promote/optimize nutrition  Outcome: Progressing  Goal: Promote skin healing  Outcome: Progressing     Problem: Pain - Adult  Goal: Verbalizes/displays adequate comfort level or baseline comfort level  Outcome: Progressing     Problem: Safety - Adult  Goal: Free from fall injury  Outcome: Progressing     Problem: Discharge Planning  Goal: Discharge to home or other facility with appropriate resources  Outcome: Progressing     Problem: Chronic Conditions and Co-morbidities  Goal: Patient's chronic conditions and co-morbidity symptoms are monitored and maintained or improved  Outcome: Progressing     Problem: Diabetes  Goal: Maintain electrolyte levels within acceptable range throughout shift  Outcome: Progressing  Goal: Maintain glucose levels >70mg/dl to <250mg/dl throughout shift  Outcome: Progressing  Goal: Learn about and adhere to nutrition recommendations by end of shift  Outcome: Progressing  Goal: Vital signs within normal range for age by end of shift  Outcome:  Progressing   The patient's goals for the shift include      The clinical goals for the shift include Patient will remain free from pain and nausea during shift.

## 2023-12-14 NOTE — PROGRESS NOTES
Harper Lara is a 78 y.o. female on day 8 of admission presenting with Small bowel obstruction (CMS/HCC).      Subjective   Had n/v with full liquids yesterday so was made NPO. PPN resumed. Mild abd tenderness today, no n/v. Still passing flatus. No fever.        Objective     Last Recorded Vitals  /62 (Patient Position: Lying)   Pulse 78   Temp 36 °C (96.8 °F) (Temporal)   Resp 16   Wt 97.5 kg (215 lb)   SpO2 96%   Intake/Output last 3 Shifts:    Intake/Output Summary (Last 24 hours) at 12/14/2023 1447  Last data filed at 12/14/2023 0600  Gross per 24 hour   Intake 350 ml   Output --   Net 350 ml         Admission Weight  Weight: 97.5 kg (215 lb) (12/06/23 1117)    Daily Weight  12/06/23 : 97.5 kg (215 lb)    Image Results  FL small bowel series  Narrative: Interpreted By:  Salas Altamirano,   STUDY:  FL SMALL BOWEL SERIES;  12/12/2023 8:31 am      INDICATION:  Signs/Symptoms:SBO.      COMPARISON:  Previous small bowel series of 12/07/2023 and CT examination of  12/06/2023.      ACCESSION NUMBER(S):  [id number]      ORDERING CLINICIAN:  TARAN TRAYLOR      TECHNIQUE:  A small bowel series is performed utilizing 360 cc of Gastrografin  through a pre-existing nasogastric tube. Serial radiographs of the  abdomen and pelvis are performed following the infusion of contrast.      FINDINGS:  2 preliminary radiographs of the abdomen and pelvis demonstrate  surgical sutures in the right lower quadrant. Dilated air-filled  small bowel loops are identified in the left abdomen reaching 5.8 cm  in diameter. A nasogastric tube tip on the imaged performed 4:40 p.m.  is located in the distal esophagus. A subsequent radiograph performed  5:09 p.m. confirms the tip of the tube coiled in the stomach fundus.      Following the ingestion of contrast there is prompt opacification of  the stomach fundus and proximal body. Contrast remains in the  proximal stomach from 0 minutes 290 minutes after contrast ingestion.       A 3 hour portable radiograph demonstrates contrast throughout the  small bowel. There is mild small bowel dilatation reaching 4.2 cm.  Contrast is identified in the colon from the cecum to the proximal  descending colon.      A 6 hour portable radiograph demonstrates contrast throughout the  stomach, small bowel, and colon to the level of the rectum.  Small-bowel distention in the left abdomen reaches 4.8 cm.      The visualized small and large bowel full patterns are within normal  limits. There is no obvious filling defects or displacement of  opacified bowel. There is no sign of contrast extravasation.      Impression: Initial delay passage of contrast from the stomach from 0-90 minutes.      On the 3 hour delayed image there is contrast throughout the small  bowel and colon. The small bowel transit time is likely between 2 and  3 hours.      Persistent proximal small bowel distention reaching 4.8 cm on delayed  images. This appearance could be related to adynamic postoperative  ileus. Residual small-bowel obstruction is less likely. Continued  clinical surveillance is recommended.      Signed by: Salas Altamirano 12/12/2023 9:48 AM  Dictation workstation:   [id number]      Physical Exam  Constitutional:       Appearance: She is obese.   HENT:      Head: Normocephalic and atraumatic.      Nose: Nose normal.      Mouth/Throat:      Mouth: Mucous membranes are dry.   Eyes:      Extraocular Movements: Extraocular movements intact.      Conjunctiva/sclera: Conjunctivae normal.   Cardiovascular:      Rate and Rhythm: Normal rate and regular rhythm.      Pulses: Normal pulses.      Heart sounds: Normal heart sounds.   Pulmonary:      Effort: Pulmonary effort is normal.      Breath sounds: Normal breath sounds.   Abdominal:      Palpations: Abdomen is soft.      Tenderness: There is abdominal tenderness.   Musculoskeletal:         General: Normal range of motion.      Cervical back: Normal range of motion and  neck supple.   Skin:     General: Skin is warm and dry.   Neurological:      Mental Status: She is alert. Mental status is at baseline.   Psychiatric:         Mood and Affect: Mood normal.         Relevant Results               Assessment/Plan          78 y.o. female presenting with past medical history of type 2 diabetes mellitus, fatty liver, hyperlipidemia, hypertension, A-fib status post ablation, hypothyroidism, anxiety and depression, history of GI bleed comes to the hospital with abdominal pain and vomiting for past few days.  Patient found to have SBO on the imaging.  SBFT done showed high-grade obstruction.  On 12/8/2023 the patient underwent extensive laparoscopic lysis of adhesions for 2 hours and small intestinal resection and anastomosis.  Patient is POD #3 and no return of bowel function yet.  Continue with IV hydration.  Will reach out to dietary for  parenteral nutrition recommendations at this time.        Principal Problem:    Small bowel obstruction (CMS/HCC)  Active Problems:    Acute kidney injury (nontraumatic) (CMS/HCC)    Chronic renal insufficiency    Small bowel obstruction  -As seen on CT scan of abdomen pelvis and also on SBFT.  -Status post extensive laparoscopic lysis of adhesions and laparoscopic small intestine resection and anastomosis on 12/8/2023.  -Continue with IV hydration and maintain n.p.o. status at this time.  IV fluids switched to to potassium containing fluids.  -Surgery has been consulted and NG tube in place.  Awaiting return of bowel function at this time.  -Due to prolonged n.p.o. status will consult dietary for parenteral nutrition recommendations.     SOCORRO with underlying CKD stage III  -Creatinine noted 1.81 baseline is around 1.1.  Improved  -Continue with IV hydration.    Hypernatremia  -Will adjust IV fluids and continue to maintain potassium and IV fluids.     Hypokalemia  -Replace IV and monitor.      Atrial fibrillation  -Since NPO will add IV lopressor every 6  hours. Rate controlled at this time  -The patient is chest pain-free at this time.  Continue to monitor.     Hypothyroidism  -Not able to resume oral Synthroid at this time.  Will continue with reduced dose of IV Synthroid.     Chronic systolic congestive heart failure  -Last echo from 2021 shows LVEF of 40%.  -Monitor volume status     Diabetes mellitus type 2  -Hold home oral antidiabetics  -Continue with sliding scale insulin n.p.o. protocol.     VTE prophylaxis: SCDs patient history of GI bleed    PT OT     12/14/23:  Remains NPO after n/v with full  liquids yesterday  PPN resumed, will discontinue when tolerating diet  D/w PT, TCC, will plan for SNF at this point  Xarelto resumed       Mamadou Hall MD

## 2023-12-14 NOTE — PROGRESS NOTES
Occupational Therapy    OT Treatment    Patient Name: Harper Lara  MRN: [id number]  Today's Date: 12/14/2023  Time Calculation  Start Time: 1405  Stop Time: 1420  Time Calculation (min): 15 min         Assessment:  Evaluation/Treatment Tolerance: Patient limited by pain  End of Session Patient Position: Up in chair, Alarm on (all needs met, call light within reach)  OT Assessment Results: Decreased ADL status, Decreased endurance  Evaluation/Treatment Tolerance: Patient limited by pain  Plan:  Treatment Interventions: ADL retraining, Endurance training  OT Frequency: 2 times per week  OT Discharge Recommendations: Moderate intensity level of continued care  OT Recommended Transfer Status: Minimal assist, Assist of 1    Treatment Interventions: ADL retraining, Endurance training    Subjective   Previous Visit Info:  OT Last Visit  OT Received On: 12/14/23  General:  General  Prior to Session Communication: Bedside nurse (cleared by RN to participate)  Patient Position Received: Bed, 2 rail up, Alarm on  General Comment: Pt pleasant and cooperative however limited by pain.  Pt agreeable to OOB transfer this date only (IV; tele; alaram)  Precautions:  Medical Precautions: Fall precautions  Post-Surgical Precautions: Abdominal surgery precautions  Precautions Comment: Pt now on clear liquid diet  Vital Signs:     Pain:  Pain Assessment  Pain Assessment: 0-10  Pain Score: 10 - Worst possible pain  Pain Type: Acute pain, Surgical pain  Pain Location:  (c/o abd pain and b/l LE pain)  Pain Frequency: Constant/continuous    Objective    Cognition:  Cognition  Overall Cognitive Status: Within Functional Limits       Activities of Daily Living:      LE Dressing  LE Dressing: No (Pt declined d/t pain.  Education provided on benefit and use of reacher to complete LB adl's.  Pt receptive to verbal education.)       Functional Standing Tolerance:      Bed Mobility/Transfers: Bed Mobility  Bed Mobility: Yes  Bed Mobility 1  Bed  Mobility 1: Supine to sitting  Level of Assistance 1: Moderate assistance  Bed Mobility Comments 1: Pt educated on log roll technique for supine to sit transfer.  Pt reports this technique reduces pain vs having HOB elevated and rotating at trunk.  Pt does report that B/L LE's hurt regardless of transfer technique.  Assist to rotate onto left side with use of bed pad.  Once in side-lying position, pt required assist to bring LE's off of bed and elevate trunk    Transfers  Transfer: Yes  Transfer 1  Technique 1: Sit to stand, Stand to sit  Transfer Device 1:  (FWW)  Transfer Level of Assistance 1: Minimum assistance  Trials/Comments 1: Pt requires verbal cues for safety and hand placement with sit<>stand transfers.  Needs repeated trials to reinforce education. (Pt performed x 2 sit to stand transfers from bed level and x 2 from chair level)  Transfers 2  Transfer From 2: Bed to  Transfer to 2: Chair with arms  Transfer Device 2:  (FWW)  Transfer Level of Assistance 2: Minimum assistance        Ambulation/Gait Training:  Ambulation/Gait Training  Ambulation/Gait Training Performed:  (Functional mobility in room chair <>door with fww. D/t pain, pt politely declined to stand at sink to wash hands)  Sitting Balance:  Static Sitting Balance  Static Sitting-Comment/Number of Minutes: F+  Dynamic Sitting Balance  Dynamic Sitting-Comments: F  Standing Balance:  Static Standing Balance  Static Standing-Comment/Number of Minutes: Fair balance  Dynamic Standing Balance  Dynamic Standing-Comments: Fair balance with 1 UE alternating support with reaching in multiple planes.  Stand tolerance 1-2 min x 3 trials         Outcome Measures:[facility] Daily Activity  Putting on and taking off regular lower body clothing: A lot  Bathing (including washing, rinsing, drying): A little  Putting on and taking off regular upper body clothing: A little  Toileting, which includes using toilet, bedpan or urinal: A lot  Taking care of personal  grooming such as brushing teeth: A little  Eating Meals: A little  Daily Activity - Total Score: 16        Education Documentation  ADL Training, taught by Jennifer Felty, OTA at 12/14/2023  2:39 PM.  Learner: Patient  Readiness: Acceptance  Method: Explanation, Demonstration  Response: Verbalizes Understanding    Education Comments  No comments found.        OP EDUCATION:  Education  Individual(s) Educated: Patient  Education Comment: Pt educated on safe transfer techniques including hand placement and positioning; techniques/strategies for balance improvement; abd precautions; safe body mechanics; energy conservation.    Goals:  Encounter Problems       Encounter Problems (Active)       OT Goals       Mod I for all functional transfers  (Progressing)       Start:  12/12/23    Expected End:  12/15/23            SBA for LB dressing with AE as needed  (Progressing)       Start:  12/12/23    Expected End:  12/15/23            SBA for toileting tasks and clothing mgmt  (Progressing)       Start:  12/12/23    Expected End:  12/15/23            Fair + to good dyn standing balance for ADL tasks  (Progressing)       Start:  12/12/23    Expected End:  12/15/23

## 2023-12-14 NOTE — CARE PLAN
Problem: Pain  Goal: My pain/discomfort is manageable  Outcome: Progressing   The patient's goals for the shift include      The clinical goals for the shift include Patient will be pain free and nauesa free    Over the shift, the patient did not make progress toward the following goals. Barriers to progression include complexity of health problems. Recommendations to address these barriers include supportive care and education.

## 2023-12-15 LAB
ANION GAP SERPL CALC-SCNC: 10 MMOL/L (ref 10–20)
BASOPHILS # BLD AUTO: 0.01 X10*3/UL (ref 0–0.1)
BASOPHILS NFR BLD AUTO: 0.3 %
BUN SERPL-MCNC: 42 MG/DL (ref 6–23)
CALCIUM SERPL-MCNC: 7.9 MG/DL (ref 8.6–10.3)
CHLORIDE SERPL-SCNC: 99 MMOL/L (ref 98–107)
CO2 SERPL-SCNC: 29 MMOL/L (ref 21–32)
CREAT SERPL-MCNC: 0.92 MG/DL (ref 0.5–1.05)
EOSINOPHIL # BLD AUTO: 0.05 X10*3/UL (ref 0–0.4)
EOSINOPHIL NFR BLD AUTO: 1.4 %
ERYTHROCYTE [DISTWIDTH] IN BLOOD BY AUTOMATED COUNT: 15.4 % (ref 11.5–14.5)
GFR SERPL CREATININE-BSD FRML MDRD: 65 ML/MIN/1.73M*2
GLUCOSE BLD MANUAL STRIP-MCNC: 131 MG/DL (ref 74–99)
GLUCOSE BLD MANUAL STRIP-MCNC: 136 MG/DL (ref 74–99)
GLUCOSE BLD MANUAL STRIP-MCNC: 140 MG/DL (ref 74–99)
GLUCOSE BLD MANUAL STRIP-MCNC: 141 MG/DL (ref 74–99)
GLUCOSE BLD MANUAL STRIP-MCNC: 141 MG/DL (ref 74–99)
GLUCOSE BLD MANUAL STRIP-MCNC: 146 MG/DL (ref 74–99)
GLUCOSE SERPL-MCNC: 133 MG/DL (ref 74–99)
HCT VFR BLD AUTO: 24.4 % (ref 36–46)
HGB BLD-MCNC: 7.9 G/DL (ref 12–16)
HOLD SPECIMEN: NORMAL
IMM GRANULOCYTES # BLD AUTO: 0.01 X10*3/UL (ref 0–0.5)
IMM GRANULOCYTES NFR BLD AUTO: 0.3 % (ref 0–0.9)
LYMPHOCYTES # BLD AUTO: 0.71 X10*3/UL (ref 0.8–3)
LYMPHOCYTES NFR BLD AUTO: 20.2 %
MCH RBC QN AUTO: 29 PG (ref 26–34)
MCHC RBC AUTO-ENTMCNC: 32.4 G/DL (ref 32–36)
MCV RBC AUTO: 90 FL (ref 80–100)
MONOCYTES # BLD AUTO: 0.28 X10*3/UL (ref 0.05–0.8)
MONOCYTES NFR BLD AUTO: 8 %
NEUTROPHILS # BLD AUTO: 2.45 X10*3/UL (ref 1.6–5.5)
NEUTROPHILS NFR BLD AUTO: 69.8 %
NRBC BLD-RTO: 0 /100 WBCS (ref 0–0)
PLATELET # BLD AUTO: 132 X10*3/UL (ref 150–450)
POTASSIUM SERPL-SCNC: 3.3 MMOL/L (ref 3.5–5.3)
RBC # BLD AUTO: 2.72 X10*6/UL (ref 4–5.2)
SODIUM SERPL-SCNC: 135 MMOL/L (ref 136–145)
WBC # BLD AUTO: 3.5 X10*3/UL (ref 4.4–11.3)

## 2023-12-15 PROCEDURE — 99232 SBSQ HOSP IP/OBS MODERATE 35: CPT | Performed by: INTERNAL MEDICINE

## 2023-12-15 PROCEDURE — 2500000001 HC RX 250 WO HCPCS SELF ADMINISTERED DRUGS (ALT 637 FOR MEDICARE OP): Performed by: INTERNAL MEDICINE

## 2023-12-15 PROCEDURE — 1210000001 HC SEMI-PRIVATE ROOM DAILY

## 2023-12-15 PROCEDURE — 36415 COLL VENOUS BLD VENIPUNCTURE: CPT

## 2023-12-15 PROCEDURE — 2500000005 HC RX 250 GENERAL PHARMACY W/O HCPCS: Performed by: INTERNAL MEDICINE

## 2023-12-15 PROCEDURE — 97116 GAIT TRAINING THERAPY: CPT | Mod: GP,CQ

## 2023-12-15 PROCEDURE — 2500000004 HC RX 250 GENERAL PHARMACY W/ HCPCS (ALT 636 FOR OP/ED): Performed by: INTERNAL MEDICINE

## 2023-12-15 PROCEDURE — 99232 SBSQ HOSP IP/OBS MODERATE 35: CPT

## 2023-12-15 PROCEDURE — 82947 ASSAY GLUCOSE BLOOD QUANT: CPT

## 2023-12-15 PROCEDURE — 97110 THERAPEUTIC EXERCISES: CPT | Mod: GP,CQ

## 2023-12-15 PROCEDURE — 85025 COMPLETE CBC W/AUTO DIFF WBC: CPT

## 2023-12-15 PROCEDURE — 80048 BASIC METABOLIC PNL TOTAL CA: CPT

## 2023-12-15 RX ADMIN — ASCORBIC ACID, VITAMIN A PALMITATE, CHOLECALCIFEROL, THIAMINE HYDROCHLORIDE, RIBOFLAVIN-5 PHOSPHATE SODIUM, PYRIDOXINE HYDROCHLORIDE, NIACINAMIDE, DEXPANTHENOL, ALPHA-TOCOPHEROL ACETATE, VITAMIN K1, FOLIC ACID, BIOTIN, CYANOCOBALAMIN: 200; 3300; 200; 6; 3.6; 6; 40; 15; 10; 150; 600; 60; 5 INJECTION, SOLUTION INTRAVENOUS at 01:32

## 2023-12-15 RX ADMIN — ATORVASTATIN CALCIUM 40 MG: 20 TABLET, FILM COATED ORAL at 20:48

## 2023-12-15 RX ADMIN — RIVAROXABAN 15 MG: 15 TABLET, FILM COATED ORAL at 16:21

## 2023-12-15 RX ADMIN — LEVOTHYROXINE SODIUM 75 MCG: 75 TABLET ORAL at 05:55

## 2023-12-15 RX ADMIN — I.V. FAT EMULSION 250 ML: 20 EMULSION INTRAVENOUS at 09:37

## 2023-12-15 RX ADMIN — METOPROLOL TARTRATE 2.5 MG: 5 INJECTION INTRAVENOUS at 11:16

## 2023-12-15 ASSESSMENT — COGNITIVE AND FUNCTIONAL STATUS - GENERAL
MOVING FROM LYING ON BACK TO SITTING ON SIDE OF FLAT BED WITH BEDRAILS: A LOT
DRESSING REGULAR UPPER BODY CLOTHING: A LOT
CLIMB 3 TO 5 STEPS WITH RAILING: A LOT
PERSONAL GROOMING: A LITTLE
MOBILITY SCORE: 15
DAILY ACTIVITIY SCORE: 16
MOVING TO AND FROM BED TO CHAIR: A LITTLE
TURNING FROM BACK TO SIDE WHILE IN FLAT BAD: A LOT
WALKING IN HOSPITAL ROOM: A LITTLE
STANDING UP FROM CHAIR USING ARMS: A LITTLE
HELP NEEDED FOR BATHING: A LITTLE
TOILETING: A LOT
DRESSING REGULAR LOWER BODY CLOTHING: A LOT

## 2023-12-15 ASSESSMENT — PAIN SCALES - GENERAL: PAINLEVEL_OUTOF10: 0 - NO PAIN

## 2023-12-15 ASSESSMENT — PAIN - FUNCTIONAL ASSESSMENT: PAIN_FUNCTIONAL_ASSESSMENT: 0-10

## 2023-12-15 NOTE — PROGRESS NOTES
Harper Lara is a 78 y.o. female on day 9 of admission presenting with Small bowel obstruction (CMS/HCC).      Subjective   Had n/v with full liquids yesterday so was made NPO. PPN resumed. Mild abd tenderness today, no n/v. Still passing flatus. No fever.        Objective     Last Recorded Vitals  /61 (Patient Position: Sitting)   Pulse 64   Temp 36.7 °C (98.1 °F)   Resp 16   Wt 97.5 kg (215 lb)   SpO2 97%   Intake/Output last 3 Shifts:    Intake/Output Summary (Last 24 hours) at 12/15/2023 1234  Last data filed at 12/15/2023 0414  Gross per 24 hour   Intake --   Output 375 ml   Net -375 ml         Admission Weight  Weight: 97.5 kg (215 lb) (12/06/23 1117)    Daily Weight  12/06/23 : 97.5 kg (215 lb)    Image Results  FL small bowel series  Narrative: Interpreted By:  Salas Altamirano,   STUDY:  FL SMALL BOWEL SERIES;  12/12/2023 8:31 am      INDICATION:  Signs/Symptoms:SBO.      COMPARISON:  Previous small bowel series of 12/07/2023 and CT examination of  12/06/2023.      ACCESSION NUMBER(S):  [id number]      ORDERING CLINICIAN:  TARAN TRAYLOR      TECHNIQUE:  A small bowel series is performed utilizing 360 cc of Gastrografin  through a pre-existing nasogastric tube. Serial radiographs of the  abdomen and pelvis are performed following the infusion of contrast.      FINDINGS:  2 preliminary radiographs of the abdomen and pelvis demonstrate  surgical sutures in the right lower quadrant. Dilated air-filled  small bowel loops are identified in the left abdomen reaching 5.8 cm  in diameter. A nasogastric tube tip on the imaged performed 4:40 p.m.  is located in the distal esophagus. A subsequent radiograph performed  5:09 p.m. confirms the tip of the tube coiled in the stomach fundus.      Following the ingestion of contrast there is prompt opacification of  the stomach fundus and proximal body. Contrast remains in the  proximal stomach from 0 minutes 290 minutes after contrast ingestion.      A 3  hour portable radiograph demonstrates contrast throughout the  small bowel. There is mild small bowel dilatation reaching 4.2 cm.  Contrast is identified in the colon from the cecum to the proximal  descending colon.      A 6 hour portable radiograph demonstrates contrast throughout the  stomach, small bowel, and colon to the level of the rectum.  Small-bowel distention in the left abdomen reaches 4.8 cm.      The visualized small and large bowel full patterns are within normal  limits. There is no obvious filling defects or displacement of  opacified bowel. There is no sign of contrast extravasation.      Impression: Initial delay passage of contrast from the stomach from 0-90 minutes.      On the 3 hour delayed image there is contrast throughout the small  bowel and colon. The small bowel transit time is likely between 2 and  3 hours.      Persistent proximal small bowel distention reaching 4.8 cm on delayed  images. This appearance could be related to adynamic postoperative  ileus. Residual small-bowel obstruction is less likely. Continued  clinical surveillance is recommended.      Signed by: Salas Altamirano 12/12/2023 9:48 AM  Dictation workstation:   [id number]      Physical Exam  Constitutional:       Appearance: She is obese.   HENT:      Head: Normocephalic and atraumatic.      Nose: Nose normal.      Mouth/Throat:      Mouth: Mucous membranes are dry.   Eyes:      Extraocular Movements: Extraocular movements intact.      Conjunctiva/sclera: Conjunctivae normal.   Cardiovascular:      Rate and Rhythm: Normal rate and regular rhythm.      Pulses: Normal pulses.      Heart sounds: Normal heart sounds.   Pulmonary:      Effort: Pulmonary effort is normal.      Breath sounds: Normal breath sounds.   Abdominal:      Palpations: Abdomen is soft.      Tenderness: There is abdominal tenderness.   Musculoskeletal:         General: Normal range of motion.      Cervical back: Normal range of motion and neck  supple.   Skin:     General: Skin is warm and dry.   Neurological:      Mental Status: She is alert. Mental status is at baseline.   Psychiatric:         Mood and Affect: Mood normal.         Relevant Results               Assessment/Plan          78 y.o. female presenting with past medical history of type 2 diabetes mellitus, fatty liver, hyperlipidemia, hypertension, A-fib status post ablation, hypothyroidism, anxiety and depression, history of GI bleed comes to the hospital with abdominal pain and vomiting for past few days.  Patient found to have SBO on the imaging.  SBFT done showed high-grade obstruction.  On 12/8/2023 the patient underwent extensive laparoscopic lysis of adhesions for 2 hours and small intestinal resection and anastomosis.  Patient is POD #3 and no return of bowel function yet.  Continue with IV hydration.  Will reach out to dietary for  parenteral nutrition recommendations at this time.        Principal Problem:    Small bowel obstruction (CMS/HCC)  Active Problems:    Acute kidney injury (nontraumatic) (CMS/HCC)    Chronic renal insufficiency    Small bowel obstruction  -As seen on CT scan of abdomen pelvis and also on SBFT.  -Status post extensive laparoscopic lysis of adhesions and laparoscopic small intestine resection and anastomosis on 12/8/2023.  -Continue with IV hydration and maintain n.p.o. status at this time.  IV fluids switched to to potassium containing fluids.  -Surgery has been consulted and NG tube in place.  Awaiting return of bowel function at this time.  -Due to prolonged n.p.o. status will consult dietary for parenteral nutrition recommendations.     SOCORRO with underlying CKD stage III  -Creatinine noted 1.81 baseline is around 1.1.  Improved  -Continue with IV hydration.    Hypernatremia  -Will adjust IV fluids and continue to maintain potassium and IV fluids.     Hypokalemia  -Replace IV and monitor.      Atrial fibrillation  -Since NPO will add IV lopressor every 6  hours. Rate controlled at this time  -The patient is chest pain-free at this time.  Continue to monitor.     Hypothyroidism  -Not able to resume oral Synthroid at this time.  Will continue with reduced dose of IV Synthroid.     Chronic systolic congestive heart failure  -Last echo from 2021 shows LVEF of 40%.  -Monitor volume status     Diabetes mellitus type 2  -Hold home oral antidiabetics  -Continue with sliding scale insulin n.p.o. protocol.     VTE prophylaxis: SCDs patient history of GI bleed    PT OT     12/15/23:  Cont clear liquids which she is tolerating  Diet advancement per surg  PPN resumed, will discontinue when tolerating diet  D/w pt and , preference now is for home with home health; will see how she progresses with PT  Xarelto resumed       Mamadou Hall MD

## 2023-12-15 NOTE — PROGRESS NOTES
"Nutrition Follow Up Assessment:   Nutrition Assessment         Patient is a 78 y.o. female presenting with: small bowel obstruction      Nutrition History:  Food and Nutrient History: RD follow-up. Diet was advanced to full liquids on 12/13 though did not tolerate, now back to clear liquids. Dislikes Ensure Clear that was previously added by physician, was discontinued in [facility] though still receiving from kitchen, informed kitchen to stop sending Ensure Clear. PPN at goal rate. Pt denies GI symptoms. Per surgery note, plans to continue clear liquids one more day and continue PPN. Once tolerating full liquids, discontinue PPN and lipids.  Food Allergies/Intolerances:  Strawberry  GI Symptoms: None  Oral Problems: Chewing difficulty - missing teeth. Will need Easy to Chew diet once solid diet indicated.       Anthropometrics:  Height: 160 cm (5' 3\")   Weight: 97.5 kg (215 lb)   BMI (Calculated): 38.09             Weight History:     Wt Readings from Last 10 Encounters:   12/06/23 97.5 kg (215 lb)   08/12/19 119 kg (262 lb 5.6 oz)     Weight History / % Weight Change: 12/12/22: 94.4 kg; 12/6/23: 97.5 kg  Significant Weight Loss: No    Nutrition Focused Physical Exam Findings:  defer: pt eating lunch    Edema:  Edema: +1 trace  Edema Location: BUE, BLE per nursing assessment  Physical Findings:    Skin: Positive (pressure injury documented in bilateral buttocks, no stage documented. Stage 2 pressure injury documented in July.)     Nutrition Significant Labs:  BMP Trend:   Results from last 7 days   Lab Units 12/15/23  0651 12/14/23  0627 12/13/23  0611 12/12/23  0635   GLUCOSE mg/dL 133* 134* 150* 145*   CALCIUM mg/dL 7.9* 7.9* 8.4* 9.0   SODIUM mmol/L 135* 135* 137 144   POTASSIUM mmol/L 3.3* 3.3* 3.5 3.5   CO2 mmol/L 29 27 26 29   CHLORIDE mmol/L 99 99 101 106   BUN mg/dL 42* 37* 33* 25*   CREATININE mg/dL 0.92 1.02 0.95 0.99        Nutrition Specific Medications: Reviewed      I/O:   Last BM Date: 12/15/23; Stool " Appearance: Loose (12/13/23 1045)        Dietary Orders (From admission, onward)       Start     Ordered    12/14/23 1401  Adult diet Clear Liquid  Diet effective now        Comments: No Strawberry flavored products. Patient is allergic.   Question:  Diet type  Answer:  Clear Liquid    12/14/23 1403                     Estimated Needs:     MSJ with 1.2 activity factor, 1.2 stress factor    2065 kcal  Total Protein Estimated Needs (g): 68 g  Method for Estimating Needs: 52-68g (1-1.3 g/kg IBW)   Total Fluid Needs: per MD           Nutrition Diagnosis   Malnutrition Diagnosis  Patient has Malnutrition Diagnosis: No (insufficient data to diagnose at this time)    Nutrition Diagnosis  Patient has Nutrition Diagnosis: Yes  Diagnosis Status (1): Ongoing  Nutrition Diagnosis 1: Inadequate oral intake  Related to (1): altered GI function  As Evidenced by (1): NPO or clear liuqids since 12/6, need for PN       Nutrition Interventions/Recommendations         Nutrition Prescription:  Individualized Nutrition Prescription Provided for :   Recommend PPN (Clinimix 4.25% amino acids, 5% dextrose) with goal rate of 70 ml/hr (provides 1680 ml total volume, 571 kcal, 71g protein. Recommend 250 ml 20% lipids 3 times per week for an additional 214 kcal per day on average. Recommend discontinue lipids if triglycerides are >400. Continue clears per surgery.     Once tolerating full liquids, discontinue PPN and lipids.    Once able to advance past full liquids, please order Easy to Chew diet.        Nutrition Interventions:   Food and/or Nutrient Delivery Interventions  Interventions: Meals and snacks, Parenteral nutrition/ IV fluids    Nutrition Education:       Pt denies nutritional questions or concerns.    Nutrition Monitoring and Evaluation   Food/Nutrient Related History Monitoring  Monitoring and Evaluation Plan: Enteral and parenteral nutrition intake, Energy intake  Energy Intake: Estimated energy intake  Criteria: meet >75% of  estimated nutrition needs  Enteral and Parenteral Nutrition Intake: Parenteral nutrition formula/solution  Criteria: tolerate PPN and lipids without adverse effects on electrolytes or glucose    Time Spent/Follow-up Reminder:   Time Spent (min): 45 minutes  Last Date of Nutrition Visit: 12/15/23  Nutrition Follow-Up Needed?: 3-5 days

## 2023-12-15 NOTE — PROGRESS NOTES
"Harper Lara is a 78 y.o. female on day 9 of admission presenting with Small bowel obstruction (CMS/HCC).     Subjective   Patient was seen and examined this morning.  She is sitting in her chair.  She was restarted on a clear liquid diet yesterday evening and states she tolerated the diet without nausea or vomiting but had abdominal pain all night last night. She also complains of bilateral leg soreness.  She currently has no abdominal pain.  She denies nausea or vomiting.       Objective     Physical Exam  Vitals reviewed.   Constitutional:       General: She is not in acute distress.  HENT:      Head: Normocephalic and atraumatic.      Mouth/Throat:      Mouth: Mucous membranes are moist.   Eyes:      Conjunctiva/sclera: Conjunctivae normal.   Pulmonary:      Effort: Pulmonary effort is normal. No respiratory distress.   Abdominal:      General: Abdomen is flat. Bowel sounds are normal. There is no distension.      Palpations: Abdomen is soft.      Tenderness: There is abdominal tenderness (generalized). There is no guarding.      Comments: Laparotomy incisions are clean, dry, and intact.   Musculoskeletal:         General: No swelling.   Skin:     General: Skin is warm and dry.      Capillary Refill: Capillary refill takes less than 2 seconds.   Neurological:      Mental Status: She is alert.   Psychiatric:         Mood and Affect: Mood normal.         Behavior: Behavior normal.         Last Recorded Vitals  Blood pressure 111/66, pulse 57, temperature 35.8 °C (96.4 °F), temperature source Temporal, resp. rate 16, height 1.6 m (5' 3\"), weight 97.5 kg (215 lb), SpO2 98 %.  Intake/Output last 3 Shifts:  I/O last 3 completed shifts:  In: 350 (3.6 mL/kg)   Out: 375 (3.8 mL/kg) [Urine:375 (0.1 mL/kg/hr)]  Weight: 97.5 kg     Relevant Results  Lab Results   Component Value Date    WBC 3.5 (L) 12/15/2023    HGB 7.9 (L) 12/15/2023    HCT 24.4 (L) 12/15/2023    MCV 90 12/15/2023     (L) 12/15/2023     Lab " Results   Component Value Date    GLUCOSE 133 (H) 12/15/2023    CALCIUM 7.9 (L) 12/15/2023     (L) 12/15/2023    K 3.3 (L) 12/15/2023    CO2 29 12/15/2023    CL 99 12/15/2023    BUN 42 (H) 12/15/2023    CREATININE 0.92 12/15/2023            Current Medications:  atorvastatin, 40 mg, oral, Nightly  fat emulsion-plant based, 250 mL, intravenous, Once per day on Mon Wed Fri  insulin lispro, 0-5 Units, subcutaneous, q4h  levothyroxine, 75 mcg, oral, Daily  lidocaine, 5 mL, infiltration, Once  lidocaine, 5 mL, infiltration, Once  metoprolol, 2.5 mg, intravenous, q6h  rivaroxaban, 15 mg, oral, Daily with evening meal        Assessment/Plan     Principal Problem:    Small bowel obstruction (CMS/HCC)  Active Problems:    Acute kidney injury (nontraumatic) (CMS/HCC)    Chronic renal insufficiency    Subjective  Patient sitting in chair.  She states she had abdominal pain last night but no nausea or vomiting after a clear liquid diet in the evening.  She currently denies abdominal pain, nausea, or vomiting.    Plan  -Clear liquid diet, continue to monitor symptoms  - Maintain PPN  - KAT hose  - Pain control  - Nausea control  - Encourage ambulation  - Appreciate PT/OT consult    Further recommendations per ANNETTE SolitarioC    Patient seen and examined. I reviewed and discussed APRN note. Afebrile, tolerating clear liquid diet; have loose bm; will keep clear liquid diet for another day; continue PPN

## 2023-12-15 NOTE — CARE PLAN
Problem: Pain  Goal: My pain/discomfort is manageable  Outcome: Progressing   The patient's goals for the shift include      The clinical goals for the shift include Pain controll    Over the shift, the patient did not make progress toward the following goals. Barriers to progression include complexity of health problems. Recommendations to address these barriers include supportive care and education.

## 2023-12-15 NOTE — PROGRESS NOTES
"Physical Therapy    Physical Therapy Treatment    Patient Name: Harper Lara  MRN: [id number]  Today's Date: 12/15/2023  Time Calculation  Start Time: 0835  Stop Time: 0858  Time Calculation (min): 23 min       Assessment/Plan   End of Session Patient Position:  Patient sitting up in chair after treatment.  Call light/phone/table in reach.  Chair alarm in place    PT Plan  Inpatient/Swing Bed or Outpatient: Inpatient  Treatment/Interventions: Bed mobility, Transfer training, Gait training, Balance training, Strengthening, Home exercise program, Therapeutic exercise, Therapeutic activity  PT Plan: Skilled PT  PT Frequency: 4 times per week  PT Discharge Recommendations: Moderate intensity level of continued care  Equipment Recommended upon Discharge: Wheeled walker PT Recommended Transfer Status: Assist x1, Assistive device    General Visit Information:   PT  Visit  PT Received On: 12/15/23    General  Prior to Session Communication:  Cleared by RN for PT  Patient Position Received:  Patient presents sitting in chair, agreeable to PT.  Motivated towards goals    General Comment: To ED 12/6 with abdominal pain and vomiting; pt. found to have SBO and SOCORRO on CKD. Pt. underwent extensive laporascopic lysis of adhesions with small intestine resection and anastomosis 12/8 with Dr. Lozada; CT abd- Findings compatible with acute mechanical small bowel obstruction  potentially related to adhesion. Newly seen mild superior likely recent superior L2 compressive  deformity.    General Observations:   General Observation:  Patient with IV, alarm    Precautions:  Medical Precautions: Fall precautions  Post-Surgical Precautions: Abdominal surgery precautions    Pain:  Pain Assessment: 0-10  Pain Score:  c/o pain in left thigh.  Currently rates pain 4-5/10.  She reports not sleeping well last night due to pain.  She also c/o itching \"all over\"    Cognition:  Overall Cognitive Status:  pleasant and cooperative    Therapeutic " "Exercise  Therapeutic Exercise Performed:  Seated LE ther-ex: AP, heel raises, LAQ, seated marching, seated hip add (pillow squeeze) x15     Balance/Neuromuscular Re-Education  Balance/Neuromuscular Re-Education Activity Performed:  Patient performed multilevel standing reaching ~1\" outside MICHELLE with single UE support.   She also performed unsupported reaches out in front of her x5.  No LOB with either activity.    Ambulation/Gait Training  Ambulation/Gait Training Performed:  Patient amb 40' with ww and CGAx1.  Kyphotic posture, tends to keep head down despite v/c to pick head up.  Decreased step length bilat.  No LOB    Transfers  Transfer:  sit<-->stand: min x1  Two sit/stands performed from chair level.  Instructions needed for hand placement with each transfer.      Outcome Measures:  [facility] Basic Mobility  Turning from your back to your side while in a flat bed without using bedrails: A lot  Moving from lying on your back to sitting on the side of a flat bed without using bedrails: A lot  Moving to and from bed to chair (including a wheelchair): A little  Standing up from a chair using your arms (e.g. wheelchair or bedside chair): A little  To walk in hospital room: A little  Climbing 3-5 steps with railing: A lot  Basic Mobility - Total Score: 15    Education Documentation  Precautions, taught by Alison Boateng PTA at 12/15/2023  9:04 AM.  Learner: Patient  Readiness: Acceptance  Method: Explanation, Demonstration  Response: Verbalizes Understanding, Needs Reinforcement    Home Exercise Program, taught by Alison Boateng PTA at 12/15/2023  9:04 AM.  Learner: Patient  Readiness: Acceptance  Method: Explanation, Demonstration  Response: Verbalizes Understanding, Needs Reinforcement    Mobility Training, taught by Alison Boateng PTA at 12/15/2023  9:04 AM.  Learner: Patient  Readiness: Acceptance  Method: Explanation, Demonstration  Response: Verbalizes Understanding, Needs Reinforcement      Encounter Problems  "      Encounter Problems (Active)       Impaired mobility        Perform all bed mobility with supervision  (Progressing)       Start:  12/09/23    Expected End:  12/15/23            Perform all transfers with ww and SBA (Progressing)       Start:  12/09/23    Expected End:  12/15/23            Patient will ambulate >/= 50 ft. with ww and SBA (Progressing)       Start:  12/09/23    Expected End:  12/15/23            Patient will perform BLE HEP with supervision x10-20 reps x 1-2 sets  (Progressing)       Start:  12/09/23    Expected End:  12/15/23

## 2023-12-16 ENCOUNTER — APPOINTMENT (OUTPATIENT)
Dept: RADIOLOGY | Facility: HOSPITAL | Age: 78
DRG: 330 | End: 2023-12-16
Payer: MEDICARE

## 2023-12-16 LAB
ANION GAP SERPL CALC-SCNC: 11 MMOL/L (ref 10–20)
BASOPHILS # BLD AUTO: 0.01 X10*3/UL (ref 0–0.1)
BASOPHILS NFR BLD AUTO: 0.3 %
BUN SERPL-MCNC: 39 MG/DL (ref 6–23)
CALCIUM SERPL-MCNC: 7.9 MG/DL (ref 8.6–10.3)
CHLORIDE SERPL-SCNC: 101 MMOL/L (ref 98–107)
CO2 SERPL-SCNC: 27 MMOL/L (ref 21–32)
CREAT SERPL-MCNC: 0.9 MG/DL (ref 0.5–1.05)
EOSINOPHIL # BLD AUTO: 0.05 X10*3/UL (ref 0–0.4)
EOSINOPHIL NFR BLD AUTO: 1.7 %
ERYTHROCYTE [DISTWIDTH] IN BLOOD BY AUTOMATED COUNT: 15.3 % (ref 11.5–14.5)
GFR SERPL CREATININE-BSD FRML MDRD: 66 ML/MIN/1.73M*2
GLUCOSE BLD MANUAL STRIP-MCNC: 122 MG/DL (ref 74–99)
GLUCOSE BLD MANUAL STRIP-MCNC: 126 MG/DL (ref 74–99)
GLUCOSE BLD MANUAL STRIP-MCNC: 126 MG/DL (ref 74–99)
GLUCOSE BLD MANUAL STRIP-MCNC: 128 MG/DL (ref 74–99)
GLUCOSE BLD MANUAL STRIP-MCNC: 129 MG/DL (ref 74–99)
GLUCOSE BLD MANUAL STRIP-MCNC: 148 MG/DL (ref 74–99)
GLUCOSE SERPL-MCNC: 125 MG/DL (ref 74–99)
HCT VFR BLD AUTO: 24 % (ref 36–46)
HGB BLD-MCNC: 7.8 G/DL (ref 12–16)
IMM GRANULOCYTES # BLD AUTO: 0.01 X10*3/UL (ref 0–0.5)
IMM GRANULOCYTES NFR BLD AUTO: 0.3 % (ref 0–0.9)
LYMPHOCYTES # BLD AUTO: 0.72 X10*3/UL (ref 0.8–3)
LYMPHOCYTES NFR BLD AUTO: 24.6 %
MCH RBC QN AUTO: 29.2 PG (ref 26–34)
MCHC RBC AUTO-ENTMCNC: 32.5 G/DL (ref 32–36)
MCV RBC AUTO: 90 FL (ref 80–100)
MONOCYTES # BLD AUTO: 0.34 X10*3/UL (ref 0.05–0.8)
MONOCYTES NFR BLD AUTO: 11.6 %
NEUTROPHILS # BLD AUTO: 1.8 X10*3/UL (ref 1.6–5.5)
NEUTROPHILS NFR BLD AUTO: 61.5 %
NRBC BLD-RTO: 0 /100 WBCS (ref 0–0)
PLATELET # BLD AUTO: 131 X10*3/UL (ref 150–450)
POTASSIUM SERPL-SCNC: 3.6 MMOL/L (ref 3.5–5.3)
RBC # BLD AUTO: 2.67 X10*6/UL (ref 4–5.2)
SODIUM SERPL-SCNC: 135 MMOL/L (ref 136–145)
WBC # BLD AUTO: 2.9 X10*3/UL (ref 4.4–11.3)

## 2023-12-16 PROCEDURE — 74176 CT ABD & PELVIS W/O CONTRAST: CPT

## 2023-12-16 PROCEDURE — 85025 COMPLETE CBC W/AUTO DIFF WBC: CPT

## 2023-12-16 PROCEDURE — 1210000001 HC SEMI-PRIVATE ROOM DAILY

## 2023-12-16 PROCEDURE — 2500000005 HC RX 250 GENERAL PHARMACY W/O HCPCS: Performed by: INTERNAL MEDICINE

## 2023-12-16 PROCEDURE — 82947 ASSAY GLUCOSE BLOOD QUANT: CPT

## 2023-12-16 PROCEDURE — 99232 SBSQ HOSP IP/OBS MODERATE 35: CPT | Performed by: INTERNAL MEDICINE

## 2023-12-16 PROCEDURE — 99232 SBSQ HOSP IP/OBS MODERATE 35: CPT

## 2023-12-16 PROCEDURE — 36415 COLL VENOUS BLD VENIPUNCTURE: CPT

## 2023-12-16 PROCEDURE — 2500000004 HC RX 250 GENERAL PHARMACY W/ HCPCS (ALT 636 FOR OP/ED): Performed by: INTERNAL MEDICINE

## 2023-12-16 PROCEDURE — 80048 BASIC METABOLIC PNL TOTAL CA: CPT

## 2023-12-16 PROCEDURE — 2500000001 HC RX 250 WO HCPCS SELF ADMINISTERED DRUGS (ALT 637 FOR MEDICARE OP): Performed by: INTERNAL MEDICINE

## 2023-12-16 RX ORDER — LIDOCAINE HYDROCHLORIDE 10 MG/ML
5 INJECTION INFILTRATION; PERINEURAL ONCE
Status: DISCONTINUED | OUTPATIENT
Start: 2023-12-16 | End: 2023-12-16

## 2023-12-16 RX ADMIN — ATORVASTATIN CALCIUM 40 MG: 20 TABLET, FILM COATED ORAL at 20:21

## 2023-12-16 RX ADMIN — METOPROLOL TARTRATE 2.5 MG: 5 INJECTION INTRAVENOUS at 22:40

## 2023-12-16 RX ADMIN — ONDANSETRON 4 MG: 2 INJECTION INTRAMUSCULAR; INTRAVENOUS at 21:11

## 2023-12-16 RX ADMIN — METOPROLOL TARTRATE 2.5 MG: 5 INJECTION INTRAVENOUS at 17:20

## 2023-12-16 RX ADMIN — RIVAROXABAN 15 MG: 15 TABLET, FILM COATED ORAL at 17:19

## 2023-12-16 RX ADMIN — LEVOTHYROXINE SODIUM 75 MCG: 75 TABLET ORAL at 06:10

## 2023-12-16 RX ADMIN — ASCORBIC ACID, VITAMIN A PALMITATE, CHOLECALCIFEROL, THIAMINE HYDROCHLORIDE, RIBOFLAVIN-5 PHOSPHATE SODIUM, PYRIDOXINE HYDROCHLORIDE, NIACINAMIDE, DEXPANTHENOL, ALPHA-TOCOPHEROL ACETATE, VITAMIN K1, FOLIC ACID, BIOTIN, CYANOCOBALAMIN: 200; 3300; 200; 6; 3.6; 6; 40; 15; 10; 150; 600; 60; 5 INJECTION, SOLUTION INTRAVENOUS at 01:54

## 2023-12-16 RX ADMIN — METOPROLOL TARTRATE 2.5 MG: 5 INJECTION INTRAVENOUS at 00:04

## 2023-12-16 ASSESSMENT — COGNITIVE AND FUNCTIONAL STATUS - GENERAL
MOVING TO AND FROM BED TO CHAIR: A LITTLE
WALKING IN HOSPITAL ROOM: A LITTLE
CLIMB 3 TO 5 STEPS WITH RAILING: A LOT
DAILY ACTIVITIY SCORE: 16
TOILETING: A LOT
STANDING UP FROM CHAIR USING ARMS: A LITTLE
HELP NEEDED FOR BATHING: A LITTLE
MOVING FROM LYING ON BACK TO SITTING ON SIDE OF FLAT BED WITH BEDRAILS: A LOT
DRESSING REGULAR UPPER BODY CLOTHING: A LOT
PERSONAL GROOMING: A LITTLE
TURNING FROM BACK TO SIDE WHILE IN FLAT BAD: A LOT
MOBILITY SCORE: 15
DRESSING REGULAR LOWER BODY CLOTHING: A LOT

## 2023-12-16 ASSESSMENT — PAIN SCALES - GENERAL
PAINLEVEL_OUTOF10: 0 - NO PAIN
PAINLEVEL_OUTOF10: 0 - NO PAIN

## 2023-12-16 ASSESSMENT — PAIN - FUNCTIONAL ASSESSMENT: PAIN_FUNCTIONAL_ASSESSMENT: 0-10

## 2023-12-16 NOTE — CARE PLAN
Problem: Pain  Goal: My pain/discomfort is manageable  Outcome: Progressing     Problem: Safety  Goal: Patient will be injury free during hospitalization  Outcome: Progressing  Goal: I will remain free of falls  Outcome: Progressing     Problem: Daily Care  Goal: Daily care needs are met  Outcome: Progressing     Problem: Psychosocial Needs  Goal: Demonstrates ability to cope with hospitalization/illness  Outcome: Progressing  Goal: Collaborate with me, my family, and caregiver to identify my specific goals  Outcome: Progressing     Problem: Discharge Barriers  Goal: My discharge needs are met  Outcome: Progressing     Problem: Skin  Goal: Decreased wound size/increased tissue granulation at next dressing change  Outcome: Progressing  Goal: Participates in plan/prevention/treatment measures  Outcome: Progressing  Goal: Prevent/manage excess moisture  Outcome: Progressing  Goal: Prevent/minimize sheer/friction injuries  Outcome: Progressing  Goal: Promote/optimize nutrition  Outcome: Progressing  Goal: Promote skin healing  Outcome: Progressing     Problem: Pain - Adult  Goal: Verbalizes/displays adequate comfort level or baseline comfort level  Outcome: Progressing     Problem: Safety - Adult  Goal: Free from fall injury  Outcome: Progressing     Problem: Discharge Planning  Goal: Discharge to home or other facility with appropriate resources  Outcome: Progressing     Problem: Chronic Conditions and Co-morbidities  Goal: Patient's chronic conditions and co-morbidity symptoms are monitored and maintained or improved  Outcome: Progressing     Problem: Diabetes  Goal: Maintain electrolyte levels within acceptable range throughout shift  Outcome: Progressing  Goal: Maintain glucose levels >70mg/dl to <250mg/dl throughout shift  Outcome: Progressing  Goal: Learn about and adhere to nutrition recommendations by end of shift  Outcome: Progressing  Goal: Vital signs within normal range for age by end of shift  Outcome:  Progressing   The patient's goals for the shift include      The clinical goals for the shift include ambulate hallways to increase bowel mobility

## 2023-12-16 NOTE — PROGRESS NOTES
"Harper Lara is a 78 y.o. female on day 10 of admission presenting with Small bowel obstruction (CMS/HCC).     Subjective   Patient was seen and examined this morning.  She was eating breakfast.  She states her abdomen feels full after eating but denies any pain.  She states she has passed gas and yesterday had a liquidy bowel movement but no bowel movements overnight or this morning.  She has been belching.  She denies nausea or vomiting.  I told her to stop eating breakfast as she is now n.p.o. I also informed her that she will be getting a CT of her abdomen and pelvis.       Objective     Physical Exam  Vitals reviewed.   Constitutional:       General: She is not in acute distress.  HENT:      Head: Normocephalic and atraumatic.      Mouth/Throat:      Mouth: Mucous membranes are moist.   Eyes:      Conjunctiva/sclera: Conjunctivae normal.   Pulmonary:      Effort: Pulmonary effort is normal. No respiratory distress.   Abdominal:      General: Abdomen is flat. Bowel sounds are normal. There is no distension.      Palpations: Abdomen is soft.      Tenderness: There is abdominal tenderness (generalized). There is no guarding.      Comments: Laparotomy incisions are clean, dry, and intact.   Musculoskeletal:         General: No swelling.   Skin:     General: Skin is warm and dry.      Capillary Refill: Capillary refill takes less than 2 seconds.   Neurological:      Mental Status: She is alert.   Psychiatric:         Mood and Affect: Mood normal.         Behavior: Behavior normal.         Last Recorded Vitals  Blood pressure 116/55, pulse 61, temperature 37.2 °C (99 °F), temperature source Temporal, resp. rate 14, height 1.6 m (5' 3\"), weight 97.5 kg (215 lb), SpO2 96 %.  Intake/Output last 3 Shifts:  I/O last 3 completed shifts:  In: 2159.4 (22.1 mL/kg)   Out: 375 (3.8 mL/kg) [Urine:375 (0.1 mL/kg/hr)]  Weight: 97.5 kg     Relevant Results  Lab Results   Component Value Date    WBC 2.9 (L) 12/16/2023    HGB 7.8 " (L) 12/16/2023    HCT 24.0 (L) 12/16/2023    MCV 90 12/16/2023     (L) 12/16/2023     Lab Results   Component Value Date    GLUCOSE 125 (H) 12/16/2023    CALCIUM 7.9 (L) 12/16/2023     (L) 12/16/2023    K 3.6 12/16/2023    CO2 27 12/16/2023     12/16/2023    BUN 39 (H) 12/16/2023    CREATININE 0.90 12/16/2023            Current Medications:  atorvastatin, 40 mg, oral, Nightly  fat emulsion-plant based, 250 mL, intravenous, Once per day on Mon Wed Fri  insulin lispro, 0-5 Units, subcutaneous, q4h  levothyroxine, 75 mcg, oral, Daily  lidocaine, 5 mL, infiltration, Once  lidocaine, 5 mL, infiltration, Once  metoprolol, 2.5 mg, intravenous, q6h  rivaroxaban, 15 mg, oral, Daily with evening meal        Assessment/Plan     Principal Problem:    Small bowel obstruction (CMS/HCC)  Active Problems:    Acute kidney injury (nontraumatic) (CMS/HCC)    Chronic renal insufficiency    Subjective  Patient was seen this morning.  She was eating breakfast this morning and her abdomen felt full but she denied any pain.  She has passed gas and has not had any bowel movements overnight or this morning.  She has been belching.  She denies nausea or vomiting.  I told her that she is now n.p.o. and should stop eating, and that she will get a CT of her abdomen and pelvis, as she is still having symptoms with a clear liquid diet.  On exam, abdomen is soft, nondistended, generalized abdominal tenderness, laparotomy incisions are CDI.    Plan  - NPO  - STAT CT abdomen/pelvis to evaluate for possible persistent obstruction  - Maintain PPN  - KAT hose  - Pain control  - Nausea control  - Encourage ambulation  - Appreciate PT/OT consult     Further recommendations per Dr. Neville Duffy PA-C    Patient seen and examined. I have reviewed and discussed APRN note. Patient continues to have flatus, decrease abdominal pain; I reviewed the repeat CT scan with residual contrast in rectum, colon but still dilated loops of  small bowel; before surgery, the site of obstruction was proximal to the anastomosis where the adhesive band was; on the current CT scan, the dilation extends past the anastomosis; there is very slow transit of enteric content; I did not free all of the interloop adhesions as they were extensive and were not causing obstruction but only the constricting band was; bowel was also free from intra-peritoneal mesh; if patient keeps having poor tolerance of meal, it may be worth re-exploring laparoscopically to lyse further adhesions; good thing is unlike the last SBFT which showed complete SBO this is PSBO vs ileus; on current scan; will discuss options with patient and family which will include re-exploration vs observation

## 2023-12-16 NOTE — PROGRESS NOTES
Harper Lara is a 78 y.o. female on day 10 of admission presenting with Small bowel obstruction (CMS/HCC).      Subjective   Had some distension today, though pain is doing better. Did have a BM last night. Passing flatus.        Objective     Last Recorded Vitals  /60   Pulse 63   Temp 36.2 °C (97.2 °F)   Resp 16   Wt 97.5 kg (215 lb)   SpO2 99%   Intake/Output last 3 Shifts:    Intake/Output Summary (Last 24 hours) at 12/16/2023 1422  Last data filed at 12/16/2023 1109  Gross per 24 hour   Intake 2159.37 ml   Output 1 ml   Net 2158.37 ml         Admission Weight  Weight: 97.5 kg (215 lb) (12/06/23 1117)    Daily Weight  12/06/23 : 97.5 kg (215 lb)    Image Results  CT abdomen pelvis wo IV contrast  Narrative: Interpreted By:  Ramonita Tinajero,   STUDY:  CT ABDOMEN PELVIS WO IV CONTRAST;  12/16/2023 12:40 pm      INDICATION:  Signs/Symptoms:Small bowel obstruction, still symptomatic.      COMPARISON:  12/06/2023      ACCESSION NUMBER(S):  [id number]      ORDERING CLINICIAN:  JOSE ALFREDO VALENCIA      TECHNIQUE:  CT of the abdomen and pelvis was performed.  Contiguous axial images  were obtained at 3 mm slice thickness through the abdomen and pelvis.  Coronal and sagittal reconstructions at 3 mm slice thickness were  performed.   No intravenous or oral contrast agents were administered.      FINDINGS:  Please note that the study is limited without intravenous contrast.      LOWER CHEST:  Lung bases are clear. No pleural effusion is present. Heart is normal  in overall size. There is no pericardial effusion.      ABDOMEN:      LIVER:  No gross focal mass is identified. Liver is nonenlarged.      BILE DUCTS:  Bile ducts are stable in size.      GALLBLADDER:  Gallbladder is absent.      PANCREAS:  Pancreas is atrophic without obvious mass.      SPLEEN:  Spleen is nonenlarged.      ADRENAL GLANDS:  Bilateral adrenal glands appear normal.      KIDNEYS AND URETERS:  No stones or hydronephrosis are present. Right  kidney is ptotic.  Subcentimeter parenchymal hypodensities are too small to characterize.      PELVIS:      BLADDER:  The urinary bladder appears normal without abnormal wall thickening.      REPRODUCTIVE ORGANS:  Uterus is absent.      BOWEL:  Stomach is mildly dilated and contains air and fluid. Duodenal is  also dilated with air and fluid. Small-bowel dilatation is present up  to and including the small bowel anastomosis. Small-bowel dilatation  appears similar to the previous CT. Distal small bowel is nondilated,  also unchanged. Appendix is not identified. Colon is nondilated.  Contrast material is present distal transverse colon to rectum.  Air-fluid level is noted in the rectum.      VESSELS:  Patchy atherosclerosis is present in the aorta. There is no  aneurysmal dilatation. Vena cava is normal in size.      PERITONEUM/RETROPERITONEUM/LYMPH NODES:  No ascites or pneumoperitoneum is identified. There is no mesenteric  inflammation. No retroperitoneal lymph nodes are pathologically  enlarged.      ABDOMINAL WALL:  Small left inguinal hernia contains fat. An oval subcutaneous  circumscribed mass to the left of the umbilicus has an internal  density of about 60 Hounsfield units and is probably a subcutaneous  hematoma. This has an axial long axis of about 3 cm.      BONES:  Bones are very osteopenic. Sclerosis in the L2 and L3 vertebra  appears to be degenerative. Severe hypertrophy is noted involving  lumbar facet joints. Hypertrophic bone changes produce severe canal  and lateral recess stenosis at L2-3 level.      Impression: 1.  Dilated stomach, duodenal and small bowel are similar to the  prior study. Dilatation extends to and includes the small bowel  anastomosis but the distal most small bowel, including the terminal  ileum, is nondilated.  2. Residual contrast from preceding small-bowel study is present in  the distal colon with an air-fluid level in the rectum indicative of  diarrhea.           MACRO:  None      Signed by: Ramonita Tinajero 12/16/2023 1:32 PM  Dictation workstation:   [id number]      Physical Exam  Constitutional:       Appearance: She is obese.   HENT:      Head: Normocephalic and atraumatic.      Nose: Nose normal.      Mouth/Throat:      Mouth: Mucous membranes are dry.   Eyes:      Extraocular Movements: Extraocular movements intact.      Conjunctiva/sclera: Conjunctivae normal.   Cardiovascular:      Rate and Rhythm: Normal rate and regular rhythm.      Pulses: Normal pulses.      Heart sounds: Normal heart sounds.   Pulmonary:      Effort: Pulmonary effort is normal.      Breath sounds: Normal breath sounds.   Abdominal:      Palpations: Abdomen is soft.      Tenderness: There is abdominal tenderness.   Musculoskeletal:         General: Normal range of motion.      Cervical back: Normal range of motion and neck supple.   Skin:     General: Skin is warm and dry.   Neurological:      Mental Status: She is alert. Mental status is at baseline.   Psychiatric:         Mood and Affect: Mood normal.         Relevant Results               Assessment/Plan          78 y.o. female presenting with past medical history of type 2 diabetes mellitus, fatty liver, hyperlipidemia, hypertension, A-fib status post ablation, hypothyroidism, anxiety and depression, history of GI bleed comes to the hospital with abdominal pain and vomiting for past few days.  Patient found to have SBO on the imaging.  SBFT done showed high-grade obstruction.  On 12/8/2023 the patient underwent extensive laparoscopic lysis of adhesions for 2 hours and small intestinal resection and anastomosis.  Patient is POD #3 and no return of bowel function yet.  Continue with IV hydration.  Will reach out to dietary for  parenteral nutrition recommendations at this time.        Principal Problem:    Small bowel obstruction (CMS/HCC)  Active Problems:    Acute kidney injury (nontraumatic) (CMS/HCC)    Chronic renal  insufficiency    Small bowel obstruction  -As seen on CT scan of abdomen pelvis and also on SBFT.  -Status post extensive laparoscopic lysis of adhesions and laparoscopic small intestine resection and anastomosis on 12/8/2023.  -Continue with IV hydration and maintain n.p.o. status at this time.  IV fluids switched to to potassium containing fluids.  -Surgery has been consulted and NG tube in place.  Awaiting return of bowel function at this time.  -Due to prolonged n.p.o. status will consult dietary for parenteral nutrition recommendations.     SOCORRO with underlying CKD stage III  -Creatinine noted 1.81 baseline is around 1.1.  Improved  -Continue with IV hydration.    Hypernatremia  -Will adjust IV fluids and continue to maintain potassium and IV fluids.     Hypokalemia  -Replace IV and monitor.      Atrial fibrillation  -Since NPO will add IV lopressor every 6 hours. Rate controlled at this time  -The patient is chest pain-free at this time.  Continue to monitor.     Hypothyroidism  -Not able to resume oral Synthroid at this time.  Will continue with reduced dose of IV Synthroid.     Chronic systolic congestive heart failure  -Last echo from 2021 shows LVEF of 40%.  -Monitor volume status     Diabetes mellitus type 2  -Hold home oral antidiabetics  -Continue with sliding scale insulin n.p.o. protocol.     VTE prophylaxis: SCDs patient history of GI bleed    PT OT     12/16/23:  CT repeated this AM due to distension  Contrast passing through colon but does appear to have an ileus - d/w surgery  OK to cont clear liquids  PPN , will discontinue when tolerating diet  Follow progress with PT/OT to determine dispo  Xarelto resumed       Mamadou Hall MD

## 2023-12-17 ENCOUNTER — ANESTHESIA EVENT (OUTPATIENT)
Dept: OPERATING ROOM | Facility: HOSPITAL | Age: 78
DRG: 330 | End: 2023-12-17
Payer: MEDICARE

## 2023-12-17 ENCOUNTER — APPOINTMENT (OUTPATIENT)
Dept: RADIOLOGY | Facility: HOSPITAL | Age: 78
DRG: 330 | End: 2023-12-17
Payer: MEDICARE

## 2023-12-17 ENCOUNTER — ANESTHESIA (OUTPATIENT)
Dept: OPERATING ROOM | Facility: HOSPITAL | Age: 78
DRG: 330 | End: 2023-12-17
Payer: MEDICARE

## 2023-12-17 LAB
ANION GAP SERPL CALC-SCNC: 10 MMOL/L (ref 10–20)
ANION GAP SERPL CALC-SCNC: 9 MMOL/L (ref 10–20)
BASOPHILS # BLD AUTO: 0.02 X10*3/UL (ref 0–0.1)
BASOPHILS NFR BLD AUTO: 0.5 %
BUN SERPL-MCNC: 38 MG/DL (ref 6–23)
BUN SERPL-MCNC: 39 MG/DL (ref 6–23)
CALCIUM SERPL-MCNC: 6.9 MG/DL (ref 8.6–10.3)
CALCIUM SERPL-MCNC: 8 MG/DL (ref 8.6–10.3)
CHLORIDE SERPL-SCNC: 103 MMOL/L (ref 98–107)
CHLORIDE SERPL-SCNC: 99 MMOL/L (ref 98–107)
CO2 SERPL-SCNC: 20 MMOL/L (ref 21–32)
CO2 SERPL-SCNC: 26 MMOL/L (ref 21–32)
CREAT SERPL-MCNC: 0.79 MG/DL (ref 0.5–1.05)
CREAT SERPL-MCNC: 0.89 MG/DL (ref 0.5–1.05)
EOSINOPHIL # BLD AUTO: 0.06 X10*3/UL (ref 0–0.4)
EOSINOPHIL NFR BLD AUTO: 1.4 %
ERYTHROCYTE [DISTWIDTH] IN BLOOD BY AUTOMATED COUNT: 15.4 % (ref 11.5–14.5)
ERYTHROCYTE [DISTWIDTH] IN BLOOD BY AUTOMATED COUNT: 15.5 % (ref 11.5–14.5)
GFR SERPL CREATININE-BSD FRML MDRD: 67 ML/MIN/1.73M*2
GFR SERPL CREATININE-BSD FRML MDRD: 78 ML/MIN/1.73M*2
GLUCOSE BLD MANUAL STRIP-MCNC: 131 MG/DL (ref 74–99)
GLUCOSE BLD MANUAL STRIP-MCNC: 140 MG/DL (ref 74–99)
GLUCOSE BLD MANUAL STRIP-MCNC: 151 MG/DL (ref 74–99)
GLUCOSE BLD MANUAL STRIP-MCNC: 161 MG/DL (ref 74–99)
GLUCOSE BLD MANUAL STRIP-MCNC: 163 MG/DL (ref 74–99)
GLUCOSE BLD MANUAL STRIP-MCNC: 171 MG/DL (ref 74–99)
GLUCOSE SERPL-MCNC: 136 MG/DL (ref 74–99)
GLUCOSE SERPL-MCNC: 190 MG/DL (ref 74–99)
HCT VFR BLD AUTO: 27.2 % (ref 36–46)
HCT VFR BLD AUTO: 32.4 % (ref 36–46)
HGB BLD-MCNC: 10.7 G/DL (ref 12–16)
HGB BLD-MCNC: 8.8 G/DL (ref 12–16)
HOLD SPECIMEN: NORMAL
IMM GRANULOCYTES # BLD AUTO: 0.01 X10*3/UL (ref 0–0.5)
IMM GRANULOCYTES NFR BLD AUTO: 0.2 % (ref 0–0.9)
LYMPHOCYTES # BLD AUTO: 0.79 X10*3/UL (ref 0.8–3)
LYMPHOCYTES NFR BLD AUTO: 18.2 %
MAGNESIUM SERPL-MCNC: 1.39 MG/DL (ref 1.6–2.4)
MAGNESIUM SERPL-MCNC: 1.97 MG/DL (ref 1.6–2.4)
MCH RBC QN AUTO: 28.9 PG (ref 26–34)
MCH RBC QN AUTO: 29.2 PG (ref 26–34)
MCHC RBC AUTO-ENTMCNC: 32.4 G/DL (ref 32–36)
MCHC RBC AUTO-ENTMCNC: 33 G/DL (ref 32–36)
MCV RBC AUTO: 89 FL (ref 80–100)
MCV RBC AUTO: 90 FL (ref 80–100)
MONOCYTES # BLD AUTO: 0.37 X10*3/UL (ref 0.05–0.8)
MONOCYTES NFR BLD AUTO: 8.5 %
NEUTROPHILS # BLD AUTO: 3.08 X10*3/UL (ref 1.6–5.5)
NEUTROPHILS NFR BLD AUTO: 71.2 %
NRBC BLD-RTO: 0 /100 WBCS (ref 0–0)
NRBC BLD-RTO: 0 /100 WBCS (ref 0–0)
PLATELET # BLD AUTO: 164 X10*3/UL (ref 150–450)
PLATELET # BLD AUTO: 184 X10*3/UL (ref 150–450)
POTASSIUM SERPL-SCNC: 3.2 MMOL/L (ref 3.5–5.3)
POTASSIUM SERPL-SCNC: 3.3 MMOL/L (ref 3.5–5.3)
RBC # BLD AUTO: 3.04 X10*6/UL (ref 4–5.2)
RBC # BLD AUTO: 3.66 X10*6/UL (ref 4–5.2)
SODIUM SERPL-SCNC: 129 MMOL/L (ref 136–145)
SODIUM SERPL-SCNC: 132 MMOL/L (ref 136–145)
WBC # BLD AUTO: 2.8 X10*3/UL (ref 4.4–11.3)
WBC # BLD AUTO: 4.3 X10*3/UL (ref 4.4–11.3)

## 2023-12-17 PROCEDURE — 3700000002 HC GENERAL ANESTHESIA TIME - EACH INCREMENTAL 1 MINUTE: Performed by: SURGERY

## 2023-12-17 PROCEDURE — 2500000004 HC RX 250 GENERAL PHARMACY W/ HCPCS (ALT 636 FOR OP/ED): Performed by: SURGERY

## 2023-12-17 PROCEDURE — 2500000005 HC RX 250 GENERAL PHARMACY W/O HCPCS: Performed by: ANESTHESIOLOGY

## 2023-12-17 PROCEDURE — 83735 ASSAY OF MAGNESIUM: CPT | Performed by: INTERNAL MEDICINE

## 2023-12-17 PROCEDURE — 44125 REMOVAL OF SMALL INTESTINE: CPT | Performed by: SURGERY

## 2023-12-17 PROCEDURE — 80048 BASIC METABOLIC PNL TOTAL CA: CPT

## 2023-12-17 PROCEDURE — 2500000004 HC RX 250 GENERAL PHARMACY W/ HCPCS (ALT 636 FOR OP/ED): Performed by: INTERNAL MEDICINE

## 2023-12-17 PROCEDURE — 2500000001 HC RX 250 WO HCPCS SELF ADMINISTERED DRUGS (ALT 637 FOR MEDICARE OP): Performed by: INTERNAL MEDICINE

## 2023-12-17 PROCEDURE — 44120 REMOVAL OF SMALL INTESTINE: CPT | Performed by: STUDENT IN AN ORGANIZED HEALTH CARE EDUCATION/TRAINING PROGRAM

## 2023-12-17 PROCEDURE — 3600000003 HC OR TIME - INITIAL BASE CHARGE - PROCEDURE LEVEL THREE: Performed by: SURGERY

## 2023-12-17 PROCEDURE — 0DNU3ZZ RELEASE OMENTUM, PERCUTANEOUS APPROACH: ICD-10-PCS | Performed by: SURGERY

## 2023-12-17 PROCEDURE — 2500000004 HC RX 250 GENERAL PHARMACY W/ HCPCS (ALT 636 FOR OP/ED)

## 2023-12-17 PROCEDURE — 99233 SBSQ HOSP IP/OBS HIGH 50: CPT | Performed by: INTERNAL MEDICINE

## 2023-12-17 PROCEDURE — 2020000001 HC ICU ROOM DAILY

## 2023-12-17 PROCEDURE — 36415 COLL VENOUS BLD VENIPUNCTURE: CPT | Performed by: INTERNAL MEDICINE

## 2023-12-17 PROCEDURE — 37799 UNLISTED PX VASCULAR SURGERY: CPT

## 2023-12-17 PROCEDURE — 94760 N-INVAS EAR/PLS OXIMETRY 1: CPT

## 2023-12-17 PROCEDURE — 3600000008 HC OR TIME - EACH INCREMENTAL 1 MINUTE - PROCEDURE LEVEL THREE: Performed by: SURGERY

## 2023-12-17 PROCEDURE — 2500000005 HC RX 250 GENERAL PHARMACY W/O HCPCS: Performed by: INTERNAL MEDICINE

## 2023-12-17 PROCEDURE — 3700000001 HC GENERAL ANESTHESIA TIME - INITIAL BASE CHARGE: Performed by: SURGERY

## 2023-12-17 PROCEDURE — 82947 ASSAY GLUCOSE BLOOD QUANT: CPT

## 2023-12-17 PROCEDURE — 2500000005 HC RX 250 GENERAL PHARMACY W/O HCPCS: Performed by: SURGERY

## 2023-12-17 PROCEDURE — 71045 X-RAY EXAM CHEST 1 VIEW: CPT | Performed by: RADIOLOGY

## 2023-12-17 PROCEDURE — 2720000007 HC OR 272 NO HCPCS: Performed by: SURGERY

## 2023-12-17 PROCEDURE — 94002 VENT MGMT INPAT INIT DAY: CPT

## 2023-12-17 PROCEDURE — A4217 STERILE WATER/SALINE, 500 ML: HCPCS | Performed by: SURGERY

## 2023-12-17 PROCEDURE — 3E033XZ INTRODUCTION OF VASOPRESSOR INTO PERIPHERAL VEIN, PERCUTANEOUS APPROACH: ICD-10-PCS | Performed by: INTERNAL MEDICINE

## 2023-12-17 PROCEDURE — 2500000004 HC RX 250 GENERAL PHARMACY W/ HCPCS (ALT 636 FOR OP/ED): Performed by: ANESTHESIOLOGY

## 2023-12-17 PROCEDURE — 71045 X-RAY EXAM CHEST 1 VIEW: CPT

## 2023-12-17 PROCEDURE — 2500000005 HC RX 250 GENERAL PHARMACY W/O HCPCS

## 2023-12-17 PROCEDURE — 83735 ASSAY OF MAGNESIUM: CPT

## 2023-12-17 PROCEDURE — 99291 CRITICAL CARE FIRST HOUR: CPT | Performed by: INTERNAL MEDICINE

## 2023-12-17 PROCEDURE — 85025 COMPLETE CBC W/AUTO DIFF WBC: CPT | Performed by: INTERNAL MEDICINE

## 2023-12-17 PROCEDURE — 88307 TISSUE EXAM BY PATHOLOGIST: CPT | Mod: TC,SUR,ELYLAB | Performed by: SURGERY

## 2023-12-17 PROCEDURE — 94003 VENT MGMT INPAT SUBQ DAY: CPT

## 2023-12-17 PROCEDURE — 0DBB0ZZ EXCISION OF ILEUM, OPEN APPROACH: ICD-10-PCS | Performed by: SURGERY

## 2023-12-17 PROCEDURE — 88307 TISSUE EXAM BY PATHOLOGIST: CPT | Performed by: PATHOLOGY

## 2023-12-17 PROCEDURE — P9045 ALBUMIN (HUMAN), 5%, 250 ML: HCPCS | Mod: JZ

## 2023-12-17 PROCEDURE — 85027 COMPLETE CBC AUTOMATED: CPT

## 2023-12-17 PROCEDURE — 80048 BASIC METABOLIC PNL TOTAL CA: CPT | Performed by: INTERNAL MEDICINE

## 2023-12-17 RX ORDER — BUPIVACAINE HCL/EPINEPHRINE 0.25-.0005
VIAL (ML) INJECTION AS NEEDED
Status: DISCONTINUED | OUTPATIENT
Start: 2023-12-17 | End: 2023-12-17 | Stop reason: HOSPADM

## 2023-12-17 RX ORDER — ROCURONIUM BROMIDE 10 MG/ML
INJECTION, SOLUTION INTRAVENOUS AS NEEDED
Status: DISCONTINUED | OUTPATIENT
Start: 2023-12-17 | End: 2023-12-19

## 2023-12-17 RX ORDER — MORPHINE SULFATE 4 MG/ML
4 INJECTION, SOLUTION INTRAMUSCULAR; INTRAVENOUS EVERY 4 HOURS PRN
Status: DISCONTINUED | OUTPATIENT
Start: 2023-12-17 | End: 2023-12-17

## 2023-12-17 RX ORDER — NOREPINEPHRINE BITARTRATE/D5W 8 MG/250ML
.01-.5 PLASTIC BAG, INJECTION (ML) INTRAVENOUS CONTINUOUS
Status: DISCONTINUED | OUTPATIENT
Start: 2023-12-17 | End: 2023-12-18

## 2023-12-17 RX ORDER — FENTANYL CITRATE 50 UG/ML
INJECTION, SOLUTION INTRAMUSCULAR; INTRAVENOUS AS NEEDED
Status: DISCONTINUED | OUTPATIENT
Start: 2023-12-17 | End: 2023-12-17

## 2023-12-17 RX ORDER — SODIUM CHLORIDE, SODIUM LACTATE, POTASSIUM CHLORIDE, CALCIUM CHLORIDE 600; 310; 30; 20 MG/100ML; MG/100ML; MG/100ML; MG/100ML
50 INJECTION, SOLUTION INTRAVENOUS CONTINUOUS
Status: DISCONTINUED | OUTPATIENT
Start: 2023-12-17 | End: 2023-12-17

## 2023-12-17 RX ORDER — LEVOTHYROXINE SODIUM 20 UG/ML
37.5 INJECTION, SOLUTION INTRAVENOUS
Status: DISCONTINUED | OUTPATIENT
Start: 2023-12-18 | End: 2023-12-24

## 2023-12-17 RX ORDER — ETOMIDATE 2 MG/ML
20 INJECTION INTRAVENOUS ONCE
Status: COMPLETED | OUTPATIENT
Start: 2023-12-17 | End: 2023-12-17

## 2023-12-17 RX ORDER — CEFAZOLIN SODIUM 2 G/50ML
SOLUTION INTRAVENOUS AS NEEDED
Status: DISCONTINUED | OUTPATIENT
Start: 2023-12-17 | End: 2023-12-19

## 2023-12-17 RX ORDER — FENTANYL CITRATE-0.9 % NACL/PF 10 MCG/ML
0-300 PLASTIC BAG, INJECTION (ML) INTRAVENOUS CONTINUOUS
Status: DISCONTINUED | OUTPATIENT
Start: 2023-12-17 | End: 2023-12-19

## 2023-12-17 RX ORDER — SODIUM CHLORIDE 0.9 G/100ML
IRRIGANT IRRIGATION AS NEEDED
Status: DISCONTINUED | OUTPATIENT
Start: 2023-12-17 | End: 2023-12-17 | Stop reason: HOSPADM

## 2023-12-17 RX ORDER — PHENYLEPHRINE 10 MG/250 ML(40 MCG/ML)IN 0.9 % SOD.CHLORIDE INTRAVENOUS
CONTINUOUS PRN
Status: DISCONTINUED | OUTPATIENT
Start: 2023-12-17 | End: 2023-12-19

## 2023-12-17 RX ORDER — PANTOPRAZOLE SODIUM 40 MG/10ML
40 INJECTION, POWDER, LYOPHILIZED, FOR SOLUTION INTRAVENOUS
Status: DISCONTINUED | OUTPATIENT
Start: 2023-12-18 | End: 2023-12-26 | Stop reason: HOSPADM

## 2023-12-17 RX ORDER — CALCIUM GLUCONATE 20 MG/ML
2 INJECTION, SOLUTION INTRAVENOUS ONCE
Status: COMPLETED | OUTPATIENT
Start: 2023-12-17 | End: 2023-12-17

## 2023-12-17 RX ORDER — ALBUMIN HUMAN 50 G/1000ML
25 SOLUTION INTRAVENOUS ONCE
Status: COMPLETED | OUTPATIENT
Start: 2023-12-17 | End: 2023-12-17

## 2023-12-17 RX ORDER — ETOMIDATE 2 MG/ML
INJECTION INTRAVENOUS
Status: COMPLETED
Start: 2023-12-17 | End: 2023-12-17

## 2023-12-17 RX ORDER — ETOMIDATE 2 MG/ML
INJECTION INTRAVENOUS AS NEEDED
Status: DISCONTINUED | OUTPATIENT
Start: 2023-12-17 | End: 2023-12-19

## 2023-12-17 RX ORDER — MAGNESIUM SULFATE HEPTAHYDRATE 40 MG/ML
4 INJECTION, SOLUTION INTRAVENOUS ONCE
Status: COMPLETED | OUTPATIENT
Start: 2023-12-17 | End: 2023-12-18

## 2023-12-17 RX ORDER — POLYETHYLENE GLYCOL 3350 17 G/17G
17 POWDER, FOR SOLUTION ORAL DAILY
Status: DISCONTINUED | OUTPATIENT
Start: 2023-12-17 | End: 2023-12-26 | Stop reason: HOSPADM

## 2023-12-17 RX ORDER — DEXAMETHASONE SODIUM PHOSPHATE 4 MG/ML
INJECTION, SOLUTION INTRA-ARTICULAR; INTRALESIONAL; INTRAMUSCULAR; INTRAVENOUS; SOFT TISSUE AS NEEDED
Status: DISCONTINUED | OUTPATIENT
Start: 2023-12-17 | End: 2023-12-19

## 2023-12-17 RX ORDER — POTASSIUM CHLORIDE 14.9 MG/ML
20 INJECTION INTRAVENOUS
Status: COMPLETED | OUTPATIENT
Start: 2023-12-17 | End: 2023-12-18

## 2023-12-17 RX ORDER — CEFAZOLIN SODIUM 2 G/100ML
2 INJECTION, SOLUTION INTRAVENOUS EVERY 8 HOURS
Status: DISPENSED | OUTPATIENT
Start: 2023-12-17 | End: 2023-12-18

## 2023-12-17 RX ORDER — METRONIDAZOLE 500 MG/100ML
500 INJECTION, SOLUTION INTRAVENOUS EVERY 8 HOURS
Status: COMPLETED | OUTPATIENT
Start: 2023-12-17 | End: 2023-12-18

## 2023-12-17 RX ORDER — PROPOFOL 10 MG/ML
5-50 INJECTION, EMULSION INTRAVENOUS CONTINUOUS
Status: DISCONTINUED | OUTPATIENT
Start: 2023-12-17 | End: 2023-12-18

## 2023-12-17 RX ORDER — PHENYLEPHRINE HCL IN 0.9% NACL 1 MG/10 ML
SYRINGE (ML) INTRAVENOUS AS NEEDED
Status: DISCONTINUED | OUTPATIENT
Start: 2023-12-17 | End: 2023-12-19

## 2023-12-17 RX ORDER — MIDAZOLAM HYDROCHLORIDE 1 MG/ML
INJECTION, SOLUTION INTRAMUSCULAR; INTRAVENOUS AS NEEDED
Status: DISCONTINUED | OUTPATIENT
Start: 2023-12-17 | End: 2023-12-19

## 2023-12-17 RX ORDER — SODIUM CHLORIDE, SODIUM LACTATE, POTASSIUM CHLORIDE, CALCIUM CHLORIDE 600; 310; 30; 20 MG/100ML; MG/100ML; MG/100ML; MG/100ML
50 INJECTION, SOLUTION INTRAVENOUS CONTINUOUS
Status: DISCONTINUED | OUTPATIENT
Start: 2023-12-17 | End: 2023-12-26 | Stop reason: HOSPADM

## 2023-12-17 RX ORDER — CEFAZOLIN SODIUM 2 G/50ML
2 SOLUTION INTRAVENOUS ONCE
Status: DISCONTINUED | OUTPATIENT
Start: 2023-12-17 | End: 2023-12-17

## 2023-12-17 RX ORDER — PROPOFOL 10 MG/ML
INJECTION, EMULSION INTRAVENOUS
Status: COMPLETED
Start: 2023-12-17 | End: 2023-12-17

## 2023-12-17 RX ORDER — NOREPINEPHRINE BITARTRATE/D5W 8 MG/250ML
PLASTIC BAG, INJECTION (ML) INTRAVENOUS
Status: COMPLETED
Start: 2023-12-17 | End: 2023-12-17

## 2023-12-17 RX ADMIN — DEXAMETHASONE SODIUM PHOSPHATE 8 MG: 4 INJECTION, SOLUTION INTRAMUSCULAR; INTRAVENOUS at 16:03

## 2023-12-17 RX ADMIN — ETOMIDATE 20 MG: 2 INJECTION INTRAVENOUS at 13:20

## 2023-12-17 RX ADMIN — ROCURONIUM BROMIDE 30 MG: 10 SOLUTION INTRAVENOUS at 15:19

## 2023-12-17 RX ADMIN — INSULIN LISPRO 1 UNITS: 100 INJECTION, SOLUTION INTRAVENOUS; SUBCUTANEOUS at 17:49

## 2023-12-17 RX ADMIN — LEVOTHYROXINE SODIUM 75 MCG: 75 TABLET ORAL at 06:19

## 2023-12-17 RX ADMIN — INSULIN LISPRO 1 UNITS: 100 INJECTION, SOLUTION INTRAVENOUS; SUBCUTANEOUS at 08:29

## 2023-12-17 RX ADMIN — FENTANYL CITRATE 50 MCG: 50 INJECTION, SOLUTION INTRAMUSCULAR; INTRAVENOUS at 13:20

## 2023-12-17 RX ADMIN — CEFAZOLIN SODIUM 2 G: 2 SOLUTION INTRAVENOUS at 20:00

## 2023-12-17 RX ADMIN — MIDAZOLAM 2 MG: 1 INJECTION INTRAMUSCULAR; INTRAVENOUS at 13:15

## 2023-12-17 RX ADMIN — ALBUMIN HUMAN 25 G: 0.05 INJECTION, SOLUTION INTRAVENOUS at 21:14

## 2023-12-17 RX ADMIN — SODIUM CHLORIDE, POTASSIUM CHLORIDE, SODIUM LACTATE AND CALCIUM CHLORIDE 1000 ML: 600; 310; 30; 20 INJECTION, SOLUTION INTRAVENOUS at 18:00

## 2023-12-17 RX ADMIN — PROPOFOL 10 MCG/KG/MIN: 10 INJECTION, EMULSION INTRAVENOUS at 17:45

## 2023-12-17 RX ADMIN — ETOMIDATE 20 MG: 2 INJECTION INTRAVENOUS at 17:47

## 2023-12-17 RX ADMIN — INSULIN LISPRO 1 UNITS: 100 INJECTION, SOLUTION INTRAVENOUS; SUBCUTANEOUS at 20:20

## 2023-12-17 RX ADMIN — ETOMIDATE 20 MG: 20 INJECTION, SOLUTION INTRAVENOUS at 17:47

## 2023-12-17 RX ADMIN — SUGAMMADEX 200 MG: 100 INJECTION, SOLUTION INTRAVENOUS at 16:38

## 2023-12-17 RX ADMIN — SODIUM CHLORIDE, SODIUM LACTATE, POTASSIUM CHLORIDE, AND CALCIUM CHLORIDE: 600; 310; 30; 20 INJECTION, SOLUTION INTRAVENOUS at 13:15

## 2023-12-17 RX ADMIN — METRONIDAZOLE 500 MG: 500 INJECTION, SOLUTION INTRAVENOUS at 18:54

## 2023-12-17 RX ADMIN — FENTANYL CITRATE 25 MCG/HR: 0.05 INJECTION, SOLUTION INTRAMUSCULAR; INTRAVENOUS at 18:30

## 2023-12-17 RX ADMIN — Medication 100 MCG: at 15:23

## 2023-12-17 RX ADMIN — FENTANYL CITRATE 50 MCG: 50 INJECTION, SOLUTION INTRAMUSCULAR; INTRAVENOUS at 14:08

## 2023-12-17 RX ADMIN — ASCORBIC ACID, VITAMIN A PALMITATE, CHOLECALCIFEROL, THIAMINE HYDROCHLORIDE, RIBOFLAVIN-5 PHOSPHATE SODIUM, PYRIDOXINE HYDROCHLORIDE, NIACINAMIDE, DEXPANTHENOL, ALPHA-TOCOPHEROL ACETATE, VITAMIN K1, FOLIC ACID, BIOTIN, CYANOCOBALAMIN: 200; 3300; 200; 6; 3.6; 6; 40; 15; 10; 150; 600; 60; 5 INJECTION, SOLUTION INTRAVENOUS at 02:04

## 2023-12-17 RX ADMIN — POTASSIUM CHLORIDE 20 MEQ: 14.9 INJECTION, SOLUTION INTRAVENOUS at 23:12

## 2023-12-17 RX ADMIN — Medication 0.6 MCG/KG/MIN: at 15:25

## 2023-12-17 RX ADMIN — CALCIUM GLUCONATE 2 G: 20 INJECTION, SOLUTION INTRAVENOUS at 21:20

## 2023-12-17 RX ADMIN — ONDANSETRON 4 MG: 2 INJECTION INTRAMUSCULAR; INTRAVENOUS at 16:03

## 2023-12-17 RX ADMIN — POTASSIUM CHLORIDE 20 MEQ: 14.9 INJECTION, SOLUTION INTRAVENOUS at 21:14

## 2023-12-17 RX ADMIN — Medication 100 MCG: at 16:03

## 2023-12-17 RX ADMIN — ROCURONIUM BROMIDE 50 MG: 10 SOLUTION INTRAVENOUS at 13:20

## 2023-12-17 RX ADMIN — ONDANSETRON 4 MG: 2 INJECTION INTRAMUSCULAR; INTRAVENOUS at 10:27

## 2023-12-17 RX ADMIN — FENTANYL CITRATE 25 MCG: 50 INJECTION, SOLUTION INTRAMUSCULAR; INTRAVENOUS at 16:43

## 2023-12-17 RX ADMIN — SODIUM CHLORIDE, POTASSIUM CHLORIDE, SODIUM LACTATE AND CALCIUM CHLORIDE 1000 ML: 600; 310; 30; 20 INJECTION, SOLUTION INTRAVENOUS at 21:15

## 2023-12-17 RX ADMIN — CEFAZOLIN SODIUM 2 G: 2 SOLUTION INTRAVENOUS at 13:29

## 2023-12-17 RX ADMIN — NOREPINEPHRINE BITARTRATE 0.01 MCG/KG/MIN: 8 INJECTION, SOLUTION INTRAVENOUS at 17:15

## 2023-12-17 RX ADMIN — SODIUM CHLORIDE, POTASSIUM CHLORIDE, SODIUM LACTATE AND CALCIUM CHLORIDE 50 ML/HR: 600; 310; 30; 20 INJECTION, SOLUTION INTRAVENOUS at 17:46

## 2023-12-17 RX ADMIN — Medication 100 MCG: at 16:12

## 2023-12-17 RX ADMIN — Medication 100 MCG: at 15:30

## 2023-12-17 RX ADMIN — METOPROLOL TARTRATE 2.5 MG: 5 INJECTION INTRAVENOUS at 10:22

## 2023-12-17 RX ADMIN — Medication 0.01 MCG/KG/MIN: at 17:15

## 2023-12-17 RX ADMIN — SODIUM CHLORIDE, POTASSIUM CHLORIDE, SODIUM LACTATE AND CALCIUM CHLORIDE 1000 ML: 600; 310; 30; 20 INJECTION, SOLUTION INTRAVENOUS at 19:02

## 2023-12-17 RX ADMIN — MAGNESIUM SULFATE HEPTAHYDRATE 4 G: 40 INJECTION, SOLUTION INTRAVENOUS at 21:14

## 2023-12-17 SDOH — HEALTH STABILITY: MENTAL HEALTH: CURRENT SMOKER: 0

## 2023-12-17 ASSESSMENT — PAIN - FUNCTIONAL ASSESSMENT
PAIN_FUNCTIONAL_ASSESSMENT: CPOT (CRITICAL CARE PAIN OBSERVATION TOOL)

## 2023-12-17 ASSESSMENT — COGNITIVE AND FUNCTIONAL STATUS - GENERAL
MOVING TO AND FROM BED TO CHAIR: A LITTLE
HELP NEEDED FOR BATHING: A LITTLE
TURNING FROM BACK TO SIDE WHILE IN FLAT BAD: A LOT
DAILY ACTIVITIY SCORE: 16
DRESSING REGULAR UPPER BODY CLOTHING: A LOT
DRESSING REGULAR LOWER BODY CLOTHING: A LOT
STANDING UP FROM CHAIR USING ARMS: A LITTLE
WALKING IN HOSPITAL ROOM: A LITTLE
CLIMB 3 TO 5 STEPS WITH RAILING: A LOT
MOVING FROM LYING ON BACK TO SITTING ON SIDE OF FLAT BED WITH BEDRAILS: A LOT
TOILETING: A LOT
MOBILITY SCORE: 15
PERSONAL GROOMING: A LITTLE

## 2023-12-17 NOTE — OP NOTE
Exploration Laparoscopy Operative Note     Date: 2023  OR Location: [facility]    Name: Harper Lara, : 1945, Age: 78 y.o., MRN: [id number], Sex: female    Diagnosis  * No Diagnosis Codes entered * * No Diagnosis Codes entered *     Procedures  Diagnostic laparoscopy, converted to laparotomy, extensive lysis of adhesions lasting for 30 minutes, small bowel resection with side-to-side antiperistaltic stapled anastomosis,       Surgeons      * Manuel Lozada - Primary    Resident/Fellow/Other Assistant:  Surgeon(s) and Role:     * Roxie Tenorio PA-C - Assisting    Procedure Summary  Anesthesia: * No anesthesia type entered *  ASA: IV  Anesthesia Staff: Anesthesiologist: Johann Vazquez MD  Estimated Blood Loss: minimal   Intra-op Medications:   Medication Name Total Dose   sodium chloride 0.9 % irrigation solution 4,000 mL   lactated Ringer's infusion 1,041.67 mL              Anesthesia Record               Intraprocedure I/O Totals          Intake    Phenylephrine Drip 0.00 mL    The total shown is the total volume documented since Anesthesia Start was filed.    Total Intake 0 mL          Specimen:   ID Type Source Tests Collected by Time   1 : SMALL BOWEL Tissue SMALL BOWEL /INTESTINE SEGMENTAL RESECTION SURGICAL PATHOLOGY EXAM Manuel Lozada MD 2023 1559        Staff:   Circulator: Vero Palomino RN  Scrub Person: Francesca Howell         Drains and/or Catheters:   External Urinary Catheter (Active)   Output (mL) 50 mL 23 1450     Findings: flimsy, inflammatory adhesions of bowel and greater omentum to midline mesh; involving the previous anastomosis, a loop of bowel was densely adherent to each other with no dissection plan; the proximal loops of bowel were very dilated to this dense adhesion; 20-25 cm of proximal TI resected.     NDICATION FOR PROCEDURE:    79 y/o female patient with persistent SBO. History of open cholecystectomy, hysterectomy, laparotomy with SBR, two ventral  hernias repair with mesh. She underwent diagnostic laparoscopy, lysis of adhesions and constricting adhesive band on 12/08; she did well, NG removed but when started on clear liquid and full liquid diet she continues to have nausea and vomiting; SBFT and CT abd/pelvis showed persistently dilated loops of small even though contrast in colon, patient was having flatus and small loose stools. With the persistent nausea, vomiting with clear liquid diet and CT scan finding from yesterday, I had meeting with patient and family members ( and daughter). The patient was consented for diagnostic laparoscopy, possible laparotomy, possible bowel resection and possible colostomy creation. I explained the procedure, the risks and complications which include but not limited to bleeding, infection, bowel injury, injury to the surrounding structures, Covid 19 infection, MI, stroke and blood clot. All questions were answered and willing to proceed. She got her Xarelto yesterday.     DETAILS OF PROCEDURE:    After informed consent was obtained, the patient was brought into the operating room and placed in supine position.  Huddle and time-out were performed.  General anesthesia was obtained without difficulty. Bilateral arms were tucked and padded.  Arterial line placed. Healed right subcostal and midline incisions. Glue over the stab incisions were removed. The abdomen was prepped and draped in a normal sterile fashion using ChloraPrep. Because she got her Xarelto yesterday Am, she did not get heparin for the case; she got 2 g of Ancef. A stab incision was made in LUQ at previous trocar incision. The 5 mm optical trocar was placed. Pneumoperitoneum was obtained with CO2 at 15 mmHg.  There was no evidence of iatrogenic injury. Very dilated loops of small bowel encountered. I placed 5 mm trocar in the left mid abdominal region. Flimsy inflammatory adhesions of bowel and greater omentum to midline mesh. These adhesions were taken  down on 12/8 and were not as dense. Using the Ligasure and suction device, the adhesions taken down allowing placement of additional 5 mm trocar in LLQ and supra-umbilical region. Hemostasis obtained with Ligasure and Bipolar. The patient was placed in Trendelenburg position with the right side up. Very dilated proximal loops of small bowel and decompressed distal terminal ileum. Starting at the distal TI after taking down some interloop adhesions, it was ran proximally where a loop of bowel involving the previous anastomosis was densely adherent to each other. Multiple attempts made but could not free the bowel as there was no dissecting plane resulting in unavoidable enterotomy. To better evaluate this bowel and further attempt to take down the adhesion, I decided to convert to open; midline laparotomy was made along previous midline incision through the midline mesh. The 1 cm enterotomy was repaired in two layers using 3-0 silk. Multiple attempts to free the bowel was unsuccessful. Proximal and distal to this segment which included the previous anastomosis and repaired enterotomy, a window was created in the mesentery. Mesentery controlled with the Ligasure. Enterotomies created. Using the 75 mm linear blue load staple, a side-to-side antiperistaltic anastomosis was created. Very minimal spillage of succus which was suctioned. 20-25 cm of proximal TI resected. The common enterotomy approximated with 2-0 PDS in running fashion; suture line re-inforced with 3-0 vicryl suture in Lembert fashion. Anastomosis was patent and viable. Two crotch sutures placed using 3-0 vicryl. The mesentery defect approximated with 3-0 vicryl in figure of eight fashion. A tongue of omentum was fashioned and secured over the anastomosis with 3-0 vicryl suture. I ran the bowel multiple times from TI to Ligament of Treitz with no further adhesion seen. I placed NG and confirmed placement in stomach. The intra-abdominal cavity was irrigated  copiously with liters of warm saline. #19 round drain was placed along right colic gutter and pelvis and brought out in the RUQ; drain secured with 2-0 silk suture. Using two #1 loop Prolene the midline incision was approximated with the fully incorporated midline mesh in a running fashion. Staples placed. During the case, the patient was hypotensive needing pressor boluses. Brock was placed. The instruments, sponge and needle counts were correct. The patient was extubated and transferred to ICU. I called and spoke with daughter (Carla: [phone]). All questions answered.     Manuel Lozada  Phone Number: [phone]

## 2023-12-17 NOTE — ANESTHESIA PROCEDURE NOTES
Arterial Line:    Date/Time: 12/17/2023 1:53 PM    Staffing  Performed: attending   Authorized by: Johann Vazquez MD    Performed by: Johann Vazquez MD    An arterial line was placed. Procedure performed using ultrasound guidance.in the OR for the following indication(s): continuous blood pressure monitoring and blood sampling needed.    A 20 gauge (size), 1 and 3/4 inch (length), Angiocath (type) catheter was placed into the Right radial artery, secured by Tegaderm,   Seldinger technique used.  Events:  patient tolerated procedure well with no complications.

## 2023-12-17 NOTE — ANESTHESIA PREPROCEDURE EVALUATION
Patient: Harper Lara    Procedure Information       Date/Time: 12/17/23 1310    Procedure: Exploration Laparoscopy    Location: [facility] / [facility]    Surgeons: Manuel Lozada MD            Relevant Problems   Cardiovascular   (+) Atrial fibrillation (CMS/HCC)   (+) CAD (coronary artery disease)   (+) CHF (congestive heart failure) (CMS/HCC)   (+) HTN (hypertension), benign   (+) Hyperlipidemia      Endocrine   (+) DM2 (diabetes mellitus, type 2) (CMS/[facility])      GI   (+) GERD (gastroesophageal reflux disease)      /Renal   (+) Acute kidney injury (nontraumatic) (CMS/[facility])   (+) Chronic renal insufficiency      Neuro/Psych   (+) Anxiety and depression      Musculoskeletal   (+) Gout, arthritis      Other   (+) Gout, arthritis       Clinical information reviewed:    Allergies  Meds               NPO Detail:  No data recorded     Physical Exam    Airway  Mallampati: III     Cardiovascular - normal exam  Rhythm: irregular  Rate: abnormal     Dental   Comments: Partial lower dentures.   Pulmonary - normal exam     Abdominal - normal exam       Other findings: Per Patient she has been throwing up since yesterday.          Anesthesia Plan    ASA 4 - emergent     general     The patient is not a current smoker.  Patient was not previously instructed to abstain from smoking on day of procedure.  Patient did not smoke on day of procedure.    intravenous induction   Anesthetic plan and risks discussed with patient.

## 2023-12-17 NOTE — PROGRESS NOTES
"Harper Lara is a 78 y.o. female on day 11 of admission presenting with Small bowel obstruction (CMS/HCC).    Subjective   Patient vomited overnight and had nausea this AM; also had loose bowel movements, reporting abdominal pain        Objective     Physical Exam  Gen: tearful,  CV: RRR  Pulm: CTAB  Abd: soft, moderate tenderness in RLQ, distended  Neuro: Alert and oriented x 3  Ext: moves all extremties     Last Recorded Vitals  Blood pressure 119/56, pulse 64, temperature 36.2 °C (97.2 °F), temperature source Temporal, resp. rate 16, height 1.6 m (5' 3\"), weight 97.5 kg (215 lb), SpO2 100 %.  Intake/Output last 3 Shifts:  I/O last 3 completed shifts:  In: 2439.4 (25 mL/kg)   Out: 2 (0 mL/kg) [Stool:2]  Weight: 97.5 kg     Relevant Results                      Assessment/Plan   Principal Problem:    Small bowel obstruction (CMS/HCC)  Active Problems:    Acute kidney injury (nontraumatic) (CMS/HCC)    Chronic renal insufficiency  Persistent SBO; with the persistent nausea, vomiting, abdominal pain and CT scan finding from yesterday, I recommended re-exploration. I spoke with patient and daughter on phone ([phone]). She is consented for diagnostic laparoscopy, possible laparotomy, possible bowel resection and possible colostomy. I explained to her the procedure, risks and complications which include but not limited to bleeding, infection, bowel injury, MI and stroke; all questions answered and willing to proceed.          Manuel Lozada MD      "

## 2023-12-17 NOTE — POST-PROCEDURE NOTE
I was the surgical first assist to Dr. Lozada for Harper Lara's procedure on 12/17/23.    Procedure:   Diagnostic Laparoscopy, open laparotomy, lysis of adhesions, small bowel resection with anastomosis     Roxie Tenorio PA-C

## 2023-12-17 NOTE — ANESTHESIA PROCEDURE NOTES
Airway  Date/Time: 12/17/2023 1:50 PM  Urgency: elective      Staffing  Performed: attending   Authorized by: Johann Vazquez MD    Performed by: Johann Vazquez MD  Patient location during procedure: OR    Indications and Patient Condition  Indications for airway management: anesthesia  Spontaneous ventilation: present  Sedation level: deep  Preoxygenated: yes  MILS not maintained throughout  Mask difficulty assessment: 0 - not attempted  Planned trial extubation    Final Airway Details  Final airway type: endotracheal airway      Successful airway: ETT  Cuffed: yes   Successful intubation technique: video laryngoscopy  Facilitating devices/methods: Bougie  Endotracheal tube insertion site: oral  Blade: Robert  Blade size: #3  ETT size (mm): 7.0  Cormack-Lehane Classification: grade I - full view of glottis  Placement verified by: capnometry and palpation of cuff   Measured from: gums  ETT to gums (cm): 21  Number of attempts at approach: 1  Number of other approaches attempted: 1

## 2023-12-17 NOTE — PROGRESS NOTES
Harper Lara is a 78 y.o. female on day 11 of admission presenting with Small bowel obstruction (CMS/HCC).      Subjective   Had some vomiting overnight. More abd pain today. Not feeling very well. No fever. No chest pain or dyspnea. Occasional liquid BM.       Objective     Last Recorded Vitals  /56 (BP Location: Left arm, Patient Position: Lying)   Pulse 64   Temp 36.2 °C (97.2 °F) (Temporal)   Resp 16   Wt 97.5 kg (215 lb)   SpO2 100%   Intake/Output last 3 Shifts:    Intake/Output Summary (Last 24 hours) at 12/17/2023 1236  Last data filed at 12/16/2023 2200  Gross per 24 hour   Intake 280 ml   Output 1 ml   Net 279 ml         Admission Weight  Weight: 97.5 kg (215 lb) (12/06/23 1117)    Daily Weight  12/06/23 : 97.5 kg (215 lb)    Image Results  CT abdomen pelvis wo IV contrast  Narrative: Interpreted By:  Ramonita Tinajero,   STUDY:  CT ABDOMEN PELVIS WO IV CONTRAST;  12/16/2023 12:40 pm      INDICATION:  Signs/Symptoms:Small bowel obstruction, still symptomatic.      COMPARISON:  12/06/2023      ACCESSION NUMBER(S):  [id number]      ORDERING CLINICIAN:  JOSE ALFREDO VALENCIA      TECHNIQUE:  CT of the abdomen and pelvis was performed.  Contiguous axial images  were obtained at 3 mm slice thickness through the abdomen and pelvis.  Coronal and sagittal reconstructions at 3 mm slice thickness were  performed.   No intravenous or oral contrast agents were administered.      FINDINGS:  Please note that the study is limited without intravenous contrast.      LOWER CHEST:  Lung bases are clear. No pleural effusion is present. Heart is normal  in overall size. There is no pericardial effusion.      ABDOMEN:      LIVER:  No gross focal mass is identified. Liver is nonenlarged.      BILE DUCTS:  Bile ducts are stable in size.      GALLBLADDER:  Gallbladder is absent.      PANCREAS:  Pancreas is atrophic without obvious mass.      SPLEEN:  Spleen is nonenlarged.      ADRENAL GLANDS:  Bilateral adrenal glands appear  normal.      KIDNEYS AND URETERS:  No stones or hydronephrosis are present. Right kidney is ptotic.  Subcentimeter parenchymal hypodensities are too small to characterize.      PELVIS:      BLADDER:  The urinary bladder appears normal without abnormal wall thickening.      REPRODUCTIVE ORGANS:  Uterus is absent.      BOWEL:  Stomach is mildly dilated and contains air and fluid. Duodenal is  also dilated with air and fluid. Small-bowel dilatation is present up  to and including the small bowel anastomosis. Small-bowel dilatation  appears similar to the previous CT. Distal small bowel is nondilated,  also unchanged. Appendix is not identified. Colon is nondilated.  Contrast material is present distal transverse colon to rectum.  Air-fluid level is noted in the rectum.      VESSELS:  Patchy atherosclerosis is present in the aorta. There is no  aneurysmal dilatation. Vena cava is normal in size.      PERITONEUM/RETROPERITONEUM/LYMPH NODES:  No ascites or pneumoperitoneum is identified. There is no mesenteric  inflammation. No retroperitoneal lymph nodes are pathologically  enlarged.      ABDOMINAL WALL:  Small left inguinal hernia contains fat. An oval subcutaneous  circumscribed mass to the left of the umbilicus has an internal  density of about 60 Hounsfield units and is probably a subcutaneous  hematoma. This has an axial long axis of about 3 cm.      BONES:  Bones are very osteopenic. Sclerosis in the L2 and L3 vertebra  appears to be degenerative. Severe hypertrophy is noted involving  lumbar facet joints. Hypertrophic bone changes produce severe canal  and lateral recess stenosis at L2-3 level.      Impression: 1.  Dilated stomach, duodenal and small bowel are similar to the  prior study. Dilatation extends to and includes the small bowel  anastomosis but the distal most small bowel, including the terminal  ileum, is nondilated.  2. Residual contrast from preceding small-bowel study is present in  the distal colon  with an air-fluid level in the rectum indicative of  diarrhea.          MACRO:  None      Signed by: Ramonita Tinajero 12/16/2023 1:32 PM  Dictation workstation:   [id number]      Physical Exam  Constitutional:       Appearance: She is obese.   HENT:      Head: Normocephalic and atraumatic.      Nose: Nose normal.      Mouth/Throat:      Mouth: Mucous membranes are dry.   Eyes:      Extraocular Movements: Extraocular movements intact.      Conjunctiva/sclera: Conjunctivae normal.   Cardiovascular:      Rate and Rhythm: Normal rate and regular rhythm.      Pulses: Normal pulses.      Heart sounds: Normal heart sounds.   Pulmonary:      Effort: Pulmonary effort is normal.      Breath sounds: Normal breath sounds.   Abdominal:      Palpations: Abdomen is soft.      Tenderness: There is abdominal tenderness.      Comments: Worse today   Musculoskeletal:         General: Normal range of motion.      Cervical back: Normal range of motion and neck supple.   Skin:     General: Skin is warm and dry.   Neurological:      Mental Status: She is alert. Mental status is at baseline.   Psychiatric:         Mood and Affect: Mood normal.       Relevant Results               Assessment/Plan          78 y.o. female presenting with past medical history of type 2 diabetes mellitus, fatty liver, hyperlipidemia, hypertension, A-fib status post ablation, hypothyroidism, anxiety and depression, history of GI bleed comes to the hospital with abdominal pain and vomiting for past few days.  Patient found to have SBO on the imaging.  SBFT done showed high-grade obstruction.  On 12/8/2023 the patient underwent extensive laparoscopic lysis of adhesions for 2 hours and small intestinal resection and anastomosis.  Patient is POD #3 and no return of bowel function yet.  Continue with IV hydration.  Will reach out to dietary for  parenteral nutrition recommendations at this time.        Principal Problem:    Small bowel obstruction (CMS/HCC)  Active  Problems:    Acute kidney injury (nontraumatic) (CMS/HCC)    Chronic renal insufficiency    Small bowel obstruction  -As seen on CT scan of abdomen pelvis and also on SBFT.  -Status post extensive laparoscopic lysis of adhesions and laparoscopic small intestine resection and anastomosis on 12/8/2023.  -Continue with IV hydration and maintain n.p.o. status at this time.  IV fluids switched to to potassium containing fluids.  -Surgery has been consulted and NG tube in place.  Awaiting return of bowel function at this time.  -Due to prolonged n.p.o. status will consult dietary for parenteral nutrition recommendations.     SOCORRO with underlying CKD stage III  -Creatinine noted 1.81 baseline is around 1.1.  Improved  -Continue with IV hydration.    Hypernatremia  -Will adjust IV fluids and continue to maintain potassium and IV fluids.     Hypokalemia  -Replace IV and monitor.      Atrial fibrillation  -Since NPO will add IV lopressor every 6 hours. Rate controlled at this time  -The patient is chest pain-free at this time.  Continue to monitor.     Hypothyroidism  -Not able to resume oral Synthroid at this time.  Will continue with reduced dose of IV Synthroid.     Chronic systolic congestive heart failure  -Last echo from 2021 shows LVEF of 40%.  -Monitor volume status     Diabetes mellitus type 2  -Hold home oral antidiabetics  -Continue with sliding scale insulin n.p.o. protocol.     VTE prophylaxis: SCDs patient history of GI bleed    PT OT     12/17/23:  With vomiting last night, worsening pain, d/w Dr. Lozada - plan to return to the OR today for re-exploration  Cont PPN, IVF for now  Further planning pending OR today  Hold veronika Hall MD

## 2023-12-18 LAB
ANION GAP BLDA CALCULATED.4IONS-SCNC: 12 MMO/L (ref 10–25)
ANION GAP SERPL CALC-SCNC: 11 MMOL/L (ref 10–20)
BASE EXCESS BLDA CALC-SCNC: -3.2 MMOL/L (ref -2–3)
BASOPHILS # BLD MANUAL: 0 X10*3/UL (ref 0–0.1)
BASOPHILS NFR BLD MANUAL: 0 %
BODY TEMPERATURE: ABNORMAL
BUN SERPL-MCNC: 38 MG/DL (ref 6–23)
CA-I BLDA-SCNC: 1.21 MMOL/L (ref 1.1–1.33)
CALCIUM SERPL-MCNC: 7.5 MG/DL (ref 8.6–10.3)
CHLORIDE BLDA-SCNC: 101 MMOL/L (ref 98–107)
CHLORIDE SERPL-SCNC: 102 MMOL/L (ref 98–107)
CO2 SERPL-SCNC: 22 MMOL/L (ref 21–32)
CREAT SERPL-MCNC: 0.86 MG/DL (ref 0.5–1.05)
EOSINOPHIL # BLD MANUAL: 0 X10*3/UL (ref 0–0.4)
EOSINOPHIL NFR BLD MANUAL: 0 %
ERYTHROCYTE [DISTWIDTH] IN BLOOD BY AUTOMATED COUNT: 15.4 % (ref 11.5–14.5)
GFR SERPL CREATININE-BSD FRML MDRD: 70 ML/MIN/1.73M*2
GLUCOSE BLD MANUAL STRIP-MCNC: 127 MG/DL (ref 74–99)
GLUCOSE BLD MANUAL STRIP-MCNC: 131 MG/DL (ref 74–99)
GLUCOSE BLD MANUAL STRIP-MCNC: 154 MG/DL (ref 74–99)
GLUCOSE BLD MANUAL STRIP-MCNC: 156 MG/DL (ref 74–99)
GLUCOSE BLD MANUAL STRIP-MCNC: 172 MG/DL (ref 74–99)
GLUCOSE BLD MANUAL STRIP-MCNC: 181 MG/DL (ref 74–99)
GLUCOSE BLDA-MCNC: 146 MG/DL (ref 74–99)
GLUCOSE SERPL-MCNC: 190 MG/DL (ref 74–99)
HCO3 BLDA-SCNC: 20.6 MMOL/L (ref 22–26)
HCT VFR BLD AUTO: 24.9 % (ref 36–46)
HCT VFR BLD EST: 26 % (ref 36–46)
HGB BLD-MCNC: 8.3 G/DL (ref 12–16)
HGB BLDA-MCNC: 8.7 G/DL (ref 12–16)
IMM GRANULOCYTES # BLD AUTO: 0.01 X10*3/UL (ref 0–0.5)
IMM GRANULOCYTES NFR BLD AUTO: 0.3 % (ref 0–0.9)
INHALED O2 CONCENTRATION: 50 %
LACTATE BLDA-SCNC: 2.6 MMOL/L (ref 0.4–2)
LACTATE SERPL-SCNC: 1.6 MMOL/L (ref 0.4–2)
LYMPHOCYTES # BLD MANUAL: 0.13 X10*3/UL (ref 0.8–3)
LYMPHOCYTES NFR BLD MANUAL: 4 %
MAGNESIUM SERPL-MCNC: 2.26 MG/DL (ref 1.6–2.4)
MCH RBC QN AUTO: 29.1 PG (ref 26–34)
MCHC RBC AUTO-ENTMCNC: 33.3 G/DL (ref 32–36)
MCV RBC AUTO: 87 FL (ref 80–100)
METAMYELOCYTES # BLD MANUAL: 0.03 X10*3/UL
METAMYELOCYTES NFR BLD MANUAL: 1 %
MONOCYTES # BLD MANUAL: 0.1 X10*3/UL (ref 0.05–0.8)
MONOCYTES NFR BLD MANUAL: 3 %
NEUTROPHILS # BLD MANUAL: 2.95 X10*3/UL (ref 1.6–5.5)
NEUTS BAND # BLD MANUAL: 0.9 X10*3/UL (ref 0–0.5)
NEUTS BAND NFR BLD MANUAL: 28 %
NEUTS SEG # BLD MANUAL: 2.05 X10*3/UL (ref 1.6–5)
NEUTS SEG NFR BLD MANUAL: 64 %
NRBC BLD-RTO: 0 /100 WBCS (ref 0–0)
OVALOCYTES BLD QL SMEAR: ABNORMAL
OXYHGB MFR BLDA: 99.2 % (ref 94–98)
PCO2 BLDA: 31 MM HG (ref 38–42)
PEEP CMH2O: 5 CM H2O
PH BLDA: 7.43 PH (ref 7.38–7.42)
PHOSPHATE SERPL-MCNC: 3.1 MG/DL (ref 2.5–4.9)
PLATELET # BLD AUTO: 152 X10*3/UL (ref 150–450)
PLATELET CLUMP BLD QL SMEAR: PRESENT
PO2 BLDA: 195 MM HG (ref 85–95)
POTASSIUM BLDA-SCNC: 4.7 MMOL/L (ref 3.5–5.3)
POTASSIUM SERPL-SCNC: 4.3 MMOL/L (ref 3.5–5.3)
PRESSURE SUPPORT: 5 CM H2O
RBC # BLD AUTO: 2.85 X10*6/UL (ref 4–5.2)
RBC MORPH BLD: ABNORMAL
SAO2 % BLDA: 100 % (ref 94–100)
SCHISTOCYTES BLD QL SMEAR: ABNORMAL
SODIUM BLDA-SCNC: 129 MMOL/L (ref 136–145)
SODIUM SERPL-SCNC: 131 MMOL/L (ref 136–145)
SPECIMEN DRAWN FROM PATIENT: ABNORMAL
TOTAL CELLS COUNTED BLD: 100
VENTILATOR MODE: ABNORMAL
WBC # BLD AUTO: 3.2 X10*3/UL (ref 4.4–11.3)

## 2023-12-18 PROCEDURE — 2500000005 HC RX 250 GENERAL PHARMACY W/O HCPCS: Performed by: INTERNAL MEDICINE

## 2023-12-18 PROCEDURE — C9113 INJ PANTOPRAZOLE SODIUM, VIA: HCPCS | Performed by: INTERNAL MEDICINE

## 2023-12-18 PROCEDURE — 82947 ASSAY GLUCOSE BLOOD QUANT: CPT

## 2023-12-18 PROCEDURE — 37799 UNLISTED PX VASCULAR SURGERY: CPT

## 2023-12-18 PROCEDURE — 83735 ASSAY OF MAGNESIUM: CPT

## 2023-12-18 PROCEDURE — 83605 ASSAY OF LACTIC ACID: CPT

## 2023-12-18 PROCEDURE — 94003 VENT MGMT INPAT SUBQ DAY: CPT

## 2023-12-18 PROCEDURE — 2020000001 HC ICU ROOM DAILY

## 2023-12-18 PROCEDURE — 80048 BASIC METABOLIC PNL TOTAL CA: CPT

## 2023-12-18 PROCEDURE — 99291 CRITICAL CARE FIRST HOUR: CPT | Performed by: INTERNAL MEDICINE

## 2023-12-18 PROCEDURE — 99232 SBSQ HOSP IP/OBS MODERATE 35: CPT

## 2023-12-18 PROCEDURE — 2500000004 HC RX 250 GENERAL PHARMACY W/ HCPCS (ALT 636 FOR OP/ED): Performed by: INTERNAL MEDICINE

## 2023-12-18 PROCEDURE — 2500000005 HC RX 250 GENERAL PHARMACY W/O HCPCS

## 2023-12-18 PROCEDURE — 85007 BL SMEAR W/DIFF WBC COUNT: CPT

## 2023-12-18 PROCEDURE — 84132 ASSAY OF SERUM POTASSIUM: CPT | Performed by: INTERNAL MEDICINE

## 2023-12-18 PROCEDURE — 84100 ASSAY OF PHOSPHORUS: CPT

## 2023-12-18 PROCEDURE — 85027 COMPLETE CBC AUTOMATED: CPT

## 2023-12-18 PROCEDURE — 2500000004 HC RX 250 GENERAL PHARMACY W/ HCPCS (ALT 636 FOR OP/ED): Performed by: SURGERY

## 2023-12-18 RX ADMIN — INSULIN LISPRO 1 UNITS: 100 INJECTION, SOLUTION INTRAVENOUS; SUBCUTANEOUS at 00:09

## 2023-12-18 RX ADMIN — SODIUM CHLORIDE, POTASSIUM CHLORIDE, SODIUM LACTATE AND CALCIUM CHLORIDE 50 ML/HR: 600; 310; 30; 20 INJECTION, SOLUTION INTRAVENOUS at 20:32

## 2023-12-18 RX ADMIN — INSULIN LISPRO 1 UNITS: 100 INJECTION, SOLUTION INTRAVENOUS; SUBCUTANEOUS at 08:21

## 2023-12-18 RX ADMIN — LEVOTHYROXINE SODIUM 38 MCG: 20 INJECTION, SOLUTION INTRAVENOUS at 08:23

## 2023-12-18 RX ADMIN — PANTOPRAZOLE SODIUM 40 MG: 40 INJECTION, POWDER, FOR SOLUTION INTRAVENOUS at 06:34

## 2023-12-18 RX ADMIN — METOPROLOL TARTRATE 2.5 MG: 5 INJECTION INTRAVENOUS at 18:17

## 2023-12-18 RX ADMIN — CEFAZOLIN SODIUM 2 G: 2 INJECTION, SOLUTION INTRAVENOUS at 09:11

## 2023-12-18 RX ADMIN — INSULIN LISPRO 1 UNITS: 100 INJECTION, SOLUTION INTRAVENOUS; SUBCUTANEOUS at 20:33

## 2023-12-18 RX ADMIN — I.V. FAT EMULSION 250 ML: 20 EMULSION INTRAVENOUS at 08:22

## 2023-12-18 RX ADMIN — FENTANYL CITRATE 75 MCG/HR: 0.05 INJECTION, SOLUTION INTRAMUSCULAR; INTRAVENOUS at 01:31

## 2023-12-18 RX ADMIN — METRONIDAZOLE 500 MG: 500 INJECTION, SOLUTION INTRAVENOUS at 11:44

## 2023-12-18 RX ADMIN — ASCORBIC ACID, VITAMIN A PALMITATE, CHOLECALCIFEROL, THIAMINE HYDROCHLORIDE, RIBOFLAVIN-5 PHOSPHATE SODIUM, PYRIDOXINE HYDROCHLORIDE, NIACINAMIDE, DEXPANTHENOL, ALPHA-TOCOPHEROL ACETATE, VITAMIN K1, FOLIC ACID, BIOTIN, CYANOCOBALAMIN: 200; 3300; 200; 6; 3.6; 6; 40; 15; 10; 150; 600; 60; 5 INJECTION, SOLUTION INTRAVENOUS at 01:33

## 2023-12-18 RX ADMIN — CEFAZOLIN SODIUM 2 G: 2 INJECTION, SOLUTION INTRAVENOUS at 02:26

## 2023-12-18 RX ADMIN — METRONIDAZOLE 500 MG: 500 INJECTION, SOLUTION INTRAVENOUS at 03:02

## 2023-12-18 RX ADMIN — INSULIN LISPRO 1 UNITS: 100 INJECTION, SOLUTION INTRAVENOUS; SUBCUTANEOUS at 03:36

## 2023-12-18 ASSESSMENT — PAIN SCALES - GENERAL: PAINLEVEL_OUTOF10: 0 - NO PAIN

## 2023-12-18 NOTE — PROGRESS NOTES
Physical Therapy                 Therapy Communication Note    Patient Name: Harper Lara  MRN: [id number]  Today's Date: 12/18/2023     Discipline: Physical Therapy    Missed Visit Reason: Missed Visit Reason:  (Patient currently intubated)    Missed Time: Attempt    Comment:  Patient transferred to SICU on 12/17.  She underwent diagnostic laparoscopy; open laparotomy; lysis of adhesions; small bowel resection with anastomosis.   She is currently intubated and will need new therapy orders to continue with PT once medically cleared.

## 2023-12-18 NOTE — CARE PLAN
The patient's goals for the shift include  pass SBT, extubate and removal of restraints.    The clinical goals for the shift include pass SBT, extubate and removal of restraints.

## 2023-12-18 NOTE — PROGRESS NOTES
Critical Care Daily Progress        Subjective   Patient is a 78 y.o. female admitted on 12/6/2023  1:11 PM with the following indication(s) for ICU care: Small bowel obstruction and acute kidney injury    Interval History: Patient with h/o T2DM, fatty liver, DLP, HTN, Afib s/p ablation, GI bleed, anxiety/depression, hypothyroidism, who p/w abdominal pain x few days associated with N/V. Found to have SBO. Initially had surgery and adhesionolysis done on 128,, however, given no improvement s/p repeat laparoscopy that was converted to laparotomy with extensive adhesionolysis on 12/17. Post surgery hypotensive, admitted to the ICU for further management.     12/18/2023: Patient was placed on sedation vacation followed by spontaneous breathing trial.  Patient passing the spontaneous breathing trial    Scheduled Medications:   [Held by provider] atorvastatin, 40 mg, oral, Nightly  ceFAZolin, 2 g, intravenous, q8h  fat emulsion-plant based, 250 mL, intravenous, Once per day on Mon Wed Fri  insulin lispro, 0-5 Units, subcutaneous, q4h  levothyroxine, 38 mcg, intravenous, q24h ORLY  [Held by provider] levothyroxine, 75 mcg, oral, Daily  metoprolol, 2.5 mg, intravenous, q6h  metroNIDAZOLE, 500 mg, intravenous, q8h  pantoprazole, 40 mg, intravenous, Daily before breakfast  [Held by provider] polyethylene glycol, 17 g, oral, Daily  [Held by provider] rivaroxaban, 15 mg, oral, Daily with evening meal         Continuous Medications:   Adult Clinimix Parenteral Nutrition, 70 mL/hr, Last Rate: 70 mL/hr (12/18/23 0600)  fentaNYL, 0-300 mcg/hr, Last Rate: 25 mcg/hr (12/18/23 0600)  lactated Ringer's, 50 mL/hr, Last Rate: 50 mL/hr (12/18/23 0600)  norepinephrine, 0.01-0.5 mcg/kg/min, Last Rate: Stopped (12/17/23 1901)         PRN Medications:   PRN medications: dextrose 10 % in water (D10W), dextrose, fentaNYL, glucagon, ondansetron **OR** ondansetron    Objective   Vitals:  Most Recent:  Vitals:    12/18/23 0728   BP:    Pulse:     Resp: 19   Temp:    SpO2:        24hr Min/Max:  Temp  Min: 35.5 °C (95.9 °F)  Max: 36.9 °C (98.4 °F)  Pulse  Min: 64  Max: 88  BP  Min: 119/56  Max: 119/56  Resp  Min: 13  Max: 28  SpO2  Min: 70 %  Max: 100 %    LDA:  Arterial Line 12/17/23 Right Radial (Active)   Placement Date/Time: 12/17/23 (c) 1353   Size: 20 G  Orientation: Right  Location: Radial  Securement Method: Transparent dressing  Patient Tolerance: Tolerated well   Number of days: 0       ETT  7.5 mm (Active)   Placement Date/Time: 12/17/23 1710   Hand Hygiene Completed: Yes  ETT Type: ETT - single  Single Lumen Tube Size: 7.5 mm  Cuffed: Yes  Location: Oral   Number of days: 0       Urethral Catheter (Active)   Placement Date/Time: 12/17/23 (c) 1635   Hand Hygiene Completed: Yes  Catheter Balloon Size: 5 mL   Number of days: 0       Closed/Suction Drain Lateral RUQ Bulb 19 Fr. (Active)   Placement Date/Time: 12/17/23 1732   Orientation: Lateral  Location: RUQ  Drain Tube Type: Bulb  Size (Fr.): 19 Fr.   Number of days: 0       NG/OG/Feeding Tube NG - [place] sump Left nostril (Active)   Placement Date/Time: 12/17/23 1700   Type of Tube: NG/OG Tube  Tube Type: NG - [place] sump  Tube Location: Left nostril   Number of days: 0         Vent settings:  Vent Mode: Volume control/assist control  FiO2 (%):  [70 %-100 %] 70 %  S RR:  [16] 16  S VT:  [450 mL] 450 mL  PEEP/CPAP (cm H2O):  [5 cm H20] 5 cm H20  MAP (cm H2O):  [5-13] 7.2    Hemodynamic parameters for last 24 hours:       I/O:    Intake/Output Summary (Last 24 hours) at 12/18/2023 0901  Last data filed at 12/18/2023 0600  Gross per 24 hour   Intake 6838.1 ml   Output 2100 ml   Net 4738.1 ml       Physical Exam:   Physical Exam   Constitutional:       General: She is not in acute distress.     Appearance: Normal appearance. She is normal weight.   HENT:      Head: Normocephalic and atraumatic.      Nose:      Comments: NG in place     Mouth/Throat:      Comments: ET in place.   Eyes:      General: No  scleral icterus.     Extraocular Movements: Extraocular movements intact.      Pupils: Pupils are equal, round, and reactive to light.   Cardiovascular:      Rate and Rhythm: Normal rate and regular rhythm.      Heart sounds: Normal heart sounds. No murmur heard.     No friction rub. No gallop.   Pulmonary:      Effort: Pulmonary effort is normal. No respiratory distress.      Breath sounds: Normal breath sounds. No wheezing or rales.      Comments: Intubated on MV  Abdominal:      Abdomen is closed     Tenderness: There is abdominal tenderness. There is no guarding.     Musculoskeletal:         General: No swelling or tenderness. Normal range of motion.      Cervical back: Normal range of motion and neck supple. No rigidity.      Right lower leg: No edema.      Left lower leg: No edema.   Lymphadenopathy:      Cervical: No cervical adenopathy.   Skin:     General: Skin is warm and dry.      Coloration: Skin is not jaundiced.   Neurological:      General: No focal deficit present.      Mental Status: She is alert.      Cranial Nerves: No cranial nerve deficit.      Motor: No weakness.      Comments: Awake and alert, follows commands, moves all extremities.      Lab/Radiology/Diagnostic Review:  Results for orders placed or performed during the hospital encounter of 12/06/23 (from the past 24 hour(s))   POCT GLUCOSE   Result Value Ref Range    POCT Glucose 140 (H) 74 - 99 mg/dL   POCT GLUCOSE   Result Value Ref Range    POCT Glucose 161 (H) 74 - 99 mg/dL   CBC   Result Value Ref Range    WBC 2.8 (L) 4.4 - 11.3 x10*3/uL    nRBC 0.0 0.0 - 0.0 /100 WBCs    RBC 3.66 (L) 4.00 - 5.20 x10*6/uL    Hemoglobin 10.7 (L) 12.0 - 16.0 g/dL    Hematocrit 32.4 (L) 36.0 - 46.0 %    MCV 89 80 - 100 fL    MCH 29.2 26.0 - 34.0 pg    MCHC 33.0 32.0 - 36.0 g/dL    RDW 15.4 (H) 11.5 - 14.5 %    Platelets 184 150 - 450 x10*3/uL   Basic Metabolic Panel   Result Value Ref Range    Glucose 190 (H) 74 - 99 mg/dL    Sodium 129 (L) 136 - 145  mmol/L    Potassium 3.2 (L) 3.5 - 5.3 mmol/L    Chloride 103 98 - 107 mmol/L    Bicarbonate 20 (L) 21 - 32 mmol/L    Anion Gap 9 (L) 10 - 20 mmol/L    Urea Nitrogen 38 (H) 6 - 23 mg/dL    Creatinine 0.79 0.50 - 1.05 mg/dL    eGFR 78 >60 mL/min/1.73m*2    Calcium 6.9 (L) 8.6 - 10.3 mg/dL   Magnesium   Result Value Ref Range    Magnesium 1.39 (L) 1.60 - 2.40 mg/dL   POCT GLUCOSE   Result Value Ref Range    POCT Glucose 163 (H) 74 - 99 mg/dL   POCT GLUCOSE   Result Value Ref Range    POCT Glucose 181 (H) 74 - 99 mg/dL   CBC and Auto Differential   Result Value Ref Range    WBC 3.2 (L) 4.4 - 11.3 x10*3/uL    nRBC 0.0 0.0 - 0.0 /100 WBCs    RBC 2.85 (L) 4.00 - 5.20 x10*6/uL    Hemoglobin 8.3 (L) 12.0 - 16.0 g/dL    Hematocrit 24.9 (L) 36.0 - 46.0 %    MCV 87 80 - 100 fL    MCH 29.1 26.0 - 34.0 pg    MCHC 33.3 32.0 - 36.0 g/dL    RDW 15.4 (H) 11.5 - 14.5 %    Platelets 152 150 - 450 x10*3/uL    Immature Granulocytes %, Automated 0.3 0.0 - 0.9 %    Immature Granulocytes Absolute, Automated 0.01 0.00 - 0.50 x10*3/uL   Phosphorus   Result Value Ref Range    Phosphorus 3.1 2.5 - 4.9 mg/dL   Basic metabolic panel   Result Value Ref Range    Glucose 190 (H) 74 - 99 mg/dL    Sodium 131 (L) 136 - 145 mmol/L    Potassium 4.3 3.5 - 5.3 mmol/L    Chloride 102 98 - 107 mmol/L    Bicarbonate 22 21 - 32 mmol/L    Anion Gap 11 10 - 20 mmol/L    Urea Nitrogen 38 (H) 6 - 23 mg/dL    Creatinine 0.86 0.50 - 1.05 mg/dL    eGFR 70 >60 mL/min/1.73m*2    Calcium 7.5 (L) 8.6 - 10.3 mg/dL   Magnesium   Result Value Ref Range    Magnesium 2.26 1.60 - 2.40 mg/dL   Manual Differential   Result Value Ref Range    Neutrophils %, Manual 64.0 40.0 - 80.0 %    Bands %, Manual 28.0 0.0 - 5.0 %    Lymphocytes %, Manual 4.0 13.0 - 44.0 %    Monocytes %, Manual 3.0 2.0 - 10.0 %    Eosinophils %, Manual 0.0 0.0 - 6.0 %    Basophils %, Manual 0.0 0.0 - 2.0 %    Metamyelocytes %, Manual 1.0 0.0 - 0.0 %    Seg Neutrophils Absolute, Manual 2.05 1.60 - 5.00  x10*3/uL    Bands Absolute, Manual 0.90 (H) 0.00 - 0.50 x10*3/uL    Lymphocytes Absolute, Manual 0.13 (L) 0.80 - 3.00 x10*3/uL    Monocytes Absolute, Manual 0.10 0.05 - 0.80 x10*3/uL    Eosinophils Absolute, Manual 0.00 0.00 - 0.40 x10*3/uL    Basophils Absolute, Manual 0.00 0.00 - 0.10 x10*3/uL    Metamyelocytes Absolute, Manual 0.03 0.00 - 0.00 x10*3/uL    Total Cells Counted 100     Neutrophils Absolute, Manual 2.95 1.60 - 5.50 x10*3/uL    RBC Morphology See Below     RBC Fragments Few     Ovalocytes Few     Clumped Platelets Present    POCT GLUCOSE   Result Value Ref Range    POCT Glucose 172 (H) 74 - 99 mg/dL   POCT GLUCOSE   Result Value Ref Range    POCT Glucose 156 (H) 74 - 99 mg/dL     XR chest 1 view    Result Date: 12/18/2023  Interpreted By:  Schoenberger, Joseph, STUDY: XR CHEST 1 VIEW;  12/17/2023 6:11 pm   INDICATION: Signs/Symptoms:post intubation..   COMPARISON: 12/06/2023   ACCESSION NUMBER(S): [id number]   ORDERING CLINICIAN: ORLIN NOGUERA   FINDINGS: Nasogastric tube is now directed anteriorly to the gastric antrum. Endotracheal tube in good position 3.7 cm above the guillermina.       CARDIOMEDIASTINAL SILHOUETTE: Cardiomediastinal silhouette is normal in size and configuration.   LUNGS: No new focal lung opacities.   ABDOMEN: No remarkable upper abdominal findings.   BONES: No acute osseous changes.       1.  No acute findings. Satisfactory position of tubes and lines.       MACRO: None   Signed by: Joseph Schoenberger 12/18/2023 8:14 AM Dictation workstation:   [id number]    CT abdomen pelvis wo IV contrast    Result Date: 12/16/2023  Interpreted By:  Ramonita Tinajero, STUDY: CT ABDOMEN PELVIS WO IV CONTRAST;  12/16/2023 12:40 pm   INDICATION: Signs/Symptoms:Small bowel obstruction, still symptomatic.   COMPARISON: 12/06/2023   ACCESSION NUMBER(S): [id number]   ORDERING CLINICIAN: JOSE ALFREDO VALENCIA   TECHNIQUE: CT of the abdomen and pelvis was performed.  Contiguous axial images were obtained  at 3 mm slice thickness through the abdomen and pelvis. Coronal and sagittal reconstructions at 3 mm slice thickness were performed.   No intravenous or oral contrast agents were administered.   FINDINGS: Please note that the study is limited without intravenous contrast.   LOWER CHEST: Lung bases are clear. No pleural effusion is present. Heart is normal in overall size. There is no pericardial effusion.   ABDOMEN:   LIVER: No gross focal mass is identified. Liver is nonenlarged.   BILE DUCTS: Bile ducts are stable in size.   GALLBLADDER: Gallbladder is absent.   PANCREAS: Pancreas is atrophic without obvious mass.   SPLEEN: Spleen is nonenlarged.   ADRENAL GLANDS: Bilateral adrenal glands appear normal.   KIDNEYS AND URETERS: No stones or hydronephrosis are present. Right kidney is ptotic. Subcentimeter parenchymal hypodensities are too small to characterize.   PELVIS:   BLADDER: The urinary bladder appears normal without abnormal wall thickening.   REPRODUCTIVE ORGANS: Uterus is absent.   BOWEL: Stomach is mildly dilated and contains air and fluid. Duodenal is also dilated with air and fluid. Small-bowel dilatation is present up to and including the small bowel anastomosis. Small-bowel dilatation appears similar to the previous CT. Distal small bowel is nondilated, also unchanged. Appendix is not identified. Colon is nondilated. Contrast material is present distal transverse colon to rectum. Air-fluid level is noted in the rectum.   VESSELS: Patchy atherosclerosis is present in the aorta. There is no aneurysmal dilatation. Vena cava is normal in size.   PERITONEUM/RETROPERITONEUM/LYMPH NODES: No ascites or pneumoperitoneum is identified. There is no mesenteric inflammation. No retroperitoneal lymph nodes are pathologically enlarged.   ABDOMINAL WALL: Small left inguinal hernia contains fat. An oval subcutaneous circumscribed mass to the left of the umbilicus has an internal density of about 60 Hounsfield units  and is probably a subcutaneous hematoma. This has an axial long axis of about 3 cm.   BONES: Bones are very osteopenic. Sclerosis in the L2 and L3 vertebra appears to be degenerative. Severe hypertrophy is noted involving lumbar facet joints. Hypertrophic bone changes produce severe canal and lateral recess stenosis at L2-3 level.       1.  Dilated stomach, duodenal and small bowel are similar to the prior study. Dilatation extends to and includes the small bowel anastomosis but the distal most small bowel, including the terminal ileum, is nondilated. 2. Residual contrast from preceding small-bowel study is present in the distal colon with an air-fluid level in the rectum indicative of diarrhea.     MACRO: None   Signed by: Ramonita Tinajero 12/16/2023 1:32 PM Dictation workstation:   [id number]    FL small bowel series    Result Date: 12/12/2023  Interpreted By:  Salas Altamirano, STUDY: FL SMALL BOWEL SERIES;  12/12/2023 8:31 am   INDICATION: Signs/Symptoms:SBO.   COMPARISON: Previous small bowel series of 12/07/2023 and CT examination of 12/06/2023.   ACCESSION NUMBER(S): [id number]   ORDERING CLINICIAN: TARAN TRAYLOR   TECHNIQUE: A small bowel series is performed utilizing 360 cc of Gastrografin through a pre-existing nasogastric tube. Serial radiographs of the abdomen and pelvis are performed following the infusion of contrast.   FINDINGS: 2 preliminary radiographs of the abdomen and pelvis demonstrate surgical sutures in the right lower quadrant. Dilated air-filled small bowel loops are identified in the left abdomen reaching 5.8 cm in diameter. A nasogastric tube tip on the imaged performed 4:40 p.m. is located in the distal esophagus. A subsequent radiograph performed 5:09 p.m. confirms the tip of the tube coiled in the stomach fundus.   Following the ingestion of contrast there is prompt opacification of the stomach fundus and proximal body. Contrast remains in the proximal stomach from 0 minutes 290  minutes after contrast ingestion.   A 3 hour portable radiograph demonstrates contrast throughout the small bowel. There is mild small bowel dilatation reaching 4.2 cm. Contrast is identified in the colon from the cecum to the proximal descending colon.   A 6 hour portable radiograph demonstrates contrast throughout the stomach, small bowel, and colon to the level of the rectum. Small-bowel distention in the left abdomen reaches 4.8 cm.   The visualized small and large bowel full patterns are within normal limits. There is no obvious filling defects or displacement of opacified bowel. There is no sign of contrast extravasation.       Initial delay passage of contrast from the stomach from 0-90 minutes.   On the 3 hour delayed image there is contrast throughout the small bowel and colon. The small bowel transit time is likely between 2 and 3 hours.   Persistent proximal small bowel distention reaching 4.8 cm on delayed images. This appearance could be related to adynamic postoperative ileus. Residual small-bowel obstruction is less likely. Continued clinical surveillance is recommended.   Signed by: Salas Altamirano 12/12/2023 9:48 AM Dictation workstation:   [id number]    Bedside PICC Imaging    Result Date: 12/11/2023  These images are not reportable by radiology and will not be interpreted by  Radiologists.    XR abdomen 1 view    Result Date: 12/8/2023  Interpreted By:  David Collado, STUDY: Abdominal series dated  12/8/2023.   INDICATION: Enteric tube placement.   COMPARISON: None.   ACCESSION NUMBER(S): [id number]   ORDERING CLINICIAN: GRZEGORZ TABOR   TECHNIQUE: AP radiograph of the abdomen with subtraction imaging.   FINDINGS: Exam is limited due to incomplete inclusion the entirety of the abdomen in the field of view. There is an enteric tube with the tip and side port projecting over the left upper quadrant. Reticular increased density is seen over the left upper quadrant which may be related to  contrast installation. There is some increased density over the lower abdomen which could relate to some of the oral contrast from prior small-bowel follow-through; otherwise, the majority of this contrast does not appear to be present. There is a prominent loop of bowel over the mid abdomen measuring up to 5.6 cm. This likely represents a loop of small bowel. Surgical changes are seen over the right lower quadrant. Visualized lung bases are clear. Degenerative changes seen of the spine.       1. Enteric tube as above. 2. Dilated loop of bowel in the mid abdomen likely representing a dilated loop of small bowel compatible with obstructive process further discussed on the 12/06/2023 CT and the 12/07/2023 small-bowel follow-through. 3. Although there may be some retained oral contrast from prior small-bowel follow-through the majority of this contrast is not evident. It is uncertain whether this is due to passage versus enteric tube suction of the contrast. Clinical correlation may be helpful.   Signed by: David Collado 12/8/2023 9:42 PM Dictation workstation:   [id number]    FL small bowel series    Result Date: 12/8/2023  Interpreted By:  Jimbo Andersen, STUDY: FL SMALL BOWEL SERIES; ;  12/7/2023 8:25 pm   INDICATION: Signs/Symptoms:SBO.   COMPARISON: CT abdomen/pelvis of 12/06/2023.   ACCESSION NUMBER(S): [id number]   ORDERING CLINICIAN: VIJI LUNA   TECHNIQUE: Small bowel series was performed with administration of enteric contrast through an NG tube. Sequential radiographs of the abdomen were obtained over a 10 hour period.   FINDINGS:  view of the abdomen shows clear lung bases and NG tube tip projecting at the gastric fundus level. There are moderately dilated central abdominal small bowel segments similar in configuration to prior CT with an overall bowel gas pattern suggestive of obstruction. A right lower quadrant suture line is present.   Enteric contrast was administered through the NG tube  and promptly opacified the stomach. There was no progression of contrast from the stomach over the initial 4 hours of the exam.   On 6 hour image, there was passage of a portion of the contrast from the stomach, now opacifying the moderately dilated central abdominal small bowel segments. On subsequent images at 8 hour and 10 hour, there has been no further progression of the enteric contrast. The enteric contrast remains within the dilated stomach as well as within the dilated central abdominal small bowel. Contrast has not reached the distal decompressed small bowel segments or colon.       Small-bowel series compatible with high-grade small bowel obstruction similar to prior CT.     MACRO: None   Signed by: Jimbo Andersen 12/8/2023 8:03 AM Dictation workstation:   [id number]    XR chest 1 view    Result Date: 12/6/2023  Interpreted By:  Jonah Sweet, STUDY: XR CHEST 1 VIEW;  12/6/2023 3:16 pm   INDICATION: Signs/Symptoms:confirm NG placement.   COMPARISON: Earlier same day   ACCESSION NUMBER(S): [id number]   ORDERING CLINICIAN: ANTONIO KILPATRICK   FINDINGS: Please see the impression       1.  NG tube ends in the gastric fundus.   Signed by: Jonah Sweet 12/6/2023 3:20 PM Dictation workstation:   [id number]    CT abdomen pelvis wo IV contrast    Result Date: 12/6/2023  Interpreted By:  Carlos Augustin, STUDY: CT ABDOMEN PELVIS WO IV CONTRAST;  12/6/2023 11:59 am   INDICATION: Signs/Symptoms:vomiting.   COMPARISON: Selected examinations as far back as July 25, 2021 CT abdomen and pelvis including September 20, 2023 CT chest abdomen and pelvis.   ACCESSION NUMBER(S): [id number]   ORDERING CLINICIAN: ANTONIO KILPATRICK   TECHNIQUE: CT of the abdomen and pelvis was performed. Contiguous axial images were obtained at 3 mm slice thickness through the abdomen and pelvis. Coronal and sagittal reconstructions at 3 mm slice thickness were performed.  No intravenous or oral contrast agents were administered.   FINDINGS:  Please note that the evaluation of vessels, lymph nodes and organs is limited without intravenous contrast.   LOWER CHEST: Newly seen elevation left hemidiaphragm related to gastric distension. No pneumonia, edema, or effusion. There is increased mural prominence distal esophagus of indeterminate cause, potentially related to underdistention or inflammation.   ABDOMEN:   LIVER: The liver demonstrates homogeneous attenuation without evidence of focal liver lesions.   BILE DUCTS: Unchanged prominence of the intra and extrahepatic bile ducts without evident obstructing mass or calculus compatible with age and prior cholecystectomy.   GALLBLADDER: Absent   PANCREAS: Grossly unchanged with partial fatty replacement.   SPLEEN: Within normal limits.   ADRENAL GLANDS: Within normal limits.   Unchanged small homogeneous hypodensities most compatible with cysts. No evident hydronephrosis, or calculus.   PELVIS:   BLADDER: Unremarkable   REPRODUCTIVE ORGANS: No evident pelvic mass. The uterus and ovaries are not identified.   BOWEL: There is newly seen marked dilation of the stomach and portion of the small bowel with decompressed distal small bowel. There is evident tapering anterior pelvis just to the left of midline series 21, image 120. The pattern favors a mechanical obstruction potentially related to adhesion. There is a small nearby left anterior fat containing hernia series 21, image 104. There is mild fluid within the hernia sac image 111 there are multiple adjacent densities likely suture and or calcifications anterior peritoneal cavity. No apparent pneumatosis. The dilated fluid-filled small bowel measures up to 5.7 cm caliber series 21, image 90. The decompressed distal small bowel measures less than 1 cm. The colon is predominantly decompressed. Patient is status post surgery with suture near the ileocolonic junction.   VESSELS: Normal caliber. Minimal scattered arterial vascular calcifications.    PERITONEUM/RETROPERITONEUM/LYMPH NODES: No ascites or free air, no fluid collection.  No enlarged mesenteric lymph nodes.   BONES: Compared with September 20 newly seen mild inferior compressive deformity L2 with new bandlike sclerosis midportion compatible with impaction deformity or healing response without significant retropulsion. Stable deformity superior L3. There is osseous demineralization. There are scattered degenerative changes.       1.  Findings compatible with acute mechanical small bowel obstruction potentially related to adhesion. 2. There is an adjacent small left-sided ventral wall hernia which contains minimal fat and fluid within the hernia sac which may or may not be related. Attention recommended on clinical assessment. 3. Newly seen mild superior likely recent superior L2 compressive deformity.     Signed by: Carlos Augustin 12/6/2023 12:24 PM Dictation workstation:   [id number]    XR chest 1 view    Result Date: 12/6/2023  Interpreted By:  Carlos Augustin, STUDY: XR CHEST 1 VIEW;  12/6/2023 11:58 am   INDICATION: .   COMPARISON: December 19, 2021 chest radiograph. September 20, 2023 CT chest abdomen and pelvis. Same day CT abdomen and pelvis   ACCESSION NUMBER(S): [id number]   ORDERING CLINICIAN: ANTONIO KILPATRICK   FINDINGS: AP radiograph of the chest was provided.       CARDIOMEDIASTINAL SILHOUETTE: Unchanged cardiomediastinal silhouette.   LUNGS: Lungs are clear.   ABDOMEN: Newly seen marked gaseous distention of the stomach distention of portion of the bowel.   BONES: No acute osseous changes.       1.  Abnormal distention of the stomach and bowel. 2. The lungs are clear.       MACRO: None   Signed by: Carlos Augustin 12/6/2023 12:07 PM Dictation workstation:   [id number]                Assessment/Plan   Principal Problem:    Small bowel obstruction (CMS/HCC)  Active Problems:    Acute kidney injury (nontraumatic) (CMS/HCC)    Chronic renal insufficiency    Patient with h/o T2DM,  fatty liver, DLP, HTN, Afib s/p ablation, GI bleed, anxiety/depression, hypothyroidism, who p/w abdominal pain x few days associated with N/V. Found to have SBO. Initially had surgery and adhesionolysis done on 128,, however, given no improvement s/p repeat laparoscopy that was converted to laparotomy with extensive adhesionolysis on 12/17. Post surgery hypotensive, admitted to the ICU for further management.       Acute hypoxic respiratory failure likely due to decreased respiratory drive and upper airway obstruction.       Now intubated     Continue MV     SBT in am     12/18/2023: Patient was placed on sedation vacation followed by spontaneous breathing trial.        Patient passing the spontaneous breathing trial      Will extubate the patient     2.  Small bowel obstruction secondary to extensive adhesions     Surgery on consult, will FU with their recommendations     Continue parenteral nutrition     GI prophylaxis     12/18/2023: Patient abdomen was closed yesterday 12/17/2023     Patient still does not have a bowel movements     3.  Acute encephalopathy secondary to pain medications and sedation     currently intubated and sedated. H/o anxiety/depression     Continue sedation with Propofol     Pain control: currently Fentanyl     Supportive measures     Daily SAT     12/18/2023: Encephalopathy resolved     4. Hypotensive on arrival, likely hypovolemic. Requiring low dose levophed. H/o HTN, DLP, Afib with ablation     IVF     Continue Atorvastatin     Hold Metoprolol     Continue to monitor      Home Xeralto on hold    I spent 30 minutes of cumulative critical care time with the patient.  Greater than 50% of that time was spent in the direct collaboration and or coordination of care of the patient.     Dragon dictation software was used to dictate this note and thus there may be minor errors in translation/transcription including garbled speech or misspellings. Please contact for clarification if needed.

## 2023-12-18 NOTE — PROGRESS NOTES
"Harper Lara is a 78 y.o. female on day 12 of admission presenting with Small bowel obstruction (CMS/HCC).    Subjective    Patient awake resting in bed this morning.  No acute events overnight.    Objective     Physical Exam  Vitals reviewed.   Constitutional:       Comments: Tearful   HENT:      Head: Normocephalic and atraumatic.      Nose: Nose normal.      Mouth/Throat:      Pharynx: Oropharynx is clear.      Comments: Intubated  Eyes:      Extraocular Movements: Extraocular movements intact.      Conjunctiva/sclera: Conjunctivae normal.      Pupils: Pupils are equal, round, and reactive to light.      Comments: NG tube in place   Cardiovascular:      Rate and Rhythm: Normal rate and regular rhythm.      Pulses: Normal pulses.      Heart sounds: Normal heart sounds.   Pulmonary:      Effort: Pulmonary effort is normal.      Breath sounds: Normal breath sounds.   Abdominal:      General: Abdomen is flat. Bowel sounds are normal.      Palpations: Abdomen is soft.      Tenderness: There is abdominal tenderness (Mild).      Comments: Incision sites are CDI. ANTHONY Drain in place with no output this morning   Musculoskeletal:         General: Normal range of motion.      Cervical back: Normal range of motion and neck supple.   Skin:     General: Skin is warm and dry.      Capillary Refill: Capillary refill takes less than 2 seconds.   Neurological:      General: No focal deficit present.      Mental Status: She is alert and oriented to person, place, and time. Mental status is at baseline.   Psychiatric:         Mood and Affect: Mood normal.         Behavior: Behavior normal.         Last Recorded Vitals  Blood pressure 119/56, pulse 72, temperature 36.4 °C (97.5 °F), temperature source Temporal, resp. rate 19, height 1.6 m (5' 3\"), weight 73.5 kg (162 lb 0.6 oz), SpO2 100 %.  Intake/Output last 3 Shifts:  I/O last 3 completed shifts:  In: 7118.1 (96.8 mL/kg) [I.V.:912.1 (12.4 mL/kg); Blood:500; IV " Piggyback:3200]  Out: 2100 (28.6 mL/kg) [Urine:720 (0.3 mL/kg/hr); Emesis/NG output:950; Drains:430]  Weight: 73.5 kg     Relevant Results  Lab Results   Component Value Date    WBC 3.2 (L) 12/18/2023    HGB 8.3 (L) 12/18/2023    HCT 24.9 (L) 12/18/2023    MCV 87 12/18/2023     12/18/2023     Lab Results   Component Value Date    GLUCOSE 190 (H) 12/18/2023    CALCIUM 7.5 (L) 12/18/2023     (L) 12/18/2023    K 4.3 12/18/2023    CO2 22 12/18/2023     12/18/2023    BUN 38 (H) 12/18/2023    CREATININE 0.86 12/18/2023     No current facility-administered medications on file prior to encounter.     Current Outpatient Medications on File Prior to Encounter   Medication Sig Dispense Refill    amLODIPine (Norvasc) 5 mg tablet Take 1 tablet (5 mg) by mouth once daily.      atorvastatin (Lipitor) 20 mg tablet Take 1 tablet (20 mg) by mouth once daily.      atorvastatin (Lipitor) 40 mg tablet Take 1 tablet (40 mg) by mouth once daily.      benztropine (Cogentin) 1 mg tablet Take 1 tablet (1 mg) by mouth once daily.      calcium polycarbophiL (Fibercon) 625 mg tablet Take by mouth twice a day.      carvedilol (Coreg) 12.5 mg tablet Take by mouth twice a day.      cetirizine (ZyrTEC) 10 mg tablet Take 1 tablet (10 mg) by mouth once daily.      cholecalciferol (Vitamin D-3) 10 MCG (400 UNIT) tablet Take 1 tablet (10 mcg) by mouth once daily.      cyanocobalamin (Vitamin B-12) 1,000 mcg tablet Take 1 tablet (1,000 mcg) by mouth once daily.      docosahexaenoic acid/epa (FISH OIL ORAL) Take 1 capsule by mouth once daily.      FLUoxetine (PROzac) 20 mg tablet Take 1 tablet (20 mg) by mouth once daily.      furosemide (Lasix) 20 mg tablet Take 1 tablet (20 mg) by mouth once daily.      gentamicin (Garamycin) 0.1 % ointment Apply 1 Application topically 3 times a day. To bed sores      glipiZIDE (Glucotrol) 10 mg tablet 1 tab(s) orally 2 times a day (before meals)      levothyroxine (Synthroid, Levoxyl) 75 mcg tablet  Take 1 tablet (75 mcg) by mouth once daily.      magnesium oxide (Mag-Ox) 400 mg tablet Take 1 tablet (400 mg) by mouth once daily.      metFORMIN (Glucophage) 500 mg tablet Take 1 tablet (500 mg) by mouth 2 times a day with meals.      multivitamin tablet Take 1 tablet by mouth once daily.      nystatin (Mycostatin) 100,000 unit/gram powder Apply 1 Application topically if needed.      ondansetron ODT (Zofran-ODT) 4 mg disintegrating tablet Take 1 tablet (4 mg) by mouth every 8 hours if needed for nausea or vomiting.      pantoprazole (ProtoNix) 40 mg EC tablet Take 1 tablet (40 mg) by mouth once daily in the morning. Take before meals. Do not crush, chew, or split.      rivaroxaban (Xarelto) 15 mg tablet Take 1 tablet (15 mg) by mouth once daily in the evening. Take with meals. Take with food.      triamterene-hydrochlorothiazid (Dyazide) 37.5-25 mg capsule Take 1 capsule by mouth once daily in the morning.       Results for orders placed or performed during the hospital encounter of 12/06/23 (from the past 24 hour(s))   POCT GLUCOSE   Result Value Ref Range    POCT Glucose 140 (H) 74 - 99 mg/dL   POCT GLUCOSE   Result Value Ref Range    POCT Glucose 161 (H) 74 - 99 mg/dL   CBC   Result Value Ref Range    WBC 2.8 (L) 4.4 - 11.3 x10*3/uL    nRBC 0.0 0.0 - 0.0 /100 WBCs    RBC 3.66 (L) 4.00 - 5.20 x10*6/uL    Hemoglobin 10.7 (L) 12.0 - 16.0 g/dL    Hematocrit 32.4 (L) 36.0 - 46.0 %    MCV 89 80 - 100 fL    MCH 29.2 26.0 - 34.0 pg    MCHC 33.0 32.0 - 36.0 g/dL    RDW 15.4 (H) 11.5 - 14.5 %    Platelets 184 150 - 450 x10*3/uL   Basic Metabolic Panel   Result Value Ref Range    Glucose 190 (H) 74 - 99 mg/dL    Sodium 129 (L) 136 - 145 mmol/L    Potassium 3.2 (L) 3.5 - 5.3 mmol/L    Chloride 103 98 - 107 mmol/L    Bicarbonate 20 (L) 21 - 32 mmol/L    Anion Gap 9 (L) 10 - 20 mmol/L    Urea Nitrogen 38 (H) 6 - 23 mg/dL    Creatinine 0.79 0.50 - 1.05 mg/dL    eGFR 78 >60 mL/min/1.73m*2    Calcium 6.9 (L) 8.6 - 10.3 mg/dL    Magnesium   Result Value Ref Range    Magnesium 1.39 (L) 1.60 - 2.40 mg/dL   POCT GLUCOSE   Result Value Ref Range    POCT Glucose 163 (H) 74 - 99 mg/dL   POCT GLUCOSE   Result Value Ref Range    POCT Glucose 181 (H) 74 - 99 mg/dL   CBC and Auto Differential   Result Value Ref Range    WBC 3.2 (L) 4.4 - 11.3 x10*3/uL    nRBC 0.0 0.0 - 0.0 /100 WBCs    RBC 2.85 (L) 4.00 - 5.20 x10*6/uL    Hemoglobin 8.3 (L) 12.0 - 16.0 g/dL    Hematocrit 24.9 (L) 36.0 - 46.0 %    MCV 87 80 - 100 fL    MCH 29.1 26.0 - 34.0 pg    MCHC 33.3 32.0 - 36.0 g/dL    RDW 15.4 (H) 11.5 - 14.5 %    Platelets 152 150 - 450 x10*3/uL    Immature Granulocytes %, Automated 0.3 0.0 - 0.9 %    Immature Granulocytes Absolute, Automated 0.01 0.00 - 0.50 x10*3/uL   Phosphorus   Result Value Ref Range    Phosphorus 3.1 2.5 - 4.9 mg/dL   Basic metabolic panel   Result Value Ref Range    Glucose 190 (H) 74 - 99 mg/dL    Sodium 131 (L) 136 - 145 mmol/L    Potassium 4.3 3.5 - 5.3 mmol/L    Chloride 102 98 - 107 mmol/L    Bicarbonate 22 21 - 32 mmol/L    Anion Gap 11 10 - 20 mmol/L    Urea Nitrogen 38 (H) 6 - 23 mg/dL    Creatinine 0.86 0.50 - 1.05 mg/dL    eGFR 70 >60 mL/min/1.73m*2    Calcium 7.5 (L) 8.6 - 10.3 mg/dL   Magnesium   Result Value Ref Range    Magnesium 2.26 1.60 - 2.40 mg/dL   Manual Differential   Result Value Ref Range    Neutrophils %, Manual 64.0 40.0 - 80.0 %    Bands %, Manual 28.0 0.0 - 5.0 %    Lymphocytes %, Manual 4.0 13.0 - 44.0 %    Monocytes %, Manual 3.0 2.0 - 10.0 %    Eosinophils %, Manual 0.0 0.0 - 6.0 %    Basophils %, Manual 0.0 0.0 - 2.0 %    Metamyelocytes %, Manual 1.0 0.0 - 0.0 %    Seg Neutrophils Absolute, Manual 2.05 1.60 - 5.00 x10*3/uL    Bands Absolute, Manual 0.90 (H) 0.00 - 0.50 x10*3/uL    Lymphocytes Absolute, Manual 0.13 (L) 0.80 - 3.00 x10*3/uL    Monocytes Absolute, Manual 0.10 0.05 - 0.80 x10*3/uL    Eosinophils Absolute, Manual 0.00 0.00 - 0.40 x10*3/uL    Basophils Absolute, Manual 0.00 0.00 - 0.10  x10*3/uL    Metamyelocytes Absolute, Manual 0.03 0.00 - 0.00 x10*3/uL    Total Cells Counted 100     Neutrophils Absolute, Manual 2.95 1.60 - 5.50 x10*3/uL    RBC Morphology See Below     RBC Fragments Few     Ovalocytes Few     Clumped Platelets Present    POCT GLUCOSE   Result Value Ref Range    POCT Glucose 172 (H) 74 - 99 mg/dL           Assessment/Plan   Principal Problem:    Small bowel obstruction (CMS/HCC)  Active Problems:    Acute kidney injury (nontraumatic) (CMS/HCC)    Chronic renal insufficiency    Assessment  Patient awake and tearful this morning.  Nursing reports no acute events overnight.  When questioned about pain, patient responded by shaking her head no.  When asked about nausea/vomiting, patient responded by shaking her head no.  Upon examination incision sites were clean dry and intact.  ANTHONY drain in place with no output this morning.  Per nursing.  Patient has had 30-35 urine output per hour.  NG tube in place with 700 mL green output.     Impression   POD10 diagnostic laparoscopy, lysis of adhesions, and constricting adhesive band. POD1 diagnostic laparoscopy, converted to laparotomy, lysis of adhesions, and small bowel resection.    Plan  NPO/NG/IVF - NG tube to remain in place till return of bowel function  Pain control  Nausea control  Strict I&Os   Continue PPN  Brock to remain in place.    Further recommendations per Dr. Neville aBles PA-C    Patient seen and examined. I have reviewed and discussed APRN note. Off pressor and vent; awake and following commands; making at least 25-30 ml/hr urine output; 800 ml in NG canister; serous-sanguinous output in drain; ok to have ice chips; hoping to transfer out of ICU tomorrow; continue drain; continue NG until return of bowel function; ok to resume Xarelto in 3 days. I told patient and daughter (Carla) that I will be off the next few days and Dr. Farnsworth will be covering for me

## 2023-12-19 ENCOUNTER — APPOINTMENT (OUTPATIENT)
Dept: RADIOLOGY | Facility: HOSPITAL | Age: 78
DRG: 330 | End: 2023-12-19
Payer: MEDICARE

## 2023-12-19 LAB
ANION GAP SERPL CALC-SCNC: 10 MMOL/L (ref 10–20)
BUN SERPL-MCNC: 38 MG/DL (ref 6–23)
CALCIUM SERPL-MCNC: 7.4 MG/DL (ref 8.6–10.3)
CHLORIDE SERPL-SCNC: 101 MMOL/L (ref 98–107)
CO2 SERPL-SCNC: 23 MMOL/L (ref 21–32)
CREAT SERPL-MCNC: 0.95 MG/DL (ref 0.5–1.05)
ERYTHROCYTE [DISTWIDTH] IN BLOOD BY AUTOMATED COUNT: 15.4 % (ref 11.5–14.5)
GFR SERPL CREATININE-BSD FRML MDRD: 62 ML/MIN/1.73M*2
GLUCOSE BLD MANUAL STRIP-MCNC: 123 MG/DL (ref 74–99)
GLUCOSE BLD MANUAL STRIP-MCNC: 141 MG/DL (ref 74–99)
GLUCOSE BLD MANUAL STRIP-MCNC: 147 MG/DL (ref 74–99)
GLUCOSE BLD MANUAL STRIP-MCNC: 149 MG/DL (ref 74–99)
GLUCOSE BLD MANUAL STRIP-MCNC: 150 MG/DL (ref 74–99)
GLUCOSE BLD MANUAL STRIP-MCNC: 159 MG/DL (ref 74–99)
GLUCOSE BLD MANUAL STRIP-MCNC: 167 MG/DL (ref 74–99)
GLUCOSE SERPL-MCNC: 146 MG/DL (ref 74–99)
HCT VFR BLD AUTO: 22.6 % (ref 36–46)
HGB BLD-MCNC: 7.5 G/DL (ref 12–16)
MAGNESIUM SERPL-MCNC: 1.97 MG/DL (ref 1.6–2.4)
MCH RBC QN AUTO: 28.8 PG (ref 26–34)
MCHC RBC AUTO-ENTMCNC: 33.2 G/DL (ref 32–36)
MCV RBC AUTO: 87 FL (ref 80–100)
NRBC BLD-RTO: 0 /100 WBCS (ref 0–0)
PHOSPHATE SERPL-MCNC: 3.5 MG/DL (ref 2.5–4.9)
PLATELET # BLD AUTO: 152 X10*3/UL (ref 150–450)
POTASSIUM SERPL-SCNC: 4.3 MMOL/L (ref 3.5–5.3)
RBC # BLD AUTO: 2.6 X10*6/UL (ref 4–5.2)
SODIUM SERPL-SCNC: 130 MMOL/L (ref 136–145)
TRIGL SERPL-MCNC: 94 MG/DL (ref 0–149)
WBC # BLD AUTO: 7.3 X10*3/UL (ref 4.4–11.3)

## 2023-12-19 PROCEDURE — 37799 UNLISTED PX VASCULAR SURGERY: CPT | Performed by: INTERNAL MEDICINE

## 2023-12-19 PROCEDURE — 2500000004 HC RX 250 GENERAL PHARMACY W/ HCPCS (ALT 636 FOR OP/ED): Performed by: INTERNAL MEDICINE

## 2023-12-19 PROCEDURE — 71045 X-RAY EXAM CHEST 1 VIEW: CPT | Performed by: RADIOLOGY

## 2023-12-19 PROCEDURE — 2500000004 HC RX 250 GENERAL PHARMACY W/ HCPCS (ALT 636 FOR OP/ED)

## 2023-12-19 PROCEDURE — 82947 ASSAY GLUCOSE BLOOD QUANT: CPT

## 2023-12-19 PROCEDURE — 80048 BASIC METABOLIC PNL TOTAL CA: CPT | Performed by: INTERNAL MEDICINE

## 2023-12-19 PROCEDURE — 84100 ASSAY OF PHOSPHORUS: CPT

## 2023-12-19 PROCEDURE — 99232 SBSQ HOSP IP/OBS MODERATE 35: CPT

## 2023-12-19 PROCEDURE — 1200000002 HC GENERAL ROOM WITH TELEMETRY DAILY

## 2023-12-19 PROCEDURE — 2720000007 HC OR 272 NO HCPCS

## 2023-12-19 PROCEDURE — C1751 CATH, INF, PER/CENT/MIDLINE: HCPCS

## 2023-12-19 PROCEDURE — 87081 CULTURE SCREEN ONLY: CPT | Mod: ELYLAB

## 2023-12-19 PROCEDURE — 85027 COMPLETE CBC AUTOMATED: CPT | Performed by: INTERNAL MEDICINE

## 2023-12-19 PROCEDURE — 36569 INSJ PICC 5 YR+ W/O IMAGING: CPT

## 2023-12-19 PROCEDURE — 2500000005 HC RX 250 GENERAL PHARMACY W/O HCPCS

## 2023-12-19 PROCEDURE — 2500000005 HC RX 250 GENERAL PHARMACY W/O HCPCS: Performed by: INTERNAL MEDICINE

## 2023-12-19 PROCEDURE — 71045 X-RAY EXAM CHEST 1 VIEW: CPT

## 2023-12-19 PROCEDURE — C9113 INJ PANTOPRAZOLE SODIUM, VIA: HCPCS | Performed by: INTERNAL MEDICINE

## 2023-12-19 PROCEDURE — 99291 CRITICAL CARE FIRST HOUR: CPT | Performed by: INTERNAL MEDICINE

## 2023-12-19 PROCEDURE — 83735 ASSAY OF MAGNESIUM: CPT

## 2023-12-19 PROCEDURE — 84478 ASSAY OF TRIGLYCERIDES: CPT

## 2023-12-19 PROCEDURE — 99024 POSTOP FOLLOW-UP VISIT: CPT | Performed by: SURGERY

## 2023-12-19 PROCEDURE — 36415 COLL VENOUS BLD VENIPUNCTURE: CPT

## 2023-12-19 RX ORDER — LIDOCAINE HYDROCHLORIDE 10 MG/ML
5 INJECTION INFILTRATION; PERINEURAL ONCE
Status: DISCONTINUED | OUTPATIENT
Start: 2023-12-19 | End: 2023-12-26 | Stop reason: HOSPADM

## 2023-12-19 RX ORDER — MAGNESIUM SULFATE HEPTAHYDRATE 40 MG/ML
2 INJECTION, SOLUTION INTRAVENOUS ONCE
Status: COMPLETED | OUTPATIENT
Start: 2023-12-19 | End: 2023-12-19

## 2023-12-19 RX ORDER — CALCIUM GLUCONATE 20 MG/ML
2 INJECTION, SOLUTION INTRAVENOUS ONCE
Status: COMPLETED | OUTPATIENT
Start: 2023-12-19 | End: 2023-12-19

## 2023-12-19 RX ADMIN — SODIUM CHLORIDE, POTASSIUM CHLORIDE, SODIUM LACTATE AND CALCIUM CHLORIDE 50 ML/HR: 600; 310; 30; 20 INJECTION, SOLUTION INTRAVENOUS at 16:30

## 2023-12-19 RX ADMIN — LEVOTHYROXINE SODIUM 38 MCG: 20 INJECTION, SOLUTION INTRAVENOUS at 10:38

## 2023-12-19 RX ADMIN — CALCIUM GLUCONATE 2 G: 20 INJECTION, SOLUTION INTRAVENOUS at 07:50

## 2023-12-19 RX ADMIN — PANTOPRAZOLE SODIUM 40 MG: 40 INJECTION, POWDER, FOR SOLUTION INTRAVENOUS at 07:50

## 2023-12-19 RX ADMIN — ASCORBIC ACID, VITAMIN A PALMITATE, CHOLECALCIFEROL, THIAMINE HYDROCHLORIDE, RIBOFLAVIN-5 PHOSPHATE SODIUM, PYRIDOXINE HYDROCHLORIDE, NIACINAMIDE, DEXPANTHENOL, ALPHA-TOCOPHEROL ACETATE, VITAMIN K1, FOLIC ACID, BIOTIN, CYANOCOBALAMIN: 200; 3300; 200; 6; 3.6; 6; 40; 15; 10; 150; 600; 60; 5 INJECTION, SOLUTION INTRAVENOUS at 03:07

## 2023-12-19 RX ADMIN — INSULIN LISPRO 1 UNITS: 100 INJECTION, SOLUTION INTRAVENOUS; SUBCUTANEOUS at 16:31

## 2023-12-19 RX ADMIN — SODIUM CHLORIDE, POTASSIUM CHLORIDE, SODIUM LACTATE AND CALCIUM CHLORIDE 50 ML/HR: 600; 310; 30; 20 INJECTION, SOLUTION INTRAVENOUS at 13:36

## 2023-12-19 RX ADMIN — INSULIN LISPRO 1 UNITS: 100 INJECTION, SOLUTION INTRAVENOUS; SUBCUTANEOUS at 21:35

## 2023-12-19 RX ADMIN — ASCORBIC ACID, VITAMIN A PALMITATE, CHOLECALCIFEROL, THIAMINE HYDROCHLORIDE, RIBOFLAVIN-5 PHOSPHATE SODIUM, PYRIDOXINE HYDROCHLORIDE, NIACINAMIDE, DEXPANTHENOL, ALPHA-TOCOPHEROL ACETATE, VITAMIN K1, FOLIC ACID, BIOTIN, CYANOCOBALAMIN: 200; 3300; 200; 6; 3.6; 6; 40; 15; 10; 150; 600; 60; 5 INJECTION, SOLUTION INTRAVENOUS at 13:35

## 2023-12-19 RX ADMIN — MAGNESIUM SULFATE HEPTAHYDRATE 2 G: 40 INJECTION, SOLUTION INTRAVENOUS at 07:50

## 2023-12-19 ASSESSMENT — PAIN SCALES - GENERAL
PAINLEVEL_OUTOF10: 0 - NO PAIN
PAINLEVEL_OUTOF10: 0 - NO PAIN
PAIN_LEVEL: 1

## 2023-12-19 ASSESSMENT — COGNITIVE AND FUNCTIONAL STATUS - GENERAL
HELP NEEDED FOR BATHING: A LOT
STANDING UP FROM CHAIR USING ARMS: A LOT
MOVING TO AND FROM BED TO CHAIR: A LOT
MOBILITY SCORE: 13
TOILETING: A LOT
MOVING FROM LYING ON BACK TO SITTING ON SIDE OF FLAT BED WITH BEDRAILS: A LITTLE
DAILY ACTIVITIY SCORE: 12
MOVING FROM LYING ON BACK TO SITTING ON SIDE OF FLAT BED WITH BEDRAILS: A LITTLE
PERSONAL GROOMING: A LOT
PERSONAL GROOMING: A LITTLE
TURNING FROM BACK TO SIDE WHILE IN FLAT BAD: A LITTLE
TOILETING: A LOT
HELP NEEDED FOR BATHING: A LOT
CLIMB 3 TO 5 STEPS WITH RAILING: TOTAL
DRESSING REGULAR LOWER BODY CLOTHING: A LOT
DRESSING REGULAR LOWER BODY CLOTHING: A LOT
STANDING UP FROM CHAIR USING ARMS: A LOT
WALKING IN HOSPITAL ROOM: A LOT
DRESSING REGULAR UPPER BODY CLOTHING: A LOT
DRESSING REGULAR UPPER BODY CLOTHING: A LOT
WALKING IN HOSPITAL ROOM: A LOT
DAILY ACTIVITIY SCORE: 14
EATING MEALS: A LOT
TURNING FROM BACK TO SIDE WHILE IN FLAT BAD: A LITTLE
MOBILITY SCORE: 13
MOVING TO AND FROM BED TO CHAIR: A LOT
EATING MEALS: A LITTLE
CLIMB 3 TO 5 STEPS WITH RAILING: TOTAL

## 2023-12-19 ASSESSMENT — PAIN - FUNCTIONAL ASSESSMENT
PAIN_FUNCTIONAL_ASSESSMENT: 0-10
PAIN_FUNCTIONAL_ASSESSMENT: 0-10

## 2023-12-19 NOTE — PROGRESS NOTES
Critical Care Daily Progress        Subjective   Patient is a 78 y.o. female admitted on 12/6/2023  1:11 PM with the following indication(s) for ICU care: Small bowel obstruction and acute kidney injury    Interval History: Patient with h/o T2DM, fatty liver, DLP, HTN, Afib s/p ablation, GI bleed, anxiety/depression, hypothyroidism, who p/w abdominal pain x few days associated with N/V. Found to have SBO. Initially had surgery and adhesionolysis done on 128,, however, given no improvement s/p repeat laparoscopy that was converted to laparotomy with extensive adhesionolysis on 12/17. Post surgery hypotensive, admitted to the ICU for further management.     12/18/2023: Patient was placed on sedation vacation followed by spontaneous breathing trial.  Patient passing the spontaneous breathing trial    12/19/2023: Patient successfully extubated yesterday 12/19/2023.  Currently on nasal cannula.  Still no significant bowel movements    Scheduled Medications:   [Held by provider] atorvastatin, 40 mg, oral, Nightly  fat emulsion-plant based, 250 mL, intravenous, Once per day on Mon Wed Fri  insulin lispro, 0-5 Units, subcutaneous, q4h  levothyroxine, 38 mcg, intravenous, q24h ORLY  [Held by provider] levothyroxine, 75 mcg, oral, Daily  lidocaine, 5 mL, infiltration, Once  [Held by provider] metoprolol, 2.5 mg, intravenous, q6h  pantoprazole, 40 mg, intravenous, Daily before breakfast  [Held by provider] polyethylene glycol, 17 g, oral, Daily  [Held by provider] rivaroxaban, 15 mg, oral, Daily with evening meal         Continuous Medications:   Adult Clinimix Parenteral Nutrition, 83 mL/hr  lactated Ringer's, 50 mL/hr, Last Rate: 50 mL/hr (12/19/23 0600)         PRN Medications:   PRN medications: alteplase, dextrose 10 % in water (D10W), dextrose, glucagon, ondansetron **OR** ondansetron    Objective   Vitals:  Most Recent:  Vitals:    12/19/23 1200   BP: (!) 118/34   Pulse: 88   Resp: (!) 27   Temp:    SpO2: 99%       24hr  Min/Max:  Temp  Min: 35.9 °C (96.6 °F)  Max: 36.6 °C (97.9 °F)  Pulse  Min: 80  Max: 92  BP  Min: 118/34  Max: 123/42  Resp  Min: 17  Max: 27  SpO2  Min: 97 %  Max: 100 %    LDA:  Arterial Line 12/17/23 Right Radial (Active)   Placement Date/Time: 12/17/23 (c) 1353   Size: 20 G  Orientation: Right  Location: Radial  Securement Method: Transparent dressing  Patient Tolerance: Tolerated well   Number of days: 0       ETT  7.5 mm (Active)   Placement Date/Time: 12/17/23 1710   Hand Hygiene Completed: Yes  ETT Type: ETT - single  Single Lumen Tube Size: 7.5 mm  Cuffed: Yes  Location: Oral   Number of days: 0       Urethral Catheter (Active)   Placement Date/Time: 12/17/23 (c) 1635   Hand Hygiene Completed: Yes  Catheter Balloon Size: 5 mL   Number of days: 0       Closed/Suction Drain Lateral RUQ Bulb 19 Fr. (Active)   Placement Date/Time: 12/17/23 1732   Orientation: Lateral  Location: RUQ  Drain Tube Type: Bulb  Size (Fr.): 19 Fr.   Number of days: 0       NG/OG/Feeding Tube NG - [place] sump Left nostril (Active)   Placement Date/Time: 12/17/23 1700   Type of Tube: NG/OG Tube  Tube Type: NG - [place] sump  Tube Location: Left nostril   Number of days: 0         Vent settings:       Hemodynamic parameters for last 24 hours:       I/O:    Intake/Output Summary (Last 24 hours) at 12/19/2023 1217  Last data filed at 12/19/2023 1117  Gross per 24 hour   Intake 3702 ml   Output 1630 ml   Net 2072 ml         Physical Exam:   Physical Exam   Constitutional:       General: She is not in acute distress.     Appearance: Normal appearance. She is normal weight.   HENT:      Head: Normocephalic and atraumatic.      Nose:      Comments: NG in place     Mouth/Throat:   Eyes:      General: No scleral icterus.     Extraocular Movements: Extraocular movements intact.      Pupils: Pupils are equal, round, and reactive to light.   Cardiovascular:      Rate and Rhythm: Normal rate and regular rhythm.      Heart sounds: Normal heart sounds.  No murmur heard.     No friction rub. No gallop.   Pulmonary:      Effort: Pulmonary effort is normal. No respiratory distress.      Breath sounds: Normal breath sounds. No wheezing or rales.   Abdominal:      Abdomen is closed     Tenderness: There is abdominal tenderness. There is no guarding.     Musculoskeletal:         General: No swelling or tenderness. Normal range of motion.      Cervical back: Normal range of motion and neck supple. No rigidity.      Right lower leg: No edema.      Left lower leg: No edema.   Lymphadenopathy:      Cervical: No cervical adenopathy.   Skin:     General: Skin is warm and dry.      Coloration: Skin is not jaundiced.   Neurological:      General: No focal deficit present.      Mental Status: She is alert.      Cranial Nerves: No cranial nerve deficit.      Motor: No weakness.      Comments: Awake and alert, follows commands, moves all extremities.      Lab/Radiology/Diagnostic Review:  Results for orders placed or performed during the hospital encounter of 12/06/23 (from the past 24 hour(s))   POCT GLUCOSE   Result Value Ref Range    POCT Glucose 127 (H) 74 - 99 mg/dL   POCT GLUCOSE   Result Value Ref Range    POCT Glucose 154 (H) 74 - 99 mg/dL   Lactate   Result Value Ref Range    Lactate 1.6 0.4 - 2.0 mmol/L   POCT GLUCOSE   Result Value Ref Range    POCT Glucose 150 (H) 74 - 99 mg/dL   POCT GLUCOSE   Result Value Ref Range    POCT Glucose 149 (H) 74 - 99 mg/dL   Magnesium   Result Value Ref Range    Magnesium 1.97 1.60 - 2.40 mg/dL   Phosphorus   Result Value Ref Range    Phosphorus 3.5 2.5 - 4.9 mg/dL   CBC   Result Value Ref Range    WBC 7.3 4.4 - 11.3 x10*3/uL    nRBC 0.0 0.0 - 0.0 /100 WBCs    RBC 2.60 (L) 4.00 - 5.20 x10*6/uL    Hemoglobin 7.5 (L) 12.0 - 16.0 g/dL    Hematocrit 22.6 (L) 36.0 - 46.0 %    MCV 87 80 - 100 fL    MCH 28.8 26.0 - 34.0 pg    MCHC 33.2 32.0 - 36.0 g/dL    RDW 15.4 (H) 11.5 - 14.5 %    Platelets 152 150 - 450 x10*3/uL   Basic Metabolic Panel    Result Value Ref Range    Glucose 146 (H) 74 - 99 mg/dL    Sodium 130 (L) 136 - 145 mmol/L    Potassium 4.3 3.5 - 5.3 mmol/L    Chloride 101 98 - 107 mmol/L    Bicarbonate 23 21 - 32 mmol/L    Anion Gap 10 10 - 20 mmol/L    Urea Nitrogen 38 (H) 6 - 23 mg/dL    Creatinine 0.95 0.50 - 1.05 mg/dL    eGFR 62 >60 mL/min/1.73m*2    Calcium 7.4 (L) 8.6 - 10.3 mg/dL   POCT GLUCOSE   Result Value Ref Range    POCT Glucose 141 (H) 74 - 99 mg/dL   POCT GLUCOSE   Result Value Ref Range    POCT Glucose 147 (H) 74 - 99 mg/dL   Triglycerides   Result Value Ref Range    Triglycerides 94 0 - 149 mg/dL   POCT GLUCOSE   Result Value Ref Range    POCT Glucose 123 (H) 74 - 99 mg/dL     XR chest 1 view    Result Date: 12/18/2023  Interpreted By:  Schoenberger, Joseph, STUDY: XR CHEST 1 VIEW;  12/17/2023 6:11 pm   INDICATION: Signs/Symptoms:post intubation..   COMPARISON: 12/06/2023   ACCESSION NUMBER(S): [id number]   ORDERING CLINICIAN: ORLIN NOGUERA   FINDINGS: Nasogastric tube is now directed anteriorly to the gastric antrum. Endotracheal tube in good position 3.7 cm above the guillermina.       CARDIOMEDIASTINAL SILHOUETTE: Cardiomediastinal silhouette is normal in size and configuration.   LUNGS: No new focal lung opacities.   ABDOMEN: No remarkable upper abdominal findings.   BONES: No acute osseous changes.       1.  No acute findings. Satisfactory position of tubes and lines.       MACRO: None   Signed by: Joseph Schoenberger 12/18/2023 8:14 AM Dictation workstation:   [id number]    CT abdomen pelvis wo IV contrast    Result Date: 12/16/2023  Interpreted By:  Ramonita Tinajero, STUDY: CT ABDOMEN PELVIS WO IV CONTRAST;  12/16/2023 12:40 pm   INDICATION: Signs/Symptoms:Small bowel obstruction, still symptomatic.   COMPARISON: 12/06/2023   ACCESSION NUMBER(S): [id number]   ORDERING CLINICIAN: JOSE ALFREDO VALENCIA   TECHNIQUE: CT of the abdomen and pelvis was performed.  Contiguous axial images were obtained at 3 mm slice thickness  through the abdomen and pelvis. Coronal and sagittal reconstructions at 3 mm slice thickness were performed.   No intravenous or oral contrast agents were administered.   FINDINGS: Please note that the study is limited without intravenous contrast.   LOWER CHEST: Lung bases are clear. No pleural effusion is present. Heart is normal in overall size. There is no pericardial effusion.   ABDOMEN:   LIVER: No gross focal mass is identified. Liver is nonenlarged.   BILE DUCTS: Bile ducts are stable in size.   GALLBLADDER: Gallbladder is absent.   PANCREAS: Pancreas is atrophic without obvious mass.   SPLEEN: Spleen is nonenlarged.   ADRENAL GLANDS: Bilateral adrenal glands appear normal.   KIDNEYS AND URETERS: No stones or hydronephrosis are present. Right kidney is ptotic. Subcentimeter parenchymal hypodensities are too small to characterize.   PELVIS:   BLADDER: The urinary bladder appears normal without abnormal wall thickening.   REPRODUCTIVE ORGANS: Uterus is absent.   BOWEL: Stomach is mildly dilated and contains air and fluid. Duodenal is also dilated with air and fluid. Small-bowel dilatation is present up to and including the small bowel anastomosis. Small-bowel dilatation appears similar to the previous CT. Distal small bowel is nondilated, also unchanged. Appendix is not identified. Colon is nondilated. Contrast material is present distal transverse colon to rectum. Air-fluid level is noted in the rectum.   VESSELS: Patchy atherosclerosis is present in the aorta. There is no aneurysmal dilatation. Vena cava is normal in size.   PERITONEUM/RETROPERITONEUM/LYMPH NODES: No ascites or pneumoperitoneum is identified. There is no mesenteric inflammation. No retroperitoneal lymph nodes are pathologically enlarged.   ABDOMINAL WALL: Small left inguinal hernia contains fat. An oval subcutaneous circumscribed mass to the left of the umbilicus has an internal density of about 60 Hounsfield units and is probably a  subcutaneous hematoma. This has an axial long axis of about 3 cm.   BONES: Bones are very osteopenic. Sclerosis in the L2 and L3 vertebra appears to be degenerative. Severe hypertrophy is noted involving lumbar facet joints. Hypertrophic bone changes produce severe canal and lateral recess stenosis at L2-3 level.       1.  Dilated stomach, duodenal and small bowel are similar to the prior study. Dilatation extends to and includes the small bowel anastomosis but the distal most small bowel, including the terminal ileum, is nondilated. 2. Residual contrast from preceding small-bowel study is present in the distal colon with an air-fluid level in the rectum indicative of diarrhea.     MACRO: None   Signed by: Ramonita Tinajero 12/16/2023 1:32 PM Dictation workstation:   [id number]    FL small bowel series    Result Date: 12/12/2023  Interpreted By:  Salas Altamirano, STUDY: FL SMALL BOWEL SERIES;  12/12/2023 8:31 am   INDICATION: Signs/Symptoms:SBO.   COMPARISON: Previous small bowel series of 12/07/2023 and CT examination of 12/06/2023.   ACCESSION NUMBER(S): [id number]   ORDERING CLINICIAN: TARAN TRAYLOR   TECHNIQUE: A small bowel series is performed utilizing 360 cc of Gastrografin through a pre-existing nasogastric tube. Serial radiographs of the abdomen and pelvis are performed following the infusion of contrast.   FINDINGS: 2 preliminary radiographs of the abdomen and pelvis demonstrate surgical sutures in the right lower quadrant. Dilated air-filled small bowel loops are identified in the left abdomen reaching 5.8 cm in diameter. A nasogastric tube tip on the imaged performed 4:40 p.m. is located in the distal esophagus. A subsequent radiograph performed 5:09 p.m. confirms the tip of the tube coiled in the stomach fundus.   Following the ingestion of contrast there is prompt opacification of the stomach fundus and proximal body. Contrast remains in the proximal stomach from 0 minutes 290 minutes after  contrast ingestion.   A 3 hour portable radiograph demonstrates contrast throughout the small bowel. There is mild small bowel dilatation reaching 4.2 cm. Contrast is identified in the colon from the cecum to the proximal descending colon.   A 6 hour portable radiograph demonstrates contrast throughout the stomach, small bowel, and colon to the level of the rectum. Small-bowel distention in the left abdomen reaches 4.8 cm.   The visualized small and large bowel full patterns are within normal limits. There is no obvious filling defects or displacement of opacified bowel. There is no sign of contrast extravasation.       Initial delay passage of contrast from the stomach from 0-90 minutes.   On the 3 hour delayed image there is contrast throughout the small bowel and colon. The small bowel transit time is likely between 2 and 3 hours.   Persistent proximal small bowel distention reaching 4.8 cm on delayed images. This appearance could be related to adynamic postoperative ileus. Residual small-bowel obstruction is less likely. Continued clinical surveillance is recommended.   Signed by: Salas Altamirano 12/12/2023 9:48 AM Dictation workstation:   [id number]    Bedside PICC Imaging    Result Date: 12/11/2023  These images are not reportable by radiology and will not be interpreted by  Radiologists.    XR abdomen 1 view    Result Date: 12/8/2023  Interpreted By:  David Collado, STUDY: Abdominal series dated  12/8/2023.   INDICATION: Enteric tube placement.   COMPARISON: None.   ACCESSION NUMBER(S): [id number]   ORDERING CLINICIAN: GRZEGORZ TABOR   TECHNIQUE: AP radiograph of the abdomen with subtraction imaging.   FINDINGS: Exam is limited due to incomplete inclusion the entirety of the abdomen in the field of view. There is an enteric tube with the tip and side port projecting over the left upper quadrant. Reticular increased density is seen over the left upper quadrant which may be related to contrast  installation. There is some increased density over the lower abdomen which could relate to some of the oral contrast from prior small-bowel follow-through; otherwise, the majority of this contrast does not appear to be present. There is a prominent loop of bowel over the mid abdomen measuring up to 5.6 cm. This likely represents a loop of small bowel. Surgical changes are seen over the right lower quadrant. Visualized lung bases are clear. Degenerative changes seen of the spine.       1. Enteric tube as above. 2. Dilated loop of bowel in the mid abdomen likely representing a dilated loop of small bowel compatible with obstructive process further discussed on the 12/06/2023 CT and the 12/07/2023 small-bowel follow-through. 3. Although there may be some retained oral contrast from prior small-bowel follow-through the majority of this contrast is not evident. It is uncertain whether this is due to passage versus enteric tube suction of the contrast. Clinical correlation may be helpful.   Signed by: David Collado 12/8/2023 9:42 PM Dictation workstation:   [id number]    FL small bowel series    Result Date: 12/8/2023  Interpreted By:  Jimbo Andersen, STUDY: FL SMALL BOWEL SERIES; ;  12/7/2023 8:25 pm   INDICATION: Signs/Symptoms:SBO.   COMPARISON: CT abdomen/pelvis of 12/06/2023.   ACCESSION NUMBER(S): [id number]   ORDERING CLINICIAN: VIJI LUNA   TECHNIQUE: Small bowel series was performed with administration of enteric contrast through an NG tube. Sequential radiographs of the abdomen were obtained over a 10 hour period.   FINDINGS:  view of the abdomen shows clear lung bases and NG tube tip projecting at the gastric fundus level. There are moderately dilated central abdominal small bowel segments similar in configuration to prior CT with an overall bowel gas pattern suggestive of obstruction. A right lower quadrant suture line is present.   Enteric contrast was administered through the NG tube and  promptly opacified the stomach. There was no progression of contrast from the stomach over the initial 4 hours of the exam.   On 6 hour image, there was passage of a portion of the contrast from the stomach, now opacifying the moderately dilated central abdominal small bowel segments. On subsequent images at 8 hour and 10 hour, there has been no further progression of the enteric contrast. The enteric contrast remains within the dilated stomach as well as within the dilated central abdominal small bowel. Contrast has not reached the distal decompressed small bowel segments or colon.       Small-bowel series compatible with high-grade small bowel obstruction similar to prior CT.     MACRO: None   Signed by: Jimbo Andersen 12/8/2023 8:03 AM Dictation workstation:   [id number]    XR chest 1 view    Result Date: 12/6/2023  Interpreted By:  Jonah Sweet, STUDY: XR CHEST 1 VIEW;  12/6/2023 3:16 pm   INDICATION: Signs/Symptoms:confirm NG placement.   COMPARISON: Earlier same day   ACCESSION NUMBER(S): [id number]   ORDERING CLINICIAN: ANTONIO KILPATRICK   FINDINGS: Please see the impression       1.  NG tube ends in the gastric fundus.   Signed by: Jonah Sweet 12/6/2023 3:20 PM Dictation workstation:   [id number]    CT abdomen pelvis wo IV contrast    Result Date: 12/6/2023  Interpreted By:  Carlos Augustin, STUDY: CT ABDOMEN PELVIS WO IV CONTRAST;  12/6/2023 11:59 am   INDICATION: Signs/Symptoms:vomiting.   COMPARISON: Selected examinations as far back as July 25, 2021 CT abdomen and pelvis including September 20, 2023 CT chest abdomen and pelvis.   ACCESSION NUMBER(S): [id number]   ORDERING CLINICIAN: ANTONIO KILPATRICK   TECHNIQUE: CT of the abdomen and pelvis was performed. Contiguous axial images were obtained at 3 mm slice thickness through the abdomen and pelvis. Coronal and sagittal reconstructions at 3 mm slice thickness were performed.  No intravenous or oral contrast agents were administered.   FINDINGS:  Please note that the evaluation of vessels, lymph nodes and organs is limited without intravenous contrast.   LOWER CHEST: Newly seen elevation left hemidiaphragm related to gastric distension. No pneumonia, edema, or effusion. There is increased mural prominence distal esophagus of indeterminate cause, potentially related to underdistention or inflammation.   ABDOMEN:   LIVER: The liver demonstrates homogeneous attenuation without evidence of focal liver lesions.   BILE DUCTS: Unchanged prominence of the intra and extrahepatic bile ducts without evident obstructing mass or calculus compatible with age and prior cholecystectomy.   GALLBLADDER: Absent   PANCREAS: Grossly unchanged with partial fatty replacement.   SPLEEN: Within normal limits.   ADRENAL GLANDS: Within normal limits.   Unchanged small homogeneous hypodensities most compatible with cysts. No evident hydronephrosis, or calculus.   PELVIS:   BLADDER: Unremarkable   REPRODUCTIVE ORGANS: No evident pelvic mass. The uterus and ovaries are not identified.   BOWEL: There is newly seen marked dilation of the stomach and portion of the small bowel with decompressed distal small bowel. There is evident tapering anterior pelvis just to the left of midline series 21, image 120. The pattern favors a mechanical obstruction potentially related to adhesion. There is a small nearby left anterior fat containing hernia series 21, image 104. There is mild fluid within the hernia sac image 111 there are multiple adjacent densities likely suture and or calcifications anterior peritoneal cavity. No apparent pneumatosis. The dilated fluid-filled small bowel measures up to 5.7 cm caliber series 21, image 90. The decompressed distal small bowel measures less than 1 cm. The colon is predominantly decompressed. Patient is status post surgery with suture near the ileocolonic junction.   VESSELS: Normal caliber. Minimal scattered arterial vascular calcifications.    PERITONEUM/RETROPERITONEUM/LYMPH NODES: No ascites or free air, no fluid collection.  No enlarged mesenteric lymph nodes.   BONES: Compared with September 20 newly seen mild inferior compressive deformity L2 with new bandlike sclerosis midportion compatible with impaction deformity or healing response without significant retropulsion. Stable deformity superior L3. There is osseous demineralization. There are scattered degenerative changes.       1.  Findings compatible with acute mechanical small bowel obstruction potentially related to adhesion. 2. There is an adjacent small left-sided ventral wall hernia which contains minimal fat and fluid within the hernia sac which may or may not be related. Attention recommended on clinical assessment. 3. Newly seen mild superior likely recent superior L2 compressive deformity.     Signed by: Carlos Augustin 12/6/2023 12:24 PM Dictation workstation:   [id number]    XR chest 1 view    Result Date: 12/6/2023  Interpreted By:  Carlos Augustin, STUDY: XR CHEST 1 VIEW;  12/6/2023 11:58 am   INDICATION: .   COMPARISON: December 19, 2021 chest radiograph. September 20, 2023 CT chest abdomen and pelvis. Same day CT abdomen and pelvis   ACCESSION NUMBER(S): [id number]   ORDERING CLINICIAN: ANTONIO KILPATRICK   FINDINGS: AP radiograph of the chest was provided.       CARDIOMEDIASTINAL SILHOUETTE: Unchanged cardiomediastinal silhouette.   LUNGS: Lungs are clear.   ABDOMEN: Newly seen marked gaseous distention of the stomach distention of portion of the bowel.   BONES: No acute osseous changes.       1.  Abnormal distention of the stomach and bowel. 2. The lungs are clear.       MACRO: None   Signed by: Carlos Augustin 12/6/2023 12:07 PM Dictation workstation:   [id number]                Assessment/Plan   Principal Problem:    Small bowel obstruction (CMS/HCC)  Active Problems:    Acute kidney injury (nontraumatic) (CMS/HCC)    Chronic renal insufficiency    Patient with h/o T2DM,  fatty liver, DLP, HTN, Afib s/p ablation, GI bleed, anxiety/depression, hypothyroidism, who p/w abdominal pain x few days associated with N/V. Found to have SBO. Initially had surgery and adhesionolysis done on 128,, however, given no improvement s/p repeat laparoscopy that was converted to laparotomy with extensive adhesionolysis on 12/17. Post surgery hypotensive, admitted to the ICU for further management.       Acute hypoxic respiratory failure likely due to decreased respiratory drive and upper airway obstruction.       Now intubated     Continue MV     SBT in am     12/18/2023: Patient was placed on sedation vacation followed by spontaneous breathing trial.        Patient passing the spontaneous breathing trial      Will extubate the patient       12/19/2023: Patient successfully extubated yesterday 12/19/2023.         Currently on nasal cannula.  Still no significant bowel movements    2.  Small bowel obstruction secondary to extensive adhesions     Surgery on consult, will FU with their recommendations     Continue parenteral nutrition     GI prophylaxis     12/18/2023: Patient abdomen was closed yesterday 12/17/2023 12/19/2023: Still no significant bowel movements     On peripheral parenteral nutrition, n.p.o. as per surgery     3.  Acute encephalopathy secondary to pain medications and sedation     currently intubated and sedated. H/o anxiety/depression     Continue sedation with Propofol     Pain control: currently Fentanyl     Supportive measures     Daily SAT     12/18/2023: Encephalopathy resolved     4. Hypotensive on arrival, likely hypovolemic. Requiring low dose levophed. H/o HTN, DLP, Afib with ablation     IVF     Continue Atorvastatin     Hold Metoprolol     Continue to monitor      12/19/2023: Oral medication including atorvastatin, Xarelto and coreg on hold as the patient is strict n.p.o.     Resuming the medications deferred to surgical team.  Communicated with the surgical team    I  spent 30 minutes of cumulative critical care time with the patient.  Greater than 50% of that time was spent in the direct collaboration and or coordination of care of the patient.   We can transfer the patient to [facility] with surgical team as a primary.     Dragon dictation software was used to dictate this note and thus there may be minor errors in translation/transcription including garbled speech or misspellings. Please contact for clarification if needed.

## 2023-12-19 NOTE — PROGRESS NOTES
"Harper Lara is a 78 y.o. female on day 13 of admission presenting with Small bowel obstruction (CMS/HCC).    Subjective    Patient awake resting in bed this morning.  No acute events overnight.    Objective     Physical Exam  Vitals reviewed.   HENT:      Head: Normocephalic and atraumatic.      Nose: Nose normal.      Mouth/Throat:      Pharynx: Oropharynx is clear.   Eyes:      Extraocular Movements: Extraocular movements intact.      Conjunctiva/sclera: Conjunctivae normal.      Pupils: Pupils are equal, round, and reactive to light.      Comments: NG tube in place. No output    Cardiovascular:      Rate and Rhythm: Normal rate and regular rhythm.      Pulses: Normal pulses.      Heart sounds: Normal heart sounds.   Pulmonary:      Effort: Pulmonary effort is normal.      Breath sounds: Normal breath sounds.   Abdominal:      General: Abdomen is flat. Bowel sounds are normal.      Palpations: Abdomen is soft.      Tenderness: There is abdominal tenderness (Mild).      Comments: Incision sites are CDI. ANTHONY Drain in place with 5 mL serosanguineous drainage.   Musculoskeletal:         General: Normal range of motion.      Cervical back: Normal range of motion and neck supple.   Skin:     General: Skin is warm and dry.      Capillary Refill: Capillary refill takes less than 2 seconds.   Neurological:      General: No focal deficit present.      Mental Status: She is alert and oriented to person, place, and time. Mental status is at baseline.   Psychiatric:         Mood and Affect: Mood normal.         Behavior: Behavior normal.         Last Recorded Vitals  Blood pressure 119/56, pulse 90, temperature 36.4 °C (97.5 °F), temperature source Temporal, resp. rate 24, height 1.6 m (5' 3\"), weight 72.2 kg (159 lb 2.8 oz), SpO2 99 %.  Intake/Output last 3 Shifts:  I/O last 3 completed shifts:  In: 24214.1 (145.4 mL/kg) [I.V.:2120.8 (29.4 mL/kg); Blood:500; IV Piggyback:3400]  Out: 2665 (36.9 mL/kg) [Urine:1195 (0.5 " mL/kg/hr); Emesis/NG output:1100; Drains:370]  Weight: 72.2 kg     Relevant Results  Lab Results   Component Value Date    WBC 7.3 12/19/2023    HGB 7.5 (L) 12/19/2023    HCT 22.6 (L) 12/19/2023    MCV 87 12/19/2023     12/19/2023     Lab Results   Component Value Date    GLUCOSE 146 (H) 12/19/2023    CALCIUM 7.4 (L) 12/19/2023     (L) 12/19/2023    K 4.3 12/19/2023    CO2 23 12/19/2023     12/19/2023    BUN 38 (H) 12/19/2023    CREATININE 0.95 12/19/2023     No current facility-administered medications on file prior to encounter.     Current Outpatient Medications on File Prior to Encounter   Medication Sig Dispense Refill    amLODIPine (Norvasc) 5 mg tablet Take 1 tablet (5 mg) by mouth once daily.      atorvastatin (Lipitor) 20 mg tablet Take 1 tablet (20 mg) by mouth once daily.      atorvastatin (Lipitor) 40 mg tablet Take 1 tablet (40 mg) by mouth once daily.      benztropine (Cogentin) 1 mg tablet Take 1 tablet (1 mg) by mouth once daily.      calcium polycarbophiL (Fibercon) 625 mg tablet Take by mouth twice a day.      carvedilol (Coreg) 12.5 mg tablet Take by mouth twice a day.      cetirizine (ZyrTEC) 10 mg tablet Take 1 tablet (10 mg) by mouth once daily.      cholecalciferol (Vitamin D-3) 10 MCG (400 UNIT) tablet Take 1 tablet (10 mcg) by mouth once daily.      cyanocobalamin (Vitamin B-12) 1,000 mcg tablet Take 1 tablet (1,000 mcg) by mouth once daily.      docosahexaenoic acid/epa (FISH OIL ORAL) Take 1 capsule by mouth once daily.      FLUoxetine (PROzac) 20 mg tablet Take 1 tablet (20 mg) by mouth once daily.      furosemide (Lasix) 20 mg tablet Take 1 tablet (20 mg) by mouth once daily.      gentamicin (Garamycin) 0.1 % ointment Apply 1 Application topically 3 times a day. To bed sores      glipiZIDE (Glucotrol) 10 mg tablet 1 tab(s) orally 2 times a day (before meals)      levothyroxine (Synthroid, Levoxyl) 75 mcg tablet Take 1 tablet (75 mcg) by mouth once daily.      magnesium  oxide (Mag-Ox) 400 mg tablet Take 1 tablet (400 mg) by mouth once daily.      metFORMIN (Glucophage) 500 mg tablet Take 1 tablet (500 mg) by mouth 2 times a day with meals.      multivitamin tablet Take 1 tablet by mouth once daily.      nystatin (Mycostatin) 100,000 unit/gram powder Apply 1 Application topically if needed.      ondansetron ODT (Zofran-ODT) 4 mg disintegrating tablet Take 1 tablet (4 mg) by mouth every 8 hours if needed for nausea or vomiting.      pantoprazole (ProtoNix) 40 mg EC tablet Take 1 tablet (40 mg) by mouth once daily in the morning. Take before meals. Do not crush, chew, or split.      rivaroxaban (Xarelto) 15 mg tablet Take 1 tablet (15 mg) by mouth once daily in the evening. Take with meals. Take with food.      triamterene-hydrochlorothiazid (Dyazide) 37.5-25 mg capsule Take 1 capsule by mouth once daily in the morning.       Results for orders placed or performed during the hospital encounter of 12/06/23 (from the past 24 hour(s))   Blood Gas Arterial Full Panel   Result Value Ref Range    POCT pH, Arterial 7.43 (H) 7.38 - 7.42 pH    POCT pCO2, Arterial 31 (L) 38 - 42 mm Hg    POCT pO2, Arterial 195 (H) 85 - 95 mm Hg    POCT SO2, Arterial 100 94 - 100 %    POCT Oxy Hemoglobin, Arterial 99.2 (H) 94.0 - 98.0 %    POCT Hematocrit Calculated, Arterial 26.0 (L) 36.0 - 46.0 %    POCT Sodium, Arterial 129 (L) 136 - 145 mmol/L    POCT Potassium, Arterial 4.7 3.5 - 5.3 mmol/L    POCT Chloride, Arterial 101 98 - 107 mmol/L    POCT Ionized Calcium, Arterial 1.21 1.10 - 1.33 mmol/L    POCT Glucose, Arterial 146 (H) 74 - 99 mg/dL    POCT Lactate, Arterial 2.6 (H) 0.4 - 2.0 mmol/L    POCT Base Excess, Arterial -3.2 (L) -2.0 - 3.0 mmol/L    POCT HCO3 Calculated, Arterial 20.6 (L) 22.0 - 26.0 mmol/L    POCT Hemoglobin, Arterial 8.7 (L) 12.0 - 16.0 g/dL    POCT Anion Gap, Arterial 12 10 - 25 mmo/L    Patient Temperature      FiO2 50 %    Ventilator Mode CPAP     Peep CHM2O 5.0 cm H2O    Pressure  Support 5 cm H2O    Site of Arterial Puncture Arterial Line    POCT GLUCOSE   Result Value Ref Range    POCT Glucose 131 (H) 74 - 99 mg/dL   POCT GLUCOSE   Result Value Ref Range    POCT Glucose 127 (H) 74 - 99 mg/dL   POCT GLUCOSE   Result Value Ref Range    POCT Glucose 154 (H) 74 - 99 mg/dL   Lactate   Result Value Ref Range    Lactate 1.6 0.4 - 2.0 mmol/L   POCT GLUCOSE   Result Value Ref Range    POCT Glucose 150 (H) 74 - 99 mg/dL   POCT GLUCOSE   Result Value Ref Range    POCT Glucose 149 (H) 74 - 99 mg/dL   Magnesium   Result Value Ref Range    Magnesium 1.97 1.60 - 2.40 mg/dL   Phosphorus   Result Value Ref Range    Phosphorus 3.5 2.5 - 4.9 mg/dL   CBC   Result Value Ref Range    WBC 7.3 4.4 - 11.3 x10*3/uL    nRBC 0.0 0.0 - 0.0 /100 WBCs    RBC 2.60 (L) 4.00 - 5.20 x10*6/uL    Hemoglobin 7.5 (L) 12.0 - 16.0 g/dL    Hematocrit 22.6 (L) 36.0 - 46.0 %    MCV 87 80 - 100 fL    MCH 28.8 26.0 - 34.0 pg    MCHC 33.2 32.0 - 36.0 g/dL    RDW 15.4 (H) 11.5 - 14.5 %    Platelets 152 150 - 450 x10*3/uL   Basic Metabolic Panel   Result Value Ref Range    Glucose 146 (H) 74 - 99 mg/dL    Sodium 130 (L) 136 - 145 mmol/L    Potassium 4.3 3.5 - 5.3 mmol/L    Chloride 101 98 - 107 mmol/L    Bicarbonate 23 21 - 32 mmol/L    Anion Gap 10 10 - 20 mmol/L    Urea Nitrogen 38 (H) 6 - 23 mg/dL    Creatinine 0.95 0.50 - 1.05 mg/dL    eGFR 62 >60 mL/min/1.73m*2    Calcium 7.4 (L) 8.6 - 10.3 mg/dL   POCT GLUCOSE   Result Value Ref Range    POCT Glucose 141 (H) 74 - 99 mg/dL   POCT GLUCOSE   Result Value Ref Range    POCT Glucose 147 (H) 74 - 99 mg/dL           Assessment/Plan   Principal Problem:    Small bowel obstruction (CMS/HCC)  Active Problems:    Acute kidney injury (nontraumatic) (CMS/[facility])    Chronic renal insufficiency    Assessment  Patient awake this morning.  Nursing reports no acute events overnight.  Patient states she has abdominal pain but that she is feeling much better.  Patient denies any nausea, vomiting, chills,  or shortness of breath.  Patient denies any flatus or bowel movements. Upon examination incision sites were clean dry and intact.  ANTHONY drain in place with 5 mL serosanguineous drainage. Patient has had about 70 mL of urine output per hour in the last 12 hours.  NG tube in place with no output, nursing had just changed canister.    Impression   POD11 diagnostic laparoscopy, lysis of adhesions, and constricting adhesive band. POD2 diagnostic laparoscopy, converted to laparotomy, lysis of adhesions, and small bowel resection.    Plan  NPO (except for ice chips)/NG/IVF - NG tube to remain in place till return of bowel function  Pain control  Nausea control  Strict I&Os   Continue PPN  Brock to remain in place  Okay to resume Xarelto on 12/21  PICC line ordered  Okay from general surgery view to be transferred to [facility]    Further recommendations per Dr. Javad Bales PA-C    Agree with note above except were noted below    No acute event overnight, his NG tube out with due to bilious.  Denies any flatus.  Pain is well-tolerated.  On PPN.    No acute distress on exam, dressings clean and intact.    78-year-old female postop day 11 from diagnostic laparoscopy, lysis of adhesions, and constricting adhesive band. POD2 diagnostic laparoscopy, converted to laparotomy, lysis of adhesions, and small bowel resection.    Continue n.p.o., okay for ice chips while NG tube is in place  Pain control, nausea control  Strict I's and O's  PICC with plan for TPN, okay for PPN until central access obtained  Brock to remain in place for today  Tentatively okay to resume Xarelto on 12/21, but will assess this on a day-to-day basis as patient needs to have GI function prior to starting any DOAC  To spirometer, SCDs, out of bed  PT/OT  Okay to be transferred to regular nursing floor    Baltazar Farnsworth MD  12/19/23  9:25 AM

## 2023-12-19 NOTE — ANESTHESIA POSTPROCEDURE EVALUATION
Patient: Harper Lara    Procedure Summary       Date: 12/17/23 Room / Location: [facility] 11 / [facility]    Anesthesia Start: 1314 Anesthesia Stop: 1700    Procedure: Exploration Laparoscopy TO LAPAROTOMY, EXTENSIVE LYSIS OF ADHESIONS (Abdomen) Diagnosis:     Surgeons: Manuel Lozada MD Responsible Provider: Johann Vazquez MD    Anesthesia Type: general ASA Status: 4 - Emergent            Anesthesia Type: general    Vitals Value Taken Time   /46 12/19/23 0801   Temp 36.4 °C (97.5 °F) 12/19/23 0600   Pulse 88 12/19/23 0759   Resp 24 12/19/23 0759   SpO2 99 % 12/19/23 0759   Vitals shown include unvalidated device data.    Anesthesia Post Evaluation    Patient location during evaluation: ICU  Patient participation: complete - patient participated  Level of consciousness: awake and alert  Pain score: 1  Pain management: satisfactory to patient  Airway patency: patent  Cardiovascular status: stable  Respiratory status: acceptable  Hydration status: acceptable  Postoperative Nausea and Vomiting: none        No notable events documented.

## 2023-12-19 NOTE — NURSING NOTE
Pre-Procedure Checklist:  Emergent Line Insertion: No  Type of Line to be Placed: PICC  Consent Obtained: Yes  Emergency Medication Necessary: No  Patient Identified with 2 Independent Identifiers: Yes  Review of Allergies, Anticoagulation, Relevant Labs, ECG/Telemetry: Yes  Risks/Benefits/Alternatives Discussed with Patient/POA/Legal Representative: Yes  Stop Sign on Door: Yes  Time Out Performed: Yes  Catheter Exchange: No    Positioning Checklist:  All People, Including Patient, in the Room with Cap and Mask: Yes   Fluoroscopy Used to Identify Vessel and Guide Insertion: No  Sterile Cover Used: Yes   Full Barrier Precautions Followed (Mask, Cap, Gown, Gloves): Yes   Hands Washed: Yes   Monitors Attached with Sound Alarms On: Yes  Full Body Sterile Drape (Head-to-Toe) Used to Cover Patient: Yes   Trendelenburg Position (For IJ and Subclavian): No   CHG Skin Prep Used and Allowed to Air Dry to Skin Procedure: Yes     Procedure Checklist:  Blood Aspirated From All Lumens, All Ports Subsequently Flushed: Yes   Catheter Caps Placed on All Lumens; Lumens Clamped: Yes   Maintain Guidewire Control Throughout, Ensuring Guidewire Removal: Yes   Maintain Sterile Field Throughout Insertion: Yes   Catheter Secured: Yes   Confirmatory Test of Venous Placement: chest xray    Post Procedure Checklist:  Date and Time Written on Dressing: Yes   Sharp and Wire Count and Safe Disposal of all Sharps/Wires: Yes   Sterile Dressing Applied Per Protocol: Yes   X-ray Ordered or ECG Image: Yes     PICC Insertion Details:  Size (Fr): 4 Fr  Lumen Type: Dual  Catheter to Vein Ratio Less Than 50%: Yes   Total Length (cm): 38  External Length (cm): 0  Orientation: Right  Location: Brachial  Site Prep: Chlorohexidine; Usual sterile procedure followed  Local Anesthetic: Injectable/Subcutaneous  Indication: TPN  Insertion Team Members in the Room: Nurse, Tech , nurse  Initial Extremity Circumference (cm): 34  Insertion Attempts: 1  Patient  Tolerance: Tolerated Well, Age Appropriate  Comfort Measures: Subcutaneous anesthetic; Verbal  Procedure Location: BEDSIDE  Safety Measures: Patient specific safety measures addressed with RN  Estimated Blood Loss (mL): 2  Vessel Fully Compressible Proximally and Distally to Insertion Site: Yes  Brisk Blood Return Obtained and Line Draws Easily: Yes  Tip Location: SVC  Line Confirmation: xray  Lot #: [id number]  : BardLine Exp Date: 12/31/24  Securement: Stat Lock  Post Procedure Checklist: Handoff with RN; Obtain all new IV tubing prior to use; Bed at lowest level and wheels locked; Line discharge information at bedside.  Additional Details: Line was inserted using Modified Seldinger's Technique.   Placed by Ivette Clark RN  PICC ready for immediate use.

## 2023-12-19 NOTE — PROGRESS NOTES
"Nutrition Follow Up Assessment:   Nutrition Assessment         Patient is a 78 y.o. female presenting with: small bowel obstruction. POD2 diagnostic laparoscopy, converted to laparotomy, lysis of adhesions, and small bowel resection.       Nutrition History:  Food and Nutrient History: Remains NPO. Currently on PPN 4.25/5 at 70 ml/hr (571 kcal, 71 g AA) with lipids 20% in 250 ml 3x/week (additional 1500 kcal/week). Surgery obtaining PICC line to switch from PPN to TPN, requesting recommendations. Pt asking for water.  Food Allergies/Intolerances:   strawberry  GI Symptoms: Abdominal pain  Oral Problems:  NPO       Anthropometrics:  Height: 160 cm (5' 3\")   Weight: 72.2 kg (159 lb 2.8 oz)   BMI (Calculated): 28.2             Weight History:     Weight Change %:  Weight History / % Weight Change: New wts 72.2 kg on 12/19/23, 73.5 kg on 12/18/23. Pt with ~25 kg wt loss x 2 weeks.  Significant Weight Loss: Yes  Interpretation of Weight Loss: >5% in 1 month    Nutrition Focused Physical Exam Findings:    Subcutaneous Fat Loss:   Orbital Fat Pads: Mild-Moderate (slight dark circles and slight hollowing)  Buccal Fat Pads: Mild-Moderate (flat cheeks, minimal bounce)  Triceps: Well nourished (ample fat tissue)  Muscle Wasting:  Temporalis: Mild-Moderate (slight depression)  Pectoralis (Clavicular Region): Well nourished (clavicle not visible)  Deltoid/Trapezius: Well nourished (rounded appearance at arm, shoulder, neck)  Edema:  Edema Location: non-pitting BUE and BLE edema per nursing assessment 12/19  Physical Findings:  Skin: Positive (multiple abdominal incisions, PI stage 2 bilateral buttocks per nursing assessment)    Nutrition Significant Labs:    Reviewed   Nutrition Specific Medications:  Reviewed     I/O:   Last BM Date: 12/16/23; Stool Appearance: Loose (12/13/23 1045)        Dietary Orders (From admission, onward)       Start     Ordered    12/17/23 0001  NPO Diet; Effective midnight  Diet effective midnight      " "   12/16/23 1924                     Estimated Needs:    Calculated Energy Needs Using Equations  Height: 160 cm (5' 3\")  Weight Used for Equation Calculations: 97.5 kg (214 lb 15.2 oz)  Minute Ventilation (L/min): 8.2 L/min  Myesha Cummins Equation (Overweight or Obese Patients): 1434  Equation Chosen to Use by RD: Cece Mullins  Activity Factor: 1.2  Stress Factor: 1.2  Total Energy Needs: 2065  Temp: 35.9 °C (96.6 °F)     Total Protein Estimated Needs (g): 105 g  Method for Estimating Needs: 2 g/kg IBW  Total Fluid Estimated Needs (mL): 2065 mL  Method for Estimating Needs: 1 ml/kcal or per MD        Nutrition Diagnosis   Malnutrition Diagnosis  Patient has Malnutrition Diagnosis: Yes  Diagnosis Status: New  Malnutrition Diagnosis: Moderate malnutrition related to acute disease or injury  As Evidenced by: mild-moderate fat wasting; mild-moderate muscle wasting; inadequate intake of <50% of estimated needs x >1 week    Nutrition Diagnosis  Patient has Nutrition Diagnosis: Yes  Diagnosis Status (1): Ongoing  Nutrition Diagnosis 1: Inadequate oral intake  Related to (1): altered GI function  As Evidenced by (1): NPO or clear liuqids since 12/6, need for PN       Nutrition Interventions/Recommendations         Nutrition Prescription:  Individualized Nutrition Prescription Provided for : TPN and diet per surgery        Nutrition Interventions:   Food and/or Nutrient Delivery Interventions  Interventions: Parenteral nutrition/ IV fluids  Parenteral Nutrition/IV Fluids: Modify rate of parenteral nutrition, Modify concentration of parenteral nutrition  Formula: TPN standard - AA 5%, dextrose 20%  Electrolytes: Standard  Elements: Multivitamin, Trace elements  Fat Emulsion 20%: Intralipid  Fat Emulsion Rate (mL/hr): 20.83 mL/hr  Fat Emulsion Volume (mL/day): 250 mL/day (3 days per week)  Continuous Rate (mL/hr): 83 mL/hr  Total Volume (mL/day): 1992 mL/day  Total Parenteral Kcal (kcal/day): 1752 kcal/day (without " lipids)  Total Protein (g/day): 100 g/day  Glucose Infusion Rate (GIR) (mg/kg/min): 3.8 mg/kg/min  Labs: Triglycerides:  now and each week, Renal panel and magnesium daily, LFTs: now and each week    Coordination of Nutrition Care by a Nutrition Professional  Collaboration and Referral of Nutrition Care: Collaboration by nutrition professional with other providers  Goal: Discussed in interdisciplinary rounds; rec's given to surgery via secure chat    Nutrition Education:   Reminded pt she is NPO but getting some nutrition through IV.        Nutrition Monitoring and Evaluation   Food/Nutrient Related History Monitoring  Monitoring and Evaluation Plan: Enteral and parenteral nutrition intake, Energy intake  Energy Intake: Estimated energy intake  Criteria: Pt meets >75% of estimated energy needs  Enteral and Parenteral Nutrition Intake: Parenteral nutrition intake  Criteria: Tolerates TPN    Body Composition/Growth/Weight History  Monitoring and Evaluation Plan: Weight  Weight: Measured weight  Criteria: Maintains stable weight    Biochemical Data, Medical Tests and Procedures  Monitoring and Evaluation Plan: Glucose/endocrine profile  Electrolyte and Renal Panel: Potassium, Sodium, Phosphorus, Magnesium  Criteria: Electrolytes WNL  Glucose/Endocrine Profile: Glucose, casual  Criteria: BG within desirable range    Nutrition Focused Physical Findings  Monitoring and Evaluation Plan: Digestive System  Criteria: Return of bowel function       Time Spent/Follow-up Reminder:   Time Spent (min): 45 minutes  Last Date of Nutrition Visit: 12/19/23  Nutrition Follow-Up Needed?: 3-5 days  Follow up Comment: TPN & Lipids

## 2023-12-19 NOTE — PROGRESS NOTES
12/19/23 1444   Discharge Planning   Living Arrangements Spouse/significant other   Support Systems Spouse/significant other   Type of Residence Private residence   Home or Post Acute Services Post acute facilities (Rehab/SNF/etc)   Type of Post Acute Facility Services Other (Comment)  (possible ltac)   Does the patient need discharge transport arranged? Yes   RoundTrip coordination needed? Yes     Per surgery notes , Continue n.p.o., okay for ice chips while NG tube is in place. PICC with plan for TPN, okay for PPN until central access obtained  Tentatively okay to resume Xarelto on 12/21, but will be assessed  this on a day-to-day basis as patient needs to have GI function prior to starting any DOAC  Pt has been extubated, need PT /OT  updates to assist with determining needs. Notified SNF to see if they are able to accept TPN .May need to continue TPN at discharge.

## 2023-12-20 ENCOUNTER — APPOINTMENT (OUTPATIENT)
Dept: RADIOLOGY | Facility: HOSPITAL | Age: 78
DRG: 330 | End: 2023-12-20
Payer: MEDICARE

## 2023-12-20 LAB
ABO GROUP (TYPE) IN BLOOD: NORMAL
ABO GROUP (TYPE) IN BLOOD: NORMAL
ANION GAP SERPL CALC-SCNC: 7 MMOL/L (ref 10–20)
ANTIBODY SCREEN: NORMAL
BUN SERPL-MCNC: 35 MG/DL (ref 6–23)
CALCIUM SERPL-MCNC: 7.5 MG/DL (ref 8.6–10.3)
CHLORIDE SERPL-SCNC: 102 MMOL/L (ref 98–107)
CO2 SERPL-SCNC: 25 MMOL/L (ref 21–32)
CREAT SERPL-MCNC: 0.88 MG/DL (ref 0.5–1.05)
ERYTHROCYTE [DISTWIDTH] IN BLOOD BY AUTOMATED COUNT: 15.2 % (ref 11.5–14.5)
GFR SERPL CREATININE-BSD FRML MDRD: 68 ML/MIN/1.73M*2
GLUCOSE BLD MANUAL STRIP-MCNC: 139 MG/DL (ref 74–99)
GLUCOSE BLD MANUAL STRIP-MCNC: 171 MG/DL (ref 74–99)
GLUCOSE BLD MANUAL STRIP-MCNC: 182 MG/DL (ref 74–99)
GLUCOSE BLD MANUAL STRIP-MCNC: 184 MG/DL (ref 74–99)
GLUCOSE BLD MANUAL STRIP-MCNC: 203 MG/DL (ref 74–99)
GLUCOSE BLD MANUAL STRIP-MCNC: 203 MG/DL (ref 74–99)
GLUCOSE SERPL-MCNC: 192 MG/DL (ref 74–99)
HCT VFR BLD AUTO: 18.6 % (ref 36–46)
HGB BLD-MCNC: 6.1 G/DL (ref 12–16)
HOLD SPECIMEN: NORMAL
INR PPP: 1.4 (ref 0.9–1.1)
MAGNESIUM SERPL-MCNC: 1.94 MG/DL (ref 1.6–2.4)
MCH RBC QN AUTO: 29.2 PG (ref 26–34)
MCHC RBC AUTO-ENTMCNC: 32.8 G/DL (ref 32–36)
MCV RBC AUTO: 89 FL (ref 80–100)
NRBC BLD-RTO: 0 /100 WBCS (ref 0–0)
PHOSPHATE SERPL-MCNC: 2.2 MG/DL (ref 2.5–4.9)
PLATELET # BLD AUTO: 141 X10*3/UL (ref 150–450)
POTASSIUM SERPL-SCNC: 3.7 MMOL/L (ref 3.5–5.3)
PROTHROMBIN TIME: 16.3 SECONDS (ref 9.8–12.8)
RBC # BLD AUTO: 2.09 X10*6/UL (ref 4–5.2)
RH FACTOR (ANTIGEN D): NORMAL
RH FACTOR (ANTIGEN D): NORMAL
SODIUM SERPL-SCNC: 130 MMOL/L (ref 136–145)
WBC # BLD AUTO: 5.1 X10*3/UL (ref 4.4–11.3)

## 2023-12-20 PROCEDURE — 82330 ASSAY OF CALCIUM: CPT

## 2023-12-20 PROCEDURE — 51702 INSERT TEMP BLADDER CATH: CPT

## 2023-12-20 PROCEDURE — 85027 COMPLETE CBC AUTOMATED: CPT | Performed by: INTERNAL MEDICINE

## 2023-12-20 PROCEDURE — 2500000004 HC RX 250 GENERAL PHARMACY W/ HCPCS (ALT 636 FOR OP/ED)

## 2023-12-20 PROCEDURE — 2500000005 HC RX 250 GENERAL PHARMACY W/O HCPCS

## 2023-12-20 PROCEDURE — 86920 COMPATIBILITY TEST SPIN: CPT

## 2023-12-20 PROCEDURE — 83735 ASSAY OF MAGNESIUM: CPT

## 2023-12-20 PROCEDURE — 96372 THER/PROPH/DIAG INJ SC/IM: CPT

## 2023-12-20 PROCEDURE — 99232 SBSQ HOSP IP/OBS MODERATE 35: CPT | Performed by: REGISTERED NURSE

## 2023-12-20 PROCEDURE — 1200000002 HC GENERAL ROOM WITH TELEMETRY DAILY

## 2023-12-20 PROCEDURE — 85610 PROTHROMBIN TIME: CPT

## 2023-12-20 PROCEDURE — 36430 TRANSFUSION BLD/BLD COMPNT: CPT

## 2023-12-20 PROCEDURE — 80048 BASIC METABOLIC PNL TOTAL CA: CPT | Performed by: INTERNAL MEDICINE

## 2023-12-20 PROCEDURE — 74018 RADEX ABDOMEN 1 VIEW: CPT | Performed by: RADIOLOGY

## 2023-12-20 PROCEDURE — 36415 COLL VENOUS BLD VENIPUNCTURE: CPT

## 2023-12-20 PROCEDURE — 99232 SBSQ HOSP IP/OBS MODERATE 35: CPT

## 2023-12-20 PROCEDURE — 74018 RADEX ABDOMEN 1 VIEW: CPT

## 2023-12-20 PROCEDURE — P9016 RBC LEUKOCYTES REDUCED: HCPCS

## 2023-12-20 PROCEDURE — 2500000004 HC RX 250 GENERAL PHARMACY W/ HCPCS (ALT 636 FOR OP/ED): Performed by: SURGERY

## 2023-12-20 PROCEDURE — 86901 BLOOD TYPING SEROLOGIC RH(D): CPT

## 2023-12-20 PROCEDURE — C9113 INJ PANTOPRAZOLE SODIUM, VIA: HCPCS

## 2023-12-20 PROCEDURE — 99024 POSTOP FOLLOW-UP VISIT: CPT | Performed by: SURGERY

## 2023-12-20 PROCEDURE — 84100 ASSAY OF PHOSPHORUS: CPT

## 2023-12-20 PROCEDURE — 82947 ASSAY GLUCOSE BLOOD QUANT: CPT

## 2023-12-20 RX ORDER — ENOXAPARIN SODIUM 100 MG/ML
40 INJECTION SUBCUTANEOUS DAILY
Status: DISCONTINUED | OUTPATIENT
Start: 2023-12-20 | End: 2023-12-26 | Stop reason: HOSPADM

## 2023-12-20 RX ADMIN — PHYTONADIONE 10 MG: 10 INJECTION, EMULSION INTRAMUSCULAR; INTRAVENOUS; SUBCUTANEOUS at 16:23

## 2023-12-20 RX ADMIN — INSULIN LISPRO 2 UNITS: 100 INJECTION, SOLUTION INTRAVENOUS; SUBCUTANEOUS at 08:45

## 2023-12-20 RX ADMIN — INSULIN LISPRO 1 UNITS: 100 INJECTION, SOLUTION INTRAVENOUS; SUBCUTANEOUS at 11:50

## 2023-12-20 RX ADMIN — INSULIN LISPRO 1 UNITS: 100 INJECTION, SOLUTION INTRAVENOUS; SUBCUTANEOUS at 05:45

## 2023-12-20 RX ADMIN — ASCORBIC ACID, VITAMIN A PALMITATE, CHOLECALCIFEROL, THIAMINE HYDROCHLORIDE, RIBOFLAVIN-5 PHOSPHATE SODIUM, PYRIDOXINE HYDROCHLORIDE, NIACINAMIDE, DEXPANTHENOL, ALPHA-TOCOPHEROL ACETATE, VITAMIN K1, FOLIC ACID, BIOTIN, CYANOCOBALAMIN: 200; 3300; 200; 6; 3.6; 6; 40; 15; 10; 150; 600; 60; 5 INJECTION, SOLUTION INTRAVENOUS at 12:19

## 2023-12-20 RX ADMIN — INSULIN LISPRO 1 UNITS: 100 INJECTION, SOLUTION INTRAVENOUS; SUBCUTANEOUS at 21:05

## 2023-12-20 RX ADMIN — PANTOPRAZOLE SODIUM 40 MG: 40 INJECTION, POWDER, FOR SOLUTION INTRAVENOUS at 05:45

## 2023-12-20 RX ADMIN — ENOXAPARIN SODIUM 40 MG: 40 INJECTION SUBCUTANEOUS at 12:59

## 2023-12-20 RX ADMIN — INSULIN LISPRO 2 UNITS: 100 INJECTION, SOLUTION INTRAVENOUS; SUBCUTANEOUS at 01:46

## 2023-12-20 RX ADMIN — HYDROMORPHONE HYDROCHLORIDE 0.4 MG: 0.5 INJECTION, SOLUTION INTRAMUSCULAR; INTRAVENOUS; SUBCUTANEOUS at 21:05

## 2023-12-20 RX ADMIN — SODIUM PHOSPHATE, MONOBASIC, MONOHYDRATE AND SODIUM PHOSPHATE, DIBASIC, ANHYDROUS 15 MMOL: 276; 142 INJECTION, SOLUTION INTRAVENOUS at 12:30

## 2023-12-20 RX ADMIN — I.V. FAT EMULSION 250 ML: 20 EMULSION INTRAVENOUS at 12:25

## 2023-12-20 RX ADMIN — HYDROMORPHONE HYDROCHLORIDE 0.2 MG: 0.2 INJECTION, SOLUTION INTRAMUSCULAR; INTRAVENOUS; SUBCUTANEOUS at 16:35

## 2023-12-20 RX ADMIN — LEVOTHYROXINE SODIUM 38 MCG: 20 INJECTION, SOLUTION INTRAVENOUS at 09:56

## 2023-12-20 ASSESSMENT — PAIN - FUNCTIONAL ASSESSMENT
PAIN_FUNCTIONAL_ASSESSMENT: 0-10
PAIN_FUNCTIONAL_ASSESSMENT: WONG-BAKER FACES
PAIN_FUNCTIONAL_ASSESSMENT: 0-10

## 2023-12-20 ASSESSMENT — PAIN DESCRIPTION - DESCRIPTORS: DESCRIPTORS: ACHING

## 2023-12-20 ASSESSMENT — PAIN DESCRIPTION - ORIENTATION
ORIENTATION: LEFT
ORIENTATION: MID

## 2023-12-20 ASSESSMENT — PAIN SCALES - GENERAL
PAINLEVEL_OUTOF10: 0 - NO PAIN
PAINLEVEL_OUTOF10: 8
PAINLEVEL_OUTOF10: 7

## 2023-12-20 ASSESSMENT — PAIN DESCRIPTION - LOCATION
LOCATION: ABDOMEN
LOCATION: HEAD

## 2023-12-20 NOTE — NURSING NOTE
Secure chat to Dr Farnsworth regarding pt IVF. Per Dr Farnsworth, can stop LR when blood is infusing and the Sodium phosphate in sodium chloride infusion can wait to be started after the 2 units of blood are finished.

## 2023-12-20 NOTE — NURSING NOTE
0450: This nurse was called into patients room by PCA d/t patient pulled out her NG tube. Dr. Farnsworth notified via secure chat. Vital signs obtained. Awaiting orders

## 2023-12-20 NOTE — PROGRESS NOTES
Physical Therapy                 Therapy Communication Note    Patient Name: Harper Lara  MRN: [id number]  Today's Date: 12/20/2023     Discipline: Physical Therapy    Missed Visit Reason: 1002-Missed Visit Reason: Patient placed on medical hold (hold Per RN . pts hgb 6.1 awaiting two units of PRBCs to tranfuse. also awaiting physcian consult due to pts NG tube being out of place. pt not appropriate for therapy at this time. reattempt as vivien.)    Missed Time: Attempt    Comment:

## 2023-12-20 NOTE — PROGRESS NOTES
Occupational Therapy                 Therapy Communication Note    Patient Name: Harper Lara  MRN: [id number]  Today's Date: 12/20/2023     Discipline: Occupational Therapy    Missed Visit Reason: Missed Visit Reason: Patient placed on medical hold, hgb 6.1, and per RN, NG tube is not in correct position.  Reattempt OT eval as appropriate.     Missed Time: Attempt 10:03    Comment:

## 2023-12-20 NOTE — PROGRESS NOTES
Harper Lara is a 78 y.o. female on day 14 of admission presenting with Small bowel obstruction (CMS/HCC).    Hospitalist Medicine consulted for post op management of Diabetes and HTN    Subjective   Patient examined and seen. Alert and oriented x3, resting comfortably.  Patient denies chest pain, shortness of breath, palpitations, fever or chills.   NG tube in place, patient had removed last night. No bowel movements or passing of gas noted. Tenderness to abdominal area - mild.       Objective     Transferred out of ICU to regular medical floor 12/19/2023, currently on RA         Last Recorded Vitals  /60   Pulse 89   Temp 37.6 °C (99.7 °F)   Resp 26   Wt 72.2 kg (159 lb 2.8 oz)   SpO2 97%   Intake/Output last 3 Shifts:    Intake/Output Summary (Last 24 hours) at 12/20/2023 0957  Last data filed at 12/20/2023 0626  Gross per 24 hour   Intake 922 ml   Output 985 ml   Net -63 ml       Admission Weight  Weight: 97.5 kg (215 lb) (12/06/23 1117)    Daily Weight  12/19/23 : 72.2 kg (159 lb 2.8 oz)    Image Results  XR abdomen 1 view  Narrative: Interpreted By:  Schoenberger, Joseph,   STUDY:  XR ABDOMEN 1 VIEW;  12/20/2023 6:54 am      INDICATION:  Signs/Symptoms:Confirm NG placement.      COMPARISON:  12/08/2015      ACCESSION NUMBER(S):  [id number]      ORDERING CLINICIAN:  JOAQUIN IBANEZ      FINDINGS:  Nasogastric tube is looped in gastric fundus. Gaseous distention of  small bowel loops has improved with caliber approximating 2.8 cm  compared to 5.6 cm previously. Limited evaluation of pneumoperitoneum  on supine imaging, however no gross evidence of free air is noted.      Visualized lungs are clear.      Osseous structures demonstrate no acute bony changes.      Impression: 1.  See above      MACRO:  None      Signed by: Joseph Schoenberger 12/20/2023 7:08 AM  Dictation workstation:   [id number]      Physical Exam  Constitutional: ill and elderly appearing, awake/alert/oriented x4 cooperative, poor  historian on medical history  ENMT: mucous membranes dry  Respiratory/Thorax: Patent airways,  normal breath sounds   Cardiovascular: Regular, rate and rhythm, no murmurs, 2+ equal pulses of the extremities, normal S 1and S 2   Gastrointestinal: Nondistended, soft, tender with palpation    Genitourinary: hernandez present   Musculoskeletal: ROM intact, no joint swelling,   Extremities: left upper extremity noted to have ecchymotic area   Bilateral edema to upper extremities, non pitting  Skin: warm, dry, intact except as noted   Neurological: alert/oriented x 3, speech clear    Relevant Results  Results for orders placed or performed during the hospital encounter of 12/06/23 (from the past 24 hour(s))   POCT GLUCOSE   Result Value Ref Range    POCT Glucose 159 (H) 74 - 99 mg/dL   POCT GLUCOSE   Result Value Ref Range    POCT Glucose 167 (H) 74 - 99 mg/dL   POCT GLUCOSE   Result Value Ref Range    POCT Glucose 203 (H) 74 - 99 mg/dL   POCT GLUCOSE   Result Value Ref Range    POCT Glucose 171 (H) 74 - 99 mg/dL   Basic Metabolic Panel   Result Value Ref Range    Glucose 192 (H) 74 - 99 mg/dL    Sodium 130 (L) 136 - 145 mmol/L    Potassium 3.7 3.5 - 5.3 mmol/L    Chloride 102 98 - 107 mmol/L    Bicarbonate 25 21 - 32 mmol/L    Anion Gap 7 (L) 10 - 20 mmol/L    Urea Nitrogen 35 (H) 6 - 23 mg/dL    Creatinine 0.88 0.50 - 1.05 mg/dL    eGFR 68 >60 mL/min/1.73m*2    Calcium 7.5 (L) 8.6 - 10.3 mg/dL   Magnesium   Result Value Ref Range    Magnesium 1.94 1.60 - 2.40 mg/dL   Phosphorus   Result Value Ref Range    Phosphorus 2.2 (L) 2.5 - 4.9 mg/dL   POCT GLUCOSE   Result Value Ref Range    POCT Glucose 203 (H) 74 - 99 mg/dL   CBC   Result Value Ref Range    WBC 5.1 4.4 - 11.3 x10*3/uL    nRBC 0.0 0.0 - 0.0 /100 WBCs    RBC 2.09 (L) 4.00 - 5.20 x10*6/uL    Hemoglobin 6.1 (LL) 12.0 - 16.0 g/dL    Hematocrit 18.6 (L) 36.0 - 46.0 %    MCV 89 80 - 100 fL    MCH 29.2 26.0 - 34.0 pg    MCHC 32.8 32.0 - 36.0 g/dL    RDW 15.2 (H) 11.5 -  14.5 %    Platelets 141 (L) 150 - 450 x10*3/uL   SST TOP   Result Value Ref Range    Extra Tube Hold for add-ons.    PST Top   Result Value Ref Range    Extra Tube Hold for add-ons.    POCT GLUCOSE   Result Value Ref Range    POCT Glucose 184 (H) 74 - 99 mg/dL       Scheduled medications  [Held by provider] atorvastatin, 40 mg, oral, Nightly  enoxaparin, 40 mg, subcutaneous, Daily  fat emulsion-plant based, 250 mL, intravenous, Once per day on Mon Wed Fri  insulin lispro, 0-5 Units, subcutaneous, q4h  levothyroxine, 38 mcg, intravenous, q24h ORLY  [Held by provider] levothyroxine, 75 mcg, oral, Daily  lidocaine, 5 mL, infiltration, Once  [Held by provider] metoprolol, 2.5 mg, intravenous, q6h  pantoprazole, 40 mg, intravenous, Daily before breakfast  [Held by provider] polyethylene glycol, 17 g, oral, Daily  [Held by provider] rivaroxaban, 15 mg, oral, Daily with evening meal  sodium phosphate, 15 mmol, intravenous, Once      Continuous medications  Adult Clinimix Parenteral Nutrition, 83 mL/hr, Last Rate: 83 mL/hr (12/20/23 1219)  lactated Ringer's, 50 mL/hr, Last Rate: 50 mL/hr (12/19/23 1630)      PRN medications  PRN medications: alteplase, dextrose 10 % in water (D10W), dextrose, glucagon, HYDROmorphone, HYDROmorphone, ondansetron **OR** ondansetron       Assessment/Plan       HPI: Patient with h/o T2DM, fatty liver, DLP, HTN, Afib s/p ablation, GI bleed, anxiety/depression, hypothyroidism, who p/w abdominal pain x few days associated with N/V. Found to have SBO. Initially had surgery and adhesionolysis done on 128,, however, given no improvement s/p repeat laparoscopy that was converted to laparotomy with extensive adhesionolysis on 12/17. Post surgery hypotensive, admitted to the ICU for further management extubated 12/18/2023. Transferred out of ICU to regular medical floor for further evaluation and treatment with NG placement.     PICC in  place left upper extremity     # Small bowel obstruction 2/2  extensive adhesions  Abdominal Pain  General Surgery Primary Team   MEDICINE CONSULTED FOR HTN/DIABETES   DVTp and Pain management per G.S.   PT/OT evaluation  and treatment   CBC/BMP as appropriate, review results  Pulmonary Toileting   Follow up as needed outpatient with G.S  Abx deferred to G.S if needed  NPO at this time NG in place  PPN for nutrition per G.S.   Strict pulmonary toileting     # HTN / HLD / Afib with Ablation on Xeralto (home) / Chronic Systolic CHF LVEF 40% from 2021   Monitor Volume status  Telemetry Monitoring Ordered - currently not in place   Previous ecg shows afib with 89bpm from 12/6/2023   Defer Xeralto to General Surgery to resume   Dvtp - Defer to G.S.   Strict NPO - Statin, Xeralto and Coreg on  hold at this time  Resume medications to General Sx team   Lopressor was placed on hold (IV) per ICU - patient is rate controlled at this time   Daily Weights     # Diabetes Mellitus Type 2  Continue home medications when able to take PO   Continue Sliding Scale SSI as NPO at this time   A1C 5.2    # Hypothyroidism  Continue home medications when able to take PO  Currently on IV synthroid    # CKD stage 3   Creatinine baseline 1.1 now to 0.88  SOCORRO resolved     # Normocytic anemia  Hbg, trending down to 6.1  RBCs ordered by primary team   Vital signs remain stable   Will order Gastric Occult   BUN elevated with normal Creatinine   Hemoccult stools         Thank you for consult  Medicine to continue to follow  Call for any acute concerns    Dvtp: Lovenox - defer to General Surgery (Xeralto at home)  FULL CODE  Pending Gastric Occult       Time spent  38 minutes obtaining labs, imaging, recommendations, interview, assessment, examination, medication review/ordering, and EMR review.    Plan of care was discussed extensively with patient, RN and General Surgery NP. Patient verbalized understanding through teach back method. All questions and concerns addressed upon examination.     Of note, this  documentation is completed using the Dragon Dictation system (voice recognition software). There may be spelling and/or grammatical errors that were not corrected prior to final submission.      Principal Problem:    Small bowel obstruction (CMS/HCC)  Active Problems:    Acute kidney injury (nontraumatic) (CMS/HCC)    Chronic renal insufficiency                Jany Colindres, APRN-CNP

## 2023-12-20 NOTE — PROGRESS NOTES
General Surgery Progress Note    Patient: Harper Lara  Unit/Bed: 1016/1016-A  YOB: 1945  MRN: [id number]  Acct: [id number]   Admitting Diagnosis: Small bowel obstruction (CMS/HCC) [K56.609]  Date:  12/6/2023  Hospital Day: 14  Attending: Baltazar Farnsworth MD       Complaint:  Chief Complaint   Patient presents with    Abdominal Pain     Abdominal pain and vomiting.  States she saw her PCP on Monday and he changed her medication.        Subjective  Patient in bed this morning.  Per nursing, patient pulled NG tube out last night.  Nursing also reports patient has not had flatus or bowel movements.  Patient has mild abdominal tenderness.  Denies nausea or vomiting.  Patient states that she has not passed gas or had any bowel movements.    PHYSICAL EXAM:  Physical Exam  Vitals reviewed.   Constitutional:       General: She is not in acute distress.     Appearance: Normal appearance. She is not toxic-appearing.      Comments: Sleepy but arousable this morning   HENT:      Head: Normocephalic and atraumatic.      Nose: Nose normal.      Comments: NG tube in place and clamped     Mouth/Throat:      Mouth: Mucous membranes are dry.      Pharynx: Oropharynx is clear.   Eyes:      Extraocular Movements: Extraocular movements intact.      Conjunctiva/sclera: Conjunctivae normal.      Pupils: Pupils are equal, round, and reactive to light.   Cardiovascular:      Rate and Rhythm: Normal rate and regular rhythm.      Pulses: Normal pulses.      Heart sounds: Normal heart sounds.   Pulmonary:      Effort: Pulmonary effort is normal. No respiratory distress.      Breath sounds: Normal breath sounds. No wheezing.   Abdominal:      General: Abdomen is flat. Bowel sounds are normal.      Palpations: Abdomen is soft.      Tenderness: There is abdominal tenderness (Mild).      Comments: Incision sites are dry and intact with some dried blood   Musculoskeletal:         General: Normal range of motion.      Cervical back:  Normal range of motion and neck supple.   Skin:     General: Skin is warm and dry.      Capillary Refill: Capillary refill takes less than 2 seconds.   Neurological:      General: No focal deficit present.      Mental Status: She is alert and oriented to person, place, and time. Mental status is at baseline.   Psychiatric:         Mood and Affect: Mood normal.         Behavior: Behavior normal.         Vital signs in last 24 hours:  Vitals:    12/20/23 0744   BP: 125/60   Pulse: 89   Resp:    Temp: 37.6 °C (99.7 °F)   SpO2: 97%       Intake/Output this shift:    Intake/Output Summary (Last 24 hours) at 12/20/2023 0911  Last data filed at 12/20/2023 0626  Gross per 24 hour   Intake 922 ml   Output 985 ml   Net -63 ml        Allergies:  Allergies   Allergen Reactions    Diphenhydramine Hcl Unknown    Morphine Unknown    Pentazocine Unknown    Propoxyphene Unknown    Propoxyphene N-Acetaminophen Unknown    Ranitidine Unknown    Strawberry Hives        Medications:  Scheduled medications  [Held by provider] atorvastatin, 40 mg, oral, Nightly  fat emulsion-plant based, 250 mL, intravenous, Once per day on Mon Wed Fri  insulin lispro, 0-5 Units, subcutaneous, q4h  levothyroxine, 38 mcg, intravenous, q24h ORLY  [Held by provider] levothyroxine, 75 mcg, oral, Daily  lidocaine, 5 mL, infiltration, Once  [Held by provider] metoprolol, 2.5 mg, intravenous, q6h  pantoprazole, 40 mg, intravenous, Daily before breakfast  [Held by provider] polyethylene glycol, 17 g, oral, Daily  [Held by provider] rivaroxaban, 15 mg, oral, Daily with evening meal      Continuous medications  Adult Clinimix Parenteral Nutrition, 83 mL/hr, Last Rate: 83 mL/hr (12/19/23 1335)  lactated Ringer's, 50 mL/hr, Last Rate: 50 mL/hr (12/19/23 1630)      PRN medications  PRN medications: alteplase, dextrose 10 % in water (D10W), dextrose, glucagon, ondansetron **OR** ondansetron     Labs:  Results for orders placed or performed during the hospital encounter  of 12/06/23 (from the past 24 hour(s))   Triglycerides   Result Value Ref Range    Triglycerides 94 0 - 149 mg/dL   POCT GLUCOSE   Result Value Ref Range    POCT Glucose 123 (H) 74 - 99 mg/dL   POCT GLUCOSE   Result Value Ref Range    POCT Glucose 159 (H) 74 - 99 mg/dL   POCT GLUCOSE   Result Value Ref Range    POCT Glucose 167 (H) 74 - 99 mg/dL   POCT GLUCOSE   Result Value Ref Range    POCT Glucose 203 (H) 74 - 99 mg/dL   POCT GLUCOSE   Result Value Ref Range    POCT Glucose 171 (H) 74 - 99 mg/dL   Basic Metabolic Panel   Result Value Ref Range    Glucose 192 (H) 74 - 99 mg/dL    Sodium 130 (L) 136 - 145 mmol/L    Potassium 3.7 3.5 - 5.3 mmol/L    Chloride 102 98 - 107 mmol/L    Bicarbonate 25 21 - 32 mmol/L    Anion Gap 7 (L) 10 - 20 mmol/L    Urea Nitrogen 35 (H) 6 - 23 mg/dL    Creatinine 0.88 0.50 - 1.05 mg/dL    eGFR 68 >60 mL/min/1.73m*2    Calcium 7.5 (L) 8.6 - 10.3 mg/dL   Magnesium   Result Value Ref Range    Magnesium 1.94 1.60 - 2.40 mg/dL   Phosphorus   Result Value Ref Range    Phosphorus 2.2 (L) 2.5 - 4.9 mg/dL   POCT GLUCOSE   Result Value Ref Range    POCT Glucose 203 (H) 74 - 99 mg/dL        Imaging:  XR abdomen 1 view    Result Date: 12/20/2023  Interpreted By:  Schoenberger, Joseph, STUDY: XR ABDOMEN 1 VIEW;  12/20/2023 6:54 am   INDICATION: Signs/Symptoms:Confirm NG placement.   COMPARISON: 12/08/2015   ACCESSION NUMBER(S): [id number]   ORDERING CLINICIAN: JOAQUIN IBANEZ   FINDINGS: Nasogastric tube is looped in gastric fundus. Gaseous distention of small bowel loops has improved with caliber approximating 2.8 cm compared to 5.6 cm previously. Limited evaluation of pneumoperitoneum on supine imaging, however no gross evidence of free air is noted.   Visualized lungs are clear.   Osseous structures demonstrate no acute bony changes.       1.  See above   MACRO: None   Signed by: Joseph Schoenberger 12/20/2023 7:08 AM Dictation workstation:   [id number]    XR chest 1 view    Result Date:  12/19/2023  Interpreted By:  Jimbo Andersen, STUDY: XR CHEST 1 VIEW;  12/19/2023 12:27 pm   INDICATION: Signs/Symptoms:PICC line procedure at bedside.   COMPARISON: 12/17/2023.   ACCESSION NUMBER(S): [id number]   ORDERING CLINICIAN: JOSSUE TRINH   FINDINGS: CARDIOMEDIASTINAL SILHOUETTE: Patient is rotated to the right. A right PICC line is now seen with tip at the SVC level. NG tube extends to the stomach level with tip not visualized. Borderline size of the cardiac silhouette is similar to prior and may be partially exaggerated by low inspiratory volume. Mild aortic prominence with calcification again seen.   LUNGS: Inspiratory volume is low with mild elevation of the left hemidiaphragm and adjacent left basilar atelectasis versus small infiltrate. Small left effusion can not be excluded by this exam. No pneumothorax is seen.   ABDOMEN: No remarkable upper abdominal findings.   BONES: Multilevel endplate spurring of the spine is similar to prior.       1.  Right PICC line with tip at the SVC level. 2. Low inspiratory volume with left basilar atelectasis versus small infiltrate.       MACRO: None.   Signed by: Jimbo Andersen 12/19/2023 12:38 PM Dictation workstation:   [id number]    Bedside PICC Imaging    Result Date: 12/19/2023  These images are not reportable by radiology and will not be interpreted by  Radiologists.       Plan  Pain control with Dilaudid  NG tube to remain in until return of bowel function  IVF/NPO/TPN, okay for ice chips  PT/INR labs ordered  Hemoglobin today was 6.1, will replete with 2 units of blood and recheck CBC levels posttransfusion.    If hemoglobin is below 7, replete with 1 unit of blood  DVT prophylaxis with Lovenox 40 mg   Electrolytes replaced  Urinary catheter in place due to urinary retention.  Strict I's and O's  PICC line placed due to TPN and IV access  PT/OT          Further recommendations per Dr. Javad Bales PA-C    Time spent  65  minutes  obtaining labs, imaging, recommendations, interview, assessment, examination, medication review/ordering, and EMR review.    Plan of care was discussed extensively with patient. Patient verbalized understanding through teach back method. All questions and concerns addressed upon examination.     Of note, this documentation is completed using the Dragon Dictation system (voice recognition software). There may be spelling and/or grammatical errors that were not corrected prior to final submission.    Agree with note above except were noted below    Patient's hemoglobin dropped from 7-6 overnight, remained hemodynamically stable and otherwise asymptomatic.  Also had urinary retention overnight with reinsertion of Brock and 600 mL of urine return.  Otherwise no acute issues    Physical exam, the patient is no acute distress, incisions clean dry and intact, ANTHONY is serosanguineous output, abdomen soft, mildly distended, appropriately tender to palpation    78-year-old female POD 12 from dx lap, BRYAN and POD3 from lap converted to open BRYAN, SBR, slowly improving  -NGT (ok to use replaced tube)  -IVF  -IV pain meds  -2 units PRBC, repeat CBC afterwards  -Coags, if abnormal will give vitamin K and FFP  -TPN  -PICC for TPN  -Brock for urinary retention  -Replace electrolytes  -ISS, SCDs, DVT prophylaxis  -Hold eliquis for now  -PT/OT  -Will likely need placement upon discharge    Baltazar Farnsworth MD  12/20/23  2:59 PM

## 2023-12-21 LAB
ANION GAP SERPL CALC-SCNC: 9 MMOL/L (ref 10–20)
BLOOD EXPIRATION DATE: NORMAL
BLOOD EXPIRATION DATE: NORMAL
BUN SERPL-MCNC: 34 MG/DL (ref 6–23)
CA-I BLD-SCNC: 1.18 MMOL/L (ref 1.1–1.33)
CA-I BLD-SCNC: 1.25 MMOL/L (ref 1.1–1.33)
CALCIUM SERPL-MCNC: 7.4 MG/DL (ref 8.6–10.3)
CHLORIDE SERPL-SCNC: 103 MMOL/L (ref 98–107)
CO2 SERPL-SCNC: 24 MMOL/L (ref 21–32)
CREAT SERPL-MCNC: 0.79 MG/DL (ref 0.5–1.05)
DISPENSE STATUS: NORMAL
DISPENSE STATUS: NORMAL
ERYTHROCYTE [DISTWIDTH] IN BLOOD BY AUTOMATED COUNT: 15.1 % (ref 11.5–14.5)
GFR SERPL CREATININE-BSD FRML MDRD: 78 ML/MIN/1.73M*2
GLUCOSE BLD MANUAL STRIP-MCNC: 173 MG/DL (ref 74–99)
GLUCOSE BLD MANUAL STRIP-MCNC: 173 MG/DL (ref 74–99)
GLUCOSE BLD MANUAL STRIP-MCNC: 176 MG/DL (ref 74–99)
GLUCOSE BLD MANUAL STRIP-MCNC: 185 MG/DL (ref 74–99)
GLUCOSE BLD MANUAL STRIP-MCNC: 189 MG/DL (ref 74–99)
GLUCOSE BLD MANUAL STRIP-MCNC: 190 MG/DL (ref 74–99)
GLUCOSE SERPL-MCNC: 183 MG/DL (ref 74–99)
HCT VFR BLD AUTO: 24.5 % (ref 36–46)
HGB BLD-MCNC: 8.1 G/DL (ref 12–16)
MAGNESIUM SERPL-MCNC: 1.67 MG/DL (ref 1.6–2.4)
MCH RBC QN AUTO: 29 PG (ref 26–34)
MCHC RBC AUTO-ENTMCNC: 33.1 G/DL (ref 32–36)
MCV RBC AUTO: 88 FL (ref 80–100)
NRBC BLD-RTO: 0 /100 WBCS (ref 0–0)
PHOSPHATE SERPL-MCNC: 3.6 MG/DL (ref 2.5–4.9)
PLATELET # BLD AUTO: 144 X10*3/UL (ref 150–450)
POTASSIUM SERPL-SCNC: 3.9 MMOL/L (ref 3.5–5.3)
PRODUCT BLOOD TYPE: 5100
PRODUCT BLOOD TYPE: 5100
PRODUCT CODE: NORMAL
PRODUCT CODE: NORMAL
RBC # BLD AUTO: 2.79 X10*6/UL (ref 4–5.2)
SODIUM SERPL-SCNC: 132 MMOL/L (ref 136–145)
STAPHYLOCOCCUS SPEC CULT: NORMAL
UNIT ABO: NORMAL
UNIT ABO: NORMAL
UNIT NUMBER: NORMAL
UNIT NUMBER: NORMAL
UNIT RH: NORMAL
UNIT RH: NORMAL
UNIT VOLUME: 350
UNIT VOLUME: 350
WBC # BLD AUTO: 5.6 X10*3/UL (ref 4.4–11.3)
XM INTEP: NORMAL
XM INTEP: NORMAL

## 2023-12-21 PROCEDURE — 97168 OT RE-EVAL EST PLAN CARE: CPT | Mod: GO

## 2023-12-21 PROCEDURE — 96372 THER/PROPH/DIAG INJ SC/IM: CPT

## 2023-12-21 PROCEDURE — 2500000004 HC RX 250 GENERAL PHARMACY W/ HCPCS (ALT 636 FOR OP/ED)

## 2023-12-21 PROCEDURE — C9113 INJ PANTOPRAZOLE SODIUM, VIA: HCPCS

## 2023-12-21 PROCEDURE — 80048 BASIC METABOLIC PNL TOTAL CA: CPT

## 2023-12-21 PROCEDURE — 99024 POSTOP FOLLOW-UP VISIT: CPT | Performed by: SURGERY

## 2023-12-21 PROCEDURE — 83735 ASSAY OF MAGNESIUM: CPT

## 2023-12-21 PROCEDURE — 97165 OT EVAL LOW COMPLEX 30 MIN: CPT | Mod: GO

## 2023-12-21 PROCEDURE — 82947 ASSAY GLUCOSE BLOOD QUANT: CPT

## 2023-12-21 PROCEDURE — 85027 COMPLETE CBC AUTOMATED: CPT | Performed by: SURGERY

## 2023-12-21 PROCEDURE — 82330 ASSAY OF CALCIUM: CPT

## 2023-12-21 PROCEDURE — 2500000005 HC RX 250 GENERAL PHARMACY W/O HCPCS

## 2023-12-21 PROCEDURE — 97164 PT RE-EVAL EST PLAN CARE: CPT | Mod: GP

## 2023-12-21 PROCEDURE — 84100 ASSAY OF PHOSPHORUS: CPT

## 2023-12-21 PROCEDURE — 99232 SBSQ HOSP IP/OBS MODERATE 35: CPT | Performed by: REGISTERED NURSE

## 2023-12-21 PROCEDURE — 1200000002 HC GENERAL ROOM WITH TELEMETRY DAILY

## 2023-12-21 RX ADMIN — ENOXAPARIN SODIUM 40 MG: 40 INJECTION SUBCUTANEOUS at 09:08

## 2023-12-21 RX ADMIN — PANTOPRAZOLE SODIUM 40 MG: 40 INJECTION, POWDER, FOR SOLUTION INTRAVENOUS at 06:36

## 2023-12-21 RX ADMIN — HYDROMORPHONE HYDROCHLORIDE 0.2 MG: 0.2 INJECTION, SOLUTION INTRAMUSCULAR; INTRAVENOUS; SUBCUTANEOUS at 01:10

## 2023-12-21 RX ADMIN — HYDROMORPHONE HYDROCHLORIDE 0.2 MG: 0.2 INJECTION, SOLUTION INTRAMUSCULAR; INTRAVENOUS; SUBCUTANEOUS at 06:36

## 2023-12-21 RX ADMIN — INSULIN LISPRO 1 UNITS: 100 INJECTION, SOLUTION INTRAVENOUS; SUBCUTANEOUS at 11:52

## 2023-12-21 RX ADMIN — HYDROMORPHONE HYDROCHLORIDE 0.2 MG: 0.2 INJECTION, SOLUTION INTRAMUSCULAR; INTRAVENOUS; SUBCUTANEOUS at 14:55

## 2023-12-21 RX ADMIN — INSULIN LISPRO 1 UNITS: 100 INJECTION, SOLUTION INTRAVENOUS; SUBCUTANEOUS at 20:55

## 2023-12-21 RX ADMIN — SODIUM CHLORIDE, POTASSIUM CHLORIDE, SODIUM LACTATE AND CALCIUM CHLORIDE 50 ML/HR: 600; 310; 30; 20 INJECTION, SOLUTION INTRAVENOUS at 09:07

## 2023-12-21 RX ADMIN — INSULIN LISPRO 1 UNITS: 100 INJECTION, SOLUTION INTRAVENOUS; SUBCUTANEOUS at 16:28

## 2023-12-21 RX ADMIN — ASCORBIC ACID, VITAMIN A PALMITATE, CHOLECALCIFEROL, THIAMINE HYDROCHLORIDE, RIBOFLAVIN-5 PHOSPHATE SODIUM, PYRIDOXINE HYDROCHLORIDE, NIACINAMIDE, DEXPANTHENOL, ALPHA-TOCOPHEROL ACETATE, VITAMIN K1, FOLIC ACID, BIOTIN, CYANOCOBALAMIN: 200; 3300; 200; 6; 3.6; 6; 40; 15; 10; 150; 600; 60; 5 INJECTION, SOLUTION INTRAVENOUS at 13:30

## 2023-12-21 RX ADMIN — INSULIN LISPRO 1 UNITS: 100 INJECTION, SOLUTION INTRAVENOUS; SUBCUTANEOUS at 01:35

## 2023-12-21 RX ADMIN — LEVOTHYROXINE SODIUM 38 MCG: 20 INJECTION, SOLUTION INTRAVENOUS at 09:08

## 2023-12-21 ASSESSMENT — COGNITIVE AND FUNCTIONAL STATUS - GENERAL
MOBILITY SCORE: 11
WALKING IN HOSPITAL ROOM: A LOT
DRESSING REGULAR LOWER BODY CLOTHING: A LOT
TURNING FROM BACK TO SIDE WHILE IN FLAT BAD: A LOT
CLIMB 3 TO 5 STEPS WITH RAILING: TOTAL
PERSONAL GROOMING: A LOT
MOVING FROM LYING ON BACK TO SITTING ON SIDE OF FLAT BED WITH BEDRAILS: A LOT
MOVING TO AND FROM BED TO CHAIR: A LOT
TOILETING: A LOT
STANDING UP FROM CHAIR USING ARMS: A LOT
HELP NEEDED FOR BATHING: A LOT
DRESSING REGULAR UPPER BODY CLOTHING: A LOT
DAILY ACTIVITIY SCORE: 14

## 2023-12-21 ASSESSMENT — PAIN SCALES - GENERAL
PAINLEVEL_OUTOF10: 6
PAINLEVEL_OUTOF10: 0 - NO PAIN
PAINLEVEL_OUTOF10: 5 - MODERATE PAIN
PAINLEVEL_OUTOF10: 0 - NO PAIN

## 2023-12-21 ASSESSMENT — PAIN DESCRIPTION - ORIENTATION: ORIENTATION: PROXIMAL;RIGHT;LEFT

## 2023-12-21 ASSESSMENT — ACTIVITIES OF DAILY LIVING (ADL)
ADL_ASSISTANCE: NEEDS ASSISTANCE
BATHING_ASSISTANCE: MAXIMAL
ADL_ASSISTANCE: NEEDS ASSISTANCE

## 2023-12-21 ASSESSMENT — PAIN DESCRIPTION - LOCATION
LOCATION: HEAD
LOCATION: ABDOMEN
LOCATION: ABDOMEN

## 2023-12-21 ASSESSMENT — PAIN DESCRIPTION - DESCRIPTORS: DESCRIPTORS: PRESSURE

## 2023-12-21 NOTE — PROGRESS NOTES
Harper Lara is a 78 y.o. female on day 15 of admission presenting with Small bowel obstruction (CMS/HCC).    Consult for HTN and DM management     Subjective   Patient examined and seen. Alert and oriented x3, resting comfortably up in chair with daughter at bedside.  Patient denies chest pain, shortness of breath, palpitations,  fever or chills.  Abdominal pain is tender and tolerable at this time.   NG remains in place          Objective     Last Recorded Vitals  /55 (BP Location: Left arm, Patient Position: Lying)   Pulse 83   Temp 36.7 °C (98.1 °F) (Temporal)   Resp 16   Wt 72.2 kg (159 lb 2.8 oz)   SpO2 96%   Intake/Output last 3 Shifts:    Intake/Output Summary (Last 24 hours) at 12/21/2023 1306  Last data filed at 12/21/2023 0757  Gross per 24 hour   Intake 742.66 ml   Output 385 ml   Net 357.66 ml       Admission Weight  Weight: 97.5 kg (215 lb) (12/06/23 1117)    Daily Weight  12/19/23 : 72.2 kg (159 lb 2.8 oz)    Image Results  XR abdomen 1 view  Narrative: Interpreted By:  Schoenberger, Joseph,   STUDY:  XR ABDOMEN 1 VIEW;  12/20/2023 6:54 am      INDICATION:  Signs/Symptoms:Confirm NG placement.      COMPARISON:  12/08/2015      ACCESSION NUMBER(S):  [id number]      ORDERING CLINICIAN:  JOAQUIN IBANEZ      FINDINGS:  Nasogastric tube is looped in gastric fundus. Gaseous distention of  small bowel loops has improved with caliber approximating 2.8 cm  compared to 5.6 cm previously. Limited evaluation of pneumoperitoneum  on supine imaging, however no gross evidence of free air is noted.      Visualized lungs are clear.      Osseous structures demonstrate no acute bony changes.      Impression: 1.  See above      MACRO:  None      Signed by: Joseph Schoenberger 12/20/2023 7:08 AM  Dictation workstation:   [id number]      Physical Exam  Constitutional: ill and elderly appearing, awake/alert/oriented x4 cooperative, poor historian on medical history  ENMT: mucous membranes  dry  Respiratory/Thorax: Patent airways,  normal breath sounds  diminished bilateral bases  Cardiovascular: iregular, rate and rhythm, no murmurs, 2+ equal pulses of the extremities, normal S 1and S 2  - telemetry shows Afib   Gastrointestinal: Nondistended, soft, tender with palpation    Genitourinary: hernandez present   Musculoskeletal: ROM intact, no joint swelling,   Extremities: left upper extremity noted to have ecchymotic area   Bilateral edema to upper extremities, non pitting  Skin: warm, dry, intact except as noted   Neurological: alert/oriented x 3, speech clear    Relevant Results    Assessment/Plan     Scheduled medications  [Held by provider] atorvastatin, 40 mg, oral, Nightly  enoxaparin, 40 mg, subcutaneous, Daily  fat emulsion-plant based, 250 mL, intravenous, Once per day on Mon Wed Fri  insulin lispro, 0-5 Units, subcutaneous, q4h  levothyroxine, 38 mcg, intravenous, q24h ORLY  [Held by provider] levothyroxine, 75 mcg, oral, Daily  lidocaine, 5 mL, infiltration, Once  [Held by provider] metoprolol, 2.5 mg, intravenous, q6h  pantoprazole, 40 mg, intravenous, Daily before breakfast  [Held by provider] polyethylene glycol, 17 g, oral, Daily  [Held by provider] rivaroxaban, 15 mg, oral, Daily with evening meal      Continuous medications  Adult Clinimix Parenteral Nutrition, 83 mL/hr, Last Rate: 83 mL/hr (12/20/23 1219)  lactated Ringer's, 50 mL/hr, Last Rate: 50 mL/hr (12/21/23 0907)      PRN medications  PRN medications: alteplase, dextrose 10 % in water (D10W), dextrose, glucagon, HYDROmorphone, HYDROmorphone, ondansetron **OR** ondansetron    Results for orders placed or performed during the hospital encounter of 12/06/23 (from the past 96 hour(s))   POCT GLUCOSE   Result Value Ref Range    POCT Glucose 161 (H) 74 - 99 mg/dL   CBC   Result Value Ref Range    WBC 2.8 (L) 4.4 - 11.3 x10*3/uL    nRBC 0.0 0.0 - 0.0 /100 WBCs    RBC 3.66 (L) 4.00 - 5.20 x10*6/uL    Hemoglobin 10.7 (L) 12.0 - 16.0 g/dL     Hematocrit 32.4 (L) 36.0 - 46.0 %    MCV 89 80 - 100 fL    MCH 29.2 26.0 - 34.0 pg    MCHC 33.0 32.0 - 36.0 g/dL    RDW 15.4 (H) 11.5 - 14.5 %    Platelets 184 150 - 450 x10*3/uL   Basic Metabolic Panel   Result Value Ref Range    Glucose 190 (H) 74 - 99 mg/dL    Sodium 129 (L) 136 - 145 mmol/L    Potassium 3.2 (L) 3.5 - 5.3 mmol/L    Chloride 103 98 - 107 mmol/L    Bicarbonate 20 (L) 21 - 32 mmol/L    Anion Gap 9 (L) 10 - 20 mmol/L    Urea Nitrogen 38 (H) 6 - 23 mg/dL    Creatinine 0.79 0.50 - 1.05 mg/dL    eGFR 78 >60 mL/min/1.73m*2    Calcium 6.9 (L) 8.6 - 10.3 mg/dL   Magnesium   Result Value Ref Range    Magnesium 1.39 (L) 1.60 - 2.40 mg/dL   POCT GLUCOSE   Result Value Ref Range    POCT Glucose 163 (H) 74 - 99 mg/dL   POCT GLUCOSE   Result Value Ref Range    POCT Glucose 181 (H) 74 - 99 mg/dL   CBC and Auto Differential   Result Value Ref Range    WBC 3.2 (L) 4.4 - 11.3 x10*3/uL    nRBC 0.0 0.0 - 0.0 /100 WBCs    RBC 2.85 (L) 4.00 - 5.20 x10*6/uL    Hemoglobin 8.3 (L) 12.0 - 16.0 g/dL    Hematocrit 24.9 (L) 36.0 - 46.0 %    MCV 87 80 - 100 fL    MCH 29.1 26.0 - 34.0 pg    MCHC 33.3 32.0 - 36.0 g/dL    RDW 15.4 (H) 11.5 - 14.5 %    Platelets 152 150 - 450 x10*3/uL    Immature Granulocytes %, Automated 0.3 0.0 - 0.9 %    Immature Granulocytes Absolute, Automated 0.01 0.00 - 0.50 x10*3/uL   Phosphorus   Result Value Ref Range    Phosphorus 3.1 2.5 - 4.9 mg/dL   Basic metabolic panel   Result Value Ref Range    Glucose 190 (H) 74 - 99 mg/dL    Sodium 131 (L) 136 - 145 mmol/L    Potassium 4.3 3.5 - 5.3 mmol/L    Chloride 102 98 - 107 mmol/L    Bicarbonate 22 21 - 32 mmol/L    Anion Gap 11 10 - 20 mmol/L    Urea Nitrogen 38 (H) 6 - 23 mg/dL    Creatinine 0.86 0.50 - 1.05 mg/dL    eGFR 70 >60 mL/min/1.73m*2    Calcium 7.5 (L) 8.6 - 10.3 mg/dL   Magnesium   Result Value Ref Range    Magnesium 2.26 1.60 - 2.40 mg/dL   Manual Differential   Result Value Ref Range    Neutrophils %, Manual 64.0 40.0 - 80.0 %    Bands %,  Manual 28.0 0.0 - 5.0 %    Lymphocytes %, Manual 4.0 13.0 - 44.0 %    Monocytes %, Manual 3.0 2.0 - 10.0 %    Eosinophils %, Manual 0.0 0.0 - 6.0 %    Basophils %, Manual 0.0 0.0 - 2.0 %    Metamyelocytes %, Manual 1.0 0.0 - 0.0 %    Seg Neutrophils Absolute, Manual 2.05 1.60 - 5.00 x10*3/uL    Bands Absolute, Manual 0.90 (H) 0.00 - 0.50 x10*3/uL    Lymphocytes Absolute, Manual 0.13 (L) 0.80 - 3.00 x10*3/uL    Monocytes Absolute, Manual 0.10 0.05 - 0.80 x10*3/uL    Eosinophils Absolute, Manual 0.00 0.00 - 0.40 x10*3/uL    Basophils Absolute, Manual 0.00 0.00 - 0.10 x10*3/uL    Metamyelocytes Absolute, Manual 0.03 0.00 - 0.00 x10*3/uL    Total Cells Counted 100     Neutrophils Absolute, Manual 2.95 1.60 - 5.50 x10*3/uL    RBC Morphology See Below     RBC Fragments Few     Ovalocytes Few     Clumped Platelets Present    POCT GLUCOSE   Result Value Ref Range    POCT Glucose 172 (H) 74 - 99 mg/dL   POCT GLUCOSE   Result Value Ref Range    POCT Glucose 156 (H) 74 - 99 mg/dL   Blood Gas Arterial Full Panel   Result Value Ref Range    POCT pH, Arterial 7.43 (H) 7.38 - 7.42 pH    POCT pCO2, Arterial 31 (L) 38 - 42 mm Hg    POCT pO2, Arterial 195 (H) 85 - 95 mm Hg    POCT SO2, Arterial 100 94 - 100 %    POCT Oxy Hemoglobin, Arterial 99.2 (H) 94.0 - 98.0 %    POCT Hematocrit Calculated, Arterial 26.0 (L) 36.0 - 46.0 %    POCT Sodium, Arterial 129 (L) 136 - 145 mmol/L    POCT Potassium, Arterial 4.7 3.5 - 5.3 mmol/L    POCT Chloride, Arterial 101 98 - 107 mmol/L    POCT Ionized Calcium, Arterial 1.21 1.10 - 1.33 mmol/L    POCT Glucose, Arterial 146 (H) 74 - 99 mg/dL    POCT Lactate, Arterial 2.6 (H) 0.4 - 2.0 mmol/L    POCT Base Excess, Arterial -3.2 (L) -2.0 - 3.0 mmol/L    POCT HCO3 Calculated, Arterial 20.6 (L) 22.0 - 26.0 mmol/L    POCT Hemoglobin, Arterial 8.7 (L) 12.0 - 16.0 g/dL    POCT Anion Gap, Arterial 12 10 - 25 mmo/L    Patient Temperature      FiO2 50 %    Ventilator Mode CPAP     Peep CHM2O 5.0 cm H2O     Pressure Support 5 cm H2O    Site of Arterial Puncture Arterial Line    POCT GLUCOSE   Result Value Ref Range    POCT Glucose 131 (H) 74 - 99 mg/dL   POCT GLUCOSE   Result Value Ref Range    POCT Glucose 127 (H) 74 - 99 mg/dL   POCT GLUCOSE   Result Value Ref Range    POCT Glucose 154 (H) 74 - 99 mg/dL   Lactate   Result Value Ref Range    Lactate 1.6 0.4 - 2.0 mmol/L   POCT GLUCOSE   Result Value Ref Range    POCT Glucose 150 (H) 74 - 99 mg/dL   POCT GLUCOSE   Result Value Ref Range    POCT Glucose 149 (H) 74 - 99 mg/dL   Magnesium   Result Value Ref Range    Magnesium 1.97 1.60 - 2.40 mg/dL   Phosphorus   Result Value Ref Range    Phosphorus 3.5 2.5 - 4.9 mg/dL   CBC   Result Value Ref Range    WBC 7.3 4.4 - 11.3 x10*3/uL    nRBC 0.0 0.0 - 0.0 /100 WBCs    RBC 2.60 (L) 4.00 - 5.20 x10*6/uL    Hemoglobin 7.5 (L) 12.0 - 16.0 g/dL    Hematocrit 22.6 (L) 36.0 - 46.0 %    MCV 87 80 - 100 fL    MCH 28.8 26.0 - 34.0 pg    MCHC 33.2 32.0 - 36.0 g/dL    RDW 15.4 (H) 11.5 - 14.5 %    Platelets 152 150 - 450 x10*3/uL   Basic Metabolic Panel   Result Value Ref Range    Glucose 146 (H) 74 - 99 mg/dL    Sodium 130 (L) 136 - 145 mmol/L    Potassium 4.3 3.5 - 5.3 mmol/L    Chloride 101 98 - 107 mmol/L    Bicarbonate 23 21 - 32 mmol/L    Anion Gap 10 10 - 20 mmol/L    Urea Nitrogen 38 (H) 6 - 23 mg/dL    Creatinine 0.95 0.50 - 1.05 mg/dL    eGFR 62 >60 mL/min/1.73m*2    Calcium 7.4 (L) 8.6 - 10.3 mg/dL   POCT GLUCOSE   Result Value Ref Range    POCT Glucose 141 (H) 74 - 99 mg/dL   POCT GLUCOSE   Result Value Ref Range    POCT Glucose 147 (H) 74 - 99 mg/dL   Staphylococcus Aureus/MRSA Colonization, Culture - PICC Only    Specimen: Anterior Nares; Swab   Result Value Ref Range    Staph/MRSA Screen Culture No Staphylococcus aureus isolated    Triglycerides   Result Value Ref Range    Triglycerides 94 0 - 149 mg/dL   POCT GLUCOSE   Result Value Ref Range    POCT Glucose 123 (H) 74 - 99 mg/dL   POCT GLUCOSE   Result Value Ref Range     POCT Glucose 159 (H) 74 - 99 mg/dL   POCT GLUCOSE   Result Value Ref Range    POCT Glucose 167 (H) 74 - 99 mg/dL   POCT GLUCOSE   Result Value Ref Range    POCT Glucose 203 (H) 74 - 99 mg/dL   POCT GLUCOSE   Result Value Ref Range    POCT Glucose 171 (H) 74 - 99 mg/dL   Basic Metabolic Panel   Result Value Ref Range    Glucose 192 (H) 74 - 99 mg/dL    Sodium 130 (L) 136 - 145 mmol/L    Potassium 3.7 3.5 - 5.3 mmol/L    Chloride 102 98 - 107 mmol/L    Bicarbonate 25 21 - 32 mmol/L    Anion Gap 7 (L) 10 - 20 mmol/L    Urea Nitrogen 35 (H) 6 - 23 mg/dL    Creatinine 0.88 0.50 - 1.05 mg/dL    eGFR 68 >60 mL/min/1.73m*2    Calcium 7.5 (L) 8.6 - 10.3 mg/dL   Magnesium   Result Value Ref Range    Magnesium 1.94 1.60 - 2.40 mg/dL   Phosphorus   Result Value Ref Range    Phosphorus 2.2 (L) 2.5 - 4.9 mg/dL   POCT GLUCOSE   Result Value Ref Range    POCT Glucose 203 (H) 74 - 99 mg/dL   CBC   Result Value Ref Range    WBC 5.1 4.4 - 11.3 x10*3/uL    nRBC 0.0 0.0 - 0.0 /100 WBCs    RBC 2.09 (L) 4.00 - 5.20 x10*6/uL    Hemoglobin 6.1 (LL) 12.0 - 16.0 g/dL    Hematocrit 18.6 (L) 36.0 - 46.0 %    MCV 89 80 - 100 fL    MCH 29.2 26.0 - 34.0 pg    MCHC 32.8 32.0 - 36.0 g/dL    RDW 15.2 (H) 11.5 - 14.5 %    Platelets 141 (L) 150 - 450 x10*3/uL   SST TOP   Result Value Ref Range    Extra Tube Hold for add-ons.    PST Top   Result Value Ref Range    Extra Tube Hold for add-ons.    POCT GLUCOSE   Result Value Ref Range    POCT Glucose 184 (H) 74 - 99 mg/dL   Prepare RBC: 2 Units   Result Value Ref Range    PRODUCT CODE A1074R38     Unit Number [id number]     Unit ABO O     Unit RH POS     XM INTEP COMP     Dispense Status TR     Blood Expiration Date January 05, 2024 23:59 EST     PRODUCT BLOOD TYPE 5100     UNIT VOLUME 350     PRODUCT CODE X9794E87     Unit Number [id number]     Unit ABO O     Unit RH POS     XM INTEP COMP     Dispense Status TR     Blood Expiration Date January 05, 2024 23:59 EST     PRODUCT BLOOD TYPE 5100      UNIT VOLUME 350    Type and screen   Result Value Ref Range    ABO TYPE O     Rh TYPE POS     ANTIBODY SCREEN NEG    Calcium, ionized   Result Value Ref Range    POCT Calcium, Ionized 1.25 1.1 - 1.33 mmol/L   Protime-INR   Result Value Ref Range    Protime 16.3 (H) 9.8 - 12.8 seconds    INR 1.4 (H) 0.9 - 1.1   VERIFY ABO/Rh Group Test   Result Value Ref Range    ABO TYPE O     Rh TYPE POS    Lavender Top   Result Value Ref Range    Extra Tube Hold for add-ons.    SST TOP   Result Value Ref Range    Extra Tube Hold for add-ons.    SST TOP   Result Value Ref Range    Extra Tube Hold for add-ons.    POCT GLUCOSE   Result Value Ref Range    POCT Glucose 139 (H) 74 - 99 mg/dL   POCT GLUCOSE   Result Value Ref Range    POCT Glucose 182 (H) 74 - 99 mg/dL   POCT GLUCOSE   Result Value Ref Range    POCT Glucose 176 (H) 74 - 99 mg/dL   CBC   Result Value Ref Range    WBC 5.6 4.4 - 11.3 x10*3/uL    nRBC 0.0 0.0 - 0.0 /100 WBCs    RBC 2.79 (L) 4.00 - 5.20 x10*6/uL    Hemoglobin 8.1 (L) 12.0 - 16.0 g/dL    Hematocrit 24.5 (L) 36.0 - 46.0 %    MCV 88 80 - 100 fL    MCH 29.0 26.0 - 34.0 pg    MCHC 33.1 32.0 - 36.0 g/dL    RDW 15.1 (H) 11.5 - 14.5 %    Platelets 144 (L) 150 - 450 x10*3/uL   POCT GLUCOSE   Result Value Ref Range    POCT Glucose 190 (H) 74 - 99 mg/dL   Magnesium   Result Value Ref Range    Magnesium 1.67 1.60 - 2.40 mg/dL   Phosphorus   Result Value Ref Range    Phosphorus 3.6 2.5 - 4.9 mg/dL   Basic Metabolic Panel   Result Value Ref Range    Glucose 183 (H) 74 - 99 mg/dL    Sodium 132 (L) 136 - 145 mmol/L    Potassium 3.9 3.5 - 5.3 mmol/L    Chloride 103 98 - 107 mmol/L    Bicarbonate 24 21 - 32 mmol/L    Anion Gap 9 (L) 10 - 20 mmol/L    Urea Nitrogen 34 (H) 6 - 23 mg/dL    Creatinine 0.79 0.50 - 1.05 mg/dL    eGFR 78 >60 mL/min/1.73m*2    Calcium 7.4 (L) 8.6 - 10.3 mg/dL   POCT GLUCOSE   Result Value Ref Range    POCT Glucose 189 (H) 74 - 99 mg/dL   Calcium, ionized   Result Value Ref Range    POCT Calcium,  Ionized 1.18 1.1 - 1.33 mmol/L   POCT GLUCOSE   Result Value Ref Range    POCT Glucose 173 (H) 74 - 99 mg/dL     HPI: Patient with h/o T2DM, fatty liver, DLP, HTN, Afib s/p ablation, GI bleed, anxiety/depression, hypothyroidism, who p/w abdominal pain x few days associated with N/V. Found to have SBO. Initially had surgery and adhesionolysis done on 128,, however, given no improvement s/p repeat laparoscopy that was converted to laparotomy with extensive adhesionolysis on 12/17. Post surgery hypotensive, admitted to the ICU for further management extubated 12/18/2023. Transferred out of ICU to regular medical floor for further evaluation and treatment with NG placement.      PICC in  place left upper extremity      # Small bowel obstruction 2/2 extensive adhesions  Abdominal Pain  General Surgery Primary Team   MEDICINE CONSULTED FOR HTN/DIABETES   DVTp and Pain management per G.S.   PT/OT evaluation  and treatment   CBC/BMP as appropriate, review results  Pulmonary Toileting   Follow up as needed outpatient with G.S  Abx deferred to G.S if needed  NPO at this time NG in place  PPN for nutrition per G.S.   Strict pulmonary toileting - discussed with RN on use of I.S for patient      # HTN / HLD / Afib with Ablation on Xeralto (home) / Chronic Systolic CHF LVEF 40% from 2021   Monitor Volume status  Telemetry Monitoring Ordered - currently not in place   Previous ecg shows afib with 89bpm from 12/6/2023   Defer Xeralto to General Surgery to resume   Dvtp - Defer to G.S.   Strict NPO - Statin, Xeralto and Coreg on  hold at this time  Resume medications to General Sx team   Lopressor was placed on hold (IV) per ICU - patient is rate controlled at this time   Daily Weights      # Diabetes Mellitus Type 2 - stable   Continue home medications when able to take PO   Continue Sliding Scale SSI as NPO at this time   A1C 5.2     # Hypothyroidism  Continue home medications when able to take PO  Currently on IV synthroid     #  CKD stage 3   Creatinine baseline 1.1 now to 0.88  SOCORRO resolved      # Normocytic anemia  Received PRBC by primary - now 8.1  RBCs ordered by primary team   Vital signs remain stable   Will order Gastric Occult - declined by Primary team   Hemoccult stools  declined by primary team         Thank you for consult  Medicine to continue to follow  Call for any acute concerns     Dvtp: Lovenox - defer to General Surgery (Xeralto at home)  FULL CODE        Time spent  36 minutes obtaining labs, imaging, recommendations, interview, assessment, examination, medication review/ordering, and EMR review.     Plan of care was discussed extensively with patient, RN, daughter, and General Surgery NP. Patient verbalized understanding through teach back method. All questions and concerns addressed upon examination.      Of note, this documentation is completed using the Dragon Dictation system (voice recognition software). There may be spelling and/or grammatical errors that were not corrected prior to final submission.        Jany Colindres, APRN-CNP

## 2023-12-21 NOTE — PROGRESS NOTES
Occupational Therapy    Re-Evaluation    Patient Name: Harper Lara  MRN: [id number]  Today's Date: 12/21/2023  Time Calculation  Start Time: 1107  Stop Time: 1125  Time Calculation (min): 18 min        Assessment:  Evaluation/Treatment Tolerance: Patient limited by pain  End of Session Communication: Bedside nurse, Care Coordinator  End of Session Patient Position: Up in chair, Alarm on  OT Assessment Results: Decreased ADL status, Decreased endurance  Evaluation/Treatment Tolerance: Patient tolerated treatment well  Plan:  Treatment Interventions: ADL retraining, Functional transfer training, Endurance training  OT Frequency: 3 times per week  OT Discharge Recommendations: Moderate intensity level of continued care  OT - OK to Discharge: Yes (when medically cleared and stable by medical team)    Subjective   Current Problem:  1. Small bowel obstruction (CMS/HCC)  Case Request Operating Room: Resection Laparoscopy Small Intestine    Case Request Operating Room: Resection Laparoscopy Small Intestine    Surgical Pathology Exam    Surgical Pathology Exam        General:  General  Reason for Referral: ADL impairment  Referred By: OT 12/19 maris  Past Medical History Relevant to Rehab: SDH, CKD, CHF, CAD, HLD, HTN  Family/Caregiver Present: Yes (stepdaughter)  Caregiver Feedback: supportive  Prior to Session Communication: Bedside nurse  Patient Position Received: Bed, 3 rail up, Alarm on  General Comment: To ED 12/6 with abdominal pain and vomiting; pt. found to have SBO and SOCORRO on CKD. Pt. underwent extensive laporascopic lysis of adhesions with small intestine resection and anastomosis 12/8 with Dr. Lozada. Pt with persistent nausea, vomiting, abdominal pain; 12/17 diagnostic laparoscopy coverted to lapartomy and small bowel resection. Was intubated, extubated 12/18. Recieved 2 PRBCs ; hbg  8.1  Precautions:  Medical Precautions: Fall precautions  Post-Surgical Precautions: Abdominal surgery  precautions  Precautions Comment: NG, IV; telemetry, telmetry, ANTHONY drain     Pain:  Pain Assessment  Pain Assessment: 0-10  Pain Score:  (Did not numerically rate,but states has pain in abdomen)  Pain Type: Surgical pain  Pain Location: Abdomen    Objective   Cognition:  Overall Cognitive Status: Within Functional Limits  Orientation Level: Oriented X4           Home Living:  Type of Home: House  Lives With: Spouse  Home Adaptive Equipment: Cane, Walker rolling or standard  Home Layout: One level  Home Access: Stairs to enter with rails  Entrance Stairs-Number of Steps: 1  Bathroom Shower/Tub: Tub/shower unit  Bathroom Equipment: Grab bars in shower  Prior Function:  Level of [place]: Needs assistance with ADLs, Needs assistance with homemaking  Receives Help From: Neighbor  ADL Assistance: Needs assistance  Homemaking Assistance: Needs assistance  Ambulatory Assistance:  (WW inside, QC outside.)  Prior Function Comments: one fall. does not drive  IADL History:  IADL Comments: neighbor assists  ADL:  Eating Assistance: Independent  Grooming Assistance: Moderate  Bathing Assistance: Maximal  UE Dressing Assistance: Moderate  LE Dressing Assistance: Maximal  Toileting Assistance with Device: Maximal  Activity Tolerance:  Endurance: Decreased tolerance for upright activites  Bed Mobility/Transfers: Bed Mobility  Bed Mobility: Yes  Bed Mobility 1  Bed Mobility 1: Supine to sitting  Level of Assistance 1: Maximum assistance  Bed Mobility Comments 1: x2 log roll; assist for trunk and LEs    Transfers  Transfer: Yes  Transfer 1  Technique 1: Sit to stand  Transfer Device 1: Walker  Transfer Level of Assistance 1: Moderate assistance  Trials/Comments 1: VCs for safe hand placment, assist to lift, steady, balance  Transfers 2  Technique 2: Stand pivot  Transfer Device 2: Walker  Transfer Level of Assistance 2: Moderate assistance  Trials/Comments 2: assist to lift, steady, balance      Ambulation/Gait  Training:  Ambulation/Gait Training  Ambulation/Gait Training Performed:  (~5 feet side stepping as well as ~5feet to recliner chair; WW use. Mod A x1)       Standing Balance:  Dynamic Standing Balance  Dynamic Standing-Comments: Fair        Strength:  Strength Comments: BUEs 3+/5 MMT       Extremities: RUE   RUE :  (R shoulder flexion ~100 deg, elbow/wrist/hand WNL) and LUE   LUE: Within Functional Limits      Outcome Measures:[facility] Daily Activity  Putting on and taking off regular lower body clothing: A lot  Bathing (including washing, rinsing, drying): A lot  Putting on and taking off regular upper body clothing: A lot  Toileting, which includes using toilet, bedpan or urinal: A lot  Taking care of personal grooming such as brushing teeth: A lot  Eating Meals: None  Daily Activity - Total Score: 14        Education Documentation  Precautions, taught by Augustina Luna OT at 12/21/2023  3:09 PM.  Learner: Family, Patient  Readiness: Acceptance  Method: Explanation  Response: Verbalizes Understanding, Needs Reinforcement  Comment: abdominal precautions    ADL Training, taught by Augustina Luna OT at 12/21/2023  3:09 PM.  Learner: Patient  Readiness: Eager  Method: Explanation  Response: Verbalizes Understanding, Needs Reinforcement      IP EDUCATION:  Education  Individual(s) Educated: Patient (step daughter)  Education Provided: Diagnosis & Precautions, Fall precautons, Risk and benefits of OT discussed with patient or other, POC discussed and agreed upon  Patient/Caregiver Demonstrated Understanding: yes  Plan of Care Discussed and Agreed Upon: yes  Patient Response to Education: Patient/Caregiver Verbalized Understanding of Information  Education Comment: Pt educated on safe transfer techniques including hand placement and positioning; techniques/strategies for balance improvement; abd precautions; safe body mechanics; energy conservation.    Goals:  Encounter Problems       Encounter  Problems (Active)       OT Goals       Mod I for all functional transfers  (Not met)       Start:  12/12/23    Expected End:  12/15/23    Resolved:  12/21/23    Updated to: SBA for all functional transfers    Update reason: change in status; re-evaluation         SBA for LB dressing with AE as needed  (Not met)       Start:  12/12/23    Expected End:  12/15/23    Resolved:  12/21/23    Updated to: Min A for LB dressing with AE    Update reason: change in status; re-evaluation         SBA for toileting tasks and clothing mgmt  (Not met)       Start:  12/12/23    Expected End:  12/15/23    Resolved:  12/21/23    Updated to: Min A for toileting tasks and clothing mgmt    Update reason: change in status; re-evaluation         Fair + to good dyn standing balance for ADL tasks  (Not met)       Start:  12/12/23    Expected End:  12/15/23    Resolved:  12/21/23    Updated to: Fair to Fair + dyn standing balance for ADL tasks    Update reason: change in status; re-evaluation         SBA for all functional transfers (Progressing)       Start:  12/21/23    Expected End:  01/04/24                Min A for LB dressing with AE (Progressing)       Start:  12/21/23    Expected End:  01/04/24                Min A for toileting tasks and clothing mgmt (Progressing)       Start:  12/21/23    Expected End:  01/04/24                Fair to Fair + dyn standing balance for ADL tasks (Progressing)       Start:  12/21/23    Expected End:  01/04/24

## 2023-12-21 NOTE — PROGRESS NOTES
Physical Therapy    Physical Therapy Evaluation    Patient Name: Harper Lara  MRN: [id number]  Today's Date: 12/21/2023   Time Calculation  Start Time: 1108  Stop Time: 1125  Time Calculation (min): 17 min    Assessment/Plan   PT Assessment  PT Assessment Results: Decreased strength, Decreased endurance, Impaired balance, Decreased mobility, Decreased coordination  Rehab Prognosis: Fair  Evaluation/Treatment Tolerance: Patient limited by fatigue  End of Session Communication: Bedside nurse, Care Coordinator  Assessment Comment: pt with decreased mobility/gait strength balance endurance  pt to benefit from skilled PT to address deficits and improve functional mobility .  End of Session Patient Position: Up in chair, Alarm on  IP OR SWING BED PT PLAN  Inpatient or Swing Bed: Inpatient  PT Plan  Treatment/Interventions: Bed mobility, Transfer training, Gait training, Stair training, Balance training, Strengthening, Endurance training, Therapeutic exercise, Therapeutic activity, Positioning  PT Plan: Skilled PT  PT Frequency: 3 times per week  PT Discharge Recommendations: Moderate intensity level of continued care  Equipment Recommended upon Discharge: Wheeled walker  PT Recommended Transfer Status: Assist x1, Assistive device, Assist x2  PT - OK to Discharge: Yes (once medically cleared for discharge to next level of care.)    Subjective     Current Problem:  Patient Active Problem List   Diagnosis    Anxiety and depression    Atrial fibrillation (CMS/HCC)    CAD (coronary artery disease)    CHF (congestive heart failure) (CMS/HCC)    DM2 (diabetes mellitus, type 2) (CMS/HCC)    GERD (gastroesophageal reflux disease)    Gout, arthritis    HTN (hypertension), benign    Hyperlipidemia    Physical deconditioning    SDH (subdural hematoma) (CMS/HCC)    Small bowel obstruction (CMS/HCC)    Acute kidney injury (nontraumatic) (CMS/HCC)    Chronic renal insufficiency       General Visit Information:  General  Reason for  Referral: weakness impaired mobility Re-eval after 2nd surgery and transfer to ICU  Referred By: OT/PT Billy 12/19  Past Medical History Relevant to Rehab: SDH, CKD, CHF, CAD, HLD, HTN  Missed Visit: Yes  Missed Visit Reason: Patient placed on medical hold (hold Per RN . pts hgb 6.1 awaiting two units of PRBCs to tranfuse. also awaiting physcian consult due to pts NG tube being out of place. pt not appropriate for therapy at this time. reattempt as vivien.)  Prior to Session Communication: Bedside nurse  Patient Position Received: Bed, 3 rail up, Alarm on (pt has NG tube , hernandez , IV tele)  General Comment: To ED 12/6 with abdominal pain and vomiting; pt. found to have SBO and SOCORRO on CKD. Pt. underwent extensive laporascopic lysis of adhesions with small intestine resection and anastomosis 12/8 with Dr. Lozada; CT abd- Findings compatible with acute mechanical small bowel obstruction  potentially related to adhesion. Newly seen mild superior likely recent superior L2 compressive  deformity. pt had to return back to sx on12/17  with laparcoscopy converted to laparotomy  with extensive ahesionolysis, SBO resection with anastomosis .  pt with hypotension s/p  remained on vent in ICU .  extubated 12/19 . transferred to medical floor.  held for therapy on12/20  due to low hgb 6.1 with need for 2 units PRBCs. 12/21 hgb 8.1  cleared for therapy eval.    Home Living:  Home Living  Type of Home: House  Lives With: Spouse  Home Adaptive Equipment: Cane, Walker rolling or standard  Home Layout: One level  Home Access: Stairs to enter without rails  Entrance Stairs-Number of Steps: 1  Bathroom Shower/Tub: Tub/shower unit  Bathroom Equipment: Grab bars in shower  Home Living Comments: Lives with spouse (unable to assist) in 1 story home with 1 step to enter. Owns 2ww and cane.    Prior Level of Function:  Prior Function Per Pt/Caregiver Report  Level of Desha: Needs assistance with ADLs, Needs assistance with  homemaking  Receives Help From: Neighbor  ADL Assistance: Needs assistance  Homemaking Assistance: Needs assistance  Ambulatory Assistance: Independent (FWW)  Prior Function Comments: mod I gait and transfers with FWW, assistance with adls and iadls. 1 fall doesnt drive    Precautions:  Precautions  Medical Precautions: Fall precautions, Abdominal precautions  Post-Surgical Precautions: Abdominal surgery precautions  Objective     Pain:  Pain Assessment  Pain Assessment: 0-10  Pain Score: 0 - No pain    Cognition:  Cognition  Orientation Level: Oriented X4    General Assessments:      Activity Tolerance  Endurance: Decreased tolerance for upright activites     Strength  Strength Comments: BLE 3+/5  Dynamic Sitting Balance  Dynamic Sitting-Comments: fair -  Dynamic Standing Balance  Dynamic Standing-Comments: fair -    Functional Assessments:     Bed Mobility  Bed Mobility: Yes  Bed Mobility 1  Bed Mobility 1: Supine to sitting, Scooting  Level of Assistance 1: Maximum assistance  Bed Mobility Comments 1: Max A x 2 to EOB  Transfers  Transfer: Yes  Transfer 1  Technique 1: Sit to stand, Stand to sit  Transfer Device 1: Walker  Transfer Level of Assistance 1: Moderate assistance  Trials/Comments 1: cues to push up from bed  Ambulation/Gait Training  Ambulation/Gait Training Performed: Yes  Ambulation/Gait Training 1  Surface 1: Level tile  Device 1: Rolling walker  Assistance 1: Moderate assistance  Quality of Gait 1: Inconsistent stride length, Decreased step length, Shuffling gait, Forward flexed posture  Comments/Distance (ft) 1: 6 steps to chair with mod A . FWW flexed posture cues to move walker    Extremity/Trunk Assessments:  RLE   RLE : Within Functional Limits  LLE   LLE : Within Functional Limits    Outcome Measures:  AMPAC Basic Mobility  Turning from your back to your side while in a flat bed without using bedrails: A lot  Moving from lying on your back to sitting on the side of a flat bed without using  bedrails: A lot  Moving to and from bed to chair (including a wheelchair): A lot  Standing up from a chair using your arms (e.g. wheelchair or bedside chair): A lot  To walk in hospital room: A lot  Climbing 3-5 steps with railing: Total  Basic Mobility - Total Score: 11    Goals:  Encounter Problems       Encounter Problems (Active)       Impaired mobility        Perform all bed mobility with supervision  (Progressing)       Start:  12/09/23    Expected End:  01/04/24            Perform all transfers with ww and SBA (Progressing)       Start:  12/09/23    Expected End:  01/04/24            Patient will ambulate >/= 50 ft. with ww and SBA (Progressing)       Start:  12/09/23    Expected End:  01/04/24            Patient will perform BLE HEP with supervision x10-20 reps x 1-2 sets  (Progressing)       Start:  12/09/23    Expected End:  01/04/24               Pain - Adult            Education Documentation  Mobility Training, taught by Carlos Fernandes PT at 12/21/2023  1:02 PM.  Learner: Patient  Readiness: Acceptance  Method: Explanation  Response: Verbalizes Understanding    Education Comments  No comments found.

## 2023-12-21 NOTE — PROGRESS NOTES
"Harper Lara is a 78 y.o. female on day 15 of admission presenting with Small bowel obstruction (CMS/HCC).    Subjective   Patient got continues to packed red blood cells yesterday, responded appropriately from 6-8, got vitamin K as well and otherwise is doing well.  Denies any flatus.  NG tube output is not recorded but is bilious in the container       Objective     Physical Exam  Gen: NAD, Aax3  Abd; Soft, ND, approrpriately TTP, incision c/d/i  Last Recorded Vitals  Blood pressure 117/55, pulse 83, temperature 36.7 °C (98.1 °F), resp. rate 16, height 1.6 m (5' 3\"), weight 72.2 kg (159 lb 2.8 oz), SpO2 96 %.  Intake/Output last 3 Shifts:  I/O last 3 completed shifts:  In: 742.7 (10.3 mL/kg) [Blood:692.7; IV Piggyback:50]  Out: 1110 (15.4 mL/kg) [Urine:900 (0.3 mL/kg/hr); Emesis/NG output:100; Drains:110]  Weight: 72.2 kg     Relevant Results                Component      Latest Ref Rng 12/21/2023   GLUCOSE      74 - 99 mg/dL 183 (H)    SODIUM      136 - 145 mmol/L 132 (L)    POTASSIUM      3.5 - 5.3 mmol/L 3.9    CHLORIDE      98 - 107 mmol/L 103    Bicarbonate      21 - 32 mmol/L 24    Anion Gap      10 - 20 mmol/L 9 (L)    Blood Urea Nitrogen      6 - 23 mg/dL 34 (H)    Creatinine      0.50 - 1.05 mg/dL 0.79    EGFR      >60 mL/min/1.73m*2 78    Calcium      8.6 - 10.3 mg/dL 7.4 (L)    WBC      4.4 - 11.3 x10*3/uL 5.6    nRBC      0.0 - 0.0 /100 WBCs 0.0    RBC      4.00 - 5.20 x10*6/uL 2.79 (L)    HEMOGLOBIN      12.0 - 16.0 g/dL 8.1 (L)    HEMATOCRIT      36.0 - 46.0 % 24.5 (L)    MCV      80 - 100 fL 88    MCH      26.0 - 34.0 pg 29.0    MCHC      32.0 - 36.0 g/dL 33.1    RED CELL DISTRIBUTION WIDTH      11.5 - 14.5 % 15.1 (H)    Platelets      150 - 450 x10*3/uL 144 (L)    POCT Calcium, Ionized      1.1 - 1.33 mmol/L 1.18    MAGNESIUM      1.60 - 2.40 mg/dL 1.67    PHOSPHORUS      2.5 - 4.9 mg/dL 3.6    POCT Glucose      74 - 99 mg/dL 173 (H)    POCT Glucose       189 (H)    POCT Glucose       190 (H)  "   POCT Glucose       176 (H)       Legend:  (H) High  (L) Low        Malnutrition Diagnosis Status: New  Malnutrition Diagnosis: Moderate malnutrition related to acute disease or injury  As Evidenced by: mild-moderate fat wasting; mild-moderate muscle wasting; inadequate intake of <50% of estimated needs x >1 week  I agree with the dietitian's malnutrition diagnosis.      Assessment/Plan   Principal Problem:    Small bowel obstruction (CMS/HCC)  Active Problems:    Acute kidney injury (nontraumatic) (CMS/HCC)    Chronic renal insufficiency    78-year-old female POD 13 from dx lap, BRYAN and POD4 from lap converted to open BRYAN, SBR, slowly improving but still awaiting ROBF  -NGT   -IV pain meds  -TPN  -Brock for urinary retention  -Replace electrolytes  -ISS, SCDs, DVT prophylaxis  -Hold eliquis for now  -PT/OT  -Will likely need placement upon discharge     Baltazar Farnsworth MD       I spent 45 minutes in the professional and overall care of this patient.      Baltazar Farnsworth MD  12/21/23  11:58 AM

## 2023-12-22 LAB
ANION GAP SERPL CALC-SCNC: 10 MMOL/L (ref 10–20)
BUN SERPL-MCNC: 33 MG/DL (ref 6–23)
CA-I BLD-SCNC: 1.18 MMOL/L (ref 1.1–1.33)
CALCIUM SERPL-MCNC: 7.4 MG/DL (ref 8.6–10.3)
CHLORIDE SERPL-SCNC: 105 MMOL/L (ref 98–107)
CO2 SERPL-SCNC: 23 MMOL/L (ref 21–32)
CREAT SERPL-MCNC: 0.77 MG/DL (ref 0.5–1.05)
ERYTHROCYTE [DISTWIDTH] IN BLOOD BY AUTOMATED COUNT: 15.3 % (ref 11.5–14.5)
GFR SERPL CREATININE-BSD FRML MDRD: 80 ML/MIN/1.73M*2
GLUCOSE BLD MANUAL STRIP-MCNC: 116 MG/DL (ref 74–99)
GLUCOSE BLD MANUAL STRIP-MCNC: 119 MG/DL (ref 74–99)
GLUCOSE BLD MANUAL STRIP-MCNC: 132 MG/DL (ref 74–99)
GLUCOSE BLD MANUAL STRIP-MCNC: 174 MG/DL (ref 74–99)
GLUCOSE BLD MANUAL STRIP-MCNC: 177 MG/DL (ref 74–99)
GLUCOSE BLD MANUAL STRIP-MCNC: 191 MG/DL (ref 74–99)
GLUCOSE BLD MANUAL STRIP-MCNC: 238 MG/DL (ref 74–99)
GLUCOSE SERPL-MCNC: 214 MG/DL (ref 74–99)
HCT VFR BLD AUTO: 24.2 % (ref 36–46)
HGB BLD-MCNC: 7.8 G/DL (ref 12–16)
MAGNESIUM SERPL-MCNC: 1.65 MG/DL (ref 1.6–2.4)
MCH RBC QN AUTO: 28.8 PG (ref 26–34)
MCHC RBC AUTO-ENTMCNC: 32.2 G/DL (ref 32–36)
MCV RBC AUTO: 89 FL (ref 80–100)
NRBC BLD-RTO: 0 /100 WBCS (ref 0–0)
PHOSPHATE SERPL-MCNC: 3.6 MG/DL (ref 2.5–4.9)
PLATELET # BLD AUTO: 146 X10*3/UL (ref 150–450)
POTASSIUM SERPL-SCNC: 4.1 MMOL/L (ref 3.5–5.3)
RBC # BLD AUTO: 2.71 X10*6/UL (ref 4–5.2)
SODIUM SERPL-SCNC: 134 MMOL/L (ref 136–145)
WBC # BLD AUTO: 3.6 X10*3/UL (ref 4.4–11.3)

## 2023-12-22 PROCEDURE — 84100 ASSAY OF PHOSPHORUS: CPT

## 2023-12-22 PROCEDURE — 97535 SELF CARE MNGMENT TRAINING: CPT | Mod: GO,CO

## 2023-12-22 PROCEDURE — C9113 INJ PANTOPRAZOLE SODIUM, VIA: HCPCS

## 2023-12-22 PROCEDURE — 97530 THERAPEUTIC ACTIVITIES: CPT | Mod: GP,CQ

## 2023-12-22 PROCEDURE — 1200000002 HC GENERAL ROOM WITH TELEMETRY DAILY

## 2023-12-22 PROCEDURE — 2500000005 HC RX 250 GENERAL PHARMACY W/O HCPCS

## 2023-12-22 PROCEDURE — 83735 ASSAY OF MAGNESIUM: CPT

## 2023-12-22 PROCEDURE — 96372 THER/PROPH/DIAG INJ SC/IM: CPT

## 2023-12-22 PROCEDURE — 99024 POSTOP FOLLOW-UP VISIT: CPT | Performed by: SURGERY

## 2023-12-22 PROCEDURE — 2500000002 HC RX 250 W HCPCS SELF ADMINISTERED DRUGS (ALT 637 FOR MEDICARE OP, ALT 636 FOR OP/ED): Performed by: NURSE PRACTITIONER

## 2023-12-22 PROCEDURE — 80048 BASIC METABOLIC PNL TOTAL CA: CPT

## 2023-12-22 PROCEDURE — 82330 ASSAY OF CALCIUM: CPT

## 2023-12-22 PROCEDURE — 82947 ASSAY GLUCOSE BLOOD QUANT: CPT

## 2023-12-22 PROCEDURE — 2500000004 HC RX 250 GENERAL PHARMACY W/ HCPCS (ALT 636 FOR OP/ED)

## 2023-12-22 PROCEDURE — 99232 SBSQ HOSP IP/OBS MODERATE 35: CPT

## 2023-12-22 PROCEDURE — 85027 COMPLETE CBC AUTOMATED: CPT

## 2023-12-22 PROCEDURE — 99232 SBSQ HOSP IP/OBS MODERATE 35: CPT | Performed by: NURSE PRACTITIONER

## 2023-12-22 RX ORDER — INSULIN LISPRO 100 [IU]/ML
0-10 INJECTION, SOLUTION INTRAVENOUS; SUBCUTANEOUS EVERY 4 HOURS
Status: DISCONTINUED | OUTPATIENT
Start: 2023-12-22 | End: 2023-12-26 | Stop reason: HOSPADM

## 2023-12-22 RX ADMIN — INSULIN LISPRO 2 UNITS: 100 INJECTION, SOLUTION INTRAVENOUS; SUBCUTANEOUS at 08:46

## 2023-12-22 RX ADMIN — INSULIN LISPRO 2 UNITS: 100 INJECTION, SOLUTION INTRAVENOUS; SUBCUTANEOUS at 06:25

## 2023-12-22 RX ADMIN — LEVOTHYROXINE SODIUM 38 MCG: 20 INJECTION, SOLUTION INTRAVENOUS at 08:46

## 2023-12-22 RX ADMIN — INSULIN LISPRO 2 UNITS: 100 INJECTION, SOLUTION INTRAVENOUS; SUBCUTANEOUS at 11:45

## 2023-12-22 RX ADMIN — ENOXAPARIN SODIUM 40 MG: 40 INJECTION SUBCUTANEOUS at 08:46

## 2023-12-22 RX ADMIN — HYDROMORPHONE HYDROCHLORIDE 0.4 MG: 0.5 INJECTION, SOLUTION INTRAMUSCULAR; INTRAVENOUS; SUBCUTANEOUS at 03:58

## 2023-12-22 RX ADMIN — I.V. FAT EMULSION 250 ML: 20 EMULSION INTRAVENOUS at 08:50

## 2023-12-22 RX ADMIN — SODIUM CHLORIDE, POTASSIUM CHLORIDE, SODIUM LACTATE AND CALCIUM CHLORIDE 50 ML/HR: 600; 310; 30; 20 INJECTION, SOLUTION INTRAVENOUS at 07:21

## 2023-12-22 RX ADMIN — PANTOPRAZOLE SODIUM 40 MG: 40 INJECTION, POWDER, FOR SOLUTION INTRAVENOUS at 06:25

## 2023-12-22 ASSESSMENT — ACTIVITIES OF DAILY LIVING (ADL)
BATHING_LEVEL_OF_ASSISTANCE: SETUP;MAXIMUM ASSISTANCE
BATHING_WHERE_ASSESSED: EDGE OF BED
HOME_MANAGEMENT_TIME_ENTRY: 23

## 2023-12-22 ASSESSMENT — COGNITIVE AND FUNCTIONAL STATUS - GENERAL
PERSONAL GROOMING: A LOT
DRESSING REGULAR LOWER BODY CLOTHING: TOTAL
DRESSING REGULAR LOWER BODY CLOTHING: TOTAL
EATING MEALS: A LOT
DAILY ACTIVITIY SCORE: 10
WALKING IN HOSPITAL ROOM: A LOT
MOVING TO AND FROM BED TO CHAIR: A LOT
STANDING UP FROM CHAIR USING ARMS: A LOT
DAILY ACTIVITIY SCORE: 12
TURNING FROM BACK TO SIDE WHILE IN FLAT BAD: A LOT
MOVING FROM LYING ON BACK TO SITTING ON SIDE OF FLAT BED WITH BEDRAILS: A LOT
TURNING FROM BACK TO SIDE WHILE IN FLAT BAD: A LOT
MOBILITY SCORE: 11
WALKING IN HOSPITAL ROOM: A LOT
DRESSING REGULAR UPPER BODY CLOTHING: A LOT
TOILETING: TOTAL
TOILETING: A LOT
MOVING TO AND FROM BED TO CHAIR: A LOT
WALKING IN HOSPITAL ROOM: A LOT
EATING MEALS: A LOT
DRESSING REGULAR UPPER BODY CLOTHING: A LOT
DAILY ACTIVITIY SCORE: 10
STANDING UP FROM CHAIR USING ARMS: A LOT
EATING MEALS: A LOT
MOVING FROM LYING ON BACK TO SITTING ON SIDE OF FLAT BED WITH BEDRAILS: A LOT
MOBILITY SCORE: 11
DRESSING REGULAR UPPER BODY CLOTHING: A LOT
PERSONAL GROOMING: A LOT
HELP NEEDED FOR BATHING: A LOT
STANDING UP FROM CHAIR USING ARMS: A LOT
MOVING FROM LYING ON BACK TO SITTING ON SIDE OF FLAT BED WITH BEDRAILS: A LOT
TURNING FROM BACK TO SIDE WHILE IN FLAT BAD: A LOT
CLIMB 3 TO 5 STEPS WITH RAILING: A LOT
MOBILITY SCORE: 12
MOVING TO AND FROM BED TO CHAIR: A LOT
DRESSING REGULAR LOWER BODY CLOTHING: A LOT
CLIMB 3 TO 5 STEPS WITH RAILING: TOTAL
HELP NEEDED FOR BATHING: A LOT
CLIMB 3 TO 5 STEPS WITH RAILING: TOTAL
TOILETING: TOTAL
HELP NEEDED FOR BATHING: A LOT
PERSONAL GROOMING: A LOT

## 2023-12-22 ASSESSMENT — PAIN SCALES - GENERAL
PAINLEVEL_OUTOF10: 0 - NO PAIN
PAINLEVEL_OUTOF10: 0 - NO PAIN
PAINLEVEL_OUTOF10: 7
PAINLEVEL_OUTOF10: 0 - NO PAIN
PAINLEVEL_OUTOF10: 0 - NO PAIN

## 2023-12-22 ASSESSMENT — PAIN - FUNCTIONAL ASSESSMENT
PAIN_FUNCTIONAL_ASSESSMENT: 0-10

## 2023-12-22 ASSESSMENT — PAIN DESCRIPTION - LOCATION: LOCATION: LEG

## 2023-12-22 ASSESSMENT — PAIN DESCRIPTION - ORIENTATION: ORIENTATION: RIGHT

## 2023-12-22 NOTE — PROGRESS NOTES
Patient started on clear liquid diet, NG tube removed. Patient may start advanced diet if tolerating clear liquid. Patient will be going to [facility] once she is medically  clear.

## 2023-12-22 NOTE — PROGRESS NOTES
"Harper Lara is a 78 y.o. female on day 16 of admission presenting with Small bowel obstruction (CMS/HCC).     Subjective   78-year-old female resting in bed.  This morning patient is ill-appearing but is passing gas.  No acute events overnight.       Objective     Physical Exam  Vitals and nursing note reviewed.   Constitutional:       Appearance: She is ill-appearing.   HENT:      Head: Normocephalic and atraumatic.      Nose: Nose normal.      Mouth/Throat:      Mouth: Mucous membranes are dry.   Cardiovascular:      Rate and Rhythm: Normal rate and regular rhythm.      Pulses: Normal pulses.      Heart sounds: Normal heart sounds, S1 normal and S2 normal.   Pulmonary:      Effort: Pulmonary effort is normal.      Breath sounds: Normal breath sounds.   Abdominal:      General: There is distension.      Palpations: Abdomen is soft.      Tenderness: There is abdominal tenderness.      Comments: Mild distention   Musculoskeletal:         General: Normal range of motion.   Skin:     General: Skin is warm and dry.      Capillary Refill: Capillary refill takes less than 2 seconds.   Neurological:      General: No focal deficit present.      Mental Status: She is alert and oriented to person, place, and time.   Psychiatric:         Mood and Affect: Mood normal.         Behavior: Behavior normal. Behavior is cooperative.         Last Recorded Vitals  Blood pressure 133/60, pulse 82, temperature 36.4 °C (97.5 °F), resp. rate 18, height 1.6 m (5' 3\"), weight 72.2 kg (159 lb 2.8 oz), SpO2 98 %.  Intake/Output last 3 Shifts:  I/O last 3 completed shifts:  In: 692.7 (9.6 mL/kg) [Blood:692.7]  Out: 2125 (29.4 mL/kg) [Urine:1700 (0.7 mL/kg/hr); Emesis/NG output:300; Drains:125]  Weight: 72.2 kg     Medications  Scheduled medications  [Held by provider] atorvastatin, 40 mg, oral, Nightly  enoxaparin, 40 mg, subcutaneous, Daily  fat emulsion-plant based, 250 mL, intravenous, Once per day on Mon Wed Fri  insulin lispro, 0-10 " Units, subcutaneous, q4h  levothyroxine, 38 mcg, intravenous, q24h ORLY  [Held by provider] levothyroxine, 75 mcg, oral, Daily  lidocaine, 5 mL, infiltration, Once  [Held by provider] metoprolol, 2.5 mg, intravenous, q6h  pantoprazole, 40 mg, intravenous, Daily before breakfast  [Held by provider] polyethylene glycol, 17 g, oral, Daily  [Held by provider] rivaroxaban, 15 mg, oral, Daily with evening meal      Continuous medications  Adult Clinimix Parenteral Nutrition, 83 mL/hr, Last Rate: 83 mL/hr (12/21/23 1330)  lactated Ringer's, 50 mL/hr, Last Rate: 50 mL/hr (12/22/23 0721)      PRN medications  PRN medications: alteplase, dextrose 10 % in water (D10W), dextrose, glucagon, HYDROmorphone, HYDROmorphone, ondansetron **OR** ondansetron   Relevant Results  Results for orders placed or performed during the hospital encounter of 12/06/23 (from the past 24 hour(s))   POCT GLUCOSE   Result Value Ref Range    POCT Glucose 185 (H) 74 - 99 mg/dL   POCT GLUCOSE   Result Value Ref Range    POCT Glucose 173 (H) 74 - 99 mg/dL   POCT GLUCOSE   Result Value Ref Range    POCT Glucose 191 (H) 74 - 99 mg/dL   POCT GLUCOSE   Result Value Ref Range    POCT Glucose 238 (H) 74 - 99 mg/dL   Calcium, ionized   Result Value Ref Range    POCT Calcium, Ionized 1.18 1.1 - 1.33 mmol/L   Magnesium   Result Value Ref Range    Magnesium 1.65 1.60 - 2.40 mg/dL   Phosphorus   Result Value Ref Range    Phosphorus 3.6 2.5 - 4.9 mg/dL   Basic Metabolic Panel   Result Value Ref Range    Glucose 214 (H) 74 - 99 mg/dL    Sodium 134 (L) 136 - 145 mmol/L    Potassium 4.1 3.5 - 5.3 mmol/L    Chloride 105 98 - 107 mmol/L    Bicarbonate 23 21 - 32 mmol/L    Anion Gap 10 10 - 20 mmol/L    Urea Nitrogen 33 (H) 6 - 23 mg/dL    Creatinine 0.77 0.50 - 1.05 mg/dL    eGFR 80 >60 mL/min/1.73m*2    Calcium 7.4 (L) 8.6 - 10.3 mg/dL   CBC   Result Value Ref Range    WBC 3.6 (L) 4.4 - 11.3 x10*3/uL    nRBC 0.0 0.0 - 0.0 /100 WBCs    RBC 2.71 (L) 4.00 - 5.20 x10*6/uL     Hemoglobin 7.8 (L) 12.0 - 16.0 g/dL    Hematocrit 24.2 (L) 36.0 - 46.0 %    MCV 89 80 - 100 fL    MCH 28.8 26.0 - 34.0 pg    MCHC 32.2 32.0 - 36.0 g/dL    RDW 15.3 (H) 11.5 - 14.5 %    Platelets 146 (L) 150 - 450 x10*3/uL   POCT GLUCOSE   Result Value Ref Range    POCT Glucose 177 (H) 74 - 99 mg/dL   POCT GLUCOSE   Result Value Ref Range    POCT Glucose 174 (H) 74 - 99 mg/dL          Malnutrition Diagnosis Status: New  Malnutrition Diagnosis: Moderate malnutrition related to acute disease or injury  As Evidenced by: mild-moderate fat wasting; mild-moderate muscle wasting; inadequate intake of <50% of estimated needs x >1 week  I agree with the dietitian's malnutrition diagnosis.      Assessment/Plan   78-year-old female resting in bed.  Patient has abdominal tenderness to palpation and mild distention.  The nurse and patient's state that she has been passing gas.  She is ill-appearing with mild temporal muscle wasting.  Considering the patient now has mild distention and is passing gas we will resume a clear liquid diet and remove her NG tube.    Principal Problem:    Small bowel obstruction (CMS/HCC)  Active Problems:    Acute kidney injury (nontraumatic) (CMS/HCC)    Chronic renal insufficiency    Plan  - Remove NG tube  - Clear liquid diet  - IV pain meds  - TPN  - Brock for urinary retention  - Replace electrolytes as needed  - Hold Eliquis for now  - PT/OT  - Will likely need placement upon discharge       I spent 36 minutes in the professional and overall care of this patient.      Tiffany Judge, APRN-CNP    Agree with note above except were noted below    Has a little bit of gas this morning, otherwise no acute events,    Patient no acute distress, abdomen mildly distended, appropriately tender to palpation, incision clean dry and intact with staples in place    78-year-old female POD 13 from dx lap, BRYAN and POD4 from lap converted to open BRYAN, SBR, slowly improving  -DC NGT  -Sips of clears  -Still  TPN  -Brock for retention  -IV pain meds  -ISS, SCDs, DVT proph  -Hold eliquis  -PT/OT  -Will need placement upon discharge    Baltazar Farnsworth MD  12/22/23  12:23 PM

## 2023-12-22 NOTE — PROGRESS NOTES
"Nutrition Note  Reason for Assessment  Reason for Assessment:  (Follow up)     Nutrition Assessment    Nutrition History:  Energy Intake: Good > 75 % (Being provided by TPN and lipids)  Food and Nutrient History: Pt remains NPO, now day 16 of NPO/Clear liquids. She is now on TPN (Central line clinimix 5% amino acids/20% dextrose) at 83 mL/hr with 250 mL 20% lipids Monday, Wednesday, Friday. Pt c/o ABD pain at RD visit. She stated she passed gas this morning. Noted sodium still low but improving. BG WNL.  Vitamin/Herbal Supplement Use: 625 mg Fibercon BID, vitamin D, 1000 mcg vitamin B12, fish oil, 400 mg Mag-Ox, multivitamin        PMH, meds, and labs reviewed.  Dietary Orders (From admission, onward)       Start     Ordered    12/22/23 1217  Adult diet Clear Liquid  Diet effective now        Question:  Diet type  Answer:  Clear Liquid    12/22/23 1217                  Independent after set-up    GI per flowsheet:  Gastrointestinal  Gastrointestinal (WDL): Exceptions to WDL  Abdomen Inspection: Soft  Abdominal Tenderness: Tenderness  Bowel Sounds: All quadrants  Bowel Sounds (All Quadrants): Active  RUQ Bowel Sounds: Active  LUQ Bowel Sounds: Active  Passing Flatus: No  Last BM Date: 12/16/23  Gastrointestinal Symptoms: None  Last bowel movement documented: 12/16/23  Allergies: Diphenhydramine hcl, Morphine, Pentazocine, Propoxyphene, Propoxyphene n-acetaminophen, Ranitidine, and [place]     Anthropometrics:  Height: 160 cm (5' 3\")  Weight: 72.2 kg (159 lb 2.8 oz)  BMI (Calculated): 28.2  IBW: 52.3 kg      Weight History / % Weight Change: No new weight to assess   Interpretation of Weight Loss: >5% in 1 month   Significant Weight Loss: Yes           Estimated Nutritional Needs:  Method for Estimating Needs: 5399-3949 kcals (25-28 kcals/kg)    Total Protein Estimated Needs (g):  ()  Method for Estimating Needs: 1.5-2 g/kg IBW as renal function permits    Total Fluid Estimated Needs (mL): 1820 mL  Method for " Estimating Needs: 35 mL/kg IBW or as per MD    Nutrition Focused Physical Findings:   Orbital Fat Pads: Mild-Moderate (slight dark circles and slight hollowing)  Buccal Fat Pads: Mild-Moderate (flat cheeks, minimal bounce)  Triceps: Well nourished (ample fat tissue)    Temporalis: Mild-Moderate (slight depression)  Pectoralis (Clavicular Region): Well nourished (clavicle not visible)  Deltoid/Trapezius: Well nourished (rounded appearance at arm, shoulder, neck)    Edema  Edema: +1 trace  Edema Location: BUE, non-piitting BLE    Skin: Positive (multiple abdominal incisions, PI stage 2 bilateral buttocks per nursing assessment)  Pain Score: 0 - No pain     Nutrition Diagnosis   Patient has Malnutrition Diagnosis: Yes  Diagnosis Status: Ongoing  Malnutrition Diagnosis: Moderate malnutrition related to acute disease or injury  As Evidenced by: mild-moderate fat wasting; mild-moderate muscle wasting; inadequate intake of <50% of estimated needs x >1 week    Patient has Nutrition Diagnosis: Yes  Ongoing  Nutrition Diagnosis 1: Inadequate oral intake  Related to (1): altered GI function  As Evidenced by (1): NPO or clear liuqids since 12/6, need for PN         Nutrition Interventions/Recommendations   Interventions  Individualized Nutrition Prescription Provided for : ~2000 kcals, ~100 gm protein to be provided by TPN and lipids. Diet to be initiated and advanced per surgery  Interventions: Parenteral nutrition/ IV fluids  Parenteral Nutrition/IV Fluids: Modify rate of parenteral nutrition, Modify concentration of parenteral nutrition  Formula: TPN standard - AA 5%, dextrose 20%  Electrolytes: Standard  Elements: Multivitamin, Trace elements  Fat Emulsion 20%: Intralipid  Fat Emulsion Rate (mL/hr): 20.83 mL/hr  Fat Emulsion Volume (mL/day): 250 mL/day (3 days per week)  Continuous Rate (mL/hr): 83 mL/hr  Total Volume (mL/day): 1992 mL/day  Total Parenteral Kcal (kcal/day): 1752 kcal/day (without lipids)  Total Protein  (g/day): 100 g/day  Glucose Infusion Rate (GIR) (mg/kg/min): 3.8 mg/kg/min  Labs: Triglycerides:  now and each week, Renal panel and magnesium daily, LFTs: now and each week      Nutrition Monitoring and Evaluation   Body Composition/Growth/Weight History  Monitoring and Evaluation Plan: Weight  Weight: Measured weight  Criteria: Maintains stable weight  Biochemical Data, Medical Tests and Procedures  Monitoring and Evaluation Plan: Glucose/endocrine profile  Electrolyte and Renal Panel: Potassium, Sodium, Phosphorus, Magnesium  Criteria: Electrolytes WNL  Glucose/Endocrine Profile: Glucose, casual  Criteria: BG within desirable range  Food/Nutrient Related History Monitoring  Monitoring and Evaluation Plan: Enteral and parenteral nutrition intake, Energy intake  Energy Intake: Estimated energy intake  Criteria: Pt meets >75% of estimated energy needs  Enteral and Parenteral Nutrition Intake: Parenteral nutrition intake  Criteria: Tolerates TPN  Nutrition Focused Physical Findings  Monitoring and Evaluation Plan: Digestive System  Criteria: Return of bowel function      Education Documentation  No documentation found.        Time Spent (min): 30 minutes  Last Date of Nutrition Visit: 12/22/23  Nutrition Follow-Up Needed?: 5-7 days

## 2023-12-22 NOTE — PROGRESS NOTES
Occupational Therapy    OT Treatment    Patient Name: Harper Lara  MRN: [id number]  Today's Date: 12/22/2023  Time Calculation  Start Time: 0938  Stop Time: 1017  Time Calculation (min): 39 min       Assessment:  End of Session Communication: Bedside nurse, PCT/NA/CTA  End of Session Patient Position: Up in chair, Alarm on     Plan:  Treatment Interventions: ADL retraining, Functional transfer training, Endurance training  OT Frequency: 3 times per week  Treatment Interventions: ADL retraining, Functional transfer training, Endurance training    Subjective   Previous Visit Info:  OT Last Visit  OT Received On: 12/22/23  General:  General  Reason for Referral: underwent extensive laporascopic lysis of adhesions with small intestine resection and anastomosis 12/8 with Dr. Lozada; CT abd- Findings compatible with acute mechanical small bowel obstruction potentially related to adhesion. Newly seen mild superior likely recent superior L2 compressive deformity. pt had to return back to sx on12/17 with laparcoscopy converted to laparotomy with extensive ahesionolysis, SBO resection with anastomosis. pt with hypotension s/p remained on vent in ICU. extubated 12/19.  Co-Treatment: PT  Prior to Session Communication: Bedside nurse  Patient Position Received: Bed, 3 rail up, Alarm on  Precautions:  Medical Precautions: Fall precautions  Post-Surgical Precautions: Abdominal surgery precautions     Pain:  Pain Assessment  Pain Assessment: 0-10  Pain Score: 0 - No pain    Objective    Cognition:  Cognition  Overall Cognitive Status: Impaired  Orientation Level: Disoriented to time  Following Commands: Follows multistep commands with repetition (and time.  50%-75%)  Insight: Moderate  Impulsive: Moderately     Activities of Daily Living: Grooming  Grooming Level of Assistance: Setup (oral care- mod a for thoroughness.  washed hair with rinse free shampoo cap- max a.  combed hair- max a. washed face- mod a)  Grooming Where  Assessed: Recliner    UE Bathing  UE Bathing Level of Assistance: Setup, Minimum assistance  UE Bathing Where Assessed: Edge of bed    LE Bathing  LE Bathing Level of Assistance: Setup, Maximum assistance  LE Bathing Where Assessed: Edge of bed    UE Dressing  UE Dressing Level of Assistance:  (doffed/donned gown,secondary to multiple lines and fair- endurance)  UE Dressing Where Assessed: Edge of bed     Toileting  Toileting Level of Assistance: Dependent  Where Assessed:  (in stance at EOB)  Functional Standing Tolerance:  Functional Standing Tolerance Comments: patient stood for a total of 2-3 mins with poor/poor+ balance with 2-1 ue support as needed during functional transfers and ADL tasks  Bed Mobility/Transfers: Bed Mobility 1  Bed Mobility 1: Supine to sitting, Scooting  Level of Assistance 1: Maximum assistance (x2)    Transfers  Transfer: Yes  Transfer 1  Technique 1: Sit to stand, Stand to sit  Transfer Device 1: Walker  Transfer Level of Assistance 1: Moderate assistance, +2, Moderate verbal cues (for hand placement and seq)  Transfers 2  Transfer From 2: Bed to  Transfer to 2: Chair with arms  Technique 2: Sit to stand  Transfer Device 2: Walker  Transfer Level of Assistance 2: Moderate assistance, +2, +1 to manage equipment (with mod a for walker mgmt)    Sitting Balance:  Dynamic Sitting Balance  Dynamic Sitting-Balance:  (fair/fair+ balance)  Standing Balance:  Dynamic Standing Balance  Dynamic Standing-Balance:  (poor/poor+)    Therapy/Activity: Therapeutic Activity  Therapeutic Activity Performed: Yes  Therapeutic Activity 1: patient sat EOB for a total of 15 mins with fair/fair+ balance during ADL tasks with fair- endurance    Outcome Measures:[facility] Daily Activity  Putting on and taking off regular lower body clothing: Total  Bathing (including washing, rinsing, drying): A lot  Putting on and taking off regular upper body clothing: A lot  Toileting, which includes using toilet, bedpan or urinal:  Total  Taking care of personal grooming such as brushing teeth: A lot  Eating Meals: A lot  Daily Activity - Total Score: 10    Education Documentation  Precautions, taught by SCOTTIE Bai at 12/22/2023  2:33 PM.  Learner: Patient  Readiness: Acceptance  Method: Explanation  Response: Needs Reinforcement    ADL Training, taught by SCOTTIE Bai at 12/22/2023  2:33 PM.  Learner: Patient  Readiness: Acceptance  Method: Explanation  Response: Needs Reinforcement    Education Comments  No comments found.      OP EDUCATION:  Education  Individual(s) Educated: Patient  Education Provided: Diagnosis & Precautions, Symptom management, Ergonomics and postural realignment, Joint protection and energy conservation, Fall precautons, Risk and benefits of OT discussed with patient or other, POC discussed and agreed upon  Diagnosis and Precautions: ABD precautions  Equipment:  (EC tech, AE and pain mgmt)  Patient/Caregiver Demonstrated Understanding: yes  Plan of Care Discussed and Agreed Upon: yes  Patient Response to Education: Patient/Caregiver Verbalized Understanding of Information, Patient/Caregiver Performed Return Demonstration of Exercises/Activities, Patient/Caregiver Asked Appropriate Questions    Goals:  Encounter Problems       Encounter Problems (Active)       OT Goals       Mod I for all functional transfers  (Not met)       Start:  12/12/23    Expected End:  12/15/23    Resolved:  12/21/23    Updated to: SBA for all functional transfers    Update reason: change in status; re-evaluation         SBA for LB dressing with AE as needed  (Not met)       Start:  12/12/23    Expected End:  12/15/23    Resolved:  12/21/23    Updated to: Min A for LB dressing with AE    Update reason: change in status; re-evaluation         SBA for toileting tasks and clothing mgmt  (Not met)       Start:  12/12/23    Expected End:  12/15/23    Resolved:  12/21/23    Updated to: Min A for toileting tasks and clothing mgmt     Update reason: change in status; re-evaluation         Fair + to good dyn standing balance for ADL tasks  (Not met)       Start:  12/12/23    Expected End:  12/15/23    Resolved:  12/21/23    Updated to: Fair to Fair + dyn standing balance for ADL tasks    Update reason: change in status; re-evaluation         SBA for all functional transfers (Progressing)       Start:  12/21/23    Expected End:  01/04/24                Min A for LB dressing with AE (Progressing)       Start:  12/21/23    Expected End:  01/04/24                Min A for toileting tasks and clothing mgmt (Progressing)       Start:  12/21/23    Expected End:  01/04/24                Fair to Fair + dyn standing balance for ADL tasks (Progressing)       Start:  12/21/23    Expected End:  01/04/24

## 2023-12-22 NOTE — PROGRESS NOTES
Physical Therapy    Physical Therapy Treatment    Patient Name: Harper Lara  MRN: [id number]  Today's Date: 12/22/2023  Time Calculation  Start Time: 0939  Stop Time: 1019  Time Calculation (min): 40 min       Assessment/Plan   PT Assessment  PT Assessment Results: Decreased strength, Decreased endurance, Impaired balance, Decreased mobility, Decreased coordination  Rehab Prognosis: Fair  Barriers to Discharge: Decreased endurance; Continues to require (A) to complete tasks.  Evaluation/Treatment Tolerance: Patient limited by fatigue  End of Session Communication: Bedside nurse, PCT/NA/CTA  Assessment Comment: Patient participated in bed mobility/transfer training; continues to require (A) for safty. Patient would benefit from additional skilled PT to address deficits and improve functional mobility .  End of Session Patient Position: Up in chair, Alarm on (Reclined in chair with LEs elevated. Call light, phone, and tray table within reach.)  PT Plan  Inpatient/Swing Bed or Outpatient: Inpatient  Treatment/Interventions: Bed mobility, Transfer training, Gait training, Stair training, Balance training, Strengthening, Endurance training, Therapeutic exercise, Therapeutic activity, Positioning  PT Plan: Skilled PT  PT Frequency: 3 times per week  PT Discharge Recommendations: Moderate intensity level of continued care  Equipment Recommended upon Discharge: Wheeled walker   PT Recommended Transfer Status: Assist x1, Assistive device, Assist x2    General Visit Information:   PT  Visit  PT Received On: 12/22/23  General  Family/Caregiver Present: No  Co-Treatment: OT  Co-Treatment Reason: Co-treat with OT to maximize patient and staff safety with transfers/amb.  Prior to Session Communication: Bedside nurse, PCT/NA/KATELYN  Patient Position Received: Bed, 3 rail up, Alarm on  General Comment: Pleasant and cooperative. Motivated to get OOB.    General Observations:   General Observation: x1 ANTHONY drain; Tele; Brock catheter;  IVs    Subjective     Precautions:  Precautions  Medical Precautions: Fall precautions  Post-Surgical Precautions: Abdominal surgery precautions    Objective     Pain:  Pain Assessment  Pain Assessment:  (Denies pain.)    Treatments:           Bed Mobility  Bed Mobility: Yes  Bed Mobility 1  Bed Mobility 1: Supine to sitting  Level of Assistance 1: Maximum assistance (x2)  Bed Mobility Comments 1: HOB ~35°. Hand over hand (A) to reach across body to use the bed rail for support. (A) to lift UB from HOB while moving LEs over the EOB. Instruction and (A) for log roll technique.  Ambulation/Gait Training  Ambulation/Gait Training Performed: Yes  Ambulation/Gait Training 1  Surface 1: Level tile  Device 1: Rolling walker  Assistance 1: Moderate assistance (x2)  Comments/Distance (ft) 1: ~3ft Bed to chair. WBOS. Forward flexed posture. Decreased step height/length B. v/c and (A) for safer maneuvering of ww with transfer training. Moves slowly/cautiously. Unsteady, but no LOB.  Transfers  Transfer: Yes  Transfer 1  Technique 1: Sit to stand, Stand to sit  Transfer Device 1:  (ww)  Transfer Level of Assistance 1: Moderate assistance, +2  Trials/Comments 1: (2x). v/c for safe hand placement and technique. Slow transition of hands to/from ww. Benefits from elevated surfaces. Retropulsive when initially standing.          Outcome Measures:  [facility] Basic Mobility  Turning from your back to your side while in a flat bed without using bedrails: A lot  Moving from lying on your back to sitting on the side of a flat bed without using bedrails: A lot  Moving to and from bed to chair (including a wheelchair): A lot  Standing up from a chair using your arms (e.g. wheelchair or bedside chair): A lot  To walk in hospital room: A lot  Climbing 3-5 steps with railing: Total  Basic Mobility - Total Score: 11    Education Documentation  Precautions, taught by Chelita Villatoro PTA at 12/22/2023  2:27 PM.  Learner: Patient  Readiness:  Acceptance  Method: Explanation, Demonstration  Response: Needs Reinforcement  Comment: See therapy note.    Mobility Training, taught by Chelita Villatoro PTA at 12/22/2023  2:27 PM.  Learner: Patient  Readiness: Acceptance  Method: Explanation, Demonstration  Response: Needs Reinforcement  Comment: See therapy note.      EDUCATION:  Education  Individual(s) Educated: Patient  Education Provided: Body Mechanics, Fall Risk, Post-Op Precautions, Posture, POC (Safety with bed mobility(log-roll technique) and transfers using ww properly for support.)    Encounter Problems       Encounter Problems (Active)       Impaired mobility        Perform all bed mobility with supervision  (Progressing)       Start:  12/09/23    Expected End:  01/04/24            Perform all transfers with ww and SBA (Progressing)       Start:  12/09/23    Expected End:  01/04/24            Patient will ambulate >/= 50 ft. with ww and SBA (Progressing)       Start:  12/09/23    Expected End:  01/04/24            Patient will perform BLE HEP with supervision x10-20 reps x 1-2 sets  (Progressing)       Start:  12/09/23    Expected End:  01/04/24

## 2023-12-22 NOTE — PROGRESS NOTES
PROGRESS NOTE    Subjective     Patient seen and examined today.  Resting in recliner with no complaints.  NG tube in place draining yellow GI contents.  No reports of nausea or emesis.  No reports of significant abdominal pain.  Has been afebrile overnight.  She is awake, alert and oriented x 4 and communicating clearly.    Objective     Last Recorded Vitals    Visit Vitals  /58   Pulse 72   Temp 36.1 °C (97 °F)   Resp 18        3 Day Weight Change: Unable to Calculate       Intake/Output Summary (Last 24 hours) at 12/22/2023 0835  Last data filed at 12/22/2023 0600  Gross per 24 hour   Intake --   Output 1760 ml   Net -1760 ml        Physical Exam  Constitutional: ill and elderly appearing, awake/alert/oriented x4 cooperative, poor historian on medical history  ENMT: mucous membranes dry  Respiratory/Thorax: Patent airways,  normal breath sounds  diminished bilateral bases  Cardiovascular: iregular, rate and rhythm, no murmurs, 2+ equal pulses of the extremities, normal S 1and S 2  - telemetry shows Afib   Gastrointestinal: Nondistended, soft, tender with palpation . Noted abd dressing with scant drainage   Genitourinary: hernandez present   Musculoskeletal: ROM intact, no joint swelling,   Extremities: left upper extremity noted to have ecchymotic area   Bilateral edema to upper extremities, non pitting  Skin: warm, dry, intact except as noted   Neurological: alert/oriented x 3, speech clear    Scheduled medications  [Held by provider] atorvastatin, 40 mg, oral, Nightly  enoxaparin, 40 mg, subcutaneous, Daily  fat emulsion-plant based, 250 mL, intravenous, Once per day on Mon Wed Fri  insulin lispro, 0-10 Units, subcutaneous, q4h  levothyroxine, 38 mcg, intravenous, q24h ORLY  [Held by provider] levothyroxine, 75 mcg, oral, Daily  lidocaine, 5 mL, infiltration, Once  [Held by provider] metoprolol, 2.5 mg, intravenous, q6h  pantoprazole, 40 mg, intravenous, Daily before breakfast  [Held by provider] polyethylene  glycol, 17 g, oral, Daily  [Held by provider] rivaroxaban, 15 mg, oral, Daily with evening meal      Continuous medications  Adult Clinimix Parenteral Nutrition, 83 mL/hr, Last Rate: 83 mL/hr (12/21/23 1330)  lactated Ringer's, 50 mL/hr, Last Rate: 50 mL/hr (12/22/23 0721)      PRN medications  PRN medications: alteplase, dextrose 10 % in water (D10W), dextrose, glucagon, HYDROmorphone, HYDROmorphone, ondansetron **OR** ondansetron       Relevant Results    Results for orders placed or performed during the hospital encounter of 12/06/23 (from the past 96 hour(s))   Blood Gas Arterial Full Panel   Result Value Ref Range    POCT pH, Arterial 7.43 (H) 7.38 - 7.42 pH    POCT pCO2, Arterial 31 (L) 38 - 42 mm Hg    POCT pO2, Arterial 195 (H) 85 - 95 mm Hg    POCT SO2, Arterial 100 94 - 100 %    POCT Oxy Hemoglobin, Arterial 99.2 (H) 94.0 - 98.0 %    POCT Hematocrit Calculated, Arterial 26.0 (L) 36.0 - 46.0 %    POCT Sodium, Arterial 129 (L) 136 - 145 mmol/L    POCT Potassium, Arterial 4.7 3.5 - 5.3 mmol/L    POCT Chloride, Arterial 101 98 - 107 mmol/L    POCT Ionized Calcium, Arterial 1.21 1.10 - 1.33 mmol/L    POCT Glucose, Arterial 146 (H) 74 - 99 mg/dL    POCT Lactate, Arterial 2.6 (H) 0.4 - 2.0 mmol/L    POCT Base Excess, Arterial -3.2 (L) -2.0 - 3.0 mmol/L    POCT HCO3 Calculated, Arterial 20.6 (L) 22.0 - 26.0 mmol/L    POCT Hemoglobin, Arterial 8.7 (L) 12.0 - 16.0 g/dL    POCT Anion Gap, Arterial 12 10 - 25 mmo/L    Patient Temperature      FiO2 50 %    Ventilator Mode CPAP     Peep CHM2O 5.0 cm H2O    Pressure Support 5 cm H2O    Site of Arterial Puncture Arterial Line    POCT GLUCOSE   Result Value Ref Range    POCT Glucose 131 (H) 74 - 99 mg/dL   POCT GLUCOSE   Result Value Ref Range    POCT Glucose 127 (H) 74 - 99 mg/dL   POCT GLUCOSE   Result Value Ref Range    POCT Glucose 154 (H) 74 - 99 mg/dL   Lactate   Result Value Ref Range    Lactate 1.6 0.4 - 2.0 mmol/L   POCT GLUCOSE   Result Value Ref Range    POCT  Glucose 150 (H) 74 - 99 mg/dL   POCT GLUCOSE   Result Value Ref Range    POCT Glucose 149 (H) 74 - 99 mg/dL   Magnesium   Result Value Ref Range    Magnesium 1.97 1.60 - 2.40 mg/dL   Phosphorus   Result Value Ref Range    Phosphorus 3.5 2.5 - 4.9 mg/dL   CBC   Result Value Ref Range    WBC 7.3 4.4 - 11.3 x10*3/uL    nRBC 0.0 0.0 - 0.0 /100 WBCs    RBC 2.60 (L) 4.00 - 5.20 x10*6/uL    Hemoglobin 7.5 (L) 12.0 - 16.0 g/dL    Hematocrit 22.6 (L) 36.0 - 46.0 %    MCV 87 80 - 100 fL    MCH 28.8 26.0 - 34.0 pg    MCHC 33.2 32.0 - 36.0 g/dL    RDW 15.4 (H) 11.5 - 14.5 %    Platelets 152 150 - 450 x10*3/uL   Basic Metabolic Panel   Result Value Ref Range    Glucose 146 (H) 74 - 99 mg/dL    Sodium 130 (L) 136 - 145 mmol/L    Potassium 4.3 3.5 - 5.3 mmol/L    Chloride 101 98 - 107 mmol/L    Bicarbonate 23 21 - 32 mmol/L    Anion Gap 10 10 - 20 mmol/L    Urea Nitrogen 38 (H) 6 - 23 mg/dL    Creatinine 0.95 0.50 - 1.05 mg/dL    eGFR 62 >60 mL/min/1.73m*2    Calcium 7.4 (L) 8.6 - 10.3 mg/dL   POCT GLUCOSE   Result Value Ref Range    POCT Glucose 141 (H) 74 - 99 mg/dL   POCT GLUCOSE   Result Value Ref Range    POCT Glucose 147 (H) 74 - 99 mg/dL   Staphylococcus Aureus/MRSA Colonization, Culture - PICC Only    Specimen: Anterior Nares; Swab   Result Value Ref Range    Staph/MRSA Screen Culture No Staphylococcus aureus isolated    Triglycerides   Result Value Ref Range    Triglycerides 94 0 - 149 mg/dL   POCT GLUCOSE   Result Value Ref Range    POCT Glucose 123 (H) 74 - 99 mg/dL   POCT GLUCOSE   Result Value Ref Range    POCT Glucose 159 (H) 74 - 99 mg/dL   POCT GLUCOSE   Result Value Ref Range    POCT Glucose 167 (H) 74 - 99 mg/dL   POCT GLUCOSE   Result Value Ref Range    POCT Glucose 203 (H) 74 - 99 mg/dL   POCT GLUCOSE   Result Value Ref Range    POCT Glucose 171 (H) 74 - 99 mg/dL   Basic Metabolic Panel   Result Value Ref Range    Glucose 192 (H) 74 - 99 mg/dL    Sodium 130 (L) 136 - 145 mmol/L    Potassium 3.7 3.5 - 5.3 mmol/L     Chloride 102 98 - 107 mmol/L    Bicarbonate 25 21 - 32 mmol/L    Anion Gap 7 (L) 10 - 20 mmol/L    Urea Nitrogen 35 (H) 6 - 23 mg/dL    Creatinine 0.88 0.50 - 1.05 mg/dL    eGFR 68 >60 mL/min/1.73m*2    Calcium 7.5 (L) 8.6 - 10.3 mg/dL   Magnesium   Result Value Ref Range    Magnesium 1.94 1.60 - 2.40 mg/dL   Phosphorus   Result Value Ref Range    Phosphorus 2.2 (L) 2.5 - 4.9 mg/dL   POCT GLUCOSE   Result Value Ref Range    POCT Glucose 203 (H) 74 - 99 mg/dL   CBC   Result Value Ref Range    WBC 5.1 4.4 - 11.3 x10*3/uL    nRBC 0.0 0.0 - 0.0 /100 WBCs    RBC 2.09 (L) 4.00 - 5.20 x10*6/uL    Hemoglobin 6.1 (LL) 12.0 - 16.0 g/dL    Hematocrit 18.6 (L) 36.0 - 46.0 %    MCV 89 80 - 100 fL    MCH 29.2 26.0 - 34.0 pg    MCHC 32.8 32.0 - 36.0 g/dL    RDW 15.2 (H) 11.5 - 14.5 %    Platelets 141 (L) 150 - 450 x10*3/uL   SST TOP   Result Value Ref Range    Extra Tube Hold for add-ons.    PST Top   Result Value Ref Range    Extra Tube Hold for add-ons.    POCT GLUCOSE   Result Value Ref Range    POCT Glucose 184 (H) 74 - 99 mg/dL   Prepare RBC: 2 Units   Result Value Ref Range    PRODUCT CODE [id number]     Unit Number [id number]     Unit ABO O     Unit RH POS     XM INTEP COMP     Dispense Status TR     Blood Expiration Date January 05, 2024 23:59 EST     PRODUCT BLOOD TYPE 5100     UNIT VOLUME 350     PRODUCT CODE [id number]     Unit Number [id number]     Unit ABO O     Unit RH POS     XM INTEP COMP     Dispense Status TR     Blood Expiration Date January 05, 2024 23:59 EST     PRODUCT BLOOD TYPE 5100     UNIT VOLUME 350    Type and screen   Result Value Ref Range    ABO TYPE O     Rh TYPE POS     ANTIBODY SCREEN NEG    Calcium, ionized   Result Value Ref Range    POCT Calcium, Ionized 1.25 1.1 - 1.33 mmol/L   Protime-INR   Result Value Ref Range    Protime 16.3 (H) 9.8 - 12.8 seconds    INR 1.4 (H) 0.9 - 1.1   VERIFY ABO/Rh Group Test   Result Value Ref Range    ABO TYPE O     Rh TYPE POS    Lavender Top   Result  Value Ref Range    Extra Tube Hold for add-ons.    SST TOP   Result Value Ref Range    Extra Tube Hold for add-ons.    SST TOP   Result Value Ref Range    Extra Tube Hold for add-ons.    POCT GLUCOSE   Result Value Ref Range    POCT Glucose 139 (H) 74 - 99 mg/dL   POCT GLUCOSE   Result Value Ref Range    POCT Glucose 182 (H) 74 - 99 mg/dL   POCT GLUCOSE   Result Value Ref Range    POCT Glucose 176 (H) 74 - 99 mg/dL   CBC   Result Value Ref Range    WBC 5.6 4.4 - 11.3 x10*3/uL    nRBC 0.0 0.0 - 0.0 /100 WBCs    RBC 2.79 (L) 4.00 - 5.20 x10*6/uL    Hemoglobin 8.1 (L) 12.0 - 16.0 g/dL    Hematocrit 24.5 (L) 36.0 - 46.0 %    MCV 88 80 - 100 fL    MCH 29.0 26.0 - 34.0 pg    MCHC 33.1 32.0 - 36.0 g/dL    RDW 15.1 (H) 11.5 - 14.5 %    Platelets 144 (L) 150 - 450 x10*3/uL   POCT GLUCOSE   Result Value Ref Range    POCT Glucose 190 (H) 74 - 99 mg/dL   Magnesium   Result Value Ref Range    Magnesium 1.67 1.60 - 2.40 mg/dL   Phosphorus   Result Value Ref Range    Phosphorus 3.6 2.5 - 4.9 mg/dL   Basic Metabolic Panel   Result Value Ref Range    Glucose 183 (H) 74 - 99 mg/dL    Sodium 132 (L) 136 - 145 mmol/L    Potassium 3.9 3.5 - 5.3 mmol/L    Chloride 103 98 - 107 mmol/L    Bicarbonate 24 21 - 32 mmol/L    Anion Gap 9 (L) 10 - 20 mmol/L    Urea Nitrogen 34 (H) 6 - 23 mg/dL    Creatinine 0.79 0.50 - 1.05 mg/dL    eGFR 78 >60 mL/min/1.73m*2    Calcium 7.4 (L) 8.6 - 10.3 mg/dL   POCT GLUCOSE   Result Value Ref Range    POCT Glucose 189 (H) 74 - 99 mg/dL   Calcium, ionized   Result Value Ref Range    POCT Calcium, Ionized 1.18 1.1 - 1.33 mmol/L   POCT GLUCOSE   Result Value Ref Range    POCT Glucose 173 (H) 74 - 99 mg/dL   POCT GLUCOSE   Result Value Ref Range    POCT Glucose 185 (H) 74 - 99 mg/dL   POCT GLUCOSE   Result Value Ref Range    POCT Glucose 173 (H) 74 - 99 mg/dL   POCT GLUCOSE   Result Value Ref Range    POCT Glucose 191 (H) 74 - 99 mg/dL   POCT GLUCOSE   Result Value Ref Range    POCT Glucose 238 (H) 74 - 99 mg/dL    Calcium, ionized   Result Value Ref Range    POCT Calcium, Ionized 1.18 1.1 - 1.33 mmol/L   Magnesium   Result Value Ref Range    Magnesium 1.65 1.60 - 2.40 mg/dL   Phosphorus   Result Value Ref Range    Phosphorus 3.6 2.5 - 4.9 mg/dL   Basic Metabolic Panel   Result Value Ref Range    Glucose 214 (H) 74 - 99 mg/dL    Sodium 134 (L) 136 - 145 mmol/L    Potassium 4.1 3.5 - 5.3 mmol/L    Chloride 105 98 - 107 mmol/L    Bicarbonate 23 21 - 32 mmol/L    Anion Gap 10 10 - 20 mmol/L    Urea Nitrogen 33 (H) 6 - 23 mg/dL    Creatinine 0.77 0.50 - 1.05 mg/dL    eGFR 80 >60 mL/min/1.73m*2    Calcium 7.4 (L) 8.6 - 10.3 mg/dL   CBC   Result Value Ref Range    WBC 3.6 (L) 4.4 - 11.3 x10*3/uL    nRBC 0.0 0.0 - 0.0 /100 WBCs    RBC 2.71 (L) 4.00 - 5.20 x10*6/uL    Hemoglobin 7.8 (L) 12.0 - 16.0 g/dL    Hematocrit 24.2 (L) 36.0 - 46.0 %    MCV 89 80 - 100 fL    MCH 28.8 26.0 - 34.0 pg    MCHC 32.2 32.0 - 36.0 g/dL    RDW 15.3 (H) 11.5 - 14.5 %    Platelets 146 (L) 150 - 450 x10*3/uL   POCT GLUCOSE   Result Value Ref Range    POCT Glucose 177 (H) 74 - 99 mg/dL     Assessment and Plan     MEDICINE CONSULTED FOR HTN/DIABETES     77 y/o female patient with h/o T2DM, fatty liver, DLP, HTN, Afib s/p ablation, GI bleed, anxiety/depression, hypothyroidism, who p/w abdominal pain x few days associated with N/V. Found to have SBO. Initially had surgery and adhesionolysis done on 128,, however, given no improvement s/p repeat laparoscopy that was converted to laparotomy with extensive adhesionolysis on 12/17. Post surgery hypotensive, admitted to the ICU for further management extubated 12/18/2023. Transferred out of ICU to regular medical floor for further evaluation and treatment with NG placement. PICC in  place left upper extremity      # Small bowel obstruction 2/2 extensive adhesions  Abdominal Pain  General Surgery Primary Team   DVTp and Pain management per G.S.   PT/OT evaluation  and treatment   CBC/BMP as appropriate, reviewed  results.   Pulmonary Toileting   Follow up as needed outpatient with G.S  Abx deferred to G.S if needed  NPO at this time NG in place  PPN for nutrition per G.S.   Strict pulmonary toileting - discussed with RN on use of I.S for patient      # HTN / HLD / Afib with Ablation on Xeralto (home) / Chronic Systolic CHF LVEF 40% from 2021   Monitor Volume status  Telemetry Monitoring Ordered - currently not in place   Previous ecg shows afib with 89bpm from 12/6/2023   Defer Xarelto to General Surgery to resume   Dvtp - Defer to G.S.   Strict NPO - Statin, Xeralto and Coreg on hold at this time  Resume medications to General Sx team   Lopressor was placed on hold (IV) per ICU - patient is rate controlled at this time   Daily Weights      # Diabetes Mellitus Type 2 - stable   Continue home medications when able to take PO   Continue Sliding Scale SSI as NPO at this time   A1C 5.2     # Hypothyroidism  Continue home medications when able to take PO  Currently on IV synthroid     # CKD stage 3   Creatinine baseline 1.1 now to 0.88  SOCORRO resolved      # Normocytic anemia  Transfuse PRBCs if hemoglobin less than 7.0  Transfused PRBCs during this admission for hemoglobin level of 6.1.  Monitor for clinical signs and symptoms of bleeding  Monitor CBC to trend H&H. Hgb trend 6.1 > 8.1 > 7.8.  Will order Gastric Occult - declined by Primary team   Hemoccult stools  declined by primary team     Hospital medicine will continue to follow with you     Principal Problem:    Small bowel obstruction (CMS/HCC)  Active Problems:    Acute kidney injury (nontraumatic) (CMS/HCC)    Chronic renal insufficiency    Plan of care was discussed extensively with patient. Patient verbalized understanding through teach back method. All questions and concerns addressed upon examination.     Of note, this documentation is completed using the Dragon Dictation system (voice recognition software). There may be spelling and/or grammatical errors that were  not corrected prior to final submission

## 2023-12-23 LAB
ANION GAP SERPL CALC-SCNC: 11 MMOL/L (ref 10–20)
BUN SERPL-MCNC: 29 MG/DL (ref 6–23)
CA-I BLD-SCNC: 1.15 MMOL/L (ref 1.1–1.33)
CALCIUM SERPL-MCNC: 7.3 MG/DL (ref 8.6–10.3)
CHLORIDE SERPL-SCNC: 106 MMOL/L (ref 98–107)
CO2 SERPL-SCNC: 23 MMOL/L (ref 21–32)
CREAT SERPL-MCNC: 0.75 MG/DL (ref 0.5–1.05)
ERYTHROCYTE [DISTWIDTH] IN BLOOD BY AUTOMATED COUNT: 15.2 % (ref 11.5–14.5)
GFR SERPL CREATININE-BSD FRML MDRD: 83 ML/MIN/1.73M*2
GLUCOSE BLD MANUAL STRIP-MCNC: 130 MG/DL (ref 74–99)
GLUCOSE BLD MANUAL STRIP-MCNC: 131 MG/DL (ref 74–99)
GLUCOSE BLD MANUAL STRIP-MCNC: 182 MG/DL (ref 74–99)
GLUCOSE BLD MANUAL STRIP-MCNC: 197 MG/DL (ref 74–99)
GLUCOSE BLD MANUAL STRIP-MCNC: 205 MG/DL (ref 74–99)
GLUCOSE SERPL-MCNC: 132 MG/DL (ref 74–99)
HCT VFR BLD AUTO: 23.7 % (ref 36–46)
HGB BLD-MCNC: 7.6 G/DL (ref 12–16)
MAGNESIUM SERPL-MCNC: 1.55 MG/DL (ref 1.6–2.4)
MCH RBC QN AUTO: 28.5 PG (ref 26–34)
MCHC RBC AUTO-ENTMCNC: 32.1 G/DL (ref 32–36)
MCV RBC AUTO: 89 FL (ref 80–100)
NRBC BLD-RTO: 0 /100 WBCS (ref 0–0)
PHOSPHATE SERPL-MCNC: 3.6 MG/DL (ref 2.5–4.9)
PLATELET # BLD AUTO: 150 X10*3/UL (ref 150–450)
POTASSIUM SERPL-SCNC: 4.4 MMOL/L (ref 3.5–5.3)
RBC # BLD AUTO: 2.67 X10*6/UL (ref 4–5.2)
SODIUM SERPL-SCNC: 136 MMOL/L (ref 136–145)
WBC # BLD AUTO: 3.7 X10*3/UL (ref 4.4–11.3)

## 2023-12-23 PROCEDURE — 80048 BASIC METABOLIC PNL TOTAL CA: CPT

## 2023-12-23 PROCEDURE — 1200000002 HC GENERAL ROOM WITH TELEMETRY DAILY

## 2023-12-23 PROCEDURE — 2500000004 HC RX 250 GENERAL PHARMACY W/ HCPCS (ALT 636 FOR OP/ED): Performed by: SURGERY

## 2023-12-23 PROCEDURE — 85027 COMPLETE CBC AUTOMATED: CPT

## 2023-12-23 PROCEDURE — 2500000005 HC RX 250 GENERAL PHARMACY W/O HCPCS

## 2023-12-23 PROCEDURE — 84100 ASSAY OF PHOSPHORUS: CPT

## 2023-12-23 PROCEDURE — 2500000004 HC RX 250 GENERAL PHARMACY W/ HCPCS (ALT 636 FOR OP/ED)

## 2023-12-23 PROCEDURE — C9113 INJ PANTOPRAZOLE SODIUM, VIA: HCPCS

## 2023-12-23 PROCEDURE — 99232 SBSQ HOSP IP/OBS MODERATE 35: CPT | Performed by: NURSE PRACTITIONER

## 2023-12-23 PROCEDURE — 82947 ASSAY GLUCOSE BLOOD QUANT: CPT

## 2023-12-23 PROCEDURE — 82330 ASSAY OF CALCIUM: CPT

## 2023-12-23 PROCEDURE — 99024 POSTOP FOLLOW-UP VISIT: CPT | Performed by: SURGERY

## 2023-12-23 PROCEDURE — 83735 ASSAY OF MAGNESIUM: CPT

## 2023-12-23 PROCEDURE — 99232 SBSQ HOSP IP/OBS MODERATE 35: CPT

## 2023-12-23 PROCEDURE — 96372 THER/PROPH/DIAG INJ SC/IM: CPT

## 2023-12-23 RX ORDER — TAMSULOSIN HYDROCHLORIDE 0.4 MG/1
0.4 CAPSULE ORAL DAILY
Status: DISCONTINUED | OUTPATIENT
Start: 2023-12-23 | End: 2023-12-26 | Stop reason: HOSPADM

## 2023-12-23 RX ADMIN — HYDROMORPHONE HYDROCHLORIDE 0.2 MG: 0.2 INJECTION, SOLUTION INTRAMUSCULAR; INTRAVENOUS; SUBCUTANEOUS at 22:56

## 2023-12-23 RX ADMIN — ASCORBIC ACID, VITAMIN A PALMITATE, CHOLECALCIFEROL, THIAMINE HYDROCHLORIDE, RIBOFLAVIN-5 PHOSPHATE SODIUM, PYRIDOXINE HYDROCHLORIDE, NIACINAMIDE, DEXPANTHENOL, ALPHA-TOCOPHEROL ACETATE, VITAMIN K1, FOLIC ACID, BIOTIN, CYANOCOBALAMIN: 200; 3300; 200; 6; 3.6; 6; 40; 15; 10; 150; 600; 60; 5 INJECTION, SOLUTION INTRAVENOUS at 10:35

## 2023-12-23 RX ADMIN — INSULIN LISPRO 2 UNITS: 100 INJECTION, SOLUTION INTRAVENOUS; SUBCUTANEOUS at 20:56

## 2023-12-23 RX ADMIN — ENOXAPARIN SODIUM 40 MG: 40 INJECTION SUBCUTANEOUS at 08:16

## 2023-12-23 RX ADMIN — ONDANSETRON 4 MG: 2 INJECTION INTRAMUSCULAR; INTRAVENOUS at 08:16

## 2023-12-23 RX ADMIN — TAMSULOSIN HYDROCHLORIDE 0.4 MG: 0.4 CAPSULE ORAL at 12:31

## 2023-12-23 RX ADMIN — HYDROMORPHONE HYDROCHLORIDE 0.2 MG: 0.2 INJECTION, SOLUTION INTRAMUSCULAR; INTRAVENOUS; SUBCUTANEOUS at 08:16

## 2023-12-23 RX ADMIN — LEVOTHYROXINE SODIUM 38 MCG: 20 INJECTION, SOLUTION INTRAVENOUS at 08:16

## 2023-12-23 RX ADMIN — INSULIN LISPRO 4 UNITS: 100 INJECTION, SOLUTION INTRAVENOUS; SUBCUTANEOUS at 12:34

## 2023-12-23 RX ADMIN — SODIUM CHLORIDE, POTASSIUM CHLORIDE, SODIUM LACTATE AND CALCIUM CHLORIDE 50 ML/HR: 600; 310; 30; 20 INJECTION, SOLUTION INTRAVENOUS at 04:44

## 2023-12-23 RX ADMIN — SODIUM CHLORIDE, POTASSIUM CHLORIDE, SODIUM LACTATE AND CALCIUM CHLORIDE 50 ML/HR: 600; 310; 30; 20 INJECTION, SOLUTION INTRAVENOUS at 20:59

## 2023-12-23 RX ADMIN — PANTOPRAZOLE SODIUM 40 MG: 40 INJECTION, POWDER, FOR SOLUTION INTRAVENOUS at 05:36

## 2023-12-23 RX ADMIN — INSULIN LISPRO 2 UNITS: 100 INJECTION, SOLUTION INTRAVENOUS; SUBCUTANEOUS at 16:34

## 2023-12-23 ASSESSMENT — COGNITIVE AND FUNCTIONAL STATUS - GENERAL
MOVING TO AND FROM BED TO CHAIR: A LOT
EATING MEALS: A LOT
DAILY ACTIVITIY SCORE: 10
EATING MEALS: A LOT
WALKING IN HOSPITAL ROOM: A LOT
TOILETING: TOTAL
DRESSING REGULAR UPPER BODY CLOTHING: A LOT
MOVING TO AND FROM BED TO CHAIR: A LOT
MOBILITY SCORE: 11
DRESSING REGULAR LOWER BODY CLOTHING: TOTAL
PERSONAL GROOMING: A LOT
CLIMB 3 TO 5 STEPS WITH RAILING: TOTAL
MOBILITY SCORE: 11
DAILY ACTIVITIY SCORE: 10
HELP NEEDED FOR BATHING: A LOT
HELP NEEDED FOR BATHING: A LOT
STANDING UP FROM CHAIR USING ARMS: A LOT
TURNING FROM BACK TO SIDE WHILE IN FLAT BAD: A LOT
TOILETING: TOTAL
DRESSING REGULAR UPPER BODY CLOTHING: A LOT
MOVING FROM LYING ON BACK TO SITTING ON SIDE OF FLAT BED WITH BEDRAILS: A LOT
TURNING FROM BACK TO SIDE WHILE IN FLAT BAD: A LOT
CLIMB 3 TO 5 STEPS WITH RAILING: TOTAL
MOVING FROM LYING ON BACK TO SITTING ON SIDE OF FLAT BED WITH BEDRAILS: A LOT
WALKING IN HOSPITAL ROOM: A LOT
PERSONAL GROOMING: A LOT
STANDING UP FROM CHAIR USING ARMS: A LOT
DRESSING REGULAR LOWER BODY CLOTHING: TOTAL

## 2023-12-23 ASSESSMENT — PAIN SCALES - GENERAL
PAINLEVEL_OUTOF10: 0 - NO PAIN
PAINLEVEL_OUTOF10: 6

## 2023-12-23 ASSESSMENT — PAIN - FUNCTIONAL ASSESSMENT
PAIN_FUNCTIONAL_ASSESSMENT: 0-10
PAIN_FUNCTIONAL_ASSESSMENT: 0-10

## 2023-12-23 NOTE — CARE PLAN
Problem: Pain  Goal: My pain/discomfort is manageable  Outcome: Progressing     Problem: Safety  Goal: Patient will be injury free during hospitalization  Outcome: Progressing  Goal: I will remain free of falls  Outcome: Progressing     Problem: Daily Care  Goal: Daily care needs are met  Outcome: Progressing     Problem: Psychosocial Needs  Goal: Demonstrates ability to cope with hospitalization/illness  Outcome: Progressing  Goal: Collaborate with me, my family, and caregiver to identify my specific goals  Outcome: Progressing     Problem: Discharge Barriers  Goal: My discharge needs are met  Outcome: Progressing     Problem: Skin  Goal: Decreased wound size/increased tissue granulation at next dressing change  Outcome: Progressing  Goal: Participates in plan/prevention/treatment measures  Outcome: Progressing  Goal: Prevent/manage excess moisture  Outcome: Progressing  Goal: Prevent/minimize sheer/friction injuries  Outcome: Progressing  Goal: Promote/optimize nutrition  Outcome: Progressing  Goal: Promote skin healing  Outcome: Progressing     Problem: Pain - Adult  Goal: Verbalizes/displays adequate comfort level or baseline comfort level  Outcome: Progressing     Problem: Safety - Adult  Goal: Free from fall injury  Outcome: Progressing   The patient's goals for the shift include      The clinical goals for the shift include pt to remain free from falls    Over the shift, the patient did not make progress toward the following goals. Barriers to progression include acuteness of illness. Recommendations to address these barriers include continued education and reinforcement.

## 2023-12-23 NOTE — PROGRESS NOTES
PROGRESS NOTE    Subjective     Patient seen and examined today.  Resting in bed with no complaints at this time.  NG tube has been removed and patient diet advanced to clear liquid diet.  No clinical signs of bleeding.  Hemoglobin at 7.6 today.  Hemodynamically stable on vital signs review.    Objective     Last Recorded Vitals    Visit Vitals  /51   Pulse 67   Temp 36.3 °C (97.3 °F)   Resp 18        3 Day Weight Change: Unable to Calculate       Intake/Output Summary (Last 24 hours) at 12/23/2023 1007  Last data filed at 12/23/2023 0559  Gross per 24 hour   Intake 2378 ml   Output 1150 ml   Net 1228 ml        Physical Exam  Constitutional: ill and elderly appearing, awake/alert/oriented x4 cooperative, poor historian on medical history  ENMT: mucous membranes dry  Respiratory/Thorax: Patent airways,  normal breath sounds  diminished bilateral bases  Cardiovascular: irregular rate and rhythm, no murmurs, 2+ equal pulses of the extremities, normal S 1and S 2  Gastrointestinal: Nondistended, soft, tender with palpation . Noted abd dressing with scant drainage   Genitourinary: hernandez present   Musculoskeletal: ROM intact, no joint swelling,   Extremities: left upper extremity noted to have ecchymotic area   Bilateral edema to upper extremities, non pitting  Skin: warm, dry, intact except as noted   Neurological: alert/oriented x 3, speech clear    Scheduled medications  [Held by provider] atorvastatin, 40 mg, oral, Nightly  enoxaparin, 40 mg, subcutaneous, Daily  fat emulsion-plant based, 250 mL, intravenous, Once per day on Mon Wed Fri  insulin lispro, 0-10 Units, subcutaneous, q4h  levothyroxine, 38 mcg, intravenous, q24h ORLY  [Held by provider] levothyroxine, 75 mcg, oral, Daily  lidocaine, 5 mL, infiltration, Once  [Held by provider] metoprolol, 2.5 mg, intravenous, q6h  pantoprazole, 40 mg, intravenous, Daily before breakfast  [Held by provider] polyethylene glycol, 17 g, oral, Daily  [Held by provider]  rivaroxaban, 15 mg, oral, Daily with evening meal      Continuous medications  Adult Clinimix Parenteral Nutrition, 83 mL/hr, Last Rate: Stopped (12/22/23 1330)  lactated Ringer's, 50 mL/hr, Last Rate: 50 mL/hr (12/23/23 0444)      PRN medications  PRN medications: alteplase, dextrose 10 % in water (D10W), dextrose, glucagon, HYDROmorphone, HYDROmorphone, ondansetron **OR** ondansetron       Relevant Results    Results for orders placed or performed during the hospital encounter of 12/06/23 (from the past 96 hour(s))   Staphylococcus Aureus/MRSA Colonization, Culture - PICC Only    Specimen: Anterior Nares; Swab   Result Value Ref Range    Staph/MRSA Screen Culture No Staphylococcus aureus isolated    Triglycerides   Result Value Ref Range    Triglycerides 94 0 - 149 mg/dL   POCT GLUCOSE   Result Value Ref Range    POCT Glucose 123 (H) 74 - 99 mg/dL   POCT GLUCOSE   Result Value Ref Range    POCT Glucose 159 (H) 74 - 99 mg/dL   POCT GLUCOSE   Result Value Ref Range    POCT Glucose 167 (H) 74 - 99 mg/dL   POCT GLUCOSE   Result Value Ref Range    POCT Glucose 203 (H) 74 - 99 mg/dL   POCT GLUCOSE   Result Value Ref Range    POCT Glucose 171 (H) 74 - 99 mg/dL   Basic Metabolic Panel   Result Value Ref Range    Glucose 192 (H) 74 - 99 mg/dL    Sodium 130 (L) 136 - 145 mmol/L    Potassium 3.7 3.5 - 5.3 mmol/L    Chloride 102 98 - 107 mmol/L    Bicarbonate 25 21 - 32 mmol/L    Anion Gap 7 (L) 10 - 20 mmol/L    Urea Nitrogen 35 (H) 6 - 23 mg/dL    Creatinine 0.88 0.50 - 1.05 mg/dL    eGFR 68 >60 mL/min/1.73m*2    Calcium 7.5 (L) 8.6 - 10.3 mg/dL   Magnesium   Result Value Ref Range    Magnesium 1.94 1.60 - 2.40 mg/dL   Phosphorus   Result Value Ref Range    Phosphorus 2.2 (L) 2.5 - 4.9 mg/dL   POCT GLUCOSE   Result Value Ref Range    POCT Glucose 203 (H) 74 - 99 mg/dL   CBC   Result Value Ref Range    WBC 5.1 4.4 - 11.3 x10*3/uL    nRBC 0.0 0.0 - 0.0 /100 WBCs    RBC 2.09 (L) 4.00 - 5.20 x10*6/uL    Hemoglobin 6.1 (LL) 12.0  - 16.0 g/dL    Hematocrit 18.6 (L) 36.0 - 46.0 %    MCV 89 80 - 100 fL    MCH 29.2 26.0 - 34.0 pg    MCHC 32.8 32.0 - 36.0 g/dL    RDW 15.2 (H) 11.5 - 14.5 %    Platelets 141 (L) 150 - 450 x10*3/uL   SST TOP   Result Value Ref Range    Extra Tube Hold for add-ons.    PST Top   Result Value Ref Range    Extra Tube Hold for add-ons.    POCT GLUCOSE   Result Value Ref Range    POCT Glucose 184 (H) 74 - 99 mg/dL   Prepare RBC: 2 Units   Result Value Ref Range    PRODUCT CODE E8440B31     Unit Number [id number]     Unit ABO O     Unit RH POS     XM INTEP COMP     Dispense Status TR     Blood Expiration Date January 05, 2024 23:59 EST     PRODUCT BLOOD TYPE 5100     UNIT VOLUME 350     PRODUCT CODE T7231O03     Unit Number [id number]     Unit ABO O     Unit RH POS     XM INTEP COMP     Dispense Status TR     Blood Expiration Date January 05, 2024 23:59 EST     PRODUCT BLOOD TYPE 5100     UNIT VOLUME 350    Type and screen   Result Value Ref Range    ABO TYPE O     Rh TYPE POS     ANTIBODY SCREEN NEG    Calcium, ionized   Result Value Ref Range    POCT Calcium, Ionized 1.25 1.1 - 1.33 mmol/L   Protime-INR   Result Value Ref Range    Protime 16.3 (H) 9.8 - 12.8 seconds    INR 1.4 (H) 0.9 - 1.1   VERIFY ABO/Rh Group Test   Result Value Ref Range    ABO TYPE O     Rh TYPE POS    Lavender Top   Result Value Ref Range    Extra Tube Hold for add-ons.    SST TOP   Result Value Ref Range    Extra Tube Hold for add-ons.    SST TOP   Result Value Ref Range    Extra Tube Hold for add-ons.    POCT GLUCOSE   Result Value Ref Range    POCT Glucose 139 (H) 74 - 99 mg/dL   POCT GLUCOSE   Result Value Ref Range    POCT Glucose 182 (H) 74 - 99 mg/dL   POCT GLUCOSE   Result Value Ref Range    POCT Glucose 176 (H) 74 - 99 mg/dL   CBC   Result Value Ref Range    WBC 5.6 4.4 - 11.3 x10*3/uL    nRBC 0.0 0.0 - 0.0 /100 WBCs    RBC 2.79 (L) 4.00 - 5.20 x10*6/uL    Hemoglobin 8.1 (L) 12.0 - 16.0 g/dL    Hematocrit 24.5 (L) 36.0 - 46.0 %     MCV 88 80 - 100 fL    MCH 29.0 26.0 - 34.0 pg    MCHC 33.1 32.0 - 36.0 g/dL    RDW 15.1 (H) 11.5 - 14.5 %    Platelets 144 (L) 150 - 450 x10*3/uL   POCT GLUCOSE   Result Value Ref Range    POCT Glucose 190 (H) 74 - 99 mg/dL   Magnesium   Result Value Ref Range    Magnesium 1.67 1.60 - 2.40 mg/dL   Phosphorus   Result Value Ref Range    Phosphorus 3.6 2.5 - 4.9 mg/dL   Basic Metabolic Panel   Result Value Ref Range    Glucose 183 (H) 74 - 99 mg/dL    Sodium 132 (L) 136 - 145 mmol/L    Potassium 3.9 3.5 - 5.3 mmol/L    Chloride 103 98 - 107 mmol/L    Bicarbonate 24 21 - 32 mmol/L    Anion Gap 9 (L) 10 - 20 mmol/L    Urea Nitrogen 34 (H) 6 - 23 mg/dL    Creatinine 0.79 0.50 - 1.05 mg/dL    eGFR 78 >60 mL/min/1.73m*2    Calcium 7.4 (L) 8.6 - 10.3 mg/dL   POCT GLUCOSE   Result Value Ref Range    POCT Glucose 189 (H) 74 - 99 mg/dL   Calcium, ionized   Result Value Ref Range    POCT Calcium, Ionized 1.18 1.1 - 1.33 mmol/L   POCT GLUCOSE   Result Value Ref Range    POCT Glucose 173 (H) 74 - 99 mg/dL   POCT GLUCOSE   Result Value Ref Range    POCT Glucose 185 (H) 74 - 99 mg/dL   POCT GLUCOSE   Result Value Ref Range    POCT Glucose 173 (H) 74 - 99 mg/dL   POCT GLUCOSE   Result Value Ref Range    POCT Glucose 191 (H) 74 - 99 mg/dL   POCT GLUCOSE   Result Value Ref Range    POCT Glucose 238 (H) 74 - 99 mg/dL   Calcium, ionized   Result Value Ref Range    POCT Calcium, Ionized 1.18 1.1 - 1.33 mmol/L   Magnesium   Result Value Ref Range    Magnesium 1.65 1.60 - 2.40 mg/dL   Phosphorus   Result Value Ref Range    Phosphorus 3.6 2.5 - 4.9 mg/dL   Basic Metabolic Panel   Result Value Ref Range    Glucose 214 (H) 74 - 99 mg/dL    Sodium 134 (L) 136 - 145 mmol/L    Potassium 4.1 3.5 - 5.3 mmol/L    Chloride 105 98 - 107 mmol/L    Bicarbonate 23 21 - 32 mmol/L    Anion Gap 10 10 - 20 mmol/L    Urea Nitrogen 33 (H) 6 - 23 mg/dL    Creatinine 0.77 0.50 - 1.05 mg/dL    eGFR 80 >60 mL/min/1.73m*2    Calcium 7.4 (L) 8.6 - 10.3 mg/dL    CBC   Result Value Ref Range    WBC 3.6 (L) 4.4 - 11.3 x10*3/uL    nRBC 0.0 0.0 - 0.0 /100 WBCs    RBC 2.71 (L) 4.00 - 5.20 x10*6/uL    Hemoglobin 7.8 (L) 12.0 - 16.0 g/dL    Hematocrit 24.2 (L) 36.0 - 46.0 %    MCV 89 80 - 100 fL    MCH 28.8 26.0 - 34.0 pg    MCHC 32.2 32.0 - 36.0 g/dL    RDW 15.3 (H) 11.5 - 14.5 %    Platelets 146 (L) 150 - 450 x10*3/uL   POCT GLUCOSE   Result Value Ref Range    POCT Glucose 177 (H) 74 - 99 mg/dL   POCT GLUCOSE   Result Value Ref Range    POCT Glucose 174 (H) 74 - 99 mg/dL   POCT GLUCOSE   Result Value Ref Range    POCT Glucose 119 (H) 74 - 99 mg/dL   POCT GLUCOSE   Result Value Ref Range    POCT Glucose 116 (H) 74 - 99 mg/dL   POCT GLUCOSE   Result Value Ref Range    POCT Glucose 132 (H) 74 - 99 mg/dL   POCT GLUCOSE   Result Value Ref Range    POCT Glucose 131 (H) 74 - 99 mg/dL   Basic Metabolic Panel   Result Value Ref Range    Glucose 132 (H) 74 - 99 mg/dL    Sodium 136 136 - 145 mmol/L    Potassium 4.4 3.5 - 5.3 mmol/L    Chloride 106 98 - 107 mmol/L    Bicarbonate 23 21 - 32 mmol/L    Anion Gap 11 10 - 20 mmol/L    Urea Nitrogen 29 (H) 6 - 23 mg/dL    Creatinine 0.75 0.50 - 1.05 mg/dL    eGFR 83 >60 mL/min/1.73m*2    Calcium 7.3 (L) 8.6 - 10.3 mg/dL   Calcium, ionized   Result Value Ref Range    POCT Calcium, Ionized 1.15 1.1 - 1.33 mmol/L   CBC   Result Value Ref Range    WBC 3.7 (L) 4.4 - 11.3 x10*3/uL    nRBC 0.0 0.0 - 0.0 /100 WBCs    RBC 2.67 (L) 4.00 - 5.20 x10*6/uL    Hemoglobin 7.6 (L) 12.0 - 16.0 g/dL    Hematocrit 23.7 (L) 36.0 - 46.0 %    MCV 89 80 - 100 fL    MCH 28.5 26.0 - 34.0 pg    MCHC 32.1 32.0 - 36.0 g/dL    RDW 15.2 (H) 11.5 - 14.5 %    Platelets 150 150 - 450 x10*3/uL   Magnesium   Result Value Ref Range    Magnesium 1.55 (L) 1.60 - 2.40 mg/dL   Phosphorus   Result Value Ref Range    Phosphorus 3.6 2.5 - 4.9 mg/dL   POCT GLUCOSE   Result Value Ref Range    POCT Glucose 130 (H) 74 - 99 mg/dL     Assessment and Plan     MEDICINE CONSULTED FOR HTN/DIABETES      77 y/o female patient with h/o T2DM, fatty liver, DLP, HTN, Afib s/p ablation, GI bleed, anxiety/depression, hypothyroidism, who p/w abdominal pain x few days associated with N/V. Found to have SBO. Initially had surgery and adhesionolysis done on 128,, however, given no improvement s/p repeat laparoscopy that was converted to laparotomy with extensive adhesionolysis on 12/17. Post surgery hypotensive, admitted to the ICU for further management extubated 12/18/2023. Transferred out of ICU to regular medical floor for further evaluation and treatment with NG placement. PICC in  place left upper extremity      # Small bowel obstruction 2/2 extensive adhesions  Abdominal Pain  General Surgery Primary Team   DVTp and Pain management per G.S.   PT/OT evaluation  and treatment   CBC/BMP as appropriate, reviewed results.   Pulmonary Toileting   Abx deferred to G.S if needed  Nutrition per G.S.   NG Tube removed.  Patient diet being advanced per primary team.     # HTN / HLD / Afib with Ablation on Xeralto (home) / Chronic Systolic CHF LVEF 40% from 2021   Monitor Volume status  Telemetry Monitoring Ordered - currently not in place   Previous ecg shows afib with 89bpm from 12/6/2023   Defer Xarelto to General Surgery to resume   Dvtp - Defer to G.S.   Strict NPO - Statin, Xeralto and Coreg on hold at this time  Resume medications to General Sx team   Lopressor was placed on hold (IV) per ICU - patient is rate controlled at this time   Daily Weights      # Diabetes Mellitus Type 2 - stable   Continue home medications when able to take PO   Continue Sliding Scale SSI as NPO at this time   A1C 5.2     # Hypothyroidism  Continue home medications when able to take PO  Currently on IV synthroid     # CKD stage 3   Creatinine baseline 1.10  SOCORRO resolved      # Normocytic anemia  Transfuse PRBCs if hemoglobin less than 7.0  Transfused PRBCs during this admission for hemoglobin level of 6.1.  Monitor for clinical signs and  symptoms of bleeding  Monitor CBC to trend H&H. Hgb trend 6.1 > 8.1 > 7.8 > 7.6  Will order Gastric Occult - declined by Primary team   Hemoccult stools  declined by primary team     Patient remains hemodynamically stable.  Will defer further management to the primary team and other consultants.  No further recommendations from hospital medicine standpoint.  We will follow as needed.  Please call with any questions or concerns.    Principal Problem:    Small bowel obstruction (CMS/HCC)  Active Problems:    Acute kidney injury (nontraumatic) (CMS/HCC)    Chronic renal insufficiency    Plan of care was discussed extensively with patient. Patient verbalized understanding through teach back method. All questions and concerns addressed upon examination.     Of note, this documentation is completed using the Dragon Dictation system (voice recognition software). There may be spelling and/or grammatical errors that were not corrected prior to final submission

## 2023-12-23 NOTE — CARE PLAN
The patient's goals for the shift include sleep     The clinical goals for the shift include to to remain free from falls      Problem: Pain  Goal: My pain/discomfort is manageable  Outcome: Progressing     Problem: Safety  Goal: Patient will be injury free during hospitalization  Outcome: Progressing  Goal: I will remain free of falls  Outcome: Progressing     Problem: Daily Care  Goal: Daily care needs are met  Outcome: Progressing     Problem: Psychosocial Needs  Goal: Demonstrates ability to cope with hospitalization/illness  Outcome: Progressing  Goal: Collaborate with me, my family, and caregiver to identify my specific goals  Outcome: Progressing     Problem: Discharge Barriers  Goal: My discharge needs are met  Outcome: Progressing     Problem: Skin  Goal: Decreased wound size/increased tissue granulation at next dressing change  Outcome: Progressing  Goal: Participates in plan/prevention/treatment measures  Outcome: Progressing  Goal: Prevent/manage excess moisture  Outcome: Progressing  Goal: Prevent/minimize sheer/friction injuries  Outcome: Progressing  Goal: Promote/optimize nutrition  Outcome: Progressing  Goal: Promote skin healing  Outcome: Progressing     Problem: Pain - Adult  Goal: Verbalizes/displays adequate comfort level or baseline comfort level  Outcome: Progressing     Problem: Safety - Adult  Goal: Free from fall injury  Outcome: Progressing     Problem: Discharge Planning  Goal: Discharge to home or other facility with appropriate resources  Outcome: Progressing     Problem: Chronic Conditions and Co-morbidities  Goal: Patient's chronic conditions and co-morbidity symptoms are monitored and maintained or improved  Outcome: Progressing     Problem: Diabetes  Goal: Maintain electrolyte levels within acceptable range throughout shift  Outcome: Progressing  Goal: Maintain glucose levels >70mg/dl to <250mg/dl throughout shift  Outcome: Progressing  Goal: Learn about and adhere to nutrition  recommendations by end of shift  Outcome: Progressing  Goal: Vital signs within normal range for age by end of shift  Outcome: Progressing     Problem: Knowledge Deficit  Goal: Patient/family/caregiver demonstrates understanding of disease process, treatment plan, medications, and discharge instructions  Outcome: Progressing     Problem: Mechanical Ventilation  Goal: Patient Will Maintain Patent Airway  Outcome: Progressing  Goal: Oral health is maintained or improved  Outcome: Progressing  Goal: Tracheostomy will be managed safely  Outcome: Progressing  Goal: ET tube will be managed safely  Outcome: Progressing  Goal: Ability to express needs and understand communication  Outcome: Progressing  Goal: Mobility/activity is maintained at optimum level for patient  Outcome: Progressing

## 2023-12-23 NOTE — PROGRESS NOTES
"Harper Lara is a 78 y.o. female on day 17 of admission presenting with Small bowel obstruction (CMS/HCC).     Subjective   78-year-old female resting in bed.  This morning patient is ill-appearing but is passing gas.  Nurse stated that the patient has had several liquid bowel movements.  No acute events overnight.       Objective     Physical Exam  Vitals and nursing note reviewed.   Constitutional:       Appearance: She is ill-appearing.   HENT:      Head: Normocephalic and atraumatic.      Nose: Nose normal.      Mouth/Throat:      Mouth: Mucous membranes are dry.   Cardiovascular:      Rate and Rhythm: Normal rate and regular rhythm.      Pulses: Normal pulses.      Heart sounds: Normal heart sounds, S1 normal and S2 normal.   Pulmonary:      Effort: Pulmonary effort is normal.      Breath sounds: Normal breath sounds.   Abdominal:      General: There is distension.      Palpations: Abdomen is soft.      Tenderness: There is abdominal tenderness.      Comments: Mild distention   Musculoskeletal:         General: Normal range of motion.   Skin:     General: Skin is warm and dry.      Capillary Refill: Capillary refill takes less than 2 seconds.   Neurological:      General: No focal deficit present.      Mental Status: She is alert.      Comments: Patient is oriented to place and person and is aware of her condition.   Psychiatric:         Mood and Affect: Mood normal.         Behavior: Behavior normal. Behavior is cooperative.         Last Recorded Vitals  Blood pressure 109/51, pulse 67, temperature 36.3 °C (97.3 °F), resp. rate 18, height 1.6 m (5' 3\"), weight 72.2 kg (159 lb 2.8 oz), SpO2 100 %.  Intake/Output last 3 Shifts:  I/O last 3 completed shifts:  In: 2378 (32.9 mL/kg) [I.V.:2378 (32.9 mL/kg)]  Out: 2010 (27.8 mL/kg) [Urine:1600 (0.6 mL/kg/hr); Drains:410]  Weight: 72.2 kg     Medications  Scheduled medications  [Held by provider] atorvastatin, 40 mg, oral, Nightly  enoxaparin, 40 mg, subcutaneous, " Daily  fat emulsion-plant based, 250 mL, intravenous, Once per day on Mon Wed Fri  insulin lispro, 0-10 Units, subcutaneous, q4h  levothyroxine, 38 mcg, intravenous, q24h ORLY  [Held by provider] levothyroxine, 75 mcg, oral, Daily  lidocaine, 5 mL, infiltration, Once  [Held by provider] metoprolol, 2.5 mg, intravenous, q6h  pantoprazole, 40 mg, intravenous, Daily before breakfast  [Held by provider] polyethylene glycol, 17 g, oral, Daily  [Held by provider] rivaroxaban, 15 mg, oral, Daily with evening meal      Continuous medications  Adult Clinimix Parenteral Nutrition, 83 mL/hr, Last Rate: 83 mL/hr (12/23/23 1035)  lactated Ringer's, 50 mL/hr, Last Rate: Stopped (12/23/23 1034)      PRN medications  PRN medications: alteplase, dextrose 10 % in water (D10W), dextrose, glucagon, HYDROmorphone, HYDROmorphone, ondansetron **OR** ondansetron   Relevant Results  Results for orders placed or performed during the hospital encounter of 12/06/23 (from the past 24 hour(s))   POCT GLUCOSE   Result Value Ref Range    POCT Glucose 174 (H) 74 - 99 mg/dL   POCT GLUCOSE   Result Value Ref Range    POCT Glucose 119 (H) 74 - 99 mg/dL   POCT GLUCOSE   Result Value Ref Range    POCT Glucose 116 (H) 74 - 99 mg/dL   POCT GLUCOSE   Result Value Ref Range    POCT Glucose 132 (H) 74 - 99 mg/dL   POCT GLUCOSE   Result Value Ref Range    POCT Glucose 131 (H) 74 - 99 mg/dL   Basic Metabolic Panel   Result Value Ref Range    Glucose 132 (H) 74 - 99 mg/dL    Sodium 136 136 - 145 mmol/L    Potassium 4.4 3.5 - 5.3 mmol/L    Chloride 106 98 - 107 mmol/L    Bicarbonate 23 21 - 32 mmol/L    Anion Gap 11 10 - 20 mmol/L    Urea Nitrogen 29 (H) 6 - 23 mg/dL    Creatinine 0.75 0.50 - 1.05 mg/dL    eGFR 83 >60 mL/min/1.73m*2    Calcium 7.3 (L) 8.6 - 10.3 mg/dL   Calcium, ionized   Result Value Ref Range    POCT Calcium, Ionized 1.15 1.1 - 1.33 mmol/L   CBC   Result Value Ref Range    WBC 3.7 (L) 4.4 - 11.3 x10*3/uL    nRBC 0.0 0.0 - 0.0 /100 WBCs    RBC  2.67 (L) 4.00 - 5.20 x10*6/uL    Hemoglobin 7.6 (L) 12.0 - 16.0 g/dL    Hematocrit 23.7 (L) 36.0 - 46.0 %    MCV 89 80 - 100 fL    MCH 28.5 26.0 - 34.0 pg    MCHC 32.1 32.0 - 36.0 g/dL    RDW 15.2 (H) 11.5 - 14.5 %    Platelets 150 150 - 450 x10*3/uL   Magnesium   Result Value Ref Range    Magnesium 1.55 (L) 1.60 - 2.40 mg/dL   Phosphorus   Result Value Ref Range    Phosphorus 3.6 2.5 - 4.9 mg/dL   POCT GLUCOSE   Result Value Ref Range    POCT Glucose 130 (H) 74 - 99 mg/dL           Malnutrition Diagnosis Status: Ongoing  Malnutrition Diagnosis: Moderate malnutrition related to acute disease or injury  As Evidenced by: mild-moderate fat wasting; mild-moderate muscle wasting; inadequate intake of <50% of estimated needs x >1 week  I agree with the dietitian's malnutrition diagnosis.      Assessment/Plan   78-year-old female resting in bed.  Today the patient remains ill-appearing. Patient has abdominal tenderness to palpation and mild distention.  Per patient's nurse she is passing gas and did have several liquid bowel movements.  Nurse also states that she has done well with her clear liquid diet.      Principal Problem:    Small bowel obstruction (CMS/HCC)  Active Problems:    Acute kidney injury (nontraumatic) (CMS/HCC)    Chronic renal insufficiency    Plan  -Advance to GI soft diet  - Continue IV pain meds  - Keep on TPN for now  - Brock for urinary retention  -Continue to hold Eliquis for now  - DVT prophylaxis is with Lovenox 40 mg subcutaneously daily  - PT/OT evaluate and treat ordered  - Will need placement upon discharge       I spent 36 minutes in the professional and overall care of this patient.      Tiffany Judge, APRN-CNP      Agree with note above except were noted below.    No acute events overnight, patient had couple of bowel movements.  Still does not look great on exam.  Tolerating clear liquid diet per nursing staff    Ill-appearing but nontoxic, abdomen is soft, appropriately tender, mildly  distended with incision clean dry and intact.    78-year-old female POD 14 from dx lap, BRYAN and POD5 from lap converted to open BRYAN, SBR, slowly improving   -ADAT to regular  -TPN until patient is able to take > 50% of nutrition enterally  -Cont. Brock for retention, will give tamulozosyn for retention  -IV pain meds  -ISS, SCDs, DVT proph  -Hold eliquis  -PT/OT  -Will need placement on DC     Baltazar Farnsworth MD  12/23/23  11:53 AM

## 2023-12-24 LAB
ANION GAP SERPL CALC-SCNC: <7 MMOL/L (ref 10–20)
BUN SERPL-MCNC: 34 MG/DL (ref 6–23)
CA-I BLD-SCNC: 1.18 MMOL/L (ref 1.1–1.33)
CALCIUM SERPL-MCNC: 7.2 MG/DL (ref 8.6–10.3)
CHLORIDE SERPL-SCNC: 106 MMOL/L (ref 98–107)
CO2 SERPL-SCNC: 24 MMOL/L (ref 21–32)
CREAT SERPL-MCNC: 0.81 MG/DL (ref 0.5–1.05)
ERYTHROCYTE [DISTWIDTH] IN BLOOD BY AUTOMATED COUNT: 15 % (ref 11.5–14.5)
GFR SERPL CREATININE-BSD FRML MDRD: 75 ML/MIN/1.73M*2
GLUCOSE BLD MANUAL STRIP-MCNC: 121 MG/DL (ref 74–99)
GLUCOSE BLD MANUAL STRIP-MCNC: 137 MG/DL (ref 74–99)
GLUCOSE BLD MANUAL STRIP-MCNC: 164 MG/DL (ref 74–99)
GLUCOSE BLD MANUAL STRIP-MCNC: 177 MG/DL (ref 74–99)
GLUCOSE BLD MANUAL STRIP-MCNC: 184 MG/DL (ref 74–99)
GLUCOSE BLD MANUAL STRIP-MCNC: 218 MG/DL (ref 74–99)
GLUCOSE SERPL-MCNC: 200 MG/DL (ref 74–99)
HCT VFR BLD AUTO: 22.6 % (ref 36–46)
HGB BLD-MCNC: 7.2 G/DL (ref 12–16)
MAGNESIUM SERPL-MCNC: 1.72 MG/DL (ref 1.6–2.4)
MCH RBC QN AUTO: 28.6 PG (ref 26–34)
MCHC RBC AUTO-ENTMCNC: 31.9 G/DL (ref 32–36)
MCV RBC AUTO: 90 FL (ref 80–100)
NRBC BLD-RTO: 0 /100 WBCS (ref 0–0)
PHOSPHATE SERPL-MCNC: 3.6 MG/DL (ref 2.5–4.9)
PLATELET # BLD AUTO: 143 X10*3/UL (ref 150–450)
POTASSIUM SERPL-SCNC: 4.1 MMOL/L (ref 3.5–5.3)
RBC # BLD AUTO: 2.52 X10*6/UL (ref 4–5.2)
SODIUM SERPL-SCNC: 132 MMOL/L (ref 136–145)
WBC # BLD AUTO: 3.4 X10*3/UL (ref 4.4–11.3)

## 2023-12-24 PROCEDURE — 82947 ASSAY GLUCOSE BLOOD QUANT: CPT

## 2023-12-24 PROCEDURE — 85027 COMPLETE CBC AUTOMATED: CPT

## 2023-12-24 PROCEDURE — 80048 BASIC METABOLIC PNL TOTAL CA: CPT

## 2023-12-24 PROCEDURE — 83735 ASSAY OF MAGNESIUM: CPT

## 2023-12-24 PROCEDURE — 82330 ASSAY OF CALCIUM: CPT

## 2023-12-24 PROCEDURE — C9113 INJ PANTOPRAZOLE SODIUM, VIA: HCPCS

## 2023-12-24 PROCEDURE — 2500000005 HC RX 250 GENERAL PHARMACY W/O HCPCS

## 2023-12-24 PROCEDURE — 84100 ASSAY OF PHOSPHORUS: CPT

## 2023-12-24 PROCEDURE — 96372 THER/PROPH/DIAG INJ SC/IM: CPT

## 2023-12-24 PROCEDURE — 2500000004 HC RX 250 GENERAL PHARMACY W/ HCPCS (ALT 636 FOR OP/ED)

## 2023-12-24 PROCEDURE — 1200000002 HC GENERAL ROOM WITH TELEMETRY DAILY

## 2023-12-24 PROCEDURE — 2500000004 HC RX 250 GENERAL PHARMACY W/ HCPCS (ALT 636 FOR OP/ED): Performed by: SURGERY

## 2023-12-24 PROCEDURE — 99024 POSTOP FOLLOW-UP VISIT: CPT | Performed by: SURGERY

## 2023-12-24 RX ORDER — OXYCODONE HYDROCHLORIDE 5 MG/1
10 TABLET ORAL EVERY 6 HOURS PRN
Status: DISCONTINUED | OUTPATIENT
Start: 2023-12-24 | End: 2023-12-26 | Stop reason: HOSPADM

## 2023-12-24 RX ORDER — OXYCODONE HYDROCHLORIDE 5 MG/1
5 TABLET ORAL EVERY 6 HOURS PRN
Status: DISCONTINUED | OUTPATIENT
Start: 2023-12-24 | End: 2023-12-26 | Stop reason: HOSPADM

## 2023-12-24 RX ORDER — MAGNESIUM SULFATE HEPTAHYDRATE 40 MG/ML
2 INJECTION, SOLUTION INTRAVENOUS ONCE
Status: COMPLETED | OUTPATIENT
Start: 2023-12-24 | End: 2023-12-24

## 2023-12-24 RX ORDER — FUROSEMIDE 10 MG/ML
20 INJECTION INTRAMUSCULAR; INTRAVENOUS ONCE
Status: COMPLETED | OUTPATIENT
Start: 2023-12-24 | End: 2023-12-24

## 2023-12-24 RX ADMIN — PANTOPRAZOLE SODIUM 40 MG: 40 INJECTION, POWDER, FOR SOLUTION INTRAVENOUS at 05:15

## 2023-12-24 RX ADMIN — LEVOTHYROXINE SODIUM 38 MCG: 20 INJECTION, SOLUTION INTRAVENOUS at 08:38

## 2023-12-24 RX ADMIN — INSULIN LISPRO 2 UNITS: 100 INJECTION, SOLUTION INTRAVENOUS; SUBCUTANEOUS at 05:07

## 2023-12-24 RX ADMIN — SODIUM CHLORIDE, POTASSIUM CHLORIDE, SODIUM LACTATE AND CALCIUM CHLORIDE 50 ML/HR: 600; 310; 30; 20 INJECTION, SOLUTION INTRAVENOUS at 14:57

## 2023-12-24 RX ADMIN — TAMSULOSIN HYDROCHLORIDE 0.4 MG: 0.4 CAPSULE ORAL at 08:38

## 2023-12-24 RX ADMIN — INSULIN LISPRO 2 UNITS: 100 INJECTION, SOLUTION INTRAVENOUS; SUBCUTANEOUS at 12:28

## 2023-12-24 RX ADMIN — ENOXAPARIN SODIUM 40 MG: 40 INJECTION SUBCUTANEOUS at 08:38

## 2023-12-24 RX ADMIN — FUROSEMIDE 20 MG: 10 INJECTION, SOLUTION INTRAMUSCULAR; INTRAVENOUS at 14:48

## 2023-12-24 RX ADMIN — MAGNESIUM SULFATE HEPTAHYDRATE 2 G: 40 INJECTION, SOLUTION INTRAVENOUS at 14:47

## 2023-12-24 RX ADMIN — INSULIN LISPRO 2 UNITS: 100 INJECTION, SOLUTION INTRAVENOUS; SUBCUTANEOUS at 08:41

## 2023-12-24 RX ADMIN — HYDROMORPHONE HYDROCHLORIDE 0.4 MG: 0.5 INJECTION, SOLUTION INTRAMUSCULAR; INTRAVENOUS; SUBCUTANEOUS at 05:39

## 2023-12-24 RX ADMIN — INSULIN LISPRO 4 UNITS: 100 INJECTION, SOLUTION INTRAVENOUS; SUBCUTANEOUS at 00:45

## 2023-12-24 ASSESSMENT — COGNITIVE AND FUNCTIONAL STATUS - GENERAL
DRESSING REGULAR UPPER BODY CLOTHING: A LOT
MOVING TO AND FROM BED TO CHAIR: A LOT
TURNING FROM BACK TO SIDE WHILE IN FLAT BAD: A LOT
EATING MEALS: A LITTLE
TOILETING: TOTAL
DAILY ACTIVITIY SCORE: 12
DRESSING REGULAR LOWER BODY CLOTHING: TOTAL
CLIMB 3 TO 5 STEPS WITH RAILING: TOTAL
STANDING UP FROM CHAIR USING ARMS: A LOT
WALKING IN HOSPITAL ROOM: A LOT
MOBILITY SCORE: 11
PERSONAL GROOMING: A LITTLE
HELP NEEDED FOR BATHING: A LOT
MOVING FROM LYING ON BACK TO SITTING ON SIDE OF FLAT BED WITH BEDRAILS: A LOT

## 2023-12-24 ASSESSMENT — PAIN SCALES - GENERAL
PAINLEVEL_OUTOF10: 7
PAINLEVEL_OUTOF10: 0 - NO PAIN
PAINLEVEL_OUTOF10: 0 - NO PAIN

## 2023-12-24 ASSESSMENT — PAIN DESCRIPTION - DESCRIPTORS: DESCRIPTORS: PRESSURE

## 2023-12-24 ASSESSMENT — PAIN DESCRIPTION - ORIENTATION: ORIENTATION: RIGHT;LEFT

## 2023-12-24 ASSESSMENT — PAIN - FUNCTIONAL ASSESSMENT
PAIN_FUNCTIONAL_ASSESSMENT: 0-10
PAIN_FUNCTIONAL_ASSESSMENT: 0-10

## 2023-12-24 ASSESSMENT — PAIN DESCRIPTION - LOCATION: LOCATION: ABDOMEN

## 2023-12-24 NOTE — CARE PLAN
The patient's goals for the shift include maintain safety    The clinical goals for the shift include safety, wounds healing

## 2023-12-24 NOTE — CARE PLAN
Problem: Safety  Goal: Patient will be injury free during hospitalization  Outcome: Progressing     The patient's goals for the shift include maintain safety    The clinical goals for the shift include patient will remain free from injury

## 2023-12-24 NOTE — PROGRESS NOTES
Patient now able to consume oral diet.  NG tube removed.  Treated with no significant events on review sinus rhythm.  Pressure with normotension at 114/57.  At this time no further recommendations, hospital medicine will sign off.

## 2023-12-24 NOTE — PROGRESS NOTES
"Harper Lara is a 78 y.o. female on day 18 of admission presenting with Small bowel obstruction (CMS/HCC).    Subjective   No acute events overnight, tolerated small amount of food, still passing flatus       Objective     Physical Exam  Gen: NAD, Aax3  Abd; Soft, mildly distended, appropriately TTP, incision c/d/i  Last Recorded Vitals  Blood pressure 114/57, pulse 68, temperature 36 °C (96.8 °F), temperature source Temporal, resp. rate 16, height 1.6 m (5' 3\"), weight 85.4 kg (188 lb 4.4 oz), SpO2 98 %.  Intake/Output last 3 Shifts:  I/O last 3 completed shifts:  In: 5233.8 (61.3 mL/kg) [P.O.:120; I.V.:3108 (36.4 mL/kg)]  Out: 2500 (29.3 mL/kg) [Urine:1850 (0.6 mL/kg/hr); Drains:650]  Weight: 85.4 kg   Component      Latest Ref Rn 12/24/2023   GLUCOSE      74 - 99 mg/dL 200 (H)    SODIUM      136 - 145 mmol/L 132 (L)    POTASSIUM      3.5 - 5.3 mmol/L 4.1    CHLORIDE      98 - 107 mmol/L 106    Bicarbonate      21 - 32 mmol/L 24    Anion Gap      10 - 20 mmol/L <7 (L)    Blood Urea Nitrogen      6 - 23 mg/dL 34 (H)    Creatinine      0.50 - 1.05 mg/dL 0.81    EGFR      >60 mL/min/1.73m*2 75    Calcium      8.6 - 10.3 mg/dL 7.2 (L)    WBC      4.4 - 11.3 x10*3/uL 3.4 (L)    nRBC      0.0 - 0.0 /100 WBCs 0.0    RBC      4.00 - 5.20 x10*6/uL 2.52 (L)    HEMOGLOBIN      12.0 - 16.0 g/dL 7.2 (L)    HEMATOCRIT      36.0 - 46.0 % 22.6 (L)    MCV      80 - 100 fL 90    MCH      26.0 - 34.0 pg 28.6    MCHC      32.0 - 36.0 g/dL 31.9 (L)    RED CELL DISTRIBUTION WIDTH      11.5 - 14.5 % 15.0 (H)    Platelets      150 - 450 x10*3/uL 143 (L)    POCT Calcium, Ionized      1.1 - 1.33 mmol/L 1.18    MAGNESIUM      1.60 - 2.40 mg/dL 1.72    PHOSPHORUS      2.5 - 4.9 mg/dL 3.6    POCT Glucose      74 - 99 mg/dL 164 (H)    POCT Glucose       184 (H)    POCT Glucose       177 (H)    POCT Glucose       218 (H)       Legend:  (H) High  (L) Low  Relevant Results                             Assessment/Plan   Principal Problem:    " Small bowel obstruction (CMS/HCC)  Active Problems:    Acute kidney injury (nontraumatic) (CMS/HCC)    Chronic renal insufficiency    78-year-old female POD 15 from dx lap, BRYAN and POD6 from lap converted to open BRYAN, SBR, slowly improving    -Regular diet  -If taking in > 50% of nutrition today, will DC TPN tomorrow  -Flomax for retention, will try removing hernandez today  -1 time dose of lasix for edema  -Transition to PO meds  -Still holding xarelto for now  -DVT proph  -PT/OT  -Dispo planning       I spent 45 minutes in the professional and overall care of this patient.      Baltazar Farnsworth MD  12/24/23  1:47 PM

## 2023-12-25 LAB
ANION GAP SERPL CALC-SCNC: 10 MMOL/L (ref 10–20)
BUN SERPL-MCNC: 33 MG/DL (ref 6–23)
CA-I BLD-SCNC: 1.09 MMOL/L (ref 1.1–1.33)
CALCIUM SERPL-MCNC: 6.9 MG/DL (ref 8.6–10.3)
CHLORIDE SERPL-SCNC: 106 MMOL/L (ref 98–107)
CO2 SERPL-SCNC: 24 MMOL/L (ref 21–32)
CREAT SERPL-MCNC: 0.82 MG/DL (ref 0.5–1.05)
ERYTHROCYTE [DISTWIDTH] IN BLOOD BY AUTOMATED COUNT: 14.9 % (ref 11.5–14.5)
GFR SERPL CREATININE-BSD FRML MDRD: 74 ML/MIN/1.73M*2
GLUCOSE BLD MANUAL STRIP-MCNC: 126 MG/DL (ref 74–99)
GLUCOSE BLD MANUAL STRIP-MCNC: 129 MG/DL (ref 74–99)
GLUCOSE BLD MANUAL STRIP-MCNC: 129 MG/DL (ref 74–99)
GLUCOSE BLD MANUAL STRIP-MCNC: 130 MG/DL (ref 74–99)
GLUCOSE BLD MANUAL STRIP-MCNC: 135 MG/DL (ref 74–99)
GLUCOSE BLD MANUAL STRIP-MCNC: 135 MG/DL (ref 74–99)
GLUCOSE BLD MANUAL STRIP-MCNC: 148 MG/DL (ref 74–99)
GLUCOSE SERPL-MCNC: 140 MG/DL (ref 74–99)
HCT VFR BLD AUTO: 24.1 % (ref 36–46)
HGB BLD-MCNC: 7.6 G/DL (ref 12–16)
HOLD SPECIMEN: NORMAL
MAGNESIUM SERPL-MCNC: 1.96 MG/DL (ref 1.6–2.4)
MCH RBC QN AUTO: 28.8 PG (ref 26–34)
MCHC RBC AUTO-ENTMCNC: 31.5 G/DL (ref 32–36)
MCV RBC AUTO: 91 FL (ref 80–100)
NRBC BLD-RTO: 0 /100 WBCS (ref 0–0)
PHOSPHATE SERPL-MCNC: 3.4 MG/DL (ref 2.5–4.9)
PLATELET # BLD AUTO: 192 X10*3/UL (ref 150–450)
POTASSIUM SERPL-SCNC: 4.5 MMOL/L (ref 3.5–5.3)
RBC # BLD AUTO: 2.64 X10*6/UL (ref 4–5.2)
SODIUM SERPL-SCNC: 135 MMOL/L (ref 136–145)
WBC # BLD AUTO: 4.9 X10*3/UL (ref 4.4–11.3)

## 2023-12-25 PROCEDURE — 84100 ASSAY OF PHOSPHORUS: CPT | Performed by: SURGERY

## 2023-12-25 PROCEDURE — 2500000004 HC RX 250 GENERAL PHARMACY W/ HCPCS (ALT 636 FOR OP/ED): Performed by: SURGERY

## 2023-12-25 PROCEDURE — 80048 BASIC METABOLIC PNL TOTAL CA: CPT | Performed by: SURGERY

## 2023-12-25 PROCEDURE — 96372 THER/PROPH/DIAG INJ SC/IM: CPT

## 2023-12-25 PROCEDURE — 2500000001 HC RX 250 WO HCPCS SELF ADMINISTERED DRUGS (ALT 637 FOR MEDICARE OP): Performed by: SURGERY

## 2023-12-25 PROCEDURE — 2500000005 HC RX 250 GENERAL PHARMACY W/O HCPCS

## 2023-12-25 PROCEDURE — 97535 SELF CARE MNGMENT TRAINING: CPT | Mod: GO,CO

## 2023-12-25 PROCEDURE — 99024 POSTOP FOLLOW-UP VISIT: CPT | Performed by: SURGERY

## 2023-12-25 PROCEDURE — 2500000004 HC RX 250 GENERAL PHARMACY W/ HCPCS (ALT 636 FOR OP/ED)

## 2023-12-25 PROCEDURE — 1200000002 HC GENERAL ROOM WITH TELEMETRY DAILY

## 2023-12-25 PROCEDURE — 82330 ASSAY OF CALCIUM: CPT | Performed by: SURGERY

## 2023-12-25 PROCEDURE — 85027 COMPLETE CBC AUTOMATED: CPT | Performed by: SURGERY

## 2023-12-25 PROCEDURE — 97116 GAIT TRAINING THERAPY: CPT | Mod: GP,CQ | Performed by: PHYSICAL THERAPY ASSISTANT

## 2023-12-25 PROCEDURE — 82947 ASSAY GLUCOSE BLOOD QUANT: CPT

## 2023-12-25 PROCEDURE — 2500000001 HC RX 250 WO HCPCS SELF ADMINISTERED DRUGS (ALT 637 FOR MEDICARE OP)

## 2023-12-25 PROCEDURE — 83735 ASSAY OF MAGNESIUM: CPT | Performed by: SURGERY

## 2023-12-25 PROCEDURE — C9113 INJ PANTOPRAZOLE SODIUM, VIA: HCPCS

## 2023-12-25 RX ORDER — FUROSEMIDE 10 MG/ML
20 INJECTION INTRAMUSCULAR; INTRAVENOUS ONCE
Status: COMPLETED | OUTPATIENT
Start: 2023-12-25 | End: 2023-12-25

## 2023-12-25 RX ADMIN — PANTOPRAZOLE SODIUM 40 MG: 40 INJECTION, POWDER, FOR SOLUTION INTRAVENOUS at 06:09

## 2023-12-25 RX ADMIN — TAMSULOSIN HYDROCHLORIDE 0.4 MG: 0.4 CAPSULE ORAL at 08:01

## 2023-12-25 RX ADMIN — OXYCODONE HYDROCHLORIDE 10 MG: 5 TABLET ORAL at 22:25

## 2023-12-25 RX ADMIN — METOPROLOL TARTRATE 2.5 MG: 5 INJECTION INTRAVENOUS at 16:33

## 2023-12-25 RX ADMIN — METOPROLOL TARTRATE 2.5 MG: 5 INJECTION INTRAVENOUS at 22:26

## 2023-12-25 RX ADMIN — ATORVASTATIN CALCIUM 40 MG: 20 TABLET, FILM COATED ORAL at 22:26

## 2023-12-25 RX ADMIN — ENOXAPARIN SODIUM 40 MG: 40 INJECTION SUBCUTANEOUS at 08:00

## 2023-12-25 RX ADMIN — SODIUM CHLORIDE, POTASSIUM CHLORIDE, SODIUM LACTATE AND CALCIUM CHLORIDE 50 ML/HR: 600; 310; 30; 20 INJECTION, SOLUTION INTRAVENOUS at 05:38

## 2023-12-25 RX ADMIN — LEVOTHYROXINE SODIUM 75 MCG: 75 TABLET ORAL at 05:38

## 2023-12-25 RX ADMIN — FUROSEMIDE 20 MG: 10 INJECTION, SOLUTION INTRAMUSCULAR; INTRAVENOUS at 13:05

## 2023-12-25 RX ADMIN — I.V. FAT EMULSION 250 ML: 20 EMULSION INTRAVENOUS at 08:01

## 2023-12-25 ASSESSMENT — COGNITIVE AND FUNCTIONAL STATUS - GENERAL
WALKING IN HOSPITAL ROOM: A LITTLE
STANDING UP FROM CHAIR USING ARMS: A LOT
DRESSING REGULAR LOWER BODY CLOTHING: A LOT
STANDING UP FROM CHAIR USING ARMS: A LOT
DAILY ACTIVITIY SCORE: 12
MOVING FROM LYING ON BACK TO SITTING ON SIDE OF FLAT BED WITH BEDRAILS: A LOT
MOVING FROM LYING ON BACK TO SITTING ON SIDE OF FLAT BED WITH BEDRAILS: A LOT
HELP NEEDED FOR BATHING: A LOT
DRESSING REGULAR UPPER BODY CLOTHING: A LITTLE
PERSONAL GROOMING: A LITTLE
TOILETING: TOTAL
HELP NEEDED FOR BATHING: A LOT
DAILY ACTIVITIY SCORE: 15
MOBILITY SCORE: 13
WALKING IN HOSPITAL ROOM: A LOT
MOBILITY SCORE: 11
DRESSING REGULAR UPPER BODY CLOTHING: A LOT
MOVING TO AND FROM BED TO CHAIR: A LITTLE
MOVING TO AND FROM BED TO CHAIR: A LOT
EATING MEALS: A LITTLE
DRESSING REGULAR LOWER BODY CLOTHING: TOTAL
PERSONAL GROOMING: A LITTLE
TOILETING: TOTAL
TURNING FROM BACK TO SIDE WHILE IN FLAT BAD: A LOT
TURNING FROM BACK TO SIDE WHILE IN FLAT BAD: A LOT
CLIMB 3 TO 5 STEPS WITH RAILING: TOTAL
CLIMB 3 TO 5 STEPS WITH RAILING: TOTAL

## 2023-12-25 ASSESSMENT — ACTIVITIES OF DAILY LIVING (ADL): HOME_MANAGEMENT_TIME_ENTRY: 10

## 2023-12-25 ASSESSMENT — PAIN SCALES - GENERAL: PAINLEVEL_OUTOF10: 0 - NO PAIN

## 2023-12-25 NOTE — CARE PLAN
The patient's goals for the shift include maintain safety    The clinical goals for the shift include safety, wounds healing    Problem: Pain  Goal: My pain/discomfort is manageable  Outcome: Progressing

## 2023-12-25 NOTE — PROGRESS NOTES
Physical Therapy    Physical Therapy Treatment    Patient Name: Harper Lara  MRN: [id number]  Today's Date: 12/25/2023  Time Calculation  Start Time: 1016  Stop Time: 1040  Time Calculation (min): 24 min    Assessment/Plan   PT Assessment  End of Session Communication: Bedside nurse  End of Session Patient Position: Up in chair  PT Plan  Inpatient/Swing Bed or Outpatient: Inpatient  PT Plan  Treatment/Interventions: Bed mobility, Transfer training, Gait training  PT Plan: Skilled PT  PT Frequency: 3 times per week    General Visit Information:   PT  Visit  PT Received On: 12/25/23  General  Co-Treatment: OT  Co-Treatment Reason:  (to obtain maximal pt. function and assure pt. safety)  Patient Position Received: Bed, 3 rail up  General Comment:  (Pt. agreeable to sit up in recliner, nursing present changing abdominal dressing)    Subjective   Precautions:  Precautions  Post-Surgical Precautions: Abdominal surgery precautions  Precautions Comment:  (Falls, abdominal with ANTHONY drain intact)  Treatments:  Bed Mobility  Bed Mobility:  (log rolling trials with use of rails and max. x 2, supine to sit with hob up and education on assuring abd. precautions required max. x 2, sit to stand from low bed mod x 2.)    Ambulation/Gait Training  Ambulation/Gait Training Performed:  (gait training trials with fww slow step to posture forward and pt. stating unable to correct with min x 1 to perfomring 6-12 ' trials in room)  Transfers  Transfer:  (supine to sit with max x 2 and hob up, sit to stand mod x 2, bed to recliner with fww and min x 1)  Outcome Measures:  [facility] Basic Mobility  Turning from your back to your side while in a flat bed without using bedrails: A lot  Moving from lying on your back to sitting on the side of a flat bed without using bedrails: A lot  Moving to and from bed to chair (including a wheelchair): A little  Standing up from a chair using your arms (e.g. wheelchair or bedside chair): A lot  To walk in  hospital room: A little  Climbing 3-5 steps with railing: Total  Basic Mobility - Total Score: 13     EDUCATION:  Outpatient Education  Individual(s) Educated: Patient  Education Provided: Body Mechanics, Fall Risk, Post-Op Precautions, Posture  Patient Response to Education: Patient/Caregiver Verbalized Understanding of Information, Patient/Caregiver Performed Return Demonstration of Exercises/Activities    Encounter Problems       Encounter Problems (Active)       Impaired mobility        Perform all bed mobility with supervision  (Not Progressing)       Start:  12/09/23    Expected End:  01/04/24            Perform all transfers with ww and SBA (Progressing)       Start:  12/09/23    Expected End:  01/04/24            Patient will ambulate >/= 50 ft. with ww and SBA (Progressing)       Start:  12/09/23    Expected End:  01/04/24            Patient will perform BLE HEP with supervision x10-20 reps x 1-2 sets  (Progressing)       Start:  12/09/23    Expected End:  01/04/24               Pain - Adult

## 2023-12-25 NOTE — PROGRESS NOTES
Occupational Therapy    OT Treatment    Patient Name: Harper Lara  MRN: [id number]  Today's Date: 12/25/2023  Time Calculation  Start Time: 1015  Stop Time: 1040  Time Calculation (min): 25 min         Assessment:  End of Session Communication: Bedside nurse  End of Session Patient Position: Up in chair, Alarm on       Subjective   Previous Visit Info:  OT Last Visit  OT Received On: 12/25/23  General:  General  Co-Treatment: PT (cotreat with PTA to increase pt safety and indep with functional transfers/ADLS)  Prior to Session Communication: Bedside nurse  Patient Position Received: Bed, 2 rail up, Alarm on  General Comment: fall safety precautions, abdominal precautions    Activities of Daily Living: Toileting  Toileting Comments: pt required total A for dimas hygiene, pt incontient of stool,  Functional Standing Tolerance:  Functional Standing Tolerance Comments: pt demos F - supported standing balance during functional transfers  Bed Mobility/Transfers: Bed Mobility  Bed Mobility:  (pt required max A x 2 for all bed mobility, pt required VC for maintaining abdominal precautions when performing)    Transfers  Transfer:  (pt required mod A x 2 for functional transfers with use of FWW. pt educated on safe hand placement when performing sit  < > stand transfers.)      Outcome Measures:[facility] Daily Activity  Putting on and taking off regular lower body clothing: A lot  Bathing (including washing, rinsing, drying): A lot  Putting on and taking off regular upper body clothing: A little  Toileting, which includes using toilet, bedpan or urinal: Total  Taking care of personal grooming such as brushing teeth: A little  Eating Meals: None  Daily Activity - Total Score: 15        Education Documentation  Precautions, taught by SCOTTIE Ramon at 12/25/2023 11:28 AM.  Learner: Patient  Readiness: Acceptance  Method: Explanation  Response: Needs Reinforcement  Comment: pt educated on OT POC and importance of maintaining  abdominal precautions    ADL Training, taught by SCOTTIE Ramon at 12/25/2023 11:28 AM.  Learner: Patient  Readiness: Acceptance  Method: Explanation  Response: Needs Reinforcement  Comment: pt educated on OT POC and importance of maintaining abdominal precautions    Education Comments  No comments found.        OP EDUCATION:       Goals:  Encounter Problems       Encounter Problems (Active)       OT Goals           SBA for all functional transfers (Progressing)       Start:  12/21/23    Expected End:  01/04/24                Min A for LB dressing with AE (Progressing)       Start:  12/21/23    Expected End:  01/04/24                Min A for toileting tasks and clothing mgmt (Progressing)       Start:  12/21/23    Expected End:  01/04/24                Fair to Fair + dyn standing balance for ADL tasks (Progressing)       Start:  12/21/23    Expected End:  01/04/24

## 2023-12-25 NOTE — PROGRESS NOTES
"Harper Lara is a 78 y.o. female on day 19 of admission presenting with Small bowel obstruction (CMS/HCC).    Subjective   Patient had some drainage from inferior portion of colon this morning, otherwise no major issues.  TPN was DC'd yesterday lipids did see this morning.  She is tolerating diet.  Having bowel function.  Overall feeling much better       Objective     Physical Exam  Gen: NAD, Aax3  Abd; Soft, ND, appropriately ttp, superior incision c/d/I, inferior portion opened with seroma evacuated from subcu layer, fascia intact, packed  Last Recorded Vitals  Blood pressure 125/59, pulse 84, temperature 36.3 °C (97.3 °F), resp. rate 20, height 1.6 m (5' 3\"), weight 84.9 kg (187 lb 2.7 oz), SpO2 97 %.  Intake/Output last 3 Shifts:  I/O last 3 completed shifts:  In: 3791.9 (44.7 mL/kg) [P.O.:120; I.V.:1208.7 (14.2 mL/kg)]  Out: 1375 (16.2 mL/kg) [Urine:1050 (0.3 mL/kg/hr); Drains:325]  Weight: 84.9 kg     Relevant Results              This patient has a central line   Reason for the central line remaining today? Parenteral nutrition, potentially removed today vs. tomorrow    Component      Latest Ref Rng 12/25/2023   WBC      4.4 - 11.3 x10*3/uL 4.9    nRBC      0.0 - 0.0 /100 WBCs 0.0    RBC      4.00 - 5.20 x10*6/uL 2.64 (L)    HEMOGLOBIN      12.0 - 16.0 g/dL 7.6 (L)    HEMATOCRIT      36.0 - 46.0 % 24.1 (L)    MCV      80 - 100 fL 91    MCH      26.0 - 34.0 pg 28.8    MCHC      32.0 - 36.0 g/dL 31.5 (L)    RED CELL DISTRIBUTION WIDTH      11.5 - 14.5 % 14.9 (H)    Platelets      150 - 450 x10*3/uL 192    GLUCOSE      74 - 99 mg/dL 140 (H)    SODIUM      136 - 145 mmol/L 135 (L)    POTASSIUM      3.5 - 5.3 mmol/L 4.5    CHLORIDE      98 - 107 mmol/L 106    Bicarbonate      21 - 32 mmol/L 24    Anion Gap      10 - 20 mmol/L 10    Blood Urea Nitrogen      6 - 23 mg/dL 33 (H)    Creatinine      0.50 - 1.05 mg/dL 0.82    EGFR      >60 mL/min/1.73m*2 74    Calcium      8.6 - 10.3 mg/dL 6.9 (L)    PHOSPHORUS      " 2.5 - 4.9 mg/dL 3.4    MAGNESIUM      1.60 - 2.40 mg/dL 1.96    POCT Calcium, Ionized      1.1 - 1.33 mmol/L 1.09 (L)    POCT Glucose      74 - 99 mg/dL 148 (H)    POCT Glucose       135 (H)    POCT Glucose       130 (H)    POCT Glucose       129 (H)       Legend:  (L) Low  (H) High             Assessment/Plan   Principal Problem:    Small bowel obstruction (CMS/HCC)  Active Problems:    Acute kidney injury (nontraumatic) (CMS/HCC)    Chronic renal insufficiency  78-year-old female POD 16 from dx lap, BRYAN and POD7 from lap converted to open BRYAN, SBR, slowly improving    -Regular diet  -TPN Dced  -Dressing changes BID  -Flomax for retention  -1 time dose of lasix for edema  -Transition to PO meds  -Still holding xarelto for now  -DVT proph  -PT/OT  -Dispo planning         I spent 47 minutes in the professional and overall care of this patient.      Baltazar Farnsworth MD  12/25/23  12:13 PM

## 2023-12-26 VITALS
HEIGHT: 63 IN | OXYGEN SATURATION: 99 % | RESPIRATION RATE: 17 BRPM | SYSTOLIC BLOOD PRESSURE: 134 MMHG | BODY MASS INDEX: 33.16 KG/M2 | HEART RATE: 79 BPM | DIASTOLIC BLOOD PRESSURE: 63 MMHG | TEMPERATURE: 97.7 F | WEIGHT: 187.17 LBS

## 2023-12-26 LAB
ANION GAP SERPL CALC-SCNC: 9 MMOL/L (ref 10–20)
BUN SERPL-MCNC: 28 MG/DL (ref 6–23)
CA-I BLD-SCNC: 1.12 MMOL/L (ref 1.1–1.33)
CALCIUM SERPL-MCNC: 7 MG/DL (ref 8.6–10.3)
CHLORIDE SERPL-SCNC: 106 MMOL/L (ref 98–107)
CO2 SERPL-SCNC: 25 MMOL/L (ref 21–32)
CREAT SERPL-MCNC: 0.81 MG/DL (ref 0.5–1.05)
ERYTHROCYTE [DISTWIDTH] IN BLOOD BY AUTOMATED COUNT: 14.9 % (ref 11.5–14.5)
GFR SERPL CREATININE-BSD FRML MDRD: 75 ML/MIN/1.73M*2
GLUCOSE BLD MANUAL STRIP-MCNC: 113 MG/DL (ref 74–99)
GLUCOSE BLD MANUAL STRIP-MCNC: 122 MG/DL (ref 74–99)
GLUCOSE BLD MANUAL STRIP-MCNC: 133 MG/DL (ref 74–99)
GLUCOSE BLD MANUAL STRIP-MCNC: 153 MG/DL (ref 74–99)
GLUCOSE SERPL-MCNC: 122 MG/DL (ref 74–99)
HCT VFR BLD AUTO: 23.8 % (ref 36–46)
HGB BLD-MCNC: 7.5 G/DL (ref 12–16)
LABORATORY COMMENT REPORT: NORMAL
MAGNESIUM SERPL-MCNC: 1.83 MG/DL (ref 1.6–2.4)
MCH RBC QN AUTO: 28.7 PG (ref 26–34)
MCHC RBC AUTO-ENTMCNC: 31.5 G/DL (ref 32–36)
MCV RBC AUTO: 91 FL (ref 80–100)
NRBC BLD-RTO: 0 /100 WBCS (ref 0–0)
PATH REPORT.FINAL DX SPEC: NORMAL
PATH REPORT.GROSS SPEC: NORMAL
PATH REPORT.TOTAL CANCER: NORMAL
PHOSPHATE SERPL-MCNC: 3.3 MG/DL (ref 2.5–4.9)
PLATELET # BLD AUTO: 234 X10*3/UL (ref 150–450)
POTASSIUM SERPL-SCNC: 4.1 MMOL/L (ref 3.5–5.3)
RBC # BLD AUTO: 2.61 X10*6/UL (ref 4–5.2)
RESIDENT REVIEW: NORMAL
SODIUM SERPL-SCNC: 136 MMOL/L (ref 136–145)
WBC # BLD AUTO: 4.4 X10*3/UL (ref 4.4–11.3)

## 2023-12-26 PROCEDURE — 97535 SELF CARE MNGMENT TRAINING: CPT | Mod: GO,CO

## 2023-12-26 PROCEDURE — 85027 COMPLETE CBC AUTOMATED: CPT | Performed by: SURGERY

## 2023-12-26 PROCEDURE — 82947 ASSAY GLUCOSE BLOOD QUANT: CPT

## 2023-12-26 PROCEDURE — 99231 SBSQ HOSP IP/OBS SF/LOW 25: CPT

## 2023-12-26 PROCEDURE — 84100 ASSAY OF PHOSPHORUS: CPT | Performed by: SURGERY

## 2023-12-26 PROCEDURE — 2500000004 HC RX 250 GENERAL PHARMACY W/ HCPCS (ALT 636 FOR OP/ED)

## 2023-12-26 PROCEDURE — 2500000001 HC RX 250 WO HCPCS SELF ADMINISTERED DRUGS (ALT 637 FOR MEDICARE OP)

## 2023-12-26 PROCEDURE — 83735 ASSAY OF MAGNESIUM: CPT | Performed by: SURGERY

## 2023-12-26 PROCEDURE — 2500000005 HC RX 250 GENERAL PHARMACY W/O HCPCS

## 2023-12-26 PROCEDURE — 82330 ASSAY OF CALCIUM: CPT | Performed by: SURGERY

## 2023-12-26 PROCEDURE — 2500000004 HC RX 250 GENERAL PHARMACY W/ HCPCS (ALT 636 FOR OP/ED): Performed by: SURGERY

## 2023-12-26 PROCEDURE — 96372 THER/PROPH/DIAG INJ SC/IM: CPT

## 2023-12-26 PROCEDURE — 87205 SMEAR GRAM STAIN: CPT | Mod: ELYLAB | Performed by: REGISTERED NURSE

## 2023-12-26 PROCEDURE — 97530 THERAPEUTIC ACTIVITIES: CPT | Mod: GP,CQ

## 2023-12-26 PROCEDURE — C9113 INJ PANTOPRAZOLE SODIUM, VIA: HCPCS

## 2023-12-26 PROCEDURE — 80048 BASIC METABOLIC PNL TOTAL CA: CPT | Performed by: SURGERY

## 2023-12-26 RX ORDER — OXYCODONE HYDROCHLORIDE 5 MG/1
5 TABLET ORAL EVERY 6 HOURS PRN
Qty: 20 TABLET | Refills: 0 | Status: SHIPPED | OUTPATIENT
Start: 2023-12-26 | End: 2023-12-31

## 2023-12-26 RX ORDER — ONDANSETRON 4 MG/1
4 TABLET, ORALLY DISINTEGRATING ORAL EVERY 8 HOURS PRN
Qty: 12 TABLET | Refills: 0 | Status: SHIPPED | OUTPATIENT
Start: 2023-12-26

## 2023-12-26 RX ADMIN — TAMSULOSIN HYDROCHLORIDE 0.4 MG: 0.4 CAPSULE ORAL at 08:36

## 2023-12-26 RX ADMIN — PANTOPRAZOLE SODIUM 40 MG: 40 INJECTION, POWDER, FOR SOLUTION INTRAVENOUS at 06:22

## 2023-12-26 RX ADMIN — LEVOTHYROXINE SODIUM 75 MCG: 75 TABLET ORAL at 06:22

## 2023-12-26 RX ADMIN — ENOXAPARIN SODIUM 40 MG: 40 INJECTION SUBCUTANEOUS at 08:36

## 2023-12-26 RX ADMIN — METOPROLOL TARTRATE 2.5 MG: 5 INJECTION INTRAVENOUS at 04:28

## 2023-12-26 ASSESSMENT — PAIN SCALES - GENERAL: PAINLEVEL_OUTOF10: 0 - NO PAIN

## 2023-12-26 ASSESSMENT — COGNITIVE AND FUNCTIONAL STATUS - GENERAL
HELP NEEDED FOR BATHING: A LOT
MOBILITY SCORE: 11
TOILETING: TOTAL
TOILETING: TOTAL
CLIMB 3 TO 5 STEPS WITH RAILING: TOTAL
MOVING FROM LYING ON BACK TO SITTING ON SIDE OF FLAT BED WITH BEDRAILS: A LOT
DRESSING REGULAR LOWER BODY CLOTHING: TOTAL
HELP NEEDED FOR BATHING: A LOT
TURNING FROM BACK TO SIDE WHILE IN FLAT BAD: A LOT
EATING MEALS: A LITTLE
PERSONAL GROOMING: A LITTLE
WALKING IN HOSPITAL ROOM: A LOT
MOVING FROM LYING ON BACK TO SITTING ON SIDE OF FLAT BED WITH BEDRAILS: A LOT
STANDING UP FROM CHAIR USING ARMS: A LOT
DRESSING REGULAR UPPER BODY CLOTHING: A LITTLE
PERSONAL GROOMING: A LITTLE
STANDING UP FROM CHAIR USING ARMS: A LOT
MOVING TO AND FROM BED TO CHAIR: A LOT
DRESSING REGULAR UPPER BODY CLOTHING: A LOT
DAILY ACTIVITIY SCORE: 12
EATING MEALS: A LITTLE
CLIMB 3 TO 5 STEPS WITH RAILING: TOTAL
TURNING FROM BACK TO SIDE WHILE IN FLAT BAD: A LOT
WALKING IN HOSPITAL ROOM: A LOT
MOBILITY SCORE: 11
DAILY ACTIVITIY SCORE: 13
MOVING TO AND FROM BED TO CHAIR: A LOT
DRESSING REGULAR LOWER BODY CLOTHING: TOTAL

## 2023-12-26 ASSESSMENT — ACTIVITIES OF DAILY LIVING (ADL): HOME_MANAGEMENT_TIME_ENTRY: 10

## 2023-12-26 ASSESSMENT — PAIN - FUNCTIONAL ASSESSMENT: PAIN_FUNCTIONAL_ASSESSMENT: 0-10

## 2023-12-26 NOTE — PROGRESS NOTES
General Surgery Progress Note    Patient: Harper Lara  Unit/Bed: 1016/1016-A  YOB: 1945  MRN: [id number]  Acct: [id number]   Admitting Diagnosis: Small bowel obstruction (CMS/HCC) [K56.609]  Date:  12/6/2023  Hospital Day: 20  Attending: Baltazar Farnsworth MD     Chief Complaint   Patient presents with    Abdominal Pain     Abdominal pain and vomiting.  States she saw her PCP on Monday and he changed her medication.        SUBJECTIVE    Patient seen and examined this morning. No acute events overnight. Patient is resting comfortably In bed. Reports feeling like she's having chills, but has no fever. She reports her pain is well controlled. The midline abdominal incision was opened inferiorly due to increased drainage. She continues to have serosanguinous drainage. Patient denies chest pain, shortness of breath, fever, nausea, vomiting, and diarrhea.      VITALS      Vitals:    12/25/23 2015 12/26/23 0038 12/26/23 0747 12/26/23 0808   BP: 133/55 116/79 (!) 88/46 107/64   BP Location:       Patient Position:       Pulse: 83 78 71 79   Resp: 20 16     Temp: 36.1 °C (97 °F) 36.4 °C (97.5 °F) 36.4 °C (97.5 °F) 36.5 °C (97.7 °F)   TempSrc:       SpO2: 97% 97% 100% 99%   Weight:       Height:           Intake/Output Summary (Last 24 hours) at 12/26/2023 0836  Last data filed at 12/25/2023 1600  Gross per 24 hour   Intake --   Output 775 ml   Net -775 ml      Wt Readings from Last 4 Encounters:   12/25/23 84.9 kg (187 lb 2.7 oz)   08/12/19 119 kg (262 lb 5.6 oz)        Allergies:  Allergies   Allergen Reactions    Diphenhydramine Hcl Unknown    Morphine Unknown    Pentazocine Unknown    Propoxyphene Unknown    Propoxyphene N-Acetaminophen Unknown    Ranitidine Unknown    Strawberry Hives        PHYSICAL EXAM   Physical Exam  Constitutional:       General: She is not in acute distress.     Appearance: She is ill-appearing.   HENT:      Mouth/Throat:      Mouth: Mucous membranes are moist.      Pharynx:  Oropharynx is clear.   Cardiovascular:      Rate and Rhythm: Normal rate and regular rhythm.      Pulses: Normal pulses.      Heart sounds: S1 normal and S2 normal.   Pulmonary:      Effort: Pulmonary effort is normal.      Breath sounds: Normal breath sounds.   Abdominal:      General: Abdomen is flat. Bowel sounds are decreased. There is no distension.      Palpations: Abdomen is soft.      Tenderness: There is generalized abdominal tenderness. There is no guarding.      Comments: Expected post op tenderness on palpation. Midline abdominal incision is clean, dry, and intact. Inferior portion of the incision is open, wound bed is beefy red with serosanguinous drainage.    Musculoskeletal:         General: No swelling or tenderness. Normal range of motion.      Cervical back: Normal range of motion.   Skin:     General: Skin is warm and dry.      Capillary Refill: Capillary refill takes less than 2 seconds.   Neurological:      General: No focal deficit present.      Mental Status: She is alert and oriented to person, place, and time.   Psychiatric:         Attention and Perception: Attention normal.         Mood and Affect: Mood normal.         Speech: Speech normal.         Behavior: Behavior is cooperative.       LABS   CBC:   Results from last 7 days   Lab Units 12/26/23 0428 12/25/23 0441 12/24/23  0518   WBC AUTO x10*3/uL 4.4 4.9 3.4*   RBC AUTO x10*6/uL 2.61* 2.64* 2.52*   HEMOGLOBIN g/dL 7.5* 7.6* 7.2*   HEMATOCRIT % 23.8* 24.1* 22.6*   MCV fL 91 91 90   MCH pg 28.7 28.8 28.6   MCHC g/dL 31.5* 31.5* 31.9*   RDW % 14.9* 14.9* 15.0*   PLATELETS AUTO x10*3/uL 234 192 143*     CMP:    Results from last 7 days   Lab Units 12/26/23 0428 12/25/23 0441 12/24/23  0518   SODIUM mmol/L 136 135* 132*   POTASSIUM mmol/L 4.1 4.5 4.1   CHLORIDE mmol/L 106 106 106   CO2 mmol/L 25 24 24   BUN mg/dL 28* 33* 34*   CREATININE mg/dL 0.81 0.82 0.81   GLUCOSE mg/dL 122* 140* 200*   CALCIUM mg/dL 7.0* 6.9* 7.2*     BMP:     Results from last 7 days   Lab Units 12/26/23 0428 12/25/23 0441 12/24/23 0518   SODIUM mmol/L 136 135* 132*   POTASSIUM mmol/L 4.1 4.5 4.1   CHLORIDE mmol/L 106 106 106   CO2 mmol/L 25 24 24   BUN mg/dL 28* 33* 34*   CREATININE mg/dL 0.81 0.82 0.81   CALCIUM mg/dL 7.0* 6.9* 7.2*   GLUCOSE mg/dL 122* 140* 200*     Magnesium:  Results from last 7 days   Lab Units 12/26/23 0428 12/25/23 0441 12/24/23 0518   MAGNESIUM mg/dL 1.83 1.96 1.72     Medications:  atorvastatin, 40 mg, oral, Nightly  enoxaparin, 40 mg, subcutaneous, Daily  insulin lispro, 0-10 Units, subcutaneous, q4h  levothyroxine, 75 mcg, oral, Daily  lidocaine, 5 mL, infiltration, Once  metoprolol, 2.5 mg, intravenous, q6h  pantoprazole, 40 mg, intravenous, Daily before breakfast  [Held by provider] polyethylene glycol, 17 g, oral, Daily  [Held by provider] rivaroxaban, 15 mg, oral, Daily with evening meal  tamsulosin, 0.4 mg, oral, Daily       lactated Ringer's, 50 mL/hr, Last Rate: 50 mL/hr (12/25/23 0538)       Current Outpatient Medications   Medication Instructions    amLODIPine (NORVASC) 5 mg, oral, Daily    atorvastatin (Lipitor) 20 mg tablet 1 tablet, oral, Daily    atorvastatin (LIPITOR) 40 mg, oral, Daily    benztropine (COGENTIN) 1 mg, oral, Daily    calcium polycarbophiL (Fibercon) 625 mg tablet oral, 2 times daily    carvedilol (Coreg) 12.5 mg tablet oral, 2 times daily    cetirizine (ZyrTEC) 10 mg tablet 1 tablet, oral, Daily    cholecalciferol (Vitamin D-3) 10 MCG (400 UNIT) tablet 1 tablet, oral, Daily    cyanocobalamin (VITAMIN B-12) 1,000 mcg, oral, Daily    docosahexaenoic acid/epa (FISH OIL ORAL) 1 capsule, oral, Daily    FLUoxetine (PROzac) 20 mg tablet 1 tablet, oral, Daily    furosemide (Lasix) 20 mg tablet 1 tablet, oral, Daily    gentamicin (Garamycin) 0.1 % ointment 1 Application, Topical, 3 times daily, To bed sores    glipiZIDE (Glucotrol) 10 mg tablet 1 tab(s) orally 2 times a day (before meals)    levothyroxine  (Synthroid, Levoxyl) 75 mcg tablet 1 tablet, oral, Daily    magnesium oxide (MAG-OX) 400 mg, oral, Daily    metFORMIN (GLUCOPHAGE) 500 mg, oral, 2 times daily with meals    multivitamin tablet 1 tablet, oral, Daily    nystatin (Mycostatin) 100,000 unit/gram powder 1 Application, Topical, As needed    ondansetron ODT (ZOFRAN-ODT) 4 mg, oral, Every 8 hours PRN    pantoprazole (PROTONIX) 40 mg, oral, Daily before breakfast, Do not crush, chew, or split.    rivaroxaban (XARELTO) 15 mg, oral, Daily with evening meal, Take with food.    triamterene-hydrochlorothiazid (Dyazide) 37.5-25 mg capsule 1 capsule, oral, Every morning        FL small bowel series    Result Date: 12/8/2023  Interpreted By:  Jimbo Andersen, STUDY: FL SMALL BOWEL SERIES; ;  12/7/2023 8:25 pm   INDICATION: Signs/Symptoms:SBO.   COMPARISON: CT abdomen/pelvis of 12/06/2023.   ACCESSION NUMBER(S): [id number]   ORDERING CLINICIAN: VIJI LUNA   TECHNIQUE: Small bowel series was performed with administration of enteric contrast through an NG tube. Sequential radiographs of the abdomen were obtained over a 10 hour period.   FINDINGS:  view of the abdomen shows clear lung bases and NG tube tip projecting at the gastric fundus level. There are moderately dilated central abdominal small bowel segments similar in configuration to prior CT with an overall bowel gas pattern suggestive of obstruction. A right lower quadrant suture line is present.   Enteric contrast was administered through the NG tube and promptly opacified the stomach. There was no progression of contrast from the stomach over the initial 4 hours of the exam.   On 6 hour image, there was passage of a portion of the contrast from the stomach, now opacifying the moderately dilated central abdominal small bowel segments. On subsequent images at 8 hour and 10 hour, there has been no further progression of the enteric contrast. The enteric contrast remains within the dilated stomach as  well as within the dilated central abdominal small bowel. Contrast has not reached the distal decompressed small bowel segments or colon.       Small-bowel series compatible with high-grade small bowel obstruction similar to prior CT.     MACRO: None   Signed by: Jimbo Andersen 12/8/2023 8:03 AM Dictation workstation:   [id number]    XR chest 1 view    Result Date: 12/6/2023  Interpreted By:  Jonah Sweet, STUDY: XR CHEST 1 VIEW;  12/6/2023 3:16 pm   INDICATION: Signs/Symptoms:confirm NG placement.   COMPARISON: Earlier same day   ACCESSION NUMBER(S): [id number]   ORDERING CLINICIAN: ANTONIO KILPATRICK   FINDINGS: Please see the impression       1.  NG tube ends in the gastric fundus.   Signed by: Jonah Sweet 12/6/2023 3:20 PM Dictation workstation:   [id number]    CT abdomen pelvis wo IV contrast    Result Date: 12/6/2023  Interpreted By:  Carlos Augustin, STUDY: CT ABDOMEN PELVIS WO IV CONTRAST;  12/6/2023 11:59 am   INDICATION: Signs/Symptoms:vomiting.   COMPARISON: Selected examinations as far back as July 25, 2021 CT abdomen and pelvis including September 20, 2023 CT chest abdomen and pelvis.   ACCESSION NUMBER(S): [id number]   ORDERING CLINICIAN: ANTONIO KILPATRICK   TECHNIQUE: CT of the abdomen and pelvis was performed. Contiguous axial images were obtained at 3 mm slice thickness through the abdomen and pelvis. Coronal and sagittal reconstructions at 3 mm slice thickness were performed.  No intravenous or oral contrast agents were administered.   FINDINGS: Please note that the evaluation of vessels, lymph nodes and organs is limited without intravenous contrast.   LOWER CHEST: Newly seen elevation left hemidiaphragm related to gastric distension. No pneumonia, edema, or effusion. There is increased mural prominence distal esophagus of indeterminate cause, potentially related to underdistention or inflammation.   ABDOMEN:   LIVER: The liver demonstrates homogeneous attenuation without evidence of focal  liver lesions.   BILE DUCTS: Unchanged prominence of the intra and extrahepatic bile ducts without evident obstructing mass or calculus compatible with age and prior cholecystectomy.   GALLBLADDER: Absent   PANCREAS: Grossly unchanged with partial fatty replacement.   SPLEEN: Within normal limits.   ADRENAL GLANDS: Within normal limits.   Unchanged small homogeneous hypodensities most compatible with cysts. No evident hydronephrosis, or calculus.   PELVIS:   BLADDER: Unremarkable   REPRODUCTIVE ORGANS: No evident pelvic mass. The uterus and ovaries are not identified.   BOWEL: There is newly seen marked dilation of the stomach and portion of the small bowel with decompressed distal small bowel. There is evident tapering anterior pelvis just to the left of midline series 21, image 120. The pattern favors a mechanical obstruction potentially related to adhesion. There is a small nearby left anterior fat containing hernia series 21, image 104. There is mild fluid within the hernia sac image 111 there are multiple adjacent densities likely suture and or calcifications anterior peritoneal cavity. No apparent pneumatosis. The dilated fluid-filled small bowel measures up to 5.7 cm caliber series 21, image 90. The decompressed distal small bowel measures less than 1 cm. The colon is predominantly decompressed. Patient is status post surgery with suture near the ileocolonic junction.   VESSELS: Normal caliber. Minimal scattered arterial vascular calcifications.   PERITONEUM/RETROPERITONEUM/LYMPH NODES: No ascites or free air, no fluid collection.  No enlarged mesenteric lymph nodes.   BONES: Compared with September 20 newly seen mild inferior compressive deformity L2 with new bandlike sclerosis midportion compatible with impaction deformity or healing response without significant retropulsion. Stable deformity superior L3. There is osseous demineralization. There are scattered degenerative changes.       1.  Findings  compatible with acute mechanical small bowel obstruction potentially related to adhesion. 2. There is an adjacent small left-sided ventral wall hernia which contains minimal fat and fluid within the hernia sac which may or may not be related. Attention recommended on clinical assessment. 3. Newly seen mild superior likely recent superior L2 compressive deformity.     Signed by: Carlos Augustin 12/6/2023 12:24 PM Dictation workstation:   [id number]    XR chest 1 view    Result Date: 12/6/2023  Interpreted By:  Carlos Augustin, STUDY: XR CHEST 1 VIEW;  12/6/2023 11:58 am   INDICATION: .   COMPARISON: December 19, 2021 chest radiograph. September 20, 2023 CT chest abdomen and pelvis. Same day CT abdomen and pelvis   ACCESSION NUMBER(S): [id number]   ORDERING CLINICIAN: ANTONIO KILPATRICK   FINDINGS: AP radiograph of the chest was provided.       CARDIOMEDIASTINAL SILHOUETTE: Unchanged cardiomediastinal silhouette.   LUNGS: Lungs are clear.   ABDOMEN: Newly seen marked gaseous distention of the stomach distention of portion of the bowel.   BONES: No acute osseous changes.       1.  Abnormal distention of the stomach and bowel. 2. The lungs are clear.       MACRO: None   Signed by: Carlos Augustin 12/6/2023 12:07 PM Dictation workstation:   [id number]      Assessment     Patient Active Problem List   Diagnosis    Anxiety and depression    Atrial fibrillation (CMS/HCC)    CAD (coronary artery disease)    CHF (congestive heart failure) (CMS/HCC)    DM2 (diabetes mellitus, type 2) (CMS/HCC)    GERD (gastroesophageal reflux disease)    Gout, arthritis    HTN (hypertension), benign    Hyperlipidemia    Physical deconditioning    SDH (subdural hematoma) (CMS/HCC)    Small bowel obstruction (CMS/HCC)    Acute kidney injury (nontraumatic) (CMS/HCC)    Chronic renal insufficiency      Plan:    POD 17 dx BRYAN; POD 7 lap converted to open BRYAN and SBR  Lower portion of the midline incision opened by Dr. Farnsworth on 12/25. Draining  serosanguinous drainage and wound bed is beefy red. She continues to slowly improve. Afebrile and no leukocytosis. Wound bed cultured due to concerns from nursing of changes in appearance.  - continue wet to dry dressing changes BID'  - continue regular diet  - DVT prophylaxis with Lovenox 40mg daily  - PT/OT  - discharge planning to SNF possibly today    Atrial Fibrillation  - hold Xarelto for now   - continue DVT prophylaxis with Lovenox    Type 2 Diabetes Mellitus  - continue accuchecks ACHS  - continue SSI    HTN/CHF/CAD  - continue home statin, Metoprolol  - monitor I&O    Urinary retention  - continue Flomax 0.4mg daily       Electronically signed by YARITZA Edwards-CNP on 12/26/2023 at 8:36 AM    Patient seen and examined. I have reviewed and discussed APRN note; drain with serous-sanguinous output; tolerating diet, having loose stool; drain removed; wound packed with 4x4 gauze and has fibrinous exudate at the base; will resume Xarelto; ok to be discharged to rehab

## 2023-12-26 NOTE — DISCHARGE INSTRUCTIONS
Use incentive spirometry 10 times every hour for two weeks  Use pain medications as needed  Take Zofran as needed for nausea  Wet to dry dressing changes twice daily  Monitor intake and output

## 2023-12-26 NOTE — PROGRESS NOTES
Occupational Therapy    OT Treatment    Patient Name: Harper Lara  MRN: [id number]  Today's Date: 12/26/2023  Time Calculation  Start Time: 0919  Stop Time: 0943  Time Calculation (min): 24 min         Assessment:  End of Session Communication: Bedside nurse  End of Session Patient Position: Bed, 3 rail up, Alarm on       Subjective   Previous Visit Info:  OT Last Visit  OT Received On: 12/26/23  General:  General  Co-Treatment: PT (to increase pt safety and indep with functional transfers/aDLS)  Prior to Session Communication: Bedside nurse  Patient Position Received: Bed, 3 rail up, Alarm on  General Comment: fall safety precautions,abdominal precautions    Pain Assessment  Pain Assessment: 0-10  Pain Score: 0 - No pain       Activities of Daily Living: Toileting  Toileting Comments: pt required total A for dimas hygiene, pt incontient of stool. per RN pt has had three episodes this date. pt stating it just keeps coming.  Functional Standing Tolerance:     Bed Mobility/Transfers: Bed Mobility  Bed Mobility:  (pt required max A x 2 for all bed mobility tasks, pt required increased assist for rolling and educated on abdominal precautions.)      Outcome Measures:[facility] Daily Activity  Putting on and taking off regular lower body clothing: Total  Bathing (including washing, rinsing, drying): A lot  Putting on and taking off regular upper body clothing: A little  Toileting, which includes using toilet, bedpan or urinal: Total  Taking care of personal grooming such as brushing teeth: A little  Eating Meals: A little  Daily Activity - Total Score: 13        Education Documentation  Precautions, taught by SCOTTIE Ramon at 12/26/2023 11:05 AM.  Learner: Patient  Readiness: Acceptance  Method: Explanation  Response: Needs Reinforcement  Comment: pt educated on abdominal precautions and importance of maintaining    ADL Training, taught by SCOTTIE Ramon at 12/26/2023 11:05 AM.  Learner: Patient  Readiness:  Acceptance  Method: Explanation  Response: Needs Reinforcement  Comment: pt educated on abdominal precautions and importance of maintaining    Precautions, taught by SCOTTIE Ramon at 12/25/2023 11:28 AM.  Learner: Patient  Readiness: Acceptance  Method: Explanation  Response: Needs Reinforcement  Comment: pt educated on OT POC and importance of maintaining abdominal precautions    ADL Training, taught by SCOTTIE Ramon at 12/25/2023 11:28 AM.  Learner: Patient  Readiness: Acceptance  Method: Explanation  Response: Needs Reinforcement  Comment: pt educated on OT POC and importance of maintaining abdominal precautions    Education Comments  No comments found.        OP EDUCATION:       Goals:  Encounter Problems       Encounter Problems (Active)       OT Goals         SBA for all functional transfers (Progressing)       Start:  12/21/23    Expected End:  01/04/24                Min A for LB dressing with AE (Progressing)       Start:  12/21/23    Expected End:  01/04/24                Min A for toileting tasks and clothing mgmt (Progressing)       Start:  12/21/23    Expected End:  01/04/24                Fair to Fair + dyn standing balance for ADL tasks (Progressing)       Start:  12/21/23    Expected End:  01/04/24

## 2023-12-26 NOTE — DISCHARGE SUMMARY
Discharge Diagnosis  Small bowel obstruction (CMS/HCC)    Issues Requiring Follow-Up  Follow up with Dr. Lozada in two weeks for post op check    Test Results Pending At Discharge  Pending Labs       Order Current Status    Extra Tubes Collected (12/20/23 1446)    Surgical Pathology Exam In process    Tissue/Wound Culture/Smear In process            Hospital Course   78 year old female presented to the ER with abdominal pain and it was discovered she had a small bowel obstruction. In June 2010, she underwent laparotomy, small bowel resection and repair of recurrent ventral hernia at the [facility]. She has a history of an open cholecystectomy, hysterectomy, SBR, and VHR repair x2. She underwent a diagnostic lysis of adhesions on 12/8/23 and a exploratory laparotomy converted to open lysis of adhesions on 12/17/23. Post operatively, she required a blood transfusion due to acute blood loss anemia. She has slowly improved. On 12/25/23 her midline incision was opened inferiorly due to increased serosanguinous drainage. Today her incision looks to be healing well. She will be discharged to a skilled nursing facility today in stable condition with instructions to have wet to dry dressing changes twice daily.    Home Medications     Medication List      START taking these medications     oxyCODONE 5 mg immediate release tablet; Commonly known as: Roxicodone;   Take 1 tablet (5 mg) by mouth every 6 hours if needed for moderate pain (4   - 6) for up to 5 days.     CHANGE how you take these medications     atorvastatin 40 mg tablet; Commonly known as: Lipitor; What changed:   Another medication with the same name was removed. Continue taking this   medication, and follow the directions you see here.     CONTINUE taking these medications     amLODIPine 5 mg tablet; Commonly known as: Norvasc   benztropine 1 mg tablet; Commonly known as: Cogentin   carvedilol 12.5 mg tablet; Commonly known as: Coreg   cholecalciferol 10  MCG (400 UNIT) tablet; Commonly known as: Vitamin D-3   cyanocobalamin 1,000 mcg tablet; Commonly known as: Vitamin B-12   FISH OIL ORAL   FLUoxetine 20 mg tablet; Commonly known as: PROzac   furosemide 20 mg tablet; Commonly known as: Lasix   gentamicin 0.1 % ointment; Commonly known as: Garamycin   glipiZIDE 10 mg tablet; Commonly known as: Glucotrol   levothyroxine 75 mcg tablet; Commonly known as: Synthroid, Levoxyl   magnesium oxide 400 mg tablet; Commonly known as: Mag-Ox   metFORMIN 500 mg tablet; Commonly known as: Glucophage   multivitamin tablet   nystatin 100,000 unit/gram powder; Commonly known as: Mycostatin   ondansetron ODT 4 mg disintegrating tablet; Commonly known as:   Zofran-ODT; Take 1 tablet (4 mg) by mouth every 8 hours if needed for   nausea or vomiting.   pantoprazole 40 mg EC tablet; Commonly known as: ProtoNix   triamterene-hydrochlorothiazid 37.5-25 mg capsule; Commonly known as:   Dyazide   Xarelto 15 mg tablet; Generic drug: rivaroxaban     STOP taking these medications     calcium polycarbophiL 625 mg tablet; Commonly known as: Fibercon   cetirizine 10 mg tablet; Commonly known as: ZyrTEC       Outpatient Follow-Up  No future appointments.    YARITZA Edwards-CNP

## 2023-12-26 NOTE — PROGRESS NOTES
Pt. Will discharge this date to [facility] snf at 12:30 via ambulance.  Pt. Aware and in agreement to transfer.

## 2023-12-26 NOTE — PROGRESS NOTES
Physical Therapy    Physical Therapy Treatment    Patient Name: Harper Lara  MRN: [id number]  Today's Date: 12/26/2023  Time Calculation  Start Time: 0920  Stop Time: 0943  Time Calculation (min): 23 min       Assessment/Plan   PT Assessment  PT Assessment Results: Decreased strength, Decreased endurance, Impaired balance, Decreased mobility, Decreased coordination  Rehab Prognosis: Fair  Barriers to Discharge: Decreased endurance; Weakness; Continues to require (A) to complete tasks.  End of Session Communication: Bedside nurse, PCT/NA/KATELYN  Assessment Comment: Patient participated in bed mobility; continues to require (A) to complete tasks. Patient would benefit from additional skilled PT to address deficits and improve functional mobility .  End of Session Patient Position: Bed, 3 rail up, Alarm on (Patient on bed pan. Call light, phone, and tray table within reach.)  PT Plan  Inpatient/Swing Bed or Outpatient: Inpatient  Treatment/Interventions: Bed mobility, Transfer training, Gait training  PT Plan: Skilled PT  PT Frequency: 3 times per week  PT Discharge Recommendations: Moderate intensity level of continued care  Equipment Recommended upon Discharge: Wheeled walker   PT Recommended Transfer Status: Assistive device, Assist x2    General Visit Information:   PT  Visit  PT Received On: 12/26/23  General  Family/Caregiver Present: No  Co-Treatment: OT  Co-Treatment Reason: Co-treat with OT to maximize patient and staff safety with transfers/amb.  Prior to Session Communication: Bedside nurse, PCT/NELA/KATELYN  Patient Position Received: Bed, 3 rail up, Alarm on  General Comment: Pleasant and cooperative.    General Observations:   General Observation: x1 ANTHONY drain; Tele; [place].    Subjective     Precautions:  Precautions  Medical Precautions: Fall precautions  Post-Surgical Precautions: Abdominal surgery precautions      Objective     Pain:  Pain Assessment  Pain Assessment:  (Denies  pain.)    Cognition:  Cognition  Orientation Level: Oriented X4    Treatments:           Bed Mobility  Bed Mobility: Yes  Bed Mobility 1  Bed Mobility 1: Rolling right, Rolling left  Level of Assistance 1: Maximum assistance (x2)  Bed Mobility Comments 1: HOB flat. Rolling L/R(2x each). Hand over hand (A) to reach across body to use the bed rail for support. v/c and (A) to place/hold LEs in flexed position to facilitate LB rotationg. Patient using bed rail well for UB support with rolling. Patient incontinent of liquid BM; (A) is required for hygiene care. (D)x2 to boost to HOB with use of bed pad. Deferred OOB transfer training due to patient needing to use the bed pan. Instructed and assisted patient with rolling/bridging to get positioned on bed pan better. Patient and nursing staff aware of bed pan use and she will use call light to alert nursing when finished.            Outcome Measures:  [facility] Basic Mobility  Turning from your back to your side while in a flat bed without using bedrails: A lot  Moving from lying on your back to sitting on the side of a flat bed without using bedrails: A lot  Moving to and from bed to chair (including a wheelchair): A lot  Standing up from a chair using your arms (e.g. wheelchair or bedside chair): A lot  To walk in hospital room: A lot  Climbing 3-5 steps with railing: Total  Basic Mobility - Total Score: 11    Education Documentation  Precautions, taught by Chelita Villatoro PTA at 12/26/2023 12:03 PM.  Learner: Patient  Readiness: Acceptance  Method: Explanation, Demonstration  Response: Needs Reinforcement  Comment: See therapy note.    Mobility Training, taught by Chelita Villatoro PTA at 12/26/2023 12:03 PM.  Learner: Patient  Readiness: Acceptance  Method: Explanation, Demonstration  Response: Needs Reinforcement  Comment: See therapy note.    EDUCATION:  Individual(s) Educated: Patient  Education Provided: Body Mechanics, POC, Post-Op Precautions (Safety with bed  mobility while maintaining abdominal precautions.)      Encounter Problems       Encounter Problems (Active)       Impaired mobility        Perform all bed mobility with supervision  (Progressing)       Start:  12/09/23    Expected End:  01/04/24            Perform all transfers with ww and SBA (Progressing)       Start:  12/09/23    Expected End:  01/04/24            Patient will ambulate >/= 50 ft. with ww and SBA (Not Progressing)       Start:  12/09/23    Expected End:  01/04/24            Patient will perform BLE HEP with supervision x10-20 reps x 1-2 sets  (Progressing)       Start:  12/09/23    Expected End:  01/04/24

## 2023-12-28 LAB
BACTERIA SPEC CULT: ABNORMAL
GRAM STN SPEC: ABNORMAL
GRAM STN SPEC: ABNORMAL

## 2023-12-29 ENCOUNTER — APPOINTMENT (OUTPATIENT)
Dept: RADIOLOGY | Facility: HOSPITAL | Age: 78
DRG: 388 | End: 2023-12-29
Payer: MEDICARE

## 2023-12-29 ENCOUNTER — HOSPITAL ENCOUNTER (INPATIENT)
Facility: HOSPITAL | Age: 78
LOS: 7 days | Discharge: NURSING, REHAB OR LONG-TERM CARE | DRG: 388 | End: 2024-01-05
Attending: STUDENT IN AN ORGANIZED HEALTH CARE EDUCATION/TRAINING PROGRAM | Admitting: SURGERY
Payer: MEDICARE

## 2023-12-29 DIAGNOSIS — K56.609 SBO (SMALL BOWEL OBSTRUCTION) (MULTI): Primary | ICD-10-CM

## 2023-12-29 LAB
ALBUMIN SERPL BCP-MCNC: 2.2 G/DL (ref 3.4–5)
ALP SERPL-CCNC: 79 U/L (ref 33–136)
ALT SERPL W P-5'-P-CCNC: 17 U/L (ref 7–45)
ANION GAP SERPL CALC-SCNC: 11 MMOL/L (ref 10–20)
ANION GAP SERPL CALC-SCNC: 13 MMOL/L (ref 10–20)
APPEARANCE UR: ABNORMAL
AST SERPL W P-5'-P-CCNC: 26 U/L (ref 9–39)
BACTERIA #/AREA URNS AUTO: ABNORMAL /HPF
BASOPHILS # BLD AUTO: 0.02 X10*3/UL (ref 0–0.1)
BASOPHILS NFR BLD AUTO: 0.2 %
BILIRUB SERPL-MCNC: 0.3 MG/DL (ref 0–1.2)
BILIRUB UR STRIP.AUTO-MCNC: NEGATIVE MG/DL
BUN SERPL-MCNC: 24 MG/DL (ref 6–23)
BUN SERPL-MCNC: 25 MG/DL (ref 6–23)
CALCIUM SERPL-MCNC: 6.6 MG/DL (ref 8.6–10.3)
CALCIUM SERPL-MCNC: 7 MG/DL (ref 8.6–10.3)
CARDIAC TROPONIN I PNL SERPL HS: 8 NG/L (ref 0–13)
CHLORIDE SERPL-SCNC: 108 MMOL/L (ref 98–107)
CHLORIDE SERPL-SCNC: 108 MMOL/L (ref 98–107)
CO2 SERPL-SCNC: 19 MMOL/L (ref 21–32)
CO2 SERPL-SCNC: 22 MMOL/L (ref 21–32)
COLOR UR: ABNORMAL
CREAT SERPL-MCNC: 0.75 MG/DL (ref 0.5–1.05)
CREAT SERPL-MCNC: 0.78 MG/DL (ref 0.5–1.05)
EOSINOPHIL # BLD AUTO: 0.03 X10*3/UL (ref 0–0.4)
EOSINOPHIL NFR BLD AUTO: 0.3 %
ERYTHROCYTE [DISTWIDTH] IN BLOOD BY AUTOMATED COUNT: 14.8 % (ref 11.5–14.5)
GFR SERPL CREATININE-BSD FRML MDRD: 79 ML/MIN/1.73M*2
GFR SERPL CREATININE-BSD FRML MDRD: 83 ML/MIN/1.73M*2
GLUCOSE BLD MANUAL STRIP-MCNC: 102 MG/DL (ref 74–99)
GLUCOSE BLD MANUAL STRIP-MCNC: 108 MG/DL (ref 74–99)
GLUCOSE BLD MANUAL STRIP-MCNC: 110 MG/DL (ref 74–99)
GLUCOSE BLD MANUAL STRIP-MCNC: 39 MG/DL (ref 74–99)
GLUCOSE BLD MANUAL STRIP-MCNC: 45 MG/DL (ref 74–99)
GLUCOSE BLD MANUAL STRIP-MCNC: 75 MG/DL (ref 74–99)
GLUCOSE BLD MANUAL STRIP-MCNC: 78 MG/DL (ref 74–99)
GLUCOSE BLD MANUAL STRIP-MCNC: 96 MG/DL (ref 74–99)
GLUCOSE SERPL-MCNC: 72 MG/DL (ref 74–99)
GLUCOSE SERPL-MCNC: 88 MG/DL (ref 74–99)
GLUCOSE UR STRIP.AUTO-MCNC: NEGATIVE MG/DL
HCT VFR BLD AUTO: 29.3 % (ref 36–46)
HGB BLD-MCNC: 9.4 G/DL (ref 12–16)
HOLD SPECIMEN: NORMAL
IMM GRANULOCYTES # BLD AUTO: 0.05 X10*3/UL (ref 0–0.5)
IMM GRANULOCYTES NFR BLD AUTO: 0.6 % (ref 0–0.9)
KETONES UR STRIP.AUTO-MCNC: NEGATIVE MG/DL
LACTATE SERPL-SCNC: 0.6 MMOL/L (ref 0.4–2)
LEUKOCYTE ESTERASE UR QL STRIP.AUTO: ABNORMAL
LIPASE SERPL-CCNC: 165 U/L (ref 9–82)
LYMPHOCYTES # BLD AUTO: 0.67 X10*3/UL (ref 0.8–3)
LYMPHOCYTES NFR BLD AUTO: 7.5 %
MAGNESIUM SERPL-MCNC: 1.81 MG/DL (ref 1.6–2.4)
MCH RBC QN AUTO: 28.7 PG (ref 26–34)
MCHC RBC AUTO-ENTMCNC: 32.1 G/DL (ref 32–36)
MCV RBC AUTO: 89 FL (ref 80–100)
MONOCYTES # BLD AUTO: 0.18 X10*3/UL (ref 0.05–0.8)
MONOCYTES NFR BLD AUTO: 2 %
NEUTROPHILS # BLD AUTO: 7.93 X10*3/UL (ref 1.6–5.5)
NEUTROPHILS NFR BLD AUTO: 89.4 %
NITRITE UR QL STRIP.AUTO: NEGATIVE
NRBC BLD-RTO: 0 /100 WBCS (ref 0–0)
PH UR STRIP.AUTO: 5 [PH]
PLATELET # BLD AUTO: 321 X10*3/UL (ref 150–450)
POTASSIUM SERPL-SCNC: 4.2 MMOL/L (ref 3.5–5.3)
POTASSIUM SERPL-SCNC: 4.7 MMOL/L (ref 3.5–5.3)
PROT SERPL-MCNC: 4.9 G/DL (ref 6.4–8.2)
PROT UR STRIP.AUTO-MCNC: NEGATIVE MG/DL
RBC # BLD AUTO: 3.28 X10*6/UL (ref 4–5.2)
RBC # UR STRIP.AUTO: ABNORMAL /UL
RBC #/AREA URNS AUTO: >20 /HPF
SODIUM SERPL-SCNC: 135 MMOL/L (ref 136–145)
SODIUM SERPL-SCNC: 137 MMOL/L (ref 136–145)
SP GR UR STRIP.AUTO: 1.03
SQUAMOUS #/AREA URNS AUTO: ABNORMAL /HPF
UROBILINOGEN UR STRIP.AUTO-MCNC: <2 MG/DL
WBC # BLD AUTO: 8.9 X10*3/UL (ref 4.4–11.3)
WBC #/AREA URNS AUTO: ABNORMAL /HPF
WBC CLUMPS #/AREA URNS AUTO: ABNORMAL /HPF

## 2023-12-29 PROCEDURE — 99232 SBSQ HOSP IP/OBS MODERATE 35: CPT | Performed by: REGISTERED NURSE

## 2023-12-29 PROCEDURE — 84484 ASSAY OF TROPONIN QUANT: CPT | Performed by: STUDENT IN AN ORGANIZED HEALTH CARE EDUCATION/TRAINING PROGRAM

## 2023-12-29 PROCEDURE — 99222 1ST HOSP IP/OBS MODERATE 55: CPT | Performed by: REGISTERED NURSE

## 2023-12-29 PROCEDURE — 36415 COLL VENOUS BLD VENIPUNCTURE: CPT | Performed by: REGISTERED NURSE

## 2023-12-29 PROCEDURE — 2500000004 HC RX 250 GENERAL PHARMACY W/ HCPCS (ALT 636 FOR OP/ED): Performed by: INTERNAL MEDICINE

## 2023-12-29 PROCEDURE — 70450 CT HEAD/BRAIN W/O DYE: CPT | Performed by: RADIOLOGY

## 2023-12-29 PROCEDURE — 82947 ASSAY GLUCOSE BLOOD QUANT: CPT

## 2023-12-29 PROCEDURE — 2500000004 HC RX 250 GENERAL PHARMACY W/ HCPCS (ALT 636 FOR OP/ED): Performed by: REGISTERED NURSE

## 2023-12-29 PROCEDURE — 0HD7XZZ EXTRACTION OF ABDOMEN SKIN, EXTERNAL APPROACH: ICD-10-PCS | Performed by: REGISTERED NURSE

## 2023-12-29 PROCEDURE — 0D9670Z DRAINAGE OF STOMACH WITH DRAINAGE DEVICE, VIA NATURAL OR ARTIFICIAL OPENING: ICD-10-PCS | Performed by: INTERNAL MEDICINE

## 2023-12-29 PROCEDURE — 83605 ASSAY OF LACTIC ACID: CPT | Performed by: STUDENT IN AN ORGANIZED HEALTH CARE EDUCATION/TRAINING PROGRAM

## 2023-12-29 PROCEDURE — 85025 COMPLETE CBC W/AUTO DIFF WBC: CPT | Performed by: STUDENT IN AN ORGANIZED HEALTH CARE EDUCATION/TRAINING PROGRAM

## 2023-12-29 PROCEDURE — 2500000005 HC RX 250 GENERAL PHARMACY W/O HCPCS: Performed by: REGISTERED NURSE

## 2023-12-29 PROCEDURE — 99285 EMERGENCY DEPT VISIT HI MDM: CPT | Performed by: STUDENT IN AN ORGANIZED HEALTH CARE EDUCATION/TRAINING PROGRAM

## 2023-12-29 PROCEDURE — 2500000004 HC RX 250 GENERAL PHARMACY W/ HCPCS (ALT 636 FOR OP/ED): Mod: JZ

## 2023-12-29 PROCEDURE — 83690 ASSAY OF LIPASE: CPT | Performed by: STUDENT IN AN ORGANIZED HEALTH CARE EDUCATION/TRAINING PROGRAM

## 2023-12-29 PROCEDURE — 71045 X-RAY EXAM CHEST 1 VIEW: CPT | Performed by: RADIOLOGY

## 2023-12-29 PROCEDURE — 71045 X-RAY EXAM CHEST 1 VIEW: CPT

## 2023-12-29 PROCEDURE — 36415 COLL VENOUS BLD VENIPUNCTURE: CPT | Performed by: STUDENT IN AN ORGANIZED HEALTH CARE EDUCATION/TRAINING PROGRAM

## 2023-12-29 PROCEDURE — 83735 ASSAY OF MAGNESIUM: CPT | Performed by: REGISTERED NURSE

## 2023-12-29 PROCEDURE — 99222 1ST HOSP IP/OBS MODERATE 55: CPT | Performed by: INTERNAL MEDICINE

## 2023-12-29 PROCEDURE — 81001 URINALYSIS AUTO W/SCOPE: CPT | Performed by: STUDENT IN AN ORGANIZED HEALTH CARE EDUCATION/TRAINING PROGRAM

## 2023-12-29 PROCEDURE — 96372 THER/PROPH/DIAG INJ SC/IM: CPT | Performed by: REGISTERED NURSE

## 2023-12-29 PROCEDURE — 74177 CT ABD & PELVIS W/CONTRAST: CPT | Performed by: RADIOLOGY

## 2023-12-29 PROCEDURE — 1200000002 HC GENERAL ROOM WITH TELEMETRY DAILY

## 2023-12-29 PROCEDURE — 2550000001 HC RX 255 CONTRASTS: Performed by: STUDENT IN AN ORGANIZED HEALTH CARE EDUCATION/TRAINING PROGRAM

## 2023-12-29 PROCEDURE — 70450 CT HEAD/BRAIN W/O DYE: CPT

## 2023-12-29 PROCEDURE — 80048 BASIC METABOLIC PNL TOTAL CA: CPT | Mod: CCI | Performed by: REGISTERED NURSE

## 2023-12-29 PROCEDURE — 74177 CT ABD & PELVIS W/CONTRAST: CPT

## 2023-12-29 PROCEDURE — 3E0336Z INTRODUCTION OF NUTRITIONAL SUBSTANCE INTO PERIPHERAL VEIN, PERCUTANEOUS APPROACH: ICD-10-PCS | Performed by: REGISTERED NURSE

## 2023-12-29 PROCEDURE — 80053 COMPREHEN METABOLIC PANEL: CPT | Performed by: STUDENT IN AN ORGANIZED HEALTH CARE EDUCATION/TRAINING PROGRAM

## 2023-12-29 RX ORDER — LEVOTHYROXINE SODIUM 20 UG/ML
37.5 INJECTION, SOLUTION INTRAVENOUS
Status: DISCONTINUED | OUTPATIENT
Start: 2023-12-29 | End: 2024-01-03

## 2023-12-29 RX ORDER — ENOXAPARIN SODIUM 100 MG/ML
1 INJECTION SUBCUTANEOUS DAILY
Status: DISCONTINUED | OUTPATIENT
Start: 2023-12-29 | End: 2023-12-30

## 2023-12-29 RX ORDER — DEXTROSE 50 % IN WATER (D50W) INTRAVENOUS SYRINGE
25 ONCE
Status: COMPLETED | OUTPATIENT
Start: 2023-12-29 | End: 2023-12-29

## 2023-12-29 RX ORDER — DEXTROSE MONOHYDRATE 100 MG/ML
0.3 INJECTION, SOLUTION INTRAVENOUS ONCE AS NEEDED
Status: DISCONTINUED | OUTPATIENT
Start: 2023-12-29 | End: 2024-01-05 | Stop reason: HOSPADM

## 2023-12-29 RX ORDER — DEXTROSE 50 % IN WATER (D50W) INTRAVENOUS SYRINGE
25
Status: DISCONTINUED | OUTPATIENT
Start: 2023-12-29 | End: 2024-01-05 | Stop reason: HOSPADM

## 2023-12-29 RX ORDER — DEXTROSE MONOHYDRATE 50 MG/ML
75 INJECTION, SOLUTION INTRAVENOUS CONTINUOUS
Status: DISCONTINUED | OUTPATIENT
Start: 2023-12-29 | End: 2024-01-03

## 2023-12-29 RX ORDER — PANTOPRAZOLE SODIUM 40 MG/10ML
40 INJECTION, POWDER, LYOPHILIZED, FOR SOLUTION INTRAVENOUS DAILY
Status: DISCONTINUED | OUTPATIENT
Start: 2023-12-29 | End: 2024-01-03

## 2023-12-29 RX ORDER — FUROSEMIDE 10 MG/ML
10 INJECTION INTRAMUSCULAR; INTRAVENOUS DAILY
Status: DISCONTINUED | OUTPATIENT
Start: 2023-12-29 | End: 2024-01-03

## 2023-12-29 RX ORDER — METOPROLOL TARTRATE 1 MG/ML
2.5 INJECTION, SOLUTION INTRAVENOUS EVERY 6 HOURS
Status: DISCONTINUED | OUTPATIENT
Start: 2023-12-29 | End: 2024-01-03

## 2023-12-29 RX ORDER — METOPROLOL TARTRATE 1 MG/ML
2.5 INJECTION, SOLUTION INTRAVENOUS EVERY 6 HOURS PRN
Status: DISCONTINUED | OUTPATIENT
Start: 2023-12-29 | End: 2023-12-29

## 2023-12-29 RX ADMIN — FUROSEMIDE 10 MG: 10 INJECTION, SOLUTION INTRAMUSCULAR; INTRAVENOUS at 16:14

## 2023-12-29 RX ADMIN — IOHEXOL 75 ML: 350 INJECTION, SOLUTION INTRAVENOUS at 01:42

## 2023-12-29 RX ADMIN — DEXTROSE MONOHYDRATE 25 G: 25 INJECTION, SOLUTION INTRAVENOUS at 07:47

## 2023-12-29 RX ADMIN — DEXTROSE MONOHYDRATE 50 ML/HR: 50 INJECTION, SOLUTION INTRAVENOUS at 06:06

## 2023-12-29 RX ADMIN — ENOXAPARIN SODIUM 80 MG: 80 INJECTION SUBCUTANEOUS at 16:14

## 2023-12-29 RX ADMIN — DEXTROSE MONOHYDRATE 75 ML/HR: 50 INJECTION, SOLUTION INTRAVENOUS at 20:03

## 2023-12-29 RX ADMIN — ASCORBIC ACID, VITAMIN A PALMITATE, CHOLECALCIFEROL, THIAMINE HYDROCHLORIDE, RIBOFLAVIN-5 PHOSPHATE SODIUM, PYRIDOXINE HYDROCHLORIDE, NIACINAMIDE, DEXPANTHENOL, ALPHA-TOCOPHEROL ACETATE, VITAMIN K1, FOLIC ACID, BIOTIN, CYANOCOBALAMIN: 200; 3300; 200; 6; 3.6; 6; 40; 15; 10; 150; 600; 60; 5 INJECTION, SOLUTION INTRAVENOUS at 21:32

## 2023-12-29 SDOH — SOCIAL STABILITY: SOCIAL INSECURITY: HAS ANYONE EVER THREATENED TO HURT YOUR FAMILY OR YOUR PETS?: NO

## 2023-12-29 SDOH — SOCIAL STABILITY: SOCIAL INSECURITY: DOES ANYONE TRY TO KEEP YOU FROM HAVING/CONTACTING OTHER FRIENDS OR DOING THINGS OUTSIDE YOUR HOME?: NO

## 2023-12-29 SDOH — SOCIAL STABILITY: SOCIAL INSECURITY: ARE THERE ANY APPARENT SIGNS OF INJURIES/BEHAVIORS THAT COULD BE RELATED TO ABUSE/NEGLECT?: NO

## 2023-12-29 SDOH — SOCIAL STABILITY: SOCIAL INSECURITY: WERE YOU ABLE TO COMPLETE ALL THE BEHAVIORAL HEALTH SCREENINGS?: YES

## 2023-12-29 SDOH — SOCIAL STABILITY: SOCIAL INSECURITY: ABUSE: ADULT

## 2023-12-29 SDOH — SOCIAL STABILITY: SOCIAL INSECURITY: DO YOU FEEL ANYONE HAS EXPLOITED OR TAKEN ADVANTAGE OF YOU FINANCIALLY OR OF YOUR PERSONAL PROPERTY?: NO

## 2023-12-29 SDOH — SOCIAL STABILITY: SOCIAL INSECURITY: HAVE YOU HAD THOUGHTS OF HARMING ANYONE ELSE?: NO

## 2023-12-29 SDOH — SOCIAL STABILITY: SOCIAL INSECURITY: ARE YOU OR HAVE YOU BEEN THREATENED OR ABUSED PHYSICALLY, EMOTIONALLY, OR SEXUALLY BY ANYONE?: NO

## 2023-12-29 SDOH — SOCIAL STABILITY: SOCIAL INSECURITY: DO YOU FEEL UNSAFE GOING BACK TO THE PLACE WHERE YOU ARE LIVING?: NO

## 2023-12-29 ASSESSMENT — LIFESTYLE VARIABLES
EVER HAD A DRINK FIRST THING IN THE MORNING TO STEADY YOUR NERVES TO GET RID OF A HANGOVER: NO
HOW MANY STANDARD DRINKS CONTAINING ALCOHOL DO YOU HAVE ON A TYPICAL DAY: PATIENT DOES NOT DRINK
REASON UNABLE TO ASSESS: YES
EVER FELT BAD OR GUILTY ABOUT YOUR DRINKING: NO
SUBSTANCE_ABUSE_PAST_12_MONTHS: NO
HOW OFTEN DO YOU HAVE 6 OR MORE DRINKS ON ONE OCCASION: NEVER
SKIP TO QUESTIONS 9-10: 1
HOW OFTEN DO YOU HAVE A DRINK CONTAINING ALCOHOL: NEVER
HAVE YOU EVER FELT YOU SHOULD CUT DOWN ON YOUR DRINKING: NO
HAVE PEOPLE ANNOYED YOU BY CRITICIZING YOUR DRINKING: NO
AUDIT-C TOTAL SCORE: 0
PRESCIPTION_ABUSE_PAST_12_MONTHS: NO
AUDIT-C TOTAL SCORE: 0

## 2023-12-29 ASSESSMENT — ENCOUNTER SYMPTOMS
APPETITE CHANGE: 1
DIARRHEA: 1
CHEST TIGHTNESS: 0
PSYCHIATRIC NEGATIVE: 1
COUGH: 0
DIFFICULTY URINATING: 0
NAUSEA: 0
SHORTNESS OF BREATH: 0
FATIGUE: 1
ACTIVITY CHANGE: 1
VOMITING: 0
ABDOMINAL PAIN: 1
FREQUENCY: 0
ABDOMINAL DISTENTION: 0
WOUND: 1
WHEEZING: 0
CHILLS: 0
WEAKNESS: 1
FEVER: 0

## 2023-12-29 ASSESSMENT — COGNITIVE AND FUNCTIONAL STATUS - GENERAL
MOBILITY SCORE: 6
EATING MEALS: A LOT
MOVING FROM LYING ON BACK TO SITTING ON SIDE OF FLAT BED WITH BEDRAILS: A LOT
DRESSING REGULAR UPPER BODY CLOTHING: A LOT
DAILY ACTIVITIY SCORE: 12
PERSONAL GROOMING: A LOT
HELP NEEDED FOR BATHING: A LOT
DRESSING REGULAR LOWER BODY CLOTHING: A LOT
MOVING TO AND FROM BED TO CHAIR: TOTAL
TURNING FROM BACK TO SIDE WHILE IN FLAT BAD: A LOT
MOBILITY SCORE: 12
MOVING TO AND FROM BED TO CHAIR: A LOT
CLIMB 3 TO 5 STEPS WITH RAILING: A LOT
WALKING IN HOSPITAL ROOM: A LOT
TOILETING: A LOT
DRESSING REGULAR UPPER BODY CLOTHING: A LOT
DAILY ACTIVITIY SCORE: 12
MOVING FROM LYING ON BACK TO SITTING ON SIDE OF FLAT BED WITH BEDRAILS: TOTAL
PERSONAL GROOMING: A LOT
WALKING IN HOSPITAL ROOM: TOTAL
HELP NEEDED FOR BATHING: A LOT
EATING MEALS: A LOT
STANDING UP FROM CHAIR USING ARMS: A LOT
PATIENT BASELINE BEDBOUND: UNABLE TO ASSESS AT THIS TIME
STANDING UP FROM CHAIR USING ARMS: TOTAL
CLIMB 3 TO 5 STEPS WITH RAILING: TOTAL
DRESSING REGULAR LOWER BODY CLOTHING: A LOT
TURNING FROM BACK TO SIDE WHILE IN FLAT BAD: TOTAL
TOILETING: A LOT

## 2023-12-29 ASSESSMENT — PAIN SCALES - GENERAL
PAINLEVEL_OUTOF10: 0 - NO PAIN
PAINLEVEL_OUTOF10: 2
PAINLEVEL_OUTOF10: 0 - NO PAIN
PAINLEVEL_OUTOF10: 2

## 2023-12-29 ASSESSMENT — PATIENT HEALTH QUESTIONNAIRE - PHQ9
2. FEELING DOWN, DEPRESSED OR HOPELESS: NOT AT ALL
1. LITTLE INTEREST OR PLEASURE IN DOING THINGS: NOT AT ALL
SUM OF ALL RESPONSES TO PHQ9 QUESTIONS 1 & 2: 0

## 2023-12-29 ASSESSMENT — ACTIVITIES OF DAILY LIVING (ADL)
TOILETING: NEEDS ASSISTANCE
LACK_OF_TRANSPORTATION: NO
HEARING - LEFT EAR: FUNCTIONAL
ASSISTIVE_DEVICE: DENTURES PARTIAL;CANE
DRESSING YOURSELF: NEEDS ASSISTANCE
BATHING: NEEDS ASSISTANCE
HEARING - RIGHT EAR: FUNCTIONAL
JUDGMENT_ADEQUATE_SAFELY_COMPLETE_DAILY_ACTIVITIES: YES
GROOMING: NEEDS ASSISTANCE
PATIENT'S MEMORY ADEQUATE TO SAFELY COMPLETE DAILY ACTIVITIES?: YES
FEEDING YOURSELF: NEEDS ASSISTANCE
WALKS IN HOME: NEEDS ASSISTANCE
ADEQUATE_TO_COMPLETE_ADL: YES

## 2023-12-29 ASSESSMENT — COLUMBIA-SUICIDE SEVERITY RATING SCALE - C-SSRS
1. IN THE PAST MONTH, HAVE YOU WISHED YOU WERE DEAD OR WISHED YOU COULD GO TO SLEEP AND NOT WAKE UP?: NO
6. HAVE YOU EVER DONE ANYTHING, STARTED TO DO ANYTHING, OR PREPARED TO DO ANYTHING TO END YOUR LIFE?: NO
2. HAVE YOU ACTUALLY HAD ANY THOUGHTS OF KILLING YOURSELF?: NO

## 2023-12-29 ASSESSMENT — PAIN - FUNCTIONAL ASSESSMENT: PAIN_FUNCTIONAL_ASSESSMENT: 0-10

## 2023-12-29 NOTE — NURSING NOTE
While getting morning vital and Glucose the technician alerted me to a (0730) glucose of 45. The NP was alerted and orders were place. Patient remained alert during this time. 50% dextrose injection was given. A repeat (0750) glucose 39, was take during the administration of dextrose.       Repeat glucose 0758 -96

## 2023-12-29 NOTE — ED PROVIDER NOTES
HPI   Chief Complaint   Patient presents with    Hypoglycemia     Pt assisted living facility states said she had an altered mental status and a blood glu of 70. Pt currently a&o X4 and has a blood glucose of 115 per ems          Patient was sent to the emergency department from nursing home for altered mental status.  They were concerned for hypoglycemia.  Her blood glucose was 70 and patient was given 1 dose of glucagon prior to arrival.  At this time, patient denies any symptoms.  She does state that she is having worsening abdominal pain however.  Beyond this, unable to get any further history from the patient due to acuity of condition and dementia.      History limited by:  Dementia                      No data recorded                Patient History   Past Medical History:   Diagnosis Date    Atrial fibrillation (CMS/HCC)     Diabetes mellitus (CMS/HCC)      Past Surgical History:   Procedure Laterality Date    CHOLECYSTECTOMY      HIATAL HERNIA REPAIR      HYSTERECTOMY       No family history on file.  Social History     Tobacco Use    Smoking status: Never     Passive exposure: Never    Smokeless tobacco: Never   Vaping Use    Vaping Use: Never used   Substance Use Topics    Alcohol use: Never    Drug use: Never       Physical Exam   ED Triage Vitals   Temp Heart Rate Resp BP   12/29/23 0028 12/29/23 0028 12/29/23 0028 12/29/23 0028   35.3 °C (95.5 °F) 64 18 114/52      SpO2 Temp Source Heart Rate Source Patient Position   12/29/23 0028 12/29/23 0055 12/29/23 0028 12/29/23 0158   98 % Oral Monitor Sitting      BP Location FiO2 (%)     12/29/23 0158 --     Right arm        Physical Exam  HENT:      Right Ear: External ear normal.      Left Ear: External ear normal.      Mouth/Throat:      Mouth: Mucous membranes are dry.   Cardiovascular:      Rate and Rhythm: Normal rate.   Pulmonary:      Effort: Pulmonary effort is normal.      Breath sounds: Normal breath sounds.      Comments: Saturating well on room  air  Abdominal:      General: There is distension.      Tenderness: There is abdominal tenderness. There is no guarding or rebound.      Comments: Generalized tenderness to palpation of the abdomen.  Recent midline exploratory laparotomy incision that is overall displaying signs of minimal erythema.  Minimal purulent discharge noted in the inferior aspect.  There is an open defect.  The remaining incision site is closed with staples.  No guarding or rebound of the abdomen   Musculoskeletal:      Comments: Trace LE edema pitting   Skin:     Capillary Refill: Capillary refill takes less than 2 seconds.   Neurological:      General: No focal deficit present.      Mental Status: She is alert. Mental status is at baseline.         ED Course & MDM        Medical Decision Making    Cardiac enzymes unremarkable.  Nonspecific elevation of lipase.  Renal function at baseline.  Normal white count and lactic acid.    Blood glucose was actually reassuring on arrival. Don't believe this is the etiology of sx    Added on CT imaging  See below for results    Talked to gen surgery, DR. Lozada. He wants no blood cultures or Abx at this point but okay with NG decompression. Admitting to his service with medicine on as consult   Procedure  Procedures     Maryann Damico MD  12/29/23 9505

## 2023-12-29 NOTE — CONSULTS
"Nutrition Initial Assessment:   Nutrition Assessment    Reason for Assessment: Admission nursing screening    Patient is a 78 y.o. female presenting with:  Small bowel obstruction      Nutrition History:  Food and Nutrient History: Met with pt for initial assessment - MST score 2. Pt is currently NPO with NG tube in place to LIWS. Pt was recently discharged following SBO and surgery, was on TPN prior to discharge. Review of chart, pt has been having hypoglycemic episdoes. Discussed with NP - would like to start pt on PPN to help with blood sugar regulation. Unclear when diet is to advance - discussed that TPN is more appropriate if longterm NPO status is anticipated however pt does not have central line at this time so PPN will start, see both PPN and TPN recommendations below. Discussed with pt that PPN will start today, pt asking when diet will advance. Pt reports that she was eating some foods po but would become full quickly.  Food Allergies/Intolerances:  None  GI Symptoms: None  Oral Problems: None       Anthropometrics:  Height: 165.1 cm (5' 5\")   Weight: 79.9 kg (176 lb 1.6 oz)   BMI (Calculated): 29.3  IBW/kg (Dietitian Calculated): 56.8 kg  Percent of IBW: 141 %       Weight History:     Weight Change %:  Weight History / % Weight Change: 13.1 kg, 13% significant wt loss x ~1 month  Significant Weight Loss: Yes  Interpretation of Weight Loss: >5% in 1 month    Nutrition Focused Physical Exam Findings:    Subcutaneous Fat Loss:   Orbital Fat Pads: Mild-Moderate (slight dark circles and slight hollowing)  Buccal Fat Pads: Mild-Moderate (flat cheeks, minimal bounce)  Triceps: Mild-moderate (less than ample fat tissue)  Muscle Wasting:  Temporalis: Mild-Moderate (slight depression)  Pectoralis (Clavicular Region): Mild-Moderate (some protrusion of clavicle)  Deltoid/Trapezius: Mild-Moderate (slight protrusion of acromion process)  Trapezius/Infraspinatus/Supraspinatus (Scapular Region): Mild-Moderate (slight " protrusion of scapula)  Edema:  Edema: none  Physical Findings:  Skin: Positive (stage 2 pressure ulcer to buttocks)    Nutrition Significant Labs:  Renal Lab Trend:   Results from last 7 days   Lab Units 12/29/23  1246 12/29/23  0050 12/26/23 0428 12/25/23  0441   POTASSIUM mmol/L 4.7 4.2 4.1 4.5   PHOSPHORUS mg/dL  --   --  3.3 3.4   SODIUM mmol/L 135* 137 136 135*   MAGNESIUM mg/dL 1.81  --  1.83 1.96   EGFR mL/min/1.73m*2 83 79 75 74   BUN mg/dL 24* 25* 28* 33*   CREATININE mg/dL 0.75 0.78 0.81 0.82    , TPN/PPN Labs:   Results from last 7 days   Lab Units 12/29/23  1246 12/29/23 0050 12/26/23 0428 12/25/23  0441   GLUCOSE mg/dL 88 72* 122* 140*   POTASSIUM mmol/L 4.7 4.2 4.1 4.5   PHOSPHORUS mg/dL  --   --  3.3 3.4   MAGNESIUM mg/dL 1.81  --  1.83 1.96   SODIUM mmol/L 135* 137 136 135*   CHLORIDE mmol/L 108* 108* 106 106   ALT U/L  --  17  --   --    AST U/L  --  26  --   --    ALK PHOS U/L  --  79  --   --    BILIRUBIN TOTAL mg/dL  --  0.3  --   --         Nutrition Specific Medications:  reviewed    I/O:   Last BM Date: 12/29/23; Stool Appearance: Loose (12/29/23 0500)        Dietary Orders (From admission, onward)       Start     Ordered    12/29/23 1340  NPO Diet; Effective now  Diet effective now         12/29/23 1339    12/29/23 0658  May Participate in Room Service With Assistance  Once        Question:  .  Answer:  Yes    12/29/23 0657                     Estimated Needs:   Total Energy Estimated Needs (kCal): 2110 kCal  Method for Estimating Needs: ABW  Total Protein Estimated Needs (g): 84 g  Method for Estimating Needs: ABW  Total Fluid Estimated Needs (mL): 2110 mL  Method for Estimating Needs: ABW        Nutrition Diagnosis   Malnutrition Diagnosis  Patient has Malnutrition Diagnosis: Yes  Diagnosis Status: New  Malnutrition Diagnosis: Severe malnutrition related to acute disease or injury  As Evidenced by: moderate muscle wasting, moderate fat loss, >5% wt loss in 1 month    Nutrition  Diagnosis  Patient has Nutrition Diagnosis: Yes  Diagnosis Status (1): New  Nutrition Diagnosis 1: Altered GI function  Related to (1): SBO  As Evidenced by (1): NPO status, NG tube to LIWS, need for PPN to help meet nutrition needs       Nutrition Interventions/Recommendations         Nutrition Prescription:  Individualized Nutrition Prescription Provided for : Continue NPO status per surgeon. Initiate PPN, if NPO >5 days, recommend PICC line and initiate TPN        Nutrition Interventions:   Food and/or Nutrient Delivery Interventions  Interventions: Parenteral nutrition/ IV fluids  Parenteral Nutrition/IV Fluids: Modify composition of parenteral nutrition  Parenteral Additional Recommendations: Recommend PPN (Clinimix 4.25/5) at 70 ml/hr to provide 1680 ml total volume, 571 kcal, 71g protein  Additional Instructions: If pt to remain NPO >5 days recommend TPN standard - AA 5%, dextrose 20% at 83 ml/hr with 20% Intralipid 250 mL/day (3 days per week)  Blood Glucose Frequency: Check 2 hours into PN window, Every 4 hours  Labs: Renal panel and magnesium daily, Repeat electrolytes as needed    Coordination of Nutrition Care by a Nutrition Professional  Collaboration and Referral of Nutrition Care: Collaboration by nutrition professional with other providers    Nutrition Education:   Not appropriate at this time       Nutrition Monitoring and Evaluation   Food/Nutrient Related History Monitoring  Monitoring and Evaluation Plan: Enteral and parenteral nutrition intake  Enteral and Parenteral Nutrition Intake: Parenteral nutrition formula/solution, Parenteral nutrition intake  Criteria: PN to meet >75% of estimated needs    Body Composition/Growth/Weight History  Monitoring and Evaluation Plan: Weight  Weight: Estimated dry weight  Criteria: maintain current wt    Biochemical Data, Medical Tests and Procedures  Monitoring and Evaluation Plan: Electrolyte/renal panel  Criteria: labs within desired limits            Time  Spent/Follow-up Reminder:   Time Spent (min): 60 minutes  Last Date of Nutrition Visit: 12/29/23  Nutrition Follow-Up Needed?: 3-5 days  Follow up Comment: PPN; TPN if >5 days

## 2023-12-29 NOTE — CARE PLAN
The clinical goals for the shift include remain hemodynamically stable    Problem: Skin  Goal: Decreased wound size/increased tissue granulation at next dressing change  Outcome: Progressing  Flowsheets (Taken 12/29/2023 1052)  Decreased wound size/increased tissue granulation at next dressing change:   Promote sleep for wound healing   Protective dressings over bony prominences   Utilize specialty bed per algorithm  Goal: Participates in plan/prevention/treatment measures  Outcome: Progressing  Flowsheets (Taken 12/29/2023 1052)  Participates in plan/prevention/treatment measures: Elevate heels  Goal: Prevent/manage excess moisture  Outcome: Progressing  Flowsheets (Taken 12/29/2023 1052)  Prevent/manage excess moisture:   Cleanse incontinence/protect with barrier cream   Moisturize dry skin   Follow provider orders for dressing changes   Monitor for/manage infection if present  Goal: Prevent/minimize sheer/friction injuries  Outcome: Progressing  Flowsheets (Taken 12/29/2023 1052)  Prevent/minimize sheer/friction injuries:   Complete micro-shifts as needed if patient unable. Adjust patient position to relieve pressure points, not a full turn   HOB 30 degrees or less   Turn/reposition every 2 hours/use positioning/transfer devices   Use pull sheet  Goal: Promote/optimize nutrition  Outcome: Progressing  Flowsheets (Taken 12/29/2023 1052)  Promote/optimize nutrition: Discuss with provider if NPO > 2 days  Goal: Promote skin healing  Outcome: Progressing  Flowsheets (Taken 12/29/2023 1052)  Promote skin healing:   Assess skin/pad under line(s)/device(s)   Ensure correct size (line/device) and apply per  instructions   Protective dressings over bony prominences   Rotate device position/do not position patient on device   Turn/reposition every 2 hours/use positioning/transfer devices     Problem: Pain  Goal: Takes deep breaths with improved pain control throughout the shift  Outcome: Progressing  Goal:  Turns in bed with improved pain control throughout the shift  Outcome: Progressing  Goal: Walks with improved pain control throughout the shift  Outcome: Progressing  Goal: Performs ADL's with improved pain control throughout shift  Outcome: Progressing  Goal: Participates in PT with improved pain control throughout the shift  Outcome: Progressing  Goal: Free from opioid side effects throughout the shift  Outcome: Progressing  Goal: Free from acute confusion related to pain meds throughout the shift  Outcome: Progressing     Problem: Nutrition  Goal: Less than 5 days NPO/clear liquids  Outcome: Progressing  Goal: Oral intake greater than 50%  Outcome: Progressing  Goal: Oral intake greater 75%  Outcome: Progressing  Goal: Consume prescribed supplement  Outcome: Progressing  Goal: Adequate PO fluid intake  Outcome: Progressing  Goal: Nutrition support goals are met within 48 hrs  Outcome: Progressing  Goal: Nutrition support is meeting 75% of nutrient needs  Outcome: Progressing  Goal: Tube feed tolerance  Outcome: Progressing  Goal: BG  mg/dL  Outcome: Progressing  Goal: Lab values WNL  Outcome: Progressing  Goal: Electrolytes WNL  Outcome: Progressing  Goal: Promote healing  Outcome: Progressing  Goal: Maintain stable weight  Outcome: Progressing  Goal: Reduce weight from edema/fluid  Outcome: Progressing  Goal: Gradual weight gain  Outcome: Progressing  Goal: Improve ostomy output  Outcome: Progressing     Problem: Diabetes  Goal: Achieve decreasing blood glucose levels by end of shift  Outcome: Progressing  Goal: Increase stability of blood glucose readings by end of shift  Outcome: Progressing  Goal: Decrease in ketones present in urine by end of shift  Outcome: Progressing  Goal: Maintain electrolyte levels within acceptable range throughout shift  Outcome: Progressing  Goal: Maintain glucose levels >70mg/dl to <250mg/dl throughout shift  Outcome: Progressing  Goal: No changes in neurological exam by end  of shift  Outcome: Progressing  Goal: Learn about and adhere to nutrition recommendations by end of shift  Outcome: Progressing  Goal: Vital signs within normal range for age by end of shift  Outcome: Progressing  Goal: Increase self care and/or family involovement by end of shift  Outcome: Progressing  Goal: Receive DSME education by end of shift  Outcome: Progressing

## 2023-12-29 NOTE — CONSULTS
Inpatient consult to Medicine  Consult performed by: Mamadou Hall MD  Consult ordered by: Maryann Damico MD  Reason for consult: hypoglycemia      Reason For Consult  Hypoglycemia, medical management    History Of Present Illness  Harper Lara is a 78 y.o. female with past medical history of type 2 diabetes, fatty liver, hypertension, atrial fibrillation, GI bleed, anxiety depression, hypothyroidism, recent admission for small bowel obstruction requiring laparoscopic lysis of adhesions and subsequent open lysis of adhesions and small bowel resection.  She was discharged to skilled nursing on the 26th.  She says that she did well for a couple of days but yesterday began to have more abdominal pain.  Apparently she was hypoglycemic earlier today and had some confusion so the nursing home sent her in for evaluation.  On my evaluation she does not appear to be confused, blood sugars were in the 70s.  She reports that her abdominal pain is diffuse rates it 10 out of 10.  Had some nausea and may be some spitting up but no overt vomiting.  She says she has been having diarrhea.  Denies any melena or hematochezia.  No fevers.  Denies any chest pain or shortness of breath.  CT scan in the emergency department showed multiple fluid-filled dilated loops of small bowel measuring up to 4.5 cm with a transition point in the right mid abdomen at the level of the anastomosis; there is also a rim-enhancing fluid collection measuring 8 x 4 x 6.7 x 6.1 cm.     Past Medical History  She has a past medical history of Atrial fibrillation (CMS/HCC) and Diabetes mellitus (CMS/HCC).    Surgical History  She has a past surgical history that includes Cholecystectomy; Hiatal hernia repair; and Hysterectomy.     Social History  She reports that she has never smoked. She has never been exposed to tobacco smoke. She has never used smokeless tobacco. She reports that she does not drink alcohol and does not use drugs.    Family History  No  family history on file.     Allergies  Diphenhydramine hcl, Morphine, Pentazocine, Propoxyphene, Propoxyphene n-acetaminophen, Ranitidine, and [place]    Review of Systems  Reviewed, negative except as noted in HPI     Physical Exam  Physical Exam  Vitals reviewed.   Constitutional:       Appearance: Normal appearance.   HENT:      Head: Normocephalic and atraumatic.      Nose: Nose normal.      Mouth/Throat:      Mouth: Mucous membranes are moist.      Pharynx: Oropharynx is clear.   Eyes:      Extraocular Movements: Extraocular movements intact.      Conjunctiva/sclera: Conjunctivae normal.      Pupils: Pupils are equal, round, and reactive to light.   Cardiovascular:      Rate and Rhythm: Regular rhythm.      Heart sounds: No murmur heard.     No gallop.   Pulmonary:      Effort: Pulmonary effort is normal. No respiratory distress.      Breath sounds: No wheezing, rhonchi or rales.      Comments: Diminished bilaterally  Abdominal:      General: There is distension.      Tenderness: There is abdominal tenderness. There is no guarding or rebound.      Comments: Significant distension, diffuse TTP; hyperactive bowel sounds  Superiorly her incision is stapled appears to be healing well  Dehisced inferiorly with some packing in wound; no surrounding fluctuance, erythema   Musculoskeletal:         General: Normal range of motion.      Comments: Mild b/l LE edema   Skin:     General: Skin is warm and dry.   Neurological:      General: No focal deficit present.      Mental Status: She is alert and oriented to person, place, and time.      Cranial Nerves: No cranial nerve deficit.      Sensory: No sensory deficit.      Motor: No weakness.   Psychiatric:         Mood and Affect: Mood normal.         Behavior: Behavior normal.             Last Recorded Vitals  /56 (BP Location: Right arm, Patient Position: Sitting)   Pulse 66   Temp 36.9 °C (98.5 °F) (Oral)   Resp 14   Wt 84.4 kg (186 lb)   SpO2 98%      Relevant Results         Assessment/Plan     Abdominal pain  SBO s/p recent laparoscopic BRYAN and subsequent open small bowel resection  Pelvic fluid collection  Concern for recurrent SBO  -CT showing multiple fluid-filled dilated loops of small bowel measuring up to 4.5 cm with a transition point in the right mid abdomen at the level of the anastomosis; there is also a rim-enhancing fluid collection measuring 8 x 4 x 6.7 x 6.1 cm  -Admitted under surgery team; plan for getting a sample of this fluid collection tomorrow; ED spoke with Dr. Lozada who did not want antibiotics until he was able to get a sample of the fluid  -Continue n.p.o., NG tube  -IV fluids and pain control    Hypoglycemia  -reported at SNF, improved here  -given NPO status will place on D5W overnight    Atrial fibrillation  -rate controlled here  -as she will be NPO for now will add PRN metoprolol  -holding xarelto - resume when ok with surgery    Chronic systolic CHF  -EF 40% last echo  -appears reasonably well compensated currently  -resume home meds when able to take oral    HTN  HLD  -resume home meds when able    Mamadou Hall MD

## 2023-12-29 NOTE — PROGRESS NOTES
12/29/23 1027   Discharge Planning   Living Arrangements Spouse/significant other   Support Systems Spouse/significant other   Type of Residence Private residence   Home or Post Acute Services Post acute facilities (Rehab/SNF/etc)   Type of Post Acute Facility Services Rehab;Skilled nursing   Patient expects to be discharged to: Return to [facility]   Does the patient need discharge transport arranged? Yes   RoundTrip coordination needed? Yes     Pt is a readmission, recently discharged to [facility] on 12/26/23, notified by hospitals that pt can return when medically ready. Pt was sent back to hospital d/t hypoglycemia, confusion and SNF did KUB that showed SBO, sent pt for evaluation. Pt now has NG tube. Met with pt who states all information is same as last admission, and plans to return to [facility] upon [facility]. Pt states staff may want to call daughter instead of pt .  Referral built and sent to hospitals to keep up to date on pt. CT team will continue to monitor case for progression and DC planning.

## 2023-12-29 NOTE — CONSULTS
"Social Work Note:   The LSW received a referral this date from the [facility] re: pt stating that she does not want her spouse to visit. The LSW met with the pt who has been  to the spouse for 45 years and states that he \"knit picks\". The pt states that her spouse has never been physically abusive. The pt stated that she has seen a counselor in the past under [facility] and found that counseling helpful in the past. The pt would be receptive to counseling. The current discharge plan is for the pt to transfer to [facility] and the LSW will offer remote counseling and F/U with the  at the [facility] for resources that the SNF may offer to their patients.  to remain available supportively.   "

## 2023-12-29 NOTE — H&P
General Surgery History and Physical  Patient: Harper Lara  Unit/Bed: 622/622-A  YOB: 1945  MRN: [id number]  Acct: [id number]   Admitting Diagnosis: SBO (small bowel obstruction) (CMS/formerly [facility]) [K56.609]  Date:  12/29/2023  Hospital Day: 0  Attending: Manuel Lozada MD       Complaint:  Chief Complaint   Patient presents with    Hypoglycemia     Pt assisted living facility states said she had an altered mental status and a blood glu of 70. Pt currently a&o X4 and has a blood glucose of 115 per ems        History of Present Illness:  Harper Lraa is a 78 y.o. female with past medical history of type 2 diabetes, fatty liver, hypertension, atrial fibrillation, GI bleed, anxiety depression, hypothyroidism, recent admission for small bowel obstruction requiring laparoscopic lysis of adhesions and subsequent open lysis of adhesions and small bowel resection.  She was discharged to skilled nursing on the 26th.  She says that she did well for a couple of days but yesterday began to have more abdominal pain.  Apparently she was hypoglycemic earlier today and had some confusion so the nursing home sent her in for evaluation.  On my evaluation she does not appear to be confused, blood sugars were in the 70s.  She reports that her abdominal pain is diffuse rates it 10 out of 10.  Had some nausea and may be some spitting up but no overt vomiting.  She says she has been having diarrhea.  Denies any melena or hematochezia.  No fevers.  Denies any chest pain or shortness of breath.  CT scan in the emergency department showed multiple fluid-filled dilated loops of small bowel measuring up to 4.5 cm with a transition point in the right mid abdomen at the level of the anastomosis; there is also a rim-enhancing fluid collection measuring 8 x 4 x 6.7 x 6.1 cm.     Allergies:  Allergies   Allergen Reactions    Diphenhydramine Hcl Unknown    Morphine Unknown    Pentazocine Unknown    Propoxyphene Unknown    Propoxyphene  N-Acetaminophen Unknown    Ranitidine Unknown    Strawberry Hives     PMHx:  Past Medical History:   Diagnosis Date    Atrial fibrillation (CMS/HCC)     Diabetes mellitus (CMS/HCC)      PSHx:  Past Surgical History:   Procedure Laterality Date    CHOLECYSTECTOMY      HIATAL HERNIA REPAIR      HYSTERECTOMY       Social Hx:  Social History     Socioeconomic History    Marital status:      Spouse name: Not on file    Number of children: Not on file    Years of education: Not on file    Highest education level: Not on file   Occupational History    Not on file   Tobacco Use    Smoking status: Never     Passive exposure: Never    Smokeless tobacco: Never   Vaping Use    Vaping Use: Never used   Substance and Sexual Activity    Alcohol use: Never    Drug use: Never    Sexual activity: Not on file   Other Topics Concern    Not on file   Social History Narrative    Not on file     Social Determinants of Health     Financial Resource Strain: Patient Declined (12/9/2023)    Overall Financial Resource Strain (CARDIA)     Difficulty of Paying Living Expenses: Patient declined   Food Insecurity: Not on file   Transportation Needs: Patient Declined (12/9/2023)    PRAPARE - Transportation     Lack of Transportation (Medical): Patient declined     Lack of Transportation (Non-Medical): Patient declined   Physical Activity: Not on file   Stress: Not on file   Social Connections: Not on file   Intimate Partner Violence: Not on file   Housing Stability: Unknown (12/9/2023)    Housing Stability Vital Sign     Unable to Pay for Housing in the Last Year: Patient refused     Number of Places Lived in the Last Year: 1     Unstable Housing in the Last Year: Patient refused     Family Hx:  No family history on file.    Review of Systems:   Review of Systems   Constitutional:  Positive for activity change, appetite change and fatigue. Negative for chills and fever.   HENT: Negative.     Respiratory:  Negative for cough, chest tightness,  shortness of breath and wheezing.    Cardiovascular:  Negative for chest pain.   Gastrointestinal:  Positive for abdominal pain and diarrhea. Negative for abdominal distention, nausea and vomiting.   Genitourinary:  Negative for difficulty urinating and frequency.   Skin:  Positive for wound.   Neurological:  Positive for weakness.   Psychiatric/Behavioral: Negative.       Physical Examination:    Visit Vitals  /53   Pulse 61   Temp 36 °C (96.8 °F)   Resp 16      Physical Exam  Vitals reviewed.   Constitutional:       Appearance: Normal appearance.   HENT:      Head: Normocephalic.      Nose: Nose normal.      Mouth/Throat:      Mouth: Mucous membranes are dry.   Cardiovascular:      Rate and Rhythm: Normal rate and regular rhythm.   Pulmonary:      Effort: Pulmonary effort is normal.      Breath sounds: Normal breath sounds.   Abdominal:      General: Abdomen is flat. Bowel sounds are decreased.      Palpations: Abdomen is soft.      Tenderness: There is generalized abdominal tenderness.      Comments: Midline incision opened and packed covered with ABD.   Skin:     General: Skin is warm.   Neurological:      General: No focal deficit present.      Mental Status: She is alert and oriented to person, place, and time.   Psychiatric:         Mood and Affect: Mood normal.       LABS:  CBC:   Lab Results   Component Value Date    WBC 8.9 12/29/2023    RBC 3.28 (L) 12/29/2023    HGB 9.4 (L) 12/29/2023    HCT 29.3 (L) 12/29/2023    MCV 89 12/29/2023    MCH 28.7 12/29/2023    MCHC 32.1 12/29/2023    RDW 14.8 (H) 12/29/2023     12/29/2023     CBC with Differential:    Lab Results   Component Value Date    WBC 8.9 12/29/2023    RBC 3.28 (L) 12/29/2023    HGB 9.4 (L) 12/29/2023    HCT 29.3 (L) 12/29/2023     12/29/2023    MCV 89 12/29/2023    MCH 28.7 12/29/2023    MCHC 32.1 12/29/2023    RDW 14.8 (H) 12/29/2023    NRBC 0.0 12/29/2023    LYMPHOPCT 7.5 12/29/2023    MONOPCT 2.0 12/29/2023    EOSPCT 0.3  "12/29/2023    BASOPCT 0.2 12/29/2023    MONOSABS 0.18 12/29/2023    LYMPHSABS 0.67 (L) 12/29/2023    EOSABS 0.03 12/29/2023    BASOSABS 0.02 12/29/2023     CMP:    Lab Results   Component Value Date     12/29/2023    K 4.2 12/29/2023     (H) 12/29/2023    CO2 22 12/29/2023    BUN 25 (H) 12/29/2023    CREATININE 0.78 12/29/2023    GLUCOSE 72 (L) 12/29/2023    PROT 4.9 (L) 12/29/2023    CALCIUM 7.0 (L) 12/29/2023    BILITOT 0.3 12/29/2023    ALKPHOS 79 12/29/2023    AST 26 12/29/2023    ALT 17 12/29/2023     BMP:    Lab Results   Component Value Date     12/29/2023    K 4.2 12/29/2023     (H) 12/29/2023    CO2 22 12/29/2023    BUN 25 (H) 12/29/2023    CREATININE 0.78 12/29/2023    CALCIUM 7.0 (L) 12/29/2023    GLUCOSE 72 (L) 12/29/2023     Magnesium:No results found for: \"MG\"  Troponin:    Lab Results   Component Value Date    TROPHS 8 12/29/2023     Lipid Panel:  No results found for: \"HDL\", \"CHHDL\", \"VLDL\", \"TRIG\", \"NHDL\"     Current Medications:    Current Facility-Administered Medications:     dextrose 10 % in water (D10W) infusion, 0.3 g/kg/hr, intravenous, Once PRN, TY Narvaez    dextrose 5 % in water (D5W) infusion, 75 mL/hr, intravenous, Continuous, TY Narvaez, Last Rate: 75 mL/hr at 12/29/23 0742, 75 mL/hr at 12/29/23 0742    dextrose 50 % injection 25 g, 25 g, intravenous, q15 min PRN, TY Narvaez    [Held by provider] enoxaparin (Lovenox) syringe 80 mg, 1 mg/kg, subcutaneous, Daily, TY Narvaez    [Held by provider] furosemide (Lasix) injection 10 mg, 10 mg, intravenous, Daily, TY Narvaez    glucagon (Glucagen) injection 1 mg, 1 mg, intramuscular, q15 min PRN, TY Narvaez    HYDROmorphone PF (Dilaudid) injection 0.2 mg, 0.2 mg, intravenous, q4h PRN, Mamadou Hall MD    [Held by provider] levothyroxine (Synthroid) injection 38 mcg, 38 mcg, intravenous, q24h ORLY, TY Narvaez    " metoprolol tartrate (Lopressor) injection 2.5 mg, 2.5 mg, intravenous, q6h PRN, Mamadou Hall MD    [Held by provider] pantoprazole (ProtoNix) injection 40 mg, 40 mg, intravenous, Daily, Keyonna Kemp, APRN-CNP    CT abdomen pelvis w IV contrast    Result Date: 12/29/2023  Interpreted By:  Saul Aguilar, STUDY: CT ABDOMEN PELVIS W IV CONTRAST;  12/29/2023 1:42 am   INDICATION: Signs/Symptoms:recent ex-lap. AMS at nursing home. eval for recurrent obstruction. midline incision open at inferior aspect with minimal drainage. eval for seroma.   COMPARISON: CT scan of the abdomen pelvis 12/16/2023.   ACCESSION NUMBER(S): [id number]   ORDERING CLINICIAN: MARI SILVA   TECHNIQUE: Axial CT images of the abdomen and pelvis with coronal and sagittal reconstructed images obtained after intravenous administration of 75 mL of Omnipaque 350.   FINDINGS: LOWER CHEST: Small to moderate bilateral pleural effusions with adjacent airspace opacity favored to relate to compressive atelectasis. Superimposed infection not excluded. Aortic root calcifications noted.   ABDOMEN:   LIVER: Within normal limits. BILE DUCTS: Mild central biliary ductal prominence is likely postsurgical. GALLBLADDER: Status post cholecystectomy. PANCREAS: Moderate fatty atrophy of the pancreas. SPLEEN: Within normal limits. ADRENALS: Within normal limits. KIDNEYS and URETERS: Symmetric renal enhancement. No hydronephrosis or perinephric fluid collection. Subcentimeter hypodense foci are too small to characterize.   VESSELS:  Calcific atherosclerosis of the aortoiliac and mesenteric vessels. No aortic aneurysm. RETROPERITONEUM: No pathologically enlarged retroperitoneal lymph nodes.   PELVIS:   REPRODUCTIVE ORGANS: Uterus is surgically absent. No adnexal mass. BLADDER: Bladder is partially distended. Foci of air noted in the bladder. Correlate for history of recent instrumentation. In the absence of such history findings raise concern for infection.    BOWEL: Moderate distention of the stomach with air-fluid level. Large duodenal diverticulum noted. Postsurgical changes of partial bowel resection and anastomosis in the right mid abdomen. There are multiple fluid-filled dilated loops of small bowel measuring up to 4.5 cm. The transition point appears to be in the right mid abdomen at level of the anastomosis with the distal small bowel loops somewhat decompressed. These findings are concerning for developing versus partial bowel obstruction. Air-fluid levels noted in the colon which can be seen in the setting of infectious/inflammatory colitis. No pneumatosis or portal venous gas. Appendix is not identified with certainty. No pericecal inflammatory changes seen. PERITONEUM: There is mild ascites with diffuse haziness of the mesentery. There are several locules of extraluminal free air in the mid pelvis as seen on axial images 106 and 114 of series 401. There are several locules of free air posterior to the rectus sheath in the mid abdomen as seen on axial image 88 of 169. there is a rim enhancing fluid collection in the pelvis abutting a dilated loop of small bowel measuring approximately 8.4 x 6.7 x 6.1 cm (AP x TR x CC). No locules of gas are seen within this collection.   ABDOMINAL WALL: Multiple cutaneous staples noted. There is a soft tissue defect in the lower pelvis concerning for open wound. A oval 1.5 x 2.7 cm fluid collection is noted in the subcutaneous soft tissues midline pelvis containing tiny locule of gas as seen on axial image 118 of 169. In addition there are multiple locules of air in the subcutaneous soft tissues right upper to mid abdomen. There is significant soft tissue stranding in the anterior abdominopelvic wall. Mild diffuse anasarca. BONES: Multilevel degenerative changes of the spine. Generalized diffuse osteopenia. Mild deformity at the inferior endplate of L2 and superior endplate of L3 are redemonstrated with vacuum disc phenomena.  Appearance is stable from prior CT.       Postsurgical changes of partial bowel resection and anastomosis in the right mid abdomen. Moderate distention of the stomach with air-fluid level.  There are multiple fluid-filled dilated loops of small bowel measuring up to 4.5 cm. The transition point appears to be in the right mid abdomen at level of the anastomosis with the distal small bowel loops decompressed. These findings are concerning for recurrent bowel obstruction. Surgical consultation is advised.   There is a rim enhancing fluid collection in the pelvis abutting a dilated loop of small bowel measuring approximately 8.4 x 6.7 x 6.1 cm (AP x TR x CC). No locules of gas are seen within this collection. Sterility of the fluid can not be assessed by CT. Correlate clinically for the need for further evaluation with fluid sampling.   There are several locules of free air posterior to the rectus sheath in the mid abdomen as well as several locules of air in the pelvis abutting loops of small bowel. These findings could be on the basis of recent surgery.   Air-fluid levels noted in the colon which can be seen in the setting of infectious/inflammatory colitis. Correlate for history of diarrhea.   Multiple cutaneous staples noted. There is a soft tissue defect in the lower pelvis concerning for open wound. An oval 1.5 x 2.7 cm fluid collection is noted in the subcutaneous soft tissues midline pelvis containing tiny locule of gas. Sterility of the fluid can not be assessed by imaging. In addition there are multiple locules of air in the subcutaneous soft tissues right upper to mid abdomen. There is significant soft tissue stranding in the anterior abdominopelvic wall. Mild diffuse anasarca.   Bladder is partially distended with multiple foci of air present. Correlate for history of recent instrumentation. In the absence of such history findings raise concern for infection. Correlate with symptomatology and if warranted  urinalysis   Small to moderate bilateral pleural effusions with adjacent airspace opacity favored to relate to compressive atelectasis. Superimposed infection not excluded. Correlate clinically.   Additional findings as described above.   MACRO: None   Signed by: Saul Aguilar 12/29/2023 2:20 AM Dictation workstation:   [id number]    CT head wo IV contrast    Result Date: 12/29/2023  Interpreted By:  Saul Aguilar, STUDY: CT HEAD WO IV CONTRAST;  12/29/2023 1:41 am   INDICATION: Signs/Symptoms:AMS on xarelto. eval for acute hemorrhage.   COMPARISON: CT scan of the head 09/20/2023   ACCESSION NUMBER(S): [id number]   ORDERING CLINICIAN: MARI ISLVA   TECHNIQUE: Axial noncontrast CT images of the head.   FINDINGS: BRAIN PARENCHYMA: Gray-white matter interfaces are preserved. No mass, mass effect or midline shift. Mild deep and periventricular white matter hypodensities are nonspecific, but favored to represent chronic small vessel ischemic changes.   HEMORRHAGE: No acute intracranial hemorrhage. VENTRICLES and EXTRA-AXIAL SPACES: Age-appropriate volume loss. EXTRACRANIAL SOFT TISSUES: Within normal limits. PARANASAL SINUSES/MASTOIDS: Mucous retention cyst versus polyp in the right maxillary sinus. Remainder of the visualized paranasal sinuses and mastoid air cells are aerated. CALVARIUM: No depressed skull fracture. No destructive osseous lesion.   OTHER FINDINGS: Atherosclerotic calcification of the carotid siphons.       No acute intracranial abnormality.   Chronic changes as noted above.   MACRO: None   Signed by: Saul Aguilar 12/29/2023 1:52 AM Dictation workstation:   [id number]    XR chest 1 view    Result Date: 12/29/2023  Interpreted By:  Saul Aguilar, STUDY: XR CHEST 1 VIEW;  12/29/2023 12:54 am   INDICATION: Signs/Symptoms:post op. eval for consolidation.   COMPARISON: Chest x-ray 12/19/2023   ACCESSION NUMBER(S): [id number]   ORDERING CLINICIAN: MARI SILVA   FINDINGS:     CARDIOMEDIASTINAL  SILHOUETTE: Cardiomediastinal silhouette is stable in size and configuration.   LUNGS: No consolidation, pleural effusion or pneumothorax. Elevation of the left hemidiaphragm with bibasilar atelectasis.   ABDOMEN: Gaseous distention of the stomach.   BONES: Multilevel degenerative changes of the spine       Elevation of the left hemidiaphragm with bibasilar airspace opacities favored to relate to atelectasis. Superimposed infection not excluded. Correlate clinically.   Marked gaseous distention of the stomach.   MACRO: None   Signed by: Saul Aguilar 12/29/2023 1:00 AM Dictation workstation:   [id number]    Assessment:    Patient resting comfortably in bed, no acute events over night. Patient is status post op diagnostic lysis of adhesions on 12/8/23 and an exploratory laparotomy converted to open lysis of adhesions and small bowel resection with side to side antiperistaltic staple anastomosis on 12/17/23. On 12/25/23 her midline incision was opened inferiorly due to increased serosanguinous drainage, with wet to dry dressing. Patient was d/c'd to a SNF on 12/26/23. Patient states while at the SNF she was doing well until yesterday when her stomach started hurting and it was also noted at the facility she was hypoglycemic. Patient is still hypoglycemic this morning with blood sugar 39 so an AMP of D5 was ordered. Fluids titrated up to 75ml/hr from 50ml/hr. Patient blood sugar 109. Patient was conscious, aware during this time. Patient denies nausea, vomiting but states she is having diarrhea, with last episode this morning.  NG output 100.    Dr. Lozada at bedside debrided fibrinous exudate from wound bed, and removed 5 more staples superiorly on midline incision. Both areas were then packed with kerlix gauze. Patient tolerated the procedure well.     Plan:  NPO/IVF/NG  DVT prophylaxis-lovenox  Continue to trend labs  Continue wet to dry dressing changes BID  SBFT if SBO does not resolve with current management  "  Consult Medicine to manage hypoglycemia/follow since they had prior to discharge.   Consult nutrition to get recommendation for PPN     Further recommendations per Dr. Lozada    Time spent  60  minutes obtaining labs, imaging, recommendations, interview, assessment, examination, medication review/ordering, and EMR review.    Plan of care was discussed extensively with patient. Patient verbalized understanding through teach back method. All questions and concerns addressed upon examination.     Of note, this documentation is completed using the Dragon Dictation system (voice recognition software). There may be spelling and/or grammatical errors that were not corrected prior to final submission.    Electronically signed by YARITZA Narvaez-CNP on 12/29/2023 at 10:04 AM     Patient seen and examined. I have reviewed and discussed APRN note. I have reviewed labs and CT abd/pelvis; last surgery (exlap, BRYAN and SBR with anastomosis) was on 12/17. Brought back to ER because of \"not acting well\". Dilated loops of small bowel proximal to entero-enteric anastomosis but stool in colon and rectum; wall enhancing linear fluid in pelvis up to 8 cm with part of it anterior to rectum; based on size and location, not accessible percutaneously; I doubt this is an abscess; most likely post surgical (either seroma vs hematoma); no need for abx at this time. Patient continues to have multiple loose bowel movements; fibrinous exudate debrided from midline wound and few more staples removed; fascia intact and no purulent drainage or erythema; will continue wet to dry dressing change; will start PPN/ continue NG/ abdominal xray in AM;   "

## 2023-12-29 NOTE — PROGRESS NOTES
Harper Lara is a 78 y.o. female on day 0 of admission presenting with SBO (small bowel obstruction) (CMS/HCC).      Medicine consulted for Hypoglycemia     Subjective   Patient examined and seen. Alert and oriented x3, resting comfortably.  Patient denies chest pain, shortness of breath, palpitations, abdominal pain, fever or chills.  Patient is agreeable to go home when cleared.  Reports support system is intact.        Objective     Last Recorded Vitals  /53   Pulse 61   Temp 36 °C (96.8 °F)   Resp 16   Wt 84.4 kg (186 lb)   SpO2 98%   Intake/Output last 3 Shifts:  No intake or output data in the 24 hours ending 12/29/23 1058    Admission Weight  Weight: 84.4 kg (186 lb) (12/29/23 0113)    Daily Weight  12/29/23 : 84.4 kg (186 lb)    Image Results  CT abdomen pelvis w IV contrast  Narrative: Interpreted By:  Saul Aguilar,   STUDY:  CT ABDOMEN PELVIS W IV CONTRAST;  12/29/2023 1:42 am      INDICATION:  Signs/Symptoms:recent ex-lap. AMS at nursing home. eval for recurrent  obstruction. midline incision open at inferior aspect with minimal  drainage. eval for seroma.      COMPARISON:  CT scan of the abdomen pelvis 12/16/2023.      ACCESSION NUMBER(S):  [id number]      ORDERING CLINICIAN:  MARI SILVA      TECHNIQUE:  Axial CT images of the abdomen and pelvis with coronal and sagittal  reconstructed images obtained after intravenous administration of 75  mL of Omnipaque 350.      FINDINGS:  LOWER CHEST: Small to moderate bilateral pleural effusions with  adjacent airspace opacity favored to relate to compressive  atelectasis. Superimposed infection not excluded. Aortic root  calcifications noted.      ABDOMEN:      LIVER: Within normal limits.  BILE DUCTS: Mild central biliary ductal prominence is likely  postsurgical. GALLBLADDER: Status post cholecystectomy.  PANCREAS: Moderate fatty atrophy of the pancreas.  SPLEEN: Within normal limits.  ADRENALS: Within normal limits.  KIDNEYS and URETERS:  Symmetric renal enhancement. No hydronephrosis  or perinephric fluid collection. Subcentimeter hypodense foci are too  small to characterize.      VESSELS:  Calcific atherosclerosis of the aortoiliac and mesenteric  vessels. No aortic aneurysm. RETROPERITONEUM: No pathologically  enlarged retroperitoneal lymph nodes.      PELVIS:      REPRODUCTIVE ORGANS: Uterus is surgically absent. No adnexal mass.  BLADDER: Bladder is partially distended. Foci of air noted in the  bladder. Correlate for history of recent instrumentation. In the  absence of such history findings raise concern for infection.      BOWEL: Moderate distention of the stomach with air-fluid level. Large  duodenal diverticulum noted. Postsurgical changes of partial bowel  resection and anastomosis in the right mid abdomen. There are  multiple fluid-filled dilated loops of small bowel measuring up to  4.5 cm. The transition point appears to be in the right mid abdomen  at level of the anastomosis with the distal small bowel loops  somewhat decompressed. These findings are concerning for developing  versus partial bowel obstruction. Air-fluid levels noted in the colon  which can be seen in the setting of infectious/inflammatory colitis.  No pneumatosis or portal venous gas. Appendix is not identified with  certainty. No pericecal inflammatory changes seen. PERITONEUM: There  is mild ascites with diffuse haziness of the mesentery. There are  several locules of extraluminal free air in the mid pelvis as seen on  axial images 106 and 114 of series 401. There are several locules of  free air posterior to the rectus sheath in the mid abdomen as seen on  axial image 88 of 169. there is a rim enhancing fluid collection in  the pelvis abutting a dilated loop of small bowel measuring  approximately 8.4 x 6.7 x 6.1 cm (AP x TR x CC). No locules of gas  are seen within this collection.      ABDOMINAL WALL: Multiple cutaneous staples noted. There is a soft  tissue  defect in the lower pelvis concerning for open wound. A oval  1.5 x 2.7 cm fluid collection is noted in the subcutaneous soft  tissues midline pelvis containing tiny locule of gas as seen on axial  image 118 of 169. In addition there are multiple locules of air in  the subcutaneous soft tissues right upper to mid abdomen. There is  significant soft tissue stranding in the anterior abdominopelvic  wall. Mild diffuse anasarca. BONES: Multilevel degenerative changes  of the spine. Generalized diffuse osteopenia. Mild deformity at the  inferior endplate of L2 and superior endplate of L3 are  redemonstrated with vacuum disc phenomena. Appearance is stable from  prior CT.      Impression: Postsurgical changes of partial bowel resection and anastomosis in  the right mid abdomen. Moderate distention of the stomach with  air-fluid level.  There are multiple fluid-filled dilated loops of  small bowel measuring up to 4.5 cm. The transition point appears to  be in the right mid abdomen at level of the anastomosis with the  distal small bowel loops decompressed. These findings are concerning  for recurrent bowel obstruction. Surgical consultation is advised.      There is a rim enhancing fluid collection in the pelvis abutting a  dilated loop of small bowel measuring approximately 8.4 x 6.7 x 6.1  cm (AP x TR x CC). No locules of gas are seen within this collection.  Sterility of the fluid can not be assessed by CT. Correlate  clinically for the need for further evaluation with fluid sampling.      There are several locules of free air posterior to the rectus sheath  in the mid abdomen as well as several locules of air in the pelvis  abutting loops of small bowel. These findings could be on the basis  of recent surgery.      Air-fluid levels noted in the colon which can be seen in the setting  of infectious/inflammatory colitis. Correlate for history of diarrhea.      Multiple cutaneous staples noted. There is a soft tissue  defect in  the lower pelvis concerning for open wound. An oval 1.5 x 2.7 cm  fluid collection is noted in the subcutaneous soft tissues midline  pelvis containing tiny locule of gas. Sterility of the fluid can not  be assessed by imaging. In addition there are multiple locules of air  in the subcutaneous soft tissues right upper to mid abdomen. There is  significant soft tissue stranding in the anterior abdominopelvic  wall. Mild diffuse anasarca.      Bladder is partially distended with multiple foci of air present.  Correlate for history of recent instrumentation. In the absence of  such history findings raise concern for infection. Correlate with  symptomatology and if warranted urinalysis      Small to moderate bilateral pleural effusions with adjacent airspace  opacity favored to relate to compressive atelectasis. Superimposed  infection not excluded. Correlate clinically.      Additional findings as described above.      MACRO:  None      Signed by: Saul Aguilar 12/29/2023 2:20 AM  Dictation workstation:   [id number]  CT head wo IV contrast  Narrative: Interpreted By:  Saul Aguilar,   STUDY:  CT HEAD WO IV CONTRAST;  12/29/2023 1:41 am      INDICATION:  Signs/Symptoms:AMS on xarelto. eval for acute hemorrhage.      COMPARISON:  CT scan of the head 09/20/2023      ACCESSION NUMBER(S):  [id number]      ORDERING CLINICIAN:  MARI SILVA      TECHNIQUE:  Axial noncontrast CT images of the head.      FINDINGS:  BRAIN PARENCHYMA: Gray-white matter interfaces are preserved. No  mass, mass effect or midline shift. Mild deep and periventricular  white matter hypodensities are nonspecific, but favored to represent  chronic small vessel ischemic changes.      HEMORRHAGE: No acute intracranial hemorrhage.  VENTRICLES and EXTRA-AXIAL SPACES: Age-appropriate volume loss.  EXTRACRANIAL SOFT TISSUES: Within normal limits.  PARANASAL SINUSES/MASTOIDS: Mucous retention cyst versus polyp in the  right maxillary sinus.  Remainder of the visualized paranasal sinuses  and mastoid air cells are aerated. CALVARIUM: No depressed skull  fracture. No destructive osseous lesion.      OTHER FINDINGS: Atherosclerotic calcification of the carotid siphons.      Impression: No acute intracranial abnormality.      Chronic changes as noted above.      MACRO:  None      Signed by: Saul Aguilar 12/29/2023 1:52 AM  Dictation workstation:   [id number]  XR chest 1 view  Narrative: Interpreted By:  Saul Aguilar,   STUDY:  XR CHEST 1 VIEW;  12/29/2023 12:54 am      INDICATION:  Signs/Symptoms:post op. eval for consolidation.      COMPARISON:  Chest x-ray 12/19/2023      ACCESSION NUMBER(S):  [id number]      ORDERING CLINICIAN:  MARI SILVA      FINDINGS:          CARDIOMEDIASTINAL SILHOUETTE:  Cardiomediastinal silhouette is stable in size and configuration.      LUNGS:  No consolidation, pleural effusion or pneumothorax. Elevation of the  left hemidiaphragm with bibasilar atelectasis.      ABDOMEN:  Gaseous distention of the stomach.      BONES:  Multilevel degenerative changes of the spine      Impression: Elevation of the left hemidiaphragm with bibasilar airspace opacities  favored to relate to atelectasis. Superimposed infection not  excluded. Correlate clinically.      Marked gaseous distention of the stomach.      MACRO:  None      Signed by: Saul Aguilar 12/29/2023 1:00 AM  Dictation workstation:   [id number]      Physical Exam  Constitutional: ill appearing, awake/alert/oriented x3,  cooperative, pallor  Head: right lower lip with healing scab , NG in place to LIWS   Respiratory/Thorax: Patent airways,  normal breath sounds   Cardiovascular: irregular, rate and rhythm,  2+ equal pulses of the extremities, normal S 1and S 2  Gastrointestinal: distended, tender with palpation, incision stapled, no drainage, dehisced inferiorly with packing, no erythema noted    Genitourinary: voiding freely without CVA tenderness or suprabupic  tenderness   Musculoskeletal: ROM intact, no joint swelling, bilateral upper extremity edema without pitting and mild bilateral lower extremity edema  Extremities: normal extremities,  moves all extremities   Skin: warm, dry, intact except as noted   Neurological: alert/oriented x 3,     Relevant Results  Scheduled medications  [Held by provider] enoxaparin, 1 mg/kg, subcutaneous, Daily  [Held by provider] furosemide, 10 mg, intravenous, Daily  [Held by provider] levothyroxine, 38 mcg, intravenous, q24h ORLY  [Held by provider] pantoprazole, 40 mg, intravenous, Daily      Continuous medications  dextrose 5 % in water (D5W), 75 mL/hr, Last Rate: 75 mL/hr (12/29/23 0742)      PRN medications  PRN medications: dextrose 10 % in water (D10W), dextrose, glucagon, HYDROmorphone, metoprolol    Results for orders placed or performed during the hospital encounter of 12/29/23 (from the past 24 hour(s))   POCT GLUCOSE   Result Value Ref Range    POCT Glucose 78 74 - 99 mg/dL   CBC and Auto Differential   Result Value Ref Range    WBC 8.9 4.4 - 11.3 x10*3/uL    nRBC 0.0 0.0 - 0.0 /100 WBCs    RBC 3.28 (L) 4.00 - 5.20 x10*6/uL    Hemoglobin 9.4 (L) 12.0 - 16.0 g/dL    Hematocrit 29.3 (L) 36.0 - 46.0 %    MCV 89 80 - 100 fL    MCH 28.7 26.0 - 34.0 pg    MCHC 32.1 32.0 - 36.0 g/dL    RDW 14.8 (H) 11.5 - 14.5 %    Platelets 321 150 - 450 x10*3/uL    Neutrophils % 89.4 40.0 - 80.0 %    Immature Granulocytes %, Automated 0.6 0.0 - 0.9 %    Lymphocytes % 7.5 13.0 - 44.0 %    Monocytes % 2.0 2.0 - 10.0 %    Eosinophils % 0.3 0.0 - 6.0 %    Basophils % 0.2 0.0 - 2.0 %    Neutrophils Absolute 7.93 (H) 1.60 - 5.50 x10*3/uL    Immature Granulocytes Absolute, Automated 0.05 0.00 - 0.50 x10*3/uL    Lymphocytes Absolute 0.67 (L) 0.80 - 3.00 x10*3/uL    Monocytes Absolute 0.18 0.05 - 0.80 x10*3/uL    Eosinophils Absolute 0.03 0.00 - 0.40 x10*3/uL    Basophils Absolute 0.02 0.00 - 0.10 x10*3/uL   Comprehensive metabolic panel   Result Value Ref  Range    Glucose 72 (L) 74 - 99 mg/dL    Sodium 137 136 - 145 mmol/L    Potassium 4.2 3.5 - 5.3 mmol/L    Chloride 108 (H) 98 - 107 mmol/L    Bicarbonate 22 21 - 32 mmol/L    Anion Gap 11 10 - 20 mmol/L    Urea Nitrogen 25 (H) 6 - 23 mg/dL    Creatinine 0.78 0.50 - 1.05 mg/dL    eGFR 79 >60 mL/min/1.73m*2    Calcium 7.0 (L) 8.6 - 10.3 mg/dL    Albumin 2.2 (L) 3.4 - 5.0 g/dL    Alkaline Phosphatase 79 33 - 136 U/L    Total Protein 4.9 (L) 6.4 - 8.2 g/dL    AST 26 9 - 39 U/L    Bilirubin, Total 0.3 0.0 - 1.2 mg/dL    ALT 17 7 - 45 U/L   Lactate   Result Value Ref Range    Lactate 0.6 0.4 - 2.0 mmol/L   Lipase   Result Value Ref Range    Lipase 165 (H) 9 - 82 U/L   Troponin I, High Sensitivity, Initial   Result Value Ref Range    Troponin I, High Sensitivity 8 0 - 13 ng/L   Urinalysis with Reflex Microscopic   Result Value Ref Range    Color, Urine Prisca (N) Straw, Yellow    Appearance, Urine Hazy (N) Clear    Specific Gravity, Urine 1.035 1.005 - 1.035    pH, Urine 5.0 5.0, 5.5, 6.0, 6.5, 7.0, 7.5, 8.0    Protein, Urine NEGATIVE NEGATIVE mg/dL    Glucose, Urine NEGATIVE NEGATIVE mg/dL    Blood, Urine MODERATE (2+) (A) NEGATIVE    Ketones, Urine NEGATIVE NEGATIVE mg/dL    Bilirubin, Urine NEGATIVE NEGATIVE    Urobilinogen, Urine <2.0 <2.0 mg/dL    Nitrite, Urine NEGATIVE NEGATIVE    Leukocyte Esterase, Urine LARGE (3+) (A) NEGATIVE   Microscopic Only, Urine   Result Value Ref Range    WBC, Urine 6-10 (A) 1-5, NONE /HPF    WBC Clumps, Urine OCCASIONAL Reference range not established. /HPF    RBC, Urine >20 (A) NONE, 1-2, 3-5 /HPF    Squamous Epithelial Cells, Urine 1-9 (SPARSE) Reference range not established. /HPF    Bacteria, Urine 3+ (A) NONE SEEN /HPF   POCT GLUCOSE   Result Value Ref Range    POCT Glucose 45 (L) 74 - 99 mg/dL   POCT GLUCOSE   Result Value Ref Range    POCT Glucose 39 (L) 74 - 99 mg/dL   POCT GLUCOSE   Result Value Ref Range    POCT Glucose 96 74 - 99 mg/dL   POCT GLUCOSE   Result Value Ref Range     POCT Glucose 108 (H) 74 - 99 mg/dL            Assessment/Plan      # Abdominal Pain   SBO s/p recent laparoscopic BRYAN and subsequent open small bowel resection   Pelvic Fluid Collection     Admit to General Surgery Team  Hospitalist Team consulted for Hypoglycemia   CT showing multiple fluid-filled dilated loops of small bowel measuring up to 4.5 cm with a transition point in the right mid abdomen at the level of the anastomosis; there is also a rim-enhancing fluid collection measuring 8 x 4 x 6.7 x 6.1 cm  -Plan for getting a sample of this fluid collection tomorrow; ED spoke with Dr. Lozada who did not want antibiotics until he was able to get a sample of the fluid  -Continue n.p.o., NG tube  -IV fluids and pain control    # Hypoglycemia  NPO due to NG to LIWS  D5W at 75ml/hr  POCT q4 hours   Recommend changing fluids to Clnimix or PPN if patient has PICC line   Continue Hypoglycemic order set  Hold all antidiabetic oral medications and reassess when she becomes active with diet  A1C 5.2 avg 103    # Atrial Fibrillation / HTN / HLD  NPO at this time  PRN lopressor for rate control  Telemetry for arrhythmia monitoring      # Chronic Systolic CHF  40% EF last echo  Appears compensated at this time  Resume home meds when able  Continue LASIX 10mg daily IV  Daily weights  Strict intake and output     # Diarrhea  Possible related to SBO  Will get Cdiff  Stool pathogens      Thank you for consult  Medicine will continue to follow with you  Hemodynamically stable    Time spent  58  minutes obtaining labs, imaging, recommendations, interview, assessment, examination, medication review/ordering, and EMR review.    Plan of care was discussed extensively with patient. Patient verbalized understanding through teach back method. All questions and concerns addressed upon examination.     Of note, this documentation is completed using the Dragon Dictation system (voice recognition software). There may be spelling and/or  grammatical errors that were not corrected prior to final submission.      Principal Problem:    SBO (small bowel obstruction) (CMS/HCC)        Jany Colindres, APRN-CNP

## 2023-12-30 ENCOUNTER — APPOINTMENT (OUTPATIENT)
Dept: RADIOLOGY | Facility: HOSPITAL | Age: 78
DRG: 388 | End: 2023-12-30
Payer: MEDICARE

## 2023-12-30 LAB
ALBUMIN SERPL BCP-MCNC: 2 G/DL (ref 3.4–5)
ALP SERPL-CCNC: 65 U/L (ref 33–136)
ALT SERPL W P-5'-P-CCNC: 14 U/L (ref 7–45)
ANION GAP SERPL CALC-SCNC: 10 MMOL/L (ref 10–20)
AST SERPL W P-5'-P-CCNC: 20 U/L (ref 9–39)
BILIRUB SERPL-MCNC: 0.3 MG/DL (ref 0–1.2)
BUN SERPL-MCNC: 25 MG/DL (ref 6–23)
C DIF TOX TCDA+TCDB STL QL NAA+PROBE: NOT DETECTED
CALCIUM SERPL-MCNC: 7.1 MG/DL (ref 8.6–10.3)
CHLORIDE SERPL-SCNC: 106 MMOL/L (ref 98–107)
CO2 SERPL-SCNC: 22 MMOL/L (ref 21–32)
CREAT SERPL-MCNC: 0.79 MG/DL (ref 0.5–1.05)
ERYTHROCYTE [DISTWIDTH] IN BLOOD BY AUTOMATED COUNT: 15.1 % (ref 11.5–14.5)
GFR SERPL CREATININE-BSD FRML MDRD: 78 ML/MIN/1.73M*2
GLUCOSE BLD MANUAL STRIP-MCNC: 133 MG/DL (ref 74–99)
GLUCOSE BLD MANUAL STRIP-MCNC: 159 MG/DL (ref 74–99)
GLUCOSE SERPL-MCNC: 140 MG/DL (ref 74–99)
HCT VFR BLD AUTO: 23.9 % (ref 36–46)
HGB BLD-MCNC: 7.6 G/DL (ref 12–16)
MAGNESIUM SERPL-MCNC: 1.74 MG/DL (ref 1.6–2.4)
MCH RBC QN AUTO: 28.1 PG (ref 26–34)
MCHC RBC AUTO-ENTMCNC: 31.8 G/DL (ref 32–36)
MCV RBC AUTO: 89 FL (ref 80–100)
NRBC BLD-RTO: 0 /100 WBCS (ref 0–0)
PLATELET # BLD AUTO: 250 X10*3/UL (ref 150–450)
POTASSIUM SERPL-SCNC: 4.1 MMOL/L (ref 3.5–5.3)
PROT SERPL-MCNC: 4.3 G/DL (ref 6.4–8.2)
RBC # BLD AUTO: 2.7 X10*6/UL (ref 4–5.2)
SODIUM SERPL-SCNC: 134 MMOL/L (ref 136–145)
WBC # BLD AUTO: 4.4 X10*3/UL (ref 4.4–11.3)

## 2023-12-30 PROCEDURE — 83735 ASSAY OF MAGNESIUM: CPT | Performed by: REGISTERED NURSE

## 2023-12-30 PROCEDURE — 82947 ASSAY GLUCOSE BLOOD QUANT: CPT

## 2023-12-30 PROCEDURE — 97162 PT EVAL MOD COMPLEX 30 MIN: CPT | Mod: GP

## 2023-12-30 PROCEDURE — C9113 INJ PANTOPRAZOLE SODIUM, VIA: HCPCS | Performed by: REGISTERED NURSE

## 2023-12-30 PROCEDURE — 82330 ASSAY OF CALCIUM: CPT

## 2023-12-30 PROCEDURE — 74250 X-RAY XM SM INT 1CNTRST STD: CPT

## 2023-12-30 PROCEDURE — 2500000005 HC RX 250 GENERAL PHARMACY W/O HCPCS: Performed by: REGISTERED NURSE

## 2023-12-30 PROCEDURE — 2550000001 HC RX 255 CONTRASTS

## 2023-12-30 PROCEDURE — 1200000002 HC GENERAL ROOM WITH TELEMETRY DAILY

## 2023-12-30 PROCEDURE — 74250 X-RAY XM SM INT 1CNTRST STD: CPT | Performed by: RADIOLOGY

## 2023-12-30 PROCEDURE — 85027 COMPLETE CBC AUTOMATED: CPT | Performed by: REGISTERED NURSE

## 2023-12-30 PROCEDURE — 87506 IADNA-DNA/RNA PROBE TQ 6-11: CPT | Mod: ELYLAB | Performed by: REGISTERED NURSE

## 2023-12-30 PROCEDURE — 36415 COLL VENOUS BLD VENIPUNCTURE: CPT

## 2023-12-30 PROCEDURE — 2500000004 HC RX 250 GENERAL PHARMACY W/ HCPCS (ALT 636 FOR OP/ED): Performed by: REGISTERED NURSE

## 2023-12-30 PROCEDURE — 96372 THER/PROPH/DIAG INJ SC/IM: CPT | Performed by: REGISTERED NURSE

## 2023-12-30 PROCEDURE — 80053 COMPREHEN METABOLIC PANEL: CPT | Performed by: REGISTERED NURSE

## 2023-12-30 PROCEDURE — 99233 SBSQ HOSP IP/OBS HIGH 50: CPT

## 2023-12-30 PROCEDURE — 36415 COLL VENOUS BLD VENIPUNCTURE: CPT | Performed by: REGISTERED NURSE

## 2023-12-30 PROCEDURE — 87493 C DIFF AMPLIFIED PROBE: CPT | Performed by: REGISTERED NURSE

## 2023-12-30 RX ORDER — ENOXAPARIN SODIUM 100 MG/ML
40 INJECTION SUBCUTANEOUS DAILY
Status: DISCONTINUED | OUTPATIENT
Start: 2023-12-31 | End: 2023-12-31

## 2023-12-30 RX ADMIN — DIATRIZOATE MEGLUMINE AND DIATRIZOATE SODIUM 60 ML: 660; 100 LIQUID ORAL; RECTAL at 15:15

## 2023-12-30 RX ADMIN — DIATRIZOATE MEGLUMINE AND DIATRIZOATE SODIUM 60 ML: 660; 100 LIQUID ORAL; RECTAL at 14:30

## 2023-12-30 RX ADMIN — FUROSEMIDE 10 MG: 10 INJECTION, SOLUTION INTRAMUSCULAR; INTRAVENOUS at 08:51

## 2023-12-30 RX ADMIN — ENOXAPARIN SODIUM 80 MG: 80 INJECTION SUBCUTANEOUS at 08:51

## 2023-12-30 RX ADMIN — ASCORBIC ACID, VITAMIN A PALMITATE, CHOLECALCIFEROL, THIAMINE HYDROCHLORIDE, RIBOFLAVIN-5 PHOSPHATE SODIUM, PYRIDOXINE HYDROCHLORIDE, NIACINAMIDE, DEXPANTHENOL, ALPHA-TOCOPHEROL ACETATE, VITAMIN K1, FOLIC ACID, BIOTIN, CYANOCOBALAMIN: 200; 3300; 200; 6; 3.6; 6; 40; 15; 10; 150; 600; 60; 5 INJECTION, SOLUTION INTRAVENOUS at 20:25

## 2023-12-30 RX ADMIN — PANTOPRAZOLE SODIUM 40 MG: 40 INJECTION, POWDER, FOR SOLUTION INTRAVENOUS at 08:51

## 2023-12-30 RX ADMIN — DIATRIZOATE MEGLUMINE AND DIATRIZOATE SODIUM 60 ML: 660; 100 LIQUID ORAL; RECTAL at 14:00

## 2023-12-30 RX ADMIN — DIATRIZOATE MEGLUMINE AND DIATRIZOATE SODIUM 60 ML: 660; 100 LIQUID ORAL; RECTAL at 15:00

## 2023-12-30 RX ADMIN — DIATRIZOATE MEGLUMINE AND DIATRIZOATE SODIUM 60 ML: 660; 100 LIQUID ORAL; RECTAL at 14:15

## 2023-12-30 RX ADMIN — METOPROLOL TARTRATE 2.5 MG: 5 INJECTION INTRAVENOUS at 20:26

## 2023-12-30 RX ADMIN — LEVOTHYROXINE SODIUM 38 MCG: 20 INJECTION, SOLUTION INTRAVENOUS at 08:51

## 2023-12-30 RX ADMIN — DIATRIZOATE MEGLUMINE AND DIATRIZOATE SODIUM 60 ML: 660; 100 LIQUID ORAL; RECTAL at 14:45

## 2023-12-30 ASSESSMENT — COGNITIVE AND FUNCTIONAL STATUS - GENERAL
STANDING UP FROM CHAIR USING ARMS: A LOT
STANDING UP FROM CHAIR USING ARMS: TOTAL
DRESSING REGULAR LOWER BODY CLOTHING: A LOT
MOVING FROM LYING ON BACK TO SITTING ON SIDE OF FLAT BED WITH BEDRAILS: A LOT
MOVING TO AND FROM BED TO CHAIR: A LOT
DAILY ACTIVITIY SCORE: 12
HELP NEEDED FOR BATHING: A LOT
MOVING FROM LYING ON BACK TO SITTING ON SIDE OF FLAT BED WITH BEDRAILS: A LOT
EATING MEALS: A LOT
WALKING IN HOSPITAL ROOM: TOTAL
MOVING TO AND FROM BED TO CHAIR: A LOT
STANDING UP FROM CHAIR USING ARMS: A LOT
DRESSING REGULAR UPPER BODY CLOTHING: A LOT
MOBILITY SCORE: 8
MOBILITY SCORE: 12
TURNING FROM BACK TO SIDE WHILE IN FLAT BAD: A LOT
CLIMB 3 TO 5 STEPS WITH RAILING: A LOT
DAILY ACTIVITIY SCORE: 12
TURNING FROM BACK TO SIDE WHILE IN FLAT BAD: A LOT
TURNING FROM BACK TO SIDE WHILE IN FLAT BAD: A LOT
CLIMB 3 TO 5 STEPS WITH RAILING: TOTAL
DRESSING REGULAR LOWER BODY CLOTHING: A LOT
WALKING IN HOSPITAL ROOM: A LOT
DRESSING REGULAR UPPER BODY CLOTHING: A LOT
PERSONAL GROOMING: A LOT
HELP NEEDED FOR BATHING: A LOT
TOILETING: A LOT
TOILETING: A LOT
MOVING TO AND FROM BED TO CHAIR: TOTAL
EATING MEALS: A LOT
MOBILITY SCORE: 12
PERSONAL GROOMING: A LOT
CLIMB 3 TO 5 STEPS WITH RAILING: A LOT
MOVING FROM LYING ON BACK TO SITTING ON SIDE OF FLAT BED WITH BEDRAILS: A LOT
WALKING IN HOSPITAL ROOM: A LOT

## 2023-12-30 ASSESSMENT — PAIN SCALES - GENERAL
PAINLEVEL_OUTOF10: 3
PAINLEVEL_OUTOF10: 1
PAINLEVEL_OUTOF10: 1

## 2023-12-30 ASSESSMENT — PAIN - FUNCTIONAL ASSESSMENT: PAIN_FUNCTIONAL_ASSESSMENT: 0-10

## 2023-12-30 NOTE — CARE PLAN
The clinical goals for the shift include Remain hemodynamilcally stable      Problem: Skin  Goal: Decreased wound size/increased tissue granulation at next dressing change  Outcome: Progressing  Flowsheets (Taken 12/30/2023 1414)  Decreased wound size/increased tissue granulation at next dressing change:   Promote sleep for wound healing   Protective dressings over bony prominences  Goal: Participates in plan/prevention/treatment measures  Outcome: Progressing  Flowsheets (Taken 12/30/2023 1414)  Participates in plan/prevention/treatment measures:   Discuss with provider PT/OT consult   Elevate heels  Goal: Prevent/manage excess moisture  Outcome: Progressing  Flowsheets (Taken 12/30/2023 1414)  Prevent/manage excess moisture:   Moisturize dry skin   Follow provider orders for dressing changes   Monitor for/manage infection if present  Goal: Prevent/minimize sheer/friction injuries  Outcome: Progressing  Flowsheets (Taken 12/30/2023 1414)  Prevent/minimize sheer/friction injuries:   HOB 30 degrees or less   Increase activity/out of bed for meals   Turn/reposition every 2 hours/use positioning/transfer devices   Use pull sheet  Goal: Promote/optimize nutrition  Outcome: Progressing  Flowsheets (Taken 12/30/2023 1414)  Promote/optimize nutrition: Monitor/record intake including meals  Goal: Promote skin healing  Outcome: Progressing  Flowsheets (Taken 12/30/2023 1414)  Promote skin healing:   Assess skin/pad under line(s)/device(s)   Ensure correct size (line/device) and apply per  instructions   Protective dressings over bony prominences   Rotate device position/do not position patient on device   Turn/reposition every 2 hours/use positioning/transfer devices     Problem: Pain  Goal: Takes deep breaths with improved pain control throughout the shift  Outcome: Progressing  Goal: Turns in bed with improved pain control throughout the shift  Outcome: Progressing  Goal: Walks with improved pain control  throughout the shift  Outcome: Progressing  Goal: Performs ADL's with improved pain control throughout shift  Outcome: Progressing  Goal: Participates in PT with improved pain control throughout the shift  Outcome: Progressing  Goal: Free from opioid side effects throughout the shift  Outcome: Progressing  Goal: Free from acute confusion related to pain meds throughout the shift  Outcome: Progressing     Problem: Nutrition  Goal: Less than 5 days NPO/clear liquids  Outcome: Progressing  Goal: BG  mg/dL  Outcome: Progressing  Goal: Lab values WNL  Outcome: Progressing  Goal: Electrolytes WNL  Outcome: Progressing  Goal: Promote healing  Outcome: Progressing  Goal: Maintain stable weight  Outcome: Progressing  Goal: Reduce weight from edema/fluid  Outcome: Progressing  Goal: Gradual weight gain  Outcome: Progressing     Problem: Diabetes  Goal: Achieve decreasing blood glucose levels by end of shift  Outcome: Progressing  Goal: Increase stability of blood glucose readings by end of shift  Outcome: Progressing  Goal: Decrease in ketones present in urine by end of shift  Outcome: Progressing  Goal: Maintain electrolyte levels within acceptable range throughout shift  Outcome: Progressing  Goal: Maintain glucose levels >70mg/dl to <250mg/dl throughout shift  Outcome: Progressing  Goal: No changes in neurological exam by end of shift  Outcome: Progressing  Goal: Learn about and adhere to nutrition recommendations by end of shift  Outcome: Progressing  Goal: Vital signs within normal range for age by end of shift  Outcome: Progressing  Goal: Increase self care and/or family involovement by end of shift  Outcome: Progressing  Goal: Receive DSME education by end of shift  Outcome: Progressing     Problem: Nutrition  Goal: Oral intake greater than 50%  Outcome: Not Progressing  Goal: Oral intake greater 75%  Outcome: Not Progressing  Goal: Consume prescribed supplement  Outcome: Not Progressing  Goal: Adequate PO fluid  intake  Outcome: Not Progressing  Goal: Nutrition support goals are met within 48 hrs  Outcome: Not Progressing  Goal: Nutrition support is meeting 75% of nutrient needs  Outcome: Not Progressing  Goal: Tube feed tolerance  Outcome: Not Progressing

## 2023-12-30 NOTE — PROGRESS NOTES
"Hraper Lara is a 78 y.o. female on day 1 of admission presenting with SBO (small bowel obstruction) (CMS/HCC).     Subjective   78-year-old female laying in bed.  Nurse at bedside.  Patient is ill-appearing.  She states that she has abdominal tenderness, denies nausea, vomiting, chest pain, shortness of breath.  No acute events overnight.       Objective     Physical Exam  Vitals and nursing note reviewed.   Constitutional:       Appearance: She is ill-appearing.   HENT:      Head: Normocephalic and atraumatic.      Nose: Nose normal.      Mouth/Throat:      Mouth: Mucous membranes are dry.   Cardiovascular:      Rate and Rhythm: Normal rate and regular rhythm.      Pulses: Normal pulses.      Heart sounds: Normal heart sounds, S1 normal and S2 normal.   Pulmonary:      Effort: Pulmonary effort is normal.      Breath sounds: Normal breath sounds.   Abdominal:      General: Bowel sounds are normal.      Palpations: Abdomen is soft.      Tenderness: There is abdominal tenderness.      Comments: Healing abdominal incision.  Distal end of incision with skin dehiscence.  Packed with wet-to-dry dressing and covered with ABD.   Musculoskeletal:         General: Normal range of motion.   Skin:     General: Skin is warm and dry.      Capillary Refill: Capillary refill takes 2 to 3 seconds.      Coloration: Skin is pale.   Neurological:      General: No focal deficit present.      Mental Status: She is alert and oriented to person, place, and time.   Psychiatric:         Mood and Affect: Mood normal.         Behavior: Behavior normal. Behavior is cooperative.         Last Recorded Vitals  Blood pressure 117/55, pulse 60, temperature 36.4 °C (97.5 °F), resp. rate 16, height 1.651 m (5' 5\"), weight 79.6 kg (175 lb 7.8 oz), SpO2 99 %.  Intake/Output last 3 Shifts:  I/O last 3 completed shifts:  In: 1823.8 (22.9 mL/kg) [I.V.:1278.8 (16.1 mL/kg)]  Out: 1750 (22 mL/kg) [Urine:1650 (0.6 mL/kg/hr); Emesis/NG output:100]  Weight: " 79.6 kg     Medications  Scheduled medications  enoxaparin, 1 mg/kg, subcutaneous, Daily  furosemide, 10 mg, intravenous, Daily  levothyroxine, 38 mcg, intravenous, q24h ORLY  metoprolol, 2.5 mg, intravenous, q6h  pantoprazole, 40 mg, intravenous, Daily      Continuous medications  Adult Clinimix Parenteral Nutrition, 70 mL/hr, Last Rate: 70 mL/hr (12/29/23 2142)  dextrose 5 % in water (D5W), 75 mL/hr, Last Rate: Stopped (12/30/23 0700)      PRN medications  PRN medications: dextrose 10 % in water (D10W), dextrose, glucagon, HYDROmorphone   Relevant Results  Results for orders placed or performed during the hospital encounter of 12/29/23 (from the past 24 hour(s))   POCT GLUCOSE   Result Value Ref Range    POCT Glucose 75 74 - 99 mg/dL   Lavender Top   Result Value Ref Range    Extra Tube Hold for add-ons.    Basic Metabolic Panel   Result Value Ref Range    Glucose 88 74 - 99 mg/dL    Sodium 135 (L) 136 - 145 mmol/L    Potassium 4.7 3.5 - 5.3 mmol/L    Chloride 108 (H) 98 - 107 mmol/L    Bicarbonate 19 (L) 21 - 32 mmol/L    Anion Gap 13 10 - 20 mmol/L    Urea Nitrogen 24 (H) 6 - 23 mg/dL    Creatinine 0.75 0.50 - 1.05 mg/dL    eGFR 83 >60 mL/min/1.73m*2    Calcium 6.6 (L) 8.6 - 10.3 mg/dL   Magnesium   Result Value Ref Range    Magnesium 1.81 1.60 - 2.40 mg/dL   POCT GLUCOSE   Result Value Ref Range    POCT Glucose 102 (H) 74 - 99 mg/dL   POCT GLUCOSE   Result Value Ref Range    POCT Glucose 110 (H) 74 - 99 mg/dL   CBC   Result Value Ref Range    WBC 4.4 4.4 - 11.3 x10*3/uL    nRBC 0.0 0.0 - 0.0 /100 WBCs    RBC 2.70 (L) 4.00 - 5.20 x10*6/uL    Hemoglobin 7.6 (L) 12.0 - 16.0 g/dL    Hematocrit 23.9 (L) 36.0 - 46.0 %    MCV 89 80 - 100 fL    MCH 28.1 26.0 - 34.0 pg    MCHC 31.8 (L) 32.0 - 36.0 g/dL    RDW 15.1 (H) 11.5 - 14.5 %    Platelets 250 150 - 450 x10*3/uL   Comprehensive Metabolic Panel   Result Value Ref Range    Glucose 140 (H) 74 - 99 mg/dL    Sodium 134 (L) 136 - 145 mmol/L    Potassium 4.1 3.5 - 5.3  mmol/L    Chloride 106 98 - 107 mmol/L    Bicarbonate 22 21 - 32 mmol/L    Anion Gap 10 10 - 20 mmol/L    Urea Nitrogen 25 (H) 6 - 23 mg/dL    Creatinine 0.79 0.50 - 1.05 mg/dL    eGFR 78 >60 mL/min/1.73m*2    Calcium 7.1 (L) 8.6 - 10.3 mg/dL    Albumin 2.0 (L) 3.4 - 5.0 g/dL    Alkaline Phosphatase 65 33 - 136 U/L    Total Protein 4.3 (L) 6.4 - 8.2 g/dL    AST 20 9 - 39 U/L    Bilirubin, Total 0.3 0.0 - 1.2 mg/dL    ALT 14 7 - 45 U/L   Magnesium   Result Value Ref Range    Magnesium 1.74 1.60 - 2.40 mg/dL   POCT GLUCOSE   Result Value Ref Range    POCT Glucose 133 (H) 74 - 99 mg/dL          Malnutrition Diagnosis Status: New  Malnutrition Diagnosis: Severe malnutrition related to acute disease or injury  As Evidenced by: moderate muscle wasting, moderate fat loss, >5% wt loss in 1 month  I agree with the dietitian's malnutrition diagnosis.      Assessment/Plan   78-year-old female admitted for recurrent SBO.  The patient had approximately 350 mL out in her NG canister overnight.  Today she is ill-appearing but pleasant.  She does have abdominal tenderness upon palpation.  Spoke with the nurse about twice a day dressing changes wet-to-dry in area of wound dehiscence.  Patient is currently on PPN per dietary recommendations.    Principal Problem:    SBO (small bowel obstruction) (CMS/[facility])    Plan  - Appreciate dietary recommendations.  PPN day 1.  Consider TPN if patient will be without nutrition for greater than 5 days.  - SBFT with Gastrografin today  - Continue wet-to-dry dressings twice daily  - Trend CBC and electrolytes daily  -Ionized calcium  - DVT prophylaxis-Lovenox  - NPO/IVF/NG    Time spent  45  minutes obtaining labs, imaging, recommendations, interview, assessment, examination, medication review/ordering, and EMR review.    Plan of care was discussed extensively with patient. Patient verbalized understanding through teach back method. All questions and concerns addressed upon examination.     Of note,  this documentation is completed using the Dragon Dictation system (voice recognition software). There may be spelling and/or grammatical errors that were not corrected prior to final submission.              Tiffany Judge, APRN-CNP

## 2023-12-30 NOTE — CARE PLAN
The patient's goals for the shift include      The clinical goals for the shift include Patient will remain hemodynamically stable throughout shift    Patient is progressing toward goals

## 2023-12-30 NOTE — PROGRESS NOTES
Physical Therapy    Physical Therapy Evaluation    Patient Name: Harper Lara  MRN: [id number]  Today's Date: 12/30/2023   Time Calculation  Start Time: 0933  Stop Time: 0948  Time Calculation (min): 15 min    Assessment/Plan   PT Assessment  PT Assessment Results: Decreased strength, Decreased endurance, Impaired balance, Decreased mobility  Rehab Prognosis: Fair  Barriers to Discharge: decreased strength, pain  Evaluation/Treatment Tolerance: Patient limited by fatigue  Medical Staff Made Aware: Yes  End of Session Communication: Bedside nurse  Assessment Comment: P tlimited by weakness and increaesd fatigue. Requires max A with all bed mobility and unable to tolerate transfer this date.  End of Session Patient Position: Bed, 3 rail up, Alarm off, not on at start of session  IP OR SWING BED PT PLAN  Inpatient or Swing Bed: Inpatient  PT Plan  Treatment/Interventions: Bed mobility, Transfer training  PT Plan: Skilled PT  PT Frequency: 3 times per week  PT Discharge Recommendations: Moderate intensity level of continued care  PT Recommended Transfer Status: Assist x2      Subjective     General Visit Information:  General  Reason for Referral: impaired mobility  Referred By: ROLAND Kemp CNP (PT/OT 12/29)  Past Medical History Relevant to Rehab: HTN, dementia, depression, anxiety, depression, hypothyroidism, DM, Afib  Family/Caregiver Present: No  Prior to Session Communication: Bedside nurse  Patient Position Received: Bed, 3 rail up, Alarm off, not on at start of session  General Comment: Pt admitted from SNF with AMS and concern for hypoglycemia. Surgery following concern for recurrent bowel obstruction. + cdiff, hgb 7.6, CXR - elevation L hemidiaphagm with bibasilar airspace opacities, superimposed infeciton not excluded. head Ct- negative; abd CT- post surgical bowel rescetion, concerning for recurrent bowel obstruction, multiple fluid filled loops and fluid collection.    Home Living:  Home Living  Home Living  Comments: Admitted from SNF, previously home iwth  in one story home with one step to enter.    Prior Level of Function:  Prior Function Per Pt/Caregiver Report  Prior Function Comments: reports transferring with SPT to W/C at facility. Previously ambulating with WW. Denies falls.    Precautions:  Precautions  Medical Precautions: Fall precautions, Infection precautions  Post-Surgical Precautions: Abdominal surgery precautions    Vital Signs:     Objective     Pain:  Pain Assessment  Pain Assessment: 0-10  Pain Score: 3  Pain Type: Acute pain  Pain Location: Abdomen (also reports L hip pain sitting EOB, not rated, reprots hx of OA)    Cognition:  Cognition  Overall Cognitive Status: Within Functional Limits  Orientation Level: Oriented X4    General Assessments:  General Observation  General Observation: Pt agreeable to PT evaluation. IV, tele, NG, Purewick      Sensation  Sensation Comment: B/L LE WFL  Strength  Strength Comments: B/L UE WFL for PT >3/5 MMT, B/L hip flex NT, knee ext 3+/5, DF 4-/5 MMT        Postural Control  Posture Comment: forward head and shoudlers  Static Sitting Balance  Static Sitting-Comment/Number of Minutes: Poor  Dynamic Sitting Balance  Dynamic Sitting-Comments: Poor       Functional Assessments:     Bed Mobility  Bed Mobility: Yes  Bed Mobility 1  Bed Mobility Comments 1: Max A x1 sup to sit and sit to supe this date. cues for log roll due to abdominal precuations. LImited by pain. tolerates sitting EOB <5 minutes with R lateral lean due to L hip pain (OA).  Transfers  Transfer: Yes  Transfer 1  Trials/Comments 1: Attempted sit to stand, dependent, unable to clear buttock from bed.             Extremity/Trunk Assessments:  RUE   RUE : Within Functional Limits (ROM)  LUE   LUE: Within Functional Limits (ROM)  RLE   RLE : Within Functional Limits (ROM)  LLE   LLE : Within Functional Limits (ROM)    Outcome Measures:  [facility] Basic Mobility  Turning from your back to your side while  in a flat bed without using bedrails: A lot  Moving from lying on your back to sitting on the side of a flat bed without using bedrails: A lot  Moving to and from bed to chair (including a wheelchair): Total  Standing up from a chair using your arms (e.g. wheelchair or bedside chair): Total  To walk in hospital room: Total  Climbing 3-5 steps with railing: Total  Basic Mobility - Total Score: 8          Goals:  Encounter Problems       Encounter Problems (Active)       PT Problem       Pt will demonstrate min A with all bed mobility   (Progressing)       Start:  12/30/23    Expected End:  01/13/24            Pt will demonstrate sit to stand and bed/chair transfers with mod A with WW.   (Progressing)       Start:  12/30/23    Expected End:  01/13/24            Pt will tolerate sitting EOB >5 minutes with SBA with no LOB to progress to functional transfers (Progressing)       Start:  12/30/23    Expected End:  01/13/24            Pt will tolerate 15 reps x2 LE therapeutic exercise for LE strengthening and endurance  (Progressing)       Start:  12/30/23    Expected End:  01/13/24                 Education Documentation  Precautions, taught by Valerie Oliveira, PT at 12/30/2023 10:52 AM.  Learner: Patient  Readiness: Acceptance  Method: Explanation  Response: Needs Reinforcement, Demonstrated Understanding  Comment: Pt educated on mobility and safety. edcuated on abdominal precautions.    Mobility Training, taught by Valerie Oliveira, PT at 12/30/2023 10:52 AM.  Learner: Patient  Readiness: Acceptance  Method: Explanation  Response: Needs Reinforcement, Demonstrated Understanding  Comment: Pt educated on mobility and safety. edcuated on abdominal precautions.

## 2023-12-30 NOTE — CARE PLAN
The patient's goals for the shift include      The clinical goals for the shift include remain hemodynamically stable  Patient is progressing toward goals

## 2023-12-30 NOTE — PROGRESS NOTES
"Daily Progress Note    Harper Lara is a 78 y.o. female on day 1 of admission presenting with SBO (small bowel obstruction) (CMS/HCC).    Subjective   Pt seen resting comfortably.  NG clamped at this time.  Pt tolerating PPN, glucose continues to improve.  Denies SOB or chest pain.       Objective     Physical Exam  Constitutional:       Appearance: She is ill-appearing.   HENT:      Head: Normocephalic.      Mouth/Throat:      Mouth: Mucous membranes are moist.   Eyes:      Pupils: Pupils are equal, round, and reactive to light.   Cardiovascular:      Rate and Rhythm: Normal rate and regular rhythm.      Heart sounds: Normal heart sounds, S1 normal and S2 normal.   Pulmonary:      Effort: Pulmonary effort is normal.      Breath sounds: Normal breath sounds.   Abdominal:      General: Bowel sounds are normal. There is distension.      Palpations: Abdomen is soft.      Comments: NG to left nares   Musculoskeletal:         General: Normal range of motion.      Cervical back: Neck supple.   Skin:     General: Skin is warm.   Neurological:      Mental Status: She is alert and oriented to person, place, and time.      Motor: Weakness present.   Psychiatric:         Mood and Affect: Mood normal.         Behavior: Behavior normal.     Last Recorded Vitals  Blood pressure 117/55, pulse 60, temperature 36.4 °C (97.5 °F), resp. rate 16, height 1.651 m (5' 5\"), weight 79.6 kg (175 lb 7.8 oz), SpO2 99 %.  Intake/Output last 3 Shifts:  I/O last 3 completed shifts:  In: 1823.8 (22.9 mL/kg) [I.V.:1278.8 (16.1 mL/kg)]  Out: 1750 (22 mL/kg) [Urine:1650 (0.6 mL/kg/hr); Emesis/NG output:100]  Weight: 79.6 kg     Medications  Scheduled medications  enoxaparin, 1 mg/kg, subcutaneous, Daily  furosemide, 10 mg, intravenous, Daily  levothyroxine, 38 mcg, intravenous, q24h ORLY  metoprolol, 2.5 mg, intravenous, q6h  pantoprazole, 40 mg, intravenous, Daily      Continuous medications  Adult Clinimix Parenteral Nutrition, 70 mL/hr, Last " Rate: 70 mL/hr (12/29/23 2142)  dextrose 5 % in water (D5W), 75 mL/hr, Last Rate: Stopped (12/30/23 0700)      PRN medications  PRN medications: dextrose 10 % in water (D10W), dextrose, glucagon, HYDROmorphone    Labs  CBC:   Results from last 7 days   Lab Units 12/30/23  0431 12/29/23  0050 12/26/23  0428   WBC AUTO x10*3/uL 4.4 8.9 4.4   RBC AUTO x10*6/uL 2.70* 3.28* 2.61*   HEMOGLOBIN g/dL 7.6* 9.4* 7.5*   HEMATOCRIT % 23.9* 29.3* 23.8*   MCV fL 89 89 91   MCH pg 28.1 28.7 28.7   MCHC g/dL 31.8* 32.1 31.5*   RDW % 15.1* 14.8* 14.9*   PLATELETS AUTO x10*3/uL 250 321 234     CMP:    Results from last 7 days   Lab Units 12/30/23  0431 12/29/23  1246 12/29/23  0050   SODIUM mmol/L 134* 135* 137   POTASSIUM mmol/L 4.1 4.7 4.2   CHLORIDE mmol/L 106 108* 108*   CO2 mmol/L 22 19* 22   BUN mg/dL 25* 24* 25*   CREATININE mg/dL 0.79 0.75 0.78   GLUCOSE mg/dL 140* 88 72*   PROTEIN TOTAL g/dL 4.3*  --  4.9*   CALCIUM mg/dL 7.1* 6.6* 7.0*   BILIRUBIN TOTAL mg/dL 0.3  --  0.3   ALK PHOS U/L 65  --  79   AST U/L 20  --  26   ALT U/L 14  --  17     BMP:    Results from last 7 days   Lab Units 12/30/23  0431 12/29/23  1246 12/29/23  0050   SODIUM mmol/L 134* 135* 137   POTASSIUM mmol/L 4.1 4.7 4.2   CHLORIDE mmol/L 106 108* 108*   CO2 mmol/L 22 19* 22   BUN mg/dL 25* 24* 25*   CREATININE mg/dL 0.79 0.75 0.78   CALCIUM mg/dL 7.1* 6.6* 7.0*   GLUCOSE mg/dL 140* 88 72*     Magnesium:  Results from last 7 days   Lab Units 12/30/23  0431 12/29/23  1246 12/26/23  0428   MAGNESIUM mg/dL 1.74 1.81 1.83     Troponin:    Results from last 7 days   Lab Units 12/29/23  0050   TROPHS ng/L 8       Assessment/Plan    # Abdominal Pain   SBO s/p recent laparoscopic BRYAN and subsequent open small bowel resection   Pelvic Fluid Collection      Admit to General Surgery Team  Hospitalist Team consulted for Hypoglycemia   CT showing multiple fluid-filled dilated loops of small bowel measuring up to 4.5 cm with a transition point in the right mid abdomen  at the level of the anastomosis; there is also a rim-enhancing fluid collection measuring 8 x 4 x 6.7 x 6.1 cm  -Plan for getting a sample of this fluid collection tomorrow; ED spoke with Dr. Lozada who did not want antibiotics until he was able to get a sample of the fluid  -Continue n.p.o., NG tube  -IV fluids and pain control     # Hypoglycemia  NPO due to NG to LIWS  D5W at 75ml/hr  POCT q4 hours   Recommend changing fluids to Clnimix or PPN if patient has PICC line   Continue Hypoglycemic order set  Hold all antidiabetic oral medications and reassess when she becomes active with diet  A1C 5.2 avg 103     # Atrial Fibrillation / HTN / HLD  NPO at this time  PRN lopressor for rate control  Telemetry for arrhythmia monitoring       # Chronic Systolic CHF  40% EF last echo  Appears compensated at this time  Resume home meds when able  Continue LASIX 10mg daily IV  Daily weights  Strict intake and output      # Diarrhea  Possible related to SBO  Will get Cdiff  Stool pathogens    12/30/2023  -Pt resting comfortably  -Glucose is within normal range with PPN  -D5 stopped  -Pending C-diff results  -NG per surgery    Thank you for consult  We will continue to follow  Hemodynamically stable    Niyah Eckert, APRN-CNP         I spent 35 minutes in the professional and overall care of this patient.      Plan of care was discussed extensively with patient.  Patient verbalized understanding through teach back method.  All question and concerns addressed upon examination.    Of note, this documentation is completed using the Dragon Dictation system (voice recognition software). There may be spelling and/or grammatical errors that were not corrected prior to final submission.

## 2023-12-31 ENCOUNTER — APPOINTMENT (OUTPATIENT)
Dept: RADIOLOGY | Facility: HOSPITAL | Age: 78
DRG: 388 | End: 2023-12-31
Payer: MEDICARE

## 2023-12-31 LAB
ALBUMIN SERPL BCP-MCNC: 2.2 G/DL (ref 3.4–5)
ALP SERPL-CCNC: 63 U/L (ref 33–136)
ALT SERPL W P-5'-P-CCNC: 11 U/L (ref 7–45)
ANION GAP SERPL CALC-SCNC: 9 MMOL/L (ref 10–20)
AST SERPL W P-5'-P-CCNC: 17 U/L (ref 9–39)
BILIRUB SERPL-MCNC: 0.3 MG/DL (ref 0–1.2)
BUN SERPL-MCNC: 24 MG/DL (ref 6–23)
C COLI+JEJ+UPSA DNA STL QL NAA+PROBE: NOT DETECTED
CA-I BLD-SCNC: 1.12 MMOL/L (ref 1.1–1.33)
CA-I BLD-SCNC: 1.12 MMOL/L (ref 1.1–1.33)
CALCIUM SERPL-MCNC: 7.3 MG/DL (ref 8.6–10.3)
CHLORIDE SERPL-SCNC: 112 MMOL/L (ref 98–107)
CO2 SERPL-SCNC: 25 MMOL/L (ref 21–32)
CREAT SERPL-MCNC: 0.7 MG/DL (ref 0.5–1.05)
EC STX1 GENE STL QL NAA+PROBE: NOT DETECTED
EC STX2 GENE STL QL NAA+PROBE: NOT DETECTED
ERYTHROCYTE [DISTWIDTH] IN BLOOD BY AUTOMATED COUNT: 14.9 % (ref 11.5–14.5)
GFR SERPL CREATININE-BSD FRML MDRD: 90 ML/MIN/1.73M*2
GLUCOSE SERPL-MCNC: 143 MG/DL (ref 74–99)
HCT VFR BLD AUTO: 26.1 % (ref 36–46)
HGB BLD-MCNC: 8.1 G/DL (ref 12–16)
HOLD SPECIMEN: NORMAL
MAGNESIUM SERPL-MCNC: 1.83 MG/DL (ref 1.6–2.4)
MCH RBC QN AUTO: 28 PG (ref 26–34)
MCHC RBC AUTO-ENTMCNC: 31 G/DL (ref 32–36)
MCV RBC AUTO: 90 FL (ref 80–100)
NOROVIRUS GI + GII RNA STL NAA+PROBE: NOT DETECTED
NRBC BLD-RTO: 0 /100 WBCS (ref 0–0)
PLATELET # BLD AUTO: 247 X10*3/UL (ref 150–450)
POTASSIUM SERPL-SCNC: 3.8 MMOL/L (ref 3.5–5.3)
PROT SERPL-MCNC: 4.7 G/DL (ref 6.4–8.2)
RBC # BLD AUTO: 2.89 X10*6/UL (ref 4–5.2)
RV RNA STL NAA+PROBE: NOT DETECTED
SALMONELLA DNA STL QL NAA+PROBE: NOT DETECTED
SHIGELLA DNA SPEC QL NAA+PROBE: NOT DETECTED
SODIUM SERPL-SCNC: 142 MMOL/L (ref 136–145)
V CHOLERAE DNA STL QL NAA+PROBE: NOT DETECTED
WBC # BLD AUTO: 4 X10*3/UL (ref 4.4–11.3)
Y ENTEROCOL DNA STL QL NAA+PROBE: NOT DETECTED

## 2023-12-31 PROCEDURE — 2500000004 HC RX 250 GENERAL PHARMACY W/ HCPCS (ALT 636 FOR OP/ED): Performed by: REGISTERED NURSE

## 2023-12-31 PROCEDURE — 2500000004 HC RX 250 GENERAL PHARMACY W/ HCPCS (ALT 636 FOR OP/ED)

## 2023-12-31 PROCEDURE — 85027 COMPLETE CBC AUTOMATED: CPT | Performed by: REGISTERED NURSE

## 2023-12-31 PROCEDURE — 74018 RADEX ABDOMEN 1 VIEW: CPT | Performed by: RADIOLOGY

## 2023-12-31 PROCEDURE — 83735 ASSAY OF MAGNESIUM: CPT | Performed by: REGISTERED NURSE

## 2023-12-31 PROCEDURE — 84075 ASSAY ALKALINE PHOSPHATASE: CPT | Performed by: REGISTERED NURSE

## 2023-12-31 PROCEDURE — 96372 THER/PROPH/DIAG INJ SC/IM: CPT

## 2023-12-31 PROCEDURE — 1200000002 HC GENERAL ROOM WITH TELEMETRY DAILY

## 2023-12-31 PROCEDURE — 2500000001 HC RX 250 WO HCPCS SELF ADMINISTERED DRUGS (ALT 637 FOR MEDICARE OP)

## 2023-12-31 PROCEDURE — 36415 COLL VENOUS BLD VENIPUNCTURE: CPT | Performed by: REGISTERED NURSE

## 2023-12-31 PROCEDURE — 2500000005 HC RX 250 GENERAL PHARMACY W/O HCPCS: Performed by: REGISTERED NURSE

## 2023-12-31 PROCEDURE — 94760 N-INVAS EAR/PLS OXIMETRY 1: CPT

## 2023-12-31 PROCEDURE — C9113 INJ PANTOPRAZOLE SODIUM, VIA: HCPCS | Performed by: REGISTERED NURSE

## 2023-12-31 PROCEDURE — 99232 SBSQ HOSP IP/OBS MODERATE 35: CPT

## 2023-12-31 PROCEDURE — 74018 RADEX ABDOMEN 1 VIEW: CPT

## 2023-12-31 RX ORDER — TRIAMTERENE/HYDROCHLOROTHIAZID 37.5-25 MG
1 TABLET ORAL DAILY
Status: DISCONTINUED | OUTPATIENT
Start: 2023-12-31 | End: 2024-01-05 | Stop reason: HOSPADM

## 2023-12-31 RX ORDER — TRIAMTERENE AND HYDROCHLOROTHIAZIDE 37.5; 25 MG/1; MG/1
1 CAPSULE ORAL EVERY MORNING
Status: DISCONTINUED | OUTPATIENT
Start: 2023-12-31 | End: 2023-12-31

## 2023-12-31 RX ORDER — FLUOXETINE HYDROCHLORIDE 20 MG/1
20 CAPSULE ORAL DAILY
Status: DISCONTINUED | OUTPATIENT
Start: 2023-12-31 | End: 2024-01-05 | Stop reason: HOSPADM

## 2023-12-31 RX ORDER — AMLODIPINE BESYLATE 5 MG/1
5 TABLET ORAL DAILY
Status: DISCONTINUED | OUTPATIENT
Start: 2023-12-31 | End: 2024-01-05 | Stop reason: HOSPADM

## 2023-12-31 RX ORDER — PANTOPRAZOLE SODIUM 40 MG/1
40 TABLET, DELAYED RELEASE ORAL
Status: DISCONTINUED | OUTPATIENT
Start: 2024-01-01 | End: 2024-01-05 | Stop reason: HOSPADM

## 2023-12-31 RX ADMIN — TRIAMTERENE AND HYDROCHLOROTHIAZIDE 1 TABLET: 37.5; 25 TABLET ORAL at 16:26

## 2023-12-31 RX ADMIN — METOPROLOL TARTRATE 2.5 MG: 5 INJECTION INTRAVENOUS at 13:27

## 2023-12-31 RX ADMIN — LEVOTHYROXINE SODIUM 38 MCG: 20 INJECTION, SOLUTION INTRAVENOUS at 10:18

## 2023-12-31 RX ADMIN — PANTOPRAZOLE SODIUM 40 MG: 40 INJECTION, POWDER, FOR SOLUTION INTRAVENOUS at 10:18

## 2023-12-31 RX ADMIN — ENOXAPARIN SODIUM 40 MG: 40 INJECTION SUBCUTANEOUS at 10:18

## 2023-12-31 RX ADMIN — METOPROLOL TARTRATE 2.5 MG: 5 INJECTION INTRAVENOUS at 18:16

## 2023-12-31 RX ADMIN — AMLODIPINE BESYLATE 5 MG: 5 TABLET ORAL at 16:26

## 2023-12-31 RX ADMIN — FUROSEMIDE 10 MG: 10 INJECTION, SOLUTION INTRAMUSCULAR; INTRAVENOUS at 10:18

## 2023-12-31 RX ADMIN — METOPROLOL TARTRATE 2.5 MG: 5 INJECTION INTRAVENOUS at 06:10

## 2023-12-31 RX ADMIN — FLUOXETINE HYDROCHLORIDE 20 MG: 20 CAPSULE ORAL at 16:26

## 2023-12-31 RX ADMIN — ASCORBIC ACID, VITAMIN A PALMITATE, CHOLECALCIFEROL, THIAMINE HYDROCHLORIDE, RIBOFLAVIN-5 PHOSPHATE SODIUM, PYRIDOXINE HYDROCHLORIDE, NIACINAMIDE, DEXPANTHENOL, ALPHA-TOCOPHEROL ACETATE, VITAMIN K1, FOLIC ACID, BIOTIN, CYANOCOBALAMIN: 200; 3300; 200; 6; 3.6; 6; 40; 15; 10; 150; 600; 60; 5 INJECTION, SOLUTION INTRAVENOUS at 22:23

## 2023-12-31 ASSESSMENT — COGNITIVE AND FUNCTIONAL STATUS - GENERAL
WALKING IN HOSPITAL ROOM: A LOT
PERSONAL GROOMING: A LOT
TOILETING: A LOT
DAILY ACTIVITIY SCORE: 12
TURNING FROM BACK TO SIDE WHILE IN FLAT BAD: A LOT
MOVING FROM LYING ON BACK TO SITTING ON SIDE OF FLAT BED WITH BEDRAILS: A LOT
DRESSING REGULAR UPPER BODY CLOTHING: A LOT
WALKING IN HOSPITAL ROOM: A LOT
TURNING FROM BACK TO SIDE WHILE IN FLAT BAD: A LOT
MOBILITY SCORE: 12
TOILETING: A LOT
CLIMB 3 TO 5 STEPS WITH RAILING: A LOT
DRESSING REGULAR UPPER BODY CLOTHING: A LOT
DRESSING REGULAR LOWER BODY CLOTHING: A LOT
MOBILITY SCORE: 12
HELP NEEDED FOR BATHING: A LOT
MOVING TO AND FROM BED TO CHAIR: A LOT
MOVING FROM LYING ON BACK TO SITTING ON SIDE OF FLAT BED WITH BEDRAILS: A LOT
PERSONAL GROOMING: A LOT
MOVING TO AND FROM BED TO CHAIR: A LOT
DAILY ACTIVITIY SCORE: 12
STANDING UP FROM CHAIR USING ARMS: A LOT
EATING MEALS: A LOT
CLIMB 3 TO 5 STEPS WITH RAILING: A LOT
EATING MEALS: A LOT
HELP NEEDED FOR BATHING: A LOT
DRESSING REGULAR LOWER BODY CLOTHING: A LOT
STANDING UP FROM CHAIR USING ARMS: A LOT

## 2023-12-31 ASSESSMENT — PAIN SCALES - GENERAL
PAINLEVEL_OUTOF10: 0 - NO PAIN
PAINLEVEL_OUTOF10: 1

## 2023-12-31 ASSESSMENT — PAIN - FUNCTIONAL ASSESSMENT: PAIN_FUNCTIONAL_ASSESSMENT: 0-10

## 2023-12-31 NOTE — PROGRESS NOTES
Pt continued with NGT  and PPN at present time.  Updates have been sent to Nursing facility,  pt does not require precert to return.       Bea Greenwood RN

## 2023-12-31 NOTE — PROGRESS NOTES
"Daily Progress Note    Harper Lara is a 78 y.o. female on day 2 of admission presenting with SBO (small bowel obstruction) (CMS/HCC).    Subjective   Patient seen resting comfortably.  NG to wall suction.  Patient tolerating PPN.  Visitor at bedside.  FMS to be removed today per general surgery.  Hypoglycemia has improved with current PPN regimen.  Denies shortness of breath, chest pain, fever or chills.       Objective     Physical Exam  Constitutional:       Appearance: She is ill-appearing.   HENT:      Head: Normocephalic.      Mouth/Throat:      Mouth: Mucous membranes are moist.   Eyes:      Pupils: Pupils are equal, round, and reactive to light.   Cardiovascular:      Rate and Rhythm: Normal rate and regular rhythm.      Heart sounds: Normal heart sounds, S1 normal and S2 normal.   Pulmonary:      Effort: Pulmonary effort is normal.      Breath sounds: Normal breath sounds.   Abdominal:      General: Bowel sounds are normal. There is distension.      Palpations: Abdomen is soft.      Comments: NG to left nares   Musculoskeletal:         General: Normal range of motion.      Cervical back: Neck supple.   Skin:     General: Skin is warm.   Neurological:      Mental Status: She is alert and oriented to person, place, and time.      Motor: Weakness present.   Psychiatric:         Mood and Affect: Mood normal.         Behavior: Behavior normal.     Last Recorded Vitals  Blood pressure 146/65, pulse 74, temperature 36.4 °C (97.5 °F), resp. rate 16, height 1.651 m (5' 5\"), weight 77 kg (169 lb 12.1 oz), SpO2 100 %.  Intake/Output last 3 Shifts:  I/O last 3 completed shifts:  In: 2412.9 (31.3 mL/kg) [I.V.:1308.8 (17 mL/kg)]  Out: 2250 (29.2 mL/kg) [Urine:1950 (0.7 mL/kg/hr); Emesis/NG output:300]  Weight: 77 kg     Medications  Scheduled medications  enoxaparin, 40 mg, subcutaneous, Daily  furosemide, 10 mg, intravenous, Daily  levothyroxine, 38 mcg, intravenous, q24h ORLY  metoprolol, 2.5 mg, intravenous, " q6h  pantoprazole, 40 mg, intravenous, Daily      Continuous medications  Adult Clinimix Parenteral Nutrition, 70 mL/hr, Last Rate: 70 mL/hr (12/30/23 2025)  dextrose 5 % in water (D5W), 75 mL/hr, Last Rate: Stopped (12/30/23 0700)      PRN medications  PRN medications: dextrose 10 % in water (D10W), dextrose, glucagon, HYDROmorphone    Labs  CBC:   Results from last 7 days   Lab Units 12/31/23  0652 12/30/23  0431 12/29/23  0050   WBC AUTO x10*3/uL 4.0* 4.4 8.9   RBC AUTO x10*6/uL 2.89* 2.70* 3.28*   HEMOGLOBIN g/dL 8.1* 7.6* 9.4*   HEMATOCRIT % 26.1* 23.9* 29.3*   MCV fL 90 89 89   MCH pg 28.0 28.1 28.7   MCHC g/dL 31.0* 31.8* 32.1   RDW % 14.9* 15.1* 14.8*   PLATELETS AUTO x10*3/uL 247 250 321     CMP:    Results from last 7 days   Lab Units 12/31/23  0652 12/30/23  0431 12/29/23  1246 12/29/23  0050   SODIUM mmol/L 142 134* 135* 137   POTASSIUM mmol/L 3.8 4.1 4.7 4.2   CHLORIDE mmol/L 112* 106 108* 108*   CO2 mmol/L 25 22 19* 22   BUN mg/dL 24* 25* 24* 25*   CREATININE mg/dL 0.70 0.79 0.75 0.78   GLUCOSE mg/dL 143* 140* 88 72*   PROTEIN TOTAL g/dL 4.7* 4.3*  --  4.9*   CALCIUM mg/dL 7.3* 7.1* 6.6* 7.0*   BILIRUBIN TOTAL mg/dL 0.3 0.3  --  0.3   ALK PHOS U/L 63 65  --  79   AST U/L 17 20  --  26   ALT U/L 11 14  --  17     BMP:    Results from last 7 days   Lab Units 12/31/23  0652 12/30/23  0431 12/29/23  1246   SODIUM mmol/L 142 134* 135*   POTASSIUM mmol/L 3.8 4.1 4.7   CHLORIDE mmol/L 112* 106 108*   CO2 mmol/L 25 22 19*   BUN mg/dL 24* 25* 24*   CREATININE mg/dL 0.70 0.79 0.75   CALCIUM mg/dL 7.3* 7.1* 6.6*   GLUCOSE mg/dL 143* 140* 88     Magnesium:  Results from last 7 days   Lab Units 12/31/23  0652 12/30/23  0431 12/29/23  1246   MAGNESIUM mg/dL 1.83 1.74 1.81     Troponin:    Results from last 7 days   Lab Units 12/29/23  0050   TROPHS ng/L 8     Assessment/Plan    Abdominal Pain   SBO s/p recent laparoscopic BRYAN and subsequent open small bowel resection   Pelvic Fluid Collection    Admit to General  Surgery Team  Hospitalist Team consulted for Hypoglycemia   CT showing multiple fluid-filled dilated loops of small bowel measuring up to 4.5 cm with a transition point in the right mid abdomen at the level of the anastomosis; there is also a rim-enhancing fluid collection measuring 8 x 4 x 6.7 x 6.1 cm  -Plan for getting a sample of this fluid collection tomorrow; ED spoke with Dr. Lozada who did not want antibiotics until he was able to get a sample of the fluid  -Continue n.p.o., NG tube  -IV fluids and pain control     # Hypoglycemia  NPO due to NG to LIWS  D5W at 75ml/hr  POCT q4 hours   Recommend changing fluids to Clnimix or PPN if patient has PICC line   Continue Hypoglycemic order set  Hold all antidiabetic oral medications and reassess when she becomes active with diet  A1C 5.2 avg 103     # Atrial Fibrillation / HTN / HLD  NPO at this time  PRN lopressor for rate control  Telemetry for arrhythmia monitoring       # Chronic Systolic CHF  40% EF last echo  Appears compensated at this time  Resume home meds when able  Continue LASIX 10mg daily IV  Daily weights  Strict intake and output      # Diarrhea  Possible related to SBO  Will get Cdiff  Stool pathogens     12/30/2023  -Pt resting comfortably  -Glucose is within normal range with PPN  -D5 stopped  -Pending C-diff results  -NG per surgery    12/31/2023  -No events overnight  -Hypoglycemia improved with PPN  -Hemoglobin stable  -Will continue to follow with GEN surge    Thank you for consult  We will continue to follow  Hemodynamically stable    Niyah Eckert, APRN-CNP         I spent 35 minutes in the professional and overall care of this patient.      Plan of care was discussed extensively with patient.  Patient verbalized understanding through teach back method.  All question and concerns addressed upon examination.    Of note, this documentation is completed using the Dragon Dictation system (voice recognition software). There may be spelling  and/or grammatical errors that were not corrected prior to final submission.

## 2023-12-31 NOTE — CARE PLAN
Problem: Skin  Goal: Decreased wound size/increased tissue granulation at next dressing change  Outcome: Progressing  Goal: Participates in plan/prevention/treatment measures  Outcome: Progressing  Goal: Prevent/manage excess moisture  Outcome: Progressing  Goal: Prevent/minimize sheer/friction injuries  Outcome: Progressing  Flowsheets (Taken 12/31/2023 1733)  Prevent/minimize sheer/friction injuries:   Turn/reposition every 2 hours/use positioning/transfer devices   HOB 30 degrees or less  Goal: Promote/optimize nutrition  Outcome: Progressing  Goal: Promote skin healing  Outcome: Progressing     Problem: Pain  Goal: Takes deep breaths with improved pain control throughout the shift  Outcome: Progressing  Goal: Turns in bed with improved pain control throughout the shift  Outcome: Progressing  Goal: Walks with improved pain control throughout the shift  Outcome: Progressing  Goal: Performs ADL's with improved pain control throughout shift  Outcome: Progressing  Goal: Participates in PT with improved pain control throughout the shift  Outcome: Progressing  Goal: Free from opioid side effects throughout the shift  Outcome: Progressing  Goal: Free from acute confusion related to pain meds throughout the shift  Outcome: Progressing     Problem: Nutrition  Goal: Less than 5 days NPO/clear liquids  Outcome: Progressing  Goal: Oral intake greater than 50%  Outcome: Progressing  Goal: Oral intake greater 75%  Outcome: Progressing  Goal: Consume prescribed supplement  Outcome: Progressing  Goal: Adequate PO fluid intake  Outcome: Progressing  Goal: Nutrition support goals are met within 48 hrs  Outcome: Progressing  Goal: Nutrition support is meeting 75% of nutrient needs  Outcome: Progressing  Goal: Tube feed tolerance  Outcome: Progressing  Goal: BG  mg/dL  Outcome: Progressing  Goal: Lab values WNL  Outcome: Progressing  Goal: Electrolytes WNL  Outcome: Progressing  Goal: Promote healing  Outcome:  Progressing  Goal: Maintain stable weight  Outcome: Progressing  Goal: Reduce weight from edema/fluid  Outcome: Progressing  Goal: Gradual weight gain  Outcome: Progressing     Problem: Diabetes  Goal: Achieve decreasing blood glucose levels by end of shift  Outcome: Progressing  Goal: Increase stability of blood glucose readings by end of shift  Outcome: Progressing  Goal: Decrease in ketones present in urine by end of shift  Outcome: Progressing  Goal: Maintain electrolyte levels within acceptable range throughout shift  Outcome: Progressing  Goal: Maintain glucose levels >70mg/dl to <250mg/dl throughout shift  Outcome: Progressing  Goal: No changes in neurological exam by end of shift  Outcome: Progressing  Goal: Learn about and adhere to nutrition recommendations by end of shift  Outcome: Progressing  Goal: Vital signs within normal range for age by end of shift  Outcome: Progressing  Goal: Increase self care and/or family involovement by end of shift  Outcome: Progressing  Goal: Receive DSME education by end of shift  Outcome: Progressing   The patient's goals for the shift include      The clinical goals for the shift include Skin care maintain

## 2023-12-31 NOTE — PROGRESS NOTES
General Surgery Progress Note    Patient: Harper Lara  Unit/Bed: 1105/1105-A  YOB: 1945  MRN: [id number]  Acct: [id number]   Admitting Diagnosis: SBO (small bowel obstruction) (CMS/[facility]) [K56.609]  Date:  12/29/2023  Hospital Day: 2  Attending: Manuel Lozada MD     Chief Complaint   Patient presents with    Hypoglycemia     Pt assisted living facility states said she had an altered mental status and a blood glu of 70. Pt currently a&o X4 and has a blood glucose of 115 per ems           SUBJECTIVE    Patient seen and examined this morning. No acute events overnight. She is requesting water. She denies pain at this time and is midly tender to palpation. Patient denies chest pain, shortness of breath, nausea, vomiting, diarrhea, fever, and chills. She states she had a bowel movement this morning.    VITALS      Vitals:    12/30/23 2032 12/31/23 0546 12/31/23 0617 12/31/23 0946   BP: 141/66 138/61  146/65   BP Location: Left arm Left arm     Patient Position: Lying Lying     Pulse: 66 72  74   Resp:    16   Temp: 36.1 °C (97 °F) 36 °C (96.8 °F)  36.4 °C (97.5 °F)   TempSrc: Temporal Temporal     SpO2: 99% 95%  100%   Weight:   77 kg (169 lb 12.1 oz)    Height:           Intake/Output Summary (Last 24 hours) at 12/31/2023 1019  Last data filed at 12/30/2023 1400  Gross per 24 hour   Intake 1256.63 ml   Output 800 ml   Net 456.63 ml      Wt Readings from Last 4 Encounters:   12/31/23 77 kg (169 lb 12.1 oz)   12/25/23 84.9 kg (187 lb 2.7 oz)   08/12/19 119 kg (262 lb 5.6 oz)        Allergies:  Allergies   Allergen Reactions    Diphenhydramine Hcl Unknown    Morphine Unknown    Pentazocine Unknown    Propoxyphene Unknown    Propoxyphene N-Acetaminophen Unknown    Ranitidine Unknown    Strawberry Hives        PHYSICAL EXAM   Physical Exam  Constitutional:       General: She is not in acute distress.     Appearance: She is ill-appearing.   HENT:      Nose:      Comments: Ng tube functioning with 200 mL out  overnight     Mouth/Throat:      Mouth: Mucous membranes are dry.   Cardiovascular:      Rate and Rhythm: Normal rate and regular rhythm.      Pulses: Normal pulses.   Pulmonary:      Effort: Pulmonary effort is normal.      Breath sounds: Normal breath sounds.   Abdominal:      General: Abdomen is flat.      Palpations: Abdomen is soft.      Tenderness: There is abdominal tenderness.   Musculoskeletal:         General: Normal range of motion.   Skin:     General: Skin is warm and dry.      Capillary Refill: Capillary refill takes less than 2 seconds.   Neurological:      Mental Status: She is alert and oriented to person, place, and time.   Psychiatric:         Mood and Affect: Mood normal.       LABS   CBC:   Results from last 7 days   Lab Units 12/31/23  0652 12/30/23  0431 12/29/23  0050   WBC AUTO x10*3/uL 4.0* 4.4 8.9   RBC AUTO x10*6/uL 2.89* 2.70* 3.28*   HEMOGLOBIN g/dL 8.1* 7.6* 9.4*   HEMATOCRIT % 26.1* 23.9* 29.3*   MCV fL 90 89 89   MCH pg 28.0 28.1 28.7   MCHC g/dL 31.0* 31.8* 32.1   RDW % 14.9* 15.1* 14.8*   PLATELETS AUTO x10*3/uL 247 250 321     CMP:    Results from last 7 days   Lab Units 12/31/23  0652 12/30/23  0431 12/29/23  1246 12/29/23  0050   SODIUM mmol/L 142 134* 135* 137   POTASSIUM mmol/L 3.8 4.1 4.7 4.2   CHLORIDE mmol/L 112* 106 108* 108*   CO2 mmol/L 25 22 19* 22   BUN mg/dL 24* 25* 24* 25*   CREATININE mg/dL 0.70 0.79 0.75 0.78   GLUCOSE mg/dL 143* 140* 88 72*   PROTEIN TOTAL g/dL 4.7* 4.3*  --  4.9*   CALCIUM mg/dL 7.3* 7.1* 6.6* 7.0*   BILIRUBIN TOTAL mg/dL 0.3 0.3  --  0.3   ALK PHOS U/L 63 65  --  79   AST U/L 17 20  --  26   ALT U/L 11 14  --  17     BMP:    Results from last 7 days   Lab Units 12/31/23  0652 12/30/23  0431 12/29/23  1246   SODIUM mmol/L 142 134* 135*   POTASSIUM mmol/L 3.8 4.1 4.7   CHLORIDE mmol/L 112* 106 108*   CO2 mmol/L 25 22 19*   BUN mg/dL 24* 25* 24*   CREATININE mg/dL 0.70 0.79 0.75   CALCIUM mg/dL 7.3* 7.1* 6.6*   GLUCOSE mg/dL 143* 140* 88      Magnesium:  Results from last 7 days   Lab Units 12/31/23  0652 12/30/23  0431 12/29/23  1246   MAGNESIUM mg/dL 1.83 1.74 1.81     Troponin:    Results from last 7 days   Lab Units 12/29/23  0050   TROPHS ng/L 8     Lipase:   Lab Results   Component Value Date    LIPASE 165 (H) 12/29/2023        enoxaparin, 40 mg, subcutaneous, Daily  furosemide, 10 mg, intravenous, Daily  levothyroxine, 38 mcg, intravenous, q24h ORLY  metoprolol, 2.5 mg, intravenous, q6h  pantoprazole, 40 mg, intravenous, Daily       Adult Clinimix Parenteral Nutrition, 70 mL/hr, Last Rate: 70 mL/hr (12/30/23 2025)  dextrose 5 % in water (D5W), 75 mL/hr, Last Rate: Stopped (12/30/23 0700)       Current Outpatient Medications   Medication Instructions    amLODIPine (NORVASC) 5 mg, oral, Daily    atorvastatin (LIPITOR) 40 mg, oral, Daily    benztropine (COGENTIN) 1 mg, oral, Daily    carvedilol (Coreg) 12.5 mg tablet oral, 2 times daily    cholecalciferol (Vitamin D-3) 10 MCG (400 UNIT) tablet 1 tablet, oral, Daily    cyanocobalamin (VITAMIN B-12) 1,000 mcg, oral, Daily    docosahexaenoic acid/epa (FISH OIL ORAL) 1 capsule, oral, Daily    FLUoxetine (PROzac) 20 mg tablet 1 tablet, oral, Daily    furosemide (Lasix) 20 mg tablet 1 tablet, oral, Daily    gentamicin (Garamycin) 0.1 % ointment 1 Application, Topical, 3 times daily, To bed sores    glipiZIDE (Glucotrol) 10 mg tablet 1 tab(s) orally 2 times a day (before meals)    levothyroxine (Synthroid, Levoxyl) 75 mcg tablet 1 tablet, oral, Daily    magnesium oxide (MAG-OX) 400 mg, oral, Daily    metFORMIN (GLUCOPHAGE) 500 mg, oral, 2 times daily with meals    multivitamin tablet 1 tablet, oral, Daily    nystatin (Mycostatin) 100,000 unit/gram powder 1 Application, Topical, As needed    ondansetron ODT (ZOFRAN-ODT) 4 mg, oral, Every 8 hours PRN    oxyCODONE (ROXICODONE) 5 mg, oral, Every 6 hours PRN    pantoprazole (PROTONIX) 40 mg, oral, Daily before breakfast, Do not crush, chew, or split.     rivaroxaban (XARELTO) 15 mg, oral, Daily with evening meal, Take with food.    triamterene-hydrochlorothiazid (Dyazide) 37.5-25 mg capsule 1 capsule, oral, Every morning        CT abdomen pelvis w IV contrast    Result Date: 12/29/2023  Interpreted By:  Saul Aguilar, STUDY: CT ABDOMEN PELVIS W IV CONTRAST;  12/29/2023 1:42 am   INDICATION: Signs/Symptoms:recent ex-lap. AMS at nursing home. eval for recurrent obstruction. midline incision open at inferior aspect with minimal drainage. eval for seroma.   COMPARISON: CT scan of the abdomen pelvis 12/16/2023.   ACCESSION NUMBER(S): [id number]   ORDERING CLINICIAN: MARI SILVA   TECHNIQUE: Axial CT images of the abdomen and pelvis with coronal and sagittal reconstructed images obtained after intravenous administration of 75 mL of Omnipaque 350.   FINDINGS: LOWER CHEST: Small to moderate bilateral pleural effusions with adjacent airspace opacity favored to relate to compressive atelectasis. Superimposed infection not excluded. Aortic root calcifications noted.   ABDOMEN:   LIVER: Within normal limits. BILE DUCTS: Mild central biliary ductal prominence is likely postsurgical. GALLBLADDER: Status post cholecystectomy. PANCREAS: Moderate fatty atrophy of the pancreas. SPLEEN: Within normal limits. ADRENALS: Within normal limits. KIDNEYS and URETERS: Symmetric renal enhancement. No hydronephrosis or perinephric fluid collection. Subcentimeter hypodense foci are too small to characterize.   VESSELS:  Calcific atherosclerosis of the aortoiliac and mesenteric vessels. No aortic aneurysm. RETROPERITONEUM: No pathologically enlarged retroperitoneal lymph nodes.   PELVIS:   REPRODUCTIVE ORGANS: Uterus is surgically absent. No adnexal mass. BLADDER: Bladder is partially distended. Foci of air noted in the bladder. Correlate for history of recent instrumentation. In the absence of such history findings raise concern for infection.   BOWEL: Moderate distention of the stomach with  air-fluid level. Large duodenal diverticulum noted. Postsurgical changes of partial bowel resection and anastomosis in the right mid abdomen. There are multiple fluid-filled dilated loops of small bowel measuring up to 4.5 cm. The transition point appears to be in the right mid abdomen at level of the anastomosis with the distal small bowel loops somewhat decompressed. These findings are concerning for developing versus partial bowel obstruction. Air-fluid levels noted in the colon which can be seen in the setting of infectious/inflammatory colitis. No pneumatosis or portal venous gas. Appendix is not identified with certainty. No pericecal inflammatory changes seen. PERITONEUM: There is mild ascites with diffuse haziness of the mesentery. There are several locules of extraluminal free air in the mid pelvis as seen on axial images 106 and 114 of series 401. There are several locules of free air posterior to the rectus sheath in the mid abdomen as seen on axial image 88 of 169. there is a rim enhancing fluid collection in the pelvis abutting a dilated loop of small bowel measuring approximately 8.4 x 6.7 x 6.1 cm (AP x TR x CC). No locules of gas are seen within this collection.   ABDOMINAL WALL: Multiple cutaneous staples noted. There is a soft tissue defect in the lower pelvis concerning for open wound. A oval 1.5 x 2.7 cm fluid collection is noted in the subcutaneous soft tissues midline pelvis containing tiny locule of gas as seen on axial image 118 of 169. In addition there are multiple locules of air in the subcutaneous soft tissues right upper to mid abdomen. There is significant soft tissue stranding in the anterior abdominopelvic wall. Mild diffuse anasarca. BONES: Multilevel degenerative changes of the spine. Generalized diffuse osteopenia. Mild deformity at the inferior endplate of L2 and superior endplate of L3 are redemonstrated with vacuum disc phenomena. Appearance is stable from prior CT.        Postsurgical changes of partial bowel resection and anastomosis in the right mid abdomen. Moderate distention of the stomach with air-fluid level.  There are multiple fluid-filled dilated loops of small bowel measuring up to 4.5 cm. The transition point appears to be in the right mid abdomen at level of the anastomosis with the distal small bowel loops decompressed. These findings are concerning for recurrent bowel obstruction. Surgical consultation is advised.   There is a rim enhancing fluid collection in the pelvis abutting a dilated loop of small bowel measuring approximately 8.4 x 6.7 x 6.1 cm (AP x TR x CC). No locules of gas are seen within this collection. Sterility of the fluid can not be assessed by CT. Correlate clinically for the need for further evaluation with fluid sampling.   There are several locules of free air posterior to the rectus sheath in the mid abdomen as well as several locules of air in the pelvis abutting loops of small bowel. These findings could be on the basis of recent surgery.   Air-fluid levels noted in the colon which can be seen in the setting of infectious/inflammatory colitis. Correlate for history of diarrhea.   Multiple cutaneous staples noted. There is a soft tissue defect in the lower pelvis concerning for open wound. An oval 1.5 x 2.7 cm fluid collection is noted in the subcutaneous soft tissues midline pelvis containing tiny locule of gas. Sterility of the fluid can not be assessed by imaging. In addition there are multiple locules of air in the subcutaneous soft tissues right upper to mid abdomen. There is significant soft tissue stranding in the anterior abdominopelvic wall. Mild diffuse anasarca.   Bladder is partially distended with multiple foci of air present. Correlate for history of recent instrumentation. In the absence of such history findings raise concern for infection. Correlate with symptomatology and if warranted urinalysis   Small to moderate bilateral  pleural effusions with adjacent airspace opacity favored to relate to compressive atelectasis. Superimposed infection not excluded. Correlate clinically.   Additional findings as described above.   MACRO: None   Signed by: Saul Aguilar 12/29/2023 2:20 AM Dictation workstation:   [id number]    CT head wo IV contrast    Result Date: 12/29/2023  Interpreted By:  Saul Aguilar, STUDY: CT HEAD WO IV CONTRAST;  12/29/2023 1:41 am   INDICATION: Signs/Symptoms:AMS on xarelto. eval for acute hemorrhage.   COMPARISON: CT scan of the head 09/20/2023   ACCESSION NUMBER(S): [id number]   ORDERING CLINICIAN: MARI SILVA   TECHNIQUE: Axial noncontrast CT images of the head.   FINDINGS: BRAIN PARENCHYMA: Gray-white matter interfaces are preserved. No mass, mass effect or midline shift. Mild deep and periventricular white matter hypodensities are nonspecific, but favored to represent chronic small vessel ischemic changes.   HEMORRHAGE: No acute intracranial hemorrhage. VENTRICLES and EXTRA-AXIAL SPACES: Age-appropriate volume loss. EXTRACRANIAL SOFT TISSUES: Within normal limits. PARANASAL SINUSES/MASTOIDS: Mucous retention cyst versus polyp in the right maxillary sinus. Remainder of the visualized paranasal sinuses and mastoid air cells are aerated. CALVARIUM: No depressed skull fracture. No destructive osseous lesion.   OTHER FINDINGS: Atherosclerotic calcification of the carotid siphons.       No acute intracranial abnormality.   Chronic changes as noted above.   MACRO: None   Signed by: Saul Aguilar 12/29/2023 1:52 AM Dictation workstation:   [id number]    XR chest 1 view    Result Date: 12/29/2023  Interpreted By:  Saul Aguilar, STUDY: XR CHEST 1 VIEW;  12/29/2023 12:54 am   INDICATION: Signs/Symptoms:post op. eval for consolidation.   COMPARISON: Chest x-ray 12/19/2023   ACCESSION NUMBER(S): [id number]   ORDERING CLINICIAN: MARI SILVA   FINDINGS:     CARDIOMEDIASTINAL SILHOUETTE: Cardiomediastinal silhouette is  stable in size and configuration.   LUNGS: No consolidation, pleural effusion or pneumothorax. Elevation of the left hemidiaphragm with bibasilar atelectasis.   ABDOMEN: Gaseous distention of the stomach.   BONES: Multilevel degenerative changes of the spine       Elevation of the left hemidiaphragm with bibasilar airspace opacities favored to relate to atelectasis. Superimposed infection not excluded. Correlate clinically.   Marked gaseous distention of the stomach.   MACRO: None   Signed by: Saul Aguilar 12/29/2023 1:00 AM Dictation workstation:   [id number]       Assessment     Patient Active Problem List   Diagnosis    Anxiety and depression    Atrial fibrillation (CMS/HCC)    CAD (coronary artery disease)    CHF (congestive heart failure) (CMS/HCC)    DM2 (diabetes mellitus, type 2) (CMS/HCC)    GERD (gastroesophageal reflux disease)    Gout, arthritis    HTN (hypertension), benign    Hyperlipidemia    Physical deconditioning    SDH (subdural hematoma) (CMS/HCC)    Small bowel obstruction (CMS/HCC)    Acute kidney injury (nontraumatic) (CMS/HCC)    Chronic renal insufficiency    SBO (small bowel obstruction) (CMS/HCC)      Plan:    Recurrent Small Bowel Obstruction  78 year old ill appearing female. Approximately 200mL out of her NG tube overnight. SBFT yesterday appears to have contrast through her colon, but we are waiting on a final read from radiology.  - appreciate dietary recommendations - PPN day 2  - SBFT awaiting results   - continue wet to dry dressings BID  - trend CBC and BMP daily  - DVT prophylaxis with Lovenox 40mg daily  - continue NG for now. Consider removal today pending SBFT results        Electronically signed by YARITZA Edwards-CNP on 12/31/2023 at 10:19 AM

## 2024-01-01 LAB
ALBUMIN SERPL BCP-MCNC: 2.3 G/DL (ref 3.4–5)
ALP SERPL-CCNC: 63 U/L (ref 33–136)
ALT SERPL W P-5'-P-CCNC: 9 U/L (ref 7–45)
ANION GAP SERPL CALC-SCNC: 12 MMOL/L (ref 10–20)
AST SERPL W P-5'-P-CCNC: 16 U/L (ref 9–39)
BILIRUB SERPL-MCNC: 0.4 MG/DL (ref 0–1.2)
BUN SERPL-MCNC: 26 MG/DL (ref 6–23)
CALCIUM SERPL-MCNC: 7.4 MG/DL (ref 8.6–10.3)
CHLORIDE SERPL-SCNC: 105 MMOL/L (ref 98–107)
CO2 SERPL-SCNC: 24 MMOL/L (ref 21–32)
CREAT SERPL-MCNC: 0.73 MG/DL (ref 0.5–1.05)
ERYTHROCYTE [DISTWIDTH] IN BLOOD BY AUTOMATED COUNT: 15.3 % (ref 11.5–14.5)
GFR SERPL CREATININE-BSD FRML MDRD: 85 ML/MIN/1.73M*2
GLUCOSE BLD MANUAL STRIP-MCNC: 135 MG/DL (ref 74–99)
GLUCOSE BLD MANUAL STRIP-MCNC: 154 MG/DL (ref 74–99)
GLUCOSE SERPL-MCNC: 130 MG/DL (ref 74–99)
HCT VFR BLD AUTO: 24.6 % (ref 36–46)
HGB BLD-MCNC: 7.7 G/DL (ref 12–16)
HOLD SPECIMEN: NORMAL
MAGNESIUM SERPL-MCNC: 1.87 MG/DL (ref 1.6–2.4)
MCH RBC QN AUTO: 27.7 PG (ref 26–34)
MCHC RBC AUTO-ENTMCNC: 31.3 G/DL (ref 32–36)
MCV RBC AUTO: 89 FL (ref 80–100)
NRBC BLD-RTO: 0 /100 WBCS (ref 0–0)
PLATELET # BLD AUTO: 247 X10*3/UL (ref 150–450)
POTASSIUM SERPL-SCNC: 3.8 MMOL/L (ref 3.5–5.3)
PROT SERPL-MCNC: 4.9 G/DL (ref 6.4–8.2)
RBC # BLD AUTO: 2.78 X10*6/UL (ref 4–5.2)
SODIUM SERPL-SCNC: 137 MMOL/L (ref 136–145)
WBC # BLD AUTO: 4.2 X10*3/UL (ref 4.4–11.3)

## 2024-01-01 PROCEDURE — 36415 COLL VENOUS BLD VENIPUNCTURE: CPT

## 2024-01-01 PROCEDURE — 1200000002 HC GENERAL ROOM WITH TELEMETRY DAILY

## 2024-01-01 PROCEDURE — 80053 COMPREHEN METABOLIC PANEL: CPT

## 2024-01-01 PROCEDURE — 85027 COMPLETE CBC AUTOMATED: CPT

## 2024-01-01 PROCEDURE — 2500000001 HC RX 250 WO HCPCS SELF ADMINISTERED DRUGS (ALT 637 FOR MEDICARE OP)

## 2024-01-01 PROCEDURE — 94760 N-INVAS EAR/PLS OXIMETRY 1: CPT

## 2024-01-01 PROCEDURE — 83735 ASSAY OF MAGNESIUM: CPT

## 2024-01-01 PROCEDURE — 2500000005 HC RX 250 GENERAL PHARMACY W/O HCPCS: Performed by: REGISTERED NURSE

## 2024-01-01 PROCEDURE — 82947 ASSAY GLUCOSE BLOOD QUANT: CPT

## 2024-01-01 PROCEDURE — 2500000004 HC RX 250 GENERAL PHARMACY W/ HCPCS (ALT 636 FOR OP/ED)

## 2024-01-01 RX ORDER — INSULIN LISPRO 100 [IU]/ML
0-5 INJECTION, SOLUTION INTRAVENOUS; SUBCUTANEOUS EVERY 6 HOURS
Status: DISCONTINUED | OUTPATIENT
Start: 2024-01-01 | End: 2024-01-03

## 2024-01-01 RX ORDER — INSULIN LISPRO 100 [IU]/ML
0-5 INJECTION, SOLUTION INTRAVENOUS; SUBCUTANEOUS
Status: DISCONTINUED | OUTPATIENT
Start: 2024-01-01 | End: 2024-01-01

## 2024-01-01 RX ADMIN — AMLODIPINE BESYLATE 5 MG: 5 TABLET ORAL at 09:40

## 2024-01-01 RX ADMIN — FLUOXETINE HYDROCHLORIDE 20 MG: 20 CAPSULE ORAL at 09:40

## 2024-01-01 RX ADMIN — TRIAMTERENE AND HYDROCHLOROTHIAZIDE 1 TABLET: 37.5; 25 TABLET ORAL at 09:41

## 2024-01-01 RX ADMIN — METOPROLOL TARTRATE 2.5 MG: 5 INJECTION INTRAVENOUS at 00:46

## 2024-01-01 RX ADMIN — RIVAROXABAN 15 MG: 15 TABLET, FILM COATED ORAL at 18:53

## 2024-01-01 ASSESSMENT — PAIN SCALES - GENERAL: PAINLEVEL_OUTOF10: 0 - NO PAIN

## 2024-01-01 NOTE — NURSING NOTE
Pt lost IV access earlier today. Message was sent to provider Dr. Lozada informing him of the loss of access and whether PO meds should be ordered. No response was rec'd. A MICU nurse came down to try to gain access but was unsuccessful. Another message was sent to provider re: no IV access.

## 2024-01-01 NOTE — PROGRESS NOTES
Harper Lara is a 78 y.o. female on day 3 of admission presenting with SBO (small bowel obstruction) (CMS/[facility]).    Medicine consulted for Hypoglycemia     Subjective   Patient examined and seen. Alert and oriented x3, resting comfortably.  Patient denies chest pain, shortness of breath, palpitations,  fever or chills.  She is currently on PPN and glucose is manageable at this time.        Objective   Added glucose checks Q6     Last Recorded Vitals  /59   Pulse 70   Temp 35.9 °C (96.6 °F) (Temporal)   Resp 17   Wt 78 kg (171 lb 15.3 oz)   SpO2 98%   Intake/Output last 3 Shifts:  No intake or output data in the 24 hours ending 01/01/24 1123    Admission Weight  Weight: 84.4 kg (186 lb) (12/29/23 0113)    Daily Weight  01/01/24 : 78 kg (171 lb 15.3 oz)    Image Results  XR abdomen 1 view  Narrative: Interpreted By:  Omar Noe,   STUDY:  XR ABDOMEN 1 VIEW; 12/31/2023 3:24 pm      INDICATION:  Signs/Symptoms:small bowel obstruction.      COMPARISON:  12/30/2023      ACCESSION NUMBER(S):  [id number]      ORDERING CLINICIAN:  TARAS WARD      FINDINGS:  2 supine AP radiographs of the abdomen were obtained. There is a  nonobstructive bowel gas pattern present, with residual oral contrast  seen throughout the colon. Free intraperitoneal air and air-fluid  levels cannot be excluded without upright or decubitus images.      Impression: Nonobstructive bowel gas pattern.      MACRO:  None      Signed by: Omar Noe 12/31/2023 3:36 PM  Dictation workstation:   [id number]      Physical Exam  Constitutional: ill appearing, awake/alert/oriented x3,  cooperative, pallor  Head: right lower lip with healing scab , NG in place to LIWS   Respiratory/Thorax: Patent airways,  normal breath sounds   Cardiovascular: irregular, rate and rhythm,    Gastrointestinal:  tender with palpation, incision stapled,   Musculoskeletal: ROM intact, no joint swelling, bilateral upper extremity edema without pitting and mild  bilateral lower extremity edema  Extremities: normal extremities,  moves all extremities   Skin: warm, dry, intact except as noted       Relevant Results    Scheduled medications  amLODIPine, 5 mg, oral, Daily  FLUoxetine, 20 mg, oral, Daily  furosemide, 10 mg, intravenous, Daily  insulin lispro, 0-5 Units, subcutaneous, q6h  levothyroxine, 38 mcg, intravenous, q24h ORLY  metoprolol, 2.5 mg, intravenous, q6h  pantoprazole, 40 mg, oral, Daily before breakfast  pantoprazole, 40 mg, intravenous, Daily  rivaroxaban, 15 mg, oral, Daily with evening meal  triamterene-hydrochlorothiazid, 1 tablet, oral, Daily      Continuous medications  Adult Clinimix Parenteral Nutrition, 70 mL/hr, Last Rate: Stopped (01/01/24 0700)  dextrose 5 % in water (D5W), 75 mL/hr, Last Rate: Stopped (12/30/23 0700)      PRN medications  PRN medications: dextrose 10 % in water (D10W), dextrose, glucagon, HYDROmorphone    Results for orders placed or performed during the hospital encounter of 12/29/23 (from the past 24 hour(s))   CBC   Result Value Ref Range    WBC 4.2 (L) 4.4 - 11.3 x10*3/uL    nRBC 0.0 0.0 - 0.0 /100 WBCs    RBC 2.78 (L) 4.00 - 5.20 x10*6/uL    Hemoglobin 7.7 (L) 12.0 - 16.0 g/dL    Hematocrit 24.6 (L) 36.0 - 46.0 %    MCV 89 80 - 100 fL    MCH 27.7 26.0 - 34.0 pg    MCHC 31.3 (L) 32.0 - 36.0 g/dL    RDW 15.3 (H) 11.5 - 14.5 %    Platelets 247 150 - 450 x10*3/uL   Comprehensive Metabolic Panel   Result Value Ref Range    Glucose 130 (H) 74 - 99 mg/dL    Sodium 137 136 - 145 mmol/L    Potassium 3.8 3.5 - 5.3 mmol/L    Chloride 105 98 - 107 mmol/L    Bicarbonate 24 21 - 32 mmol/L    Anion Gap 12 10 - 20 mmol/L    Urea Nitrogen 26 (H) 6 - 23 mg/dL    Creatinine 0.73 0.50 - 1.05 mg/dL    eGFR 85 >60 mL/min/1.73m*2    Calcium 7.4 (L) 8.6 - 10.3 mg/dL    Albumin 2.3 (L) 3.4 - 5.0 g/dL    Alkaline Phosphatase 63 33 - 136 U/L    Total Protein 4.9 (L) 6.4 - 8.2 g/dL    AST 16 9 - 39 U/L    Bilirubin, Total 0.4 0.0 - 1.2 mg/dL    ALT 9 7 -  45 U/L   Magnesium   Result Value Ref Range    Magnesium 1.87 1.60 - 2.40 mg/dL   SST TOP   Result Value Ref Range    Extra Tube Hold for add-ons.    POCT GLUCOSE   Result Value Ref Range    POCT Glucose 154 (H) 74 - 99 mg/dL            Assessment/Plan      # Abdominal Pain   SBO s/p recent laparoscopic BRYAN and subsequent open small bowel resection   Pelvic Fluid Collection      Admit to General Surgery Team  Hospitalist Team consulted for Hypoglycemia   CT showing multiple fluid-filled dilated loops of small bowel measuring up to 4.5 cm with a transition point in the right mid abdomen at the level of the anastomosis; there is also a rim-enhancing fluid collection measuring 8 x 4 x 6.7 x 6.1 cm  --NPO - on PPN   -IV fluids and pain control     # Hypoglycemia - stable   NPO   PPN   Accu checks  Q6  Recommend changing fluids to Clnimix   Continue Hypoglycemic order set  Hold all antidiabetic oral medications and reassess when she becomes active with diet  A1C 5.2 avg 103     # Atrial Fibrillation / HTN / HLD  NPO at this time  PRN lopressor for rate control  Telemetry for arrhythmia monitoring       # Diarrhea  Possible related to SBO  Will get Cdiff  Stool pathogens        Thank you for consult  Medicine will continue to follow with you  Hemodynamically stable     Time spent 36  minutes obtaining labs, imaging, recommendations, interview, assessment, examination, medication review/ordering, and EMR review.     Plan of care was discussed extensively with patient. Patient verbalized understanding through teach back method. All questions and concerns addressed upon examination.      Of note, this documentation is completed using the Dragon Dictation system (voice recognition software). There may be spelling and/or grammatical errors that were not corrected prior to final submission.        Principal Problem:    SBO (small bowel obstruction) (CMS/HCC)               Principal Problem:    SBO (small bowel obstruction)  (CMS/HCC)                   Jany Colindres, APRN-CNP

## 2024-01-01 NOTE — PROGRESS NOTES
Nasogastric tube out patient on full liquid diet states she passed some gas small bowel movement could not finish her full liquid lunch due to fullness, rates her pain 6/10  Afebrile vitals are stable  Abdomen is soft exudate in the wound needs increased wet-to-dry changes    Would not advance diet at this time

## 2024-01-02 LAB
ALBUMIN SERPL BCP-MCNC: 2.1 G/DL (ref 3.4–5)
ALP SERPL-CCNC: 55 U/L (ref 33–136)
ALT SERPL W P-5'-P-CCNC: 17 U/L (ref 7–45)
ANION GAP SERPL CALC-SCNC: 10 MMOL/L (ref 10–20)
APPEARANCE UR: ABNORMAL
AST SERPL W P-5'-P-CCNC: 31 U/L (ref 9–39)
BACTERIA #/AREA URNS AUTO: ABNORMAL /HPF
BILIRUB SERPL-MCNC: 0.3 MG/DL (ref 0–1.2)
BILIRUB UR STRIP.AUTO-MCNC: NEGATIVE MG/DL
BUN SERPL-MCNC: 22 MG/DL (ref 6–23)
CALCIUM SERPL-MCNC: 7.1 MG/DL (ref 8.6–10.3)
CHLORIDE SERPL-SCNC: 106 MMOL/L (ref 98–107)
CO2 SERPL-SCNC: 24 MMOL/L (ref 21–32)
COLOR UR: YELLOW
CREAT SERPL-MCNC: 0.73 MG/DL (ref 0.5–1.05)
ERYTHROCYTE [DISTWIDTH] IN BLOOD BY AUTOMATED COUNT: 15.2 % (ref 11.5–14.5)
GFR SERPL CREATININE-BSD FRML MDRD: 85 ML/MIN/1.73M*2
GLUCOSE BLD MANUAL STRIP-MCNC: 119 MG/DL (ref 74–99)
GLUCOSE BLD MANUAL STRIP-MCNC: 140 MG/DL (ref 74–99)
GLUCOSE BLD MANUAL STRIP-MCNC: 149 MG/DL (ref 74–99)
GLUCOSE BLD MANUAL STRIP-MCNC: 159 MG/DL (ref 74–99)
GLUCOSE SERPL-MCNC: 137 MG/DL (ref 74–99)
GLUCOSE UR STRIP.AUTO-MCNC: NEGATIVE MG/DL
HCT VFR BLD AUTO: 23.2 % (ref 36–46)
HGB BLD-MCNC: 7.4 G/DL (ref 12–16)
HOLD SPECIMEN: NORMAL
KETONES UR STRIP.AUTO-MCNC: NEGATIVE MG/DL
LEUKOCYTE ESTERASE UR QL STRIP.AUTO: ABNORMAL
MAGNESIUM SERPL-MCNC: 1.69 MG/DL (ref 1.6–2.4)
MCH RBC QN AUTO: 28 PG (ref 26–34)
MCHC RBC AUTO-ENTMCNC: 31.9 G/DL (ref 32–36)
MCV RBC AUTO: 88 FL (ref 80–100)
MUCOUS THREADS #/AREA URNS AUTO: ABNORMAL /LPF
NITRITE UR QL STRIP.AUTO: NEGATIVE
NRBC BLD-RTO: 0 /100 WBCS (ref 0–0)
PH UR STRIP.AUTO: 5 [PH]
PLATELET # BLD AUTO: 215 X10*3/UL (ref 150–450)
POTASSIUM SERPL-SCNC: 3.8 MMOL/L (ref 3.5–5.3)
PROT SERPL-MCNC: 4.4 G/DL (ref 6.4–8.2)
PROT UR STRIP.AUTO-MCNC: NEGATIVE MG/DL
RBC # BLD AUTO: 2.64 X10*6/UL (ref 4–5.2)
RBC # UR STRIP.AUTO: NEGATIVE /UL
RBC #/AREA URNS AUTO: ABNORMAL /HPF
SODIUM SERPL-SCNC: 136 MMOL/L (ref 136–145)
SP GR UR STRIP.AUTO: 1.01
UROBILINOGEN UR STRIP.AUTO-MCNC: <2 MG/DL
WBC # BLD AUTO: 4.2 X10*3/UL (ref 4.4–11.3)
WBC #/AREA URNS AUTO: ABNORMAL /HPF

## 2024-01-02 PROCEDURE — 2500000004 HC RX 250 GENERAL PHARMACY W/ HCPCS (ALT 636 FOR OP/ED): Performed by: REGISTERED NURSE

## 2024-01-02 PROCEDURE — C9113 INJ PANTOPRAZOLE SODIUM, VIA: HCPCS | Performed by: REGISTERED NURSE

## 2024-01-02 PROCEDURE — 99233 SBSQ HOSP IP/OBS HIGH 50: CPT

## 2024-01-02 PROCEDURE — 85027 COMPLETE CBC AUTOMATED: CPT

## 2024-01-02 PROCEDURE — 80053 COMPREHEN METABOLIC PANEL: CPT

## 2024-01-02 PROCEDURE — 2500000004 HC RX 250 GENERAL PHARMACY W/ HCPCS (ALT 636 FOR OP/ED)

## 2024-01-02 PROCEDURE — 2500000001 HC RX 250 WO HCPCS SELF ADMINISTERED DRUGS (ALT 637 FOR MEDICARE OP)

## 2024-01-02 PROCEDURE — 82947 ASSAY GLUCOSE BLOOD QUANT: CPT

## 2024-01-02 PROCEDURE — 97530 THERAPEUTIC ACTIVITIES: CPT | Mod: GP,CQ | Performed by: PHYSICAL THERAPY ASSISTANT

## 2024-01-02 PROCEDURE — 2500000005 HC RX 250 GENERAL PHARMACY W/O HCPCS: Performed by: REGISTERED NURSE

## 2024-01-02 PROCEDURE — 81001 URINALYSIS AUTO W/SCOPE: CPT

## 2024-01-02 PROCEDURE — 36415 COLL VENOUS BLD VENIPUNCTURE: CPT

## 2024-01-02 PROCEDURE — 1200000002 HC GENERAL ROOM WITH TELEMETRY DAILY

## 2024-01-02 PROCEDURE — 83735 ASSAY OF MAGNESIUM: CPT

## 2024-01-02 RX ADMIN — PANTOPRAZOLE SODIUM 40 MG: 40 INJECTION, POWDER, FOR SOLUTION INTRAVENOUS at 08:59

## 2024-01-02 RX ADMIN — AMLODIPINE BESYLATE 5 MG: 5 TABLET ORAL at 09:00

## 2024-01-02 RX ADMIN — PANTOPRAZOLE SODIUM 40 MG: 40 TABLET, DELAYED RELEASE ORAL at 08:59

## 2024-01-02 RX ADMIN — LEVOTHYROXINE SODIUM 38 MCG: 20 INJECTION, SOLUTION INTRAVENOUS at 08:57

## 2024-01-02 RX ADMIN — FLUOXETINE HYDROCHLORIDE 20 MG: 20 CAPSULE ORAL at 08:59

## 2024-01-02 RX ADMIN — METOPROLOL TARTRATE 2.5 MG: 5 INJECTION INTRAVENOUS at 08:57

## 2024-01-02 RX ADMIN — FUROSEMIDE 10 MG: 10 INJECTION, SOLUTION INTRAMUSCULAR; INTRAVENOUS at 08:59

## 2024-01-02 RX ADMIN — METOPROLOL TARTRATE 2.5 MG: 5 INJECTION INTRAVENOUS at 20:29

## 2024-01-02 RX ADMIN — RIVAROXABAN 15 MG: 15 TABLET, FILM COATED ORAL at 16:27

## 2024-01-02 RX ADMIN — TRIAMTERENE AND HYDROCHLOROTHIAZIDE 1 TABLET: 37.5; 25 TABLET ORAL at 08:59

## 2024-01-02 ASSESSMENT — COGNITIVE AND FUNCTIONAL STATUS - GENERAL
DRESSING REGULAR UPPER BODY CLOTHING: A LOT
MOVING FROM LYING ON BACK TO SITTING ON SIDE OF FLAT BED WITH BEDRAILS: A LOT
CLIMB 3 TO 5 STEPS WITH RAILING: TOTAL
MOVING FROM LYING ON BACK TO SITTING ON SIDE OF FLAT BED WITH BEDRAILS: A LOT
WALKING IN HOSPITAL ROOM: A LOT
TOILETING: A LOT
WALKING IN HOSPITAL ROOM: A LITTLE
CLIMB 3 TO 5 STEPS WITH RAILING: TOTAL
MOBILITY SCORE: 11
MOVING TO AND FROM BED TO CHAIR: A LITTLE
MOVING TO AND FROM BED TO CHAIR: A LOT
MOBILITY SCORE: 14
MOBILITY SCORE: 14
HELP NEEDED FOR BATHING: A LOT
DAILY ACTIVITIY SCORE: 12
PERSONAL GROOMING: A LOT
TURNING FROM BACK TO SIDE WHILE IN FLAT BAD: A LOT
STANDING UP FROM CHAIR USING ARMS: A LITTLE
STANDING UP FROM CHAIR USING ARMS: A LITTLE
MOVING TO AND FROM BED TO CHAIR: A LITTLE
MOVING FROM LYING ON BACK TO SITTING ON SIDE OF FLAT BED WITH BEDRAILS: A LOT
TURNING FROM BACK TO SIDE WHILE IN FLAT BAD: A LOT
CLIMB 3 TO 5 STEPS WITH RAILING: TOTAL
STANDING UP FROM CHAIR USING ARMS: A LOT
TURNING FROM BACK TO SIDE WHILE IN FLAT BAD: A LOT
WALKING IN HOSPITAL ROOM: A LITTLE
EATING MEALS: A LOT
DRESSING REGULAR LOWER BODY CLOTHING: A LOT

## 2024-01-02 ASSESSMENT — PAIN SCALES - GENERAL
PAINLEVEL_OUTOF10: 0 - NO PAIN

## 2024-01-02 NOTE — CARE PLAN
The patient's goals for the shift include      The clinical goals for the shift include ppt will have decreased incidences of incontinenece    Over the shift, the patient did not make progress toward the following goals. Barriers to progression include questionable lack of muscle tone. Recommendations to address these barriers include continue with current poc.

## 2024-01-02 NOTE — PROGRESS NOTES
"Nutrition Follow Up Assessment:   Nutrition Assessment         Patient is a 78 y.o. female presenting with: SBO      Nutrition History:  Energy Intake: Good > 75 % (per pt and RN while on full liquids)  Food and Nutrient History: RD follow-up. On a full liquid diet at time of RD visit, since been upgraded to low fiber. PPN running at goal rate at time of RD visit. Discussed with RN and Dr. Lozada. RN reports extensive diarrhea. Surgery recommending continue PPN at this time with discontinuation of PPN if tolerates solid foods. Pt continues to report early satiety, okay with Ensure though likes vanilla.  Food Allergies/Intolerances:   strawberry  GI Symptoms: Diarrhea  Oral Problems: None per pt       Anthropometrics:  Height: 165.1 cm (5' 5\")   Weight: 77.2 kg (170 lb 3.1 oz)   BMI (Calculated): 28.32  IBW/kg (Dietitian Calculated): 56.8 kg  Percent of IBW: 141 %       Weight History:     Wt Readings from Last 15 Encounters:   01/02/24 77.2 kg (170 lb 3.1 oz)   12/25/23 84.9 kg (187 lb 2.7 oz)   08/12/19 119 kg (262 lb 5.6 oz)     Previously stated by RD on 12/29: Weight Change: 13.1 kg, 13% significant wt loss x ~1 month  Significant Weight Loss: Yes  Interpretation of Weight Loss: >5% in 1 month      Nutrition Focused Physical Exam Findings:  defer: completed at initial RD encounter    Edema:  Edema: +1 trace, +2 mild, +3 moderate  Edema Location: 1+ generalized, 2+ BUE, 3+ BLE edema  Physical Findings:  Skin: Positive (stage 2 on buttocks, incisions)    Nutrition Significant Labs:  BMP Trend:   Results from last 7 days   Lab Units 01/02/24  0640 01/01/24  0756 12/31/23  0652 12/30/23  0431   GLUCOSE mg/dL 137* 130* 143* 140*   CALCIUM mg/dL 7.1* 7.4* 7.3* 7.1*   SODIUM mmol/L 136 137 142 134*   POTASSIUM mmol/L 3.8 3.8 3.8 4.1   CO2 mmol/L 24 24 25 22   CHLORIDE mmol/L 106 105 112* 106   BUN mg/dL 22 26* 24* 25*   CREATININE mg/dL 0.73 0.73 0.70 0.79        Nutrition Specific Medications: Reviewed. PPN running at " 70 at time of RD visit. D5W ordered though not running at time of RD visit.      I/O:   Last BM Date: 01/02/24; Stool Appearance: Watery (12/31/23 1030)        Dietary Orders (From admission, onward)       Start     Ordered    01/02/24 1127  Adult diet Low fiber  Diet effective now        Question:  Diet type  Answer:  Low fiber    01/02/24 1126    12/31/23 1659  Oral nutritional supplements  Until discontinued        Question Answer Comment   Deliver with All meals    Select supplement: Ensure High Protein        12/31/23 1659    12/29/23 0658  May Participate in Room Service With Assistance  Once        Question:  .  Answer:  Yes    12/29/23 0657                     Estimated Needs:   Total Energy Estimated Needs (kCal): 2110 kCal  Method for Estimating Needs: ABW  Total Protein Estimated Needs (g): 84 g  Method for Estimating Needs: ABW  Total Fluid Estimated Needs (mL): 2110 mL  Method for Estimating Needs: ABW        Nutrition Diagnosis   Malnutrition Diagnosis  Patient has Malnutrition Diagnosis: Yes  Diagnosis Status: Ongoing  Malnutrition Diagnosis: Severe malnutrition related to acute disease or injury  As Evidenced by: moderate muscle wasting, moderate fat loss, >5% wt loss in 1 month    Nutrition Diagnosis  Patient has Nutrition Diagnosis: Yes  Diagnosis Status (1): Ongoing  Nutrition Diagnosis 1: Altered GI function  Related to (1): SBO  As Evidenced by (1): PPN running with plans to discontinue if tolerates solid foods       Nutrition Interventions/Recommendations         Nutrition Prescription:  Individualized Nutrition Prescription Provided for : Low fiber diet. Discontinue PPN when tolerating diet        Nutrition Interventions:   Food and/or Nutrient Delivery Interventions  Interventions: Meals and snacks  Meals and Snacks: Fiber-modified diet  1 Ensure Provides 350 kcal, 20g protein  Parenteral Additional Recommendations: Current PPN (Clinimix 4.25/5) at 70 ml/hr provides 1680 ml total volume, 571  kcal, 71g protein, recommend discontinue once tolerating solid diet    Coordination of Nutrition Care by a Nutrition Professional  Collaboration and Referral of Nutrition Care: Collaboration by nutrition professional with other providers  Goal: RN; Dr. Lozada    Nutrition Education:       Pt denied nutrition questions at this time. Discussed importance of small, frequent meals if continues to have early satiety. Encouraged consuming Ensure between meals.    Nutrition Monitoring and Evaluation   Food/Nutrient Related History Monitoring  Monitoring and Evaluation Plan: Energy intake, Amount of food, Enteral and parenteral nutrition intake  Energy Intake: Estimated energy intake  Criteria: Pt meets >75% of estimated energy needs  Amount of Food: Estimated amout of food, Medical food intake  Criteria: Pt consumes >75% of meals and supplements  Additional Plans: Monitor for tolerance to PO diet and discontinuation of PPN    Body Composition/Growth/Weight History  Monitoring and Evaluation Plan: Weight  Weight: Measured weight  Criteria: Maintains stable weight    Biochemical Data, Medical Tests and Procedures  Monitoring and Evaluation Plan: Glucose/endocrine profile, Electrolyte/renal panel  Criteria: WNL  Glucose/Endocrine Profile: Glucose, casual  Criteria: BG within desirable range    Time Spent/Follow-up Reminder:   Time Spent (min): 45 minutes  Last Date of Nutrition Visit: 01/02/24  Nutrition Follow-Up Needed?: 3-5 days

## 2024-01-02 NOTE — PROGRESS NOTES
Physical Therapy    Physical Therapy Treatment    Patient Name: Harper Lara  MRN: [id number]  Today's Date: 1/2/2024  Time Calculation  Start Time: 0955  Stop Time: 1020  Time Calculation (min): 25 min       Assessment/Plan   PT Assessment  End of Session Communication: Bedside nurse  End of Session Patient Position: Up in chair     PT Plan  Treatment/Interventions: Bed mobility, Transfer training, Gait training  PT Plan: Skilled PT  PT Frequency: 3 times per week    General Visit Information:   PT  Visit  PT Received On: 01/02/24  General  Prior to Session Communication: Bedside nurse  Patient Position Received: Bed, 3 rail up  General Comment:  (pt. agreeable to sit edge of bed. progressed to standing and pt. became incontinent of loose stools required bsc transfer.)    Subjective   Precautions:  Precautions  Precautions Comment:  (fall, infection. abd. precautions., no alarms)  Treatments:    Ambulation/Gait Training  Ambulation/Gait Training Performed:  (gait training to/from bed/bsc/chair with min x 1 of 2-3 ' distances with instruct for postural education with safe proximity of ad.)  Transfers  Transfer:  (supine to sit with mod x 1, sit to stand with mod x 1, bed to bsc and then bsc to chair with walker and mn x 1 with verbal and tactile instructions for safe execution.)    Outcome Measures:  AMPAC Basic Mobility  Turning from your back to your side while in a flat bed without using bedrails: A lot  Moving from lying on your back to sitting on the side of a flat bed without using bedrails: A lot  Moving to and from bed to chair (including a wheelchair): A little  Standing up from a chair using your arms (e.g. wheelchair or bedside chair): A little  To walk in hospital room: A little  Climbing 3-5 steps with railing: Total  Basic Mobility - Total Score: 14    DUCATION:  Outpatient Education  Individual(s) Educated: Patient  Education Provided: Body Mechanics, Post-Op Precautions, Posture  Patient Response to  Education: Patient/Caregiver Verbalized Understanding of Information, Patient/Caregiver Performed Return Demonstration of Exercises/Activities    Encounter Problems       Encounter Problems (Active)       PT Problem       Pt will demonstrate min A with all bed mobility   (Progressing)       Start:  12/30/23    Expected End:  01/13/24            Pt will demonstrate sit to stand and bed/chair transfers with mod A with WW.   (Progressing)       Start:  12/30/23    Expected End:  01/13/24            Pt will tolerate sitting EOB >5 minutes with SBA with no LOB to progress to functional transfers (Progressing)       Start:  12/30/23    Expected End:  01/13/24            Pt will tolerate 15 reps x2 LE therapeutic exercise for LE strengthening and endurance  (Progressing)       Start:  12/30/23    Expected End:  01/13/24

## 2024-01-02 NOTE — CARE PLAN
The patient's goals for the shift include      The clinical goals for the shift include pt will have decreased incidences of fecal incontininence    Over the shift, the patient did make progress toward the following goals. Barriers to progression include none. Recommendations to address these barriers include none.     PA and patient at bedside.

## 2024-01-02 NOTE — PROGRESS NOTES
General Surgery Progress Note    Patient: Harper Lara  Unit/Bed: 1105/1105-A  YOB: 1945  MRN: [id number]  Acct: [id number]   Admitting Diagnosis: SBO (small bowel obstruction) (CMS/[facility]) [K56.609]  Date:  12/29/2023  Hospital Day: 4  Attending: Manuel Lozada MD       Complaint:  Chief Complaint   Patient presents with    Hypoglycemia     Pt assisted living facility states said she had an altered mental status and a blood glu of 70. Pt currently a&o X4 and has a blood glucose of 115 per ems           Subjective  Patient awake and resting in bed this morning.  Had just finished her breakfast and reports feelings of fullness.  Denies nausea, vomiting, chills, or shortness of breath.  Reports that she has had bowel movements this morning and is passing gas.  According to nursing staff, patient has had multiple episodes of uncontrollable diarrhea this morning and that she had foul-smelling urine.    PHYSICAL EXAM:  Physical Exam  Vitals reviewed.   Constitutional:       General: She is not in acute distress.     Appearance: Normal appearance. She is not toxic-appearing.   HENT:      Head: Normocephalic and atraumatic.      Nose: Nose normal.      Mouth/Throat:      Pharynx: Oropharynx is clear.   Eyes:      Extraocular Movements: Extraocular movements intact.      Conjunctiva/sclera: Conjunctivae normal.      Pupils: Pupils are equal, round, and reactive to light.   Cardiovascular:      Rate and Rhythm: Normal rate and regular rhythm.      Pulses: Normal pulses.      Heart sounds: Normal heart sounds.   Pulmonary:      Effort: Pulmonary effort is normal. No respiratory distress.      Breath sounds: Normal breath sounds.   Abdominal:      General: Abdomen is flat. Bowel sounds are normal. There is no distension.      Palpations: Abdomen is soft.      Tenderness: There is no abdominal tenderness.   Musculoskeletal:         General: Normal range of motion.      Cervical back: Normal range of motion and  neck supple.   Skin:     General: Skin is warm and dry.      Capillary Refill: Capillary refill takes less than 2 seconds.   Neurological:      General: No focal deficit present.      Mental Status: She is alert and oriented to person, place, and time. Mental status is at baseline.   Psychiatric:         Mood and Affect: Mood normal.         Behavior: Behavior normal.         Vital signs in last 24 hours:  Vitals:    01/02/24 0700   BP: 121/60   Pulse:    Resp:    Temp:    SpO2:        Intake/Output this shift:    Intake/Output Summary (Last 24 hours) at 1/2/2024 1341  Last data filed at 1/2/2024 1006  Gross per 24 hour   Intake --   Output 2425 ml   Net -2425 ml        Allergies:  Allergies   Allergen Reactions    Diphenhydramine Hcl Unknown    Morphine Unknown    Pentazocine Unknown    Propoxyphene Unknown    Propoxyphene N-Acetaminophen Unknown    Ranitidine Unknown    Strawberry Hives        Medications:  Scheduled medications  amLODIPine, 5 mg, oral, Daily  FLUoxetine, 20 mg, oral, Daily  furosemide, 10 mg, intravenous, Daily  insulin lispro, 0-5 Units, subcutaneous, q6h  levothyroxine, 38 mcg, intravenous, q24h ORLY  metoprolol, 2.5 mg, intravenous, q6h  pantoprazole, 40 mg, oral, Daily before breakfast  pantoprazole, 40 mg, intravenous, Daily  rivaroxaban, 15 mg, oral, Daily with evening meal  triamterene-hydrochlorothiazid, 1 tablet, oral, Daily      Continuous medications  Adult Clinimix Parenteral Nutrition, 70 mL/hr, Last Rate: Stopped (01/01/24 0700)  dextrose 5 % in water (D5W), 75 mL/hr, Last Rate: Stopped (12/30/23 0700)      PRN medications  PRN medications: dextrose 10 % in water (D10W), dextrose, glucagon, HYDROmorphone     Labs:  Results for orders placed or performed during the hospital encounter of 12/29/23 (from the past 24 hour(s))   POCT GLUCOSE   Result Value Ref Range    POCT Glucose 135 (H) 74 - 99 mg/dL   POCT GLUCOSE   Result Value Ref Range    POCT Glucose 140 (H) 74 - 99 mg/dL    Comprehensive Metabolic Panel   Result Value Ref Range    Glucose 137 (H) 74 - 99 mg/dL    Sodium 136 136 - 145 mmol/L    Potassium 3.8 3.5 - 5.3 mmol/L    Chloride 106 98 - 107 mmol/L    Bicarbonate 24 21 - 32 mmol/L    Anion Gap 10 10 - 20 mmol/L    Urea Nitrogen 22 6 - 23 mg/dL    Creatinine 0.73 0.50 - 1.05 mg/dL    eGFR 85 >60 mL/min/1.73m*2    Calcium 7.1 (L) 8.6 - 10.3 mg/dL    Albumin 2.1 (L) 3.4 - 5.0 g/dL    Alkaline Phosphatase 55 33 - 136 U/L    Total Protein 4.4 (L) 6.4 - 8.2 g/dL    AST 31 9 - 39 U/L    Bilirubin, Total 0.3 0.0 - 1.2 mg/dL    ALT 17 7 - 45 U/L   Magnesium   Result Value Ref Range    Magnesium 1.69 1.60 - 2.40 mg/dL   SST TOP   Result Value Ref Range    Extra Tube Hold for add-ons.    CBC   Result Value Ref Range    WBC 4.2 (L) 4.4 - 11.3 x10*3/uL    nRBC 0.0 0.0 - 0.0 /100 WBCs    RBC 2.64 (L) 4.00 - 5.20 x10*6/uL    Hemoglobin 7.4 (L) 12.0 - 16.0 g/dL    Hematocrit 23.2 (L) 36.0 - 46.0 %    MCV 88 80 - 100 fL    MCH 28.0 26.0 - 34.0 pg    MCHC 31.9 (L) 32.0 - 36.0 g/dL    RDW 15.2 (H) 11.5 - 14.5 %    Platelets 215 150 - 450 x10*3/uL   POCT GLUCOSE   Result Value Ref Range    POCT Glucose 149 (H) 74 - 99 mg/dL        Imaging:  XR abdomen 1 view    Result Date: 12/31/2023  Interpreted By:  Omar Noe, STUDY: XR ABDOMEN 1 VIEW; 12/31/2023 3:24 pm   INDICATION: Signs/Symptoms:small bowel obstruction.   COMPARISON: 12/30/2023   ACCESSION NUMBER(S): [id number]   ORDERING CLINICIAN: TARAS WARD   FINDINGS: 2 supine AP radiographs of the abdomen were obtained. There is a nonobstructive bowel gas pattern present, with residual oral contrast seen throughout the colon. Free intraperitoneal air and air-fluid levels cannot be excluded without upright or decubitus images.       Nonobstructive bowel gas pattern.   MACRO: None   Signed by: Omar Noe 12/31/2023 3:36 PM Dictation workstation:   [id number]       Plan  DVT prophylaxis with Xarelto  Will advance diet to a low fiber  diet  Continue TPN feeds, possibly discontinue if tolerating low fiber diet  Urine analysis ordered due to reports of foul-smelling urine  Continue pain control  PT/OT consult  Continue wet-to-dry dressing changes to wound  C. difficile test from 12- was negative  Continue to trend CBC and BMP daily  Awaiting urinalysis results      Further recommendations per Dr. Neville Bales, LEO    Time spent  35  minutes obtaining labs, imaging, recommendations, interview, assessment, examination, medication review/ordering, and EMR review.    Plan of care was discussed extensively with patient. Patient verbalized understanding through teach back method. All questions and concerns addressed upon examination.     Of note, this documentation is completed using the Dragon Dictation system (voice recognition software). There may be spelling and/or grammatical errors that were not corrected prior to final submission.    Patient seen and examined. I have reviewed and discussed APRN note. Having multiple loose stools; C.difficile negative on 12/30; will advance diet to soft; continue PPN for now; social service for discharge planning

## 2024-01-03 LAB
ALBUMIN SERPL BCP-MCNC: 2.2 G/DL (ref 3.4–5)
ALP SERPL-CCNC: 56 U/L (ref 33–136)
ALT SERPL W P-5'-P-CCNC: 17 U/L (ref 7–45)
ANION GAP SERPL CALC-SCNC: 11 MMOL/L (ref 10–20)
AST SERPL W P-5'-P-CCNC: 22 U/L (ref 9–39)
BILIRUB SERPL-MCNC: 0.3 MG/DL (ref 0–1.2)
BUN SERPL-MCNC: 25 MG/DL (ref 6–23)
CALCIUM SERPL-MCNC: 7.3 MG/DL (ref 8.6–10.3)
CHLORIDE SERPL-SCNC: 105 MMOL/L (ref 98–107)
CO2 SERPL-SCNC: 22 MMOL/L (ref 21–32)
CREAT SERPL-MCNC: 0.7 MG/DL (ref 0.5–1.05)
ERYTHROCYTE [DISTWIDTH] IN BLOOD BY AUTOMATED COUNT: 15.1 % (ref 11.5–14.5)
GFR SERPL CREATININE-BSD FRML MDRD: 90 ML/MIN/1.73M*2
GLUCOSE BLD MANUAL STRIP-MCNC: 124 MG/DL (ref 74–99)
GLUCOSE BLD MANUAL STRIP-MCNC: 127 MG/DL (ref 74–99)
GLUCOSE BLD MANUAL STRIP-MCNC: 134 MG/DL (ref 74–99)
GLUCOSE BLD MANUAL STRIP-MCNC: 134 MG/DL (ref 74–99)
GLUCOSE BLD MANUAL STRIP-MCNC: 150 MG/DL (ref 74–99)
GLUCOSE SERPL-MCNC: 136 MG/DL (ref 74–99)
HCT VFR BLD AUTO: 23.4 % (ref 36–46)
HGB BLD-MCNC: 7.6 G/DL (ref 12–16)
HOLD SPECIMEN: NORMAL
MAGNESIUM SERPL-MCNC: 1.79 MG/DL (ref 1.6–2.4)
MCH RBC QN AUTO: 28.4 PG (ref 26–34)
MCHC RBC AUTO-ENTMCNC: 32.5 G/DL (ref 32–36)
MCV RBC AUTO: 87 FL (ref 80–100)
NRBC BLD-RTO: 0 /100 WBCS (ref 0–0)
PLATELET # BLD AUTO: 236 X10*3/UL (ref 150–450)
POTASSIUM SERPL-SCNC: 4.2 MMOL/L (ref 3.5–5.3)
PROT SERPL-MCNC: 4.7 G/DL (ref 6.4–8.2)
RBC # BLD AUTO: 2.68 X10*6/UL (ref 4–5.2)
SODIUM SERPL-SCNC: 134 MMOL/L (ref 136–145)
WBC # BLD AUTO: 4.2 X10*3/UL (ref 4.4–11.3)

## 2024-01-03 PROCEDURE — 2500000005 HC RX 250 GENERAL PHARMACY W/O HCPCS: Performed by: REGISTERED NURSE

## 2024-01-03 PROCEDURE — 82947 ASSAY GLUCOSE BLOOD QUANT: CPT

## 2024-01-03 PROCEDURE — 2500000001 HC RX 250 WO HCPCS SELF ADMINISTERED DRUGS (ALT 637 FOR MEDICARE OP)

## 2024-01-03 PROCEDURE — 99233 SBSQ HOSP IP/OBS HIGH 50: CPT

## 2024-01-03 PROCEDURE — 83735 ASSAY OF MAGNESIUM: CPT

## 2024-01-03 PROCEDURE — 85027 COMPLETE CBC AUTOMATED: CPT

## 2024-01-03 PROCEDURE — 97165 OT EVAL LOW COMPLEX 30 MIN: CPT | Mod: GO

## 2024-01-03 PROCEDURE — 36415 COLL VENOUS BLD VENIPUNCTURE: CPT

## 2024-01-03 PROCEDURE — 94760 N-INVAS EAR/PLS OXIMETRY 1: CPT

## 2024-01-03 PROCEDURE — 2500000004 HC RX 250 GENERAL PHARMACY W/ HCPCS (ALT 636 FOR OP/ED): Performed by: REGISTERED NURSE

## 2024-01-03 PROCEDURE — 2500000004 HC RX 250 GENERAL PHARMACY W/ HCPCS (ALT 636 FOR OP/ED)

## 2024-01-03 PROCEDURE — 97530 THERAPEUTIC ACTIVITIES: CPT | Mod: GP,CQ | Performed by: PHYSICAL THERAPY ASSISTANT

## 2024-01-03 PROCEDURE — 1200000002 HC GENERAL ROOM WITH TELEMETRY DAILY

## 2024-01-03 PROCEDURE — 80053 COMPREHEN METABOLIC PANEL: CPT

## 2024-01-03 PROCEDURE — C9113 INJ PANTOPRAZOLE SODIUM, VIA: HCPCS | Performed by: REGISTERED NURSE

## 2024-01-03 RX ORDER — LEVOTHYROXINE SODIUM 75 UG/1
75 TABLET ORAL DAILY
Status: DISCONTINUED | OUTPATIENT
Start: 2024-01-04 | End: 2024-01-05 | Stop reason: HOSPADM

## 2024-01-03 RX ORDER — ACETAMINOPHEN 325 MG/1
650 TABLET ORAL EVERY 4 HOURS PRN
Status: DISCONTINUED | OUTPATIENT
Start: 2024-01-03 | End: 2024-01-05 | Stop reason: HOSPADM

## 2024-01-03 RX ORDER — ACETAMINOPHEN 160 MG/5ML
650 SOLUTION ORAL EVERY 4 HOURS PRN
Status: DISCONTINUED | OUTPATIENT
Start: 2024-01-03 | End: 2024-01-05 | Stop reason: HOSPADM

## 2024-01-03 RX ORDER — FUROSEMIDE 40 MG/1
20 TABLET ORAL DAILY
Status: DISCONTINUED | OUTPATIENT
Start: 2024-01-03 | End: 2024-01-05 | Stop reason: HOSPADM

## 2024-01-03 RX ADMIN — METOPROLOL TARTRATE 2.5 MG: 5 INJECTION INTRAVENOUS at 01:59

## 2024-01-03 RX ADMIN — METOPROLOL TARTRATE 2.5 MG: 5 INJECTION INTRAVENOUS at 06:59

## 2024-01-03 RX ADMIN — ACETAMINOPHEN 650 MG: 325 TABLET ORAL at 16:43

## 2024-01-03 RX ADMIN — RIVAROXABAN 15 MG: 15 TABLET, FILM COATED ORAL at 16:43

## 2024-01-03 RX ADMIN — TRIAMTERENE AND HYDROCHLOROTHIAZIDE 1 TABLET: 37.5; 25 TABLET ORAL at 08:30

## 2024-01-03 RX ADMIN — FLUOXETINE HYDROCHLORIDE 20 MG: 20 CAPSULE ORAL at 08:30

## 2024-01-03 RX ADMIN — PANTOPRAZOLE SODIUM 40 MG: 40 INJECTION, POWDER, FOR SOLUTION INTRAVENOUS at 08:30

## 2024-01-03 RX ADMIN — FUROSEMIDE 10 MG: 10 INJECTION, SOLUTION INTRAMUSCULAR; INTRAVENOUS at 08:30

## 2024-01-03 RX ADMIN — LEVOTHYROXINE SODIUM 38 MCG: 20 INJECTION, SOLUTION INTRAVENOUS at 08:29

## 2024-01-03 RX ADMIN — AMLODIPINE BESYLATE 5 MG: 5 TABLET ORAL at 08:30

## 2024-01-03 RX ADMIN — PANTOPRAZOLE SODIUM 40 MG: 40 TABLET, DELAYED RELEASE ORAL at 06:59

## 2024-01-03 ASSESSMENT — COGNITIVE AND FUNCTIONAL STATUS - GENERAL
STANDING UP FROM CHAIR USING ARMS: A LITTLE
MOBILITY SCORE: 14
TOILETING: A LOT
WALKING IN HOSPITAL ROOM: A LITTLE
MOVING FROM LYING ON BACK TO SITTING ON SIDE OF FLAT BED WITH BEDRAILS: A LOT
WALKING IN HOSPITAL ROOM: A LITTLE
DRESSING REGULAR UPPER BODY CLOTHING: A LITTLE
MOVING TO AND FROM BED TO CHAIR: A LITTLE
CLIMB 3 TO 5 STEPS WITH RAILING: TOTAL
MOBILITY SCORE: 14
STANDING UP FROM CHAIR USING ARMS: A LITTLE
TURNING FROM BACK TO SIDE WHILE IN FLAT BAD: A LOT
MOVING FROM LYING ON BACK TO SITTING ON SIDE OF FLAT BED WITH BEDRAILS: A LOT
HELP NEEDED FOR BATHING: A LOT
DAILY ACTIVITIY SCORE: 15
PERSONAL GROOMING: A LOT
TURNING FROM BACK TO SIDE WHILE IN FLAT BAD: A LOT
MOVING TO AND FROM BED TO CHAIR: A LITTLE
DRESSING REGULAR LOWER BODY CLOTHING: A LOT
CLIMB 3 TO 5 STEPS WITH RAILING: TOTAL

## 2024-01-03 ASSESSMENT — ACTIVITIES OF DAILY LIVING (ADL)
BATHING_ASSISTANCE: MODERATE
ADL_ASSISTANCE: NEEDS ASSISTANCE

## 2024-01-03 ASSESSMENT — PAIN DESCRIPTION - LOCATION: LOCATION: ABDOMEN

## 2024-01-03 ASSESSMENT — PAIN - FUNCTIONAL ASSESSMENT
PAIN_FUNCTIONAL_ASSESSMENT: 0-10

## 2024-01-03 ASSESSMENT — PAIN SCALES - GENERAL
PAINLEVEL_OUTOF10: 3
PAINLEVEL_OUTOF10: 0 - NO PAIN
PAINLEVEL_OUTOF10: 0 - NO PAIN

## 2024-01-03 NOTE — PROGRESS NOTES
Occupational Therapy    Evaluation    Patient Name: Harper Lara  MRN: [id number]  Today's Date: 1/3/2024  Time Calculation  Start Time: 1325  Stop Time: 1343  Time Calculation (min): 18 min      Assessment:  End of Session Communication: Bedside nurse  End of Session Patient Position: Bed, 3 rail up, Alarm on (Assisted cleanign pt. body and bed due to soiled linens/purwick. Removed purwik, educated pt. to use call button to go to bathroom.)  OT Assessment Results: Decreased ADL status, Decreased endurance  Strengths: Attitude of self  Plan:  Treatment Interventions: ADL retraining, Functional transfer training, Endurance training  OT Frequency: 2 times per week  OT Discharge Recommendations: Moderate intensity level of continued care  OT - OK to Discharge: Yes (when medically stable)      Subjective   Current Problem:  1. SBO (small bowel obstruction) (CMS/HCC)          General:  General  Reason for Referral: ADL impairment  Referred By: Justo 12/29, OT  Past Medical History Relevant to Rehab: Fatty liver, GI bleed, hypothyroidism, DM, anxiety, depression, dementia, A-fib, HTN, SBO and resection  Family/Caregiver Present: No  Patient Position Received: Bed, 3 rail up, Alarm on  General Comment: Pt. is 79 y/o to ED 12/29 from SNF with AMS and concern for hypoglycemia. Pt. was recently D/C 12/26 to SNF s/p SBO, laparoscopic procedure with SB recetion. General sx following for concern for recurrent SBO. Na 134, CXR: elevation L hemidiaphragm, bibasilar opacities, infection not excluded. CT  head (-), CT abd: post surgical SB resection with concern for recurrent SBO, mulitple fluid filled loops and fluid collection. NG placed; removed 1/1/24.  Precautions:  Medical Precautions: Fall precautions  Post-Surgical Precautions: Abdominal surgery precautions  Precautions Comment: Telemetry; purwick.    Pain:  Pain Assessment  Pain Assessment: 0-10  Pain Score: 0 - No pain    Objective   Cognition:  Overall Cognitive Status:  Within Functional Limits  Orientation Level: Oriented X4     Home Living:  Type of Home: House  Lives With: Spouse  Home Adaptive Equipment: Walker rolling or standard, Cane  Home Layout: One level  Home Access: Stairs to enter without rails  Entrance Stairs-Number of Steps: 1  Bathroom Shower/Tub: Tub/shower unit  Bathroom Equipment: Grab bars in shower  Home Living Comments: pt. recently admitted from SNF  Prior Function:  ADL Assistance: Needs assistance  Homemaking Assistance: Needs assistance  Ambulatory Assistance:  (At home pt. was using WW and QC.)  Prior Function Comments: Was pivoting to w/c at [facility] with therapists. upon D/C from hospital 12/26, pt. was using WW.  IADL History:  IADL Comments: Needs assist at baseline.  ADL:  Eating Assistance: Independent  Grooming Assistance: Moderate  Bathing Assistance: Moderate  UE Dressing Assistance: Minimal  LE Dressing Assistance: Maximal  Toileting Assistance with Device: Maximal  Activity Tolerance:  Endurance: Decreased tolerance for upright activites  Bed Mobility/Transfers: Bed Mobility  Bed Mobility: Yes  Bed Mobility 1  Bed Mobility 1: Supine to sitting, Sitting to supine  Level of Assistance 1: Minimum assistance  Bed Mobility Comments 1: log roll;able to flex B knees, reach across body for bed rail. Mod A x1 to lift trunk to upright sit and scoot hips forward to square EOB. Assist Min A x1 to bring LEs back into bed at end of session.    Transfers  Transfer: Yes  Transfer 1  Technique 1: Sit to stand, Stand to sit  Transfer Device 1: Walker  Transfer Level of Assistance 1: Minimum assistance, Moderate assistance  Trials/Comments 1: Mod A from EOB. Min A from commode chair with arm rests. Increased time to upright posture and balance.  Transfers 2  Technique 2: Stand pivot  Transfer Device 2: Walker  Transfer Level of Assistance 2: Minimum assistance  Trials/Comments 2: bed<>commode. WW use; assist to steady/balance. VCs for hand  placement    Ambulation/Gait Training:  Ambulation/Gait Training  Ambulation/Gait Training Performed:  (side stepping alongside EOB with WW; Min A x1)    Standing Balance:  Dynamic Standing Balance  Dynamic Standing-Comments: Fair - with WW     Strength:  Strength Comments: Mayra 4/5 MMT    Hand Function:  Gross Grasp: Functional  Extremities: RUE   RUE : Within Functional Limits and LUE   LUE: Within Functional Limits    Outcome Measures:[facility] Daily Activity  Putting on and taking off regular lower body clothing: A lot  Bathing (including washing, rinsing, drying): A lot  Putting on and taking off regular upper body clothing: A little  Toileting, which includes using toilet, bedpan or urinal: A lot  Taking care of personal grooming such as brushing teeth: A lot  Eating Meals: None  Daily Activity - Total Score: 15        Education Documentation  Precautions, taught by Augustina Luna OT at 1/3/2024  2:21 PM.  Learner: Patient  Readiness: Eager  Method: Explanation  Response: Verbalizes Understanding, Needs Reinforcement    ADL Training, taught by Augustina Luna OT at 1/3/2024  2:21 PM.  Learner: Patient  Readiness: Eager  Method: Explanation  Response: Verbalizes Understanding, Needs Reinforcement        IP EDUCATION:  Education  Individual(s) Educated: Patient  Education Provided: POC discussed and agreed upon (WW use, BSC use, and benefit of getting up to use bathroom when needed (use of call button))  Plan of Care Discussed and Agreed Upon: yes  Patient Response to Education: Patient/Caregiver Verbalized Understanding of Information    Goals:  Encounter Problems       Encounter Problems (Active)       OT Goals       SBA for all functional transfers  (Progressing)       Start:  01/03/24    Expected End:  01/17/24            Fair + dyn standing balance during ADLs and functional activities  (Progressing)       Start:  01/03/24    Expected End:  01/17/24            Pt. will be able to tolerate  5+ minutes of ADLs and functional tasks with one rest break.  (Progressing)       Start:  01/03/24    Expected End:  01/17/24            Min A for LB dressing; reacher, sock aid, dressing stick use (Progressing)       Start:  01/03/24    Expected End:  01/17/24

## 2024-01-03 NOTE — CARE PLAN
"The patient's goals for the shift include  to \"feel better\"    The clinical goals for the shift include pt will have decreased stol incontinence this shift    Over the shift, the patient did not make progress toward the following goals. Barriers to progression include Pts possible sbo. Recommendations to address these barriers include continue with current poc.    "

## 2024-01-03 NOTE — PROGRESS NOTES
General Surgery Progress Note    Patient: Harper Lara  Unit/Bed: 1105/1105-A  YOB: 1945  MRN: [id number]  Acct: [id number]   Admitting Diagnosis: SBO (small bowel obstruction) (CMS/[facility]) [K56.609]  Date:  12/29/2023  Hospital Day: 5  Attending: Manuel Lozada MD       Complaint:  Chief Complaint   Patient presents with    Hypoglycemia     Pt assisted living facility states said she had an altered mental status and a blood glu of 70. Pt currently a&o X4 and has a blood glucose of 115 per ems           Subjective  Patient awake and resting in bed this morning.  Had just finished her breakfast and reports feeling good.  Denies nausea, vomiting, chills, or shortness of breath.  Reports multiple episodes of diarrhea yesterday, but had none overnight and none this morning. Patient had some mild tenderness to palpation along right abdomen today.    PHYSICAL EXAM:  Physical Exam  Vitals reviewed.   Constitutional:       General: She is not in acute distress.     Appearance: Normal appearance. She is not toxic-appearing.   HENT:      Head: Normocephalic and atraumatic.      Nose: Nose normal.      Mouth/Throat:      Pharynx: Oropharynx is clear.   Eyes:      Extraocular Movements: Extraocular movements intact.      Conjunctiva/sclera: Conjunctivae normal.      Pupils: Pupils are equal, round, and reactive to light.   Cardiovascular:      Rate and Rhythm: Normal rate and regular rhythm.      Pulses: Normal pulses.      Heart sounds: Normal heart sounds.   Pulmonary:      Effort: Pulmonary effort is normal. No respiratory distress.      Breath sounds: Normal breath sounds.   Abdominal:      General: Abdomen is flat. Bowel sounds are normal. There is no distension.      Palpations: Abdomen is soft.      Tenderness: There is abdominal tenderness (Mild R abdominal tenderness).   Musculoskeletal:         General: Normal range of motion.      Cervical back: Normal range of motion and neck supple.   Skin:      General: Skin is warm and dry.      Capillary Refill: Capillary refill takes less than 2 seconds.   Neurological:      General: No focal deficit present.      Mental Status: She is alert and oriented to person, place, and time. Mental status is at baseline.   Psychiatric:         Mood and Affect: Mood normal.         Behavior: Behavior normal.         Vital signs in last 24 hours:  Vitals:    01/03/24 0420   BP: 122/58   Pulse: 72   Resp: 18   Temp: 36.6 °C (97.9 °F)   SpO2: 98%       Intake/Output this shift:    Intake/Output Summary (Last 24 hours) at 1/3/2024 0847  Last data filed at 1/3/2024 0600  Gross per 24 hour   Intake 1794 ml   Output 1550 ml   Net 244 ml          Allergies:  Allergies   Allergen Reactions    Diphenhydramine Hcl Unknown    Morphine Unknown    Pentazocine Unknown    Propoxyphene Unknown    Propoxyphene N-Acetaminophen Unknown    Ranitidine Unknown    Strawberry Hives        Medications:  Scheduled medications  amLODIPine, 5 mg, oral, Daily  FLUoxetine, 20 mg, oral, Daily  furosemide, 10 mg, intravenous, Daily  insulin lispro, 0-5 Units, subcutaneous, q6h  levothyroxine, 38 mcg, intravenous, q24h ORLY  metoprolol, 2.5 mg, intravenous, q6h  pantoprazole, 40 mg, oral, Daily before breakfast  pantoprazole, 40 mg, intravenous, Daily  rivaroxaban, 15 mg, oral, Daily with evening meal  triamterene-hydrochlorothiazid, 1 tablet, oral, Daily      Continuous medications  Adult Clinimix Parenteral Nutrition, 70 mL/hr, Last Rate: Stopped (01/01/24 0700)  dextrose 5 % in water (D5W), 75 mL/hr, Last Rate: Stopped (12/30/23 0700)      PRN medications  PRN medications: acetaminophen **OR** acetaminophen, dextrose 10 % in water (D10W), dextrose, glucagon     Labs:  Results for orders placed or performed during the hospital encounter of 12/29/23 (from the past 24 hour(s))   POCT GLUCOSE   Result Value Ref Range    POCT Glucose 149 (H) 74 - 99 mg/dL   Urinalysis with Reflex Microscopic   Result Value Ref Range     Color, Urine Yellow Straw, Yellow    Appearance, Urine Hazy (N) Clear    Specific Gravity, Urine 1.008 1.005 - 1.035    pH, Urine 5.0 5.0, 5.5, 6.0, 6.5, 7.0, 7.5, 8.0    Protein, Urine NEGATIVE NEGATIVE mg/dL    Glucose, Urine NEGATIVE NEGATIVE mg/dL    Blood, Urine NEGATIVE NEGATIVE    Ketones, Urine NEGATIVE NEGATIVE mg/dL    Bilirubin, Urine NEGATIVE NEGATIVE    Urobilinogen, Urine <2.0 <2.0 mg/dL    Nitrite, Urine NEGATIVE NEGATIVE    Leukocyte Esterase, Urine SMALL (1+) (A) NEGATIVE   Microscopic Only, Urine   Result Value Ref Range    WBC, Urine 6-10 (A) 1-5, NONE /HPF    RBC, Urine NONE NONE, 1-2, 3-5 /HPF    Bacteria, Urine 1+ (A) NONE SEEN /HPF    Mucus, Urine 1+ Reference range not established. /LPF   POCT GLUCOSE   Result Value Ref Range    POCT Glucose 159 (H) 74 - 99 mg/dL   POCT GLUCOSE   Result Value Ref Range    POCT Glucose 119 (H) 74 - 99 mg/dL   POCT GLUCOSE   Result Value Ref Range    POCT Glucose 134 (H) 74 - 99 mg/dL   CBC   Result Value Ref Range    WBC 4.2 (L) 4.4 - 11.3 x10*3/uL    nRBC 0.0 0.0 - 0.0 /100 WBCs    RBC 2.68 (L) 4.00 - 5.20 x10*6/uL    Hemoglobin 7.6 (L) 12.0 - 16.0 g/dL    Hematocrit 23.4 (L) 36.0 - 46.0 %    MCV 87 80 - 100 fL    MCH 28.4 26.0 - 34.0 pg    MCHC 32.5 32.0 - 36.0 g/dL    RDW 15.1 (H) 11.5 - 14.5 %    Platelets 236 150 - 450 x10*3/uL   Comprehensive Metabolic Panel   Result Value Ref Range    Glucose 136 (H) 74 - 99 mg/dL    Sodium 134 (L) 136 - 145 mmol/L    Potassium 4.2 3.5 - 5.3 mmol/L    Chloride 105 98 - 107 mmol/L    Bicarbonate 22 21 - 32 mmol/L    Anion Gap 11 10 - 20 mmol/L    Urea Nitrogen 25 (H) 6 - 23 mg/dL    Creatinine 0.70 0.50 - 1.05 mg/dL    eGFR 90 >60 mL/min/1.73m*2    Calcium 7.3 (L) 8.6 - 10.3 mg/dL    Albumin 2.2 (L) 3.4 - 5.0 g/dL    Alkaline Phosphatase 56 33 - 136 U/L    Total Protein 4.7 (L) 6.4 - 8.2 g/dL    AST 22 9 - 39 U/L    Bilirubin, Total 0.3 0.0 - 1.2 mg/dL    ALT 17 7 - 45 U/L   Magnesium   Result Value Ref Range     Magnesium 1.79 1.60 - 2.40 mg/dL   SST TOP   Result Value Ref Range    Extra Tube Hold for add-ons.    POCT GLUCOSE   Result Value Ref Range    POCT Glucose 127 (H) 74 - 99 mg/dL        Imaging:  XR abdomen 1 view    Result Date: 12/31/2023  Interpreted By:  Omar Noe, STUDY: XR ABDOMEN 1 VIEW; 12/31/2023 3:24 pm   INDICATION: Signs/Symptoms:small bowel obstruction.   COMPARISON: 12/30/2023   ACCESSION NUMBER(S): [id number]   ORDERING CLINICIAN: TARAS WARD   FINDINGS: 2 supine AP radiographs of the abdomen were obtained. There is a nonobstructive bowel gas pattern present, with residual oral contrast seen throughout the colon. Free intraperitoneal air and air-fluid levels cannot be excluded without upright or decubitus images.       Nonobstructive bowel gas pattern.   MACRO: None   Signed by: Omar Noe 12/31/2023 3:36 PM Dictation workstation:   [id number]       Plan  DVT prophylaxis with Xarelto  Low fiber diet   PPN feeds can be stopped, see how patient tolerates low fiber diet. Calorie count meals  Urine from yesterday negative  Continue pain control - discontinued Dilaudid and prescribed Tylenol.  When asked patient reports no known allergies to Tylenol.  Continue PT/OT   Continue wet-to-dry dressing changes to wound  C. difficile test from 12- was negative  Continue to trend CBC and BMP daily  Potential discharge tomorrow.     Further recommendations per Dr. Neville Bales PA-C    Time spent  35  minutes obtaining labs, imaging, recommendations, interview, assessment, examination, medication review/ordering, and EMR review.    Plan of care was discussed extensively with patient. Patient verbalized understanding through teach back method. All questions and concerns addressed upon examination.     Of note, this documentation is completed using the Dragon Dictation system (voice recognition software). There may be spelling and/or grammatical errors that were not corrected prior to  final submission.    Patient seen and examined. I have reviewed and discussed APRN note; denies abdominal pain; tolerating soft diet with no nausea or vomiting, multiple loose stools overnight but none today; worked with PT/OT; difficulty with IV access and will hold off; hold PPN; if continues to tolerate diet, will discharge with soft diet without PPN; all questions answered

## 2024-01-03 NOTE — PROGRESS NOTES
"Physical Therapy    Physical Therapy Treatment    Patient Name: Harper Lara  MRN: [id number]  Today's Date: 1/3/2024  Time Calculation  Start Time: 0858  Stop Time: 0923  Time Calculation (min): 25 min       Assessment/Plan   PT Assessment  End of Session Communication: Bedside nurse  End of Session Patient Position: Up in chair     PT Plan  Treatment/Interventions: Bed mobility, Transfer training, Strengthening, Therapeutic exercise  PT Plan: Skilled PT  PT Frequency: 3 times per week    General Visit Information:   PT  Visit  PT Received On: 01/03/24  General  Prior to Session Communication: Bedside nurse  Patient Position Received: Bed, 3 rail up  Preferred Learning Style: verbal, visual  General Comment:  (Pt. stating \"feels good to be out of the bed\" agreeable to sit up in chair.)    Subjective   Precautions:  Precautions  Post-Surgical Precautions: Abdominal surgery precautions (pt. educated and reviewed precautions to assure precautions maintained during tx.)  Precautions Comment:  (Fall, abdominal precauitons, infection)    Treatments:  Therapeutic Exercise  Therapeutic Exercise Performed: Yes (BLE exercises all planes in available ranges to promote strength and mobility with rest breaks as needed x 10 reps each.)    Therapeutic Activity  Therapeutic Activity Performed:  (pt. using fww to transfer from bed to chair taking 3 steps with close cg/min x 1, no buckling noted)    Transfers  Transfer:  (supine to sit with mod x 1 and use of rail, sit to stand with min x 1 and bed to chair with walker and min x 1 to assure balance and form.)    Outcome Measures:  [facility] Basic Mobility  Turning from your back to your side while in a flat bed without using bedrails: A lot  Moving from lying on your back to sitting on the side of a flat bed without using bedrails: A lot  Moving to and from bed to chair (including a wheelchair): A little  Standing up from a chair using your arms (e.g. wheelchair or bedside chair): A " little  To walk in hospital room: A little  Climbing 3-5 steps with railing: Total  Basic Mobility - Total Score: 14    EDUCATION:  Outpatient Education  Individual(s) Educated: Patient  Education Provided: Fall Risk, Body Mechanics, Home Exercise Program, Post-Op Precautions, Posture  Patient Response to Education: Patient/Caregiver Verbalized Understanding of Information, Patient/Caregiver Performed Return Demonstration of Exercises/Activities    Encounter Problems       Encounter Problems (Active)       PT Problem       Pt will demonstrate min A with all bed mobility   (Progressing)       Start:  12/30/23    Expected End:  01/13/24            Pt will demonstrate sit to stand and bed/chair transfers with mod A with WW.   (Progressing)       Start:  12/30/23    Expected End:  01/13/24            Pt will tolerate sitting EOB >5 minutes with SBA with no LOB to progress to functional transfers (Progressing)       Start:  12/30/23    Expected End:  01/13/24            Pt will tolerate 15 reps x2 LE therapeutic exercise for LE strengthening and endurance  (Progressing)       Start:  12/30/23    Expected End:  01/13/24

## 2024-01-03 NOTE — SIGNIFICANT EVENT
Medicine  Consulted for Glucose management  Patient is stable on current management.  Medicine to sign off  Call for acute needs      01/03/24 at 10:10 AM - YARITZA Cheung-CNP

## 2024-01-04 ENCOUNTER — APPOINTMENT (OUTPATIENT)
Dept: SURGERY | Facility: CLINIC | Age: 79
End: 2024-01-04
Payer: MEDICARE

## 2024-01-04 LAB
ALBUMIN SERPL BCP-MCNC: 2.2 G/DL (ref 3.4–5)
ALP SERPL-CCNC: 71 U/L (ref 33–136)
ALT SERPL W P-5'-P-CCNC: 21 U/L (ref 7–45)
ANION GAP SERPL CALC-SCNC: 11 MMOL/L (ref 10–20)
AST SERPL W P-5'-P-CCNC: 27 U/L (ref 9–39)
BILIRUB SERPL-MCNC: 0.2 MG/DL (ref 0–1.2)
BUN SERPL-MCNC: 25 MG/DL (ref 6–23)
CALCIUM SERPL-MCNC: 7.4 MG/DL (ref 8.6–10.3)
CHLORIDE SERPL-SCNC: 105 MMOL/L (ref 98–107)
CO2 SERPL-SCNC: 23 MMOL/L (ref 21–32)
CREAT SERPL-MCNC: 0.73 MG/DL (ref 0.5–1.05)
ERYTHROCYTE [DISTWIDTH] IN BLOOD BY AUTOMATED COUNT: 14.9 % (ref 11.5–14.5)
GFR SERPL CREATININE-BSD FRML MDRD: 85 ML/MIN/1.73M*2
GLUCOSE BLD MANUAL STRIP-MCNC: 113 MG/DL (ref 74–99)
GLUCOSE BLD MANUAL STRIP-MCNC: 114 MG/DL (ref 74–99)
GLUCOSE BLD MANUAL STRIP-MCNC: 119 MG/DL (ref 74–99)
GLUCOSE BLD MANUAL STRIP-MCNC: 148 MG/DL (ref 74–99)
GLUCOSE SERPL-MCNC: 117 MG/DL (ref 74–99)
HCT VFR BLD AUTO: 23.8 % (ref 36–46)
HGB BLD-MCNC: 7.7 G/DL (ref 12–16)
HOLD SPECIMEN: NORMAL
MAGNESIUM SERPL-MCNC: 1.68 MG/DL (ref 1.6–2.4)
MCH RBC QN AUTO: 28.5 PG (ref 26–34)
MCHC RBC AUTO-ENTMCNC: 32.4 G/DL (ref 32–36)
MCV RBC AUTO: 88 FL (ref 80–100)
NRBC BLD-RTO: 0 /100 WBCS (ref 0–0)
PLATELET # BLD AUTO: 220 X10*3/UL (ref 150–450)
POTASSIUM SERPL-SCNC: 4.2 MMOL/L (ref 3.5–5.3)
PROT SERPL-MCNC: 4.8 G/DL (ref 6.4–8.2)
RBC # BLD AUTO: 2.7 X10*6/UL (ref 4–5.2)
SODIUM SERPL-SCNC: 135 MMOL/L (ref 136–145)
WBC # BLD AUTO: 5 X10*3/UL (ref 4.4–11.3)

## 2024-01-04 PROCEDURE — 2500000004 HC RX 250 GENERAL PHARMACY W/ HCPCS (ALT 636 FOR OP/ED)

## 2024-01-04 PROCEDURE — 36415 COLL VENOUS BLD VENIPUNCTURE: CPT

## 2024-01-04 PROCEDURE — 97530 THERAPEUTIC ACTIVITIES: CPT | Mod: GP,CQ | Performed by: PHYSICAL THERAPY ASSISTANT

## 2024-01-04 PROCEDURE — 99233 SBSQ HOSP IP/OBS HIGH 50: CPT

## 2024-01-04 PROCEDURE — 1200000002 HC GENERAL ROOM WITH TELEMETRY DAILY

## 2024-01-04 PROCEDURE — 82947 ASSAY GLUCOSE BLOOD QUANT: CPT

## 2024-01-04 PROCEDURE — 83735 ASSAY OF MAGNESIUM: CPT

## 2024-01-04 PROCEDURE — 2500000001 HC RX 250 WO HCPCS SELF ADMINISTERED DRUGS (ALT 637 FOR MEDICARE OP)

## 2024-01-04 PROCEDURE — 80053 COMPREHEN METABOLIC PANEL: CPT

## 2024-01-04 PROCEDURE — 85027 COMPLETE CBC AUTOMATED: CPT

## 2024-01-04 PROCEDURE — 94760 N-INVAS EAR/PLS OXIMETRY 1: CPT

## 2024-01-04 PROCEDURE — 97116 GAIT TRAINING THERAPY: CPT | Mod: GP,CQ | Performed by: PHYSICAL THERAPY ASSISTANT

## 2024-01-04 RX ORDER — ACETAMINOPHEN 325 MG/1
650 TABLET ORAL EVERY 6 HOURS PRN
Qty: 30 TABLET | Refills: 0 | Status: SHIPPED | OUTPATIENT
Start: 2024-01-04

## 2024-01-04 RX ADMIN — ACETAMINOPHEN 650 MG: 325 TABLET ORAL at 06:18

## 2024-01-04 RX ADMIN — AMLODIPINE BESYLATE 5 MG: 5 TABLET ORAL at 08:00

## 2024-01-04 RX ADMIN — LEVOTHYROXINE SODIUM 75 MCG: 75 TABLET ORAL at 06:18

## 2024-01-04 RX ADMIN — FUROSEMIDE 20 MG: 40 TABLET ORAL at 10:23

## 2024-01-04 RX ADMIN — RIVAROXABAN 15 MG: 15 TABLET, FILM COATED ORAL at 16:35

## 2024-01-04 RX ADMIN — PANTOPRAZOLE SODIUM 40 MG: 40 TABLET, DELAYED RELEASE ORAL at 06:18

## 2024-01-04 RX ADMIN — TRIAMTERENE AND HYDROCHLOROTHIAZIDE 1 TABLET: 37.5; 25 TABLET ORAL at 08:00

## 2024-01-04 RX ADMIN — FLUOXETINE HYDROCHLORIDE 20 MG: 20 CAPSULE ORAL at 08:00

## 2024-01-04 RX ADMIN — ACETAMINOPHEN 650 MG: 325 TABLET ORAL at 00:03

## 2024-01-04 ASSESSMENT — PAIN SCALES - GENERAL
PAINLEVEL_OUTOF10: 3
PAINLEVEL_OUTOF10: 0 - NO PAIN
PAINLEVEL_OUTOF10: 0 - NO PAIN

## 2024-01-04 ASSESSMENT — COGNITIVE AND FUNCTIONAL STATUS - GENERAL
MOBILITY SCORE: 15
MOVING TO AND FROM BED TO CHAIR: A LITTLE
CLIMB 3 TO 5 STEPS WITH RAILING: TOTAL
MOVING FROM LYING ON BACK TO SITTING ON SIDE OF FLAT BED WITH BEDRAILS: A LITTLE
WALKING IN HOSPITAL ROOM: A LITTLE
STANDING UP FROM CHAIR USING ARMS: A LITTLE
TURNING FROM BACK TO SIDE WHILE IN FLAT BAD: A LOT

## 2024-01-04 ASSESSMENT — PAIN - FUNCTIONAL ASSESSMENT
PAIN_FUNCTIONAL_ASSESSMENT: 0-10
PAIN_FUNCTIONAL_ASSESSMENT: 0-10

## 2024-01-04 ASSESSMENT — PAIN DESCRIPTION - LOCATION: LOCATION: ABDOMEN

## 2024-01-04 NOTE — DISCHARGE INSTRUCTIONS
Call the office or go to the ED are if:  Fever above 100.4  Cannot eat or drink  Have trouble urinating  Chest pain or shortness of breath  Pain is uncontrolled    Do not drive while taking narcotics  Complete all doses of Antibiotics, if prescribed.  Do not lift more than 10lbs until follow up with clearance.   You may follow a regular diet. Continue Boost or Ensure three times a day.

## 2024-01-04 NOTE — PROGRESS NOTES
01/04/24 1620   Current Planned Discharge Disposition   Current Planned Discharge Disposition SNF  ([facility] NR)     Plan is for discharge once pt is able to tolerate diet. Awaiting medical clearance for discharge back to SNF.

## 2024-01-04 NOTE — PROGRESS NOTES
Physical Therapy    Physical Therapy Treatment    Patient Name: Harper Lara  MRN: [id number]  Today's Date: 1/4/2024  Time Calculation  Start Time: 0912  Stop Time: 0935  Time Calculation (min): 23 min       Assessment/Plan   PT Assessment  End of Session Communication: Bedside nurse  End of Session Patient Position: Up in chair     PT Plan  Treatment/Interventions: Bed mobility, Transfer training, Gait training  PT Plan: Skilled PT  PT Frequency: 3 times per week    General Visit Information:   PT  Visit  PT Received On: 01/04/24  General  Prior to Session Communication: Bedside nurse  General Comment:  (pt. agreeable to oob actvity stating may be discharged today.)    Subjective   Precautions:  Precautions  Precautions Comment:  (fall, infection, abdomnal precautions)  Treatments:    Ambulation/Gait Training  Ambulation/Gait Training Performed: Yes (gait training trials with fww and min x 1 to assure safe proximity and negotiating of device 10-12' trials with short rest breaks taken as needed slow step to trial.)  Transfers  Transfer:  (supine to sit with mod x 1, sit to stand with min/cg and bed to chair with walker and min/cg to assure balance and form)    Outcome Measures:  [facility] Basic Mobility  Turning from your back to your side while in a flat bed without using bedrails: A little  Moving from lying on your back to sitting on the side of a flat bed without using bedrails: A lot  Moving to and from bed to chair (including a wheelchair): A little  Standing up from a chair using your arms (e.g. wheelchair or bedside chair): A little  To walk in hospital room: A little  Climbing 3-5 steps with railing: Total  Basic Mobility - Total Score: 15    EDUCATION:  Outpatient Education  Individual(s) Educated: Patient    Encounter Problems       Encounter Problems (Active)       PT Problem       Pt will demonstrate min A with all bed mobility   (Progressing)       Start:  12/30/23    Expected End:  01/13/24            Pt  will demonstrate sit to stand and bed/chair transfers with mod A with WW.   (Met)       Start:  12/30/23    Expected End:  01/13/24    Resolved:  01/04/24    Updated to: Pt will demonstrate sit to stand and bed/chair transfers with CGA with WW.    Update reason: progressing; goal met         Pt will tolerate sitting EOB >5 minutes with SBA with no LOB to progress to functional transfers (Progressing)       Start:  12/30/23    Expected End:  01/13/24            Pt will tolerate 15 reps x2 LE therapeutic exercise for LE strengthening and endurance  (Progressing)       Start:  12/30/23    Expected End:  01/13/24            Pt will demonstrate sit to stand and bed/chair transfers with CGA with WW. (Progressing)       Start:  01/04/24    Expected End:  01/13/24                Pt will ambulate 15 feetx2 with min A with WW  with no LOB  (Progressing)       Start:  01/04/24    Expected End:  01/13/24

## 2024-01-04 NOTE — CARE PLAN
Late entry for 1/3, POC reviewed and goals updated. Continue with POC, discussed with following PTA.

## 2024-01-04 NOTE — PROGRESS NOTES
"Nutrition Follow Up Assessment:   Nutrition Assessment         Patient is a 78 y.o. female presenting with: SBO      Nutrition History:  Energy Intake: Good > 75 % (per pt and RN)  Food and Nutrient History: RD follow-up. On low fiber diet, PPN discontinued since last RD visit. Pt denies GI symptoms. Per nursing assistant, has had 4 BMs today. Calorie count requested per surgery's notes though RD not consulted to officially complete it. Nursing reporting ate 100% of breakfast yesterday and today minus the pudding on tray, each meal providing 375 kcal per [facility]. Nursing also reporting 100% of dinner consumed last night, per [facility] this provides 470 kcal. RN unsure how much of lunch was consumed either today or yesterday. Unclear at this time if has been drinking Ensure, will continue to send. If pt no longer wishes to receive Ensure, please discontinue.  Food Allergies/Intolerances:   Strawberry  GI Symptoms:  pt denies  Oral Problems: None       Anthropometrics:  Height: 165.1 cm (5' 5\")   Weight: 76.8 kg (169 lb 5 oz)   BMI (Calculated): 28.18  IBW/kg (Dietitian Calculated): 56.8 kg  Percent of IBW: 141 %       Weight History:     Wt Readings from Last 15 Encounters:   01/04/24 76.8 kg (169 lb 5 oz)   12/25/23 84.9 kg (187 lb 2.7 oz)   08/12/19 119 kg (262 lb 5.6 oz)         Previously stated by RD on 12/29: Weight Change: 13.1 kg, 13% significant wt loss x ~1 month  Significant Weight Loss: Yes  Interpretation of Weight Loss: >5% in 1 month           Nutrition Focused Physical Exam Findings:  defer: completed at initial     Edema:  Edema: +2 mild, +3 moderate  Edema Location: 2+ generalized edema, 2+ BUE, 3+ RLE, 2+ LLE edema per nursing assessment  Physical Findings:  Skin: Positive (stage 2 on buttocks, incisions)    Nutrition Significant Labs:  BMP Trend:   Results from last 7 days   Lab Units 01/04/24  0533 01/03/24  0555 01/02/24  0640 01/01/24  0756   GLUCOSE mg/dL 117* 136* 137* 130*   CALCIUM " mg/dL 7.4* 7.3* 7.1* 7.4*   SODIUM mmol/L 135* 134* 136 137   POTASSIUM mmol/L 4.2 4.2 3.8 3.8   CO2 mmol/L 23 22 24 24   CHLORIDE mmol/L 105 105 106 105   BUN mg/dL 25* 25* 22 26*   CREATININE mg/dL 0.73 0.70 0.73 0.73        Nutrition Specific Medications: Reviewed      I/O:   Last BM Date: 01/04/24; Stool Appearance: Watery (12/31/23 1030)        Dietary Orders (From admission, onward)       Start     Ordered    01/02/24 1127  Adult diet Low fiber  Diet effective now        Question:  Diet type  Answer:  Low fiber    01/02/24 1126    12/31/23 1659  Oral nutritional supplements  Until discontinued        Question Answer Comment   Deliver with All meals    Select supplement: Ensure High Protein        12/31/23 1659 12/29/23 0658  May Participate in Room Service With Assistance  Once        Question:  .  Answer:  Yes    12/29/23 0657                     Estimated Needs:   Total Energy Estimated Needs (kCal): 2110 kCal  Method for Estimating Needs: ABW  Total Protein Estimated Needs (g): 84 g  Method for Estimating Needs: ABW  Total Fluid Estimated Needs (mL): 2110 mL  Method for Estimating Needs: ABW        Nutrition Diagnosis   Malnutrition Diagnosis  Patient has Malnutrition Diagnosis: Yes  Diagnosis Status: Ongoing  Malnutrition Diagnosis: Severe malnutrition related to acute disease or injury  As Evidenced by: moderate muscle wasting, moderate fat loss, >5% wt loss in 1 month    Nutrition Diagnosis  Patient has Nutrition Diagnosis: Yes  Diagnosis Status (1): Resolved  Nutrition Diagnosis 1: Altered GI function  Related to (1): SBO  As Evidenced by (1): PPN running with plans to disontinue if tolerates solid foods       Nutrition Interventions/Recommendations         Nutrition Prescription:  Individualized Nutrition Prescription Provided for : Low fiber diet. Ensure TID        Nutrition Interventions:   Food and/or Nutrient Delivery Interventions  Interventions: Meals and snacks, Medical food supplement  Meals  and Snacks: Fiber-modified diet  Medical Food Supplement: Commercial beverage  Goal: Consume >50% of Ensure per meal (one Ensure provides 350 kcal, 20g protein)    Coordination of Nutrition Care by a Nutrition Professional  Collaboration and Referral of Nutrition Care: Collaboration by nutrition professional with other providers  Goal: RN    Nutrition Education:       Pt denies nutritional questions at this time.    Nutrition Monitoring and Evaluation   Food/Nutrient Related History Monitoring  Monitoring and Evaluation Plan: Energy intake, Amount of food  Energy Intake: Estimated energy intake  Criteria: Pt meets >75% of estimated energy needs  Amount of Food: Estimated amout of food, Medical food intake  Criteria: Pt consumes >75% of meals and supplements    Body Composition/Growth/Weight History  Monitoring and Evaluation Plan: Weight  Weight: Measured weight  Criteria: Maintains stable weight    Biochemical Data, Medical Tests and Procedures  Monitoring and Evaluation Plan: Glucose/endocrine profile, Electrolyte/renal panel  Criteria: WNL  Glucose/Endocrine Profile: Glucose, casual  Criteria: BG within desirable range            Time Spent/Follow-up Reminder:   Time Spent (min): 30 minutes  Last Date of Nutrition Visit: 01/04/24  Nutrition Follow-Up Needed?: 5-7 days

## 2024-01-04 NOTE — PROGRESS NOTES
General Surgery Progress Note    Patient: Harper Lara  Unit/Bed: 1105/1105-A  YOB: 1945  MRN: [id number]  Acct: [id number]   Admitting Diagnosis: SBO (small bowel obstruction) (CMS/[facility]) [K56.609]  Date:  12/29/2023  Hospital Day: 6  Attending: Manuel Lozada MD       Complaint:  Chief Complaint   Patient presents with    Hypoglycemia     Pt assisted living facility states said she had an altered mental status and a blood glu of 70. Pt currently a&o X4 and has a blood glucose of 115 per ems           Subjective  Patient awake and resting in bed this morning.  Had just finished her breakfast and reports feeling good.  Denies nausea, vomiting, chills, or shortness of breath.  Reports no bowel movements last night or this morning, but does report passing gas still.    PHYSICAL EXAM:  Physical Exam  Vitals reviewed.   Constitutional:       General: She is not in acute distress.     Appearance: Normal appearance. She is not toxic-appearing.   HENT:      Head: Normocephalic and atraumatic.      Nose: Nose normal.      Mouth/Throat:      Pharynx: Oropharynx is clear.   Eyes:      Extraocular Movements: Extraocular movements intact.      Conjunctiva/sclera: Conjunctivae normal.      Pupils: Pupils are equal, round, and reactive to light.   Cardiovascular:      Rate and Rhythm: Normal rate and regular rhythm.      Pulses: Normal pulses.      Heart sounds: Normal heart sounds.   Pulmonary:      Effort: Pulmonary effort is normal. No respiratory distress.      Breath sounds: Normal breath sounds.   Abdominal:      General: Abdomen is flat. Bowel sounds are normal. There is no distension.      Palpations: Abdomen is soft.      Tenderness: There is no abdominal tenderness.   Musculoskeletal:         General: Normal range of motion.      Cervical back: Normal range of motion and neck supple.   Skin:     General: Skin is warm and dry.      Capillary Refill: Capillary refill takes less than 2 seconds.    Neurological:      General: No focal deficit present.      Mental Status: She is alert and oriented to person, place, and time. Mental status is at baseline.   Psychiatric:         Mood and Affect: Mood normal.         Behavior: Behavior normal.         Vital signs in last 24 hours:  Vitals:    01/04/24 0330   BP: 109/59   Pulse: 70   Resp: 17   Temp: 36.1 °C (97 °F)   SpO2: 98%       Intake/Output this shift:    Intake/Output Summary (Last 24 hours) at 1/4/2024 0858  Last data filed at 1/4/2024 0330  Gross per 24 hour   Intake --   Output 1800 ml   Net -1800 ml          Allergies:  Allergies   Allergen Reactions    Diphenhydramine Hcl Unknown    Morphine Unknown    Pentazocine Unknown    Propoxyphene Unknown    Propoxyphene N-Acetaminophen Unknown    Ranitidine Unknown    Strawberry Hives        Medications:  Scheduled medications  amLODIPine, 5 mg, oral, Daily  FLUoxetine, 20 mg, oral, Daily  furosemide, 20 mg, oral, Daily  levothyroxine, 75 mcg, oral, Daily  pantoprazole, 40 mg, oral, Daily before breakfast  rivaroxaban, 15 mg, oral, Daily with evening meal  triamterene-hydrochlorothiazid, 1 tablet, oral, Daily      Continuous medications       PRN medications  PRN medications: acetaminophen **OR** acetaminophen, dextrose 10 % in water (D10W), dextrose, glucagon     Labs:  Results for orders placed or performed during the hospital encounter of 12/29/23 (from the past 24 hour(s))   POCT GLUCOSE   Result Value Ref Range    POCT Glucose 134 (H) 74 - 99 mg/dL   POCT GLUCOSE   Result Value Ref Range    POCT Glucose 124 (H) 74 - 99 mg/dL   POCT GLUCOSE   Result Value Ref Range    POCT Glucose 150 (H) 74 - 99 mg/dL   CBC   Result Value Ref Range    WBC 5.0 4.4 - 11.3 x10*3/uL    nRBC 0.0 0.0 - 0.0 /100 WBCs    RBC 2.70 (L) 4.00 - 5.20 x10*6/uL    Hemoglobin 7.7 (L) 12.0 - 16.0 g/dL    Hematocrit 23.8 (L) 36.0 - 46.0 %    MCV 88 80 - 100 fL    MCH 28.5 26.0 - 34.0 pg    MCHC 32.4 32.0 - 36.0 g/dL    RDW 14.9 (H) 11.5 -  14.5 %    Platelets 220 150 - 450 x10*3/uL   Comprehensive Metabolic Panel   Result Value Ref Range    Glucose 117 (H) 74 - 99 mg/dL    Sodium 135 (L) 136 - 145 mmol/L    Potassium 4.2 3.5 - 5.3 mmol/L    Chloride 105 98 - 107 mmol/L    Bicarbonate 23 21 - 32 mmol/L    Anion Gap 11 10 - 20 mmol/L    Urea Nitrogen 25 (H) 6 - 23 mg/dL    Creatinine 0.73 0.50 - 1.05 mg/dL    eGFR 85 >60 mL/min/1.73m*2    Calcium 7.4 (L) 8.6 - 10.3 mg/dL    Albumin 2.2 (L) 3.4 - 5.0 g/dL    Alkaline Phosphatase 71 33 - 136 U/L    Total Protein 4.8 (L) 6.4 - 8.2 g/dL    AST 27 9 - 39 U/L    Bilirubin, Total 0.2 0.0 - 1.2 mg/dL    ALT 21 7 - 45 U/L   Magnesium   Result Value Ref Range    Magnesium 1.68 1.60 - 2.40 mg/dL   SST TOP   Result Value Ref Range    Extra Tube Hold for add-ons.    POCT GLUCOSE   Result Value Ref Range    POCT Glucose 113 (H) 74 - 99 mg/dL        Imaging:  XR abdomen 1 view    Result Date: 12/31/2023  Interpreted By:  Omar Noe, STUDY: XR ABDOMEN 1 VIEW; 12/31/2023 3:24 pm   INDICATION: Signs/Symptoms:small bowel obstruction.   COMPARISON: 12/30/2023   ACCESSION NUMBER(S): [id number]   ORDERING CLINICIAN: TARAS WARD   FINDINGS: 2 supine AP radiographs of the abdomen were obtained. There is a nonobstructive bowel gas pattern present, with residual oral contrast seen throughout the colon. Free intraperitoneal air and air-fluid levels cannot be excluded without upright or decubitus images.       Nonobstructive bowel gas pattern.   MACRO: None   Signed by: Omar Noe 12/31/2023 3:36 PM Dictation workstation:   [id number]       Plan  DVT prophylaxis with Xarelto  Tolerating low fiber diet  PPN feeds can be stopped, see how patient tolerates low fiber diet. Calorie count meals  Continue pain control   Continue PT/OT   Continue wet-to-dry dressing changes to wound  C. difficile test from 12- was negative  Continue to trend CBC and BMP daily  Potential discharge today.     Further recommendations  per Dr. Neville Bales PA-C    Time spent  35  minutes obtaining labs, imaging, recommendations, interview, assessment, examination, medication review/ordering, and EMR review.    Plan of care was discussed extensively with patient. Patient verbalized understanding through teach back method. All questions and concerns addressed upon examination.     Of note, this documentation is completed using the Dragon Dictation system (voice recognition software). There may be spelling and/or grammatical errors that were not corrected prior to final submission.    Patient seen and examined. I have reviewed and discussed APRN note; having multiple loose bowel movements; tolerating soft diet with no nausea or vomiting; patient will be transferred back to nursing home likely tomorrow

## 2024-01-04 NOTE — CARE PLAN
The patient's goals for the shift include      The clinical goals for the shift include Pt will have no nausea or  abd pain with advanced diet    Over the shift, the patient did not make progress toward the following goals. Barriers to progression include . Recommendations to address these barriers include .

## 2024-01-05 VITALS
TEMPERATURE: 96.8 F | RESPIRATION RATE: 16 BRPM | WEIGHT: 214.95 LBS | OXYGEN SATURATION: 98 % | HEIGHT: 65 IN | BODY MASS INDEX: 35.81 KG/M2 | DIASTOLIC BLOOD PRESSURE: 56 MMHG | SYSTOLIC BLOOD PRESSURE: 124 MMHG | HEART RATE: 69 BPM

## 2024-01-05 LAB
ALBUMIN SERPL BCP-MCNC: 2.3 G/DL (ref 3.4–5)
ALP SERPL-CCNC: 66 U/L (ref 33–136)
ALT SERPL W P-5'-P-CCNC: 17 U/L (ref 7–45)
ANION GAP SERPL CALC-SCNC: 10 MMOL/L (ref 10–20)
AST SERPL W P-5'-P-CCNC: 19 U/L (ref 9–39)
BILIRUB SERPL-MCNC: 0.3 MG/DL (ref 0–1.2)
BUN SERPL-MCNC: 28 MG/DL (ref 6–23)
CALCIUM SERPL-MCNC: 7.6 MG/DL (ref 8.6–10.3)
CHLORIDE SERPL-SCNC: 103 MMOL/L (ref 98–107)
CO2 SERPL-SCNC: 25 MMOL/L (ref 21–32)
CREAT SERPL-MCNC: 0.77 MG/DL (ref 0.5–1.05)
ERYTHROCYTE [DISTWIDTH] IN BLOOD BY AUTOMATED COUNT: 15.1 % (ref 11.5–14.5)
GFR SERPL CREATININE-BSD FRML MDRD: 80 ML/MIN/1.73M*2
GLUCOSE BLD MANUAL STRIP-MCNC: 125 MG/DL (ref 74–99)
GLUCOSE BLD MANUAL STRIP-MCNC: 128 MG/DL (ref 74–99)
GLUCOSE SERPL-MCNC: 121 MG/DL (ref 74–99)
HCT VFR BLD AUTO: 23.4 % (ref 36–46)
HGB BLD-MCNC: 7.5 G/DL (ref 12–16)
HOLD SPECIMEN: NORMAL
MAGNESIUM SERPL-MCNC: 1.58 MG/DL (ref 1.6–2.4)
MCH RBC QN AUTO: 27.4 PG (ref 26–34)
MCHC RBC AUTO-ENTMCNC: 32.1 G/DL (ref 32–36)
MCV RBC AUTO: 85 FL (ref 80–100)
NRBC BLD-RTO: 0 /100 WBCS (ref 0–0)
PLATELET # BLD AUTO: 210 X10*3/UL (ref 150–450)
POTASSIUM SERPL-SCNC: 4.1 MMOL/L (ref 3.5–5.3)
PROT SERPL-MCNC: 4.9 G/DL (ref 6.4–8.2)
RBC # BLD AUTO: 2.74 X10*6/UL (ref 4–5.2)
SODIUM SERPL-SCNC: 134 MMOL/L (ref 136–145)
WBC # BLD AUTO: 4 X10*3/UL (ref 4.4–11.3)

## 2024-01-05 PROCEDURE — 94760 N-INVAS EAR/PLS OXIMETRY 1: CPT

## 2024-01-05 PROCEDURE — 2500000001 HC RX 250 WO HCPCS SELF ADMINISTERED DRUGS (ALT 637 FOR MEDICARE OP)

## 2024-01-05 PROCEDURE — 85027 COMPLETE CBC AUTOMATED: CPT

## 2024-01-05 PROCEDURE — 2500000004 HC RX 250 GENERAL PHARMACY W/ HCPCS (ALT 636 FOR OP/ED)

## 2024-01-05 PROCEDURE — 99231 SBSQ HOSP IP/OBS SF/LOW 25: CPT

## 2024-01-05 PROCEDURE — 80053 COMPREHEN METABOLIC PANEL: CPT

## 2024-01-05 PROCEDURE — 82947 ASSAY GLUCOSE BLOOD QUANT: CPT

## 2024-01-05 PROCEDURE — 97530 THERAPEUTIC ACTIVITIES: CPT | Mod: GO,CO

## 2024-01-05 PROCEDURE — 83735 ASSAY OF MAGNESIUM: CPT

## 2024-01-05 PROCEDURE — 97535 SELF CARE MNGMENT TRAINING: CPT | Mod: GO,CO

## 2024-01-05 PROCEDURE — 36415 COLL VENOUS BLD VENIPUNCTURE: CPT

## 2024-01-05 RX ORDER — LANOLIN ALCOHOL/MO/W.PET/CERES
400 CREAM (GRAM) TOPICAL ONCE
Status: COMPLETED | OUTPATIENT
Start: 2024-01-05 | End: 2024-01-05

## 2024-01-05 RX ADMIN — PANTOPRAZOLE SODIUM 40 MG: 40 TABLET, DELAYED RELEASE ORAL at 06:58

## 2024-01-05 RX ADMIN — FLUOXETINE HYDROCHLORIDE 20 MG: 20 CAPSULE ORAL at 08:02

## 2024-01-05 RX ADMIN — AMLODIPINE BESYLATE 5 MG: 5 TABLET ORAL at 08:02

## 2024-01-05 RX ADMIN — FUROSEMIDE 20 MG: 40 TABLET ORAL at 08:02

## 2024-01-05 RX ADMIN — MAGNESIUM OXIDE 400 MG (241.3 MG MAGNESIUM) TABLET 400 MG: TABLET at 08:12

## 2024-01-05 RX ADMIN — LEVOTHYROXINE SODIUM 75 MCG: 75 TABLET ORAL at 06:58

## 2024-01-05 RX ADMIN — TRIAMTERENE AND HYDROCHLOROTHIAZIDE 1 TABLET: 37.5; 25 TABLET ORAL at 08:02

## 2024-01-05 RX ADMIN — RIVAROXABAN 15 MG: 15 TABLET, FILM COATED ORAL at 16:50

## 2024-01-05 RX ADMIN — ACETAMINOPHEN 650 MG: 325 TABLET ORAL at 08:04

## 2024-01-05 ASSESSMENT — COGNITIVE AND FUNCTIONAL STATUS - GENERAL
MOVING FROM LYING ON BACK TO SITTING ON SIDE OF FLAT BED WITH BEDRAILS: A LITTLE
CLIMB 3 TO 5 STEPS WITH RAILING: TOTAL
DRESSING REGULAR LOWER BODY CLOTHING: A LOT
TOILETING: A LOT
TURNING FROM BACK TO SIDE WHILE IN FLAT BAD: A LOT
MOVING TO AND FROM BED TO CHAIR: A LITTLE
DRESSING REGULAR UPPER BODY CLOTHING: A LITTLE
PERSONAL GROOMING: A LOT
HELP NEEDED FOR BATHING: A LOT
DRESSING REGULAR UPPER BODY CLOTHING: A LOT
TOILETING: A LOT
DAILY ACTIVITIY SCORE: 13
PERSONAL GROOMING: A LITTLE
MOBILITY SCORE: 15
DRESSING REGULAR LOWER BODY CLOTHING: A LOT
HELP NEEDED FOR BATHING: A LOT
WALKING IN HOSPITAL ROOM: A LITTLE
STANDING UP FROM CHAIR USING ARMS: A LITTLE
EATING MEALS: A LITTLE
DAILY ACTIVITIY SCORE: 16

## 2024-01-05 ASSESSMENT — PAIN SCALES - GENERAL
PAINLEVEL_OUTOF10: 2
PAINLEVEL_OUTOF10: 5 - MODERATE PAIN
PAINLEVEL_OUTOF10: 3

## 2024-01-05 ASSESSMENT — PAIN - FUNCTIONAL ASSESSMENT
PAIN_FUNCTIONAL_ASSESSMENT: 0-10

## 2024-01-05 ASSESSMENT — ACTIVITIES OF DAILY LIVING (ADL): HOME_MANAGEMENT_TIME_ENTRY: 25

## 2024-01-05 ASSESSMENT — PAIN DESCRIPTION - LOCATION: LOCATION: BACK

## 2024-01-05 NOTE — CARE PLAN
The patient's goals for the shift include   Pt will be free from injury throughout this shift.    The clinical goals for the shift include pt will have no nausea or abd pain for this shift

## 2024-01-05 NOTE — PROGRESS NOTES
"Occupational Therapy    OT Treatment    Patient Name: Harper Lara  MRN: [id number]  Today's Date: 1/5/2024  Time Calculation  Start Time: 0944  Stop Time: 1025  Time Calculation (min): 41 min       Assessment:  End of Session Communication: Bedside nurse, PCT/NA/CTA  End of Session Patient Position: Bed, 3 rail up, Alarm on     Plan:  Treatment Interventions: ADL retraining, Functional transfer training, Endurance training  OT Frequency: 2 times per week  Treatment Interventions: ADL retraining, Functional transfer training, Endurance training    Subjective   Previous Visit Info:  OT Last Visit  OT Received On: 01/05/24  General:  General  Prior to Session Communication: Bedside nurse  Patient Position Received: Bed, 3 rail up, Alarm on  General Comment: patient agreeable to OT, reports \"i think i have to pee.\" patient soiled with stool  Precautions:  Medical Precautions: Fall precautions  Post-Surgical Precautions: Abdominal surgery precautions     Pain:  Pain Assessment  Pain Assessment: 0-10  Pain Score: 2  Pain Location: Abdomen    Objective    Cognition:  Cognition  Orientation Level: Oriented X4  Insight: Mild     Activities of Daily Living: UE Dressing  UE Dressing Level of Assistance:  (doffed gown with mod a. donned with min a)  UE Dressing Where Assessed: Edge of bed    LE Dressing  LE Dressing: Yes  Sock Level of Assistance:  (donned socks with max a)    Toileting  Toileting Level of Assistance:  (mostly cleaned up in bed dep then once up onto BSC, patient was able to assist with toileting tasks with max a)  Where Assessed: Bedside commode  Functional Standing Tolerance:  Functional Standing Tolerance Comments: patient stood for a total of 3 mins this date with fair/fair- balance with 2-1 ue support as needed during functional transfers and ADL tasks  Bed Mobility/Transfers: Bed Mobility 1  Bed Mobility 1: Supine to sitting  Level of Assistance 1: Moderate assistance  Bed Mobility 2  Bed Mobility  2: " Sitting to supine  Level of Assistance 2: Minimum assistance    Transfers  Transfer: Yes  Transfer 1  Technique 1: Sit to stand  Transfer Device 1: Walker  Transfer Level of Assistance 1: Minimum assistance  Trials/Comments 1: from bed and BSC  Transfers 2  Transfer Device 2: Walker  Transfer Level of Assistance 2: Minimum assistance  Trials/Comments 2: bed <> BSC    Toilet Transfers  Toilet Transfer Type: To and from  Toilet Transfer to: Extra wide bedside commode  Toilet Transfer Technique: Ambulating  Toilet Transfers: Minimal assistance  Sitting Balance:  Dynamic Sitting Balance  Dynamic Sitting-Balance:  (fair+ balance)  Standing Balance:  Dynamic Standing Balance  Dynamic Standing-Balance:  (fair/fair- balance)    Outcome Measures:[facility] Daily Activity  Putting on and taking off regular lower body clothing: A lot  Bathing (including washing, rinsing, drying): A lot  Putting on and taking off regular upper body clothing: A lot  Toileting, which includes using toilet, bedpan or urinal: A lot  Taking care of personal grooming such as brushing teeth: A lot  Eating Meals: A little  Daily Activity - Total Score: 13    Education Documentation  Precautions, taught by SCOTTIE Bai at 1/5/2024 12:53 PM.  Learner: Patient  Readiness: Acceptance  Method: Explanation  Response: Needs Reinforcement    ADL Training, taught by SCOTTIE Bai at 1/5/2024 12:53 PM.  Learner: Patient  Readiness: Acceptance  Method: Explanation  Response: Needs Reinforcement    Education Comments  No comments found.        OP EDUCATION:  Education  Individual(s) Educated: Patient  Education Provided: Diagnosis & Precautions, Symptom management, Ergonomics and postural realignment, Joint protection and energy conservation, Fall precautons, Risk and benefits of OT discussed with patient or other, POC discussed and agreed upon  Diagnosis and Precautions: ABD precautions  Risk and Benefits Discussed with Patient/Caregiver/Other:  yes  Patient/Caregiver Demonstrated Understanding: yes  Plan of Care Discussed and Agreed Upon: yes  Patient Response to Education: Patient/Caregiver Verbalized Understanding of Information    Goals:  Encounter Problems       Encounter Problems (Active)       OT Goals       SBA for all functional transfers  (Progressing)       Start:  01/03/24    Expected End:  01/17/24            Fair + dyn standing balance during ADLs and functional activities  (Progressing)       Start:  01/03/24    Expected End:  01/17/24            Pt. will be able to tolerate 5+ minutes of ADLs and functional tasks with one rest break.  (Progressing)       Start:  01/03/24    Expected End:  01/17/24            Min A for LB dressing; reacher, sock aid, dressing stick use (Progressing)       Start:  01/03/24    Expected End:  01/17/24

## 2024-01-05 NOTE — PROGRESS NOTES
SW updated that pt has been medically cleared for discharge. SW updated facility and confirmed facility is able to accommodate today. SW updated pt, pt remains in agreement with discharge to [facility]. Pt requested for SW to contact her spouse and update him. SW requested transport via roundtrip. SW contacted pt spouse and her daughter and updated on discharge to [facility] today, both in agreement. Care Team updated. Transport confirmed 2:30 pickup.

## 2024-01-05 NOTE — CARE PLAN
The patient's goals for the shift include patient will have no abdominal pain through end of shift    The clinical goals for the shift include patient labs will be WNL for patient through end of shift    Over the shift, the patient did not make progress toward the following goals: none. Barriers to progression include n/a. Recommendations to address these barriers include continue current plan of care.

## 2024-01-05 NOTE — DISCHARGE SUMMARY
Discharge Diagnosis  SBO (small bowel obstruction) (CMS/[facility])    Issues Requiring Follow-Up  Wound Care    Test Results Pending At Discharge  Pending Labs       No current pending labs.            Hospital Course  The patient was admitted from nursing home with partial SBO which was managed with NG; SBFT showed resolution of SBO; patient has been having multiple loose bowel movements and flatus; she is tolerating soft diet with no nausea or vomiting; she is working with PT/OT; denies abdominal pain and afebrile; patient will be discharged back to the nursing home for rehab; wound is clean at base with fibrinous exudates along edges which were debrided sharply.    Pertinent Physical Exam At Time of Discharge  Physical Exam  Gen: no acute distress  CV: RRR  Pulm: unlabored  Abd: soft, non-distended, very minimal tenderness; wound clean at base with minimal fibrinous exudates along the edges which were debrided sharply  Home Medications     Medication List      START taking these medications     acetaminophen 325 mg tablet; Commonly known as: Tylenol; Take 2 tablets   (650 mg) by mouth every 6 hours if needed for mild pain (1 - 3) or   moderate pain (4 - 6).     CONTINUE taking these medications     amLODIPine 5 mg tablet; Commonly known as: Norvasc   atorvastatin 40 mg tablet; Commonly known as: Lipitor   benztropine 1 mg tablet; Commonly known as: Cogentin   carvedilol 12.5 mg tablet; Commonly known as: Coreg   cholecalciferol 10 MCG (400 UNIT) tablet; Commonly known as: Vitamin D-3   cyanocobalamin 1,000 mcg tablet; Commonly known as: Vitamin B-12   FISH OIL ORAL   FLUoxetine 20 mg tablet; Commonly known as: PROzac   furosemide 20 mg tablet; Commonly known as: Lasix   gentamicin 0.1 % ointment; Commonly known as: Garamycin   glipiZIDE 10 mg tablet; Commonly known as: Glucotrol   levothyroxine 75 mcg tablet; Commonly known as: Synthroid, Levoxyl   magnesium oxide 400 mg tablet; Commonly known as: Mag-Ox   metFORMIN  500 mg tablet; Commonly known as: Glucophage   multivitamin tablet   nystatin 100,000 unit/gram powder; Commonly known as: Mycostatin   ondansetron ODT 4 mg disintegrating tablet; Commonly known as:   Zofran-ODT; Take 1 tablet (4 mg) by mouth every 8 hours if needed for   nausea or vomiting.   pantoprazole 40 mg EC tablet; Commonly known as: ProtoNix   triamterene-hydrochlorothiazid 37.5-25 mg capsule; Commonly known as:   Dyazide   Xarelto 15 mg tablet; Generic drug: rivaroxaban     ASK your doctor about these medications     oxyCODONE 5 mg immediate release tablet; Commonly known as: Roxicodone;   Take 1 tablet (5 mg) by mouth every 6 hours if needed for moderate pain (4   - 6) for up to 5 days.; Ask about: Should I take this medication?       Outpatient Follow-Up  No future appointments.    Bri Duffy PA-C    Patient seen and examined. I have discussed and reviewed APRN note; doing well; no wound infection; will be discharged

## 2024-01-21 ENCOUNTER — APPOINTMENT (OUTPATIENT)
Dept: RADIOLOGY | Facility: HOSPITAL | Age: 79
DRG: 371 | End: 2024-01-21
Payer: MEDICARE

## 2024-01-21 ENCOUNTER — HOSPITAL ENCOUNTER (INPATIENT)
Facility: HOSPITAL | Age: 79
LOS: 3 days | Discharge: NURSING, REHAB OR LONG-TERM CARE | DRG: 371 | End: 2024-01-24
Attending: EMERGENCY MEDICINE | Admitting: STUDENT IN AN ORGANIZED HEALTH CARE EDUCATION/TRAINING PROGRAM
Payer: MEDICARE

## 2024-01-21 ENCOUNTER — APPOINTMENT (OUTPATIENT)
Dept: CARDIOLOGY | Facility: HOSPITAL | Age: 79
DRG: 371 | End: 2024-01-21
Payer: MEDICARE

## 2024-01-21 DIAGNOSIS — A04.72 C. DIFFICILE COLITIS: ICD-10-CM

## 2024-01-21 DIAGNOSIS — E16.2 HYPOGLYCEMIA: ICD-10-CM

## 2024-01-21 DIAGNOSIS — A04.72 CLOSTRIDIUM DIFFICILE DIARRHEA: Primary | ICD-10-CM

## 2024-01-21 DIAGNOSIS — L02.211 ABDOMINAL WALL ABSCESS: ICD-10-CM

## 2024-01-21 LAB
ANION GAP SERPL CALC-SCNC: 13 MMOL/L (ref 10–20)
APPEARANCE UR: ABNORMAL
BACTERIA #/AREA URNS AUTO: ABNORMAL /HPF
BASOPHILS # BLD AUTO: 0.02 X10*3/UL (ref 0–0.1)
BASOPHILS NFR BLD AUTO: 0.3 %
BILIRUB UR STRIP.AUTO-MCNC: NEGATIVE MG/DL
BUN SERPL-MCNC: 33 MG/DL (ref 6–23)
C DIF TOX TCDA+TCDB STL QL NAA+PROBE: DETECTED
C DIFF TOX A+B STL QL IA: NEGATIVE
CALCIUM SERPL-MCNC: 8.2 MG/DL (ref 8.6–10.3)
CHLORIDE SERPL-SCNC: 99 MMOL/L (ref 98–107)
CO2 SERPL-SCNC: 27 MMOL/L (ref 21–32)
COLOR UR: YELLOW
CREAT SERPL-MCNC: 0.97 MG/DL (ref 0.5–1.05)
EGFRCR SERPLBLD CKD-EPI 2021: 61 ML/MIN/1.73M*2
EOSINOPHIL # BLD AUTO: 0.07 X10*3/UL (ref 0–0.4)
EOSINOPHIL NFR BLD AUTO: 1 %
ERYTHROCYTE [DISTWIDTH] IN BLOOD BY AUTOMATED COUNT: 16 % (ref 11.5–14.5)
FLUAV RNA RESP QL NAA+PROBE: NOT DETECTED
FLUBV RNA RESP QL NAA+PROBE: NOT DETECTED
GLUCOSE BLD MANUAL STRIP-MCNC: 118 MG/DL (ref 74–99)
GLUCOSE BLD MANUAL STRIP-MCNC: 126 MG/DL (ref 74–99)
GLUCOSE BLD MANUAL STRIP-MCNC: 127 MG/DL (ref 74–99)
GLUCOSE BLD MANUAL STRIP-MCNC: 145 MG/DL (ref 74–99)
GLUCOSE BLD MANUAL STRIP-MCNC: 160 MG/DL (ref 74–99)
GLUCOSE BLD MANUAL STRIP-MCNC: 30 MG/DL (ref 74–99)
GLUCOSE BLD MANUAL STRIP-MCNC: 37 MG/DL (ref 74–99)
GLUCOSE BLD MANUAL STRIP-MCNC: 43 MG/DL (ref 74–99)
GLUCOSE BLD MANUAL STRIP-MCNC: 79 MG/DL (ref 74–99)
GLUCOSE BLD MANUAL STRIP-MCNC: 92 MG/DL (ref 74–99)
GLUCOSE SERPL-MCNC: 26 MG/DL (ref 74–99)
GLUCOSE UR STRIP.AUTO-MCNC: NEGATIVE MG/DL
HCT VFR BLD AUTO: 27 % (ref 36–46)
HGB BLD-MCNC: 8.4 G/DL (ref 12–16)
HOLD SPECIMEN: NORMAL
IMM GRANULOCYTES # BLD AUTO: 0.03 X10*3/UL (ref 0–0.5)
IMM GRANULOCYTES NFR BLD AUTO: 0.4 % (ref 0–0.9)
KETONES UR STRIP.AUTO-MCNC: NEGATIVE MG/DL
LACTATE SERPL-SCNC: 1.6 MMOL/L (ref 0.4–2)
LEUKOCYTE ESTERASE UR QL STRIP.AUTO: ABNORMAL
LIPASE SERPL-CCNC: 48 U/L (ref 9–82)
LYMPHOCYTES # BLD AUTO: 1.51 X10*3/UL (ref 0.8–3)
LYMPHOCYTES NFR BLD AUTO: 20.6 %
MCH RBC QN AUTO: 27.3 PG (ref 26–34)
MCHC RBC AUTO-ENTMCNC: 31.1 G/DL (ref 32–36)
MCV RBC AUTO: 88 FL (ref 80–100)
MONOCYTES # BLD AUTO: 0.43 X10*3/UL (ref 0.05–0.8)
MONOCYTES NFR BLD AUTO: 5.9 %
NEUTROPHILS # BLD AUTO: 5.26 X10*3/UL (ref 1.6–5.5)
NEUTROPHILS NFR BLD AUTO: 71.8 %
NITRITE UR QL STRIP.AUTO: NEGATIVE
NRBC BLD-RTO: 0 /100 WBCS (ref 0–0)
PH UR STRIP.AUTO: 5 [PH]
PLATELET # BLD AUTO: 224 X10*3/UL (ref 150–450)
POTASSIUM SERPL-SCNC: 4.3 MMOL/L (ref 3.5–5.3)
PROT UR STRIP.AUTO-MCNC: NEGATIVE MG/DL
RBC # BLD AUTO: 3.08 X10*6/UL (ref 4–5.2)
RBC # UR STRIP.AUTO: NEGATIVE /UL
RBC #/AREA URNS AUTO: ABNORMAL /HPF
RSV RNA RESP QL NAA+PROBE: DETECTED
SARS-COV-2 RNA RESP QL NAA+PROBE: NOT DETECTED
SODIUM SERPL-SCNC: 135 MMOL/L (ref 136–145)
SP GR UR STRIP.AUTO: 1.03
SQUAMOUS #/AREA URNS AUTO: ABNORMAL /HPF
UROBILINOGEN UR STRIP.AUTO-MCNC: <2 MG/DL
WBC # BLD AUTO: 7.3 X10*3/UL (ref 4.4–11.3)
WBC #/AREA URNS AUTO: ABNORMAL /HPF

## 2024-01-21 PROCEDURE — 82947 ASSAY GLUCOSE BLOOD QUANT: CPT

## 2024-01-21 PROCEDURE — 2500000004 HC RX 250 GENERAL PHARMACY W/ HCPCS (ALT 636 FOR OP/ED): Performed by: EMERGENCY MEDICINE

## 2024-01-21 PROCEDURE — 80048 BASIC METABOLIC PNL TOTAL CA: CPT | Performed by: EMERGENCY MEDICINE

## 2024-01-21 PROCEDURE — 74178 CT ABD&PLV WO CNTR FLWD CNTR: CPT | Mod: FOREIGN READ | Performed by: RADIOLOGY

## 2024-01-21 PROCEDURE — 83605 ASSAY OF LACTIC ACID: CPT | Performed by: EMERGENCY MEDICINE

## 2024-01-21 PROCEDURE — 36415 COLL VENOUS BLD VENIPUNCTURE: CPT | Performed by: EMERGENCY MEDICINE

## 2024-01-21 PROCEDURE — 87634 RSV DNA/RNA AMP PROBE: CPT | Performed by: REGISTERED NURSE

## 2024-01-21 PROCEDURE — 83690 ASSAY OF LIPASE: CPT | Performed by: EMERGENCY MEDICINE

## 2024-01-21 PROCEDURE — 99285 EMERGENCY DEPT VISIT HI MDM: CPT | Performed by: EMERGENCY MEDICINE

## 2024-01-21 PROCEDURE — 87636 SARSCOV2 & INF A&B AMP PRB: CPT | Performed by: EMERGENCY MEDICINE

## 2024-01-21 PROCEDURE — 74177 CT ABD & PELVIS W/CONTRAST: CPT

## 2024-01-21 PROCEDURE — 85025 COMPLETE CBC W/AUTO DIFF WBC: CPT | Performed by: EMERGENCY MEDICINE

## 2024-01-21 PROCEDURE — 1200000002 HC GENERAL ROOM WITH TELEMETRY DAILY

## 2024-01-21 PROCEDURE — 2500000005 HC RX 250 GENERAL PHARMACY W/O HCPCS: Performed by: EMERGENCY MEDICINE

## 2024-01-21 PROCEDURE — 96376 TX/PRO/DX INJ SAME DRUG ADON: CPT

## 2024-01-21 PROCEDURE — 87040 BLOOD CULTURE FOR BACTERIA: CPT | Mod: ELYLAB | Performed by: HOSPITALIST

## 2024-01-21 PROCEDURE — 87324 CLOSTRIDIUM AG IA: CPT | Mod: ELYLAB | Performed by: EMERGENCY MEDICINE

## 2024-01-21 PROCEDURE — 2500000005 HC RX 250 GENERAL PHARMACY W/O HCPCS

## 2024-01-21 PROCEDURE — 71045 X-RAY EXAM CHEST 1 VIEW: CPT | Mod: FOREIGN READ | Performed by: RADIOLOGY

## 2024-01-21 PROCEDURE — 2500000002 HC RX 250 W HCPCS SELF ADMINISTERED DRUGS (ALT 637 FOR MEDICARE OP, ALT 636 FOR OP/ED): Performed by: HOSPITALIST

## 2024-01-21 PROCEDURE — 2500000004 HC RX 250 GENERAL PHARMACY W/ HCPCS (ALT 636 FOR OP/ED): Performed by: HOSPITALIST

## 2024-01-21 PROCEDURE — 99223 1ST HOSP IP/OBS HIGH 75: CPT | Performed by: HOSPITALIST

## 2024-01-21 PROCEDURE — 87493 C DIFF AMPLIFIED PROBE: CPT | Performed by: EMERGENCY MEDICINE

## 2024-01-21 PROCEDURE — 71045 X-RAY EXAM CHEST 1 VIEW: CPT

## 2024-01-21 PROCEDURE — 2550000001 HC RX 255 CONTRASTS: Performed by: EMERGENCY MEDICINE

## 2024-01-21 PROCEDURE — 81001 URINALYSIS AUTO W/SCOPE: CPT | Performed by: HOSPITALIST

## 2024-01-21 PROCEDURE — 93005 ELECTROCARDIOGRAM TRACING: CPT

## 2024-01-21 PROCEDURE — 2500000001 HC RX 250 WO HCPCS SELF ADMINISTERED DRUGS (ALT 637 FOR MEDICARE OP): Performed by: HOSPITALIST

## 2024-01-21 PROCEDURE — 96374 THER/PROPH/DIAG INJ IV PUSH: CPT

## 2024-01-21 PROCEDURE — 2500000004 HC RX 250 GENERAL PHARMACY W/ HCPCS (ALT 636 FOR OP/ED): Performed by: INTERNAL MEDICINE

## 2024-01-21 PROCEDURE — 94640 AIRWAY INHALATION TREATMENT: CPT

## 2024-01-21 RX ORDER — VANCOMYCIN HYDROCHLORIDE 125 MG/1
125 CAPSULE ORAL 4 TIMES DAILY
Status: DISCONTINUED | OUTPATIENT
Start: 2024-01-21 | End: 2024-01-25 | Stop reason: HOSPADM

## 2024-01-21 RX ORDER — IPRATROPIUM BROMIDE AND ALBUTEROL SULFATE 2.5; .5 MG/3ML; MG/3ML
3 SOLUTION RESPIRATORY (INHALATION)
Status: DISCONTINUED | OUTPATIENT
Start: 2024-01-21 | End: 2024-01-22

## 2024-01-21 RX ORDER — DEXTROSE 50 % IN WATER (D50W) INTRAVENOUS SYRINGE
25
Status: DISCONTINUED | OUTPATIENT
Start: 2024-01-21 | End: 2024-01-25 | Stop reason: HOSPADM

## 2024-01-21 RX ORDER — FLUOXETINE HYDROCHLORIDE 20 MG/1
20 CAPSULE ORAL DAILY
Status: DISCONTINUED | OUTPATIENT
Start: 2024-01-21 | End: 2024-01-25 | Stop reason: HOSPADM

## 2024-01-21 RX ORDER — DEXTROSE MONOHYDRATE 100 MG/ML
0.3 INJECTION, SOLUTION INTRAVENOUS ONCE AS NEEDED
Status: DISCONTINUED | OUTPATIENT
Start: 2024-01-21 | End: 2024-01-22

## 2024-01-21 RX ORDER — L. ACIDOPHILUS/L.BULGARICUS 1MM CELL
1 TABLET ORAL 2 TIMES DAILY
Status: DISCONTINUED | OUTPATIENT
Start: 2024-01-21 | End: 2024-01-25 | Stop reason: HOSPADM

## 2024-01-21 RX ORDER — CARVEDILOL 12.5 MG/1
12.5 TABLET ORAL
Status: DISCONTINUED | OUTPATIENT
Start: 2024-01-21 | End: 2024-01-25 | Stop reason: HOSPADM

## 2024-01-21 RX ORDER — MULTIVIT-MIN/IRON FUM/FOLIC AC 7.5 MG-4
1 TABLET ORAL DAILY
Status: DISCONTINUED | OUTPATIENT
Start: 2024-01-21 | End: 2024-01-25 | Stop reason: HOSPADM

## 2024-01-21 RX ORDER — LEVOTHYROXINE SODIUM 75 UG/1
75 TABLET ORAL DAILY
Status: DISCONTINUED | OUTPATIENT
Start: 2024-01-21 | End: 2024-01-25 | Stop reason: HOSPADM

## 2024-01-21 RX ORDER — DEXTROSE 50 % IN WATER (D50W) INTRAVENOUS SYRINGE
Status: COMPLETED
Start: 2024-01-21 | End: 2024-01-21

## 2024-01-21 RX ADMIN — DEXTROSE MONOHYDRATE 25 G: 25 INJECTION, SOLUTION INTRAVENOUS at 04:59

## 2024-01-21 RX ADMIN — VANCOMYCIN HYDROCHLORIDE 125 MG: 125 CAPSULE ORAL at 09:49

## 2024-01-21 RX ADMIN — FLUOXETINE HYDROCHLORIDE 20 MG: 20 CAPSULE ORAL at 09:49

## 2024-01-21 RX ADMIN — Medication 1 TABLET: at 23:39

## 2024-01-21 RX ADMIN — IOHEXOL 75 ML: 350 INJECTION, SOLUTION INTRAVENOUS at 05:20

## 2024-01-21 RX ADMIN — DEXTROSE MONOHYDRATE 25 G: 25 INJECTION, SOLUTION INTRAVENOUS at 04:12

## 2024-01-21 RX ADMIN — IPRATROPIUM BROMIDE AND ALBUTEROL SULFATE 3 ML: 2.5; .5 SOLUTION RESPIRATORY (INHALATION) at 09:59

## 2024-01-21 RX ADMIN — DEXTROSE MONOHYDRATE 25 G: 25 INJECTION, SOLUTION INTRAVENOUS at 07:00

## 2024-01-21 RX ADMIN — IPRATROPIUM BROMIDE AND ALBUTEROL SULFATE 3 ML: 2.5; .5 SOLUTION RESPIRATORY (INHALATION) at 20:09

## 2024-01-21 RX ADMIN — RIVAROXABAN 15 MG: 15 TABLET, FILM COATED ORAL at 18:03

## 2024-01-21 RX ADMIN — VANCOMYCIN HYDROCHLORIDE 125 MG: 125 CAPSULE ORAL at 18:03

## 2024-01-21 RX ADMIN — Medication 1 TABLET: at 09:48

## 2024-01-21 RX ADMIN — DEXTROSE MONOHYDRATE 25 G: 25 INJECTION, SOLUTION INTRAVENOUS at 06:00

## 2024-01-21 RX ADMIN — LEVOTHYROXINE SODIUM 75 MCG: 75 TABLET ORAL at 09:48

## 2024-01-21 RX ADMIN — VANCOMYCIN HYDROCHLORIDE 125 MG: 125 CAPSULE ORAL at 23:39

## 2024-01-21 RX ADMIN — CARVEDILOL 12.5 MG: 12.5 TABLET, FILM COATED ORAL at 18:03

## 2024-01-21 RX ADMIN — CARVEDILOL 12.5 MG: 12.5 TABLET, FILM COATED ORAL at 09:49

## 2024-01-21 RX ADMIN — ERTAPENEM SODIUM 1 G: 1 INJECTION, POWDER, LYOPHILIZED, FOR SOLUTION INTRAMUSCULAR; INTRAVENOUS at 23:38

## 2024-01-21 RX ADMIN — VANCOMYCIN HYDROCHLORIDE 125 MG: 125 CAPSULE ORAL at 13:06

## 2024-01-21 RX ADMIN — IPRATROPIUM BROMIDE AND ALBUTEROL SULFATE 3 ML: 2.5; .5 SOLUTION RESPIRATORY (INHALATION) at 12:46

## 2024-01-21 RX ADMIN — HYALURONIDASE 150 UNITS: 150 INJECTION SUBCUTANEOUS at 06:15

## 2024-01-21 SDOH — SOCIAL STABILITY: SOCIAL INSECURITY: HAS ANYONE EVER THREATENED TO HURT YOUR FAMILY OR YOUR PETS?: NO

## 2024-01-21 SDOH — ECONOMIC STABILITY: TRANSPORTATION INSECURITY
IN THE PAST 12 MONTHS, HAS LACK OF TRANSPORTATION KEPT YOU FROM MEETINGS, WORK, OR FROM GETTING THINGS NEEDED FOR DAILY LIVING?: PATIENT DECLINED

## 2024-01-21 SDOH — ECONOMIC STABILITY: INCOME INSECURITY: HOW HARD IS IT FOR YOU TO PAY FOR THE VERY BASICS LIKE FOOD, HOUSING, MEDICAL CARE, AND HEATING?: PATIENT DECLINED

## 2024-01-21 SDOH — SOCIAL STABILITY: SOCIAL INSECURITY: HAVE YOU HAD THOUGHTS OF HARMING ANYONE ELSE?: NO

## 2024-01-21 SDOH — SOCIAL STABILITY: SOCIAL INSECURITY: ABUSE: ADULT

## 2024-01-21 SDOH — SOCIAL STABILITY: SOCIAL INSECURITY: ARE YOU OR HAVE YOU BEEN THREATENED OR ABUSED PHYSICALLY, EMOTIONALLY, OR SEXUALLY BY ANYONE?: NO

## 2024-01-21 SDOH — SOCIAL STABILITY: SOCIAL INSECURITY: ARE THERE ANY APPARENT SIGNS OF INJURIES/BEHAVIORS THAT COULD BE RELATED TO ABUSE/NEGLECT?: NO

## 2024-01-21 SDOH — SOCIAL STABILITY: SOCIAL INSECURITY: DO YOU FEEL UNSAFE GOING BACK TO THE PLACE WHERE YOU ARE LIVING?: NO

## 2024-01-21 SDOH — SOCIAL STABILITY: SOCIAL INSECURITY: DO YOU FEEL ANYONE HAS EXPLOITED OR TAKEN ADVANTAGE OF YOU FINANCIALLY OR OF YOUR PERSONAL PROPERTY?: NO

## 2024-01-21 SDOH — SOCIAL STABILITY: SOCIAL INSECURITY: WERE YOU ABLE TO COMPLETE ALL THE BEHAVIORAL HEALTH SCREENINGS?: YES

## 2024-01-21 SDOH — ECONOMIC STABILITY: TRANSPORTATION INSECURITY
IN THE PAST 12 MONTHS, HAS THE LACK OF TRANSPORTATION KEPT YOU FROM MEDICAL APPOINTMENTS OR FROM GETTING MEDICATIONS?: PATIENT DECLINED

## 2024-01-21 SDOH — ECONOMIC STABILITY: INCOME INSECURITY: IN THE LAST 12 MONTHS, WAS THERE A TIME WHEN YOU WERE NOT ABLE TO PAY THE MORTGAGE OR RENT ON TIME?: PATIENT DECLINED

## 2024-01-21 SDOH — SOCIAL STABILITY: SOCIAL INSECURITY: DOES ANYONE TRY TO KEEP YOU FROM HAVING/CONTACTING OTHER FRIENDS OR DOING THINGS OUTSIDE YOUR HOME?: NO

## 2024-01-21 SDOH — ECONOMIC STABILITY: HOUSING INSECURITY
IN THE LAST 12 MONTHS, WAS THERE A TIME WHEN YOU DID NOT HAVE A STEADY PLACE TO SLEEP OR SLEPT IN A SHELTER (INCLUDING NOW)?: PATIENT DECLINED

## 2024-01-21 ASSESSMENT — ACTIVITIES OF DAILY LIVING (ADL)
WALKS IN HOME: NEEDS ASSISTANCE
JUDGMENT_ADEQUATE_SAFELY_COMPLETE_DAILY_ACTIVITIES: NO
BATHING: DEPENDENT
ASSISTIVE_DEVICE: WALKER
LACK_OF_TRANSPORTATION: PATIENT DECLINED
DRESSING YOURSELF: DEPENDENT
FEEDING YOURSELF: NEEDS ASSISTANCE
GROOMING: DEPENDENT
TOILETING: DEPENDENT
HEARING - RIGHT EAR: FUNCTIONAL
HEARING - LEFT EAR: FUNCTIONAL
ADEQUATE_TO_COMPLETE_ADL: YES
PATIENT'S MEMORY ADEQUATE TO SAFELY COMPLETE DAILY ACTIVITIES?: NO

## 2024-01-21 ASSESSMENT — ENCOUNTER SYMPTOMS
NAUSEA: 0
RESPIRATORY NEGATIVE: 1
ABDOMINAL PAIN: 1
EYES NEGATIVE: 1
DIARRHEA: 1
MUSCULOSKELETAL NEGATIVE: 1
CONSTITUTIONAL NEGATIVE: 1
VOMITING: 0
NEUROLOGICAL NEGATIVE: 1
CARDIOVASCULAR NEGATIVE: 1

## 2024-01-21 ASSESSMENT — PAIN SCALES - GENERAL
PAINLEVEL_OUTOF10: 5 - MODERATE PAIN
PAINLEVEL_OUTOF10: 5 - MODERATE PAIN

## 2024-01-21 ASSESSMENT — COGNITIVE AND FUNCTIONAL STATUS - GENERAL
MOVING FROM LYING ON BACK TO SITTING ON SIDE OF FLAT BED WITH BEDRAILS: A LOT
DAILY ACTIVITIY SCORE: 13
DRESSING REGULAR UPPER BODY CLOTHING: A LOT
TOILETING: A LOT
EATING MEALS: A LITTLE
PATIENT BASELINE BEDBOUND: NO
HELP NEEDED FOR BATHING: A LOT
MOVING TO AND FROM BED TO CHAIR: A LOT
DRESSING REGULAR LOWER BODY CLOTHING: A LOT
CLIMB 3 TO 5 STEPS WITH RAILING: A LOT
PERSONAL GROOMING: A LOT
STANDING UP FROM CHAIR USING ARMS: A LOT
MOBILITY SCORE: 12
WALKING IN HOSPITAL ROOM: A LOT
TURNING FROM BACK TO SIDE WHILE IN FLAT BAD: A LOT

## 2024-01-21 ASSESSMENT — COLUMBIA-SUICIDE SEVERITY RATING SCALE - C-SSRS
2. HAVE YOU ACTUALLY HAD ANY THOUGHTS OF KILLING YOURSELF?: NO
6. HAVE YOU EVER DONE ANYTHING, STARTED TO DO ANYTHING, OR PREPARED TO DO ANYTHING TO END YOUR LIFE?: NO
1. IN THE PAST MONTH, HAVE YOU WISHED YOU WERE DEAD OR WISHED YOU COULD GO TO SLEEP AND NOT WAKE UP?: NO

## 2024-01-21 ASSESSMENT — PAIN - FUNCTIONAL ASSESSMENT
PAIN_FUNCTIONAL_ASSESSMENT: 0-10
PAIN_FUNCTIONAL_ASSESSMENT: 0-10

## 2024-01-21 ASSESSMENT — LIFESTYLE VARIABLES
HOW OFTEN DO YOU HAVE 6 OR MORE DRINKS ON ONE OCCASION: NEVER
AUDIT-C TOTAL SCORE: 0
HOW OFTEN DO YOU HAVE A DRINK CONTAINING ALCOHOL: NEVER
EVER FELT BAD OR GUILTY ABOUT YOUR DRINKING: NO
AUDIT-C TOTAL SCORE: 0
HAVE YOU EVER FELT YOU SHOULD CUT DOWN ON YOUR DRINKING: NO
SKIP TO QUESTIONS 9-10: 1
HAVE PEOPLE ANNOYED YOU BY CRITICIZING YOUR DRINKING: NO
EVER HAD A DRINK FIRST THING IN THE MORNING TO STEADY YOUR NERVES TO GET RID OF A HANGOVER: NO
REASON UNABLE TO ASSESS: YES
HOW MANY STANDARD DRINKS CONTAINING ALCOHOL DO YOU HAVE ON A TYPICAL DAY: PATIENT DOES NOT DRINK

## 2024-01-21 NOTE — PROGRESS NOTES
"Emergency Medicine Transition of Care Note.    I received Harper Lara in signout from Dr. Gutierrez.  Please see the previous ED provider note for all HPI, PE and MDM up to the time of signout at 0700. This is in addition to the primary record.    In brief Harper Lara is an 78 y.o. female presenting for   Chief Complaint   Patient presents with    Hypoglycemia     At the time of signout we were awaiting: CT scan    Diagnoses as of 01/21/24 0835   Hypoglycemia   C. difficile colitis   Clostridium difficile diarrhea       Medical Decision Making      Final diagnoses:   [E16.2] Hypoglycemia   [A04.72] C. difficile colitis   [A04.72] Clostridium difficile diarrhea           Procedure  Procedures    Teodora Gurrola MD Harper Lara is a 78 y.o. female on day 0 of admission presenting with Clostridium difficile diarrhea.    Subjective   78-year-old female being admitted for persistent hypoglycemia, C. difficile diarrhea.  We are awaiting a CT scan of the abdomen pelvis.  CT scan of the abdomen pelvis was concerning for possible abscess at the anastomosis site from the previous surgery.  I did speak to Dr. Lozada.  He is requesting the patient be admitted to medicine.  I spoke to Dr. Benedict.  Patient was admitted to medicine with a surgical consult.       Objective     Physical Exam    Last Recorded Vitals  Blood pressure 112/54, pulse 66, temperature 36.4 °C (97.5 °F), temperature source Tympanic, resp. rate 19, height 1.6 m (5' 3\"), weight 97.5 kg (215 lb), SpO2 100 %.  Intake/Output last 3 Shifts:  No intake/output data recorded.    Relevant Results                             Assessment/Plan   Principal Problem:    Clostridium difficile diarrhea    Phlegmon versus abscess at the anastomosis site       I spent 15 minutes in the professional and overall care of this patient.      Teodora Gurrola MD      "

## 2024-01-21 NOTE — ED PROVIDER NOTES
HPI   Chief Complaint   Patient presents with    Hypoglycemia       Chief complaint low blood sugar  History of present illness 78-year-old  female who is from [facility] [place] recent had a small bowel obstruction admission here to the ED on 12/29/2023 and then admitted to the hospital is here with some diarrhea however she has been not eating as much she is on metformin 500 mg twice daily was noted to have a sugar of 30 at the nursing home and then was brought here here was given amp of D50 and placed straight onto D10 normal saline she had a couple blood sugar drops here while on the drip she denies any fever denies of any abdominal pain blood pressure 120/57 pulse 64 temp 36 4 respirate 16 pulse ox 100%                          No data recorded                Patient History   Past Medical History:   Diagnosis Date    Atrial fibrillation (CMS/HCC)     Diabetes mellitus (CMS/HCC)      Past Surgical History:   Procedure Laterality Date    CHOLECYSTECTOMY      HIATAL HERNIA REPAIR      HYSTERECTOMY       No family history on file.  Social History     Tobacco Use    Smoking status: Never     Passive exposure: Never    Smokeless tobacco: Never   Vaping Use    Vaping Use: Never used   Substance Use Topics    Alcohol use: Never    Drug use: Never       Physical Exam   ED Triage Vitals   Temp Heart Rate Respirations BP   01/21/24 0423 01/21/24 0404 01/21/24 0404 01/21/24 0404   36.4 °C (97.5 °F) 64 16 120/57      Pulse Ox Temp Source Heart Rate Source Patient Position   01/21/24 0404 01/21/24 0423 01/21/24 0404 --   100 % Tympanic Monitor       BP Location FiO2 (%)     -- --             Physical Exam  Vitals and nursing note reviewed. Exam conducted with a chaperone present.   Constitutional:       General: She is in acute distress.      Appearance: She is ill-appearing and diaphoretic.   HENT:      Head: Atraumatic.      Nose: Nose normal.      Mouth/Throat:      Mouth: Mucous membranes are dry.   Eyes:       Extraocular Movements: Extraocular movements intact.      Pupils: Pupils are equal, round, and reactive to light.   Cardiovascular:      Rate and Rhythm: Normal rate and regular rhythm.   Pulmonary:      Effort: Pulmonary effort is normal.      Breath sounds: Normal breath sounds.   Abdominal:      General: Bowel sounds are normal. There is distension.   Skin:     Capillary Refill: Capillary refill takes 2 to 3 seconds.   Neurological:      General: No focal deficit present.      Mental Status: She is alert. Mental status is at baseline.   Psychiatric:         Mood and Affect: Mood normal.         ED Course & MDM   Diagnoses as of 01/21/24 2033   Hypoglycemia   C. difficile colitis   Clostridium difficile diarrhea       Medical Decision Making  Differential diagnosis  #1 hypoglycemia  #2 oral hypoglycemic effect  #3 neuroglycopenia  #4 postoperative complication to the bowel obstruction  #5 C. difficile COVID or influenza  Considering the above differential diagnosis following test and treatments were considered in order with shared decision making CT of the abdomen COVID test C. difficile PCR influenza AMB amylase lipase lactic acid CBC electrolytes EKG    Amount and/or Complexity of Data Reviewed  Labs: ordered. Decision-making details documented in ED Course.     Details: Glucose of 79, positive C. difficile white count of 7 3 hemoglobin 8 hematocrit 27 sodium 135 potassium 4 3 chloride 99 RSV detected positive  Radiology: ordered and independent interpretation performed.     Details: Chest x-ray was clear  ECG/medicine tests: ordered and independent interpretation performed.     Details: EKG reveals normal sinus rhythm with a rate of 76  Discussion of management or test interpretation with external provider(s): Consulted with hospitalist dr katia king      Labs Reviewed   C. DIFFICILE, PCR - Abnormal       Result Value    C. difficile, PCR Detected (*)     Narrative:     This test is an FDA-cleared  real-time PCR assay for detection of toxigenic C. difficile DNA from unprocessed liquid or unformed stool specimens that have not undergone nucleic acid extraction in symptomatic patients with potential C. difficile infection (CDI). A positive result may indicate colonization, and clinical assessment is required for the diagnosis of CDI. This test cannot be performed on formed stools or used as a test of cure, and should not be performed more than once per 7 days.   URINALYSIS WITH REFLEX MICROSCOPIC - Abnormal    Color, Urine Yellow      Appearance, Urine Hazy (*)     Specific Gravity, Urine 1.032      pH, Urine 5.0      Protein, Urine NEGATIVE      Glucose, Urine NEGATIVE      Blood, Urine NEGATIVE      Ketones, Urine NEGATIVE      Bilirubin, Urine NEGATIVE      Urobilinogen, Urine <2.0      Nitrite, Urine NEGATIVE      Leukocyte Esterase, Urine MODERATE (2+) (*)    CBC WITH AUTO DIFFERENTIAL - Abnormal    WBC 7.3      nRBC 0.0      RBC 3.08 (*)     Hemoglobin 8.4 (*)     Hematocrit 27.0 (*)     MCV 88      MCH 27.3      MCHC 31.1 (*)     RDW 16.0 (*)     Platelets 224      Neutrophils % 71.8      Immature Granulocytes %, Automated 0.4      Lymphocytes % 20.6      Monocytes % 5.9      Eosinophils % 1.0      Basophils % 0.3      Neutrophils Absolute 5.26      Immature Granulocytes Absolute, Automated 0.03      Lymphocytes Absolute 1.51      Monocytes Absolute 0.43      Eosinophils Absolute 0.07      Basophils Absolute 0.02     BASIC METABOLIC PANEL - Abnormal    Glucose 26 (*)     Sodium 135 (*)     Potassium 4.3      Chloride 99      Bicarbonate 27      Anion Gap 13      Urea Nitrogen 33 (*)     Creatinine 0.97      eGFR 61      Calcium 8.2 (*)    RSV PCR - Abnormal    RSV PCR Detected (*)     Narrative:     This assay is an FDA-cleared, in vitro diagnostic nucleic acid amplification test for the detection of RSV from nasopharyngeal specimens, and has been validated for use at [facility].  Negative results do not preclude RSV infections, and should not be used as the sole basis for diagnosis, treatment, or other management decisions. If Influenza A/B and RSV PCR results are negative, testing for Parainfluenza virus, Adenovirus and Metapneumovirus is routinely performed for pediatric oncology and intensive care inpatients at [facility] [place], and is available on other patients by placing an add-on request.       MICROSCOPIC ONLY, URINE - Abnormal    WBC, Urine 1-5      RBC, Urine 1-2      Squamous Epithelial Cells, Urine 1-9 (SPARSE)      Bacteria, Urine 4+ (*)    POCT GLUCOSE - Abnormal    POCT Glucose 43 (*)    POCT GLUCOSE - Abnormal    POCT Glucose 126 (*)    POCT GLUCOSE - Abnormal    POCT Glucose 30 (*)    POCT GLUCOSE - Abnormal    POCT Glucose 127 (*)    POCT GLUCOSE - Abnormal    POCT Glucose 37 (*)    POCT GLUCOSE - Abnormal    POCT Glucose 160 (*)    POCT GLUCOSE - Abnormal    POCT Glucose 145 (*)    POCT GLUCOSE - Abnormal    POCT Glucose 118 (*)    C. DIFFICILE TOXIN, EIA - Normal    C. difficile (Toxin A/B) Negative      Narrative:     Test detects C. difficile toxin A and/or toxin B, by EIA.    This result may represent C. difficile colonization without active infection, however, toxin production and C. difficile infection (CDI) cannot be ruled out. Infectious Disease consultation is strongly recommended to interpret test results in the clinical context.   LIPASE - Normal    Lipase 48      Narrative:     Venipuncture immediately after or during the administration of Metamizole may lead to falsely low results. Testing should be performed immediately prior to Metamizole dosing.   LACTATE - Normal    Lactate 1.6      Narrative:     Venipuncture immediately after or during the administration of Metamizole may lead to falsely low results. Testing should be performed immediately  prior to Metamizole dosing.   INFLUENZA A AND B PCR - Normal    Flu A Result Not Detected      Flu B Result Not Detected       Narrative:     This assay is an in vitro diagnostic multiplex nucleic acid amplification test for the detection and discrimination of Influenza A & B from nasopharyngeal specimens, and has been validated for use at [facility]. Negative results do not preclude Influenza A/B infections, and should not be used as the sole basis for diagnosis, treatment, or other management decisions. If Influenza A/B and RSV PCR results are negative, testing for Parainfluenza virus, Adenovirus and Metapneumovirus is routinely performed [place] [facility] [place], and is available on other patients by placing an add-on request.   SARS-COV-2 PCR, SYMPTOMATIC - Normal    Coronavirus 2019, PCR Not Detected      Narrative:     This assay has received FDA Emergency Use Authorization (EUA) and is only authorized for the duration of time that circumstances exist to justify the authorization of the emergency use of in vitro diagnostic tests for the detection of SARS-CoV-2 virus and/or diagnosis of COVID-19 infection under section 564(b)(1) of the Act, 21 U.S.C. 360bbb-3(b)(1). This assay is an in vitro diagnostic nucleic acid amplification test for the qualitative detection of SARS-CoV-2 from nasopharyngeal specimens and has been validated for use at [facility]. Negative results do not preclude COVID-19 infections and should not be used as the sole basis for diagnosis, treatment, or other management decisions.     POCT GLUCOSE - Normal    POCT Glucose 79     POCT GLUCOSE - Normal    POCT Glucose 92     BLOOD CULTURE   BLOOD CULTURE   MAGNESIUM   BASIC METABOLIC PANEL   CBC   POCT GLUCOSE   POCT GLUCOSE   POCT GLUCOSE        CT abdomen pelvis w IV contrast   Final Result   Postoperative changes of partial bowel resection are again   demonstrated with grossly intact appearance of the right lower   abdominal surgical anastomosis.   Along the posterior and medial aspect  of the surgical anastomosis,   there is a thick-walled, mixed air-fluid collection as described   suggesting residual abscess.  There is moderate surrounding mesenteric   stranding/inflammatory response.   Mild to moderately distended small and large bowel loops are   demonstrated throughout the abdomen and pelvis as described suggesting   a mild diffuse ileus and/or partial obstruction.  Superimposed   enterocolitis cannot be excluded.   Small amount of nondependent air is seen within the urinary bladder.    Correlate for any recent intervention versus developing emphysematous   cystitis.   Anterior abdominal wall open surgical anastomosis is demonstrated as   described with adjacent soft tissue thickening suggesting phlegmonous   reaction.  No distinct drainable anterior abdominal wall fluid   collection identified.   NOTIFICATION:  The critical results of the study were discussed with,   and acknowledged by Dr. Tasha Gutierrez by telephone on 01/21/2024 at   0658.   Signed by Blaine Holt MD      XR chest 1 view   Final Result   No acute pulmonary abnormality.   Signed by Blaine Holt MD             Procedure  ECG 12 lead    Performed by: Tasha Gutierrez MD  Authorized by: Tasha Gutierrez MD    Rate:     ECG rate:  76    ECG rate assessment: normal    Rhythm:     Rhythm: sinus rhythm    Ectopy:     Ectopy: none    QRS:     QRS axis:  Normal  ST segments:     ST segments:  Normal  T waves:     T waves: normal         Tasha Gutierrez MD  01/21/24 0652       Tasha Gutierrez MD  01/21/24 0710       Tasha Gutierrez MD  01/21/24 2033

## 2024-01-21 NOTE — CARE PLAN
The patient's goals for the shift include return to her SNF.     The clinical goals for the shift include labs WNL

## 2024-01-21 NOTE — H&P
History and Physical        ASSESSMENT AND PLAN:     Abdominal pain  History of small bowel obstruction with lysis of adhesions  Abdominal residual abscess   -Laparoscopic ventral hernia repair at [facility] on 2010  -History of multiple small bowel obstruction s/p open lysis of adhesions 12/27  -CT abdomen and pelvis shows: Thick-walled fluid collection concerning for abscess.  -Will speak to general surgery prior to starting antibiotics given her C. difficile toxin positive    C. difficile diarrhea  -Discontinue Protonix as it can cause C. difficile infections  -Start vancomycin oral, probiotic  -ID consult    Diabetes mellitus  Hypoglycemia  -Blood sugar on admission 26  -This is most likely related to poor oral intake and being on glipizide 10 mg twice daily.  -Her hemoglobin A1c from 8/23: 5.3  -Repeat hemoglobin A1c, will most likely be discharged off all diabetic medications.  -Continue D10, fingersticks every 4 hours.    RSV  -Asymptomatic on room air    History of dementia with parkinsonian features  -Continue fluoxetine    History of paroxysmal atrial fibrillation  -Currently remains in normal sinus rhythm, continue Coreg, hold Eliquis in case patient needs surgical intervention    History of nonischemic cardiomyopathy  -Cardiac cath from 2021 shows an EF of 40 to 45%  -Continue Coreg, hold Lasix for now, continue to monitor blood pressure  -Patient appears to be euvolemic    11.  Hypothyroidism  -Continue levothyroxine      VTE Prophylaxis: SCD      Reji Andrews MD    HISTORY OF PRESENT ILLNESS:   Chief Complaint: Low blood sugar, abdominal pain    History Of Present Illness:    Harper Lara is a 78 y.o. female with a significant past medical history of atrial fibrillation on Xarelto, diabetes mellitus, hypothyroidism, dementia with parkinsonian features, history of compression fractures, history of a small bowel obstruction who underwent laparotomy and repair of ventral hernia at  the [facility], subsequently underwent open lysis of adhesions, presented to the emergency room with abdominal pain, hypoglycemia and weakness.    She was hospitalized in December for small bowel obstruction, she underwent an exploratory laparotomy with open lysis of adhesions on 12/27, subsequently after that her midline incision was opened on December 25 due to increased serosanguineous drainage, she was discharged to a skilled nursing facility.    Post discharge, she presented several days after to the hospital with a partial small obstruction which was managed conservatively with an NG tube.    She states that she started having abdominal pain, diarrhea and low blood sugar.    In the emergency room, CT abdomen and pelvis shows a thick-walled, air-fluid collection suggesting a residual abscess with moderate surrounding and mesenteric inflammatory response.    She was also found to have positive C. difficile PCR.    Her blood sugar on admission was 30, she was subsequently started on a D10 drip.    Review of systems: 10 point review of systems is otherwise negative except as mentioned above.    PAST HISTORIES:       Past Medical History:  She has a past medical history of Atrial fibrillation (CMS/[facility]) and Diabetes mellitus (CMS/[facility]).    Past Surgical History:  She has a past surgical history that includes Cholecystectomy; Hiatal hernia repair; and Hysterectomy.      Social History:  She reports that she has never smoked. She has never been exposed to tobacco smoke. She has never used smokeless tobacco. She reports that she does not drink alcohol and does not use drugs.    Family History:  No family history on file.     Allergies:  Diphenhydramine hcl, Morphine, Pentazocine, Propoxyphene, Propoxyphene n-acetaminophen, Ranitidine, and Strawberry    OBJECTIVE:       Last Recorded Vitals:  Vitals:    01/21/24 0705 01/21/24 0731 01/21/24 0805 01/21/24 0850   BP: 134/60 134/64 112/54    BP Location:   Left arm     Patient Position:   Lying    Pulse: 68 67 66    Resp:  16 19    Temp:       TempSrc:       SpO2: 99% 100% 100%    Weight:    68.8 kg (151 lb 11.2 oz)   Height:           Last I/O:  No intake/output data recorded.    Physical Exam    PHYSICAL EXAM:   GENERAL: Laying in bed, does not appear to be in any distress.   HEENT: HEAD: Normocephalic atraumatic.  Neck: Supple.  Eyes: Pupils are reactive to direct light.   CVS: S1, S2 heard. Regular rate and rhythm  LUNGS: Clear to auscultate bilaterally. No wheezing or rhonchi appreciated.  ABDOMEN: Soft, positive bowel sounds, tenderness to palpate.  NEUROLOGICAL: No focal neurological deficits appreciated. Cranial nerves are grossly intact.  EXTREMITIES: Dorsalis pedis pulses can be appreciated. No edema appreciated.  SKIN:  Grossly intact, warm and dry.          Scheduled Medications  carvedilol, 12.5 mg, oral, BID with meals  FLUoxetine, 20 mg, oral, Daily  ipratropium-albuteroL, 3 mL, nebulization, q6h  lactobacillus acidophilus, 1 tablet, oral, BID  levothyroxine, 75 mcg, oral, Daily  multivitamin with minerals, 1 tablet, oral, Daily  rivaroxaban, 15 mg, oral, Daily with evening meal  vancomycin, 125 mg, oral, 4x daily      PRN Medications  PRN medications: dextrose 10 % in water (D10W), dextrose  Continuous Medications       Outpatient Medications:  Prior to Admission medications    Medication Sig Start Date End Date Taking? Authorizing Provider   acetaminophen (Tylenol) 325 mg tablet Take 2 tablets (650 mg) by mouth every 6 hours if needed for mild pain (1 - 3) or moderate pain (4 - 6). 1/4/24   Krissy Bales PA-C   amLODIPine (Norvasc) 5 mg tablet Take 1 tablet (5 mg) by mouth once daily.    Historical Provider, MD   atorvastatin (Lipitor) 40 mg tablet Take 1 tablet (40 mg) by mouth once daily.    Historical Provider, MD   benztropine (Cogentin) 1 mg tablet Take 1 tablet (1 mg) by mouth once daily.    Historical Provider, MD   carvedilol (Coreg) 12.5 mg tablet Take  by mouth twice a day.    Historical Provider, MD   cholecalciferol (Vitamin D-3) 10 MCG (400 UNIT) tablet Take 1 tablet (10 mcg) by mouth once daily.    Historical Provider, MD   cyanocobalamin (Vitamin B-12) 1,000 mcg tablet Take 1 tablet (1,000 mcg) by mouth once daily.    Historical Provider, MD   docosahexaenoic acid/epa (FISH OIL ORAL) Take 1 capsule by mouth once daily.    Historical Provider, MD   FLUoxetine (PROzac) 20 mg tablet Take 1 tablet (20 mg) by mouth once daily.    Historical Provider, MD   furosemide (Lasix) 20 mg tablet Take 1 tablet (20 mg) by mouth once daily.    Historical Provider, MD   gentamicin (Garamycin) 0.1 % ointment Apply 1 Application topically 3 times a day. To bed sores    Historical Provider, MD   glipiZIDE (Glucotrol) 10 mg tablet 1 tab(s) orally 2 times a day (before meals)    Historical Provider, MD   levothyroxine (Synthroid, Levoxyl) 75 mcg tablet Take 1 tablet (75 mcg) by mouth once daily.    Historical Provider, MD   magnesium oxide (Mag-Ox) 400 mg tablet Take 1 tablet (400 mg) by mouth once daily.    Historical Provider, MD   metFORMIN (Glucophage) 500 mg tablet Take 1 tablet (500 mg) by mouth 2 times a day with meals.    Historical Provider, MD   multivitamin tablet Take 1 tablet by mouth once daily.    Historical Provider, MD   nystatin (Mycostatin) 100,000 unit/gram powder Apply 1 Application topically if needed.    Historical Provider, MD   ondansetron ODT (Zofran-ODT) 4 mg disintegrating tablet Take 1 tablet (4 mg) by mouth every 8 hours if needed for nausea or vomiting. 12/26/23   Virgie Grossman, APRN-CNP   pantoprazole (ProtoNix) 40 mg EC tablet Take 1 tablet (40 mg) by mouth once daily in the morning. Take before meals. Do not crush, chew, or split.    Historical Provider, MD   rivaroxaban (Xarelto) 15 mg tablet Take 1 tablet (15 mg) by mouth once daily in the evening. Take with meals. Take with food.    Historical Provider, MD   triamterene-hydrochlorothiazid  (Dyazide) 37.5-25 mg capsule Take 1 capsule by mouth once daily in the morning.    Historical Provider, MD       LABS AND IMAGING:     Labs:  Results for orders placed or performed during the hospital encounter of 01/21/24 (from the past 24 hour(s))   POCT GLUCOSE   Result Value Ref Range    POCT Glucose 43 (L) 74 - 99 mg/dL   CBC and Auto Differential   Result Value Ref Range    WBC 7.3 4.4 - 11.3 x10*3/uL    nRBC 0.0 0.0 - 0.0 /100 WBCs    RBC 3.08 (L) 4.00 - 5.20 x10*6/uL    Hemoglobin 8.4 (L) 12.0 - 16.0 g/dL    Hematocrit 27.0 (L) 36.0 - 46.0 %    MCV 88 80 - 100 fL    MCH 27.3 26.0 - 34.0 pg    MCHC 31.1 (L) 32.0 - 36.0 g/dL    RDW 16.0 (H) 11.5 - 14.5 %    Platelets 224 150 - 450 x10*3/uL    Neutrophils % 71.8 40.0 - 80.0 %    Immature Granulocytes %, Automated 0.4 0.0 - 0.9 %    Lymphocytes % 20.6 13.0 - 44.0 %    Monocytes % 5.9 2.0 - 10.0 %    Eosinophils % 1.0 0.0 - 6.0 %    Basophils % 0.3 0.0 - 2.0 %    Neutrophils Absolute 5.26 1.60 - 5.50 x10*3/uL    Immature Granulocytes Absolute, Automated 0.03 0.00 - 0.50 x10*3/uL    Lymphocytes Absolute 1.51 0.80 - 3.00 x10*3/uL    Monocytes Absolute 0.43 0.05 - 0.80 x10*3/uL    Eosinophils Absolute 0.07 0.00 - 0.40 x10*3/uL    Basophils Absolute 0.02 0.00 - 0.10 x10*3/uL   Basic metabolic panel   Result Value Ref Range    Glucose 26 (LL) 74 - 99 mg/dL    Sodium 135 (L) 136 - 145 mmol/L    Potassium 4.3 3.5 - 5.3 mmol/L    Chloride 99 98 - 107 mmol/L    Bicarbonate 27 21 - 32 mmol/L    Anion Gap 13 10 - 20 mmol/L    Urea Nitrogen 33 (H) 6 - 23 mg/dL    Creatinine 0.97 0.50 - 1.05 mg/dL    eGFR 61 >60 mL/min/1.73m*2    Calcium 8.2 (L) 8.6 - 10.3 mg/dL   ECG 12 lead   Result Value Ref Range    Ventricular Rate 76 BPM    Atrial Rate 76 BPM    IN Interval 196 ms    QRS Duration 128 ms    QT Interval 412 ms    QTC Calculation(Bazett) 463 ms    P Axis 90 degrees    R Axis -45 degrees    T Axis 34 degrees    QRS Count 12 beats    Q Onset 211 ms    P Onset 113 ms    P  Offset 162 ms    T Offset 417 ms    QTC Fredericia 445 ms   Lipase   Result Value Ref Range    Lipase 48 9 - 82 U/L   Lactate   Result Value Ref Range    Lactate 1.6 0.4 - 2.0 mmol/L   POCT GLUCOSE   Result Value Ref Range    POCT Glucose 126 (H) 74 - 99 mg/dL   Influenza A, and B PCR   Result Value Ref Range    Flu A Result Not Detected Not Detected    Flu B Result Not Detected Not Detected   SARS-CoV-2 RT PCR   Result Value Ref Range    Coronavirus 2019, PCR Not Detected Not Detected   RSV PCR   Result Value Ref Range    RSV PCR Detected (A) Not Detected   C. difficile, PCR    Specimen: Stool   Result Value Ref Range    C. difficile, PCR Detected (A) Not Detected   POCT GLUCOSE   Result Value Ref Range    POCT Glucose 79 74 - 99 mg/dL   POCT GLUCOSE   Result Value Ref Range    POCT Glucose 30 (L) 74 - 99 mg/dL   POCT GLUCOSE   Result Value Ref Range    POCT Glucose 127 (H) 74 - 99 mg/dL   POCT GLUCOSE   Result Value Ref Range    POCT Glucose 37 (L) 74 - 99 mg/dL   POCT GLUCOSE   Result Value Ref Range    POCT Glucose 160 (H) 74 - 99 mg/dL   POCT GLUCOSE   Result Value Ref Range    POCT Glucose 145 (H) 74 - 99 mg/dL        Imaging:  ECG 12 lead  Normal sinus rhythm  Left axis deviation  Left ventricular hypertrophy with QRS widening  Possible Lateral infarct (cited on or before 06-DEC-2023)  Abnormal ECG  When compared with ECG of 06-DEC-2023 13:48,  Sinus rhythm has replaced Atrial fibrillation  Criteria for Inferior infarct are no longer Present  Questionable change in initial forces of Anterolateral leads  CT abdomen pelvis w IV contrast  Narrative: STUDY:  CT Abdomen and Pelvis without and with IV Contrast; 0/21/2024 at 5:21  AM  INDICATION:  Status post bowel obstruction, diarrhea.  COMPARISON:  12/29/2023 CT abdomen and pelvis.  ACCESSION NUMBER(S):  [id number]  ORDERING CLINICIAN:  CARLA SMILEY  TECHNIQUE:  CT of the abdomen and pelvis was performed without and with IV  contrast.  Contiguous axial  images were obtained at 3 mm slice  thickness through the abdomen and pelvis.  Coronal and sagittal  reconstructions at 3 mm slice thickness were performed.  Omnipaque 350  75 mL was administered intravenously; positive oral contrast was  given.    FINDINGS:  LOWER CHEST:  No cardiomegaly.  No pericardial effusion.  Lung bases demonstrate  trace bilateral pleural effusions with minimal bibasilar atelectasis.      ABDOMEN:     LIVER:  No hepatomegaly.  Smooth surface contour.  Normal attenuation.     BILE DUCTS:  No intrahepatic or extrahepatic biliary ductal dilatation.     GALLBLADDER:  Gallbladder is absent.    STOMACH:  No abnormalities identified.     PANCREAS:  Mild pancreatic atrophy again noted.  No masses or ductal dilatation.     SPLEEN:  No splenomegaly or focal splenic lesion.     ADRENAL GLANDS:  No thickening or nodules.     KIDNEYS AND URETERS:  Kidneys are normal in size and location.  No renal or ureteral  calculi.     PELVIS:     BLADDER:  Mildly distended appearance of the urinary bladder with small amount  of nondependent air.       REPRODUCTIVE ORGANS:  Uterus is absent.     BOWEL:  Postoperative changes of partial bowel resection are again  demonstrated within grossly intact surgical anastomosis in the right  lower abdomen.  Just posterior and medial to the anastomosis, there is  a thick-walled collection measuring up to 3.4 x 3.8 x 2.8 cm (AP,  transverse, craniocaudal on image 95 series 201).  Moderate adjacent  mesenteric stranding and phlegmonous reaction is noted.  Mild to  moderately distended fluid-filled small bowel loops are seen in the  left hemiabdomen with mildly distended, fluid-filled colonic bowel  loops.  Findings may represent a mild generalized ileus versus partial  obstruction.  Superimposed enterocolitis is not excluded.  VESSELS:  No abnormalities identified.  Abdominal aorta is normal in caliber.      PERITONEUM/RETROPERITONEUM/LYMPH NODES:  No free fluid.  No  pneumoperitoneum.  No lymphadenopathy.     ABDOMINAL WALL:  Anterior abdominal wall postoperative changes are again seen with open  surgical anastomosis/wound.  There is thickening along the open  anastomosis suggesting phlegmonous reaction.  No distinct drainable  anterior abdominal wall fluid collection is identified.  SOFT TISSUES:   No abnormalities identified.     BONES:  Stable thoracolumbar degenerative changes with persistent findings of  inferior L2 and superior L3 endplate defects.  No acute fracture or  aggressive osseous lesion.  Impression: Postoperative changes of partial bowel resection are again  demonstrated with grossly intact appearance of the right lower  abdominal surgical anastomosis.  Along the posterior and medial aspect of the surgical anastomosis,  there is a thick-walled, mixed air-fluid collection as described  suggesting residual abscess.  There is moderate surrounding mesenteric  stranding/inflammatory response.  Mild to moderately distended small and large bowel loops are  demonstrated throughout the abdomen and pelvis as described suggesting  a mild diffuse ileus and/or partial obstruction.  Superimposed  enterocolitis cannot be excluded.  Small amount of nondependent air is seen within the urinary bladder.   Correlate for any recent intervention versus developing emphysematous  cystitis.  Anterior abdominal wall open surgical anastomosis is demonstrated as  described with adjacent soft tissue thickening suggesting phlegmonous  reaction.  No distinct drainable anterior abdominal wall fluid  collection identified.  NOTIFICATION:  The critical results of the study were discussed with,  and acknowledged by Dr. Tasha Gutierrez by telephone on 01/21/2024 at  0658.  Signed by Blaine Holt MD  XR chest 1 view  Narrative: STUDY:  Chest Radiograph;  1/21/2024 5:11 AM  INDICATION:  Shortness of breath.  COMPARISON:  12/29/2023 XR Chest  ACCESSION NUMBER(S):  [id number]  ORDERING  CLINICIAN:  CARLA SMILEY  TECHNIQUE:  Frontal chest was obtained at 05:11 hours.  FINDINGS:  CARDIOMEDIASTINAL SILHOUETTE:  Cardiomediastinal silhouette is normal in size and configuration.     LUNGS:  Lungs are clear.     ABDOMEN:  No remarkable upper abdominal findings.     BONES:  No acute osseous changes.  Impression: No acute pulmonary abnormality.  Signed by Blaine Holt MD

## 2024-01-22 ENCOUNTER — APPOINTMENT (OUTPATIENT)
Dept: SURGERY | Facility: CLINIC | Age: 79
End: 2024-01-22
Payer: MEDICARE

## 2024-01-22 LAB
ABO GROUP (TYPE) IN BLOOD: NORMAL
ANION GAP SERPL CALC-SCNC: 11 MMOL/L (ref 10–20)
ANTIBODY SCREEN: NORMAL
BLOOD EXPIRATION DATE: NORMAL
BUN SERPL-MCNC: 32 MG/DL (ref 6–23)
CALCIUM SERPL-MCNC: 7.9 MG/DL (ref 8.6–10.3)
CHLORIDE SERPL-SCNC: 101 MMOL/L (ref 98–107)
CO2 SERPL-SCNC: 27 MMOL/L (ref 21–32)
CREAT SERPL-MCNC: 1.19 MG/DL (ref 0.5–1.05)
DISPENSE STATUS: NORMAL
EGFRCR SERPLBLD CKD-EPI 2021: 47 ML/MIN/1.73M*2
ERYTHROCYTE [DISTWIDTH] IN BLOOD BY AUTOMATED COUNT: 16.1 % (ref 11.5–14.5)
GLUCOSE BLD MANUAL STRIP-MCNC: 121 MG/DL (ref 74–99)
GLUCOSE BLD MANUAL STRIP-MCNC: 136 MG/DL (ref 74–99)
GLUCOSE BLD MANUAL STRIP-MCNC: 152 MG/DL (ref 74–99)
GLUCOSE BLD MANUAL STRIP-MCNC: 179 MG/DL (ref 74–99)
GLUCOSE SERPL-MCNC: 140 MG/DL (ref 74–99)
HCT VFR BLD AUTO: 21.7 % (ref 36–46)
HCT VFR BLD AUTO: 23.9 % (ref 36–46)
HGB BLD-MCNC: 6.6 G/DL (ref 12–16)
HGB BLD-MCNC: 7.8 G/DL (ref 12–16)
HOLD SPECIMEN: NORMAL
MAGNESIUM SERPL-MCNC: 1.77 MG/DL (ref 1.6–2.4)
MCH RBC QN AUTO: 26.5 PG (ref 26–34)
MCHC RBC AUTO-ENTMCNC: 30.4 G/DL (ref 32–36)
MCV RBC AUTO: 87 FL (ref 80–100)
NRBC BLD-RTO: 0 /100 WBCS (ref 0–0)
PLATELET # BLD AUTO: 176 X10*3/UL (ref 150–450)
POTASSIUM SERPL-SCNC: 4.4 MMOL/L (ref 3.5–5.3)
PRODUCT BLOOD TYPE: 5100
PRODUCT CODE: NORMAL
RBC # BLD AUTO: 2.49 X10*6/UL (ref 4–5.2)
RH FACTOR (ANTIGEN D): NORMAL
SODIUM SERPL-SCNC: 135 MMOL/L (ref 136–145)
UNIT ABO: NORMAL
UNIT NUMBER: NORMAL
UNIT RH: NORMAL
UNIT VOLUME: 323
WBC # BLD AUTO: 5 X10*3/UL (ref 4.4–11.3)
XM INTEP: NORMAL

## 2024-01-22 PROCEDURE — 36415 COLL VENOUS BLD VENIPUNCTURE: CPT | Performed by: HOSPITALIST

## 2024-01-22 PROCEDURE — 2500000002 HC RX 250 W HCPCS SELF ADMINISTERED DRUGS (ALT 637 FOR MEDICARE OP, ALT 636 FOR OP/ED): Performed by: HOSPITALIST

## 2024-01-22 PROCEDURE — 85027 COMPLETE CBC AUTOMATED: CPT | Performed by: HOSPITALIST

## 2024-01-22 PROCEDURE — 97165 OT EVAL LOW COMPLEX 30 MIN: CPT | Mod: GO

## 2024-01-22 PROCEDURE — 85014 HEMATOCRIT: CPT | Performed by: HOSPITALIST

## 2024-01-22 PROCEDURE — 97161 PT EVAL LOW COMPLEX 20 MIN: CPT | Mod: GP

## 2024-01-22 PROCEDURE — 2500000004 HC RX 250 GENERAL PHARMACY W/ HCPCS (ALT 636 FOR OP/ED): Performed by: INTERNAL MEDICINE

## 2024-01-22 PROCEDURE — 1200000002 HC GENERAL ROOM WITH TELEMETRY DAILY

## 2024-01-22 PROCEDURE — 94640 AIRWAY INHALATION TREATMENT: CPT

## 2024-01-22 PROCEDURE — 83735 ASSAY OF MAGNESIUM: CPT | Performed by: HOSPITALIST

## 2024-01-22 PROCEDURE — 80048 BASIC METABOLIC PNL TOTAL CA: CPT | Performed by: HOSPITALIST

## 2024-01-22 PROCEDURE — 36430 TRANSFUSION BLD/BLD COMPNT: CPT

## 2024-01-22 PROCEDURE — 86920 COMPATIBILITY TEST SPIN: CPT

## 2024-01-22 PROCEDURE — P9016 RBC LEUKOCYTES REDUCED: HCPCS

## 2024-01-22 PROCEDURE — 82947 ASSAY GLUCOSE BLOOD QUANT: CPT

## 2024-01-22 PROCEDURE — 2500000004 HC RX 250 GENERAL PHARMACY W/ HCPCS (ALT 636 FOR OP/ED): Performed by: HOSPITALIST

## 2024-01-22 PROCEDURE — 99232 SBSQ HOSP IP/OBS MODERATE 35: CPT | Performed by: HOSPITALIST

## 2024-01-22 PROCEDURE — 86901 BLOOD TYPING SEROLOGIC RH(D): CPT | Performed by: HOSPITALIST

## 2024-01-22 PROCEDURE — 2500000001 HC RX 250 WO HCPCS SELF ADMINISTERED DRUGS (ALT 637 FOR MEDICARE OP): Performed by: HOSPITALIST

## 2024-01-22 RX ORDER — DEXTROSE MONOHYDRATE 100 MG/ML
75 INJECTION, SOLUTION INTRAVENOUS ONCE AS NEEDED
Status: DISCONTINUED | OUTPATIENT
Start: 2024-01-22 | End: 2024-01-25 | Stop reason: HOSPADM

## 2024-01-22 RX ORDER — IPRATROPIUM BROMIDE AND ALBUTEROL SULFATE 2.5; .5 MG/3ML; MG/3ML
3 SOLUTION RESPIRATORY (INHALATION) EVERY 2 HOUR PRN
Status: DISCONTINUED | OUTPATIENT
Start: 2024-01-22 | End: 2024-01-25 | Stop reason: HOSPADM

## 2024-01-22 RX ADMIN — VANCOMYCIN HYDROCHLORIDE 125 MG: 125 CAPSULE ORAL at 16:47

## 2024-01-22 RX ADMIN — VANCOMYCIN HYDROCHLORIDE 125 MG: 125 CAPSULE ORAL at 06:48

## 2024-01-22 RX ADMIN — FLUOXETINE HYDROCHLORIDE 20 MG: 20 CAPSULE ORAL at 08:15

## 2024-01-22 RX ADMIN — VANCOMYCIN HYDROCHLORIDE 125 MG: 125 CAPSULE ORAL at 22:35

## 2024-01-22 RX ADMIN — Medication 1 TABLET: at 08:15

## 2024-01-22 RX ADMIN — ERTAPENEM SODIUM 1 G: 1 INJECTION, POWDER, LYOPHILIZED, FOR SOLUTION INTRAMUSCULAR; INTRAVENOUS at 20:20

## 2024-01-22 RX ADMIN — IPRATROPIUM BROMIDE AND ALBUTEROL SULFATE 3 ML: 2.5; .5 SOLUTION RESPIRATORY (INHALATION) at 04:10

## 2024-01-22 RX ADMIN — RIVAROXABAN 15 MG: 15 TABLET, FILM COATED ORAL at 16:47

## 2024-01-22 RX ADMIN — IPRATROPIUM BROMIDE AND ALBUTEROL SULFATE 3 ML: 2.5; .5 SOLUTION RESPIRATORY (INHALATION) at 21:01

## 2024-01-22 RX ADMIN — IPRATROPIUM BROMIDE AND ALBUTEROL SULFATE 3 ML: 2.5; .5 SOLUTION RESPIRATORY (INHALATION) at 07:15

## 2024-01-22 RX ADMIN — CARVEDILOL 12.5 MG: 12.5 TABLET, FILM COATED ORAL at 08:15

## 2024-01-22 RX ADMIN — CARVEDILOL 12.5 MG: 12.5 TABLET, FILM COATED ORAL at 16:47

## 2024-01-22 RX ADMIN — LEVOTHYROXINE SODIUM 75 MCG: 75 TABLET ORAL at 06:48

## 2024-01-22 RX ADMIN — Medication 1 TABLET: at 20:20

## 2024-01-22 RX ADMIN — IPRATROPIUM BROMIDE AND ALBUTEROL SULFATE 3 ML: 2.5; .5 SOLUTION RESPIRATORY (INHALATION) at 13:53

## 2024-01-22 RX ADMIN — VANCOMYCIN HYDROCHLORIDE 125 MG: 125 CAPSULE ORAL at 13:29

## 2024-01-22 ASSESSMENT — COGNITIVE AND FUNCTIONAL STATUS - GENERAL
WALKING IN HOSPITAL ROOM: A LITTLE
DAILY ACTIVITIY SCORE: 13
MOVING FROM LYING ON BACK TO SITTING ON SIDE OF FLAT BED WITH BEDRAILS: A LITTLE
HELP NEEDED FOR BATHING: A LOT
PERSONAL GROOMING: A LITTLE
STANDING UP FROM CHAIR USING ARMS: A LOT
MOVING TO AND FROM BED TO CHAIR: A LOT
DRESSING REGULAR LOWER BODY CLOTHING: A LOT
DRESSING REGULAR LOWER BODY CLOTHING: A LOT
MOBILITY SCORE: 16
HELP NEEDED FOR BATHING: A LOT
TOILETING: A LOT
WALKING IN HOSPITAL ROOM: A LOT
CLIMB 3 TO 5 STEPS WITH RAILING: TOTAL
MOBILITY SCORE: 12
DRESSING REGULAR UPPER BODY CLOTHING: A LOT
CLIMB 3 TO 5 STEPS WITH RAILING: A LOT
TURNING FROM BACK TO SIDE WHILE IN FLAT BAD: A LITTLE
STANDING UP FROM CHAIR USING ARMS: A LITTLE
EATING MEALS: A LITTLE
DRESSING REGULAR UPPER BODY CLOTHING: A LITTLE
MOVING TO AND FROM BED TO CHAIR: A LITTLE
DAILY ACTIVITIY SCORE: 16
TOILETING: A LOT
PERSONAL GROOMING: A LOT
TURNING FROM BACK TO SIDE WHILE IN FLAT BAD: A LOT
MOVING FROM LYING ON BACK TO SITTING ON SIDE OF FLAT BED WITH BEDRAILS: A LOT

## 2024-01-22 ASSESSMENT — ACTIVITIES OF DAILY LIVING (ADL)
ADL_ASSISTANCE: NEEDS ASSISTANCE
BATHING_ASSISTANCE: MODERATE

## 2024-01-22 ASSESSMENT — PAIN - FUNCTIONAL ASSESSMENT
PAIN_FUNCTIONAL_ASSESSMENT: 0-10
PAIN_FUNCTIONAL_ASSESSMENT: 0-10

## 2024-01-22 ASSESSMENT — PAIN SCALES - GENERAL
PAINLEVEL_OUTOF10: 0 - NO PAIN
PAINLEVEL_OUTOF10: 0 - NO PAIN

## 2024-01-22 NOTE — PROGRESS NOTES
"Harper Lara is a 78 y.o. female on day 1 of admission presenting with Clostridium difficile diarrhea.    Subjective   No new issues, tolerating diet with no nausea or vomiting, reports no bowel movement today; receiving 1 unit of PRBC       Objective     Physical Exam  No acute distress  Abd: soft, minimal tenderness; wound with dressing  Last Recorded Vitals  Blood pressure 132/68, pulse 61, temperature 36.1 °C (96.9 °F), temperature source Temporal, resp. rate 18, height 1.6 m (5' 2.99\"), weight 68.8 kg (151 lb 10.8 oz), SpO2 100 %.  Intake/Output last 3 Shifts:  I/O last 3 completed shifts:  In: 550 (8 mL/kg) [P.O.:500; IV Piggyback:50]  Out: 300 (4.4 mL/kg) [Urine:300 (0.1 mL/kg/hr)]  Weight: 68.8 kg     Relevant Results                             Assessment/Plan   Principal Problem:    Clostridium difficile diarrhea  I discussed CT scan with Interventional radiologist (Dr.Schoenberger). Per him, the fluid is not accessible as it is surrounded by loops of bowel; continue abx as patient continues to do well clinically; continue wound care      Manuel Lozada MD      "

## 2024-01-22 NOTE — PROGRESS NOTES
Occupational Therapy    Evaluation/Treatment    Patient Name: Harper Lara  MRN: [id number]  : 1945  Today's Date: 24  Time Calculation  Start Time: 919  Stop Time: 943  Time Calculation (min): 24 min       Assessment:  End of Session Communication: Bedside nurse, PCT/NA/CTA  End of Session Patient Position: Up in chair, Alarm on (call light in reach, PCT in room)  OT Assessment Results: Decreased ADL status, Decreased endurance, Decreased functional mobility    Plan:  Treatment Interventions: ADL retraining, Functional transfer training, Endurance training  OT Frequency: 2 times per week  OT Discharge Recommendations: Moderate intensity level of continued care  OT Recommended Transfer Status: Minimal assist  OT - OK to Discharge: Yes (when deemed medically appropriate)  Treatment Interventions: ADL retraining, Functional transfer training, Endurance training  Subjective               General:   OT Received On: 24  General  Reason for Referral: ADL impairment  Referred By: OT/PT Juliana   Past Medical History Relevant to Rehab: HTN, CAD, CHF, afib, depression, anxiety , hypothyroidism, DM, GERD, GOUT HLD,  parkinsonian/ Dementia,  SBO, SDH, [facility] recent SBO with open lysis 23.  Prior to Session Communication: Bedside nurse  Patient Position Received: Bed, 3 rail up, Alarm on  General Comment: pt is a 79 yo female came [facility] the hospital [facility] on  with reports of ABD pain and diarrhea, pt with dx : Cdiff and RSV. test/labs : hgb 6.6 ,CT ABD/pelvis :  thickened wall / fluid colleciton concern for abcess CXR : neg.  pt with recent SBO with open lysis.  Has ABD incision with dressing . Pt asymptomatic with Hbg 6.6 at time of eval    Precautions:  Medical Precautions: Fall precautions  Post-Surgical Precautions: Abdominal surgery precautions (contact/droplet precautions  C-diff and RSV)           Pain:  Pain Assessment  Pain Assessment: 0-10  Pain Score: 0 - No  pain  Objective     Cognition:  Overall Cognitive Status: Within Functional Limits  Orientation Level: Disoriented to situation  Processing Speed: Delayed             Home Living:  Home Living Comments: pt is a limited historian . reports [facility]s [facility] before her last SBO sx. but unsure of time frame since shes been at home.    Prior Function:  Prior Function Comments: per pt at SNF has been using a walker for mobility and at times a wc.  has assistance for  iadls. Pt indep with feeding, grooming, assist with toileting, bathing/dressing           Activities of Daily Living:   Eating Assistance: Independent  Grooming Assistance: Minimal  Bathing Assistance: Moderate  UE Dressing Assistance: Minimal  LE Dressing Assistance: Maximal  Toileting Assistance with Device: Moderate  Toileting Deficit: Bedside commode, Perineal hygiene, Clothing management up, Clothing management down  Functional Assistance:  (Pt ambulated 15' in room with ww with min A)                         Activity Tolerance:  Endurance:  (pt fatigues after 10 minutes of functional activity)           Bed Mobility/Transfers: Bed Mobility  Bed Mobility:  (sup to sit min A)  Transfers  Transfer: Yes (sit to stand at EOB min A, bed to BSC min A, use of ww for transfers, vc's for safe hand placement)                Balance:  Static Sitting: good  Dynamic Sitting: fair   Static Standing: fair   Dynamic Standing: fair               Strength:  Strength Comments: B UE 4/5 throughout            Extremities: B UE AROM WFL     Outcome Measures: [facility] Daily Activity  Putting on and taking off regular lower body clothing: A lot  Bathing (including washing, rinsing, drying): A lot  Putting on and taking off regular upper body clothing: A little  Toileting, which includes using toilet, bedpan or urinal: A lot  Taking care of personal grooming such as brushing teeth: A little  Eating Meals: None  Daily Activity - Total Score: 16    Education  Documentation  Precautions, taught by Ro Weinberg OT at 1/22/2024 12:52 PM.  Learner: Patient  Readiness: Acceptance  Method: Explanation  Response: Verbalizes Understanding, Needs Reinforcement    ADL Training, taught by Ro Weinberg OT at 1/22/2024 12:52 PM.  Learner: Patient  Readiness: Acceptance  Method: Explanation  Response: Verbalizes Understanding, Needs Reinforcemen             Goals:  Encounter Problems       Encounter Problems (Active)       OT Goals       Pt will transfer to bed, chair,toilet with SBA (Progressing)       Start:  01/22/24    Expected End:  02/05/24            Pt will dress UB with SBA (Progressing)       Start:  01/22/24    Expected End:  02/05/24            Pt will tolerate 15 minutes of functional activity with one rest break (Progressing)       Start:  01/22/24    Expected End:  02/05/24

## 2024-01-22 NOTE — CONSULTS
Reason For Consul  Intra-abdominal abscess    History Of Present Illness  78-year-old patient who is well-known to me.  Her last surgery was December 17th; she underwent laparotomy, lysis of adhesions and small bowel resection with anastomosis.  She was readmitted on the 12/29 due to partial bowel obstruction which was managed with bowel rest and nasogastric tube; she was discharged on January 5 to [facility].  She has been doing well, tolerating diet and having bowel movement.  She was brought to the ER yesterday due to low blood sugar. Her blood sugar in the 30 and also noted to be C. difficile positive. She reports intermittent abdominal pain but is improved overall; reports having soft bowel movement today. Denies nausea, vomiting, fevers and chills.  CT abdomen/pelvis shows 3 x 4 cm fluid collection posterior and medial to the anastomosis with adjacent mesentery stranding and phlegmonous reaction, mildly dilated loops of small and large bowel     Past Medical History  She has a past medical history of Atrial fibrillation (CMS/HCC) and Diabetes mellitus (CMS/HCC).    Surgical History  She has a past surgical history that includes Cholecystectomy; Hiatal hernia repair; and Hysterectomy. Laparotomy, SBR with anastomosis      Social History  She reports that she has never smoked. She has never been exposed to tobacco smoke. She has never used smokeless tobacco. She reports that she does not drink alcohol and does not use drugs.    Family History  No family history on file.     Allergies  Diphenhydramine hcl, Morphine, Pentazocine, Propoxyphene, Propoxyphene n-acetaminophen, Ranitidine, and Arboles         Physical Exam  Constitutional: no acute distress  Pulmonary: normal respiratory effort; clear to auscultation bilaterally, no wheezes or bronchi   Cardiovascular: regular rate and rhythm, no murmurs or extra-heart sound  Abdomen: soft, minimal tenderness (improved from previous admissions), slightly distended; open  "midline wound is 10x4 cm; , fibrinous exudative debrided and loose sutures cut  Musculoskeletal: digits and nails normal without clubbing or cyanosis; Joints, bones and muscles are normal with normal range of motion  Neurologic: cranial nerve II-XII intact grossly  Psychiatric: oriented to person, place and time  Last Recorded Vitals  Blood pressure 123/57, pulse 68, temperature 36.4 °C (97.5 °F), temperature source Temporal, resp. rate 19, height 1.6 m (5' 2.99\"), weight 68.8 kg (151 lb 10.8 oz), SpO2 97 %.    Relevant Results  ECG 12 lead    Result Date: 1/21/2024  Normal sinus rhythm Left axis deviation Left ventricular hypertrophy with QRS widening Possible Lateral infarct (cited on or before 06-DEC-2023) Abnormal ECG When compared with ECG of 06-DEC-2023 13:48, Sinus rhythm has replaced Atrial fibrillation Criteria for Inferior infarct are no longer Present Questionable change in initial forces of Anterolateral leads    CT abdomen pelvis w IV contrast    Result Date: 1/21/2024  STUDY: CT Abdomen and Pelvis without and with IV Contrast; 0/21/2024 at 5:21 AM INDICATION: Status post bowel obstruction, diarrhea. COMPARISON: 12/29/2023 CT abdomen and pelvis. ACCESSION NUMBER(S): [id number] ORDERING CLINICIAN: CARLA SMILEY TECHNIQUE: CT of the abdomen and pelvis was performed without and with IV contrast.  Contiguous axial images were obtained at 3 mm slice thickness through the abdomen and pelvis.  Coronal and sagittal reconstructions at 3 mm slice thickness were performed.  Omnipaque 350 75 mL was administered intravenously; positive oral contrast was given.  FINDINGS: LOWER CHEST: No cardiomegaly.  No pericardial effusion.  Lung bases demonstrate trace bilateral pleural effusions with minimal bibasilar atelectasis.  ABDOMEN:  LIVER: No hepatomegaly.  Smooth surface contour.  Normal attenuation.  BILE DUCTS: No intrahepatic or extrahepatic biliary ductal dilatation.  GALLBLADDER: Gallbladder is absent.  " STOMACH: No abnormalities identified.  PANCREAS: Mild pancreatic atrophy again noted.  No masses or ductal dilatation.  SPLEEN: No splenomegaly or focal splenic lesion.  ADRENAL GLANDS: No thickening or nodules.  KIDNEYS AND URETERS: Kidneys are normal in size and location.  No renal or ureteral calculi.  PELVIS:  BLADDER: Mildly distended appearance of the urinary bladder with small amount of nondependent air.   REPRODUCTIVE ORGANS: Uterus is absent.  BOWEL: Postoperative changes of partial bowel resection are again demonstrated within grossly intact surgical anastomosis in the right lower abdomen.  Just posterior and medial to the anastomosis, there is a thick-walled collection measuring up to 3.4 x 3.8 x 2.8 cm (AP, transverse, craniocaudal on image 95 series 201).  Moderate adjacent mesenteric stranding and phlegmonous reaction is noted.  Mild to moderately distended fluid-filled small bowel loops are seen in the left hemiabdomen with mildly distended, fluid-filled colonic bowel loops.  Findings may represent a mild generalized ileus versus partial obstruction.  Superimposed enterocolitis is not excluded. VESSELS: No abnormalities identified.  Abdominal aorta is normal in caliber.  PERITONEUM/RETROPERITONEUM/LYMPH NODES: No free fluid.  No pneumoperitoneum. No lymphadenopathy.  ABDOMINAL WALL: Anterior abdominal wall postoperative changes are again seen with open surgical anastomosis/wound.  There is thickening along the open anastomosis suggesting phlegmonous reaction.  No distinct drainable anterior abdominal wall fluid collection is identified. SOFT TISSUES: No abnormalities identified.  BONES: Stable thoracolumbar degenerative changes with persistent findings of inferior L2 and superior L3 endplate defects.  No acute fracture or aggressive osseous lesion.    Postoperative changes of partial bowel resection are again demonstrated with grossly intact appearance of the right lower abdominal surgical  anastomosis. Along the posterior and medial aspect of the surgical anastomosis, there is a thick-walled, mixed air-fluid collection as described suggesting residual abscess.  There is moderate surrounding mesenteric stranding/inflammatory response. Mild to moderately distended small and large bowel loops are demonstrated throughout the abdomen and pelvis as described suggesting a mild diffuse ileus and/or partial obstruction.  Superimposed enterocolitis cannot be excluded. Small amount of nondependent air is seen within the urinary bladder. Correlate for any recent intervention versus developing emphysematous cystitis. Anterior abdominal wall open surgical anastomosis is demonstrated as described with adjacent soft tissue thickening suggesting phlegmonous reaction.  No distinct drainable anterior abdominal wall fluid collection identified. NOTIFICATION:  The critical results of the study were discussed with, and acknowledged by Dr. Tasha Gutierrez by telephone on 01/21/2024 at 0658. Signed by Blaine Holt MD    Results for orders placed or performed during the hospital encounter of 01/21/24 (from the past 24 hour(s))   POCT GLUCOSE   Result Value Ref Range    POCT Glucose 43 (L) 74 - 99 mg/dL   CBC and Auto Differential   Result Value Ref Range    WBC 7.3 4.4 - 11.3 x10*3/uL    nRBC 0.0 0.0 - 0.0 /100 WBCs    RBC 3.08 (L) 4.00 - 5.20 x10*6/uL    Hemoglobin 8.4 (L) 12.0 - 16.0 g/dL    Hematocrit 27.0 (L) 36.0 - 46.0 %    MCV 88 80 - 100 fL    MCH 27.3 26.0 - 34.0 pg    MCHC 31.1 (L) 32.0 - 36.0 g/dL    RDW 16.0 (H) 11.5 - 14.5 %    Platelets 224 150 - 450 x10*3/uL    Neutrophils % 71.8 40.0 - 80.0 %    Immature Granulocytes %, Automated 0.4 0.0 - 0.9 %    Lymphocytes % 20.6 13.0 - 44.0 %    Monocytes % 5.9 2.0 - 10.0 %    Eosinophils % 1.0 0.0 - 6.0 %    Basophils % 0.3 0.0 - 2.0 %    Neutrophils Absolute 5.26 1.60 - 5.50 x10*3/uL    Immature Granulocytes Absolute, Automated 0.03 0.00 - 0.50 x10*3/uL    Lymphocytes  Absolute 1.51 0.80 - 3.00 x10*3/uL    Monocytes Absolute 0.43 0.05 - 0.80 x10*3/uL    Eosinophils Absolute 0.07 0.00 - 0.40 x10*3/uL    Basophils Absolute 0.02 0.00 - 0.10 x10*3/uL   Basic metabolic panel   Result Value Ref Range    Glucose 26 (LL) 74 - 99 mg/dL    Sodium 135 (L) 136 - 145 mmol/L    Potassium 4.3 3.5 - 5.3 mmol/L    Chloride 99 98 - 107 mmol/L    Bicarbonate 27 21 - 32 mmol/L    Anion Gap 13 10 - 20 mmol/L    Urea Nitrogen 33 (H) 6 - 23 mg/dL    Creatinine 0.97 0.50 - 1.05 mg/dL    eGFR 61 >60 mL/min/1.73m*2    Calcium 8.2 (L) 8.6 - 10.3 mg/dL   ECG 12 lead   Result Value Ref Range    Ventricular Rate 76 BPM    Atrial Rate 76 BPM    TX Interval 196 ms    QRS Duration 128 ms    QT Interval 412 ms    QTC Calculation(Bazett) 463 ms    P Axis 90 degrees    R Axis -45 degrees    T Axis 34 degrees    QRS Count 12 beats    Q Onset 211 ms    P Onset 113 ms    P Offset 162 ms    T Offset 417 ms    QTC Fredericia 445 ms   Lipase   Result Value Ref Range    Lipase 48 9 - 82 U/L   Lactate   Result Value Ref Range    Lactate 1.6 0.4 - 2.0 mmol/L   POCT GLUCOSE   Result Value Ref Range    POCT Glucose 126 (H) 74 - 99 mg/dL   Influenza A, and B PCR   Result Value Ref Range    Flu A Result Not Detected Not Detected    Flu B Result Not Detected Not Detected   SARS-CoV-2 RT PCR   Result Value Ref Range    Coronavirus 2019, PCR Not Detected Not Detected   RSV PCR   Result Value Ref Range    RSV PCR Detected (A) Not Detected   C. difficile, PCR    Specimen: Stool   Result Value Ref Range    C. difficile, PCR Detected (A) Not Detected   Clostridium Difficile EIA    Specimen: Stool   Result Value Ref Range    C. difficile (Toxin A/B) Negative Negative, Indeterminate   POCT GLUCOSE   Result Value Ref Range    POCT Glucose 79 74 - 99 mg/dL   POCT GLUCOSE   Result Value Ref Range    POCT Glucose 30 (L) 74 - 99 mg/dL   POCT GLUCOSE   Result Value Ref Range    POCT Glucose 127 (H) 74 - 99 mg/dL   POCT GLUCOSE   Result Value  Ref Range    POCT Glucose 37 (L) 74 - 99 mg/dL   POCT GLUCOSE   Result Value Ref Range    POCT Glucose 160 (H) 74 - 99 mg/dL   SST TOP   Result Value Ref Range    Extra Tube Hold for add-ons.    POCT GLUCOSE   Result Value Ref Range    POCT Glucose 145 (H) 74 - 99 mg/dL   POCT GLUCOSE   Result Value Ref Range    POCT Glucose 118 (H) 74 - 99 mg/dL   POCT GLUCOSE   Result Value Ref Range    POCT Glucose 92 74 - 99 mg/dL   Urinalysis with Reflex Microscopic   Result Value Ref Range    Color, Urine Yellow Straw, Yellow    Appearance, Urine Hazy (N) Clear    Specific Gravity, Urine 1.032 1.005 - 1.035    pH, Urine 5.0 5.0, 5.5, 6.0, 6.5, 7.0, 7.5, 8.0    Protein, Urine NEGATIVE NEGATIVE mg/dL    Glucose, Urine NEGATIVE NEGATIVE mg/dL    Blood, Urine NEGATIVE NEGATIVE    Ketones, Urine NEGATIVE NEGATIVE mg/dL    Bilirubin, Urine NEGATIVE NEGATIVE    Urobilinogen, Urine <2.0 <2.0 mg/dL    Nitrite, Urine NEGATIVE NEGATIVE    Leukocyte Esterase, Urine MODERATE (2+) (A) NEGATIVE   Microscopic Only, Urine   Result Value Ref Range    WBC, Urine 1-5 1-5, NONE /HPF    RBC, Urine 1-2 NONE, 1-2, 3-5 /HPF    Squamous Epithelial Cells, Urine 1-9 (SPARSE) Reference range not established. /HPF    Bacteria, Urine 4+ (A) NONE SEEN /HPF      Assessment/Plan   78-year-old patient well-known to me who last surgery was on December 17; she underwent laparotomy, extensive lysis of adhesions and small bowel resection with anastomosis; last admission for SBO was from 12/29-1/05 and managed with bowel rest and NG. Currently admitted for hypoglycemia and C. difficile colitis.  I have reviewed the CT abdomen/pelvis.  Ill-defined fluid collection adjacent to the anastomosis measuring 3 x 4 cm.  Will review and discuss CT scan with interventional radiologist to see whether this is accessible percutaneously.  Continue diet and wet-to-dry dressing change.      Manuel Lozada MD

## 2024-01-22 NOTE — PROGRESS NOTES
Received call from kristi mobley stating pt. From their snf unit and plan on her return.  Referral sent to kristi mckenzie for return.  Pt. With medicare ns. Will not require ins. Precert.

## 2024-01-22 NOTE — CARE PLAN
Problem: Skin  Goal: Decreased wound size/increased tissue granulation at next dressing change  Outcome: Progressing  Goal: Participates in plan/prevention/treatment measures  Outcome: Progressing  Goal: Prevent/manage excess moisture  Outcome: Progressing  Goal: Prevent/minimize sheer/friction injuries  Outcome: Progressing  Goal: Promote/optimize nutrition  Outcome: Progressing  Goal: Promote skin healing  Outcome: Progressing   The patient's goals for the shift include      The clinical goals for the shift include labs WNL

## 2024-01-22 NOTE — CARE PLAN
Problem: Skin  Goal: Decreased wound size/increased tissue granulation at next dressing change  1/22/2024 1356 by Di Coe RN  Outcome: Progressing  1/22/2024 1355 by Di Coe RN  Outcome: Progressing  Goal: Participates in plan/prevention/treatment measures  1/22/2024 1356 by Di Coe RN  Outcome: Progressing  Flowsheets (Taken 1/22/2024 1356)  Participates in plan/prevention/treatment measures:   Discuss with provider PT/OT consult   Elevate heels   Increase activity/out of bed for meals  1/22/2024 1355 by Di Coe RN  Outcome: Progressing  Goal: Prevent/manage excess moisture  1/22/2024 1356 by Di Coe RN  Outcome: Progressing  1/22/2024 1355 by Di Coe RN  Outcome: Progressing  Goal: Prevent/minimize sheer/friction injuries  1/22/2024 1356 by Di Coe RN  Outcome: Progressing  1/22/2024 1355 by Di Coe RN  Outcome: Progressing  Goal: Promote/optimize nutrition  1/22/2024 1356 by Di Coe RN  Outcome: Progressing  Flowsheets (Taken 1/22/2024 1356)  Promote/optimize nutrition: Assist with feeding  1/22/2024 1355 by Di Coe RN  Outcome: Progressing  Goal: Promote skin healing  1/22/2024 1356 by Di Coe RN  Outcome: Progressing  1/22/2024 1355 by Di Coe RN  Outcome: Progressing

## 2024-01-22 NOTE — PROGRESS NOTES
Removed dressing from midline abdomen. Old dressing had large amount of blue green drainage, suspecious for pseudomonas. Pungent odor with old dressing. Wound bed is pale pink with small amount of slough tissue. Applied vashe wet to dry dressing.  Wound Care Progress Note     Visit Date: 1/22/2024      Patient Name: Harper Lara         MRN: [id number]                Reason for Visit: Wound Care Midline Abdomen        Wound History: 2 months     Pertinent Labs:   Albumin   Date Value Ref Range Status   01/05/2024 2.3 (L) 3.4 - 5.0 g/dL Final           Wound Assessment:           NEW    External Urinary Catheter Female (Active)   Placement Date/Time: 12/30/23 0519   Hand Hygiene Completed: Yes  External Catheter Type: Female   Number of days: 23     External Urinary Catheter Female (Active)                   Wound 12/07/23 Pressure Injury Buttocks Right;Left (Active)   Date First Assessed/Time First Assessed: 12/07/23 0055   Hand Hygiene Completed: Yes  Primary Wound Type: Pressure Injury  Location: Buttocks  Wound Location Orientation: Right;Left   Number of days: 46       Wound 12/08/23 Incision Abdomen Lower;Medial (Active)   Date First Assessed: 12/08/23   Primary Wound Type: (c) Incision  Location: Abdomen  Wound Location Orientation: Lower;Medial   Number of days: 45       Wound Incision Abdomen Medial;Upper (Active)   No Date First Assessed or Time First Assessed found.   Primary Wound Type: (c) Incision  Location: Abdomen  Wound Location Orientation: Medial;Upper   Number of days:        Wound 12/17/23 Incision Umbilicus Medial;Anterior;Lower (Active)   Date First Assessed/Time First Assessed: 12/17/23 1730   Present on Original Admission: No  Hand Hygiene Completed: Yes  Primary Wound Type: Incision  Location: Umbilicus  Wound Location Orientation: Medial;Anterior;Lower   Number of days: 35       Wound 12/29/23 Pressure Injury Buttocks (Active)   Date First Assessed/Time First Assessed: 12/29/23 1025   Hand  Hygiene Completed: Yes  Primary Wound Type: Pressure Injury  Location: Buttocks   Number of days: 24     Wound 12/07/23 Pressure Injury Buttocks Right;Left (Active)       Wound 12/08/23 Incision Abdomen Lower;Medial (Active)   Dressing Changed Changed 01/22/24 0900   Dressing Status Clean;Dry 01/22/24 0900       Wound Incision Abdomen Medial;Upper (Active)       Wound 12/17/23 Incision Umbilicus Medial;Anterior;Lower (Active)       Wound 12/29/23 Pressure Injury Buttocks (Active)                   Wound Team Plan: Continue with Vashe wet to dry dressing changes to abdominal midline 2 x a day. Will assess for wound vac later this week.     Denis López LPN  1/22/2024  3:10 PM

## 2024-01-22 NOTE — PROGRESS NOTES
01/22/24 0847   Discharge Planning   Living Arrangements Other (Comment)   Support Systems Other (Comment)   Type of Residence Skilled nursing facility   Who is requesting discharge planning? Provider   Home or Post Acute Services Post acute facilities (Rehab/SNF/etc)   Type of Post Acute Facility Services Skilled nursing   Patient expects to be discharged to: [facility]   Does the patient need discharge transport arranged? Yes   RoundTrip coordination needed? Yes   Has discharge transport been arranged? No   What day is the transport expected? 01/25/24     Patient confirmed SNF at [facility], plan will be to return to [facility] on discharge. Will continue to monitor.

## 2024-01-22 NOTE — PROGRESS NOTES
Physical Therapy    Physical Therapy Evaluation    Patient Name: Harper Lara  MRN: [id number]  Today's Date: 1/22/2024   Time Calculation  Start Time: 0920  Stop Time: 0942  Time Calculation (min): 22 min    Assessment/Plan   PT Assessment  PT Assessment Results: Decreased strength, Decreased endurance, Impaired balance, Decreased mobility, Decreased coordination, Decreased cognition  Rehab Prognosis: Fair  Evaluation/Treatment Tolerance: Patient limited by fatigue  End of Session Communication: Bedside nurse  Assessment Comment: pt with decreased mobility, gait strength balance and endurance. pt to benefit from skilled PT to address deficits and improve functional mobility .  End of Session Patient Position: Up in chair, Alarm on  IP OR SWING BED PT PLAN  Inpatient or Swing Bed: Inpatient  PT Plan  Treatment/Interventions: Bed mobility, Transfer training, Gait training, Balance training, Strengthening, Endurance training, Therapeutic exercise, Therapeutic activity, Home exercise program  PT Plan: Skilled PT  PT Frequency: 3 times per week  PT Discharge Recommendations: Moderate intensity level of continued care  PT Recommended Transfer Status: Assist x1, Assistive device  PT - OK to Discharge: Yes (once medically cleared for discharge to next level of care.)    Subjective     Current Problem:  Patient Active Problem List   Diagnosis    Anxiety and depression    Atrial fibrillation (CMS/[facility])    CAD (coronary artery disease)    CHF (congestive heart failure) (CMS/[facility])    DM2 (diabetes mellitus, type 2) (CMS/[facility])    GERD (gastroesophageal reflux disease)    Gout, arthritis    HTN (hypertension), benign    Hyperlipidemia    Physical deconditioning    SDH (subdural hematoma) (CMS/HCC)    Small bowel obstruction (CMS/HCC)    Acute kidney injury (nontraumatic) (CMS/[facility])    Chronic renal insufficiency    SBO (small bowel obstruction) (CMS/[facility])    Clostridium difficile diarrhea       General Visit  Information:  General  Reason for Referral: weakness/ impaired mobility  Referred By: OT/PT Juliana 1/21  Past Medical History Relevant to Rehab: HTN, CAD, CHF, afib, depression, anxiety , hypothyroidism, DM, GERD, GOUT HLD,  parkinsonian/ Dementia,  SBO, SDH, SOCORRO recent SBO with open lysis 12/27/23.  Prior to Session Communication: Bedside nurse  Patient Position Received: Bed, 3 rail up, Alarm on  General Comment: pt is a 77 yo female came to the hospital from [facility] SNF on 1/21 with reports of ABD pain and diarrhea, pt with dx : Cdiff and RSV. test/labs : hgb 6.6 ,CT ABD/pelvis :  thickened wall / fluid colleciton concern for abcess CXR : neg.  pt with recent SBO with open lysis . has ABD incision with dressing .    Home Living:  Home Living  Home Living Comments: pt is a limited historian . reports shes been at [facility] since before her last SBO sx. but unsure of time frame since shes been at home.    Prior Level of Function:  Prior Function Per Pt/Caregiver Report  Receives Help From: Personal care attendant  ADL Assistance: Needs assistance  Homemaking Assistance: Needs assistance  Ambulatory Assistance: Independent (FWW)  Prior Function Comments: per pt at SNF has been using a walker for mobility and at times a wc.  has assistance for adls and iadls.    Precautions:  Precautions  Medical Precautions: Fall precautions  Post-Surgical Precautions: Abdominal surgery precautions (contact/droplet precautions  C-diff and RSV)    Objective     Pain:  Pain Assessment  Pain Assessment: 0-10  Pain Score: 0 - No pain    Cognition:  Cognition  Overall Cognitive Status: Within Functional Limits  Orientation Level: Disoriented to situation  Processing Speed: Delayed    General Assessments:      Activity Tolerance  Endurance: Decreased tolerance for upright activites     Strength  Strength Comments: BLE 4-/5    Dynamic Sitting Balance  Dynamic Sitting-Comments: fair  Dynamic Standing Balance  Dynamic  Standing-Comments: fair -    Functional Assessments:     Bed Mobility  Bed Mobility: Yes  Bed Mobility 1  Bed Mobility 1: Supine to sitting, Scooting  Level of Assistance 1: Minimum assistance, Minimal verbal cues  Transfers  Transfer: Yes  Transfer 1  Technique 1: Sit to stand, Stand to sit  Transfer Device 1: Walker (fww)  Transfer Level of Assistance 1: Minimum assistance, Minimal verbal cues  Trials/Comments 1: FWW WALTER  cues to push up and reach back with transfers  Ambulation/Gait Training  Ambulation/Gait Training Performed: Yes  Ambulation/Gait Training 1  Surface 1: Level tile  Device 1: Rolling walker  Assistance 1: Minimum assistance, Minimal verbal cues  Quality of Gait 1: Forward flexed posture, Decreased step length, Inconsistent stride length  Comments/Distance (ft) 1: 30 ft with FWW min A .    Extremity/Trunk Assessments:  RLE   RLE : Within Functional Limits  LLE   LLE : Within Functional Limits    Outcome Measures:  [facility] Basic Mobility  Turning from your back to your side while in a flat bed without using bedrails: A little  Moving from lying on your back to sitting on the side of a flat bed without using bedrails: A little  Moving to and from bed to chair (including a wheelchair): A little  Standing up from a chair using your arms (e.g. wheelchair or bedside chair): A little  To walk in hospital room: A little  Climbing 3-5 steps with railing: Total  Basic Mobility - Total Score: 16  Goals:  Encounter Problems       Encounter Problems (Active)       PT Problem       Pt will demonstrate mod I with bed mobility to edge of bed.         Start:  01/22/24    Expected End:  02/05/24            Pt will demonstrate supervision with sit to stand/chair transfers with FWW.         Start:  01/22/24    Expected End:  02/05/24            Pt will ambulate 50 feet with FWW Supervision .         Start:  01/22/24    Expected End:  02/05/24            Pt to demo improved BLE strength by being able to complete  supine/seated thera ex 2x20 BLEs with 4 or less rest breaks .         Start:  01/22/24    Expected End:  02/05/24                 Education Documentation  Mobility Training, taught by Carlos Fernandes PT at 1/22/2024 12:20 PM.  Learner: Patient  Readiness: Acceptance  Method: Explanation  Response: Verbalizes Understanding    Education Comments  No comments found.

## 2024-01-23 LAB
ANION GAP SERPL CALC-SCNC: 12 MMOL/L (ref 10–20)
BUN SERPL-MCNC: 31 MG/DL (ref 6–23)
CALCIUM SERPL-MCNC: 8 MG/DL (ref 8.6–10.3)
CHLORIDE SERPL-SCNC: 102 MMOL/L (ref 98–107)
CO2 SERPL-SCNC: 26 MMOL/L (ref 21–32)
CREAT SERPL-MCNC: 0.93 MG/DL (ref 0.5–1.05)
EGFRCR SERPLBLD CKD-EPI 2021: 64 ML/MIN/1.73M*2
ERYTHROCYTE [DISTWIDTH] IN BLOOD BY AUTOMATED COUNT: 15.9 % (ref 11.5–14.5)
GLUCOSE BLD MANUAL STRIP-MCNC: 133 MG/DL (ref 74–99)
GLUCOSE SERPL-MCNC: 131 MG/DL (ref 74–99)
HCT VFR BLD AUTO: 24.3 % (ref 36–46)
HGB BLD-MCNC: 7.7 G/DL (ref 12–16)
HOLD SPECIMEN: NORMAL
MAGNESIUM SERPL-MCNC: 1.77 MG/DL (ref 1.6–2.4)
MCH RBC QN AUTO: 27.3 PG (ref 26–34)
MCHC RBC AUTO-ENTMCNC: 31.7 G/DL (ref 32–36)
MCV RBC AUTO: 86 FL (ref 80–100)
NRBC BLD-RTO: 0 /100 WBCS (ref 0–0)
PLATELET # BLD AUTO: 175 X10*3/UL (ref 150–450)
POTASSIUM SERPL-SCNC: 4.4 MMOL/L (ref 3.5–5.3)
RBC # BLD AUTO: 2.82 X10*6/UL (ref 4–5.2)
SODIUM SERPL-SCNC: 136 MMOL/L (ref 136–145)
WBC # BLD AUTO: 4.6 X10*3/UL (ref 4.4–11.3)

## 2024-01-23 PROCEDURE — 2500000001 HC RX 250 WO HCPCS SELF ADMINISTERED DRUGS (ALT 637 FOR MEDICARE OP): Performed by: HOSPITALIST

## 2024-01-23 PROCEDURE — 36415 COLL VENOUS BLD VENIPUNCTURE: CPT | Performed by: HOSPITALIST

## 2024-01-23 PROCEDURE — 85027 COMPLETE CBC AUTOMATED: CPT | Performed by: HOSPITALIST

## 2024-01-23 PROCEDURE — 1200000002 HC GENERAL ROOM WITH TELEMETRY DAILY

## 2024-01-23 PROCEDURE — 97110 THERAPEUTIC EXERCISES: CPT | Mod: GP,CQ

## 2024-01-23 PROCEDURE — 2500000001 HC RX 250 WO HCPCS SELF ADMINISTERED DRUGS (ALT 637 FOR MEDICARE OP): Performed by: INTERNAL MEDICINE

## 2024-01-23 PROCEDURE — 97530 THERAPEUTIC ACTIVITIES: CPT | Mod: GP,CQ

## 2024-01-23 PROCEDURE — 2500000004 HC RX 250 GENERAL PHARMACY W/ HCPCS (ALT 636 FOR OP/ED): Performed by: HOSPITALIST

## 2024-01-23 PROCEDURE — 2500000004 HC RX 250 GENERAL PHARMACY W/ HCPCS (ALT 636 FOR OP/ED): Performed by: INTERNAL MEDICINE

## 2024-01-23 PROCEDURE — 82947 ASSAY GLUCOSE BLOOD QUANT: CPT

## 2024-01-23 PROCEDURE — 83735 ASSAY OF MAGNESIUM: CPT | Performed by: HOSPITALIST

## 2024-01-23 PROCEDURE — 80048 BASIC METABOLIC PNL TOTAL CA: CPT | Performed by: HOSPITALIST

## 2024-01-23 PROCEDURE — 99232 SBSQ HOSP IP/OBS MODERATE 35: CPT | Performed by: INTERNAL MEDICINE

## 2024-01-23 RX ORDER — ATORVASTATIN CALCIUM 20 MG/1
40 TABLET, FILM COATED ORAL NIGHTLY
Status: DISCONTINUED | OUTPATIENT
Start: 2024-01-23 | End: 2024-01-25 | Stop reason: HOSPADM

## 2024-01-23 RX ORDER — MENTHOL AND ZINC OXIDE .44; 20.625 G/100G; G/100G
1 OINTMENT TOPICAL 4 TIMES DAILY PRN
Status: DISCONTINUED | OUTPATIENT
Start: 2024-01-23 | End: 2024-01-25 | Stop reason: HOSPADM

## 2024-01-23 RX ORDER — AMLODIPINE BESYLATE 5 MG/1
5 TABLET ORAL DAILY
Status: DISCONTINUED | OUTPATIENT
Start: 2024-01-23 | End: 2024-01-25 | Stop reason: HOSPADM

## 2024-01-23 RX ADMIN — CARVEDILOL 12.5 MG: 12.5 TABLET, FILM COATED ORAL at 16:18

## 2024-01-23 RX ADMIN — VANCOMYCIN HYDROCHLORIDE 125 MG: 125 CAPSULE ORAL at 16:18

## 2024-01-23 RX ADMIN — LEVOTHYROXINE SODIUM 75 MCG: 75 TABLET ORAL at 06:14

## 2024-01-23 RX ADMIN — Medication 1 TABLET: at 07:53

## 2024-01-23 RX ADMIN — Medication 1 TABLET: at 21:20

## 2024-01-23 RX ADMIN — VANCOMYCIN HYDROCHLORIDE 125 MG: 125 CAPSULE ORAL at 21:55

## 2024-01-23 RX ADMIN — VANCOMYCIN HYDROCHLORIDE 125 MG: 125 CAPSULE ORAL at 12:42

## 2024-01-23 RX ADMIN — FLUOXETINE HYDROCHLORIDE 20 MG: 20 CAPSULE ORAL at 07:53

## 2024-01-23 RX ADMIN — VANCOMYCIN HYDROCHLORIDE 125 MG: 125 CAPSULE ORAL at 06:14

## 2024-01-23 RX ADMIN — ATORVASTATIN CALCIUM 40 MG: 20 TABLET, FILM COATED ORAL at 21:20

## 2024-01-23 RX ADMIN — ERTAPENEM SODIUM 1 G: 1 INJECTION, POWDER, LYOPHILIZED, FOR SOLUTION INTRAMUSCULAR; INTRAVENOUS at 21:19

## 2024-01-23 RX ADMIN — Medication 1 APPLICATION: at 14:09

## 2024-01-23 RX ADMIN — CARVEDILOL 12.5 MG: 12.5 TABLET, FILM COATED ORAL at 07:52

## 2024-01-23 RX ADMIN — AMLODIPINE BESYLATE 5 MG: 5 TABLET ORAL at 12:42

## 2024-01-23 ASSESSMENT — COGNITIVE AND FUNCTIONAL STATUS - GENERAL
DAILY ACTIVITIY SCORE: 17
CLIMB 3 TO 5 STEPS WITH RAILING: A LOT
MOBILITY SCORE: 13
STANDING UP FROM CHAIR USING ARMS: A LITTLE
PERSONAL GROOMING: A LITTLE
MOVING FROM LYING ON BACK TO SITTING ON SIDE OF FLAT BED WITH BEDRAILS: A LITTLE
MOVING FROM LYING ON BACK TO SITTING ON SIDE OF FLAT BED WITH BEDRAILS: A LOT
PERSONAL GROOMING: A LITTLE
DRESSING REGULAR UPPER BODY CLOTHING: A LOT
DAILY ACTIVITIY SCORE: 14
TURNING FROM BACK TO SIDE WHILE IN FLAT BAD: A LITTLE
MOVING TO AND FROM BED TO CHAIR: A LITTLE
MOVING FROM LYING ON BACK TO SITTING ON SIDE OF FLAT BED WITH BEDRAILS: A LITTLE
MOVING TO AND FROM BED TO CHAIR: A LITTLE
STANDING UP FROM CHAIR USING ARMS: A LOT
MOBILITY SCORE: 14
TURNING FROM BACK TO SIDE WHILE IN FLAT BAD: A LOT
DRESSING REGULAR LOWER BODY CLOTHING: A LOT
TOILETING: A LOT
HELP NEEDED FOR BATHING: A LOT
TOILETING: A LOT
STANDING UP FROM CHAIR USING ARMS: A LOT
DRESSING REGULAR LOWER BODY CLOTHING: A LITTLE
WALKING IN HOSPITAL ROOM: A LOT
MOBILITY SCORE: 16
WALKING IN HOSPITAL ROOM: A LITTLE
DRESSING REGULAR UPPER BODY CLOTHING: A LITTLE
CLIMB 3 TO 5 STEPS WITH RAILING: TOTAL
MOVING TO AND FROM BED TO CHAIR: A LOT
EATING MEALS: A LITTLE
CLIMB 3 TO 5 STEPS WITH RAILING: TOTAL
WALKING IN HOSPITAL ROOM: A LITTLE
TURNING FROM BACK TO SIDE WHILE IN FLAT BAD: A LITTLE
HELP NEEDED FOR BATHING: A LOT

## 2024-01-23 ASSESSMENT — PAIN - FUNCTIONAL ASSESSMENT: PAIN_FUNCTIONAL_ASSESSMENT: 0-10

## 2024-01-23 ASSESSMENT — PAIN SCALES - GENERAL
PAINLEVEL_OUTOF10: 0 - NO PAIN
PAINLEVEL_OUTOF10: 0 - NO PAIN

## 2024-01-23 NOTE — PROGRESS NOTES
?  HISTORY OF PRESENT ILLNESS:      Harper Lara is a 78 y.o. female   Diabetes atrial fibrillation hypothyroidism dementia Parkinson's history of small bowel obstruction nonischemic cardiomyopathy  Had a laparotomy 12/27/2023        Presenting with abdominal pain  CT scan shows presumed abscess thick-walled air-fluid collection in the pelvis     C. difficile test prior PCR is positive     Positive for RSV       Current Medications:    Current Facility-Administered Medications   Medication Dose Route Frequency Provider Last Rate Last Admin    carvedilol (Coreg) tablet 12.5 mg  12.5 mg oral BID with meals Reji Andrews MD   12.5 mg at 01/23/24 0752    dextrose 10 % in water (D10W) infusion  75 mL/hr intravenous Once PRN Reji Andrews MD        dextrose 50 % injection 25 g  25 g intravenous q15 min PRN Reji Andrews MD   25 g at 01/21/24 0700    ertapenem (INVanz) 1 g in sodium chloride 0.9% 50 mL IV  1 g intravenous q24h Marvin Casey MD   Stopped at 01/22/24 2050    FLUoxetine (PROzac) capsule 20 mg  20 mg oral Daily Reji Andrews MD   20 mg at 01/23/24 0753    ipratropium-albuteroL (Duo-Neb) 0.5-2.5 mg/3 mL nebulizer solution 3 mL  3 mL nebulization q2h PRN Reji Andrews MD        lactobacillus acidophilus tablet 1 tablet  1 tablet oral BID Reji Andrews MD   1 tablet at 01/23/24 0753    levothyroxine (Synthroid, Levoxyl) tablet 75 mcg  75 mcg oral Daily Reji Andrews MD   75 mcg at 01/23/24 0614    multivitamin with minerals 1 tablet  1 tablet oral Daily Reji Andrews MD   1 tablet at 01/23/24 0753    vancomycin (Vancocin) capsule 125 mg  125 mg oral 4x daily Reji Andrews MD   125 mg at 01/23/24 0614        Allergies:    Allergies   Allergen Reactions    Diphenhydramine Hcl Unknown    Morphine Unknown    Pentazocine Unknown    Propoxyphene Unknown    Propoxyphene N-Acetaminophen Unknown  "   Ranitidine Unknown    Strawberry Hives          Review of Systems  14 system review is negative other than HPI     /63 (BP Location: Right arm, Patient Position: Lying)   Pulse 63   Temp 36.2 °C (97.2 °F) (Temporal)   Resp 16   Ht 1.6 m (5' 2.99\")   Wt 68.8 kg (151 lb 10.8 oz)   SpO2 99%   BMI 26.88 kg/m²    Physical Exam:  General: Patient appears ok at the present time. NAD  Skin: no new rashes  HEENT:  Neck is supple, No subconjunctival hemorrhages, no oral exudates  Heart: S1 S2  Lungs: diminished bases  Abdomen: mild distention and tenderness,  Extrem: No edema, non tender           DATA:    .  Lab Results   Component Value Date    WBC 4.6 01/23/2024    HGB 7.7 (L) 01/23/2024    HCT 24.3 (L) 01/23/2024    MCV 86 01/23/2024     01/23/2024     Results from last 7 days   Lab Units 01/23/24  0549   SODIUM mmol/L 136   POTASSIUM mmol/L 4.4   CHLORIDE mmol/L 102   CO2 mmol/L 26   BUN mg/dL 31*   CREATININE mg/dL 0.93   CALCIUM mg/dL 8.0*   GLUCOSE mg/dL 131*   Susceptibility data from last 90 days.  Collected Specimen Info Organism   12/26/23 Tissue/Biopsy from Wound/Tissue Mixed Gram-Positive and Gram-Negative Bacteria   Susceptibility data from last 90 days.  Collected Specimen Info Organism   12/26/23 Tissue/Biopsy from Wound/Tissue Mixed Gram-Positive and Gram-Negative Bacteria           IMPRESSION:        Problem List Items Addressed This Visit          Infectious Diseases    * (Principal) Clostridium difficile diarrhea - Primary     Other Visit Diagnoses       Hypoglycemia        C. difficile colitis               PLAN:     aspiration fluid collection could not be done, difficult issue as diagnosis unclear but imaging suggestive of infection ,and now c diff+ testing, so antibiotics can continue to propagate this      Poncho Patterson MD    "

## 2024-01-23 NOTE — PROGRESS NOTES
PROGRESS NOTE    ASSESSMENT AND PLAN:   Abdominal pain  History of small bowel obstruction with lysis of adhesions  Abdominal residual abscess   -Laparoscopic ventral hernia repair at [facility] on 2010  -History of multiple small bowel obstruction s/p open lysis of adhesions 12/27  -Admission in 1/4 for SBO, treated with conservatively with NGT, dced to SNF.  -Presented this admission with abdominal pain, diarrhea.  -CT abdomen and pelvis on admission: Thick-walled fluid collection concerning for abscess. Unable to be drained by IR due to overlying bowel.  -bcx ngtd  Plan:  -general surgery following, no plan for intervention  -ID following  -currently on empiric IV ertapenem, final abx plan tbd by ID  -wound care for abd wound, may need wound vac     C. Difficile infection  -Discontinue Protonix as it can cause C. difficile infection  -Continue vancomycin oral, probiotic  -ID following     Diabetes mellitus c/b hypoglycemia  -Blood sugar on admission 26  -This is most likely related to poor oral intake and being on glipizide 10 mg twice daily  -Her hemoglobin A1c from 8/23: 5.3  -Off D10, BS now stable  -Will plan for DC diabetic meds on discharge.     RSV  -Asymptomatic on room air     History of dementia with parkinsonian features  -Continue fluoxetine     History of paroxysmal atrial fibrillation  -Currently remains in normal sinus rhythm, continue Core  -Pt has a history of ICH, Xarelto was discontinued on 9/23, restarted again during recent hospitalization.  -On discharge, given her blood loses anemia, recent falls with ICH, plan to discontinue Xarelto  -Outpatient referral for Watchman Device     History of nonischemic cardiomyopathy  -Cardiac cath from 2021 shows an EF of 40 to 45%  -Continue Coreg, hold Lasix for now, continue to monitor blood pressure  -Patient appears to be euvolemic    HTN  -home coreg, amlodipine     Hypothyroidism  -Continue levothyroxine    Acute on chronic  "anemia  -Multifactorial, blood loss from recent surgical intervention and dilutional from D10.  -s/p 1u pRBC this admission, Hgb improved to 7.8, stable this AM at 7.7    VTE Prophylaxis: SCD    SUBJECTIVE:   Admit Date: 1/21/2024    Interval History: Feels ok, No abd pain today. Diarrhea improved she says.     OBJECTIVE:   Vitals: /63 (BP Location: Right arm, Patient Position: Lying)   Pulse 63   Temp 36.2 °C (97.2 °F) (Temporal)   Resp 16   Ht 1.6 m (5' 2.99\")   Wt 68.8 kg (151 lb 10.8 oz)   SpO2 99%   BMI 26.88 kg/m²    Wt Readings from Last 3 Encounters:   01/21/24 68.8 kg (151 lb 10.8 oz)   01/05/24 97.5 kg (214 lb 15.2 oz)   12/25/23 84.9 kg (187 lb 2.7 oz)      24HR INTAKE/OUTPUT:    Intake/Output Summary (Last 24 hours) at 1/23/2024 1023  Last data filed at 1/23/2024 0815  Gross per 24 hour   Intake 1200 ml   Output --   Net 1200 ml         PHYSICAL EXAM:   GENERAL: Laying in bed, does not appear to be in any distress.   HEENT: HEAD: Normocephalic atraumatic.  Neck: Supple.  Eyes: Pupils are reactive to direct light.   CVS: S1, S2 heard. Regular rate and rhythm  LUNGS: Clear to auscultate bilaterally. No wheezing or rhonchi appreciated.  ABDOMEN: Soft NTTP, Vertical incision covered with dressing  NEUROLOGICAL: No focal neurological deficits appreciated. Cranial nerves are grossly intact.  EXTREMITIES: No edema appreciated.  SKIN:  Grossly intact, warm and dry.      LABS/IMAGING AND MEDICATIONS:   Scheduled Meds:carvedilol, 12.5 mg, oral, BID with meals  ertapenem, 1 g, intravenous, q24h  FLUoxetine, 20 mg, oral, Daily  lactobacillus acidophilus, 1 tablet, oral, BID  levothyroxine, 75 mcg, oral, Daily  multivitamin with minerals, 1 tablet, oral, Daily  vancomycin, 125 mg, oral, 4x daily      PRN Meds:PRN medications: dextrose 10 % in water (D10W), dextrose, ipratropium-albuteroL    No lab exists for component: \"CBC\"   No lab exists for component: \"CMP\"   No lab exists for component: \"TROPONIN\"   " "       No lab exists for component: \"LIPIDS\"       No lab exists for component: \"URINALYSIS\"          BMP:  Results from last 7 days   Lab Units 01/23/24  0549 01/22/24  0604 01/21/24  0414   SODIUM mmol/L 136 135* 135*   POTASSIUM mmol/L 4.4 4.4 4.3   CHLORIDE mmol/L 102 101 99   CO2 mmol/L 26 27 27   BUN mg/dL 31* 32* 33*   CREATININE mg/dL 0.93 1.19* 0.97         CBC:  Results from last 7 days   Lab Units 01/23/24  0549 01/22/24  2159 01/22/24  0604 01/21/24  0414   WBC AUTO x10*3/uL 4.6  --  5.0 7.3   RBC AUTO x10*6/uL 2.82*  --  2.49* 3.08*   HEMOGLOBIN g/dL 7.7* 7.8* 6.6* 8.4*   HEMATOCRIT % 24.3* 23.9* 21.7* 27.0*   MCV fL 86  --  87 88   MCH pg 27.3  --  26.5 27.3   MCHC g/dL 31.7*  --  30.4* 31.1*   RDW % 15.9*  --  16.1* 16.0*   PLATELETS AUTO x10*3/uL 175  --  176 224         Cardiac Enzymes:           Hepatic Function Panel:        No lab exists for component: \"LABALBU\"    Magnesium:  Results from last 7 days   Lab Units 01/23/24  0549   MAGNESIUM mg/dL 1.77         Pro-BNP:  No results found for: \"PROBNP\"    INR:        TSH:  No results found for: \"TSH\"    Lipid Profile:        No lab exists for component: \"LABVLDL\"    HgbA1C:        Lactate Level:  No lab exists for component: \"LACTA\"    CMP:  Results from last 7 days   Lab Units 01/23/24  0549 01/22/24  0604 01/21/24  0414   SODIUM mmol/L 136 135* 135*   POTASSIUM mmol/L 4.4 4.4 4.3   CHLORIDE mmol/L 102 101 99   CO2 mmol/L 26 27 27   BUN mg/dL 31* 32* 33*   CREATININE mg/dL 0.93 1.19* 0.97   GLUCOSE mg/dL 131* 140* 26*   CALCIUM mg/dL 8.0* 7.9* 8.2*         Amylase:        Lipase:  Results from last 7 days   Lab Units 01/21/24  0431   LIPASE U/L 48         ABG:        No lab exists for component: \"PO2\", \"PCO2\", \"HCO3\", \"BE\", \"O2SAT\"       "

## 2024-01-23 NOTE — CONSULTS
"Nutrition Initial Assessment:   Nutrition Assessment    Reason for Assessment: Admission nursing screening    Patient is a 78 y.o. female presenting with: clostridium difficile diarrhea. Pt working with therapy at time of attempted assessment. Unable to speak with pt. All hx obtained via chart review. Will re-attempt interview and NFPE as appropriate.       Nutrition History:  Energy Intake: Good > 75 %  Food and Nutrient History: Pt with one documented meal intake of 75% breakfast on 1/23. Had previous admissions in December 2023 with abdominal surgeries and prolonged NPO status with PPN later switched to TPN. Overall nutrition intake was frequently <75% of needs for most of December 2023. Pt with diarrhea.  Vitamin/Herbal Supplement Use: Vitamin D3, vitamin B12, fish oil, multivitamin per home med list  Food Allergies/Intolerances:   strawberry  GI Symptoms: Diarrhea  Oral Problems:  unable to assess       Anthropometrics:  Height: 160 cm (5' 2.99\")   Weight: 68.8 kg (151 lb 10.8 oz)   BMI (Calculated): 26.88  IBW/kg (Dietitian Calculated): 52.3 kg          Weight History:   Wt Readings from Last 10 Encounters:   01/21/24 68.8 kg (151 lb 10.8 oz)   01/05/24 97.5 kg (214 lb 15.2 oz)   1/1/24 78 kg   12/25/23 84.9 kg (187 lb 2.7 oz)   12/18/23 73.5 kg   08/12/19 119 kg (262 lb 5.6 oz)      Weight Change %:  Weight History / % Weight Change: Pt with significant fluctuations in weight over the last 1-2 months. Current wt appears significantly lower than wt range from last 1-2 months. Possible 4.7-28.7 kg wt loss x 1 month (6.3%-29%).  Significant Weight Loss: Yes  Interpretation of Weight Loss: >5% in 1 month    Nutrition Focused Physical Exam Findings:    Subcutaneous Fat Loss:   Orbital Fat Pads: Defer  Muscle Wasting:  Temporalis: Defer  Edema:  Edema Location: 1-2+ generalized, BUE, BLE edema per nursing assessment  Physical Findings:  Skin: Positive (abdominal incisions per nursing assessment)    Nutrition " Significant Labs:    Reviewed   Nutrition Specific Medications:  Reviewed     I/O:   Last BM Date: 01/21/24;          Dietary Orders (From admission, onward)       Start     Ordered    01/23/24 1417  Oral nutritional supplements  Until discontinued        Question Answer Comment   Deliver with All meals    Select supplement: Mighty Shake        01/23/24 1416    01/21/24 1331  Adult diet Regular  Diet effective now        Question:  Diet type  Answer:  Regular    01/21/24 1330    01/21/24 1031  May Participate in Room Service With Assistance  Once        Question:  .  Answer:  Yes    01/21/24 1030                     Estimated Needs:   Total Energy Estimated Needs (kCal): 1720 kCal  Method for Estimating Needs: 25 kcal/kg ABW  Total Protein Estimated Needs (g): 69 g  Method for Estimating Needs: 1 g/kg ABW  Total Fluid Estimated Needs (mL): 1720 mL  Method for Estimating Needs: 25 ml/kg ABW        Nutrition Diagnosis   Malnutrition Diagnosis  Patient has Malnutrition Diagnosis: Yes  Diagnosis Status: New  Malnutrition Diagnosis: Severe malnutrition related to acute disease or injury  As Evidenced by: pt meeting <75% of estimated needs x 1 month; >5% wt loss x 1 month            Nutrition Interventions/Recommendations         Nutrition Prescription:  Individualized Nutrition Prescription Provided for : Regular diet with ONS        Nutrition Interventions:   Food and/or Nutrient Delivery Interventions  Interventions: Meals and snacks, Medical food supplement  Meals and Snacks: General healthful diet  Goal: Consumes 3 meals per day  Medical Food Supplement: Commercial beverage  Goal: Mighty Shake TID (220 kcal and 6 g protein per serving).         Nutrition Education:   N/A       Nutrition Monitoring and Evaluation   Food/Nutrient Related History Monitoring  Monitoring and Evaluation Plan: Energy intake, Amount of food, Fluid intake  Energy Intake: Estimated energy intake  Criteria: Pt meets >75% of estimated energy  needs  Fluid Intake: Estimated fluid intake  Criteria: fluid intake to meet >75% of estimated need  Amount of Food: Estimated amout of food, Medical food intake  Criteria: Pt consumes >75% of meals and supplements    Body Composition/Growth/Weight History  Monitoring and Evaluation Plan: Weight  Weight: Measured weight  Criteria: Maintains stable weight/reduce wt from edema    Biochemical Data, Medical Tests and Procedures  Monitoring and Evaluation Plan: Glucose/endocrine profile  Glucose/Endocrine Profile: Glucose, casual  Criteria: BG within desirable range    Nutrition Focused Physical Findings  Monitoring and Evaluation Plan: Digestive System  Digestive System: Diarrhea  Criteria: Reduce diarrhea       Time Spent/Follow-up Reminder:   Time Spent (min): 45 minutes  Last Date of Nutrition Visit: 01/23/24  Nutrition Follow-Up Needed?: 3-5 days  Follow up Comment: mighty shake TID

## 2024-01-23 NOTE — PROGRESS NOTES
PROGRESS NOTE    ASSESSMENT AND PLAN:   Abdominal pain  History of small bowel obstruction with lysis of adhesions  Abdominal residual abscess   -Laparoscopic ventral hernia repair at [facility] on 2010  -History of multiple small bowel obstruction s/p open lysis of adhesions 12/27  -Admission in 1/4 for SBO, treated with conservatively with NGT, dced to SNF.  -P/W abdominal pain, diarrhea.  -CT abdomen and pelvis on admission: Thick-walled fluid collection concerning for abscess.    Plan:  -IR cannot access fluid, continue IV Ertapenem  -General Surgery and ID following.     C. difficile diarrhea  -Discontinue Protonix as it can cause C. difficile infection.  -Continue vancomycin oral, probiotic  -ID following     Diabetes mellitus  Hypoglycemia  -Blood sugar on admission 26  -This is most likely related to poor oral intake and being on glipizide 10 mg twice daily.  -Her hemoglobin A1c from 8/23: 5.3  -Off D10, -140.  -Will plan for DC diabetic meds on discharge.      RSV  -Asymptomatic on room air     History of dementia with parkinsonian features  -Continue fluoxetine     History of paroxysmal atrial fibrillation  -Currently remains in normal sinus rhythm, continue Coreg,  -Pt has a history of ICH, Xarelto was discontinued on 9/23, restarted again during recent hospitalization.  -On discharge, given her blood loses anemia, recent falls with ICH, plan to discontinue Xarelto.  -Outpatient referral for Watchman Device.      History of nonischemic cardiomyopathy  -Cardiac cath from 2021 shows an EF of 40 to 45%  -Continue Coreg, hold Lasix for now, continue to monitor blood pressure  -Patient appears to be euvolemic     11.  Hypothyroidism  -Continue levothyroxine    12.  Acute on chronic anemia  - Multifactorial, blood loss from recent surgical intervention and dilutional from D10.  - Hb 6.6 today, transfuse 1 unit of PRBC.             VTE Prophylaxis: SCD        SUBJECTIVE:   Admit Date:  "1/21/2024    Interval History: Feels ok, abdominal pain improved.       OBJECTIVE:   Vitals: /59   Pulse 62   Temp 36.1 °C (96.9 °F) (Temporal)   Resp 18   Ht 1.6 m (5' 2.99\")   Wt 68.8 kg (151 lb 10.8 oz)   SpO2 97%   BMI 26.88 kg/m²    Wt Readings from Last 3 Encounters:   01/21/24 68.8 kg (151 lb 10.8 oz)   01/05/24 97.5 kg (214 lb 15.2 oz)   12/25/23 84.9 kg (187 lb 2.7 oz)      24HR INTAKE/OUTPUT:    Intake/Output Summary (Last 24 hours) at 1/22/2024 2141  Last data filed at 1/22/2024 1700  Gross per 24 hour   Intake 900 ml   Output 300 ml   Net 600 ml       PHYSICAL EXAM:   GENERAL: Laying in bed, does not appear to be in any distress.   HEENT: HEAD: Normocephalic atraumatic.  Neck: Supple.  Eyes: Pupils are reactive to direct light.   CVS: S1, S2 heard. Regular rate and rhythm  LUNGS: Clear to auscultate bilaterally. No wheezing or rhonchi appreciated.  ABDOMEN: Soft NTTP, Vertical incision covered with dressing with dried blood.  NEUROLOGICAL: No focal neurological deficits appreciated. Cranial nerves are grossly intact.  EXTREMITIES: No edema appreciated.  SKIN:  Grossly intact, warm and dry.      LABS/IMAGING AND MEDICATIONS:   Scheduled Meds:carvedilol, 12.5 mg, oral, BID with meals  ertapenem, 1 g, intravenous, q24h  FLUoxetine, 20 mg, oral, Daily  lactobacillus acidophilus, 1 tablet, oral, BID  levothyroxine, 75 mcg, oral, Daily  multivitamin with minerals, 1 tablet, oral, Daily  rivaroxaban, 15 mg, oral, Daily with evening meal  vancomycin, 125 mg, oral, 4x daily      PRN Meds:PRN medications: dextrose 10 % in water (D10W), dextrose, ipratropium-albuteroL    No lab exists for component: \"CBC\"   No lab exists for component: \"CMP\"   No lab exists for component: \"TROPONIN\"          No lab exists for component: \"LIPIDS\"       No lab exists for component: \"URINALYSIS\"          BMP:  Results from last 7 days   Lab Units 01/22/24  0604 01/21/24  0414   SODIUM mmol/L 135* 135*   POTASSIUM mmol/L " "4.4 4.3   CHLORIDE mmol/L 101 99   CO2 mmol/L 27 27   BUN mg/dL 32* 33*   CREATININE mg/dL 1.19* 0.97       CBC:  Results from last 7 days   Lab Units 01/22/24  0604 01/21/24  0414   WBC AUTO x10*3/uL 5.0 7.3   RBC AUTO x10*6/uL 2.49* 3.08*   HEMOGLOBIN g/dL 6.6* 8.4*   HEMATOCRIT % 21.7* 27.0*   MCV fL 87 88   MCH pg 26.5 27.3   MCHC g/dL 30.4* 31.1*   RDW % 16.1* 16.0*   PLATELETS AUTO x10*3/uL 176 224       Cardiac Enzymes:           Hepatic Function Panel:        No lab exists for component: \"LABALBU\"    Magnesium:  Results from last 7 days   Lab Units 01/22/24  0604   MAGNESIUM mg/dL 1.77       Pro-BNP:  No results found for: \"PROBNP\"    INR:        TSH:  No results found for: \"TSH\"    Lipid Profile:        No lab exists for component: \"LABVLDL\"    HgbA1C:        Lactate Level:  No lab exists for component: \"LACTA\"    CMP:  Results from last 7 days   Lab Units 01/22/24  0604 01/21/24  0414   SODIUM mmol/L 135* 135*   POTASSIUM mmol/L 4.4 4.3   CHLORIDE mmol/L 101 99   CO2 mmol/L 27 27   BUN mg/dL 32* 33*   CREATININE mg/dL 1.19* 0.97   GLUCOSE mg/dL 140* 26*   CALCIUM mg/dL 7.9* 8.2*       Amylase:        Lipase:  Results from last 7 days   Lab Units 01/21/24  0431   LIPASE U/L 48       ABG:        No lab exists for component: \"PO2\", \"PCO2\", \"HCO3\", \"BE\", \"O2SAT\"       "

## 2024-01-23 NOTE — PROGRESS NOTES
Physical Therapy    Physical Therapy Treatment    Patient Name: Harper Lara  MRN: [id number]  Today's Date: 1/23/2024  Time Calculation  Start Time: 1335  Stop Time: 1415  Time Calculation (min): 40 min       Assessment/Plan   PT Assessment  PT Assessment Results: Decreased strength, Decreased endurance, Impaired balance, Decreased mobility, Decreased coordination, Decreased cognition  Rehab Prognosis: Fair  Treatment Tolerance: Patient tolerated treatment well, Patient limited by fatigue, Patient limited by pain  Medical Staff Made Aware: Yes  End of Session Communication: Bedside nurse, PCT/NA/CTA  Assessment Comment: Patient continues to require (A) for safety with bed mobility/transfers and instructions for exercises. Patient will benefit from additional PT to address deficits and improve mobility.  End of Session Patient Position: Up in chair, Alarm on (Call light, phone, and tray table within reach.)  PT Plan  Inpatient/Swing Bed or Outpatient: Inpatient  Treatment/Interventions: Bed mobility, Transfer training, Gait training, Balance training, Strengthening, Endurance training, Therapeutic exercise, Therapeutic activity, Home exercise program  PT Plan: Skilled PT  PT Frequency: 3 times per week  PT Discharge Recommendations: Moderate intensity level of continued care    PT Recommended Transfer Status: Assist x1, Assistive device    General Visit Information:   PT  Visit  PT Received On: 01/23/24  General  Family/Caregiver Present: No  Prior to Session Communication: Bedside nurse, PCT/NA/CTA  Patient Position Received: Bed, 3 rail up, Alarm on  General Comment: Drowsy, but awakens to name being called.    General Observations:   General Observation: Purewick(removed for mobility); Tele; Abdominal bandages.    Subjective     Precautions:  Precautions  Medical Precautions: Fall precautions  Post-Surgical Precautions: Abdominal surgery precautions  Precautions Comment: Contact (+) Precautions (+)C-Diff and  (+)RSV    Objective     Pain:  Pain Assessment  Pain Assessment: 0-10 (c/o sore neck from napping crooked in bed and Coccyx hurting(small sore noted). Nursing aware.)    Cognition:  Cognition  Orientation Level: Oriented X4    Balance:   Static Sitting: F+  Dynamic Sitting: F  Static Standing: F- with ww  Dynamic Standing: F- with ww      Treatments:  Therapeutic Exercise  Therapeutic Exercise Performed: Yes  Therapeutic Exercise Activity 1: Instructed patient in supine ther ex. AROM BLE with AP/QS/GS x15 and Heelslides/Abd-add x10. Coccyx pain limiting reps with heelslides/abd-add. Nursing aware.        Bed Mobility  Bed Mobility: Yes  Bed Mobility 1  Bed Mobility 1: Supine to sitting  Level of Assistance 1: Moderate assistance  Bed Mobility Comments 1: HOB ~40degrees. Hand over hand (A) to reach across body to use the bed rail for support. (A) to lift UB from HOB while moving LEs over the EOB. Coccyx pain limiting patient's ability to scoot hips towards EOB.  Ambulation/Gait Training  Ambulation/Gait Training Performed: Yes  Ambulation/Gait Training 1  Surface 1: Level tile  Device 1: Rolling walker  Gait Support Devices: Gait belt  Assistance 1: Minimum assistance  Comments/Distance (ft) 1: ~3ft x1(bed to chair) and ~3ft x2(chair<>BSC). NBOS with COG slightly posterior to MICHELLE. Slow with wt shifting. Kyphotic posture with forward flexed head position. Decreased step height/length B. Decreased hip/knee extension B. v/c and (A) for safer maneuvering of ww with 90/180° turns.  Transfers  Transfer: Yes  Transfer 1  Technique 1: Sit to stand, Stand to sit  Transfer Device 1:  (ww)  Transfer Level of Assistance 1: Moderate assistance, Minimum assistance  Trials/Comments 1: (5x). Mod(A) sit to stand and Min(A) stand to sit. v/c for safe hand placement and technique. Slow transition of hands to/from ww. Benefits from elevated surfaces.          Outcome Measures:  [facility] Basic Mobility  Turning from your back to your side  while in a flat bed without using bedrails: A lot  Moving from lying on your back to sitting on the side of a flat bed without using bedrails: A lot  Moving to and from bed to chair (including a wheelchair): A little  Standing up from a chair using your arms (e.g. wheelchair or bedside chair): A lot  To walk in hospital room: A little  Climbing 3-5 steps with railing: A lot  Basic Mobility - Total Score: 14    Education Documentation  Precautions, taught by Chelita Villatoro PTA at 1/23/2024  3:26 PM.  Learner: Patient  Readiness: Acceptance  Method: Explanation, Demonstration  Response: Verbalizes Understanding, Needs Reinforcement  Comment: See therapy note.    Home Exercise Program, taught by Chelita Villatoro PTA at 1/23/2024  3:26 PM.  Learner: Patient  Readiness: Acceptance  Method: Explanation, Demonstration  Response: Verbalizes Understanding, Needs Reinforcement  Comment: See therapy note.    Mobility Training, taught by Chelita Villatoro PTA at 1/23/2024  3:26 PM.  Learner: Patient  Readiness: Acceptance  Method: Explanation, Demonstration  Response: Verbalizes Understanding, Needs Reinforcement  Comment: See therapy note.    EDUCATION:  Individual(s) Educated: Patient  Education Provided: Fall Risk, Body Mechanics, Home Exercise Program, POC, Post-Op Precautions (Safety with bed mobility/transfers.)  Patient Response to Education: Patient/Caregiver Verbalized Understanding of Information    Encounter Problems       Encounter Problems (Active)       PT Problem       Pt will demonstrate mod I with bed mobility to edge of bed.   (Progressing)       Start:  01/22/24    Expected End:  02/05/24            Pt will demonstrate supervision with sit to stand/chair transfers with FWW.   (Progressing)       Start:  01/22/24    Expected End:  02/05/24            Pt will ambulate 50 feet with FWW Supervision .   (Progressing)       Start:  01/22/24    Expected End:  02/05/24            Pt to demo improved BLE strength by  being able to complete supine/seated thera ex 2x20 BLEs with 4 or less rest breaks .   (Progressing)       Start:  01/22/24    Expected End:  02/05/24

## 2024-01-24 ENCOUNTER — APPOINTMENT (OUTPATIENT)
Dept: RADIOLOGY | Facility: HOSPITAL | Age: 79
DRG: 371 | End: 2024-01-24
Payer: MEDICARE

## 2024-01-24 VITALS
DIASTOLIC BLOOD PRESSURE: 55 MMHG | OXYGEN SATURATION: 99 % | TEMPERATURE: 97.3 F | SYSTOLIC BLOOD PRESSURE: 126 MMHG | RESPIRATION RATE: 18 BRPM | BODY MASS INDEX: 26.88 KG/M2 | WEIGHT: 151.68 LBS | HEIGHT: 63 IN | HEART RATE: 53 BPM

## 2024-01-24 LAB
ANION GAP SERPL CALC-SCNC: 12 MMOL/L (ref 10–20)
BUN SERPL-MCNC: 33 MG/DL (ref 6–23)
CALCIUM SERPL-MCNC: 8.1 MG/DL (ref 8.6–10.3)
CHLORIDE SERPL-SCNC: 104 MMOL/L (ref 98–107)
CO2 SERPL-SCNC: 26 MMOL/L (ref 21–32)
CREAT SERPL-MCNC: 0.82 MG/DL (ref 0.5–1.05)
EGFRCR SERPLBLD CKD-EPI 2021: 74 ML/MIN/1.73M*2
ERYTHROCYTE [DISTWIDTH] IN BLOOD BY AUTOMATED COUNT: 16.1 % (ref 11.5–14.5)
GLUCOSE SERPL-MCNC: 122 MG/DL (ref 74–99)
HCT VFR BLD AUTO: 23.7 % (ref 36–46)
HGB BLD-MCNC: 7.4 G/DL (ref 12–16)
HOLD SPECIMEN: NORMAL
MAGNESIUM SERPL-MCNC: 1.82 MG/DL (ref 1.6–2.4)
MCH RBC QN AUTO: 27.3 PG (ref 26–34)
MCHC RBC AUTO-ENTMCNC: 31.2 G/DL (ref 32–36)
MCV RBC AUTO: 88 FL (ref 80–100)
NRBC BLD-RTO: 0 /100 WBCS (ref 0–0)
PLATELET # BLD AUTO: 189 X10*3/UL (ref 150–450)
POTASSIUM SERPL-SCNC: 4.3 MMOL/L (ref 3.5–5.3)
RBC # BLD AUTO: 2.71 X10*6/UL (ref 4–5.2)
SODIUM SERPL-SCNC: 138 MMOL/L (ref 136–145)
WBC # BLD AUTO: 3.8 X10*3/UL (ref 4.4–11.3)

## 2024-01-24 PROCEDURE — 2500000004 HC RX 250 GENERAL PHARMACY W/ HCPCS (ALT 636 FOR OP/ED): Performed by: INTERNAL MEDICINE

## 2024-01-24 PROCEDURE — 83735 ASSAY OF MAGNESIUM: CPT | Performed by: INTERNAL MEDICINE

## 2024-01-24 PROCEDURE — 2500000004 HC RX 250 GENERAL PHARMACY W/ HCPCS (ALT 636 FOR OP/ED): Performed by: HOSPITALIST

## 2024-01-24 PROCEDURE — 97530 THERAPEUTIC ACTIVITIES: CPT | Mod: GP,CQ

## 2024-01-24 PROCEDURE — 2500000001 HC RX 250 WO HCPCS SELF ADMINISTERED DRUGS (ALT 637 FOR MEDICARE OP): Performed by: INTERNAL MEDICINE

## 2024-01-24 PROCEDURE — 02HV33Z INSERTION OF INFUSION DEVICE INTO SUPERIOR VENA CAVA, PERCUTANEOUS APPROACH: ICD-10-PCS | Performed by: INTERNAL MEDICINE

## 2024-01-24 PROCEDURE — 99239 HOSP IP/OBS DSCHRG MGMT >30: CPT | Performed by: INTERNAL MEDICINE

## 2024-01-24 PROCEDURE — 80048 BASIC METABOLIC PNL TOTAL CA: CPT | Performed by: INTERNAL MEDICINE

## 2024-01-24 PROCEDURE — 2500000005 HC RX 250 GENERAL PHARMACY W/O HCPCS: Performed by: INTERNAL MEDICINE

## 2024-01-24 PROCEDURE — 2720000007 HC OR 272 NO HCPCS

## 2024-01-24 PROCEDURE — 71045 X-RAY EXAM CHEST 1 VIEW: CPT | Performed by: RADIOLOGY

## 2024-01-24 PROCEDURE — 85027 COMPLETE CBC AUTOMATED: CPT | Performed by: INTERNAL MEDICINE

## 2024-01-24 PROCEDURE — 36569 INSJ PICC 5 YR+ W/O IMAGING: CPT

## 2024-01-24 PROCEDURE — 97535 SELF CARE MNGMENT TRAINING: CPT | Mod: GO,CO

## 2024-01-24 PROCEDURE — C1751 CATH, INF, PER/CENT/MIDLINE: HCPCS

## 2024-01-24 PROCEDURE — 1210000001 HC SEMI-PRIVATE ROOM DAILY

## 2024-01-24 PROCEDURE — 36415 COLL VENOUS BLD VENIPUNCTURE: CPT | Performed by: INTERNAL MEDICINE

## 2024-01-24 PROCEDURE — 71045 X-RAY EXAM CHEST 1 VIEW: CPT

## 2024-01-24 PROCEDURE — 97530 THERAPEUTIC ACTIVITIES: CPT | Mod: GO,CO

## 2024-01-24 PROCEDURE — 2500000001 HC RX 250 WO HCPCS SELF ADMINISTERED DRUGS (ALT 637 FOR MEDICARE OP): Performed by: HOSPITALIST

## 2024-01-24 RX ORDER — MEROPENEM 1 G/1
1 INJECTION, POWDER, FOR SOLUTION INTRAVENOUS EVERY 8 HOURS
Status: DISCONTINUED | OUTPATIENT
Start: 2024-01-24 | End: 2024-01-25 | Stop reason: HOSPADM

## 2024-01-24 RX ORDER — L. ACIDOPHILUS/L.BULGARICUS 1MM CELL
1 TABLET ORAL 2 TIMES DAILY
Start: 2024-01-24

## 2024-01-24 RX ORDER — LIDOCAINE HYDROCHLORIDE 10 MG/ML
5 INJECTION INFILTRATION; PERINEURAL ONCE
Status: COMPLETED | OUTPATIENT
Start: 2024-01-24 | End: 2024-01-24

## 2024-01-24 RX ORDER — VANCOMYCIN HYDROCHLORIDE 125 MG/1
CAPSULE ORAL
Start: 2024-01-24 | End: 2024-02-27

## 2024-01-24 RX ORDER — MENTHOL AND ZINC OXIDE .44; 20.625 G/100G; G/100G
1 OINTMENT TOPICAL 4 TIMES DAILY PRN
Start: 2024-01-24

## 2024-01-24 RX ORDER — TRAMADOL HYDROCHLORIDE 50 MG/1
50 TABLET ORAL EVERY 8 HOURS PRN
Status: DISCONTINUED | OUTPATIENT
Start: 2024-01-24 | End: 2024-01-25 | Stop reason: HOSPADM

## 2024-01-24 RX ORDER — ACETAMINOPHEN 325 MG/1
650 TABLET ORAL EVERY 6 HOURS PRN
Status: DISCONTINUED | OUTPATIENT
Start: 2024-01-24 | End: 2024-01-25 | Stop reason: HOSPADM

## 2024-01-24 RX ORDER — MEROPENEM 1 G/1
1 INJECTION, POWDER, FOR SOLUTION INTRAVENOUS EVERY 8 HOURS
Start: 2024-01-24 | End: 2024-02-21

## 2024-01-24 RX ADMIN — Medication 1 TABLET: at 08:35

## 2024-01-24 RX ADMIN — AMLODIPINE BESYLATE 5 MG: 5 TABLET ORAL at 08:35

## 2024-01-24 RX ADMIN — VANCOMYCIN HYDROCHLORIDE 125 MG: 125 CAPSULE ORAL at 12:47

## 2024-01-24 RX ADMIN — CARVEDILOL 12.5 MG: 12.5 TABLET, FILM COATED ORAL at 16:51

## 2024-01-24 RX ADMIN — LIDOCAINE HYDROCHLORIDE 30 MG: 10 INJECTION, SOLUTION INFILTRATION; PERINEURAL at 15:55

## 2024-01-24 RX ADMIN — FLUOXETINE HYDROCHLORIDE 20 MG: 20 CAPSULE ORAL at 08:35

## 2024-01-24 RX ADMIN — ATORVASTATIN CALCIUM 40 MG: 20 TABLET, FILM COATED ORAL at 22:00

## 2024-01-24 RX ADMIN — VANCOMYCIN HYDROCHLORIDE 125 MG: 125 CAPSULE ORAL at 22:00

## 2024-01-24 RX ADMIN — MEROPENEM 1 G: 1 INJECTION, POWDER, FOR SOLUTION INTRAVENOUS at 12:48

## 2024-01-24 RX ADMIN — LEVOTHYROXINE SODIUM 75 MCG: 75 TABLET ORAL at 06:06

## 2024-01-24 RX ADMIN — MEROPENEM 1 G: 1 INJECTION, POWDER, FOR SOLUTION INTRAVENOUS at 21:59

## 2024-01-24 RX ADMIN — VANCOMYCIN HYDROCHLORIDE 125 MG: 125 CAPSULE ORAL at 06:06

## 2024-01-24 RX ADMIN — TRAMADOL HYDROCHLORIDE 50 MG: 50 TABLET, COATED ORAL at 12:27

## 2024-01-24 RX ADMIN — VANCOMYCIN HYDROCHLORIDE 125 MG: 125 CAPSULE ORAL at 16:51

## 2024-01-24 RX ADMIN — Medication 1 TABLET: at 22:00

## 2024-01-24 ASSESSMENT — COGNITIVE AND FUNCTIONAL STATUS - GENERAL
TURNING FROM BACK TO SIDE WHILE IN FLAT BAD: A LOT
TURNING FROM BACK TO SIDE WHILE IN FLAT BAD: A LOT
MOVING FROM LYING ON BACK TO SITTING ON SIDE OF FLAT BED WITH BEDRAILS: A LOT
HELP NEEDED FOR BATHING: A LOT
DRESSING REGULAR LOWER BODY CLOTHING: A LOT
DAILY ACTIVITIY SCORE: 12
DRESSING REGULAR UPPER BODY CLOTHING: A LOT
WALKING IN HOSPITAL ROOM: A LITTLE
TOILETING: A LOT
CLIMB 3 TO 5 STEPS WITH RAILING: A LOT
PERSONAL GROOMING: A LITTLE
EATING MEALS: A LITTLE
HELP NEEDED FOR BATHING: A LOT
MOVING TO AND FROM BED TO CHAIR: A LITTLE
EATING MEALS: A LOT
MOBILITY SCORE: 14
DRESSING REGULAR UPPER BODY CLOTHING: A LOT
MOBILITY SCORE: 14
DRESSING REGULAR LOWER BODY CLOTHING: A LOT
CLIMB 3 TO 5 STEPS WITH RAILING: A LOT
STANDING UP FROM CHAIR USING ARMS: A LOT
TOILETING: A LOT
MOVING FROM LYING ON BACK TO SITTING ON SIDE OF FLAT BED WITH BEDRAILS: A LOT
MOVING TO AND FROM BED TO CHAIR: A LITTLE
STANDING UP FROM CHAIR USING ARMS: A LOT
DAILY ACTIVITIY SCORE: 14
WALKING IN HOSPITAL ROOM: A LITTLE
PERSONAL GROOMING: A LOT

## 2024-01-24 ASSESSMENT — PAIN DESCRIPTION - ORIENTATION: ORIENTATION: MID

## 2024-01-24 ASSESSMENT — PAIN SCALES - GENERAL
PAINLEVEL_OUTOF10: 4
PAINLEVEL_OUTOF10: 7

## 2024-01-24 ASSESSMENT — PAIN - FUNCTIONAL ASSESSMENT
PAIN_FUNCTIONAL_ASSESSMENT: 0-10

## 2024-01-24 ASSESSMENT — PAIN DESCRIPTION - LOCATION: LOCATION: ABDOMEN

## 2024-01-24 ASSESSMENT — ACTIVITIES OF DAILY LIVING (ADL): HOME_MANAGEMENT_TIME_ENTRY: 12

## 2024-01-24 ASSESSMENT — PAIN SCALES - PAIN ASSESSMENT IN ADVANCED DEMENTIA (PAINAD): BREATHING: OCCASIONAL LABORED BREATHING, SHORT PERIOD OF HYPERVENTILATION

## 2024-01-24 NOTE — PROGRESS NOTES
Spoke with patient's daughter Carla with regards to patient returning to [facility] this evening, Carla is in agreement with transfer back tonight, will notify other family members.

## 2024-01-24 NOTE — PROGRESS NOTES
Occupational Therapy    OT Treatment    Patient Name: Harper Lara  MRN: [id number]  Today's Date: 1/24/2024  Time Calculation  Start Time: 1416  Stop Time: 1440  Time Calculation (min): 24 min       Assessment:  End of Session Communication: Bedside nurse, PCT/NA/CTA  End of Session Patient Position: Bed, 3 rail up, Alarm on     Plan:  Treatment Interventions: ADL retraining, Functional transfer training, Endurance training  OT Frequency: 2 times per week  Treatment Interventions: ADL retraining, Functional transfer training, Endurance training    Subjective   Previous Visit Info:  OT Last Visit  OT Received On: 01/24/24  General:  General  Prior to Session Communication: Bedside nurse  Patient Position Received: Bed, 3 rail up, Alarm on  General Comment: limited participation this date secondary to pain, increased time required this date  Precautions:  Medical Precautions: Fall precautions, Infection precautions  Post-Surgical Precautions: Abdominal surgery precautions     Pain:  Pain Assessment  Pain Score:  (not rated, increased pain with movement)  Pain Location:  (thigh)  Pain Orientation: Left    Objective    Cognition:  Cognition  Overall Cognitive Status: Impaired  Orientation Level: Oriented X4  Following Commands: Follows multistep commands with repetition  Problem Solving: Assistance required to implement solutions  Insight: Moderate  Impulsive: Moderately     Activities of Daily Living: Toileting  Toileting Level of Assistance: Dependent  Where Assessed: Bed level (side lying)     Bed Mobility/Transfers: Bed Mobility  Bed Mobility: Yes (attempted sup>sit, however declined secondary to pain)  Bed Mobility 1  Bed Mobility 1: Rolling right, Rolling left  Level of Assistance 1: Maximum assistance  Bed Mobility 2  Bed Mobility  2:  (boost to HOB)  Level of Assistance 2: Maximum assistance    Outcome Measures:[facility] Daily Activity  Putting on and taking off regular lower body clothing: A lot  Bathing  (including washing, rinsing, drying): A lot  Putting on and taking off regular upper body clothing: A lot  Toileting, which includes using toilet, bedpan or urinal: A lot  Taking care of personal grooming such as brushing teeth: A lot  Eating Meals: A lot  Daily Activity - Total Score: 12    Education Documentation  Precautions, taught by SCOTTIE Bai at 1/24/2024  3:15 PM.  Learner: Patient  Readiness: Acceptance  Method: Explanation  Response: Needs Reinforcement    ADL Training, taught by SCOTTIE Bai at 1/24/2024  3:15 PM.  Learner: Patient  Readiness: Acceptance  Method: Explanation  Response: Needs Reinforcement    Education Comments  No comments found.         Goals:  Encounter Problems       Encounter Problems (Active)       OT Goals       Pt will transfer to bed, chair,toilet with SBA (Progressing)       Start:  01/22/24    Expected End:  02/05/24            Pt will dress UB with SBA (Progressing)       Start:  01/22/24    Expected End:  02/05/24            Pt will tolerate 15 minutes of functional activity with one rest break (Progressing)       Start:  01/22/24    Expected End:  02/05/24

## 2024-01-24 NOTE — PROGRESS NOTES
Removed dressing from midline abdominal wound. Old dressing had large amount of blue green drainage. Dr. Patterson made aware. Wound bed is pale pink, sutures base of wound patent. Small amount of straw drainage seen. Applied vashe wet to dry dressing. PI Stage 2 sacrum with friction shear surrounding tissue. No drainage seen at this time remainder of tissue blanches. Applied Calmoseptine and reapplied Mepilex sacral dressing.  Wound Care Progress Note     Visit Date: 1/24/2024      Patient Name: Harper Lara         MRN: [id number]                Reason for Visit: Wound Care        Wound History: Chronic     Pertinent Labs:   Albumin   Date Value Ref Range Status   01/05/2024 2.3 (L) 3.4 - 5.0 g/dL Final           Wound Assessment:           NEW    External Urinary Catheter Female (Active)   Placement Date/Time: 12/30/23 0519   Hand Hygiene Completed: Yes  External Catheter Type: Female   Number of days: 25     External Urinary Catheter Female (Active)   External Catheter Status Changed 01/23/24 1512   Output (mL) 800 mL 01/23/24 2119                   Wound 12/08/23 Incision Abdomen Lower;Medial (Active)   Date First Assessed: 12/08/23   Primary Wound Type: (c) Incision  Location: Abdomen  Wound Location Orientation: Lower;Medial   Number of days: 47       Wound Incision Abdomen Medial;Upper (Active)   No Date First Assessed or Time First Assessed found.   Primary Wound Type: (c) Incision  Location: Abdomen  Wound Location Orientation: Medial;Upper   Number of days:        Wound 12/17/23 Incision Umbilicus Medial;Anterior;Lower (Active)   Date First Assessed/Time First Assessed: 12/17/23 1730   Present on Original Admission: No  Hand Hygiene Completed: Yes  Primary Wound Type: Incision  Location: Umbilicus  Wound Location Orientation: Medial;Anterior;Lower   Number of days: 37       Wound 01/23/24 Pressure Injury Coccyx (Active)   Date First Assessed/Time First Assessed: 01/23/24 1724   Present on Original  Admission: Yes  Primary Wound Type: Pressure Injury  Location: Coccyx   Number of days: 0     Wound 12/08/23 Incision Abdomen Lower;Medial (Active)   Dressing Changed Changed 01/22/24 0900   Dressing Status Dry;Clean 01/23/24 0753       Wound Incision Abdomen Medial;Upper (Active)       Wound 12/17/23 Incision Umbilicus Medial;Anterior;Lower (Active)       Wound 01/23/24 Pressure Injury Coccyx (Active)   Site Assessment Red 01/23/24 1700   Dressing ABD 01/23/24 2130   Dressing Changed New 01/23/24 1700   Dressing Status Clean;Dry 01/23/24 2130                   Wound Team Plan: Continue With Current Dressing Changes.     Denis López LPN  1/24/2024  12:58 PM

## 2024-01-24 NOTE — PROGRESS NOTES
Physical Therapy    Physical Therapy Treatment    Patient Name: Harper Lara  MRN: [id number]  Today's Date: 1/24/2024  Time Calculation  Start Time: 1034  Stop Time: 1102  Time Calculation (min): 28 min       Assessment/Plan   PT Assessment  PT Assessment Results: Decreased strength, Decreased endurance, Impaired balance, Decreased mobility, Decreased coordination, Decreased cognition  Rehab Prognosis: Good  Treatment Tolerance: Patient tolerated treatment well, Patient limited by fatigue, Patient limited by pain  Medical Staff Made Aware: Yes  End of Session Communication: Bedside nurse, PCT/NA/KATELYN  Assessment Comment: Patient continues to require (A) for safety with bed mobility/transfers and instructions for exercises. Patient will benefit from additional PT to address deficits and improve mobility.  End of Session Patient Position: Bed, 3 rail up, Alarm on (Call light, phone, and tray table within reach.)  PT Plan  Inpatient/Swing Bed or Outpatient: Inpatient  Treatment/Interventions: Bed mobility, Transfer training, Gait training, Balance training, Strengthening, Endurance training, Therapeutic exercise, Therapeutic activity, Home exercise program  PT Plan: Skilled PT  PT Frequency: 3 times per week  PT Discharge Recommendations: Moderate intensity level of continued care    PT Recommended Transfer Status: Assist x1, Assistive device    General Visit Information:   PT  Visit  PT Received On: 01/24/24  General  Family/Caregiver Present: No  Prior to Session Communication: Bedside nurse, PCT/NA/CTA  Patient Position Received: Bed, 3 rail up, Alarm on  General Comment: Pleasant and cooperative. Declined sitting in the chair(too uncomfortable with sore buttocks).    General Observations:   General Observation: Purewick(removed for mobility); Tele; Abdominal bandages.    Subjective     Precautions:  Precautions  Medical Precautions: Fall precautions  Post-Surgical Precautions: Abdominal surgery  precautions  Precautions Comment: Contact (+) Precautions (+)C-Diff and (+)RSV    Vital Signs:  Vital Signs  Heart Rate: 60  SpO2: 100 % (on RA.)    Objective     Pain:  Pain Assessment  Pain Assessment: 0-10 (Sore buttocks(unrated) and (L)thigh muscle cramping with bed mobility(unrated). Patient reports some pain relief with (L)thigh when she rubs her muscle. Nursing aware.)    Cognition:  Cognition  Orientation Level: Oriented X4    Balance:   Static Sitting: F+  Dynamic Sitting: F  Static Standing: F- with ww  Dynamic Standing: F- with ww    Treatments:        Bed Mobility  Bed Mobility: Yes  Bed Mobility 1  Bed Mobility 1: Supine to sitting, Sitting to supine, Rolling right, Rolling left  Level of Assistance 1: Moderate assistance  Bed Mobility Comments 1: HOB ~35° supine to sit and HOB almost flat with sit to supine. Instructed patient in log roll technique. Hand over hand (A) to reach across body to use the bed rail for support. Patient attempted transfer to (L)side, but not able to tolerate it 2° (L)thigh muscle cramping. Able to tolerate transfer out of bed on (R)side. (A) to lift UB from HOB while moving LEs over the EOB.(A) to support UB while lifting LEs onto the bed. Rolling L/R(3x each). Max(A) to (L) and Mod(A) to (R). (D)x2 to boost to HOB with use of bed pad.  Ambulation/Gait Training  Ambulation/Gait Training Performed: Yes  Ambulation/Gait Training 1  Surface 1: Level tile  Device 1: Rolling walker  Gait Support Devices: Gait belt  Assistance 1: Minimum assistance  Comments/Distance (ft) 1: ~15ft x3. NBOS. Slow lorena. Kyphotic posture with forward flexed head position. Step through gait pattern; decreased step height/length B. v/c and (A) for safer maneuvering of ww with 90/180° turns. No LOB. Increased fatigue reported.  Transfers  Transfer: Yes  Transfer 1  Technique 1: Sit to stand, Stand to sit  Transfer Device 1:  (ww)  Transfer Level of Assistance 1: Moderate assistance, Minimum  assistance  Trials/Comments 1: (4x). Mod(A) sit to stand and Min(A) stand to sit. v/c for safe hand placement and technique. Slow transition of hands to/from ww. Benefits from elevated surfaces.          Outcome Measures:  [facility] Basic Mobility  Turning from your back to your side while in a flat bed without using bedrails: A lot  Moving from lying on your back to sitting on the side of a flat bed without using bedrails: A lot  Moving to and from bed to chair (including a wheelchair): A little  Standing up from a chair using your arms (e.g. wheelchair or bedside chair): A lot  To walk in hospital room: A little  Climbing 3-5 steps with railing: A lot  Basic Mobility - Total Score: 14    Education Documentation  Precautions, taught by Chelita Villatoro PTA at 1/24/2024  1:25 PM.  Learner: Patient  Readiness: Acceptance  Method: Explanation, Demonstration  Response: Verbalizes Understanding, Needs Reinforcement  Comment: See therapy note.    Mobility Training, taught by Chelita Villatoro PTA at 1/24/2024  1:25 PM.  Learner: Patient  Readiness: Acceptance  Method: Explanation, Demonstration  Response: Verbalizes Understanding, Needs Reinforcement  Comment: See therapy note.    EDUCATION:  Individual(s) Educated: Patient  Education Provided: Fall Risk, Body Mechanics, POC, Post-Op Precautions, Posture (Safety with bed mobility/transfers.)  Patient Response to Education: Patient/Caregiver Verbalized Understanding of Information    Encounter Problems       Encounter Problems (Active)       PT Problem       Pt will demonstrate mod I with bed mobility to edge of bed.   (Progressing)       Start:  01/22/24    Expected End:  02/05/24            Pt will demonstrate supervision with sit to stand/chair transfers with FWW.   (Progressing)       Start:  01/22/24    Expected End:  02/05/24            Pt will ambulate 50 feet with FWW Supervision .   (Progressing)       Start:  01/22/24    Expected End:  02/05/24            Pt to demo  improved BLE strength by being able to complete supine/seated thera ex 2x20 BLEs with 4 or less rest breaks .   (Progressing)       Start:  01/22/24    Expected End:  02/05/24

## 2024-01-24 NOTE — PROGRESS NOTES
Spoke with patient's spouse, Juan Pablo who states he really wanted to take his wife home but had concerns with her c-diff. Patient is currently from [facility], plan was to return there for continued therapy and to recover from the c-diff.  After speaking with Juan Pablo he is ok with his wife returning to complete her therapy. Plan will be back to [facility] upon discharge.

## 2024-01-24 NOTE — DOCUMENTATION CLARIFICATION NOTE
"    PATIENT:               SRINIVAS MELTON  ACCT #:                  [id number]  MRN:                       [id number]  :                       1945  ADMIT DATE:       2024 4:03 AM  DISCH DATE:  RESPONDING PROVIDER #:        [id number]          PROVIDER RESPONSE TEXT:    Severe Protein Calorie Malnutrition    CDI QUERY TEXT:    [facility]_Nutrition Diagnosis    Instruction:    Based on your assessment of the patient and the clinical information, please provide the requested documentation by clicking on the appropriate radio button and enter any additional information if prompted.    Question: Please further clarify this patient nutritional status as    When answering this query, please exercise your independent professional judgment. The fact that a question is being asked, does not imply that any particular answer is desired or expected.    The patient's clinical indicators include:  Clinical Information:  The patient is a 78 year old female who presented to the ED with abdominal pain.  She tested positive for C. Diff and was found to have a residual abscess in her abdomen that is not amenable to draining.  Her PMH includes atrial fibrillation, hypothyroidism, dementia, NICM.    Clinical Indicators:    Nutrition Consult  \"Malnutrition Diagnosis: Severe malnutrition related to acute disease or injury.  As Evidenced by: pt meeting less than 75 percent of estimated needs x 1 month;  greater than 5 percent wt loss x 1 month.\"    Treatment:  Mighty Shake TID, Regular diet    Risk Factors:  Recent bowel resection for diverticulitis with residual abscess, C. Diff infection  Options provided:  -- Severe Protein Calorie Malnutrition  -- Other - I will add my own diagnosis  -- Refer to Clinical Documentation Reviewer    Query created by: Aixa Mckeon on 2024 12:43 PM      Electronically signed by:  KENROY MONROY MD 2024 1:14 PM          "

## 2024-01-24 NOTE — PROGRESS NOTES
PROGRESS NOTE    ASSESSMENT AND PLAN:   Abdominal pain  History of small bowel obstruction with lysis of adhesions  Abdominal residual abscess   -Laparoscopic ventral hernia repair at [facility] on 2010  -History of multiple small bowel obstruction s/p open lysis of adhesions 12/27  -Admission in 1/4 for SBO, treated with conservatively with NGT, dced to SNF.  -Presented this admission with abdominal pain, diarrhea.  -CT abdomen and pelvis on admission: Thick-walled fluid collection concerning for abscess. Unable to be drained by IR due to overlying bowel.  -bcx ngtd  -wound having bluish-green drainage c/f psar infection  Plan:  -general surgery following, no plan for intervention  -ID following, plan for empiric IV meropenem 1g q8h x4w with weekly cbc/cmp/crp, PICC ordered, fu with ID as outpt  -wound care for abd wound     C. Difficile infection  -Discontinue Protonix as it can cause C. difficile infection  -ID following  -Continue PO vanc, plan for pulsed taper given need for prolonged iv abx for above, probiotic     Diabetes mellitus c/b hypoglycemia  -Blood sugar on admission 26  -This is most likely related to poor oral intake and being on glipizide 10 mg twice daily  -Her hemoglobin A1c from 8/23: 5.3  -Off D10, BS now stable  -Will plan for DC diabetic meds on discharge.     RSV  -Asymptomatic on room air     History of dementia with parkinsonian features  -Continue fluoxetine     History of paroxysmal atrial fibrillation  -Currently remains in normal sinus rhythm, continue Core  -Pt has a history of ICH, Xarelto was discontinued on 9/23, restarted again during recent hospitalization.  -On discharge, given her blood loses anemia, recent falls with ICH, plan to discontinue Xarelto  -Outpatient referral for Watchman Device     History of nonischemic cardiomyopathy  -Cardiac cath from 2021 shows an EF of 40 to 45%  -Continue Coreg, hold Lasix for now, continue to monitor blood pressure  -Patient appears  "to be euvolemic    HTN  -home coreg, amlodipine     Hypothyroidism  -Continue levothyroxine    Acute on chronic anemia  -Multifactorial, blood loss from recent surgical intervention and dilutional from D10.  -s/p 1u pRBC this admission, Hgb improved to 7.8, stable this AM at 7.4    VTE Prophylaxis: SCD  Dispo: snf    SUBJECTIVE:   Admit Date: 1/21/2024    Interval History: Feels ok, No abd pain today. Diarrhea improved she says.     OBJECTIVE:   Vitals: /59   Pulse 55   Temp 35.8 °C (96.4 °F) (Temporal)   Resp 17   Ht 1.6 m (5' 2.99\")   Wt 68.8 kg (151 lb 10.8 oz)   SpO2 99%   BMI 26.88 kg/m²    Wt Readings from Last 3 Encounters:   01/21/24 68.8 kg (151 lb 10.8 oz)   01/05/24 97.5 kg (214 lb 15.2 oz)   12/25/23 84.9 kg (187 lb 2.7 oz)      24HR INTAKE/OUTPUT:    Intake/Output Summary (Last 24 hours) at 1/24/2024 1047  Last data filed at 1/24/2024 1002  Gross per 24 hour   Intake 300 ml   Output 2400 ml   Net -2100 ml         PHYSICAL EXAM:   GENERAL: Laying in bed, does not appear to be in any distress.   HEENT: HEAD: Normocephalic atraumatic.  Neck: Supple.  Eyes: Pupils are reactive to direct light.   CVS: S1, S2 heard. Regular rate and rhythm  LUNGS: Clear to auscultate bilaterally. No wheezing or rhonchi appreciated.  ABDOMEN: Soft NTTP, Vertical incision covered with dressing  NEUROLOGICAL: No focal neurological deficits appreciated. Cranial nerves are grossly intact.  EXTREMITIES: No edema appreciated.  SKIN:  Grossly intact, warm and dry.      LABS/IMAGING AND MEDICATIONS:   Scheduled Meds:amLODIPine, 5 mg, oral, Daily  atorvastatin, 40 mg, oral, Nightly  carvedilol, 12.5 mg, oral, BID with meals  ertapenem, 1 g, intravenous, q24h  FLUoxetine, 20 mg, oral, Daily  lactobacillus acidophilus, 1 tablet, oral, BID  levothyroxine, 75 mcg, oral, Daily  lidocaine, 5 mL, infiltration, Once  multivitamin with minerals, 1 tablet, oral, Daily  vancomycin, 125 mg, oral, 4x daily      PRN Meds:PRN " "medications: alteplase, dextrose 10 % in water (D10W), dextrose, ipratropium-albuteroL, menthol-zinc oxide    No lab exists for component: \"CBC\"   No lab exists for component: \"CMP\"   No lab exists for component: \"TROPONIN\"          No lab exists for component: \"LIPIDS\"       No lab exists for component: \"URINALYSIS\"          BMP:  Results from last 7 days   Lab Units 01/24/24  0554 01/23/24  0549 01/22/24  0604   SODIUM mmol/L 138 136 135*   POTASSIUM mmol/L 4.3 4.4 4.4   CHLORIDE mmol/L 104 102 101   CO2 mmol/L 26 26 27   BUN mg/dL 33* 31* 32*   CREATININE mg/dL 0.82 0.93 1.19*         CBC:  Results from last 7 days   Lab Units 01/24/24  0555 01/23/24  0549 01/22/24  2159 01/22/24  0604   WBC AUTO x10*3/uL 3.8* 4.6  --  5.0   RBC AUTO x10*6/uL 2.71* 2.82*  --  2.49*   HEMOGLOBIN g/dL 7.4* 7.7* 7.8* 6.6*   HEMATOCRIT % 23.7* 24.3* 23.9* 21.7*   MCV fL 88 86  --  87   MCH pg 27.3 27.3  --  26.5   MCHC g/dL 31.2* 31.7*  --  30.4*   RDW % 16.1* 15.9*  --  16.1*   PLATELETS AUTO x10*3/uL 189 175  --  176         Cardiac Enzymes:           Hepatic Function Panel:        No lab exists for component: \"LABALBU\"    Magnesium:  Results from last 7 days   Lab Units 01/24/24  0554   MAGNESIUM mg/dL 1.82         Pro-BNP:  No results found for: \"PROBNP\"    INR:        TSH:  No results found for: \"TSH\"    Lipid Profile:        No lab exists for component: \"LABVLDL\"    HgbA1C:        Lactate Level:  No lab exists for component: \"LACTA\"    CMP:  Results from last 7 days   Lab Units 01/24/24  0554 01/23/24  0549 01/22/24  0604   SODIUM mmol/L 138 136 135*   POTASSIUM mmol/L 4.3 4.4 4.4   CHLORIDE mmol/L 104 102 101   CO2 mmol/L 26 26 27   BUN mg/dL 33* 31* 32*   CREATININE mg/dL 0.82 0.93 1.19*   GLUCOSE mg/dL 122* 131* 140*   CALCIUM mg/dL 8.1* 8.0* 7.9*         Amylase:        Lipase:  Results from last 7 days   Lab Units 01/21/24  0431   LIPASE U/L 48         ABG:        No lab exists for component: \"PO2\", \"PCO2\", \"HCO3\", \"BE\", " "\"O2SAT\"       "

## 2024-01-24 NOTE — DISCHARGE SUMMARY
DISCHARGE DIAGNOSIS     Abdominal residual abscess  History of small bowel obstruction with lysis of adhesions  C. Diff infection  DM2 c/b hypoglycemia  RSV  pAfib on AC PTA  Acute on chronic anemia    HOSPITAL COURSE AND DETAILS     Abdominal pain  History of small bowel obstruction with lysis of adhesions  Abdominal residual abscess   -Laparoscopic ventral hernia repair at [facility] on 2010  -History of multiple small bowel obstruction s/p open lysis of adhesions 12/27  -Admission in 1/4 for SBO, treated with conservatively with NGT, dced to SNF.  -Presented this admission with abdominal pain, diarrhea.  -CT abdomen and pelvis on admission: Thick-walled fluid collection concerning for abscess. Unable to be drained by IR due to overlying bowel.  -bcx ngtd  -wound having bluish-green drainage c/f psar infection  Plan:  -general surgery following, no plan for intervention, fu as outpt  -ID following, plan for empiric IV meropenem 1g q8h x4w with weekly cbc/cmp/crp, PICC line placed, fu with ID as outpt  -wound care for abd wound, wound care recs wound vac at facility     C. Difficile infection  -Discontinue Protonix as it can cause C. difficile infection  -ID following  -Continue PO vanc, plan for pulsed taper given need for prolonged iv abx for above, probiotic    Diabetes mellitus c/b hypoglycemia  -Blood sugar on admission 26  -This is most likely related to poor oral intake and being on glipizide 10 mg twice daily  -Her hemoglobin A1c from 8/23: 5.3  -Off D10, BS now stable  -Will plan for DC diabetic meds on discharge.     RSV  -Asymptomatic on room air     History of dementia with parkinsonian features  -Continue fluoxetine    History of paroxysmal atrial fibrillation  -Currently remains in normal sinus rhythm, continue Coreg  -Pt has a history of ICH, Xarelto was discontinued on 9/23, restarted again during recent hospitalization.  -On discharge, given her blood loses anemia, recent falls with ICH,  plan to discontinue Xarelto  -Outpatient cardiology follow up for consideration of Watchman Device     History of nonischemic cardiomyopathy  -Cardiac cath from 2021 shows an EF of 40 to 45%  -Patient appears to be euvolemic  -cont home meds     HTN  -home meds     Hypothyroidism  -Continue levothyroxine     Acute on chronic anemia  -Multifactorial, blood loss from recent surgical intervention and dilutional from D10.  -s/p 1u pRBC this admission, Hgb improved to 7.8, stable this AM at 7.4    Stable for dc to SNF. Discussed with surgery and ID. Total time spent on discharge services 38 minutes.     DISCHARGE PHYSICAL EXAM     Last Recorded Vitals:  Vitals:    01/24/24 0805 01/24/24 0835 01/24/24 1034 01/24/24 1137   BP: 126/59 126/59  121/59   BP Location: Left arm   Left arm   Patient Position: Lying   Lying   Pulse: 55 55 60 56   Resp: 17   19   Temp: 35.8 °C (96.4 °F)   36.4 °C (97.5 °F)   TempSrc: Temporal   Temporal   SpO2: 99%  100% 96%   Weight:       Height:           Physical Exam:  GENERAL: Laying in bed, does not appear to be in any distress.   HEENT: HEAD: Normocephalic atraumatic.  Neck: Supple.  Eyes: Pupils are reactive to direct light.   CVS: S1, S2 heard. Regular rate and rhythm  LUNGS: Clear to auscultate bilaterally. No wheezing or rhonchi appreciated.  ABDOMEN: Soft NTTP, Vertical incision covered with dressing  NEUROLOGICAL: No focal neurological deficits appreciated. Cranial nerves are grossly intact.  EXTREMITIES: No edema appreciated.  SKIN:  Grossly intact, warm and dry.      PERTINENT LABS AND IMAGING     Results for orders placed or performed during the hospital encounter of 01/21/24 (from the past 96 hour(s))   POCT GLUCOSE   Result Value Ref Range    POCT Glucose 43 (L) 74 - 99 mg/dL   CBC and Auto Differential   Result Value Ref Range    WBC 7.3 4.4 - 11.3 x10*3/uL    nRBC 0.0 0.0 - 0.0 /100 WBCs    RBC 3.08 (L) 4.00 - 5.20 x10*6/uL    Hemoglobin 8.4 (L) 12.0 - 16.0 g/dL    Hematocrit  27.0 (L) 36.0 - 46.0 %    MCV 88 80 - 100 fL    MCH 27.3 26.0 - 34.0 pg    MCHC 31.1 (L) 32.0 - 36.0 g/dL    RDW 16.0 (H) 11.5 - 14.5 %    Platelets 224 150 - 450 x10*3/uL    Neutrophils % 71.8 40.0 - 80.0 %    Immature Granulocytes %, Automated 0.4 0.0 - 0.9 %    Lymphocytes % 20.6 13.0 - 44.0 %    Monocytes % 5.9 2.0 - 10.0 %    Eosinophils % 1.0 0.0 - 6.0 %    Basophils % 0.3 0.0 - 2.0 %    Neutrophils Absolute 5.26 1.60 - 5.50 x10*3/uL    Immature Granulocytes Absolute, Automated 0.03 0.00 - 0.50 x10*3/uL    Lymphocytes Absolute 1.51 0.80 - 3.00 x10*3/uL    Monocytes Absolute 0.43 0.05 - 0.80 x10*3/uL    Eosinophils Absolute 0.07 0.00 - 0.40 x10*3/uL    Basophils Absolute 0.02 0.00 - 0.10 x10*3/uL   Basic metabolic panel   Result Value Ref Range    Glucose 26 (LL) 74 - 99 mg/dL    Sodium 135 (L) 136 - 145 mmol/L    Potassium 4.3 3.5 - 5.3 mmol/L    Chloride 99 98 - 107 mmol/L    Bicarbonate 27 21 - 32 mmol/L    Anion Gap 13 10 - 20 mmol/L    Urea Nitrogen 33 (H) 6 - 23 mg/dL    Creatinine 0.97 0.50 - 1.05 mg/dL    eGFR 61 >60 mL/min/1.73m*2    Calcium 8.2 (L) 8.6 - 10.3 mg/dL   ECG 12 lead   Result Value Ref Range    Ventricular Rate 76 BPM    Atrial Rate 76 BPM    PA Interval 196 ms    QRS Duration 128 ms    QT Interval 412 ms    QTC Calculation(Bazett) 463 ms    P Axis 90 degrees    R Axis -45 degrees    T Axis 34 degrees    QRS Count 12 beats    Q Onset 211 ms    P Onset 113 ms    P Offset 162 ms    T Offset 417 ms    QTC Fredericia 445 ms   Lipase   Result Value Ref Range    Lipase 48 9 - 82 U/L   Lactate   Result Value Ref Range    Lactate 1.6 0.4 - 2.0 mmol/L   POCT GLUCOSE   Result Value Ref Range    POCT Glucose 126 (H) 74 - 99 mg/dL   Influenza A, and B PCR   Result Value Ref Range    Flu A Result Not Detected Not Detected    Flu B Result Not Detected Not Detected   SARS-CoV-2 RT PCR   Result Value Ref Range    Coronavirus 2019, PCR Not Detected Not Detected   RSV PCR   Result Value Ref Range    RSV PCR  Detected (A) Not Detected   C. difficile, PCR    Specimen: Stool   Result Value Ref Range    C. difficile, PCR Detected (A) Not Detected   Clostridium Difficile EIA    Specimen: Stool   Result Value Ref Range    C. difficile (Toxin A/B) Negative Negative, Indeterminate   POCT GLUCOSE   Result Value Ref Range    POCT Glucose 79 74 - 99 mg/dL   POCT GLUCOSE   Result Value Ref Range    POCT Glucose 30 (L) 74 - 99 mg/dL   POCT GLUCOSE   Result Value Ref Range    POCT Glucose 127 (H) 74 - 99 mg/dL   POCT GLUCOSE   Result Value Ref Range    POCT Glucose 37 (L) 74 - 99 mg/dL   POCT GLUCOSE   Result Value Ref Range    POCT Glucose 160 (H) 74 - 99 mg/dL   Blood Culture    Specimen: Peripheral Venipuncture; Blood culture   Result Value Ref Range    Blood Culture No growth at 2 days    Blood Culture    Specimen: Peripheral Venipuncture; Blood culture   Result Value Ref Range    Blood Culture No growth at 2 days    SST TOP   Result Value Ref Range    Extra Tube Hold for add-ons.    POCT GLUCOSE   Result Value Ref Range    POCT Glucose 145 (H) 74 - 99 mg/dL   POCT GLUCOSE   Result Value Ref Range    POCT Glucose 118 (H) 74 - 99 mg/dL   POCT GLUCOSE   Result Value Ref Range    POCT Glucose 92 74 - 99 mg/dL   Urinalysis with Reflex Microscopic   Result Value Ref Range    Color, Urine Yellow Straw, Yellow    Appearance, Urine Hazy (N) Clear    Specific Gravity, Urine 1.032 1.005 - 1.035    pH, Urine 5.0 5.0, 5.5, 6.0, 6.5, 7.0, 7.5, 8.0    Protein, Urine NEGATIVE NEGATIVE mg/dL    Glucose, Urine NEGATIVE NEGATIVE mg/dL    Blood, Urine NEGATIVE NEGATIVE    Ketones, Urine NEGATIVE NEGATIVE mg/dL    Bilirubin, Urine NEGATIVE NEGATIVE    Urobilinogen, Urine <2.0 <2.0 mg/dL    Nitrite, Urine NEGATIVE NEGATIVE    Leukocyte Esterase, Urine MODERATE (2+) (A) NEGATIVE   Microscopic Only, Urine   Result Value Ref Range    WBC, Urine 1-5 1-5, NONE /HPF    RBC, Urine 1-2 NONE, 1-2, 3-5 /HPF    Squamous Epithelial Cells, Urine 1-9 (SPARSE)  Reference range not established. /HPF    Bacteria, Urine 4+ (A) NONE SEEN /HPF   POCT GLUCOSE   Result Value Ref Range    POCT Glucose 121 (H) 74 - 99 mg/dL   POCT GLUCOSE   Result Value Ref Range    POCT Glucose 136 (H) 74 - 99 mg/dL   Magnesium   Result Value Ref Range    Magnesium 1.77 1.60 - 2.40 mg/dL   Basic Metabolic Panel   Result Value Ref Range    Glucose 140 (H) 74 - 99 mg/dL    Sodium 135 (L) 136 - 145 mmol/L    Potassium 4.4 3.5 - 5.3 mmol/L    Chloride 101 98 - 107 mmol/L    Bicarbonate 27 21 - 32 mmol/L    Anion Gap 11 10 - 20 mmol/L    Urea Nitrogen 32 (H) 6 - 23 mg/dL    Creatinine 1.19 (H) 0.50 - 1.05 mg/dL    eGFR 47 (L) >60 mL/min/1.73m*2    Calcium 7.9 (L) 8.6 - 10.3 mg/dL   CBC   Result Value Ref Range    WBC 5.0 4.4 - 11.3 x10*3/uL    nRBC 0.0 0.0 - 0.0 /100 WBCs    RBC 2.49 (L) 4.00 - 5.20 x10*6/uL    Hemoglobin 6.6 (L) 12.0 - 16.0 g/dL    Hematocrit 21.7 (L) 36.0 - 46.0 %    MCV 87 80 - 100 fL    MCH 26.5 26.0 - 34.0 pg    MCHC 30.4 (L) 32.0 - 36.0 g/dL    RDW 16.1 (H) 11.5 - 14.5 %    Platelets 176 150 - 450 x10*3/uL   SST TOP   Result Value Ref Range    Extra Tube Hold for add-ons.    POCT GLUCOSE   Result Value Ref Range    POCT Glucose 152 (H) 74 - 99 mg/dL   Prepare RBC: 1 Units   Result Value Ref Range    PRODUCT CODE [id number]     Unit Number [id number]     Unit ABO O     Unit RH POS     XM INTEP COMP     Dispense Status TR     Blood Expiration Date February 17, 2024 23:59 EST     PRODUCT BLOOD TYPE 5100     UNIT VOLUME 323    Type and screen   Result Value Ref Range    ABO TYPE O     Rh TYPE POS     ANTIBODY SCREEN NEG    POCT GLUCOSE   Result Value Ref Range    POCT Glucose 179 (H) 74 - 99 mg/dL   Hemoglobin and hematocrit, blood   Result Value Ref Range    Hemoglobin 7.8 (L) 12.0 - 16.0 g/dL    Hematocrit 23.9 (L) 36.0 - 46.0 %   POCT GLUCOSE   Result Value Ref Range    POCT Glucose 133 (H) 74 - 99 mg/dL   Magnesium   Result Value Ref Range    Magnesium 1.77 1.60 - 2.40 mg/dL    Basic Metabolic Panel   Result Value Ref Range    Glucose 131 (H) 74 - 99 mg/dL    Sodium 136 136 - 145 mmol/L    Potassium 4.4 3.5 - 5.3 mmol/L    Chloride 102 98 - 107 mmol/L    Bicarbonate 26 21 - 32 mmol/L    Anion Gap 12 10 - 20 mmol/L    Urea Nitrogen 31 (H) 6 - 23 mg/dL    Creatinine 0.93 0.50 - 1.05 mg/dL    eGFR 64 >60 mL/min/1.73m*2    Calcium 8.0 (L) 8.6 - 10.3 mg/dL   CBC   Result Value Ref Range    WBC 4.6 4.4 - 11.3 x10*3/uL    nRBC 0.0 0.0 - 0.0 /100 WBCs    RBC 2.82 (L) 4.00 - 5.20 x10*6/uL    Hemoglobin 7.7 (L) 12.0 - 16.0 g/dL    Hematocrit 24.3 (L) 36.0 - 46.0 %    MCV 86 80 - 100 fL    MCH 27.3 26.0 - 34.0 pg    MCHC 31.7 (L) 32.0 - 36.0 g/dL    RDW 15.9 (H) 11.5 - 14.5 %    Platelets 175 150 - 450 x10*3/uL   SST TOP   Result Value Ref Range    Extra Tube Hold for add-ons.    Magnesium   Result Value Ref Range    Magnesium 1.82 1.60 - 2.40 mg/dL   Basic Metabolic Panel   Result Value Ref Range    Glucose 122 (H) 74 - 99 mg/dL    Sodium 138 136 - 145 mmol/L    Potassium 4.3 3.5 - 5.3 mmol/L    Chloride 104 98 - 107 mmol/L    Bicarbonate 26 21 - 32 mmol/L    Anion Gap 12 10 - 20 mmol/L    Urea Nitrogen 33 (H) 6 - 23 mg/dL    Creatinine 0.82 0.50 - 1.05 mg/dL    eGFR 74 >60 mL/min/1.73m*2    Calcium 8.1 (L) 8.6 - 10.3 mg/dL   SST TOP   Result Value Ref Range    Extra Tube Hold for add-ons.    CBC   Result Value Ref Range    WBC 3.8 (L) 4.4 - 11.3 x10*3/uL    nRBC 0.0 0.0 - 0.0 /100 WBCs    RBC 2.71 (L) 4.00 - 5.20 x10*6/uL    Hemoglobin 7.4 (L) 12.0 - 16.0 g/dL    Hematocrit 23.7 (L) 36.0 - 46.0 %    MCV 88 80 - 100 fL    MCH 27.3 26.0 - 34.0 pg    MCHC 31.2 (L) 32.0 - 36.0 g/dL    RDW 16.1 (H) 11.5 - 14.5 %    Platelets 189 150 - 450 x10*3/uL        CT abdomen pelvis w IV contrast   Final Result   Postoperative changes of partial bowel resection are again   demonstrated with grossly intact appearance of the right lower   abdominal surgical anastomosis.   Along the posterior and medial aspect of  the surgical anastomosis,   there is a thick-walled, mixed air-fluid collection as described   suggesting residual abscess.  There is moderate surrounding mesenteric   stranding/inflammatory response.   Mild to moderately distended small and large bowel loops are   demonstrated throughout the abdomen and pelvis as described suggesting   a mild diffuse ileus and/or partial obstruction.  Superimposed   enterocolitis cannot be excluded.   Small amount of nondependent air is seen within the urinary bladder.    Correlate for any recent intervention versus developing emphysematous   cystitis.   Anterior abdominal wall open surgical anastomosis is demonstrated as   described with adjacent soft tissue thickening suggesting phlegmonous   reaction.  No distinct drainable anterior abdominal wall fluid   collection identified.   NOTIFICATION:  The critical results of the study were discussed with,   and acknowledged by Dr. Tasha Gutierrez by telephone on 01/21/2024 at   0658.   Signed by Blaine Holt MD      XR chest 1 view   Final Result   No acute pulmonary abnormality.   Signed by Blaine Holt MD      Bedside PICC Imaging    (Results Pending)   XR chest 1 view    (Results Pending)       No echocardiogram results found for the past 14 days    DISCHARGE MEDICATIONS        Your medication list        START taking these medications        Instructions Last Dose Given Next Dose Due   lactobacillus acidophilus tablet tablet      Take 1 tablet by mouth 2 times a day.       menthol-zinc oxide 0.44-20.6 % ointment  Commonly known as: Calmoseptine - Risamine      Apply 1 Application topically 4 times a day as needed (As needed).       meropenem 1 gram/100 mL IV  Commonly known as: Merrem      Infuse 100 mL (1 g) at 200 mL/hr over 30 minutes into a venous catheter every 8 hours for 28 days.       vancomycin 125 mg capsule  Commonly known as: Vancocin  Start taking on: January 24, 2024      Take 1 capsule (125 mg) by mouth 4 times a  day for 6 days, THEN 1 capsule (125 mg) 2 times a day for 7 days, THEN 1 capsule (125 mg) once daily for 7 days, THEN 1 capsule (125 mg) every other day for 14 days.              CONTINUE taking these medications        Instructions Last Dose Given Next Dose Due   acetaminophen 325 mg tablet  Commonly known as: Tylenol      Take 2 tablets (650 mg) by mouth every 6 hours if needed for mild pain (1 - 3) or moderate pain (4 - 6).       amLODIPine 5 mg tablet  Commonly known as: Norvasc           atorvastatin 40 mg tablet  Commonly known as: Lipitor           benztropine 1 mg tablet  Commonly known as: Cogentin           carvedilol 12.5 mg tablet  Commonly known as: Coreg           cholecalciferol 10 MCG (400 UNIT) tablet  Commonly known as: Vitamin D-3           cyanocobalamin 1,000 mcg tablet  Commonly known as: Vitamin B-12           FISH OIL ORAL           FLUoxetine 20 mg tablet  Commonly known as: PROzac           gentamicin 0.1 % ointment  Commonly known as: Garamycin           levothyroxine 75 mcg tablet  Commonly known as: Synthroid, Levoxyl           magnesium oxide 400 mg tablet  Commonly known as: Mag-Ox           multivitamin tablet           nystatin 100,000 unit/gram powder  Commonly known as: Mycostatin           ondansetron ODT 4 mg disintegrating tablet  Commonly known as: Zofran-ODT      Take 1 tablet (4 mg) by mouth every 8 hours if needed for nausea or vomiting.       pantoprazole 40 mg EC tablet  Commonly known as: ProtoNix           triamterene-hydrochlorothiazid 37.5-25 mg capsule  Commonly known as: Dyazide                  STOP taking these medications      furosemide 20 mg tablet  Commonly known as: Lasix        glipiZIDE 10 mg tablet  Commonly known as: Glucotrol        metFORMIN 500 mg tablet  Commonly known as: Glucophage        Xarelto 15 mg tablet  Generic drug: rivaroxaban                  Where to Get Your Medications        Information about where to get these medications is not yet  available    Ask your nurse or doctor about these medications  lactobacillus acidophilus tablet tablet  menthol-zinc oxide 0.44-20.6 % ointment  meropenem 1 gram/100 mL IV  vancomycin 125 mg capsule         OUTPATIENT FOLLOW-UP     No future appointments.

## 2024-01-26 LAB
BACTERIA BLD CULT: NORMAL
BACTERIA BLD CULT: NORMAL

## 2024-02-08 ENCOUNTER — OFFICE VISIT (OUTPATIENT)
Dept: INFECTIOUS DISEASES | Age: 79
End: 2024-02-08

## 2024-02-08 VITALS
HEART RATE: 57 BPM | SYSTOLIC BLOOD PRESSURE: 110 MMHG | TEMPERATURE: 97.2 F | DIASTOLIC BLOOD PRESSURE: 63 MMHG | RESPIRATION RATE: 16 BRPM

## 2024-02-08 DIAGNOSIS — K65.1 INTRA-ABDOMINAL ABSCESS (HCC): ICD-10-CM

## 2024-02-08 DIAGNOSIS — T14.8XXA SURGICAL WOUND PRESENT: Primary | ICD-10-CM

## 2024-02-08 DIAGNOSIS — A04.72 C. DIFFICILE COLITIS: ICD-10-CM

## 2024-02-08 RX ORDER — MEROPENEM 1 G/1
1 INJECTION, POWDER, FOR SOLUTION INTRAVENOUS EVERY 8 HOURS
COMMUNITY
Start: 2024-01-24 | End: 2024-02-21

## 2024-02-08 NOTE — PROGRESS NOTES
Scarlett Gage is a 78 y.o. female with a history  Diabetes atrial fibrillation hypothyroidism dementia Parkinson's history of small bowel obstruction nonischemic cardiomyopathy  Had a laparotomy 12/27/2023 History of multiple small bowel obstruction s/p open lysis of adhesions   She ended up returning in January  Presenting with abdominal pain, diarrhea.  Hide abdominal wound following initial surgery  CT scan shows presumed abscess thick-walled air-fluid collection in the pelvis    C. difficile test prior PCR is positive    Positive for RSV    The collection was not able to be aspirated.  No wound cultures collection cultures were able to identify microbiology surgery did not feel that she needed any sort of intervention    Patient was given oral vancomycin taper, and meropenem.    She believes the wound shrinking from what she is telling she still has drainage externally.  Last CAT scan she still showed persistence of the collection prior to her discharge I did question whether this was in communication with outside wound          Allergies:   Allergies   Allergen Reactions   Diphenhydramine Hcl Unknown   Morphine Unknown   Pentazocine Unknown   Propoxyphene Unknown   Propoxyphene N-Acetaminophen Unknown   Ranitidine Unknown   Jewett Hives       Review of Systems  14 system review is negative other than HPI    /63 (BP Location: Right arm, Patient Position: Lying)  Pulse 63  Temp 36.2 °C (97.2 °F) (Temporal)  Resp 16  Ht 1.6 m (5' 2.99\")  Wt 68.8 kg (151 lb 10.8 oz)  SpO2 99%  BMI 26.88 kg/m²   Physical Exam:  General: Patient appears ok at the present time. NAD  Skin: no new rashes  HEENT: Neck is supple, No subconjunctival hemorrhages, no oral exudates  Heart: S1 S2  Lungs: diminished bases  Abdomen no tenderness.  Still has a catheter central incision site wound close to the fascia site which is still sutured, she still has some light yellowish brownish drainage  Extrem: No edema, non

## 2024-02-09 ENCOUNTER — TELEPHONE (OUTPATIENT)
Dept: INFECTIOUS DISEASES | Age: 79
End: 2024-02-09

## 2024-02-09 NOTE — TELEPHONE ENCOUNTER
Per Dr. Mayer-Ordered to inquire with [facility] of antibiotic dose that Ms. Gage was receiving.  Initially patient was on Firvanq 25 mg/5ml Q 6.  Now on Firvanq 25 mg/5 ml QD after 2-14-24 it will change to (EOD) until 28th of February.  Relayed dose to

## 2024-02-12 ENCOUNTER — OFFICE VISIT (OUTPATIENT)
Dept: SURGERY | Facility: CLINIC | Age: 79
End: 2024-02-12
Payer: MEDICARE

## 2024-02-12 VITALS
BODY MASS INDEX: 27.11 KG/M2 | HEIGHT: 63 IN | SYSTOLIC BLOOD PRESSURE: 104 MMHG | HEART RATE: 59 BPM | DIASTOLIC BLOOD PRESSURE: 55 MMHG | WEIGHT: 153 LBS

## 2024-02-12 DIAGNOSIS — Z90.49 STATUS POST SMALL BOWEL RESECTION: ICD-10-CM

## 2024-02-12 DIAGNOSIS — Z51.89 VISIT FOR WOUND CHECK: Primary | ICD-10-CM

## 2024-02-12 PROCEDURE — 1123F ACP DISCUSS/DSCN MKR DOCD: CPT | Performed by: SURGERY

## 2024-02-12 PROCEDURE — 1036F TOBACCO NON-USER: CPT | Performed by: SURGERY

## 2024-02-12 PROCEDURE — 1125F AMNT PAIN NOTED PAIN PRSNT: CPT | Performed by: SURGERY

## 2024-02-12 PROCEDURE — 1159F MED LIST DOCD IN RCRD: CPT | Performed by: SURGERY

## 2024-02-12 PROCEDURE — 3074F SYST BP LT 130 MM HG: CPT | Performed by: SURGERY

## 2024-02-12 PROCEDURE — 99024 POSTOP FOLLOW-UP VISIT: CPT | Performed by: SURGERY

## 2024-02-12 PROCEDURE — 1160F RVW MEDS BY RX/DR IN RCRD: CPT | Performed by: SURGERY

## 2024-02-12 PROCEDURE — 1111F DSCHRG MED/CURRENT MED MERGE: CPT | Performed by: SURGERY

## 2024-02-12 PROCEDURE — 3078F DIAST BP <80 MM HG: CPT | Performed by: SURGERY

## 2024-02-12 NOTE — PROGRESS NOTES
Subjective   Patient ID: Harper Lara is a 78 y.o. female who presents for New Patient Visit (Patient is here for hospital follow up).  HPI  78-year-old patient well-known to me.  History of laparoscopic lysis of adhesio, ex lap, lysis of adhesions with small bowel resection on December 27.  She was last admitted from 1/21-24 with C. difficile colitis and small intra-abdominal abscess. not amendable to percutaneous drainage.  She was discharged on meropenem Q 8 hours for 4 weeks and oral Vanco for the C. difficile colitis; she is here in follow-up.  Still at the nursing home.  She is having 2-3 bowel movements daily; improvement in abdominal pain.  Denies nausea or vomiting.  Denies fevers and chills.      Objective   Physical Exam  Middle portion of midline wound: 7x2 cm open area with good granulation tissue, loose sutures  Assessment/Plan   Patient continues to do well.  Wound is filling in.  No infection.  Loose sutures were cut.  Continue wet-to-dry dressing change.  Follow-up in 1 month.  Continue on the antibiotic as recommended by infectious disease         Manuel Lozada MD 02/12/24 11:20 AM

## 2024-02-22 ENCOUNTER — HOSPITAL ENCOUNTER (OUTPATIENT)
Dept: RADIOLOGY | Facility: HOSPITAL | Age: 79
Discharge: HOME | End: 2024-02-22
Payer: MEDICARE

## 2024-02-22 DIAGNOSIS — L02.211 ABSCESS OF ABDOMINAL WALL: ICD-10-CM

## 2024-02-22 PROCEDURE — 74177 CT ABD & PELVIS W/CONTRAST: CPT | Performed by: RADIOLOGY

## 2024-02-22 PROCEDURE — 74177 CT ABD & PELVIS W/CONTRAST: CPT

## 2024-02-22 PROCEDURE — 2550000001 HC RX 255 CONTRASTS: Performed by: INTERNAL MEDICINE

## 2024-02-22 RX ADMIN — IOHEXOL 75 ML: 350 INJECTION, SOLUTION INTRAVENOUS at 11:36

## 2024-02-29 ENCOUNTER — OFFICE VISIT (OUTPATIENT)
Dept: INFECTIOUS DISEASES | Age: 79
End: 2024-02-29

## 2024-02-29 VITALS
HEART RATE: 58 BPM | RESPIRATION RATE: 18 BRPM | DIASTOLIC BLOOD PRESSURE: 59 MMHG | SYSTOLIC BLOOD PRESSURE: 118 MMHG | TEMPERATURE: 97.2 F

## 2024-02-29 DIAGNOSIS — K65.1 INTRA-ABDOMINAL ABSCESS (HCC): ICD-10-CM

## 2024-02-29 DIAGNOSIS — A04.72 C. DIFFICILE COLITIS: ICD-10-CM

## 2024-02-29 DIAGNOSIS — T14.8XXA SURGICAL WOUND PRESENT: Primary | ICD-10-CM

## 2024-02-29 ASSESSMENT — ENCOUNTER SYMPTOMS
RESPIRATORY NEGATIVE: 1
GASTROINTESTINAL NEGATIVE: 1

## 2024-02-29 NOTE — PROGRESS NOTES
Infectious Disease     Patient Name: Scarlett Gage  Date: 2/29/2024  YOB: 1945  Medical Record Number: [id number]        C. difficile   Intra-abdominal abscess       in January Presenting with abdominal pain, diarrhea   Anterior abdominal  with open surgical anastomosis/wound    CT scan shows presumed abscess thick-walled air-fluid collection in the pelvis     C. difficile test prior PCR is positive     Status Exam Begun Exam Ended   Final 1/21/2024 05:19 1/21/2024 05:20      Study Result     Narrative & Impression   STUDY:  CT Abdomen and Pelvis without and with IV Contrast; 0/21/2024 at 5:21  AM  INDICATION:  Status post bowel obstruction, diarrhea.  COMPARISON:  12/29/2023 CT abdomen and pelvis.  ACCESSION NUMBER(S):  [id number]  ORDERING CLINICIAN:  CAMILA CANDELARIA  TECHNIQUE:  CT of the abdomen and pelvis was performed without and with IV  contrast.  Contiguous axial images were obtained at 3 mm slice  thickness through the abdomen and pelvis.  Coronal and sagittal  reconstructions at 3 mm slice thickness were performed.  Omnipaque 350  75 mL was administered intravenously; positive oral contrast was  given.    FINDINGS:  LOWER CHEST:  No cardiomegaly.  No pericardial effusion.  Lung bases demonstrate  trace bilateral pleural effusions with minimal bibasilar atelectasis.      ABDOMEN:     LIVER:  No hepatomegaly.  Smooth surface contour.  Normal attenuation.     BILE DUCTS:  No intrahepatic or extrahepatic biliary ductal dilatation.     GALLBLADDER:  Gallbladder is absent.    STOMACH:  No abnormalities identified.     PANCREAS:  Mild pancreatic atrophy again noted.  No masses or ductal dilatation.     SPLEEN:  No splenomegaly or focal splenic lesion.     ADRENAL GLANDS:  No thickening or nodules.     KIDNEYS AND URETERS:  Kidneys are normal in size and location.  No renal or ureteral  calculi.     PELVIS:     BLADDER:  Mildly distended appearance of the urinary bladder with small amount  of

## 2024-03-04 ENCOUNTER — TELEPHONE (OUTPATIENT)
Dept: INFECTIOUS DISEASES | Age: 79
End: 2024-03-04

## 2024-03-04 NOTE — TELEPHONE ENCOUNTER
Pt  resides in a SNF. Called to give orders and schedule an appointment.  Pt has an elevated ALT of 542 U/L reported on 03/04/24.  Discussed with Dr Mchugh.  Per Dr Mchugh; order a STAT CMP, schedule an appointment ASAP with us (ID) and GI if she has a provider.  Called [facility] at [phone]. Talked to Nurse Bender (on [facility]).  Gave verbal order per Dr Mchugh for a STAT CMP and schedule appointment. An appointment available 03/05/24 @ 2p.  Per Nurse Bender, it may be difficult to schedule transportation with [facility] for tomorrow, therefore she will call the office in order to verify if scheduled time can be take place.    Fax sent to facility @ [phone] with orders and appointment (if able).    Granddaughter Jarrell Gage called office @ [phone]. Unable to make available appointment on 03/05/24.  Pt scheduled an appointment for 03/08/24 @ 0939. Granddaughter states she can bring pt in at that time.

## 2024-03-05 ENCOUNTER — TELEPHONE (OUTPATIENT)
Dept: INFECTIOUS DISEASES | Age: 79
End: 2024-03-05

## 2024-03-05 NOTE — TELEPHONE ENCOUNTER
Pt's ALT still elevated. Reported at 508 U/L.  After review, per Dr Cardozo; Pt needs to be hospitalized. The cause is from something else, not the antibiotic.  Called [facility] @ [phone], spoke to Nurse Jones,  and gave recommendation per Dr Cardozo.  Per Nurse Jones, \"Okay. We will send her to the hospital.\"

## 2024-03-08 ENCOUNTER — OFFICE VISIT (OUTPATIENT)
Dept: INFECTIOUS DISEASES | Age: 79
End: 2024-03-08

## 2024-03-08 DIAGNOSIS — K65.1 INTRA-ABDOMINAL ABSCESS (HCC): ICD-10-CM

## 2024-03-08 DIAGNOSIS — R89.9 ABNORMAL LABORATORY TEST: ICD-10-CM

## 2024-03-08 DIAGNOSIS — A04.72 C. DIFFICILE COLITIS: ICD-10-CM

## 2024-03-08 DIAGNOSIS — T14.8XXA SURGICAL WOUND PRESENT: Primary | ICD-10-CM

## 2024-03-08 ASSESSMENT — ENCOUNTER SYMPTOMS
RESPIRATORY NEGATIVE: 1
GASTROINTESTINAL NEGATIVE: 1

## 2024-03-08 NOTE — PROGRESS NOTES
and pelvis as described suggesting  a mild diffuse ileus and/or partial obstruction.  Superimposed  enterocolitis cannot be excluded.  Small amount of nondependent air is seen within the urinary bladder.   Correlate for any recent intervention versus developing emphysematous  cystitis.  Anterior abdominal wall open surgical anastomosis is demonstrated as  described with adjacent soft tissue thickening suggesting phlegmonous  reaction.  No distinct drainable anterior abdominal wall fluid  collection identified.  NOTIFICATION:  The critical results of the study were discussed with,  and acknowledged by Dr. Jeremy Lazo by telephone on 01/21/2024 at  0658.  Signed by Milo Martinez MD       No surgical intervention was undertaken at [facility]        Patient was discharged on oral vancomycin  Patient was discharged on IV meropenem              Follow-up CT scan      CT abdomen pelvis w IV contrast  Order: [id number]  Impression    1.  Mild nonspecific periportal edema as described.  2. Improvement presumed inflammatory edema at the anastomosis in the  right mid abdomen, with decreased adjacent abscess as described.  3. Slight improvement of anterior abdominal incisional diastasis,  again with attenuation of the soft tissue margins that may be due to  fibrosis/granulation, but component of inflammatory edema again is  possible. There is also a persistent but slightly decreased  subcutaneous fluid tract more cephalad.  4. Renal cortical atrophy greater on the left      MACRO:  1. None      Signed by: Donaldo Meza 2/23/2024 11:46 AM  Dictation workstation:   [id number]  Narrative    Interpreted By:  Donaldo Meza,  STUDY:  CT ABDOMEN PELVIS W IV CONTRAST;  2/22/2024 11:36 am      INDICATION:  Signs/Symptoms:abscess of abdominal wall.      COMPARISON:  01/21/2024      ACCESSION NUMBER(S):  [id number]      ORDERING CLINICIAN:  DONALDO HERNANDEZ      TECHNIQUE:  CT of the abdomen and pelvis was performed. Contiguous

## 2024-03-12 ENCOUNTER — APPOINTMENT (OUTPATIENT)
Dept: RADIOLOGY | Facility: HOSPITAL | Age: 79
End: 2024-03-12
Payer: MEDICARE

## 2024-03-12 ENCOUNTER — HOSPITAL ENCOUNTER (EMERGENCY)
Facility: HOSPITAL | Age: 79
Discharge: HOME | End: 2024-03-12
Attending: STUDENT IN AN ORGANIZED HEALTH CARE EDUCATION/TRAINING PROGRAM
Payer: MEDICARE

## 2024-03-12 ENCOUNTER — APPOINTMENT (OUTPATIENT)
Dept: CARDIOLOGY | Facility: HOSPITAL | Age: 79
End: 2024-03-12
Payer: MEDICARE

## 2024-03-12 ENCOUNTER — TELEPHONE (OUTPATIENT)
Dept: INFECTIOUS DISEASES | Age: 79
End: 2024-03-12

## 2024-03-12 VITALS
SYSTOLIC BLOOD PRESSURE: 134 MMHG | RESPIRATION RATE: 18 BRPM | HEIGHT: 63 IN | HEART RATE: 62 BPM | OXYGEN SATURATION: 95 % | WEIGHT: 150 LBS | DIASTOLIC BLOOD PRESSURE: 62 MMHG | TEMPERATURE: 98.1 F | BODY MASS INDEX: 26.58 KG/M2

## 2024-03-12 DIAGNOSIS — R74.8 ELEVATED LIVER ENZYMES: Primary | ICD-10-CM

## 2024-03-12 LAB
ALBUMIN SERPL BCP-MCNC: 3.3 G/DL (ref 3.4–5)
ALP SERPL-CCNC: 180 U/L (ref 33–136)
ALT SERPL W P-5'-P-CCNC: 781 U/L (ref 7–45)
AMMONIA PLAS-SCNC: 24 UMOL/L (ref 16–53)
ANION GAP SERPL CALC-SCNC: 13 MMOL/L (ref 10–20)
APPEARANCE UR: CLEAR
AST SERPL W P-5'-P-CCNC: 443 U/L (ref 9–39)
ATRIAL RATE: 54 BPM
BASOPHILS # BLD AUTO: 0.01 X10*3/UL (ref 0–0.1)
BASOPHILS NFR BLD AUTO: 0.3 %
BILIRUB SERPL-MCNC: 0.8 MG/DL (ref 0–1.2)
BILIRUB UR STRIP.AUTO-MCNC: NEGATIVE MG/DL
BUN SERPL-MCNC: 35 MG/DL (ref 6–23)
CALCIUM SERPL-MCNC: 8.8 MG/DL (ref 8.6–10.3)
CARDIAC TROPONIN I PNL SERPL HS: 6 NG/L (ref 0–13)
CHLORIDE SERPL-SCNC: 104 MMOL/L (ref 98–107)
CO2 SERPL-SCNC: 24 MMOL/L (ref 21–32)
COLOR UR: NORMAL
CREAT SERPL-MCNC: 0.94 MG/DL (ref 0.5–1.05)
EGFRCR SERPLBLD CKD-EPI 2021: 63 ML/MIN/1.73M*2
EOSINOPHIL # BLD AUTO: 0.1 X10*3/UL (ref 0–0.4)
EOSINOPHIL NFR BLD AUTO: 3.1 %
ERYTHROCYTE [DISTWIDTH] IN BLOOD BY AUTOMATED COUNT: 16.5 % (ref 11.5–14.5)
GLUCOSE SERPL-MCNC: 109 MG/DL (ref 74–99)
GLUCOSE UR STRIP.AUTO-MCNC: NEGATIVE MG/DL
HCT VFR BLD AUTO: 28.8 % (ref 36–46)
HGB BLD-MCNC: 9.4 G/DL (ref 12–16)
IMM GRANULOCYTES # BLD AUTO: 0.01 X10*3/UL (ref 0–0.5)
IMM GRANULOCYTES NFR BLD AUTO: 0.3 % (ref 0–0.9)
KETONES UR STRIP.AUTO-MCNC: NEGATIVE MG/DL
LACTATE SERPL-SCNC: 0.9 MMOL/L (ref 0.4–2)
LEUKOCYTE ESTERASE UR QL STRIP.AUTO: NEGATIVE
LIPASE SERPL-CCNC: 26 U/L (ref 9–82)
LYMPHOCYTES # BLD AUTO: 1.12 X10*3/UL (ref 0.8–3)
LYMPHOCYTES NFR BLD AUTO: 34.3 %
MAGNESIUM SERPL-MCNC: 1.88 MG/DL (ref 1.6–2.4)
MCH RBC QN AUTO: 28 PG (ref 26–34)
MCHC RBC AUTO-ENTMCNC: 32.6 G/DL (ref 32–36)
MCV RBC AUTO: 86 FL (ref 80–100)
MONOCYTES # BLD AUTO: 0.22 X10*3/UL (ref 0.05–0.8)
MONOCYTES NFR BLD AUTO: 6.7 %
NEUTROPHILS # BLD AUTO: 1.81 X10*3/UL (ref 1.6–5.5)
NEUTROPHILS NFR BLD AUTO: 55.3 %
NITRITE UR QL STRIP.AUTO: NEGATIVE
NRBC BLD-RTO: 0 /100 WBCS (ref 0–0)
P AXIS: 107 DEGREES
P OFFSET: 138 MS
P ONSET: 105 MS
PH UR STRIP.AUTO: 5 [PH]
PLATELET # BLD AUTO: 147 X10*3/UL (ref 150–450)
POTASSIUM SERPL-SCNC: 4 MMOL/L (ref 3.5–5.3)
PR INTERVAL: 214 MS
PROT SERPL-MCNC: 6.3 G/DL (ref 6.4–8.2)
PROT UR STRIP.AUTO-MCNC: NEGATIVE MG/DL
Q ONSET: 212 MS
QRS COUNT: 9 BEATS
QRS DURATION: 136 MS
QT INTERVAL: 466 MS
QTC CALCULATION(BAZETT): 441 MS
QTC FREDERICIA: 449 MS
R AXIS: -48 DEGREES
RBC # BLD AUTO: 3.36 X10*6/UL (ref 4–5.2)
RBC # UR STRIP.AUTO: NEGATIVE /UL
SODIUM SERPL-SCNC: 137 MMOL/L (ref 136–145)
SP GR UR STRIP.AUTO: 1.01
T AXIS: 30 DEGREES
T OFFSET: 445 MS
UROBILINOGEN UR STRIP.AUTO-MCNC: <2 MG/DL
VENTRICULAR RATE: 54 BPM
WBC # BLD AUTO: 3.3 X10*3/UL (ref 4.4–11.3)

## 2024-03-12 PROCEDURE — 81003 URINALYSIS AUTO W/O SCOPE: CPT

## 2024-03-12 PROCEDURE — 96360 HYDRATION IV INFUSION INIT: CPT | Mod: 59

## 2024-03-12 PROCEDURE — 83605 ASSAY OF LACTIC ACID: CPT

## 2024-03-12 PROCEDURE — 82140 ASSAY OF AMMONIA: CPT

## 2024-03-12 PROCEDURE — 2550000001 HC RX 255 CONTRASTS: Performed by: STUDENT IN AN ORGANIZED HEALTH CARE EDUCATION/TRAINING PROGRAM

## 2024-03-12 PROCEDURE — 2500000004 HC RX 250 GENERAL PHARMACY W/ HCPCS (ALT 636 FOR OP/ED)

## 2024-03-12 PROCEDURE — 83735 ASSAY OF MAGNESIUM: CPT

## 2024-03-12 PROCEDURE — 74177 CT ABD & PELVIS W/CONTRAST: CPT

## 2024-03-12 PROCEDURE — 93005 ELECTROCARDIOGRAM TRACING: CPT

## 2024-03-12 PROCEDURE — 85025 COMPLETE CBC W/AUTO DIFF WBC: CPT

## 2024-03-12 PROCEDURE — 84484 ASSAY OF TROPONIN QUANT: CPT

## 2024-03-12 PROCEDURE — 99285 EMERGENCY DEPT VISIT HI MDM: CPT | Mod: 25

## 2024-03-12 PROCEDURE — 71045 X-RAY EXAM CHEST 1 VIEW: CPT

## 2024-03-12 PROCEDURE — 80053 COMPREHEN METABOLIC PANEL: CPT

## 2024-03-12 PROCEDURE — 74177 CT ABD & PELVIS W/CONTRAST: CPT | Performed by: RADIOLOGY

## 2024-03-12 PROCEDURE — 83690 ASSAY OF LIPASE: CPT

## 2024-03-12 RX ADMIN — SODIUM CHLORIDE 1000 ML: 9 INJECTION, SOLUTION INTRAVENOUS at 16:54

## 2024-03-12 RX ADMIN — IOHEXOL 75 ML: 350 INJECTION, SOLUTION INTRAVENOUS at 18:07

## 2024-03-12 ASSESSMENT — LIFESTYLE VARIABLES
EVER FELT BAD OR GUILTY ABOUT YOUR DRINKING: NO
HAVE YOU EVER FELT YOU SHOULD CUT DOWN ON YOUR DRINKING: NO
HAVE PEOPLE ANNOYED YOU BY CRITICIZING YOUR DRINKING: NO
EVER HAD A DRINK FIRST THING IN THE MORNING TO STEADY YOUR NERVES TO GET RID OF A HANGOVER: NO

## 2024-03-12 ASSESSMENT — PAIN SCALES - GENERAL
PAINLEVEL_OUTOF10: 0 - NO PAIN

## 2024-03-12 ASSESSMENT — PAIN - FUNCTIONAL ASSESSMENT
PAIN_FUNCTIONAL_ASSESSMENT: 0-10
PAIN_FUNCTIONAL_ASSESSMENT: 0-10

## 2024-03-12 NOTE — ED PROVIDER NOTES
HPI   Chief Complaint   Patient presents with    Abnormal Labs       History provided by: Patient and family    CC: Abnormal liver enzymes    HPI: 78-year-old female presents emergency department the family to be evaluated for worsening liver enzymes.  Patient was seen in late February for small bowel obstruction and eventual intra-abdominal abscess that required her to be started on IV meropenem.  She also tested positive for C. difficile at that time and is on oral vancomycin.  Shortly afterwards her liver enzymes started to increase, she was seen in the ER and referred to outpatient GI for this.  At that time, they believed it may have been a drug-induced liver injury from either the meropenem or the vancomycin.  They also discontinued her Lipitor due to this.  They were called today because her repeat blood work showed that her enzymes are getting substantially worse and they recommended she come to the ER.  The only complaint the patient has is fatigue.  She denies abdominal pain, nausea vomiting, diarrhea constipation, blood in the urine or stool.  Denies urgency, frequency, dysuria.  Denies headache and vision changes.  Denies chest pain or difficulty breathing.  Denies history of heart or lung disease.  Patient's hepatitis panel also came back positive for hepatitis A.  Patient has a subacute open wound over the umbilical region of her abdomen, she states that it is healing well.  There is occasional yellow discharge but she informs me that she was told this is to be expected.  She denies any redness or warmth.  Denies any worsening dyspnea since.  Denies fever and chills.  Denies all other systemic symptoms.    ROS: Negative unless mentioned in HPI    Social Hx: Denies tobacco, alcohol, drug use    Medical Hx: Medical history significant for A-fib, diabetes, dementia.  Allergy list reviewed.  Immunizations up-to-date.    Physical exam:    Constitutional: Patient is well-nourished and well-developed.   Sitting comfortably in the room and in no distress.  Oriented to person, place, time, and situation.    HEENT: Head is normocephalic, atraumatic. Patient's airway is patent.  Tympanic membranes are clear bilaterally.  Nasal mucosa clear.  Mouth with normal mucosa.  Throat is not erythematous and there are no oropharyngeal exudates, uvula is midline.  No obvious facial deformities.    Eyes: Clear bilaterally.  Pupils are equal round and reactive to light and accommodation.  Extraocular movements intact.      Cardiac: Regular rate, regular rhythm.  Heart sounds S1, S2.  No murmurs, rubs, or gallops.  PMI nondisplaced.  No JVD.    Respiratory: Regular respiratory rate and effort.  Breath sounds are clear and equal bilaterally, no adventitious lung sounds.  Patient is speaking in full sentences and is in no apparent respiratory distress. No use of accessory muscles.      Gastrointestinal: Abdomen is soft, nondistended, and nontender.  Patient has an approximate 6 x 2 cm subacute open surgical site over the umbilical/epigastric region of her abdomen.  There is a mild amount of yellow discharge but there is no foul smell.  No redness, warmth, swelling, or further assistance.  No rebound tenderness or guarding.  Bowel sounds are normal active.    Genitourinary: No CVA or flank tenderness.    Musculoskeletal: No reproducible tenderness.  No obvious skin or bony deformities.  Patient has equal range of motion in all extremities and no strength deficiencies.  No muscle or joint tenderness. No back or neck tenderness.  Capillary refill less than 3 seconds.  Strong peripheral pulses.  No sensory deficits.    Neurological: Patient is alert and oriented.  No focal deficits.  5/5 strength in all extremities.  Cranial nerves II through XII intact. GCS15.     Skin: Skin is normal color for race and is warm, dry, and intact.  No evidence of trauma.  No lesions, rashes, bruising, jaundice, or masses.    Psych: Appropriate mood and  affect.  No apparent risk to self or others.    Heme/lymph: No adenopathy, lymphadenopathy, or splenomegaly    Physical exam is otherwise negative unless stated above or in history of present illness.                              No data recorded                   Patient History   Past Medical History:   Diagnosis Date    Atrial fibrillation (CMS/HCC)     Diabetes mellitus (CMS/HCC)      Past Surgical History:   Procedure Laterality Date    CHOLECYSTECTOMY      HIATAL HERNIA REPAIR      HYSTERECTOMY       No family history on file.  Social History     Tobacco Use    Smoking status: Never     Passive exposure: Never    Smokeless tobacco: Never   Vaping Use    Vaping Use: Never used   Substance Use Topics    Alcohol use: Never    Drug use: Never       Physical Exam   ED Triage Vitals [03/12/24 1606]   Temperature Heart Rate Respirations BP   37 °C (98.6 °F) 60 20 130/62      Pulse Ox Temp Source Heart Rate Source Patient Position   97 % Temporal -- --      BP Location FiO2 (%)     -- --       Physical Exam    ED Course & MDM   Diagnoses as of 03/12/24 2020   Elevated liver enzymes       Patient updated on plan for lab testing, IV insertion, radiology imaging, and medications to be administered while in the ER (if indicated). Patient updated on expected wait times for testing and results. Patient provided my name and told to ask any staff member for questions or concerns if they should arise. Electronic medical record reviewed.     MDM    Upon initial assessment, patient was healthy non-toxic appearing and in no apparent distress.     Patient presented to the emergency department with the chief complaint worsening liver enzymes and fatigue. Abdomen is soft, nondistended, and nontender.  Patient has an approximate 6 x 2 cm subacute open surgical site over the umbilical/epigastric region of her abdomen.  There is a mild amount of yellow discharge but there is no foul smell.  No redness, warmth, swelling, or further  assistance.  No rebound tenderness or guarding.  Bowel sounds are normal active.  No neurological deficits.  Breath sounds are clear and equal bilaterally, 97% on room air.  No muffled heart sounds, JVD, murmur.  On arrival to the emergency department, vital signs were within normal limits    Patient's EKG was performed at 1650 interpreted by me.  Sinus bradycardia 54 beats minute with first-degree AV block.  Patient has no indications for atropine or transcutaneous pacing at this time.  She has a bifascicular block and her EKG overall looks very similar to her previous in January 2024.  Left axis deviation noted.    Patient's urinalysis revealed a urinary tract infection.  CMP shows an albumin of 3.3, total protein of 6.3.  This suggest malnutrition.  Patient's alk phos is 180, AST is 443, and ALT is 780.  This is substantially improved compared to her labs that were performed yesterday which showed an alk phos of 256, and AST and ALT of over 1600.  Her troponin is within normal limits as well as her lipase and ammonia.  Magnesium and lactate are within normal limits.  CBC shows a leukopenia, thrombocytopenia, and normocytic anemia that are all similar to the patient's previous history of pancytopenia chest x-ray reveals no acute cardiopulmonary process.  CT shows that the patient has decreased stranding at the level of the enteric anastomosis and has mild right peripelvic stranding.  Urinalysis ruled out a urinary tract infection.  Patient's diastasis of the lower abdominal anterior wall is stable.    I did speak to our gastroenterologist who reassured me that the patient does not require admission for this especially given that her labs are improving.  I did recommend that the patient follow-up closely with her gastroenterologist within the next day or 2.  Patient verbalized understanding of this and would prefer to go home and we discussed her disposition.  She will call her PCP and her gastroenterologist  tomorrow.  All questions and concerns addressed.  Reasons to return to ER discussed.  Patient verbalized understanding and agreement with the treatment plan and they remained hemodynamically stable in the ER.        This note was dictated using a speech recognition program.  While an attempt was made at proof-reading to minimize errors, minor errors in transcription may be present  Medical Decision Making      Procedure  Procedures     Bowen Mederos PA-C  03/12/24 2022

## 2024-03-12 NOTE — TELEPHONE ENCOUNTER
Nurse Carrie called (@11:35am) to request updated orders and notes as Mrs Gage has been released to her Home from [facility]. [facility] requests specifically to D/c Picc line as patient is no longer receiving IV Abx. ID note and orders obtained and faxed.   Dr Cardozo was in ID Clinic briefly and signed for the D/c of Picc Line.   Request for Picc Line Report [place] 1[place]24/24 [place] so far. No report available. A request was sent to E-Specials dept.

## 2024-03-13 LAB — HOLD SPECIMEN: NORMAL

## 2024-03-15 ENCOUNTER — OFFICE VISIT (OUTPATIENT)
Dept: WOUND CARE | Facility: CLINIC | Age: 79
End: 2024-03-15
Payer: MEDICARE

## 2024-03-15 PROCEDURE — 99214 OFFICE O/P EST MOD 30 MIN: CPT

## 2024-03-15 PROCEDURE — 99214 OFFICE O/P EST MOD 30 MIN: CPT | Performed by: SURGERY

## 2024-03-19 ENCOUNTER — OFFICE VISIT (OUTPATIENT)
Dept: WOUND CARE | Facility: CLINIC | Age: 79
End: 2024-03-19
Payer: MEDICARE

## 2024-03-19 PROCEDURE — 11042 DBRDMT SUBQ TIS 1ST 20SQCM/<: CPT

## 2024-03-19 PROCEDURE — 11042 DBRDMT SUBQ TIS 1ST 20SQCM/<: CPT | Performed by: SURGERY

## 2024-03-28 ENCOUNTER — OFFICE VISIT (OUTPATIENT)
Dept: WOUND CARE | Facility: CLINIC | Age: 79
End: 2024-03-28
Payer: MEDICARE

## 2024-03-28 PROCEDURE — 99212 OFFICE O/P EST SF 10 MIN: CPT

## 2024-03-28 PROCEDURE — 99212 OFFICE O/P EST SF 10 MIN: CPT | Performed by: PLASTIC SURGERY

## 2024-03-29 LAB
ATRIAL RATE: 76 BPM
P AXIS: 90 DEGREES
P OFFSET: 162 MS
P ONSET: 113 MS
PR INTERVAL: 196 MS
Q ONSET: 211 MS
QRS COUNT: 12 BEATS
QRS DURATION: 128 MS
QT INTERVAL: 412 MS
QTC CALCULATION(BAZETT): 463 MS
QTC FREDERICIA: 445 MS
R AXIS: -45 DEGREES
T AXIS: 34 DEGREES
T OFFSET: 417 MS
VENTRICULAR RATE: 76 BPM

## 2024-04-04 ENCOUNTER — OFFICE VISIT (OUTPATIENT)
Dept: INFECTIOUS DISEASES | Age: 79
End: 2024-04-04
Payer: MEDICARE

## 2024-04-04 VITALS
TEMPERATURE: 98.1 F | RESPIRATION RATE: 16 BRPM | HEART RATE: 80 BPM | SYSTOLIC BLOOD PRESSURE: 123 MMHG | DIASTOLIC BLOOD PRESSURE: 63 MMHG

## 2024-04-04 DIAGNOSIS — A04.72 C. DIFFICILE COLITIS: ICD-10-CM

## 2024-04-04 DIAGNOSIS — K65.1 INTRA-ABDOMINAL ABSCESS (HCC): Primary | ICD-10-CM

## 2024-04-04 DIAGNOSIS — R79.89 ELEVATED LFTS: ICD-10-CM

## 2024-04-04 PROCEDURE — 3078F DIAST BP <80 MM HG: CPT | Performed by: INTERNAL MEDICINE

## 2024-04-04 PROCEDURE — 1090F PRES/ABSN URINE INCON ASSESS: CPT | Performed by: INTERNAL MEDICINE

## 2024-04-04 PROCEDURE — 99214 OFFICE O/P EST MOD 30 MIN: CPT | Performed by: INTERNAL MEDICINE

## 2024-04-04 PROCEDURE — 1036F TOBACCO NON-USER: CPT | Performed by: INTERNAL MEDICINE

## 2024-04-04 PROCEDURE — 1123F ACP DISCUSS/DSCN MKR DOCD: CPT | Performed by: INTERNAL MEDICINE

## 2024-04-04 PROCEDURE — G8427 DOCREV CUR MEDS BY ELIG CLIN: HCPCS | Performed by: INTERNAL MEDICINE

## 2024-04-04 PROCEDURE — G8400 PT W/DXA NO RESULTS DOC: HCPCS | Performed by: INTERNAL MEDICINE

## 2024-04-04 PROCEDURE — 3074F SYST BP LT 130 MM HG: CPT | Performed by: INTERNAL MEDICINE

## 2024-04-04 PROCEDURE — G8421 BMI NOT CALCULATED: HCPCS | Performed by: INTERNAL MEDICINE

## 2024-04-04 RX ORDER — LEVOTHYROXINE SODIUM 0.07 MG/1
75 TABLET ORAL DAILY
COMMUNITY

## 2024-04-04 RX ORDER — CARVEDILOL 12.5 MG/1
12.5 TABLET ORAL 2 TIMES DAILY
Qty: 60 TABLET | Refills: 0 | Status: SHIPPED | OUTPATIENT
Start: 2024-04-04 | End: 2024-05-04

## 2024-04-04 RX ORDER — AMLODIPINE BESYLATE 5 MG/1
5 TABLET ORAL DAILY
COMMUNITY

## 2024-04-04 RX ORDER — FLUOXETINE HYDROCHLORIDE 20 MG/1
20 CAPSULE ORAL DAILY
COMMUNITY

## 2024-04-04 RX ORDER — VANCOMYCIN HYDROCHLORIDE 50 MG/ML
5 KIT ORAL
COMMUNITY
Start: 2024-03-02

## 2024-04-04 NOTE — PROGRESS NOTES
Scarlett Gage (:  1945) is a 79 y.o. female,Established patient, here for evaluation of the following chief complaint(s):  Wound Infection (2 week f/u- abdominal wound-)         ASSESSMENT/PLAN:  Intra-abdominal abscess, resolved  C. difficile colitis, resolved  Elevated LFTs probably related to medications    Refill Coreg x 1 since patient ran out 2 weeks ago and is not able to get referrals from the primary care physician  Follow-up with primary care physician.   Repeat LFTs if not done recently  May resume Lipitor if LFTs have not improved  Continue local wound care to abdominal incision  Discussed with the granddaughter    Subjective   SUBJECTIVE/OBJECTIVE:  HPI  Follow-up intra-abdominal abscess and C. difficile colitis, done with IV meropenem and p.o. vancomycin, discontinued during last visit.  Had issues with elevated liver function test with concern of drug related side effect.  Lipitor was put on hold at that point.  Patient was seen by GI specialist with negative workup for acute viral hepatitis.  Follow-up imaging of the abdomen with resolution of infection  Review of Systems   Resolved diarrhea  Healing abdominal incision  Decreasing generalized weakness and fatigue  Improving appetite  No fevers or chills  No jaundice    Objective   Physical Exam  Vitals:    24 1111   BP: 123/63   Pulse: 80   Resp: 16   Temp: 98.1 °F (36.7 °C)   TempSrc: Temporal     General Appearance: alert and oriented to person, place and time, well-developed and well-nourished, in no acute distress  Skin: warm and dry, no rash.   Head: normocephalic and atraumatic  Eyes: anicteric sclerae  ENT: oropharynx clear and moist with normal mucous membranes. No oral thrush  Lungs: normal respiratory effort, clear lungs  Heart normal S1-S2  Abdomen: soft, no tenderness  No leg edema  No erythema, no tenderness     No hepatosplenomegaly  Mild left upper quadrant tenderness  Abdominal incision with good healing   100%

## 2024-04-05 ENCOUNTER — OFFICE VISIT (OUTPATIENT)
Dept: WOUND CARE | Facility: CLINIC | Age: 79
End: 2024-04-05
Payer: MEDICARE

## 2024-04-05 PROCEDURE — 11042 DBRDMT SUBQ TIS 1ST 20SQCM/<: CPT

## 2024-04-05 PROCEDURE — 11042 DBRDMT SUBQ TIS 1ST 20SQCM/<: CPT | Performed by: SURGERY

## 2024-04-12 ENCOUNTER — OFFICE VISIT (OUTPATIENT)
Dept: WOUND CARE | Facility: CLINIC | Age: 79
End: 2024-04-12
Payer: MEDICARE

## 2024-04-12 PROCEDURE — 11042 DBRDMT SUBQ TIS 1ST 20SQCM/<: CPT

## 2024-04-12 PROCEDURE — 11042 DBRDMT SUBQ TIS 1ST 20SQCM/<: CPT | Performed by: SURGERY

## 2024-04-26 ENCOUNTER — OFFICE VISIT (OUTPATIENT)
Dept: WOUND CARE | Facility: CLINIC | Age: 79
End: 2024-04-26
Payer: MEDICARE

## 2024-04-26 PROCEDURE — 11042 DBRDMT SUBQ TIS 1ST 20SQCM/<: CPT | Performed by: SURGERY

## 2024-04-26 PROCEDURE — 11042 DBRDMT SUBQ TIS 1ST 20SQCM/<: CPT

## 2024-05-03 ENCOUNTER — OFFICE VISIT (OUTPATIENT)
Dept: WOUND CARE | Facility: CLINIC | Age: 79
End: 2024-05-03
Payer: MEDICARE

## 2024-05-03 PROCEDURE — 11042 DBRDMT SUBQ TIS 1ST 20SQCM/<: CPT

## 2024-05-03 PROCEDURE — 11042 DBRDMT SUBQ TIS 1ST 20SQCM/<: CPT | Performed by: SURGERY

## 2024-05-17 ENCOUNTER — OFFICE VISIT (OUTPATIENT)
Dept: WOUND CARE | Facility: CLINIC | Age: 79
End: 2024-05-17
Payer: MEDICARE

## 2024-05-17 PROCEDURE — 11042 DBRDMT SUBQ TIS 1ST 20SQCM/<: CPT

## 2024-05-17 PROCEDURE — 11042 DBRDMT SUBQ TIS 1ST 20SQCM/<: CPT | Performed by: SURGERY

## 2024-05-31 ENCOUNTER — APPOINTMENT (OUTPATIENT)
Dept: WOUND CARE | Facility: CLINIC | Age: 79
End: 2024-05-31
Payer: MEDICARE

## 2024-06-07 ENCOUNTER — OFFICE VISIT (OUTPATIENT)
Dept: WOUND CARE | Facility: CLINIC | Age: 79
End: 2024-06-07
Payer: MEDICARE

## 2024-06-07 PROCEDURE — 11042 DBRDMT SUBQ TIS 1ST 20SQCM/<: CPT | Performed by: SURGERY

## 2024-06-07 PROCEDURE — 11042 DBRDMT SUBQ TIS 1ST 20SQCM/<: CPT

## 2024-06-18 ENCOUNTER — OFFICE VISIT (OUTPATIENT)
Dept: WOUND CARE | Facility: CLINIC | Age: 79
End: 2024-06-18
Payer: MEDICARE

## 2024-06-18 PROCEDURE — 11042 DBRDMT SUBQ TIS 1ST 20SQCM/<: CPT

## 2024-06-21 ENCOUNTER — APPOINTMENT (OUTPATIENT)
Dept: WOUND CARE | Facility: CLINIC | Age: 79
End: 2024-06-21
Payer: MEDICARE

## 2024-07-01 ENCOUNTER — APPOINTMENT (OUTPATIENT)
Dept: SURGERY | Facility: CLINIC | Age: 79
End: 2024-07-01
Payer: MEDICARE

## 2024-07-01 VITALS
SYSTOLIC BLOOD PRESSURE: 130 MMHG | DIASTOLIC BLOOD PRESSURE: 72 MMHG | HEART RATE: 78 BPM | WEIGHT: 147 LBS | BODY MASS INDEX: 26.04 KG/M2

## 2024-07-01 DIAGNOSIS — Z51.89 VISIT FOR WOUND CHECK: Primary | ICD-10-CM

## 2024-07-01 PROCEDURE — 99213 OFFICE O/P EST LOW 20 MIN: CPT | Performed by: SURGERY

## 2024-07-01 PROCEDURE — 1159F MED LIST DOCD IN RCRD: CPT | Performed by: SURGERY

## 2024-07-01 PROCEDURE — 3078F DIAST BP <80 MM HG: CPT | Performed by: SURGERY

## 2024-07-01 PROCEDURE — 3075F SYST BP GE 130 - 139MM HG: CPT | Performed by: SURGERY

## 2024-07-01 PROCEDURE — 1036F TOBACCO NON-USER: CPT | Performed by: SURGERY

## 2024-07-01 PROCEDURE — 1123F ACP DISCUSS/DSCN MKR DOCD: CPT | Performed by: SURGERY

## 2024-07-01 PROCEDURE — 1160F RVW MEDS BY RX/DR IN RCRD: CPT | Performed by: SURGERY

## 2024-07-01 NOTE — PROGRESS NOTES
Subjective   Patient ID: Harper Lara is a 79 y.o. female who presents for No chief complaint on file..  HPI  79-year-old female patient well-known to me.  She underwent exlap, lysis of adhesions and small bowel resection with anastomosis on 12/17 after laparoscopic lysis of adhesions on 12/8 .  She was admitted from 1/21-1/24 and found to have small intra-abdominal abscess and c.difficile colitis and was treated with meropenem and vanco for weeks.  I last saw her February 12 and wound was healing. She had elevation of her liver enzymes which was thought to be due to the antibiotics which she is off of. Last LFTs on 6/25/2024 showed normal AST, ALT, alk phos, TBIL but low Albumin at 3.4.  He goes to the wound center and see Dr. Lo.  Also has home health nurse coming to the house.  Objective   Physical Exam  Abd: soft, non-distended, non-tender; midline incision is 6 cm long and 3.5 cm wide and <1 cm deep with good granulation tissue   Assessment/Plan   The wound continues to fill in.  She will continue to follow-up with Dr. Lo at the wound center.  Follow-up with me as needed   Manuel Lozada MD 07/01/24 8:52 AM

## 2024-07-02 ENCOUNTER — APPOINTMENT (OUTPATIENT)
Dept: WOUND CARE | Facility: CLINIC | Age: 79
End: 2024-07-02
Payer: MEDICARE

## 2024-07-05 ENCOUNTER — OFFICE VISIT (OUTPATIENT)
Dept: WOUND CARE | Facility: CLINIC | Age: 79
End: 2024-07-05
Payer: MEDICARE

## 2024-07-05 PROCEDURE — 11042 DBRDMT SUBQ TIS 1ST 20SQCM/<: CPT | Performed by: SURGERY

## 2024-07-05 PROCEDURE — 11042 DBRDMT SUBQ TIS 1ST 20SQCM/<: CPT

## 2024-07-19 ENCOUNTER — OFFICE VISIT (OUTPATIENT)
Dept: WOUND CARE | Facility: CLINIC | Age: 79
End: 2024-07-19
Payer: MEDICARE

## 2024-07-19 PROCEDURE — 15271 SKIN SUB GRAFT TRNK/ARM/LEG: CPT

## 2024-07-19 PROCEDURE — 11042 DBRDMT SUBQ TIS 1ST 20SQCM/<: CPT

## 2024-07-26 ENCOUNTER — OFFICE VISIT (OUTPATIENT)
Dept: WOUND CARE | Facility: CLINIC | Age: 79
End: 2024-07-26
Payer: MEDICARE

## 2024-07-26 PROCEDURE — 15271 SKIN SUB GRAFT TRNK/ARM/LEG: CPT | Performed by: SURGERY

## 2024-07-26 PROCEDURE — 15271 SKIN SUB GRAFT TRNK/ARM/LEG: CPT

## 2024-07-26 PROCEDURE — 1123F ACP DISCUSS/DSCN MKR DOCD: CPT | Performed by: SURGERY

## 2024-08-02 ENCOUNTER — OFFICE VISIT (OUTPATIENT)
Dept: WOUND CARE | Facility: CLINIC | Age: 79
End: 2024-08-02
Payer: MEDICARE

## 2024-08-02 PROCEDURE — 15271 SKIN SUB GRAFT TRNK/ARM/LEG: CPT | Performed by: SURGERY

## 2024-08-02 PROCEDURE — 15271 SKIN SUB GRAFT TRNK/ARM/LEG: CPT

## 2024-08-09 ENCOUNTER — OFFICE VISIT (OUTPATIENT)
Dept: WOUND CARE | Facility: CLINIC | Age: 79
End: 2024-08-09
Payer: MEDICARE

## 2024-08-09 PROCEDURE — 15271 SKIN SUB GRAFT TRNK/ARM/LEG: CPT

## 2024-08-16 ENCOUNTER — OFFICE VISIT (OUTPATIENT)
Dept: WOUND CARE | Facility: CLINIC | Age: 79
End: 2024-08-16
Payer: MEDICARE

## 2024-08-16 PROCEDURE — 15271 SKIN SUB GRAFT TRNK/ARM/LEG: CPT

## 2024-08-16 PROCEDURE — 15271 SKIN SUB GRAFT TRNK/ARM/LEG: CPT | Performed by: SURGERY

## 2024-08-22 ENCOUNTER — OFFICE VISIT (OUTPATIENT)
Dept: WOUND CARE | Facility: CLINIC | Age: 79
End: 2024-08-22
Payer: MEDICARE

## 2024-08-22 PROCEDURE — 15271 SKIN SUB GRAFT TRNK/ARM/LEG: CPT

## 2024-08-22 PROCEDURE — 15271 SKIN SUB GRAFT TRNK/ARM/LEG: CPT | Performed by: PLASTIC SURGERY

## 2024-08-23 ENCOUNTER — APPOINTMENT (OUTPATIENT)
Dept: WOUND CARE | Facility: CLINIC | Age: 79
End: 2024-08-23
Payer: MEDICARE

## 2024-08-30 ENCOUNTER — OFFICE VISIT (OUTPATIENT)
Dept: WOUND CARE | Facility: CLINIC | Age: 79
End: 2024-08-30
Payer: MEDICARE

## 2024-08-30 PROCEDURE — 99213 OFFICE O/P EST LOW 20 MIN: CPT | Performed by: SURGERY

## 2024-08-30 PROCEDURE — 99213 OFFICE O/P EST LOW 20 MIN: CPT

## 2024-09-04 ENCOUNTER — OFFICE VISIT (OUTPATIENT)
Dept: WOUND CARE | Facility: CLINIC | Age: 79
End: 2024-09-04
Payer: MEDICARE

## 2024-09-04 PROCEDURE — 99213 OFFICE O/P EST LOW 20 MIN: CPT

## 2024-09-05 ENCOUNTER — HOME HEALTH ADMISSION (OUTPATIENT)
Dept: HOME HEALTH SERVICES | Facility: HOME HEALTH | Age: 79
End: 2024-09-05
Payer: MEDICARE

## 2024-09-05 ENCOUNTER — APPOINTMENT (OUTPATIENT)
Dept: SURGERY | Facility: CLINIC | Age: 79
End: 2024-09-05
Payer: MEDICARE

## 2024-09-05 VITALS
BODY MASS INDEX: 25.52 KG/M2 | WEIGHT: 144 LBS | DIASTOLIC BLOOD PRESSURE: 60 MMHG | SYSTOLIC BLOOD PRESSURE: 130 MMHG | HEIGHT: 63 IN

## 2024-09-05 DIAGNOSIS — Z51.89 VISIT FOR WOUND CHECK: Primary | ICD-10-CM

## 2024-09-05 PROCEDURE — 1160F RVW MEDS BY RX/DR IN RCRD: CPT | Performed by: SURGERY

## 2024-09-05 PROCEDURE — 3078F DIAST BP <80 MM HG: CPT | Performed by: SURGERY

## 2024-09-05 PROCEDURE — 1123F ACP DISCUSS/DSCN MKR DOCD: CPT | Performed by: SURGERY

## 2024-09-05 PROCEDURE — 3075F SYST BP GE 130 - 139MM HG: CPT | Performed by: SURGERY

## 2024-09-05 PROCEDURE — 99213 OFFICE O/P EST LOW 20 MIN: CPT | Performed by: SURGERY

## 2024-09-05 PROCEDURE — 1159F MED LIST DOCD IN RCRD: CPT | Performed by: SURGERY

## 2024-09-05 NOTE — PROGRESS NOTES
Subjective   Patient ID: Harper Lara is a 79 y.o. female who presents for Follow-up and Wound Check.  HPI  79-year-old female patient well-known to me who is here for wound check.  She has been going to the wound center to see Dr. Lo.  In December 2023, she underwent ex lap, lysis of adhesions and small bowel resection with anastomosis.  She then developed small intra-abdominal abscess and C. difficile colitis that were managed with antibiotics. She is increasing her activities. She is having formed nonbloody bowel movements.  Denies abdominal pain, nausea, vomiting, fevers and chills.     Objective   Physical Exam  Gen: no acute distress  CV: RRR  Pulm: unlabored; CTAB  Abdomen: soft, non-distended, non-tender; wound is 6x4 cm and <4 mm deep; wound is filling in, no draing  Assessment/Plan   The wound is slowly healing.  Giving history of previous intra-abdominal abscess and slow wound healing, I will obtain CT abd/pelvis with contrast to rule out intra-abdominal infection. I will reorder home health for wound care.  She will continue to go to the wound center for dressing change; plans explained to patient and her neighbor who is helping care for her.        Manuel Lozada MD 09/05/24 1:34 PM

## 2024-09-05 NOTE — PATIENT INSTRUCTIONS
I have re-ordered home health nurse for wound care; I will get CT abd/pelvis to make sure no infection

## 2024-09-06 ENCOUNTER — DOCUMENTATION (OUTPATIENT)
Dept: HOME HEALTH SERVICES | Facility: HOME HEALTH | Age: 79
End: 2024-09-06
Payer: MEDICARE

## 2024-09-06 NOTE — HH CARE COORDINATION
Home Care received a Referral for Nursing. We have processed the referral for a Start of Care on 9/7 - 9/8/24.     If you have any questions or concerns, please feel free to contact us at [phone]. Follow the prompts, enter your five digit zip code, and you will be directed to your care team on [facility].

## 2024-09-07 ENCOUNTER — HOME CARE VISIT (OUTPATIENT)
Dept: HOME HEALTH SERVICES | Facility: HOME HEALTH | Age: 79
End: 2024-09-07
Payer: MEDICARE

## 2024-09-07 VITALS
SYSTOLIC BLOOD PRESSURE: 128 MMHG | DIASTOLIC BLOOD PRESSURE: 68 MMHG | HEART RATE: 61 BPM | OXYGEN SATURATION: 99 % | RESPIRATION RATE: 18 BRPM | TEMPERATURE: 98.7 F

## 2024-09-07 PROCEDURE — 169592 NO-PAY CLAIM PROCEDURE

## 2024-09-07 PROCEDURE — G0299 HHS/HOSPICE OF RN EA 15 MIN: HCPCS | Mod: HHH

## 2024-09-07 ASSESSMENT — ACTIVITIES OF DAILY LIVING (ADL)
ENTERING_EXITING_HOME: SUPERVISION
OASIS_M1830: 03
CURRENT_FUNCTION: STAND BY ASSIST
AMBULATION ASSISTANCE: STAND BY ASSIST

## 2024-09-07 ASSESSMENT — ENCOUNTER SYMPTOMS
LIMITED RANGE OF MOTION: 1
LAST BOWEL MOVEMENT: 67089
DENIES PAIN: 1
BOWEL PATTERN NORMAL: 1
LOWER EXTREMITY EDEMA: 1
DESCRIPTION OF MEMORY LOSS: SHORT TERM
STOOL FREQUENCY: DAILY
APPETITE LEVEL: GOOD
MUSCLE WEAKNESS: 1
CHANGE IN APPETITE: UNCHANGED
PERSON REPORTING PAIN: PATIENT

## 2024-09-09 ENCOUNTER — HOME CARE VISIT (OUTPATIENT)
Dept: HOME HEALTH SERVICES | Facility: HOME HEALTH | Age: 79
End: 2024-09-09
Payer: MEDICARE

## 2024-09-11 ENCOUNTER — LAB (OUTPATIENT)
Dept: LAB | Facility: LAB | Age: 79
End: 2024-09-11
Payer: MEDICARE

## 2024-09-11 ENCOUNTER — OFFICE VISIT (OUTPATIENT)
Dept: WOUND CARE | Facility: CLINIC | Age: 79
End: 2024-09-11
Payer: MEDICARE

## 2024-09-11 DIAGNOSIS — Z51.89 ENCOUNTER FOR OTHER SPECIFIED AFTERCARE: ICD-10-CM

## 2024-09-11 LAB
ANION GAP SERPL CALC-SCNC: 12 MMOL/L (ref 10–20)
BUN SERPL-MCNC: 28 MG/DL (ref 6–23)
CALCIUM SERPL-MCNC: 9.3 MG/DL (ref 8.6–10.3)
CHLORIDE SERPL-SCNC: 104 MMOL/L (ref 98–107)
CO2 SERPL-SCNC: 28 MMOL/L (ref 21–32)
CREAT SERPL-MCNC: 1.03 MG/DL (ref 0.5–1.05)
EGFRCR SERPLBLD CKD-EPI 2021: 56 ML/MIN/1.73M*2
GLUCOSE SERPL-MCNC: 81 MG/DL (ref 74–99)
POTASSIUM SERPL-SCNC: 3.8 MMOL/L (ref 3.5–5.3)
SODIUM SERPL-SCNC: 140 MMOL/L (ref 136–145)

## 2024-09-11 PROCEDURE — 36415 COLL VENOUS BLD VENIPUNCTURE: CPT

## 2024-09-11 PROCEDURE — 80048 BASIC METABOLIC PNL TOTAL CA: CPT

## 2024-09-11 PROCEDURE — 11042 DBRDMT SUBQ TIS 1ST 20SQCM/<: CPT

## 2024-09-13 ENCOUNTER — HOME CARE VISIT (OUTPATIENT)
Dept: HOME HEALTH SERVICES | Facility: HOME HEALTH | Age: 79
End: 2024-09-13
Payer: MEDICARE

## 2024-09-13 VITALS
DIASTOLIC BLOOD PRESSURE: 66 MMHG | TEMPERATURE: 98.6 F | OXYGEN SATURATION: 94 % | SYSTOLIC BLOOD PRESSURE: 133 MMHG | HEART RATE: 69 BPM

## 2024-09-13 PROCEDURE — G0299 HHS/HOSPICE OF RN EA 15 MIN: HCPCS | Mod: HHH

## 2024-09-13 ASSESSMENT — ENCOUNTER SYMPTOMS
PERSON REPORTING PAIN: PATIENT
DENIES PAIN: 1
DESCRIPTION OF MEMORY LOSS: SHORT TERM
CHANGE IN APPETITE: UNCHANGED
APPETITE LEVEL: FAIR

## 2024-09-13 ASSESSMENT — ACTIVITIES OF DAILY LIVING (ADL)
CURRENT_FUNCTION: STAND BY ASSIST
MONEY MANAGEMENT (EXPENSES/BILLS): NEEDS ASSISTANCE
AMBULATION ASSISTANCE: STAND BY ASSIST

## 2024-09-16 ENCOUNTER — HOME CARE VISIT (OUTPATIENT)
Dept: HOME HEALTH SERVICES | Facility: HOME HEALTH | Age: 79
End: 2024-09-16
Payer: MEDICARE

## 2024-09-16 VITALS
RESPIRATION RATE: 18 BRPM | DIASTOLIC BLOOD PRESSURE: 62 MMHG | TEMPERATURE: 99.1 F | SYSTOLIC BLOOD PRESSURE: 156 MMHG | OXYGEN SATURATION: 99 %

## 2024-09-16 PROCEDURE — G0299 HHS/HOSPICE OF RN EA 15 MIN: HCPCS | Mod: HHH

## 2024-09-16 SDOH — ECONOMIC STABILITY: GENERAL

## 2024-09-16 ASSESSMENT — ENCOUNTER SYMPTOMS
DENIES PAIN: 1
STOOL FREQUENCY: DAILY
APPETITE LEVEL: FAIR
DESCRIPTION OF MEMORY LOSS: SHORT TERM
PERSON REPORTING PAIN: PATIENT
DIARRHEA: 1
LAST BOWEL MOVEMENT: 67099

## 2024-09-16 ASSESSMENT — ACTIVITIES OF DAILY LIVING (ADL): MONEY MANAGEMENT (EXPENSES/BILLS): NEEDS ASSISTANCE

## 2024-09-18 ENCOUNTER — OFFICE VISIT (OUTPATIENT)
Dept: WOUND CARE | Facility: CLINIC | Age: 79
End: 2024-09-18
Payer: MEDICARE

## 2024-09-18 PROCEDURE — 11042 DBRDMT SUBQ TIS 1ST 20SQCM/<: CPT

## 2024-09-20 ENCOUNTER — HOME CARE VISIT (OUTPATIENT)
Dept: HOME HEALTH SERVICES | Facility: HOME HEALTH | Age: 79
End: 2024-09-20
Payer: MEDICARE

## 2024-09-20 VITALS
TEMPERATURE: 98.6 F | DIASTOLIC BLOOD PRESSURE: 53 MMHG | SYSTOLIC BLOOD PRESSURE: 123 MMHG | OXYGEN SATURATION: 98 % | RESPIRATION RATE: 16 BRPM | HEART RATE: 72 BPM

## 2024-09-20 PROCEDURE — G0299 HHS/HOSPICE OF RN EA 15 MIN: HCPCS | Mod: HHH

## 2024-09-20 ASSESSMENT — ENCOUNTER SYMPTOMS
DESCRIPTION OF MEMORY LOSS: SHORT TERM
CHANGE IN APPETITE: UNCHANGED
DIARRHEA: 1
STOOL FREQUENCY: MORE THAN TWICE DAILY
APPETITE LEVEL: GOOD
DENIES PAIN: 1
PERSON REPORTING PAIN: PATIENT

## 2024-09-20 ASSESSMENT — ACTIVITIES OF DAILY LIVING (ADL)
AMBULATION ASSISTANCE: STAND BY ASSIST
CURRENT_FUNCTION: STAND BY ASSIST

## 2024-09-23 ENCOUNTER — HOSPITAL ENCOUNTER (OUTPATIENT)
Dept: RADIOLOGY | Facility: HOSPITAL | Age: 79
Discharge: HOME | End: 2024-09-23
Payer: MEDICARE

## 2024-09-23 ENCOUNTER — HOME CARE VISIT (OUTPATIENT)
Dept: HOME HEALTH SERVICES | Facility: HOME HEALTH | Age: 79
End: 2024-09-23
Payer: MEDICARE

## 2024-09-23 VITALS
SYSTOLIC BLOOD PRESSURE: 153 MMHG | HEART RATE: 69 BPM | DIASTOLIC BLOOD PRESSURE: 64 MMHG | TEMPERATURE: 98.1 F | RESPIRATION RATE: 18 BRPM | OXYGEN SATURATION: 95 %

## 2024-09-23 DIAGNOSIS — Z51.89 VISIT FOR WOUND CHECK: ICD-10-CM

## 2024-09-23 PROCEDURE — A9698 NON-RAD CONTRAST MATERIALNOC: HCPCS | Performed by: SURGERY

## 2024-09-23 PROCEDURE — 74177 CT ABD & PELVIS W/CONTRAST: CPT

## 2024-09-23 PROCEDURE — 74177 CT ABD & PELVIS W/CONTRAST: CPT | Performed by: RADIOLOGY

## 2024-09-23 PROCEDURE — 2550000001 HC RX 255 CONTRASTS: Performed by: SURGERY

## 2024-09-23 PROCEDURE — G0299 HHS/HOSPICE OF RN EA 15 MIN: HCPCS | Mod: HHH

## 2024-09-23 ASSESSMENT — ENCOUNTER SYMPTOMS
DESCRIPTION OF MEMORY LOSS: SHORT TERM
PERSON REPORTING PAIN: PATIENT
DENIES PAIN: 1

## 2024-09-25 ENCOUNTER — OFFICE VISIT (OUTPATIENT)
Dept: WOUND CARE | Facility: CLINIC | Age: 79
End: 2024-09-25
Payer: MEDICARE

## 2024-09-25 PROCEDURE — 11042 DBRDMT SUBQ TIS 1ST 20SQCM/<: CPT

## 2024-09-27 ENCOUNTER — HOME CARE VISIT (OUTPATIENT)
Dept: HOME HEALTH SERVICES | Facility: HOME HEALTH | Age: 79
End: 2024-09-27
Payer: MEDICARE

## 2024-09-27 VITALS
DIASTOLIC BLOOD PRESSURE: 67 MMHG | TEMPERATURE: 97.6 F | OXYGEN SATURATION: 98 % | RESPIRATION RATE: 18 BRPM | SYSTOLIC BLOOD PRESSURE: 123 MMHG | HEART RATE: 67 BPM

## 2024-09-27 PROCEDURE — G0299 HHS/HOSPICE OF RN EA 15 MIN: HCPCS | Mod: HHH

## 2024-09-27 SDOH — ECONOMIC STABILITY: GENERAL

## 2024-09-27 ASSESSMENT — ACTIVITIES OF DAILY LIVING (ADL)
CURRENT_FUNCTION: STAND BY ASSIST
MONEY MANAGEMENT (EXPENSES/BILLS): TOTALLY DEPENDENT
AMBULATION ASSISTANCE: STAND BY ASSIST

## 2024-09-27 ASSESSMENT — ENCOUNTER SYMPTOMS
CHANGE IN APPETITE: UNCHANGED
STOOL FREQUENCY: DAILY
PERSON REPORTING PAIN: PATIENT
MUSCLE WEAKNESS: 1
BOWEL PATTERN NORMAL: 1
DENIES PAIN: 1
LIMITED RANGE OF MOTION: 1
DESCRIPTION OF MEMORY LOSS: SHORT TERM
APPETITE LEVEL: FAIR
LAST BOWEL MOVEMENT: 67110

## 2024-10-01 ENCOUNTER — HOME CARE VISIT (OUTPATIENT)
Dept: HOME HEALTH SERVICES | Facility: HOME HEALTH | Age: 79
End: 2024-10-01
Payer: MEDICARE

## 2024-10-01 VITALS
HEART RATE: 72 BPM | DIASTOLIC BLOOD PRESSURE: 62 MMHG | TEMPERATURE: 99.1 F | OXYGEN SATURATION: 100 % | SYSTOLIC BLOOD PRESSURE: 144 MMHG | RESPIRATION RATE: 18 BRPM

## 2024-10-01 PROCEDURE — G0299 HHS/HOSPICE OF RN EA 15 MIN: HCPCS | Mod: HHH

## 2024-10-01 ASSESSMENT — ENCOUNTER SYMPTOMS
HYPERTENSION: 1
APPETITE LEVEL: GOOD
PERSON REPORTING PAIN: PATIENT
LIMITED RANGE OF MOTION: 1
DESCRIPTION OF MEMORY LOSS: SHORT TERM
DENIES PAIN: 1
MUSCLE WEAKNESS: 1
CHANGE IN APPETITE: UNCHANGED

## 2024-10-01 ASSESSMENT — ACTIVITIES OF DAILY LIVING (ADL)
CURRENT_FUNCTION: STAND BY ASSIST
AMBULATION ASSISTANCE: STAND BY ASSIST

## 2024-10-02 ENCOUNTER — OFFICE VISIT (OUTPATIENT)
Dept: WOUND CARE | Facility: CLINIC | Age: 79
End: 2024-10-02
Payer: MEDICARE

## 2024-10-02 PROCEDURE — 11042 DBRDMT SUBQ TIS 1ST 20SQCM/<: CPT

## 2024-10-04 ENCOUNTER — HOME CARE VISIT (OUTPATIENT)
Dept: HOME HEALTH SERVICES | Facility: HOME HEALTH | Age: 79
End: 2024-10-04
Payer: MEDICARE

## 2024-10-04 VITALS
SYSTOLIC BLOOD PRESSURE: 132 MMHG | OXYGEN SATURATION: 96 % | TEMPERATURE: 98.3 F | DIASTOLIC BLOOD PRESSURE: 64 MMHG | HEART RATE: 68 BPM

## 2024-10-04 PROCEDURE — G0299 HHS/HOSPICE OF RN EA 15 MIN: HCPCS | Mod: HHH

## 2024-10-04 SDOH — ECONOMIC STABILITY: GENERAL

## 2024-10-04 ASSESSMENT — ENCOUNTER SYMPTOMS
LAST BOWEL MOVEMENT: 67117
BOWEL PATTERN NORMAL: 1
STOOL FREQUENCY: DAILY

## 2024-10-04 ASSESSMENT — ACTIVITIES OF DAILY LIVING (ADL)
AMBULATION ASSISTANCE: STAND BY ASSIST
PHYSICAL TRANSFERS ASSESSED: 1
CURRENT_FUNCTION: STAND BY ASSIST
MONEY MANAGEMENT (EXPENSES/BILLS): NEEDS ASSISTANCE
AMBULATION ASSISTANCE: 1

## 2024-10-04 ASSESSMENT — PAIN SCALES - PAIN ASSESSMENT IN ADVANCED DEMENTIA (PAINAD)
FACIALEXPRESSION: 0 - SMILING OR INEXPRESSIVE.
TOTALSCORE: 0
NEGVOCALIZATION: 0
FACIALEXPRESSION: 0
CONSOLABILITY: 0
BODYLANGUAGE: 0 - RELAXED.
NEGVOCALIZATION: 0 - NONE.
BREATHING: 0
CONSOLABILITY: 0 - NO NEED TO CONSOLE.
BODYLANGUAGE: 0

## 2024-10-07 ENCOUNTER — HOME CARE VISIT (OUTPATIENT)
Dept: HOME HEALTH SERVICES | Facility: HOME HEALTH | Age: 79
End: 2024-10-07
Payer: MEDICARE

## 2024-10-07 VITALS
TEMPERATURE: 99 F | OXYGEN SATURATION: 96 % | HEART RATE: 68 BPM | DIASTOLIC BLOOD PRESSURE: 66 MMHG | SYSTOLIC BLOOD PRESSURE: 139 MMHG

## 2024-10-07 PROCEDURE — G0299 HHS/HOSPICE OF RN EA 15 MIN: HCPCS | Mod: HHH

## 2024-10-07 ASSESSMENT — ACTIVITIES OF DAILY LIVING (ADL)
AMBULATION ASSISTANCE: STAND BY ASSIST
AMBULATION ASSISTANCE: 1
MONEY MANAGEMENT (EXPENSES/BILLS): NEEDS ASSISTANCE
BATHING ASSESSED: 1
BATHING_CURRENT_FUNCTION: STAND BY ASSIST

## 2024-10-07 ASSESSMENT — ENCOUNTER SYMPTOMS
BOWEL PATTERN NORMAL: 1
LAST BOWEL MOVEMENT: 67120

## 2024-10-09 ENCOUNTER — OFFICE VISIT (OUTPATIENT)
Dept: WOUND CARE | Facility: CLINIC | Age: 79
End: 2024-10-09
Payer: MEDICARE

## 2024-10-09 PROCEDURE — 11042 DBRDMT SUBQ TIS 1ST 20SQCM/<: CPT

## 2024-10-11 ENCOUNTER — TELEPHONE (OUTPATIENT)
Dept: SURGERY | Facility: CLINIC | Age: 79
End: 2024-10-11
Payer: MEDICARE

## 2024-10-11 ENCOUNTER — HOME CARE VISIT (OUTPATIENT)
Dept: HOME HEALTH SERVICES | Facility: HOME HEALTH | Age: 79
End: 2024-10-11
Payer: MEDICARE

## 2024-10-11 VITALS
HEART RATE: 72 BPM | DIASTOLIC BLOOD PRESSURE: 58 MMHG | RESPIRATION RATE: 16 BRPM | SYSTOLIC BLOOD PRESSURE: 136 MMHG | TEMPERATURE: 97.5 F | OXYGEN SATURATION: 100 %

## 2024-10-11 PROCEDURE — G0299 HHS/HOSPICE OF RN EA 15 MIN: HCPCS | Mod: HHH

## 2024-10-11 SDOH — ECONOMIC STABILITY: GENERAL

## 2024-10-11 ASSESSMENT — ENCOUNTER SYMPTOMS
MUSCLE WEAKNESS: 1
LIMITED RANGE OF MOTION: 1
DENIES PAIN: 1
DESCRIPTION OF MEMORY LOSS: SHORT TERM
PERSON REPORTING PAIN: PATIENT

## 2024-10-11 ASSESSMENT — ACTIVITIES OF DAILY LIVING (ADL)
MONEY MANAGEMENT (EXPENSES/BILLS): INDEPENDENT
CURRENT_FUNCTION: STAND BY ASSIST
AMBULATION ASSISTANCE: STAND BY ASSIST

## 2024-10-11 NOTE — TELEPHONE ENCOUNTER
I called and spoke with patient; resolved abscess; she will continue to followup at the wound center

## 2024-10-14 ENCOUNTER — HOME CARE VISIT (OUTPATIENT)
Dept: HOME HEALTH SERVICES | Facility: HOME HEALTH | Age: 79
End: 2024-10-14
Payer: MEDICARE

## 2024-10-14 VITALS
OXYGEN SATURATION: 98 % | TEMPERATURE: 98.6 F | HEART RATE: 66 BPM | RESPIRATION RATE: 16 BRPM | DIASTOLIC BLOOD PRESSURE: 60 MMHG | SYSTOLIC BLOOD PRESSURE: 124 MMHG

## 2024-10-14 PROCEDURE — G0299 HHS/HOSPICE OF RN EA 15 MIN: HCPCS | Mod: HHH

## 2024-10-14 ASSESSMENT — ENCOUNTER SYMPTOMS
PERSON REPORTING PAIN: PATIENT
DENIES PAIN: 1

## 2024-10-16 ENCOUNTER — OFFICE VISIT (OUTPATIENT)
Dept: WOUND CARE | Facility: CLINIC | Age: 79
End: 2024-10-16
Payer: MEDICARE

## 2024-10-16 PROCEDURE — 11045 DBRDMT SUBQ TISS EACH ADDL: CPT

## 2024-10-16 PROCEDURE — 11042 DBRDMT SUBQ TIS 1ST 20SQCM/<: CPT

## 2024-10-18 ENCOUNTER — APPOINTMENT (OUTPATIENT)
Dept: HOME HEALTH SERVICES | Facility: HOME HEALTH | Age: 79
End: 2024-10-18
Payer: MEDICARE

## 2024-10-21 ENCOUNTER — HOME CARE VISIT (OUTPATIENT)
Dept: HOME HEALTH SERVICES | Facility: HOME HEALTH | Age: 79
End: 2024-10-21
Payer: MEDICARE

## 2024-10-21 VITALS
RESPIRATION RATE: 18 BRPM | HEART RATE: 66 BPM | SYSTOLIC BLOOD PRESSURE: 134 MMHG | OXYGEN SATURATION: 100 % | DIASTOLIC BLOOD PRESSURE: 66 MMHG | TEMPERATURE: 98.5 F

## 2024-10-21 PROCEDURE — G0299 HHS/HOSPICE OF RN EA 15 MIN: HCPCS | Mod: HHH

## 2024-10-21 ASSESSMENT — ENCOUNTER SYMPTOMS
DENIES PAIN: 1
MUSCLE WEAKNESS: 1
DESCRIPTION OF MEMORY LOSS: SHORT TERM
PERSON REPORTING PAIN: PATIENT
LOWER EXTREMITY EDEMA: 1
CHANGE IN APPETITE: UNCHANGED
APPETITE LEVEL: GOOD

## 2024-10-21 ASSESSMENT — ACTIVITIES OF DAILY LIVING (ADL)
AMBULATION ASSISTANCE: STAND BY ASSIST
CURRENT_FUNCTION: STAND BY ASSIST

## 2024-10-23 ENCOUNTER — OFFICE VISIT (OUTPATIENT)
Dept: WOUND CARE | Facility: CLINIC | Age: 79
End: 2024-10-23
Payer: MEDICARE

## 2024-10-23 PROCEDURE — 11042 DBRDMT SUBQ TIS 1ST 20SQCM/<: CPT

## 2024-10-23 PROCEDURE — 11045 DBRDMT SUBQ TISS EACH ADDL: CPT

## 2024-10-25 ENCOUNTER — HOME CARE VISIT (OUTPATIENT)
Dept: HOME HEALTH SERVICES | Facility: HOME HEALTH | Age: 79
End: 2024-10-25
Payer: MEDICARE

## 2024-10-25 VITALS
DIASTOLIC BLOOD PRESSURE: 73 MMHG | OXYGEN SATURATION: 100 % | TEMPERATURE: 98.3 F | RESPIRATION RATE: 18 BRPM | SYSTOLIC BLOOD PRESSURE: 149 MMHG | HEART RATE: 61 BPM

## 2024-10-25 PROCEDURE — G0299 HHS/HOSPICE OF RN EA 15 MIN: HCPCS | Mod: HHH

## 2024-10-28 SDOH — ECONOMIC STABILITY: GENERAL

## 2024-10-28 ASSESSMENT — ENCOUNTER SYMPTOMS
DESCRIPTION OF MEMORY LOSS: SHORT TERM
DENIES PAIN: 1
APPETITE LEVEL: FAIR
PERSON REPORTING PAIN: PATIENT
LOWER EXTREMITY EDEMA: 1
CHANGE IN APPETITE: UNCHANGED

## 2024-10-28 ASSESSMENT — ACTIVITIES OF DAILY LIVING (ADL)
CURRENT_FUNCTION: STAND BY ASSIST
MONEY MANAGEMENT (EXPENSES/BILLS): TOTALLY DEPENDENT

## 2024-10-29 ENCOUNTER — HOME CARE VISIT (OUTPATIENT)
Dept: HOME HEALTH SERVICES | Facility: HOME HEALTH | Age: 79
End: 2024-10-29
Payer: MEDICARE

## 2024-10-29 VITALS
TEMPERATURE: 98.3 F | SYSTOLIC BLOOD PRESSURE: 131 MMHG | HEART RATE: 70 BPM | OXYGEN SATURATION: 100 % | RESPIRATION RATE: 20 BRPM | DIASTOLIC BLOOD PRESSURE: 58 MMHG

## 2024-10-29 PROCEDURE — G0299 HHS/HOSPICE OF RN EA 15 MIN: HCPCS | Mod: HHH

## 2024-10-29 ASSESSMENT — ENCOUNTER SYMPTOMS
CHANGE IN APPETITE: UNCHANGED
MUSCLE WEAKNESS: 1
APPETITE LEVEL: GOOD
DESCRIPTION OF MEMORY LOSS: SHORT TERM
LAST BOWEL MOVEMENT: 67141
LOWER EXTREMITY EDEMA: 1
DENIES PAIN: 1

## 2024-10-30 ENCOUNTER — OFFICE VISIT (OUTPATIENT)
Dept: WOUND CARE | Facility: CLINIC | Age: 79
End: 2024-10-30
Payer: MEDICARE

## 2024-10-30 PROCEDURE — 11042 DBRDMT SUBQ TIS 1ST 20SQCM/<: CPT

## 2024-10-30 PROCEDURE — 11045 DBRDMT SUBQ TISS EACH ADDL: CPT

## 2024-11-01 ENCOUNTER — HOME CARE VISIT (OUTPATIENT)
Dept: HOME HEALTH SERVICES | Facility: HOME HEALTH | Age: 79
End: 2024-11-01
Payer: MEDICARE

## 2024-11-01 VITALS
SYSTOLIC BLOOD PRESSURE: 138 MMHG | OXYGEN SATURATION: 99 % | DIASTOLIC BLOOD PRESSURE: 59 MMHG | RESPIRATION RATE: 20 BRPM | TEMPERATURE: 98.2 F | HEART RATE: 70 BPM

## 2024-11-01 PROCEDURE — G0299 HHS/HOSPICE OF RN EA 15 MIN: HCPCS | Mod: HHH

## 2024-11-01 ASSESSMENT — ENCOUNTER SYMPTOMS
MUSCLE WEAKNESS: 1
APPETITE LEVEL: GOOD
FORGETFULNESS: 1
LAST BOWEL MOVEMENT: 67144
CHANGE IN APPETITE: UNCHANGED
PAIN LOCATION: ABDOMEN
LOWER EXTREMITY EDEMA: 1
PAIN: 1
PAIN LOCATION - PAIN SEVERITY: 9/10

## 2024-11-04 ENCOUNTER — HOME CARE VISIT (OUTPATIENT)
Dept: HOME HEALTH SERVICES | Facility: HOME HEALTH | Age: 79
End: 2024-11-04
Payer: MEDICARE

## 2024-11-04 VITALS
OXYGEN SATURATION: 98 % | RESPIRATION RATE: 20 BRPM | SYSTOLIC BLOOD PRESSURE: 128 MMHG | DIASTOLIC BLOOD PRESSURE: 54 MMHG | TEMPERATURE: 98.7 F | HEART RATE: 66 BPM

## 2024-11-04 PROCEDURE — G0299 HHS/HOSPICE OF RN EA 15 MIN: HCPCS | Mod: HHH

## 2024-11-04 ASSESSMENT — ENCOUNTER SYMPTOMS
PAIN: 1
MUSCLE WEAKNESS: 1
APPETITE LEVEL: FAIR
PAIN LOCATION - PAIN SEVERITY: 3/10
LAST BOWEL MOVEMENT: 67148
DESCRIPTION OF MEMORY LOSS: SHORT TERM
PAIN LOCATION: ABDOMEN
BOWEL INCONTINENCE: 1
CHANGE IN APPETITE: UNCHANGED
LOWER EXTREMITY EDEMA: 1
FORGETFULNESS: 1

## 2024-11-04 ASSESSMENT — ACTIVITIES OF DAILY LIVING (ADL)
ENTERING_EXITING_HOME: NEEDS ASSISTANCE
OASIS_M1830: 03

## 2024-11-06 ENCOUNTER — LAB REQUISITION (OUTPATIENT)
Dept: LAB | Facility: HOSPITAL | Age: 79
End: 2024-11-06
Payer: MEDICARE

## 2024-11-06 ENCOUNTER — OFFICE VISIT (OUTPATIENT)
Dept: WOUND CARE | Facility: CLINIC | Age: 79
End: 2024-11-06
Payer: MEDICARE

## 2024-11-06 DIAGNOSIS — S31.109A UNSPECIFIED OPEN WOUND OF ABDOMINAL WALL, UNSPECIFIED QUADRANT WITHOUT PENETRATION INTO PERITONEAL CAVITY, INITIAL ENCOUNTER: ICD-10-CM

## 2024-11-06 DIAGNOSIS — L89.130: ICD-10-CM

## 2024-11-06 DIAGNOSIS — E11.622 TYPE 2 DIABETES MELLITUS WITH OTHER SKIN ULCER (CODE): ICD-10-CM

## 2024-11-06 DIAGNOSIS — L98.492 NON-PRESSURE CHRONIC ULCER OF SKIN OF OTHER SITES WITH FAT LAYER EXPOSED: ICD-10-CM

## 2024-11-06 PROCEDURE — 1090000001 HH PPS REVENUE CREDIT

## 2024-11-06 PROCEDURE — 87077 CULTURE AEROBIC IDENTIFY: CPT | Mod: OUT,ELYLAB | Performed by: OBSTETRICS & GYNECOLOGY

## 2024-11-06 PROCEDURE — 11042 DBRDMT SUBQ TIS 1ST 20SQCM/<: CPT

## 2024-11-06 PROCEDURE — 11045 DBRDMT SUBQ TISS EACH ADDL: CPT

## 2024-11-06 PROCEDURE — 1090000002 HH PPS REVENUE DEBIT

## 2024-11-07 PROCEDURE — 1090000002 HH PPS REVENUE DEBIT

## 2024-11-07 PROCEDURE — 1090000001 HH PPS REVENUE CREDIT

## 2024-11-08 ENCOUNTER — HOME CARE VISIT (OUTPATIENT)
Dept: HOME HEALTH SERVICES | Facility: HOME HEALTH | Age: 79
End: 2024-11-08
Payer: MEDICARE

## 2024-11-08 VITALS
OXYGEN SATURATION: 99 % | TEMPERATURE: 97.5 F | HEART RATE: 78 BPM | SYSTOLIC BLOOD PRESSURE: 139 MMHG | DIASTOLIC BLOOD PRESSURE: 58 MMHG | RESPIRATION RATE: 18 BRPM

## 2024-11-08 PROCEDURE — 1090000002 HH PPS REVENUE DEBIT

## 2024-11-08 PROCEDURE — 400014 HH F/U

## 2024-11-08 PROCEDURE — G0299 HHS/HOSPICE OF RN EA 15 MIN: HCPCS | Mod: HHH

## 2024-11-08 PROCEDURE — 1090000001 HH PPS REVENUE CREDIT

## 2024-11-08 ASSESSMENT — ENCOUNTER SYMPTOMS
PERSON REPORTING PAIN: PATIENT
DENIES PAIN: 1
DESCRIPTION OF MEMORY LOSS: SHORT TERM
LOWER EXTREMITY EDEMA: 1
APPETITE LEVEL: GOOD

## 2024-11-08 ASSESSMENT — ACTIVITIES OF DAILY LIVING (ADL)
AMBULATION ASSISTANCE: STAND BY ASSIST
CURRENT_FUNCTION: STAND BY ASSIST

## 2024-11-09 PROCEDURE — 1090000002 HH PPS REVENUE DEBIT

## 2024-11-09 PROCEDURE — 1090000001 HH PPS REVENUE CREDIT

## 2024-11-09 PROCEDURE — G0179 MD RECERTIFICATION HHA PT: HCPCS | Performed by: SURGERY

## 2024-11-10 LAB
B-LACTAMASE ORGANISM ISLT: NEGATIVE
BACTERIA SPEC CULT: ABNORMAL
GRAM STN SPEC: ABNORMAL
GRAM STN SPEC: ABNORMAL

## 2024-11-11 ENCOUNTER — HOME CARE VISIT (OUTPATIENT)
Dept: HOME HEALTH SERVICES | Facility: HOME HEALTH | Age: 79
End: 2024-11-11
Payer: MEDICARE

## 2024-11-11 VITALS
HEART RATE: 65 BPM | RESPIRATION RATE: 16 BRPM | TEMPERATURE: 98.7 F | OXYGEN SATURATION: 99 % | SYSTOLIC BLOOD PRESSURE: 129 MMHG | DIASTOLIC BLOOD PRESSURE: 59 MMHG

## 2024-11-11 PROCEDURE — G0299 HHS/HOSPICE OF RN EA 15 MIN: HCPCS | Mod: HHH

## 2024-11-11 SDOH — ECONOMIC STABILITY: GENERAL

## 2024-11-11 ASSESSMENT — ENCOUNTER SYMPTOMS
DENIES PAIN: 1
BOWEL PATTERN NORMAL: 1
LOWER EXTREMITY EDEMA: 1
STOOL FREQUENCY: DAILY
LAST BOWEL MOVEMENT: 67154
APPETITE LEVEL: FAIR
MUSCLE WEAKNESS: 1
PERSON REPORTING PAIN: PATIENT
DESCRIPTION OF MEMORY LOSS: SHORT TERM
CHANGE IN APPETITE: UNCHANGED

## 2024-11-11 ASSESSMENT — ACTIVITIES OF DAILY LIVING (ADL)
CURRENT_FUNCTION: STAND BY ASSIST
AMBULATION ASSISTANCE: STAND BY ASSIST
MONEY MANAGEMENT (EXPENSES/BILLS): NEEDS ASSISTANCE

## 2024-11-13 ENCOUNTER — APPOINTMENT (OUTPATIENT)
Dept: WOUND CARE | Facility: CLINIC | Age: 79
End: 2024-11-13
Payer: MEDICARE

## 2024-11-15 ENCOUNTER — HOME CARE VISIT (OUTPATIENT)
Dept: HOME HEALTH SERVICES | Facility: HOME HEALTH | Age: 79
End: 2024-11-15
Payer: MEDICARE

## 2024-11-15 VITALS — RESPIRATION RATE: 20 BRPM | TEMPERATURE: 98.2 F | HEART RATE: 66 BPM | OXYGEN SATURATION: 99 %

## 2024-11-15 PROCEDURE — G0299 HHS/HOSPICE OF RN EA 15 MIN: HCPCS | Mod: HHH

## 2024-11-15 ASSESSMENT — ENCOUNTER SYMPTOMS
PAIN LOCATION: ABDOMEN
MUSCLE WEAKNESS: 1
LOWER EXTREMITY EDEMA: 1
PAIN: 1
APPETITE LEVEL: FAIR
CHANGE IN APPETITE: UNCHANGED
FORGETFULNESS: 1
PAIN LOCATION - PAIN SEVERITY: 3/10

## 2024-11-15 ASSESSMENT — ACTIVITIES OF DAILY LIVING (ADL): AMBULATION ASSISTANCE: STAND BY ASSIST

## 2024-11-18 ENCOUNTER — HOME CARE VISIT (OUTPATIENT)
Dept: HOME HEALTH SERVICES | Facility: HOME HEALTH | Age: 79
End: 2024-11-18
Payer: MEDICARE

## 2024-11-18 VITALS — OXYGEN SATURATION: 99 % | TEMPERATURE: 98.4 F | RESPIRATION RATE: 20 BRPM | HEART RATE: 72 BPM

## 2024-11-18 PROCEDURE — G0299 HHS/HOSPICE OF RN EA 15 MIN: HCPCS | Mod: HHH

## 2024-11-18 ASSESSMENT — ENCOUNTER SYMPTOMS
LAST BOWEL MOVEMENT: 67161
LOWER EXTREMITY EDEMA: 1
MUSCLE WEAKNESS: 1
CHANGE IN APPETITE: UNCHANGED
APPETITE LEVEL: GOOD

## 2024-11-20 ENCOUNTER — APPOINTMENT (OUTPATIENT)
Dept: WOUND CARE | Facility: CLINIC | Age: 79
End: 2024-11-20
Payer: MEDICARE

## 2024-11-20 ENCOUNTER — OFFICE VISIT (OUTPATIENT)
Dept: WOUND CARE | Facility: CLINIC | Age: 79
End: 2024-11-20
Payer: MEDICARE

## 2024-11-20 PROCEDURE — 11045 DBRDMT SUBQ TISS EACH ADDL: CPT

## 2024-11-20 PROCEDURE — 11042 DBRDMT SUBQ TIS 1ST 20SQCM/<: CPT

## 2024-11-21 ENCOUNTER — HOME CARE VISIT (OUTPATIENT)
Dept: HOME HEALTH SERVICES | Facility: HOME HEALTH | Age: 79
End: 2024-11-21
Payer: MEDICARE

## 2024-11-21 PROCEDURE — G0155 HHCP-SVS OF CSW,EA 15 MIN: HCPCS | Mod: HHH

## 2024-11-23 ENCOUNTER — HOME CARE VISIT (OUTPATIENT)
Dept: HOME HEALTH SERVICES | Facility: HOME HEALTH | Age: 79
End: 2024-11-23
Payer: MEDICARE

## 2024-11-23 PROCEDURE — G0300 HHS/HOSPICE OF LPN EA 15 MIN: HCPCS | Mod: HHH

## 2024-11-24 VITALS
OXYGEN SATURATION: 99 % | RESPIRATION RATE: 18 BRPM | SYSTOLIC BLOOD PRESSURE: 110 MMHG | DIASTOLIC BLOOD PRESSURE: 68 MMHG | HEART RATE: 68 BPM

## 2024-11-24 ASSESSMENT — ACTIVITIES OF DAILY LIVING (ADL)
FEEDING: INDEPENDENT
BATHING_CURRENT_FUNCTION: INDEPENDENT
TOILETING: INDEPENDENT
FEEDING ASSESSED: 1
AMBULATION ASSISTANCE: 1
DRESSING_LB_CURRENT_FUNCTION: INDEPENDENT
BATHING ASSESSED: 1
TOILETING: 1
DRESSING_UB_CURRENT_FUNCTION: INDEPENDENT
AMBULATION ASSISTANCE: INDEPENDENT

## 2024-11-24 ASSESSMENT — ENCOUNTER SYMPTOMS
DEPRESSION: 0
LIMITED RANGE OF MOTION: 1
APPETITE LEVEL: GOOD
CHANGE IN APPETITE: UNCHANGED
LOSS OF SENSATION IN FEET: 0
OCCASIONAL FEELINGS OF UNSTEADINESS: 0
PERSON REPORTING PAIN: PATIENT
MUSCLE WEAKNESS: 1
DENIES PAIN: 1

## 2024-11-25 ENCOUNTER — HOME CARE VISIT (OUTPATIENT)
Dept: HOME HEALTH SERVICES | Facility: HOME HEALTH | Age: 79
End: 2024-11-25
Payer: MEDICARE

## 2024-11-25 VITALS
DIASTOLIC BLOOD PRESSURE: 64 MMHG | HEART RATE: 71 BPM | OXYGEN SATURATION: 98 % | TEMPERATURE: 98.7 F | SYSTOLIC BLOOD PRESSURE: 140 MMHG

## 2024-11-25 PROCEDURE — G0299 HHS/HOSPICE OF RN EA 15 MIN: HCPCS | Mod: HHH

## 2024-11-25 ASSESSMENT — ENCOUNTER SYMPTOMS
MUSCLE WEAKNESS: 1
APPETITE LEVEL: GOOD
DENIES PAIN: 1
PERSON REPORTING PAIN: PATIENT
LIMITED RANGE OF MOTION: 1
CHANGE IN APPETITE: UNCHANGED

## 2024-11-25 ASSESSMENT — ACTIVITIES OF DAILY LIVING (ADL)
CURRENT_FUNCTION: STAND BY ASSIST
AMBULATION ASSISTANCE: STAND BY ASSIST

## 2024-11-27 ENCOUNTER — OFFICE VISIT (OUTPATIENT)
Dept: WOUND CARE | Facility: CLINIC | Age: 79
End: 2024-11-27
Payer: MEDICARE

## 2024-11-27 PROCEDURE — 11045 DBRDMT SUBQ TISS EACH ADDL: CPT

## 2024-11-27 PROCEDURE — 11042 DBRDMT SUBQ TIS 1ST 20SQCM/<: CPT

## 2024-11-29 ENCOUNTER — HOME CARE VISIT (OUTPATIENT)
Dept: HOME HEALTH SERVICES | Facility: HOME HEALTH | Age: 79
End: 2024-11-29
Payer: MEDICARE

## 2024-11-29 VITALS
HEART RATE: 68 BPM | TEMPERATURE: 98.7 F | DIASTOLIC BLOOD PRESSURE: 72 MMHG | RESPIRATION RATE: 18 BRPM | SYSTOLIC BLOOD PRESSURE: 131 MMHG | OXYGEN SATURATION: 97 %

## 2024-11-29 PROCEDURE — G0299 HHS/HOSPICE OF RN EA 15 MIN: HCPCS | Mod: HHH

## 2024-11-29 SDOH — ECONOMIC STABILITY: GENERAL

## 2024-11-29 ASSESSMENT — ACTIVITIES OF DAILY LIVING (ADL)
AMBULATION ASSISTANCE: STAND BY ASSIST
CURRENT_FUNCTION: STAND BY ASSIST
MONEY MANAGEMENT (EXPENSES/BILLS): TOTALLY DEPENDENT

## 2024-11-29 ASSESSMENT — ENCOUNTER SYMPTOMS
PERSON REPORTING PAIN: PATIENT
STOOL FREQUENCY: DAILY
APPETITE LEVEL: FAIR
DENIES PAIN: 1
LAST BOWEL MOVEMENT: 67173
LOWEST PAIN SEVERITY IN PAST 24 HOURS: 0/10
LOWER EXTREMITY EDEMA: 1
DESCRIPTION OF MEMORY LOSS: SHORT TERM
MUSCLE WEAKNESS: 1
PAIN SEVERITY GOAL: 0/10
BOWEL PATTERN NORMAL: 1
HIGHEST PAIN SEVERITY IN PAST 24 HOURS: 0/10
CHANGE IN APPETITE: UNCHANGED

## 2024-12-02 ENCOUNTER — HOME CARE VISIT (OUTPATIENT)
Dept: HOME HEALTH SERVICES | Facility: HOME HEALTH | Age: 79
End: 2024-12-02
Payer: MEDICARE

## 2024-12-02 VITALS
TEMPERATURE: 97.9 F | OXYGEN SATURATION: 96 % | DIASTOLIC BLOOD PRESSURE: 58 MMHG | HEART RATE: 74 BPM | SYSTOLIC BLOOD PRESSURE: 130 MMHG

## 2024-12-02 PROCEDURE — G0299 HHS/HOSPICE OF RN EA 15 MIN: HCPCS | Mod: HHH

## 2024-12-02 ASSESSMENT — ENCOUNTER SYMPTOMS
CHANGE IN APPETITE: UNCHANGED
HYPERTENSION: 1
DENIES PAIN: 1
LIMITED RANGE OF MOTION: 1
LAST BOWEL MOVEMENT: 67175
APPETITE LEVEL: GOOD
PERSON REPORTING PAIN: PATIENT
DESCRIPTION OF MEMORY LOSS: SHORT TERM
MUSCLE WEAKNESS: 1
LOWER EXTREMITY EDEMA: 1

## 2024-12-02 ASSESSMENT — ACTIVITIES OF DAILY LIVING (ADL)
AMBULATION ASSISTANCE: STAND BY ASSIST
CURRENT_FUNCTION: STAND BY ASSIST

## 2024-12-04 ENCOUNTER — OFFICE VISIT (OUTPATIENT)
Dept: WOUND CARE | Facility: CLINIC | Age: 79
End: 2024-12-04
Payer: MEDICARE

## 2024-12-04 PROCEDURE — 11045 DBRDMT SUBQ TISS EACH ADDL: CPT

## 2024-12-04 PROCEDURE — 11042 DBRDMT SUBQ TIS 1ST 20SQCM/<: CPT

## 2024-12-06 ENCOUNTER — HOME CARE VISIT (OUTPATIENT)
Dept: HOME HEALTH SERVICES | Facility: HOME HEALTH | Age: 79
End: 2024-12-06
Payer: MEDICARE

## 2024-12-06 VITALS
TEMPERATURE: 97.7 F | OXYGEN SATURATION: 99 % | HEART RATE: 64 BPM | DIASTOLIC BLOOD PRESSURE: 60 MMHG | SYSTOLIC BLOOD PRESSURE: 136 MMHG

## 2024-12-06 PROCEDURE — G0299 HHS/HOSPICE OF RN EA 15 MIN: HCPCS | Mod: HHH

## 2024-12-06 ASSESSMENT — ENCOUNTER SYMPTOMS
SUBJECTIVE PAIN PROGRESSION: UNCHANGED
PAIN LOCATION: BACK
PAIN: 1
PERSON REPORTING PAIN: PATIENT
MUSCLE WEAKNESS: 1
PAIN LOCATION - PAIN SEVERITY: 5/10
LIMITED RANGE OF MOTION: 1
CHANGE IN APPETITE: UNCHANGED
LAST BOWEL MOVEMENT: 67180
LOWER EXTREMITY EDEMA: 1
APPETITE LEVEL: GOOD

## 2024-12-06 ASSESSMENT — ACTIVITIES OF DAILY LIVING (ADL)
AMBULATION ASSISTANCE: STAND BY ASSIST
CURRENT_FUNCTION: STAND BY ASSIST

## 2024-12-09 ENCOUNTER — HOME CARE VISIT (OUTPATIENT)
Dept: HOME HEALTH SERVICES | Facility: HOME HEALTH | Age: 79
End: 2024-12-09
Payer: MEDICARE

## 2024-12-09 VITALS
TEMPERATURE: 98.1 F | RESPIRATION RATE: 20 BRPM | HEART RATE: 74 BPM | DIASTOLIC BLOOD PRESSURE: 64 MMHG | SYSTOLIC BLOOD PRESSURE: 133 MMHG | OXYGEN SATURATION: 97 %

## 2024-12-09 PROCEDURE — G0299 HHS/HOSPICE OF RN EA 15 MIN: HCPCS | Mod: HHH

## 2024-12-10 SDOH — ECONOMIC STABILITY: GENERAL

## 2024-12-10 ASSESSMENT — ENCOUNTER SYMPTOMS
APPETITE LEVEL: FAIR
PERSON REPORTING PAIN: PATIENT
DENIES PAIN: 1
MUSCLE WEAKNESS: 1
STOOL FREQUENCY: TWICE DAILY
BOWEL PATTERN NORMAL: 1
CHANGE IN APPETITE: UNCHANGED
LOWER EXTREMITY EDEMA: 1
LAST BOWEL MOVEMENT: 67183

## 2024-12-10 ASSESSMENT — ACTIVITIES OF DAILY LIVING (ADL)
AMBULATION ASSISTANCE: STAND BY ASSIST
MONEY MANAGEMENT (EXPENSES/BILLS): NEEDS ASSISTANCE
CURRENT_FUNCTION: STAND BY ASSIST

## 2024-12-11 ENCOUNTER — APPOINTMENT (OUTPATIENT)
Dept: WOUND CARE | Facility: CLINIC | Age: 79
End: 2024-12-11
Payer: MEDICARE

## 2024-12-13 ENCOUNTER — HOME CARE VISIT (OUTPATIENT)
Dept: HOME HEALTH SERVICES | Facility: HOME HEALTH | Age: 79
End: 2024-12-13
Payer: MEDICARE

## 2024-12-13 VITALS
HEART RATE: 64 BPM | DIASTOLIC BLOOD PRESSURE: 64 MMHG | RESPIRATION RATE: 18 BRPM | OXYGEN SATURATION: 98 % | SYSTOLIC BLOOD PRESSURE: 145 MMHG | TEMPERATURE: 96.2 F

## 2024-12-13 PROCEDURE — G0299 HHS/HOSPICE OF RN EA 15 MIN: HCPCS | Mod: HHH

## 2024-12-13 ASSESSMENT — ENCOUNTER SYMPTOMS
LOWER EXTREMITY EDEMA: 1
DENIES PAIN: 1
PERSON REPORTING PAIN: PATIENT
LAST BOWEL MOVEMENT: 67186
BOWEL PATTERN NORMAL: 1
STOOL FREQUENCY: DAILY

## 2024-12-13 ASSESSMENT — ACTIVITIES OF DAILY LIVING (ADL)
CURRENT_FUNCTION: STAND BY ASSIST
AMBULATION ASSISTANCE: NON-AMBULATORY

## 2024-12-16 ENCOUNTER — HOME CARE VISIT (OUTPATIENT)
Dept: HOME HEALTH SERVICES | Facility: HOME HEALTH | Age: 79
End: 2024-12-16
Payer: MEDICARE

## 2024-12-16 VITALS
SYSTOLIC BLOOD PRESSURE: 117 MMHG | OXYGEN SATURATION: 99 % | DIASTOLIC BLOOD PRESSURE: 58 MMHG | TEMPERATURE: 97.5 F | HEART RATE: 59 BPM

## 2024-12-16 PROCEDURE — G0300 HHS/HOSPICE OF LPN EA 15 MIN: HCPCS | Mod: HHH

## 2024-12-16 ASSESSMENT — ACTIVITIES OF DAILY LIVING (ADL)
AMBULATION ASSISTANCE: INDEPENDENT
DRESSING_LB_CURRENT_FUNCTION: INDEPENDENT
TOILETING: 1
AMBULATION ASSISTANCE: 1
TOILETING: INDEPENDENT
BATHING_CURRENT_FUNCTION: INDEPENDENT
DRESSING_UB_CURRENT_FUNCTION: INDEPENDENT
FEEDING: INDEPENDENT
BATHING ASSESSED: 1
FEEDING ASSESSED: 1

## 2024-12-16 ASSESSMENT — ENCOUNTER SYMPTOMS
LOWER EXTREMITY EDEMA: 1
DENIES PAIN: 1
PERSON REPORTING PAIN: PATIENT
LIMITED RANGE OF MOTION: 1
LOSS OF SENSATION IN FEET: 0
MUSCLE WEAKNESS: 1
OCCASIONAL FEELINGS OF UNSTEADINESS: 0
DEPRESSION: 0
APPETITE LEVEL: GOOD
CHANGE IN APPETITE: UNCHANGED

## 2024-12-18 ENCOUNTER — OFFICE VISIT (OUTPATIENT)
Dept: WOUND CARE | Facility: CLINIC | Age: 79
End: 2024-12-18
Payer: MEDICARE

## 2024-12-18 PROCEDURE — 11045 DBRDMT SUBQ TISS EACH ADDL: CPT

## 2024-12-18 PROCEDURE — 11042 DBRDMT SUBQ TIS 1ST 20SQCM/<: CPT

## 2024-12-20 ENCOUNTER — HOME CARE VISIT (OUTPATIENT)
Dept: HOME HEALTH SERVICES | Facility: HOME HEALTH | Age: 79
End: 2024-12-20
Payer: MEDICARE

## 2024-12-20 VITALS
SYSTOLIC BLOOD PRESSURE: 135 MMHG | TEMPERATURE: 97.8 F | HEART RATE: 62 BPM | DIASTOLIC BLOOD PRESSURE: 70 MMHG | RESPIRATION RATE: 18 BRPM | OXYGEN SATURATION: 98 %

## 2024-12-20 PROCEDURE — G0300 HHS/HOSPICE OF LPN EA 15 MIN: HCPCS | Mod: HHH

## 2024-12-20 ASSESSMENT — ENCOUNTER SYMPTOMS
MUSCLE WEAKNESS: 1
LOWEST PAIN SEVERITY IN PAST 24 HOURS: 5/10
PAIN LOCATION: BACK
PERSON REPORTING PAIN: PATIENT
PAIN LOCATION - RELIEVING FACTORS: REST, PAIN MEDICATION
CHANGE IN APPETITE: UNCHANGED
OCCASIONAL FEELINGS OF UNSTEADINESS: 0
HIGHEST PAIN SEVERITY IN PAST 24 HOURS: 10/10
LIMITED RANGE OF MOTION: 1
PAIN: 1
APPETITE LEVEL: GOOD
PAIN LOCATION - PAIN FREQUENCY: INFREQUENT
SUBJECTIVE PAIN PROGRESSION: UNCHANGED
DEPRESSION: 0
PAIN LOCATION - PAIN QUALITY: THROBBING
PAIN SEVERITY GOAL: 2/10
PAIN LOCATION - EXACERBATING FACTORS: EXERCISE
LOSS OF SENSATION IN FEET: 0

## 2024-12-20 ASSESSMENT — ACTIVITIES OF DAILY LIVING (ADL)
FEEDING: INDEPENDENT
AMBULATION ASSISTANCE: INDEPENDENT
BATHING_CURRENT_FUNCTION: INDEPENDENT
DRESSING_LB_CURRENT_FUNCTION: INDEPENDENT
TOILETING: INDEPENDENT
FEEDING ASSESSED: 1
DRESSING_UB_CURRENT_FUNCTION: INDEPENDENT
BATHING ASSESSED: 1
AMBULATION ASSISTANCE: 1
TOILETING: 1

## 2024-12-23 ENCOUNTER — HOME CARE VISIT (OUTPATIENT)
Dept: HOME HEALTH SERVICES | Facility: HOME HEALTH | Age: 79
End: 2024-12-23
Payer: MEDICARE

## 2024-12-23 VITALS
HEART RATE: 79 BPM | TEMPERATURE: 97.5 F | RESPIRATION RATE: 18 BRPM | DIASTOLIC BLOOD PRESSURE: 70 MMHG | SYSTOLIC BLOOD PRESSURE: 125 MMHG | OXYGEN SATURATION: 99 %

## 2024-12-23 PROCEDURE — G0300 HHS/HOSPICE OF LPN EA 15 MIN: HCPCS | Mod: HHH

## 2024-12-23 ASSESSMENT — ENCOUNTER SYMPTOMS
PERSON REPORTING PAIN: PATIENT
DEPRESSION: 0
DENIES PAIN: 1
BOWEL PATTERN NORMAL: 1
APPETITE LEVEL: GOOD
MUSCLE WEAKNESS: 1
LOSS OF SENSATION IN FEET: 0
CHANGE IN APPETITE: UNCHANGED
LAST BOWEL MOVEMENT: 67197
OCCASIONAL FEELINGS OF UNSTEADINESS: 1
STOOL FREQUENCY: DAILY

## 2024-12-23 ASSESSMENT — ACTIVITIES OF DAILY LIVING (ADL)
DRESSING_LB_CURRENT_FUNCTION: INDEPENDENT
BATHING ASSESSED: 1
FEEDING ASSESSED: 1
BATHING_CURRENT_FUNCTION: STAND BY ASSIST
TOILETING: STAND BY ASSIST
TOILETING: 1
AMBULATION ASSISTANCE: STAND BY ASSIST
DRESSING_UB_CURRENT_FUNCTION: INDEPENDENT
AMBULATION ASSISTANCE: 1
FEEDING: INDEPENDENT

## 2024-12-27 ENCOUNTER — HOSPITAL ENCOUNTER (OUTPATIENT)
Facility: HOSPITAL | Age: 79
Setting detail: OBSERVATION
Discharge: HOME HEALTH | End: 2024-12-29
Attending: EMERGENCY MEDICINE | Admitting: HOSPITALIST
Payer: MEDICARE

## 2024-12-27 ENCOUNTER — APPOINTMENT (OUTPATIENT)
Dept: RADIOLOGY | Facility: HOSPITAL | Age: 79
End: 2024-12-27
Payer: MEDICARE

## 2024-12-27 ENCOUNTER — HOME CARE VISIT (OUTPATIENT)
Dept: HOME HEALTH SERVICES | Facility: HOME HEALTH | Age: 79
End: 2024-12-27
Payer: MEDICARE

## 2024-12-27 VITALS
DIASTOLIC BLOOD PRESSURE: 93 MMHG | HEART RATE: 76 BPM | SYSTOLIC BLOOD PRESSURE: 164 MMHG | RESPIRATION RATE: 18 BRPM | OXYGEN SATURATION: 97 %

## 2024-12-27 DIAGNOSIS — S32.030K COMPRESSION FRACTURE OF L3 LUMBAR VERTEBRA WITH NONUNION: ICD-10-CM

## 2024-12-27 DIAGNOSIS — R26.2 AMBULATORY DYSFUNCTION: ICD-10-CM

## 2024-12-27 DIAGNOSIS — S32.020A CLOSED COMPRESSION FRACTURE OF L2 VERTEBRA, INITIAL ENCOUNTER (MULTI): Primary | ICD-10-CM

## 2024-12-27 LAB
ALBUMIN SERPL BCP-MCNC: 3.6 G/DL (ref 3.4–5)
ALP SERPL-CCNC: 67 U/L (ref 33–136)
ALT SERPL W P-5'-P-CCNC: 11 U/L (ref 7–45)
ANION GAP SERPL CALC-SCNC: 9 MMOL/L (ref 10–20)
APPEARANCE UR: CLEAR
AST SERPL W P-5'-P-CCNC: 31 U/L (ref 9–39)
BACTERIA #/AREA URNS AUTO: ABNORMAL /HPF
BASOPHILS # BLD AUTO: 0.03 X10*3/UL (ref 0–0.1)
BASOPHILS NFR BLD AUTO: 0.5 %
BILIRUB SERPL-MCNC: 0.6 MG/DL (ref 0–1.2)
BILIRUB UR STRIP.AUTO-MCNC: NEGATIVE MG/DL
BUN SERPL-MCNC: 26 MG/DL (ref 6–23)
CALCIUM SERPL-MCNC: 8.9 MG/DL (ref 8.6–10.3)
CHLORIDE SERPL-SCNC: 105 MMOL/L (ref 98–107)
CO2 SERPL-SCNC: 28 MMOL/L (ref 21–32)
COLOR UR: ABNORMAL
CREAT SERPL-MCNC: 0.98 MG/DL (ref 0.5–1.05)
EGFRCR SERPLBLD CKD-EPI 2021: 60 ML/MIN/1.73M*2
EOSINOPHIL # BLD AUTO: 0.02 X10*3/UL (ref 0–0.4)
EOSINOPHIL NFR BLD AUTO: 0.4 %
ERYTHROCYTE [DISTWIDTH] IN BLOOD BY AUTOMATED COUNT: 13.1 % (ref 11.5–14.5)
GLUCOSE BLD MANUAL STRIP-MCNC: 112 MG/DL (ref 74–99)
GLUCOSE BLD MANUAL STRIP-MCNC: 87 MG/DL (ref 74–99)
GLUCOSE SERPL-MCNC: 143 MG/DL (ref 74–99)
GLUCOSE UR STRIP.AUTO-MCNC: NORMAL MG/DL
HCT VFR BLD AUTO: 29 % (ref 36–46)
HGB BLD-MCNC: 9.8 G/DL (ref 12–16)
IMM GRANULOCYTES # BLD AUTO: 0.02 X10*3/UL (ref 0–0.5)
IMM GRANULOCYTES NFR BLD AUTO: 0.4 % (ref 0–0.9)
KETONES UR STRIP.AUTO-MCNC: NEGATIVE MG/DL
LEUKOCYTE ESTERASE UR QL STRIP.AUTO: NEGATIVE
LYMPHOCYTES # BLD AUTO: 0.38 X10*3/UL (ref 0.8–3)
LYMPHOCYTES NFR BLD AUTO: 6.7 %
MCH RBC QN AUTO: 30.3 PG (ref 26–34)
MCHC RBC AUTO-ENTMCNC: 33.8 G/DL (ref 32–36)
MCV RBC AUTO: 90 FL (ref 80–100)
MONOCYTES # BLD AUTO: 0.22 X10*3/UL (ref 0.05–0.8)
MONOCYTES NFR BLD AUTO: 3.9 %
NEUTROPHILS # BLD AUTO: 4.96 X10*3/UL (ref 1.6–5.5)
NEUTROPHILS NFR BLD AUTO: 88.1 %
NITRITE UR QL STRIP.AUTO: NEGATIVE
NRBC BLD-RTO: 0 /100 WBCS (ref 0–0)
PH UR STRIP.AUTO: 6.5 [PH]
PLATELET # BLD AUTO: 146 X10*3/UL (ref 150–450)
POTASSIUM SERPL-SCNC: 4.2 MMOL/L (ref 3.5–5.3)
PROT SERPL-MCNC: 6.3 G/DL (ref 6.4–8.2)
PROT UR STRIP.AUTO-MCNC: ABNORMAL MG/DL
RBC # BLD AUTO: 3.23 X10*6/UL (ref 4–5.2)
RBC # UR STRIP.AUTO: ABNORMAL /UL
RBC #/AREA URNS AUTO: ABNORMAL /HPF
SODIUM SERPL-SCNC: 138 MMOL/L (ref 136–145)
SP GR UR STRIP.AUTO: 1.01
UROBILINOGEN UR STRIP.AUTO-MCNC: NORMAL MG/DL
WBC # BLD AUTO: 5.6 X10*3/UL (ref 4.4–11.3)
WBC #/AREA URNS AUTO: ABNORMAL /HPF

## 2024-12-27 PROCEDURE — 85025 COMPLETE CBC W/AUTO DIFF WBC: CPT | Performed by: REGISTERED NURSE

## 2024-12-27 PROCEDURE — 2500000005 HC RX 250 GENERAL PHARMACY W/O HCPCS: Performed by: HOSPITALIST

## 2024-12-27 PROCEDURE — 99233 SBSQ HOSP IP/OBS HIGH 50: CPT | Performed by: HOSPITALIST

## 2024-12-27 PROCEDURE — 82947 ASSAY GLUCOSE BLOOD QUANT: CPT | Mod: 59

## 2024-12-27 PROCEDURE — G0300 HHS/HOSPICE OF LPN EA 15 MIN: HCPCS | Mod: HHH

## 2024-12-27 PROCEDURE — 2500000005 HC RX 250 GENERAL PHARMACY W/O HCPCS: Performed by: REGISTERED NURSE

## 2024-12-27 PROCEDURE — 72131 CT LUMBAR SPINE W/O DYE: CPT | Performed by: RADIOLOGY

## 2024-12-27 PROCEDURE — G0378 HOSPITAL OBSERVATION PER HR: HCPCS

## 2024-12-27 PROCEDURE — 72131 CT LUMBAR SPINE W/O DYE: CPT

## 2024-12-27 PROCEDURE — 36415 COLL VENOUS BLD VENIPUNCTURE: CPT | Performed by: REGISTERED NURSE

## 2024-12-27 PROCEDURE — 81001 URINALYSIS AUTO W/SCOPE: CPT | Performed by: REGISTERED NURSE

## 2024-12-27 PROCEDURE — 99285 EMERGENCY DEPT VISIT HI MDM: CPT | Performed by: EMERGENCY MEDICINE

## 2024-12-27 PROCEDURE — 80053 COMPREHEN METABOLIC PANEL: CPT | Performed by: REGISTERED NURSE

## 2024-12-27 PROCEDURE — 2500000004 HC RX 250 GENERAL PHARMACY W/ HCPCS (ALT 636 FOR OP/ED): Performed by: REGISTERED NURSE

## 2024-12-27 RX ORDER — ACETAMINOPHEN 325 MG/1
650 TABLET ORAL EVERY 4 HOURS PRN
Status: DISCONTINUED | OUTPATIENT
Start: 2024-12-27 | End: 2024-12-29 | Stop reason: HOSPADM

## 2024-12-27 RX ORDER — ACETAMINOPHEN 160 MG/5ML
650 SOLUTION ORAL EVERY 4 HOURS PRN
Status: DISCONTINUED | OUTPATIENT
Start: 2024-12-27 | End: 2024-12-29 | Stop reason: HOSPADM

## 2024-12-27 RX ORDER — METHOCARBAMOL 500 MG/1
500 TABLET, FILM COATED ORAL EVERY 8 HOURS PRN
Status: DISCONTINUED | OUTPATIENT
Start: 2024-12-27 | End: 2024-12-29 | Stop reason: HOSPADM

## 2024-12-27 RX ORDER — POLYETHYLENE GLYCOL 3350 17 G/17G
17 POWDER, FOR SOLUTION ORAL DAILY PRN
Status: DISCONTINUED | OUTPATIENT
Start: 2024-12-27 | End: 2024-12-29 | Stop reason: HOSPADM

## 2024-12-27 RX ORDER — INSULIN LISPRO 100 [IU]/ML
0-5 INJECTION, SOLUTION INTRAVENOUS; SUBCUTANEOUS
Status: DISCONTINUED | OUTPATIENT
Start: 2024-12-27 | End: 2024-12-29 | Stop reason: HOSPADM

## 2024-12-27 RX ORDER — FENTANYL CITRATE 50 UG/ML
25 INJECTION, SOLUTION INTRAMUSCULAR; INTRAVENOUS ONCE
Status: COMPLETED | OUTPATIENT
Start: 2024-12-27 | End: 2024-12-27

## 2024-12-27 RX ORDER — ACETAMINOPHEN 325 MG/1
975 TABLET ORAL ONCE
Status: DISCONTINUED | OUTPATIENT
Start: 2024-12-27 | End: 2024-12-29 | Stop reason: HOSPADM

## 2024-12-27 RX ORDER — ACETAMINOPHEN 650 MG/1
650 SUPPOSITORY RECTAL EVERY 4 HOURS PRN
Status: DISCONTINUED | OUTPATIENT
Start: 2024-12-27 | End: 2024-12-29 | Stop reason: HOSPADM

## 2024-12-27 RX ORDER — LIDOCAINE 560 MG/1
1 PATCH PERCUTANEOUS; TOPICAL; TRANSDERMAL DAILY
Status: DISCONTINUED | OUTPATIENT
Start: 2024-12-27 | End: 2024-12-29 | Stop reason: HOSPADM

## 2024-12-27 RX ORDER — OXYCODONE HYDROCHLORIDE 5 MG/1
2.5 TABLET ORAL EVERY 6 HOURS PRN
Status: DISCONTINUED | OUTPATIENT
Start: 2024-12-27 | End: 2024-12-29 | Stop reason: HOSPADM

## 2024-12-27 RX ORDER — ONDANSETRON 4 MG/1
4 TABLET, FILM COATED ORAL EVERY 8 HOURS PRN
Status: DISCONTINUED | OUTPATIENT
Start: 2024-12-27 | End: 2024-12-29 | Stop reason: HOSPADM

## 2024-12-27 RX ORDER — ONDANSETRON HYDROCHLORIDE 2 MG/ML
4 INJECTION, SOLUTION INTRAVENOUS EVERY 8 HOURS PRN
Status: DISCONTINUED | OUTPATIENT
Start: 2024-12-27 | End: 2024-12-29 | Stop reason: HOSPADM

## 2024-12-27 RX ORDER — LIDOCAINE 560 MG/1
1 PATCH PERCUTANEOUS; TOPICAL; TRANSDERMAL ONCE
Status: COMPLETED | OUTPATIENT
Start: 2024-12-27 | End: 2024-12-28

## 2024-12-27 RX ADMIN — FENTANYL CITRATE 25 MCG: 50 INJECTION INTRAMUSCULAR; INTRAVENOUS at 13:05

## 2024-12-27 RX ADMIN — LIDOCAINE 4% 1 PATCH: 40 PATCH TOPICAL at 21:25

## 2024-12-27 RX ADMIN — LIDOCAINE 4% 1 PATCH: 40 PATCH TOPICAL at 16:51

## 2024-12-27 SDOH — ECONOMIC STABILITY: HOUSING INSECURITY: IN THE LAST 12 MONTHS, WAS THERE A TIME WHEN YOU WERE NOT ABLE TO PAY THE MORTGAGE OR RENT ON TIME?: NO

## 2024-12-27 SDOH — SOCIAL STABILITY: SOCIAL INSECURITY: WITHIN THE LAST YEAR, HAVE YOU BEEN AFRAID OF YOUR PARTNER OR EX-PARTNER?: NO

## 2024-12-27 SDOH — HEALTH STABILITY: PHYSICAL HEALTH
HOW OFTEN DO YOU NEED TO HAVE SOMEONE HELP YOU WHEN YOU READ INSTRUCTIONS, PAMPHLETS, OR OTHER WRITTEN MATERIAL FROM YOUR DOCTOR OR PHARMACY?: NEVER

## 2024-12-27 SDOH — SOCIAL STABILITY: SOCIAL INSECURITY: DOES ANYONE TRY TO KEEP YOU FROM HAVING/CONTACTING OTHER FRIENDS OR DOING THINGS OUTSIDE YOUR HOME?: NO

## 2024-12-27 SDOH — SOCIAL STABILITY: SOCIAL INSECURITY
WITHIN THE LAST YEAR, HAVE YOU BEEN KICKED, HIT, SLAPPED, OR OTHERWISE PHYSICALLY HURT BY YOUR PARTNER OR EX-PARTNER?: NO

## 2024-12-27 SDOH — HEALTH STABILITY: MENTAL HEALTH: HOW OFTEN DO YOU HAVE A DRINK CONTAINING ALCOHOL?: NEVER

## 2024-12-27 SDOH — SOCIAL STABILITY: SOCIAL INSECURITY: ARE THERE ANY APPARENT SIGNS OF INJURIES/BEHAVIORS THAT COULD BE RELATED TO ABUSE/NEGLECT?: NO

## 2024-12-27 SDOH — ECONOMIC STABILITY: FOOD INSECURITY: HOW HARD IS IT FOR YOU TO PAY FOR THE VERY BASICS LIKE FOOD, HOUSING, MEDICAL CARE, AND HEATING?: NOT HARD AT ALL

## 2024-12-27 SDOH — SOCIAL STABILITY: SOCIAL INSECURITY: WITHIN THE LAST YEAR, HAVE YOU BEEN HUMILIATED OR EMOTIONALLY ABUSED IN OTHER WAYS BY YOUR PARTNER OR EX-PARTNER?: NO

## 2024-12-27 SDOH — ECONOMIC STABILITY: FOOD INSECURITY: WITHIN THE PAST 12 MONTHS, THE FOOD YOU BOUGHT JUST DIDN'T LAST AND YOU DIDN'T HAVE MONEY TO GET MORE.: NEVER TRUE

## 2024-12-27 SDOH — ECONOMIC STABILITY: FOOD INSECURITY: WITHIN THE PAST 12 MONTHS, YOU WORRIED THAT YOUR FOOD WOULD RUN OUT BEFORE YOU GOT THE MONEY TO BUY MORE.: NEVER TRUE

## 2024-12-27 SDOH — SOCIAL STABILITY: SOCIAL INSECURITY: WERE YOU ABLE TO COMPLETE ALL THE BEHAVIORAL HEALTH SCREENINGS?: YES

## 2024-12-27 SDOH — HEALTH STABILITY: MENTAL HEALTH: HOW MANY DRINKS CONTAINING ALCOHOL DO YOU HAVE ON A TYPICAL DAY WHEN YOU ARE DRINKING?: PATIENT DOES NOT DRINK

## 2024-12-27 SDOH — ECONOMIC STABILITY: INCOME INSECURITY: IN THE PAST 12 MONTHS HAS THE ELECTRIC, GAS, OIL, OR WATER COMPANY THREATENED TO SHUT OFF SERVICES IN YOUR HOME?: NO

## 2024-12-27 SDOH — HEALTH STABILITY: MENTAL HEALTH: HOW OFTEN DO YOU HAVE SIX OR MORE DRINKS ON ONE OCCASION?: NEVER

## 2024-12-27 SDOH — ECONOMIC STABILITY: HOUSING INSECURITY: IN THE PAST 12 MONTHS, HOW MANY TIMES HAVE YOU MOVED WHERE YOU WERE LIVING?: 1

## 2024-12-27 SDOH — SOCIAL STABILITY: SOCIAL INSECURITY: DO YOU FEEL ANYONE HAS EXPLOITED OR TAKEN ADVANTAGE OF YOU FINANCIALLY OR OF YOUR PERSONAL PROPERTY?: NO

## 2024-12-27 SDOH — SOCIAL STABILITY: SOCIAL INSECURITY: ABUSE: ADULT

## 2024-12-27 SDOH — SOCIAL STABILITY: SOCIAL INSECURITY: HAVE YOU HAD ANY THOUGHTS OF HARMING ANYONE ELSE?: NO

## 2024-12-27 SDOH — ECONOMIC STABILITY: HOUSING INSECURITY: AT ANY TIME IN THE PAST 12 MONTHS, WERE YOU HOMELESS OR LIVING IN A SHELTER (INCLUDING NOW)?: NO

## 2024-12-27 SDOH — ECONOMIC STABILITY: TRANSPORTATION INSECURITY: IN THE PAST 12 MONTHS, HAS LACK OF TRANSPORTATION KEPT YOU FROM MEDICAL APPOINTMENTS OR FROM GETTING MEDICATIONS?: NO

## 2024-12-27 SDOH — SOCIAL STABILITY: SOCIAL INSECURITY
WITHIN THE LAST YEAR, HAVE YOU BEEN RAPED OR FORCED TO HAVE ANY KIND OF SEXUAL ACTIVITY BY YOUR PARTNER OR EX-PARTNER?: NO

## 2024-12-27 SDOH — SOCIAL STABILITY: SOCIAL INSECURITY: HAS ANYONE EVER THREATENED TO HURT YOUR FAMILY OR YOUR PETS?: NO

## 2024-12-27 SDOH — SOCIAL STABILITY: SOCIAL INSECURITY: ARE YOU OR HAVE YOU BEEN THREATENED OR ABUSED PHYSICALLY, EMOTIONALLY, OR SEXUALLY BY ANYONE?: YES

## 2024-12-27 SDOH — SOCIAL STABILITY: SOCIAL INSECURITY: HAVE YOU HAD THOUGHTS OF HARMING ANYONE ELSE?: NO

## 2024-12-27 SDOH — SOCIAL STABILITY: SOCIAL INSECURITY: DO YOU FEEL UNSAFE GOING BACK TO THE PLACE WHERE YOU ARE LIVING?: NO

## 2024-12-27 ASSESSMENT — ACTIVITIES OF DAILY LIVING (ADL)
AMBULATION ASSISTANCE: INDEPENDENT
LACK_OF_TRANSPORTATION: NO
TOILETING: 1
DRESSING_UB_CURRENT_FUNCTION: INDEPENDENT
HEARING - LEFT EAR: FUNCTIONAL
DRESSING YOURSELF: NEEDS ASSISTANCE
FEEDING YOURSELF: INDEPENDENT
FEEDING ASSESSED: 1
HEARING - RIGHT EAR: FUNCTIONAL
PATIENT'S MEMORY ADEQUATE TO SAFELY COMPLETE DAILY ACTIVITIES?: YES
JUDGMENT_ADEQUATE_SAFELY_COMPLETE_DAILY_ACTIVITIES: YES
ADEQUATE_TO_COMPLETE_ADL: YES
DRESSING_LB_CURRENT_FUNCTION: INDEPENDENT
BATHING ASSESSED: 1
TOILETING: NEEDS ASSISTANCE
FEEDING: INDEPENDENT
TOILETING: INDEPENDENT
WALKS IN HOME: NEEDS ASSISTANCE
LACK_OF_TRANSPORTATION: NO
BATHING: NEEDS ASSISTANCE
AMBULATION ASSISTANCE: 1
BATHING_CURRENT_FUNCTION: INDEPENDENT
GROOMING: NEEDS ASSISTANCE

## 2024-12-27 ASSESSMENT — PATIENT HEALTH QUESTIONNAIRE - PHQ9
2. FEELING DOWN, DEPRESSED OR HOPELESS: NOT AT ALL
SUM OF ALL RESPONSES TO PHQ9 QUESTIONS 1 & 2: 0
1. LITTLE INTEREST OR PLEASURE IN DOING THINGS: NOT AT ALL

## 2024-12-27 ASSESSMENT — LIFESTYLE VARIABLES
HOW OFTEN DO YOU HAVE 6 OR MORE DRINKS ON ONE OCCASION: NEVER
EVER FELT BAD OR GUILTY ABOUT YOUR DRINKING: NO
AUDIT-C TOTAL SCORE: 0
SKIP TO QUESTIONS 9-10: 1
AUDIT-C TOTAL SCORE: 0
HOW OFTEN DO YOU HAVE A DRINK CONTAINING ALCOHOL: NEVER
HOW MANY STANDARD DRINKS CONTAINING ALCOHOL DO YOU HAVE ON A TYPICAL DAY: PATIENT DOES NOT DRINK
EVER HAD A DRINK FIRST THING IN THE MORNING TO STEADY YOUR NERVES TO GET RID OF A HANGOVER: NO
HAVE PEOPLE ANNOYED YOU BY CRITICIZING YOUR DRINKING: NO
TOTAL SCORE: 0
SKIP TO QUESTIONS 9-10: 1
HAVE YOU EVER FELT YOU SHOULD CUT DOWN ON YOUR DRINKING: NO
AUDIT-C TOTAL SCORE: 0

## 2024-12-27 ASSESSMENT — COGNITIVE AND FUNCTIONAL STATUS - GENERAL
CLIMB 3 TO 5 STEPS WITH RAILING: A LOT
HELP NEEDED FOR BATHING: A LITTLE
MOVING FROM LYING ON BACK TO SITTING ON SIDE OF FLAT BED WITH BEDRAILS: A LITTLE
MOBILITY SCORE: 16
PATIENT BASELINE BEDBOUND: NO
TOILETING: A LITTLE
PERSONAL GROOMING: A LITTLE
DAILY ACTIVITIY SCORE: 20
TURNING FROM BACK TO SIDE WHILE IN FLAT BAD: A LITTLE
WALKING IN HOSPITAL ROOM: A LOT
MOVING TO AND FROM BED TO CHAIR: A LITTLE
STANDING UP FROM CHAIR USING ARMS: A LITTLE
DRESSING REGULAR LOWER BODY CLOTHING: A LITTLE

## 2024-12-27 ASSESSMENT — ENCOUNTER SYMPTOMS
DEPRESSION: 0
MUSCLE WEAKNESS: 1
LOSS OF SENSATION IN FEET: 0
PERSON REPORTING PAIN: PATIENT
LIMITED RANGE OF MOTION: 1
OCCASIONAL FEELINGS OF UNSTEADINESS: 0
CHANGE IN APPETITE: UNCHANGED
APPETITE LEVEL: GOOD

## 2024-12-27 ASSESSMENT — PAIN SCALES - GENERAL
PAINLEVEL_OUTOF10: 8
PAINLEVEL_OUTOF10: 0 - NO PAIN
PAINLEVEL_OUTOF10: 3
PAINLEVEL_OUTOF10: 7

## 2024-12-27 ASSESSMENT — COLUMBIA-SUICIDE SEVERITY RATING SCALE - C-SSRS
2. HAVE YOU ACTUALLY HAD ANY THOUGHTS OF KILLING YOURSELF?: NO
1. IN THE PAST MONTH, HAVE YOU WISHED YOU WERE DEAD OR WISHED YOU COULD GO TO SLEEP AND NOT WAKE UP?: NO
6. HAVE YOU EVER DONE ANYTHING, STARTED TO DO ANYTHING, OR PREPARED TO DO ANYTHING TO END YOUR LIFE?: NO
1. IN THE PAST MONTH, HAVE YOU WISHED YOU WERE DEAD OR WISHED YOU COULD GO TO SLEEP AND NOT WAKE UP?: NO
2. HAVE YOU ACTUALLY HAD ANY THOUGHTS OF KILLING YOURSELF?: NO
6. HAVE YOU EVER DONE ANYTHING, STARTED TO DO ANYTHING, OR PREPARED TO DO ANYTHING TO END YOUR LIFE?: NO

## 2024-12-27 ASSESSMENT — PAIN DESCRIPTION - PAIN TYPE
TYPE: CHRONIC PAIN
TYPE: CHRONIC PAIN

## 2024-12-27 ASSESSMENT — PAIN DESCRIPTION - ONSET
ONSET: ONGOING
ONSET: ONGOING

## 2024-12-27 ASSESSMENT — PAIN DESCRIPTION - FREQUENCY
FREQUENCY: CONSTANT/CONTINUOUS
FREQUENCY: CONSTANT/CONTINUOUS

## 2024-12-27 ASSESSMENT — PAIN DESCRIPTION - LOCATION
LOCATION: BACK
LOCATION: BACK

## 2024-12-27 ASSESSMENT — PAIN DESCRIPTION - DESCRIPTORS
DESCRIPTORS: ACHING
DESCRIPTORS: DISCOMFORT

## 2024-12-27 ASSESSMENT — PAIN - FUNCTIONAL ASSESSMENT
PAIN_FUNCTIONAL_ASSESSMENT: 0-10
PAIN_FUNCTIONAL_ASSESSMENT: 0-10

## 2024-12-27 ASSESSMENT — PAIN DESCRIPTION - ORIENTATION
ORIENTATION: LOWER;LEFT
ORIENTATION: MID;LEFT

## 2024-12-27 NOTE — PROGRESS NOTES
History Of Present Illness  Harper Lara is a 79 y.o. female presenting with history of afib on eliquis, presented to ED with back pain after fall two weeks ago. Per patient she was walking with walker, trying to get to recliner and fell turning to sit down. She had back pain since then but attributed it to her sciatica pain. He pain became worse and decided to come to ED for further evaluation. In the ED she as found to have a new compression fracture at L3. She was given pain meds and tried to ambulate but was very weak. She was subsequently admitted for      Past Medical History  She has a past medical history of Atrial fibrillation (Multi) and Diabetes mellitus (Multi).    Surgical History  She has a past surgical history that includes Cholecystectomy; Hiatal hernia repair; and Hysterectomy.     Social History  She reports that she has never smoked. She has never been exposed to tobacco smoke. She has never used smokeless tobacco. She reports that she does not drink alcohol and does not use drugs.    Family History  Family History   Problem Relation Name Age of Onset    Breast cancer Sister      Liver cancer Sister      Cancer Sister      Other (oral cancer) Maternal Grandmother          10 point review of systems negative except per HPI      Last Recorded Vitals  /65 (BP Location: Left arm, Patient Position: Lying)   Pulse 72   Temp 37 °C (98.6 °F) (Temporal)   Resp 17   Wt 65.3 kg (144 lb)   SpO2 98%     Physical Exam   Gen: NAD  HEENT: EOM, MMM  CV: RRR, no murmurs rubs or gallops  Resp: coarse rhonchi b/l   Abdomen: soft, NT,+BS  LE: No edema      Relevant Results  Labs Reviewed   CBC WITH AUTO DIFFERENTIAL - Abnormal       Result Value    WBC 5.6      nRBC 0.0      RBC 3.23 (*)     Hemoglobin 9.8 (*)     Hematocrit 29.0 (*)     MCV 90      MCH 30.3      MCHC 33.8      RDW 13.1      Platelets 146 (*)     Neutrophils % 88.1      Immature Granulocytes %, Automated 0.4      Lymphocytes % 6.7       Monocytes % 3.9      Eosinophils % 0.4      Basophils % 0.5      Neutrophils Absolute 4.96      Immature Granulocytes Absolute, Automated 0.02      Lymphocytes Absolute 0.38 (*)     Monocytes Absolute 0.22      Eosinophils Absolute 0.02      Basophils Absolute 0.03     COMPREHENSIVE METABOLIC PANEL - Abnormal    Glucose 143 (*)     Sodium 138      Potassium 4.2      Chloride 105      Bicarbonate 28      Anion Gap 9 (*)     Urea Nitrogen 26 (*)     Creatinine 0.98      eGFR 60 (*)     Calcium 8.9      Albumin 3.6      Alkaline Phosphatase 67      Total Protein 6.3 (*)     AST 31      Bilirubin, Total 0.6      ALT 11     URINALYSIS WITH REFLEX CULTURE AND MICROSCOPIC - Abnormal    Color, Urine Light-Yellow      Appearance, Urine Clear      Specific Gravity, Urine 1.012      pH, Urine 6.5      Protein, Urine 20 (TRACE)      Glucose, Urine Normal      Blood, Urine 0.06 (1+) (*)     Ketones, Urine NEGATIVE      Bilirubin, Urine NEGATIVE      Urobilinogen, Urine Normal      Nitrite, Urine NEGATIVE      Leukocyte Esterase, Urine NEGATIVE     URINALYSIS MICROSCOPIC WITH REFLEX CULTURE - Abnormal    WBC, Urine 1-5      RBC, Urine 1-2      Bacteria, Urine 1+ (*)    URINALYSIS WITH REFLEX CULTURE AND MICROSCOPIC    Narrative:     The following orders were created for panel order Urinalysis with Reflex Culture and Microscopic.  Procedure                               Abnormality         Status                     ---------                               -----------         ------                     Urinalysis with Reflex C...[[id number]]  Abnormal            Final result               Extra Urine Gray Tube[[id number]]                            In process                   Please view results for these tests on the individual orders.   EXTRA URINE GRAY TUBE     CT lumbar spine wo IV contrast   Final Result   1. L2 compression fracture, new relative to the prior study.   2. Transverse fracture through the syndesmotic posterior  elements at   L4-5 new relative to the prior study.   3. L3 compression fracture, similar to the prior study.   4. Degenerative changes, as described above.        MACRO:   None.        Signed by: Omar Noe 12/27/2024 2:52 PM   Dictation workstation:   [id number]        Lab Results   Component Value Date    HGBA1C 5.6 06/25/2024     Assessment/Plan  79 year old female with history of afib on eliquis admitted with back pain after mechanical fall and found to have compression fracture at L3    -continue pain control  -PT/OT  -ortho consulted    Hx of afib: continue eliquis    DMII: SSI here    DVT ppx: mikey Stockton MD

## 2024-12-27 NOTE — ED PROVIDER NOTES
HPI   Chief Complaint   Patient presents with    Sciatica     Pt was diagnosed with sciatica, lower back pain shooting down the back of the left leg.      79-year-old female past medical history significant for diabetes and atrial fibrillation as well as sciatica presents emergency department today for evaluation of sciatica pain.  Patient presents from home via EMS due to continued pain.  Patient tells me about a week and a half ago she did fall at home.  She tells me that she has been taking Tylenol at home which usually manages her pain however over the last 2 days its become significantly worse.  Patient tells me that she lives at home with her  however they do have a neighbor who comes over and makes the bed cooks and cleans for them and occasionally helps her with showering.  Patient denies any loss of bowel or bladder.  Patient denies symptoms of saddle paresthesia.  Patient denies any chest pain shortness of breath.  Patient tells me she had a mechanical fall she denies feeling dizzy or lightheaded prior to falling.  Patient has no other complaints on arrival.      History provided by:  Patient, EMS personnel and relative   used: No            Patient History   Past Medical History:   Diagnosis Date    Atrial fibrillation (Multi)     Diabetes mellitus (Multi)      Past Surgical History:   Procedure Laterality Date    CHOLECYSTECTOMY      HIATAL HERNIA REPAIR      HYSTERECTOMY       Family History   Problem Relation Name Age of Onset    Breast cancer Sister      Liver cancer Sister      Cancer Sister      Other (oral cancer) Maternal Grandmother       Social History     Tobacco Use    Smoking status: Never     Passive exposure: Never    Smokeless tobacco: Never   Vaping Use    Vaping status: Never Used   Substance Use Topics    Alcohol use: Never    Drug use: Never       Physical Exam   ED Triage Vitals [12/27/24 1207]   Temperature Heart Rate Respirations BP   37 °C (98.6 °F) 77 18  170/59      Pulse Ox Temp Source Heart Rate Source Patient Position   99 % Temporal Monitor Lying      BP Location FiO2 (%)     Left arm --       Physical Exam  Vitals and nursing note reviewed.   Constitutional:       Appearance: Normal appearance.   HENT:      Head: Normocephalic.   Cardiovascular:      Rate and Rhythm: Normal rate and regular rhythm.      Pulses: Normal pulses.      Heart sounds: Normal heart sounds.   Pulmonary:      Effort: Pulmonary effort is normal.      Breath sounds: Normal breath sounds.   Abdominal:      General: Abdomen is flat.      Palpations: Abdomen is soft.   Musculoskeletal:      Lumbar back: Tenderness (Right paraspinal musculature tenderness) present.      Comments: Patient has tenderness to the right paraspinal musculature of the lumbar spine.  Patient has no midline tenderness of the lumbar spine, no step-offs, no deformities noted.  Patient has equal strength in bilateral lower extremities 5/5.  Patient able to extend and flex at the knee bilaterally.  Patient is able to internally and externally rotated the ankle.  Patient is able to plantarflex toes bilaterally.  Patient is able to feel light touch sensation of the inner thigh.   Skin:     General: Skin is warm and dry.      Capillary Refill: Capillary refill takes less than 2 seconds.   Neurological:      General: No focal deficit present.      Mental Status: She is alert and oriented to person, place, and time.   Psychiatric:         Mood and Affect: Mood normal.         Behavior: Behavior normal.           ED Course & MDM   Diagnoses as of 12/27/24 1543   Closed compression fracture of L2 vertebra, initial encounter (Multi)   Ambulatory dysfunction                 No data recorded     Evgeny Coma Scale Score: 15 (12/27/24 1210 : Carmen Bustos RN)                           Medical Decision Making    Patient seen the emergency department; patient is healthy nontoxic appearance not appear in acute distress.  Patient has  no midline tenderness of the lumbar spine no step-offs, no deformities.  Patient is neurologically intact any focal deficits.  Patient is able to extend and flex at the knee bilaterally.  Patient is able to plantarflex this.  Patient is able to internally externally rotated the ankle.  Patient feels light touch sensation of the inner thigh.  Patient has most of her pain in the SI joint.  Patient's lung sounds are clear bilateral without any adventitious noise.  Heart regular rate and rhythm without a murmur appreciated.  Skin is warm and dry no diaphoresis noted.    Patient's pain with fentanyl.  Will also obtain basic labs, urinalysis and a CT of the lumbar spine.    CT of the lumbar spine does show an L2 compression fracture which is new when compared to previous studies.  There is a transverse fracture through the symptomatic posterior elements of the L4-5 which is also new.  Previous L3 compression fracture previously noted.    Tenderness did attempt to admit patient however this was unsuccessful.  I then discussed with patient and her daughter who is at bedside admission.  We discussed for pain management, PT OT and the possibility of placement.  Patient and her daughter are agreeable to this.  I then reached out to the hospitalist team and discussed patient with Dr. Stockton who accepts patient for admission at this time.       Procedure  Procedures     Kate Carpenter, APRN-CNP  12/27/24 [id number]

## 2024-12-28 ENCOUNTER — APPOINTMENT (OUTPATIENT)
Dept: RADIOLOGY | Facility: HOSPITAL | Age: 79
End: 2024-12-28
Payer: MEDICARE

## 2024-12-28 LAB
GLUCOSE BLD MANUAL STRIP-MCNC: 103 MG/DL (ref 74–99)
GLUCOSE BLD MANUAL STRIP-MCNC: 118 MG/DL (ref 74–99)
GLUCOSE BLD MANUAL STRIP-MCNC: 118 MG/DL (ref 74–99)
GLUCOSE BLD MANUAL STRIP-MCNC: 170 MG/DL (ref 74–99)
HOLD SPECIMEN: NORMAL

## 2024-12-28 PROCEDURE — 99221 1ST HOSP IP/OBS SF/LOW 40: CPT | Performed by: ORTHOPAEDIC SURGERY

## 2024-12-28 PROCEDURE — 72100 X-RAY EXAM L-S SPINE 2/3 VWS: CPT | Performed by: RADIOLOGY

## 2024-12-28 PROCEDURE — 2500000001 HC RX 250 WO HCPCS SELF ADMINISTERED DRUGS (ALT 637 FOR MEDICARE OP): Performed by: HOSPITALIST

## 2024-12-28 PROCEDURE — 72100 X-RAY EXAM L-S SPINE 2/3 VWS: CPT

## 2024-12-28 PROCEDURE — 2500000004 HC RX 250 GENERAL PHARMACY W/ HCPCS (ALT 636 FOR OP/ED): Performed by: HOSPITALIST

## 2024-12-28 PROCEDURE — 72148 MRI LUMBAR SPINE W/O DYE: CPT | Performed by: RADIOLOGY

## 2024-12-28 PROCEDURE — 97165 OT EVAL LOW COMPLEX 30 MIN: CPT | Mod: GO

## 2024-12-28 PROCEDURE — 72148 MRI LUMBAR SPINE W/O DYE: CPT

## 2024-12-28 PROCEDURE — 2500000002 HC RX 250 W HCPCS SELF ADMINISTERED DRUGS (ALT 637 FOR MEDICARE OP, ALT 636 FOR OP/ED): Mod: MUE | Performed by: HOSPITALIST

## 2024-12-28 PROCEDURE — 99232 SBSQ HOSP IP/OBS MODERATE 35: CPT | Performed by: HOSPITALIST

## 2024-12-28 PROCEDURE — 82947 ASSAY GLUCOSE BLOOD QUANT: CPT

## 2024-12-28 PROCEDURE — 2500000005 HC RX 250 GENERAL PHARMACY W/O HCPCS: Performed by: HOSPITALIST

## 2024-12-28 PROCEDURE — G0378 HOSPITAL OBSERVATION PER HR: HCPCS

## 2024-12-28 PROCEDURE — 97161 PT EVAL LOW COMPLEX 20 MIN: CPT | Mod: GP | Performed by: PHYSICAL THERAPIST

## 2024-12-28 RX ORDER — FUROSEMIDE 40 MG/1
20 TABLET ORAL EVERY MORNING
Status: DISCONTINUED | OUTPATIENT
Start: 2024-12-28 | End: 2024-12-29 | Stop reason: HOSPADM

## 2024-12-28 RX ORDER — FUROSEMIDE 40 MG/1
40 TABLET ORAL EVERY EVENING
Status: DISCONTINUED | OUTPATIENT
Start: 2024-12-28 | End: 2024-12-29 | Stop reason: HOSPADM

## 2024-12-28 RX ORDER — LEVOTHYROXINE SODIUM 75 UG/1
75 TABLET ORAL DAILY
Status: DISCONTINUED | OUTPATIENT
Start: 2024-12-28 | End: 2024-12-29 | Stop reason: HOSPADM

## 2024-12-28 RX ORDER — PANTOPRAZOLE SODIUM 40 MG/1
40 TABLET, DELAYED RELEASE ORAL
Status: DISCONTINUED | OUTPATIENT
Start: 2024-12-29 | End: 2024-12-29 | Stop reason: HOSPADM

## 2024-12-28 RX ORDER — LANOLIN ALCOHOL/MO/W.PET/CERES
400 CREAM (GRAM) TOPICAL DAILY
Status: DISCONTINUED | OUTPATIENT
Start: 2024-12-28 | End: 2024-12-29 | Stop reason: HOSPADM

## 2024-12-28 RX ORDER — FLUOXETINE HYDROCHLORIDE 20 MG/1
20 CAPSULE ORAL DAILY
Status: DISCONTINUED | OUTPATIENT
Start: 2024-12-28 | End: 2024-12-29 | Stop reason: HOSPADM

## 2024-12-28 RX ORDER — ARIPIPRAZOLE 10 MG/1
5 TABLET ORAL DAILY
Status: DISCONTINUED | OUTPATIENT
Start: 2024-12-28 | End: 2024-12-29 | Stop reason: HOSPADM

## 2024-12-28 RX ORDER — AMLODIPINE BESYLATE 5 MG/1
2.5 TABLET ORAL DAILY
Status: DISCONTINUED | OUTPATIENT
Start: 2024-12-28 | End: 2024-12-29 | Stop reason: HOSPADM

## 2024-12-28 RX ADMIN — FLUOXETINE HYDROCHLORIDE 20 MG: 20 CAPSULE ORAL at 10:30

## 2024-12-28 RX ADMIN — FUROSEMIDE 20 MG: 40 TABLET ORAL at 10:30

## 2024-12-28 RX ADMIN — FUROSEMIDE 40 MG: 40 TABLET ORAL at 17:55

## 2024-12-28 RX ADMIN — ARIPIPRAZOLE 5 MG: 10 TABLET ORAL at 10:31

## 2024-12-28 RX ADMIN — LEVOTHYROXINE SODIUM 75 MCG: 75 TABLET ORAL at 10:30

## 2024-12-28 RX ADMIN — MAGNESIUM OXIDE 400 MG (241.3 MG MAGNESIUM) TABLET 400 MG: TABLET at 10:30

## 2024-12-28 RX ADMIN — LIDOCAINE 4% 1 PATCH: 40 PATCH TOPICAL at 21:01

## 2024-12-28 RX ADMIN — AMLODIPINE BESYLATE 2.5 MG: 5 TABLET ORAL at 10:30

## 2024-12-28 ASSESSMENT — ENCOUNTER SYMPTOMS
PAIN LOCATION: CHEST
PAIN LOCATION - PAIN FREQUENCY: FREQUENT
PAIN LOCATION - PAIN SEVERITY: 10/10
PAIN LOCATION - RELIEVING FACTORS: REST
PAIN LOCATION: LEFT ARM
HIGHEST PAIN SEVERITY IN PAST 24 HOURS: 10/10
PAIN LOCATION - PAIN QUALITY: SHOOTING
PAIN LOCATION - PAIN FREQUENCY: FREQUENT
PAIN LOCATION - PAIN QUALITY: RADIATING
PAIN LOCATION - PAIN QUALITY: SHOOTING
PAIN LOCATION: LEFT LEG
PAIN LOCATION - PAIN SEVERITY: 8/10
PAIN LOCATION - PAIN QUALITY: SHARP
PAIN: 1
LOWEST PAIN SEVERITY IN PAST 24 HOURS: 3/10
PAIN LOCATION - PAIN SEVERITY: 8/10
PAIN LOCATION - PAIN FREQUENCY: FREQUENT
SUBJECTIVE PAIN PROGRESSION: GRADUALLY WORSENING
PAIN LOCATION: BACK
PAIN LOCATION - PAIN FREQUENCY: FREQUENT
PAIN LOCATION - PAIN SEVERITY: 10/10

## 2024-12-28 ASSESSMENT — COGNITIVE AND FUNCTIONAL STATUS - GENERAL
DRESSING REGULAR LOWER BODY CLOTHING: A LITTLE
TOILETING: A LITTLE
STANDING UP FROM CHAIR USING ARMS: A LITTLE
MOVING FROM LYING ON BACK TO SITTING ON SIDE OF FLAT BED WITH BEDRAILS: A LITTLE
WALKING IN HOSPITAL ROOM: A LOT
MOVING TO AND FROM BED TO CHAIR: A LITTLE
DAILY ACTIVITIY SCORE: 20
TURNING FROM BACK TO SIDE WHILE IN FLAT BAD: A LOT
HELP NEEDED FOR BATHING: A LOT
HELP NEEDED FOR BATHING: A LITTLE
PERSONAL GROOMING: A LITTLE
TOILETING: A LOT
CLIMB 3 TO 5 STEPS WITH RAILING: A LOT
DAILY ACTIVITIY SCORE: 16
STANDING UP FROM CHAIR USING ARMS: A LOT
CLIMB 3 TO 5 STEPS WITH RAILING: A LOT
MOBILITY SCORE: 16
MOVING TO AND FROM BED TO CHAIR: A LOT
DRESSING REGULAR UPPER BODY CLOTHING: A LITTLE
DRESSING REGULAR LOWER BODY CLOTHING: A LITTLE
WALKING IN HOSPITAL ROOM: A LITTLE
DRESSING REGULAR LOWER BODY CLOTHING: A LOT
MOBILITY SCORE: 15
TOILETING: A LITTLE
STANDING UP FROM CHAIR USING ARMS: A LITTLE
DAILY ACTIVITIY SCORE: 20
TURNING FROM BACK TO SIDE WHILE IN FLAT BAD: A LITTLE
CLIMB 3 TO 5 STEPS WITH RAILING: A LOT
WALKING IN HOSPITAL ROOM: A LOT
DRESSING REGULAR UPPER BODY CLOTHING: A LITTLE
DRESSING REGULAR UPPER BODY CLOTHING: A LITTLE
MOVING TO AND FROM BED TO CHAIR: A LOT
HELP NEEDED FOR BATHING: A LITTLE
MOVING FROM LYING ON BACK TO SITTING ON SIDE OF FLAT BED WITH BEDRAILS: A LITTLE
MOBILITY SCORE: 16

## 2024-12-28 ASSESSMENT — PAIN SCALES - GENERAL
PAINLEVEL_OUTOF10: 0 - NO PAIN
PAINLEVEL_OUTOF10: 3
PAINLEVEL_OUTOF10: 1
PAINLEVEL_OUTOF10: 3

## 2024-12-28 ASSESSMENT — PAIN - FUNCTIONAL ASSESSMENT
PAIN_FUNCTIONAL_ASSESSMENT: 0-10

## 2024-12-28 ASSESSMENT — ACTIVITIES OF DAILY LIVING (ADL): BATHING_ASSISTANCE: MODERATE

## 2024-12-28 NOTE — CARE PLAN
The patient's goals for the shift include      The clinical goals for the shift include  Pt will remain hemodynamically stable throughout shift

## 2024-12-28 NOTE — H&P
History Of Present Illness  Harper Lara is a 79 y.o. female presenting with history of afib on eliquis, presented to ED with back pain after fall two weeks ago. Per patient she was walking with walker, trying to get to recliner and fell turning to sit down. She had back pain since then but attributed it to her sciatica pain. He pain became worse and decided to come to ED for further evaluation. In the ED she as found to have a new compression fracture at L3. She was given pain meds and tried to ambulate but was very weak. She was subsequently admitted for      Past Medical History  She has a past medical history of Atrial fibrillation (Multi) and Diabetes mellitus (Multi).     Surgical History  She has a past surgical history that includes Cholecystectomy; Hiatal hernia repair; and Hysterectomy.     Social History  She reports that she has never smoked. She has never been exposed to tobacco smoke. She has never used smokeless tobacco. She reports that she does not drink alcohol and does not use drugs.     Family History  Family History          Family History   Problem Relation Name Age of Onset    Breast cancer Sister        Liver cancer Sister        Cancer Sister        Other (oral cancer) Maternal Grandmother                10 point review of systems negative except per HPI      Last Recorded Vitals  /65 (BP Location: Left arm, Patient Position: Lying)   Pulse 72   Temp 37 °C (98.6 °F) (Temporal)   Resp 17   Wt 65.3 kg (144 lb)   SpO2 98%      Physical Exam   Gen: NAD  HEENT: EOM, MMM  CV: RRR, no murmurs rubs or gallops  Resp: coarse rhonchi b/l   Abdomen: soft, NT,+BS  LE: No edema        Relevant Results        Labs Reviewed   CBC WITH AUTO DIFFERENTIAL - Abnormal       Result Value      WBC 5.6        nRBC 0.0        RBC 3.23 (*)       Hemoglobin 9.8 (*)       Hematocrit 29.0 (*)       MCV 90        MCH 30.3        MCHC 33.8        RDW 13.1        Platelets 146 (*)       Neutrophils % 88.1         Immature Granulocytes %, Automated 0.4        Lymphocytes % 6.7        Monocytes % 3.9        Eosinophils % 0.4        Basophils % 0.5        Neutrophils Absolute 4.96        Immature Granulocytes Absolute, Automated 0.02        Lymphocytes Absolute 0.38 (*)       Monocytes Absolute 0.22        Eosinophils Absolute 0.02        Basophils Absolute 0.03      COMPREHENSIVE METABOLIC PANEL - Abnormal     Glucose 143 (*)       Sodium 138        Potassium 4.2        Chloride 105        Bicarbonate 28        Anion Gap 9 (*)       Urea Nitrogen 26 (*)       Creatinine 0.98        eGFR 60 (*)       Calcium 8.9        Albumin 3.6        Alkaline Phosphatase 67        Total Protein 6.3 (*)       AST 31        Bilirubin, Total 0.6        ALT 11      URINALYSIS WITH REFLEX CULTURE AND MICROSCOPIC - Abnormal     Color, Urine Light-Yellow        Appearance, Urine Clear        Specific Gravity, Urine 1.012        pH, Urine 6.5        Protein, Urine 20 (TRACE)        Glucose, Urine Normal        Blood, Urine 0.06 (1+) (*)       Ketones, Urine NEGATIVE        Bilirubin, Urine NEGATIVE        Urobilinogen, Urine Normal        Nitrite, Urine NEGATIVE        Leukocyte Esterase, Urine NEGATIVE      URINALYSIS MICROSCOPIC WITH REFLEX CULTURE - Abnormal     WBC, Urine 1-5        RBC, Urine 1-2        Bacteria, Urine 1+ (*)     URINALYSIS WITH REFLEX CULTURE AND MICROSCOPIC     Narrative:      The following orders were created for panel order Urinalysis with Reflex Culture and Microscopic.  Procedure                               Abnormality         Status                     ---------                               -----------         ------                     Urinalysis with Reflex C...[[id number]]  Abnormal            Final result               Extra Urine Gray Tube[[id number]]                            In process                    Please view results for these tests on the individual orders.   EXTRA URINE GRAY TUBE      CT lumbar  spine wo IV contrast   Final Result   1. L2 compression fracture, new relative to the prior study.   2. Transverse fracture through the syndesmotic posterior elements at   L4-5 new relative to the prior study.   3. L3 compression fracture, similar to the prior study.   4. Degenerative changes, as described above.        MACRO:   None.        Signed by: Omar Noe 12/27/2024 2:52 PM   Dictation workstation:   [id number]                Lab Results   Component Value Date     HGBA1C 5.6 06/25/2024         Assessment/Plan  79 year old female with history of afib on eliquis admitted with back pain after mechanical fall and found to have compression fracture at L3     -continue pain control  -PT/OT  -ortho consulted     Hx of afib: continue eliquis     DMII: SSI here     DVT ppx: mikey Stockton MD

## 2024-12-28 NOTE — PROGRESS NOTES
"Harper Lara is a 79 y.o. female on day 0 of admission presenting with weakness and L3 compression fracture       Subjective  pain is ok in bed, very difficulty to move     Objective     Last Recorded Vitals  /61   Pulse 74   Temp 36.6 °C (97.9 °F)   Resp 16   Ht 1.6 m (5' 3\")   Wt 70.2 kg (154 lb 12.2 oz)   SpO2 96%   BMI 27.42 kg/m²      Intake/Output last 3 Shifts:    Intake/Output Summary (Last 24 hours) at 12/28/2024 1128  Last data filed at 12/28/2024 0915  Gross per 24 hour   Intake --   Output 1602 ml   Net -1602 ml       Scheduled medications  acetaminophen, 975 mg, oral, Once  amLODIPine, 2.5 mg, oral, Daily  ARIPiprazole, 5 mg, oral, Daily  FLUoxetine, 20 mg, oral, Daily  furosemide, 20 mg, oral, q AM  furosemide, 40 mg, oral, q PM  insulin lispro, 0-5 Units, subcutaneous, TID AC  levothyroxine, 75 mcg, oral, Daily  lidocaine, 1 patch, transdermal, Daily  magnesium oxide, 400 mg, oral, Daily  [START ON 12/29/2024] pantoprazole, 40 mg, oral, Daily before breakfast      Continuous medications     PRN medications  PRN medications: acetaminophen **OR** acetaminophen **OR** acetaminophen, acetaminophen **OR** acetaminophen **OR** acetaminophen, methocarbamol, ondansetron **OR** ondansetron, oxyCODONE, polyethylene glycol    Physical Exam   Gen: NAD  HEENT: EOM, MMM  CV: RRR, no murmurs rubs or gallops  Resp: coarse rhonchi b/l   Abdomen: soft, NT,+BS  LE: No edema      Relevant Results  Lab Results   Component Value Date    WBC 5.6 12/27/2024    HGB 9.8 (L) 12/27/2024    HCT 29.0 (L) 12/27/2024    MCV 90 12/27/2024     (L) 12/27/2024     Lab Results   Component Value Date    GLUCOSE 143 (H) 12/27/2024    CALCIUM 8.9 12/27/2024     12/27/2024    K 4.2 12/27/2024    CO2 28 12/27/2024     12/27/2024    BUN 26 (H) 12/27/2024    CREATININE 0.98 12/27/2024         Assessment/Plan  79 year old female with history of afib on eliquis admitted with back pain after mechanical fall and " found to have compression fracture at L3     -continue pain control  -PT/OT  -ortho consulted. Recommended MRI for further evaluation      Hx of afib: continue eliquis     DMII: SSI here     DVT ppx: mikey Stockton MD

## 2024-12-28 NOTE — PROGRESS NOTES
Occupational Therapy    Evaluation    Patient Name: Harper Lara  MRN: [id number]  : 1945  Today's Date: 24  Time Calculation  Start Time: 1051  Stop Time: 1105  Time Calculation (min): 14 min     904/904-A    Assessment:  OT Assessment: pt presents with decreased indep iwth ADLS and functional moblity, will benefit from con't skilled OT tx  Prognosis: Good  Barriers to Discharge Home: Caregiver assistance, Physical needs  End of Session Patient Position: Bed, 3 rail up, Alarm on (call light in reach)  OT Assessment Results: Decreased ADL status, Decreased endurance, Decreased functional mobility  Prognosis: Good    Plan:  Treatment Interventions: ADL retraining, Functional transfer training, Endurance training, Patient/family training  OT Frequency: 2 times per week  OT Discharge Recommendations: Low intensity level of continued care, Moderate intensity level of continued care  OT Recommended Transfer Status: Minimal assist  OT - OK to Discharge: Yes  Treatment Interventions: ADL retraining, Functional transfer training, Endurance training, Patient/family training  Subjective     Current Problem:  1. Closed compression fracture of L2 vertebra, initial encounter (Multi)        2. Ambulatory dysfunction                General:   OT Received On: 24  General  Reason for Referral: ADL impairment  Referred By: Mahin   Past Medical History Relevant to Rehab: A-fib (on eliquis), DM, hiatal hernia repair  Family/Caregiver Present: No  Co-Treatment: PT  Prior to Session Communication: Bedside nurse, Physician  Patient Position Received: Bed, 3 rail up, Alarm on  General Comment: To ED  with c/o sciatica pain, worsening in last 2 days. Pt. reports fall 10 days PTA when turning to sit in recliner. CT L spine- L2 compression fx, transverse fx of L4-5 and chronic L3 compression fx. Ortho consulted- awaiting lumbar XR and MRI ordered    Precautions:  Medical Precautions: Fall precautions, Spinal  precautions    Vital Signs:  SpO2:  (room air)    Pain:  Pain Assessment  Pain Assessment: 0-10  0-10 (Numeric) Pain Score: 3  Pain Location: Back  Objective     Cognition:  Overall Cognitive Status: Within Functional Limits  Orientation Level: Oriented X4             Home Living:  Home Living Comments: Lives with spouse in 1 story home, no CIRA. Owns 2ww (spouse uses) and ww with seat and no brakes (per pt). Walk-in shower with seat and GBs. Pt describes spouse as being limited in ADL and functional mobility as well    Prior Function:  Prior Function Comments: Mod. indep. with ww; 1 recent fall; does not drive (neighbor drives). Neighbor assists with supervision for showers and IADLs.           Activities of Daily Living:   Eating Assistance: Independent  Grooming Assistance: Minimal  Bathing Assistance: Moderate  UE Dressing Assistance: Minimal  LE Dressing Assistance: Maximal  Toileting Assistance with Device: Moderate  Functional Assistance:  (pt min A to ambulate with ww)                         Activity Tolerance:  Endurance: Endurance does not limit participation in activity           Bed Mobility/Transfers: Bed Mobility  Bed Mobility:  (supine to/from sit with modAx1 using log roll technique)  Transfers  Transfer:  (sit to/from stand with ww and minAx1; cues for safe hand placement)                Balance:  Static Sitting: good  Dynamic Sitting: fair   Static Standing: fair -  Dynamic Standing: poor         Vision:Vision - Basic Assessment  Current Vision: Wears glasses all the time        Sensation:  Light Touch: No apparent deficits    Strength:  Strength Comments: TERRA BRADSHAW 4/5 TERRA hallman WFL        Extremities: RUE   RUE : Within Functional Limits and LUE   LUE: Within Functional Limits    Outcome Measures: [facility] Daily Activity  Putting on and taking off regular lower body clothing: A lot  Bathing (including washing, rinsing, drying): A lot  Putting on and taking off regular upper body clothing: A  little  Toileting, which includes using toilet, bedpan or urinal: A lot  Taking care of personal grooming such as brushing teeth: A little  Eating Meals: None  Daily Activity - Total Score: 16                 Goals:  Encounter Problems       Encounter Problems (Active)       OT Goals       pt will bathe/dress UB indep (Progressing)       Start:  12/28/24    Expected End:  01/11/25            Pt will transfer to bed, chair, toilet with CGA (Progressing)       Start:  12/28/24    Expected End:  01/11/25            Pt will demo fair + dyn std balance with ADLS (Progressing)       Start:  12/28/24    Expected End:  01/11/25            Pt will complete grooming tasks indep (Progressing)       Start:  12/28/24    Expected End:  01/11/25

## 2024-12-28 NOTE — CONSULTS
Reason For Consult  Patient with low back pain        History Of Present Illness  Harper Lara is a 79 y.o. female presenting with history of L to compression fracture back in September.  Patient had a brace but is unable to wear it due to body shape and habitus    2 weeks ago she fell again had severe pain that required her to be hospitalized due to inability to ambulate    Concerning was that she occasionally gets paralysis and weakness in the left leg although this morning she is able to move both legs but has considerable pain.     Past Medical History  She has a past medical history of Atrial fibrillation (Multi) and Diabetes mellitus (Multi).    Surgical History  She has a past surgical history that includes Cholecystectomy; Hiatal hernia repair; and Hysterectomy.     Social History  She reports that she has never smoked. She has never been exposed to tobacco smoke. She has never used smokeless tobacco. She reports that she does not drink alcohol and does not use drugs.    Family History  Family History   Problem Relation Name Age of Onset    Breast cancer Sister      Liver cancer Sister      Cancer Sister      Other (oral cancer) Maternal Grandmother          Allergies  Darvocet a500 [propoxyphene n-acetaminophen], Diphenhydramine hcl, Morphine, Pentazocine, Propoxyphene, Ranitidine, and [place]    Review of Systems  Negative     Physical Exam  Head normocephalic atraumatic  Heart regular rate rhythm  Lungs clear to auscultation percussion  Abdominal exam nontender nondistended  Today both lower extremities can straight leg raise in fact laying in bed she can lift both legs she has severe back pain she is moderately kyphotic her skin is intact but she has a small skin tear in the back she has pain to palpate across the low back and pain across the lumbar junction lumbar spine into the buttocks on both sides occasionally and periodically she loses motor control of the left lower leg     Last Recorded  "Vitals  Blood pressure 131/61, pulse 74, temperature 36.6 °C (97.9 °F), resp. rate 16, height 1.6 m (5' 3\"), weight 70.2 kg (154 lb 12.2 oz), SpO2 96%.    Relevant Results      Scheduled medications  acetaminophen, 975 mg, oral, Once  insulin lispro, 0-5 Units, subcutaneous, TID AC  lidocaine, 1 patch, transdermal, Daily      Continuous medications     PRN medications  PRN medications: acetaminophen **OR** acetaminophen **OR** acetaminophen, acetaminophen **OR** acetaminophen **OR** acetaminophen, methocarbamol, ondansetron **OR** ondansetron, oxyCODONE, polyethylene glycol  Results for orders placed or performed during the hospital encounter of 12/27/24 (from the past 24 hours)   CBC and Auto Differential   Result Value Ref Range    WBC 5.6 4.4 - 11.3 x10*3/uL    nRBC 0.0 0.0 - 0.0 /100 WBCs    RBC 3.23 (L) 4.00 - 5.20 x10*6/uL    Hemoglobin 9.8 (L) 12.0 - 16.0 g/dL    Hematocrit 29.0 (L) 36.0 - 46.0 %    MCV 90 80 - 100 fL    MCH 30.3 26.0 - 34.0 pg    MCHC 33.8 32.0 - 36.0 g/dL    RDW 13.1 11.5 - 14.5 %    Platelets 146 (L) 150 - 450 x10*3/uL    Neutrophils % 88.1 40.0 - 80.0 %    Immature Granulocytes %, Automated 0.4 0.0 - 0.9 %    Lymphocytes % 6.7 13.0 - 44.0 %    Monocytes % 3.9 2.0 - 10.0 %    Eosinophils % 0.4 0.0 - 6.0 %    Basophils % 0.5 0.0 - 2.0 %    Neutrophils Absolute 4.96 1.60 - 5.50 x10*3/uL    Immature Granulocytes Absolute, Automated 0.02 0.00 - 0.50 x10*3/uL    Lymphocytes Absolute 0.38 (L) 0.80 - 3.00 x10*3/uL    Monocytes Absolute 0.22 0.05 - 0.80 x10*3/uL    Eosinophils Absolute 0.02 0.00 - 0.40 x10*3/uL    Basophils Absolute 0.03 0.00 - 0.10 x10*3/uL   Comprehensive metabolic panel   Result Value Ref Range    Glucose 143 (H) 74 - 99 mg/dL    Sodium 138 136 - 145 mmol/L    Potassium 4.2 3.5 - 5.3 mmol/L    Chloride 105 98 - 107 mmol/L    Bicarbonate 28 21 - 32 mmol/L    Anion Gap 9 (L) 10 - 20 mmol/L    Urea Nitrogen 26 (H) 6 - 23 mg/dL    Creatinine 0.98 0.50 - 1.05 mg/dL    eGFR 60 (L) " >60 mL/min/1.73m*2    Calcium 8.9 8.6 - 10.3 mg/dL    Albumin 3.6 3.4 - 5.0 g/dL    Alkaline Phosphatase 67 33 - 136 U/L    Total Protein 6.3 (L) 6.4 - 8.2 g/dL    AST 31 9 - 39 U/L    Bilirubin, Total 0.6 0.0 - 1.2 mg/dL    ALT 11 7 - 45 U/L   Urinalysis with Reflex Culture and Microscopic   Result Value Ref Range    Color, Urine Light-Yellow Light-Yellow, Yellow, Dark-Yellow    Appearance, Urine Clear Clear    Specific Gravity, Urine 1.012 1.005 - 1.035    pH, Urine 6.5 5.0, 5.5, 6.0, 6.5, 7.0, 7.5, 8.0    Protein, Urine 20 (TRACE) NEGATIVE, 10 (TRACE), 20 (TRACE) mg/dL    Glucose, Urine Normal Normal mg/dL    Blood, Urine 0.06 (1+) (A) NEGATIVE    Ketones, Urine NEGATIVE NEGATIVE mg/dL    Bilirubin, Urine NEGATIVE NEGATIVE    Urobilinogen, Urine Normal Normal mg/dL    Nitrite, Urine NEGATIVE NEGATIVE    Leukocyte Esterase, Urine NEGATIVE NEGATIVE   Extra Urine Gray Tube   Result Value Ref Range    Extra Tube Hold for add-ons.    Urinalysis Microscopic   Result Value Ref Range    WBC, Urine 1-5 1-5, NONE /HPF    RBC, Urine 1-2 NONE, 1-2, 3-5 /HPF    Bacteria, Urine 1+ (A) NONE SEEN /HPF   POCT GLUCOSE   Result Value Ref Range    POCT Glucose 87 74 - 99 mg/dL   POCT GLUCOSE   Result Value Ref Range    POCT Glucose 112 (H) 74 - 99 mg/dL   POCT GLUCOSE   Result Value Ref Range    POCT Glucose 103 (H) 74 - 99 mg/dL        Assessment/Plan     New L3 compression fracture old L2 compression fracture        Concerning is fracture of the posterior elements of L4-5 through a large osteophyte complex essentially fusing the posterior elements    Concern would be for possible hematoma periodically causing left lower leg partial weakness or paralysis    We will obtain x-rays, review CT scan    MRI will be ordered to see if there is a hematoma in the lumbar sacral spine at the new L4-5 posterior element osteophyte fracture site seen on CAT scan    We may attempt to mobilize in the brace after we see the MRI but again due to her  body shape and habitus bracing does not appear to be advantageous for her at this time    Jay Paul MD

## 2024-12-28 NOTE — PROGRESS NOTES
Physical Therapy    Physical Therapy Evaluation    Patient Name: Harper Lara  MRN: [id number]  Today's Date: 12/28/2024   Time Calculation  Start Time: 1050  Stop Time: 1104  Time Calculation (min): 14 min  904/904-A    Assessment/Plan   PT Assessment  PT Assessment Results: Decreased strength, Impaired balance, Decreased mobility, Pain  Rehab Prognosis: Good  Barriers to Discharge Home: Caregiver assistance  Assessment Comment: Pt. requires modA for bed mob and [place] for transfers and ambulation with ww. Pt. would benefit from additional PT to increase mobility and indep.  End of Session Patient Position: Bed, 3 rail up, Alarm on  IP OR SWING BED PT PLAN  Inpatient or Swing Bed: Inpatient  PT Plan  Treatment/Interventions: Bed mobility, Transfer training, Gait training, Balance training, Strengthening, Therapeutic exercise, Therapeutic activity, Home exercise program  PT Plan: Ongoing PT  PT Frequency: 4 times per week  PT Discharge Recommendations: Low intensity level of continued care  Equipment Recommended upon Discharge: Wheeled walker  PT Recommended Transfer Status: Assist x1, Assistive device  PT - OK to Discharge: Yes    Subjective     Current Problem:  1. Closed compression fracture of L2 vertebra, initial encounter (Multi)        2. Ambulatory dysfunction              General Visit Information:  General  Reason for Referral: Impaired mobility  Referred By: Mahin 12/27  Past Medical History Relevant to Rehab: A-fib (on eliquis), DM, hiatal hernia repair  Family/Caregiver Present: No  Co-Treatment: OT  Prior to Session Communication: Bedside nurse, Physician (cleared by ortho NP to see patient prior to XR and MRI of L-spine being completed)  Patient Position Received: Bed, 3 rail up, Alarm on  General Comment: To ED 12/27 with c/o sciatica pain, worsening in last 2 days. Pt. reports fall 10 days PTA when turning to sit in recliner. CT L spine- L2 compression fx, transverse fx of L4-5 and chronic L3  "compression fx. Ortho consulted- awaiting lumbar XR and MRI ordered    Home Living:  Home Living  Home Living Comments: Lives with spouse in 1 story home, no CIRA. Owns 2ww (spouse uses) and ww with seat and no brakes (per pt). Walk-in shower with seat and GBs.    Prior Level of Function:  Prior Function Per Pt/Caregiver Report  Prior Function Comments: Mod. indep. with ww; 1 recent fall; does not drive (neighbor drives). Neighbor assists with supervision for showers and IADLs.    Precautions:  Precautions  Medical Precautions: Fall precautions, Spinal precautions         Objective     Pain:  Pain Assessment  Pain Assessment: 0-10  0-10 (Numeric) Pain Score: 3  Pain Location: Back    Cognition:  Cognition  Overall Cognitive Status: Within Functional Limits  Orientation Level: Oriented X4    General Assessments:            Strength  Strength Comments: BLE at least 3+/5 grossly; BUE WFL        Postural Control  Posture Comment: Kyphotic posture- chronic          Functional Assessments:     Bed Mobility  Bed Mobility:  (supine to/from sit with modAx1 using log roll technique)  Transfers  Transfer:  (sit to/from stand with ww and minAx1; cues for safe hand placement)  Ambulation/Gait Training  Ambulation/Gait Training Performed:  (Amb. 20 ft with ww and minAx1; flexed posture (chronic issue, per pt); decreased step length and foot clearance bilaterally; suspect pt. is near baseline ambulation. Pt. states \"It feels good to walk.\")          Extremity/Trunk Assessments:        RLE   RLE :  (ROM WFL)  LLE   LLE :  (ROM WFL)    Outcome Measures:     [facility] Basic Mobility  Turning from your back to your side while in a flat bed without using bedrails: A little  Moving from lying on your back to sitting on the side of a flat bed without using bedrails: A lot  Moving to and from bed to chair (including a wheelchair): A little  Standing up from a chair using your arms (e.g. wheelchair or bedside chair): A little  To walk in " hospital room: A little  Climbing 3-5 steps with railing: A lot  Basic Mobility - Total Score: 16                                                             Goals:  Encounter Problems       Encounter Problems (Active)       Impaired mobility        Perform all bed mobility with supervision        Start:  12/28/24    Expected End:  01/11/25            Perform all transfers with ww and supervision        Start:  12/28/24    Expected End:  01/11/25            Patient will ambulate >/= 50 ft with ww and supervision        Start:  12/28/24    Expected End:  01/11/25            Patient will perform BLE HEP with supervision x10-20 reps x 1-2 sets        Start:  12/28/24    Expected End:  01/11/25               Pain - Adult            Education Documentation  Mobility Training, taught by Marbella Robert PT at 12/28/2024  2:02 PM.  Learner: Patient  Readiness: Acceptance  Method: Explanation  Response: Verbalizes Understanding, Needs Reinforcement  Comment: see note    Education Comments  No comments found.

## 2024-12-28 NOTE — CARE PLAN
The patient's goals for the shift include NA    The clinical goals for the shift include Pt will remain hemodynamically stable throughout shift      Problem: Fall/Injury  Goal: Not fall by end of shift  Outcome: Progressing  Goal: Be free from injury by end of the shift  Outcome: Progressing  Goal: Verbalize understanding of personal risk factors for fall in the hospital  Outcome: Progressing     Problem: Pain - Adult  Goal: Verbalizes/displays adequate comfort level or baseline comfort level  Outcome: Progressing     Problem: Safety - Adult  Goal: Free from fall injury  Outcome: Progressing     Problem: Discharge Planning  Goal: Discharge to home or other facility with appropriate resources  Outcome: Progressing     Problem: Chronic Conditions and Co-morbidities  Goal: Patient's chronic conditions and co-morbidity symptoms are monitored and maintained or improved  Outcome: Progressing     Problem: Skin  Goal: Decreased wound size/increased tissue granulation at next dressing change  Outcome: Progressing  Flowsheets (Taken 12/28/2024 0150)  Decreased wound size/increased tissue granulation at next dressing change:   Promote sleep for wound healing   Protective dressings over bony prominences  Goal: Participates in plan/prevention/treatment measures  Outcome: Progressing  Flowsheets (Taken 12/28/2024 0150)  Participates in plan/prevention/treatment measures: Elevate heels  Goal: Prevent/manage excess moisture  Outcome: Progressing  Flowsheets (Taken 12/28/2024 0150)  Prevent/manage excess moisture: Cleanse incontinence/protect with barrier cream  Goal: Prevent/minimize sheer/friction injuries  Outcome: Progressing  Flowsheets (Taken 12/28/2024 0150)  Prevent/minimize sheer/friction injuries:   Complete micro-shifts as needed if patient unable. Adjust patient position to relieve pressure points, not a full turn   Use pull sheet   HOB 30 degrees or less   Turn/reposition every 2 hours/use positioning/transfer  devices  Goal: Promote/optimize nutrition  Outcome: Progressing  Flowsheets (Taken 12/28/2024 0150)  Promote/optimize nutrition:   Consume > 50% meals/supplements   Monitor/record intake including meals   Offer water/supplements/favorite foods  Goal: Promote skin healing  Outcome: Progressing  Flowsheets (Taken 12/28/2024 0150)  Promote skin healing: Protective dressings over bony prominences

## 2024-12-29 VITALS
SYSTOLIC BLOOD PRESSURE: 156 MMHG | WEIGHT: 154.76 LBS | HEART RATE: 55 BPM | BODY MASS INDEX: 27.42 KG/M2 | RESPIRATION RATE: 16 BRPM | TEMPERATURE: 97 F | DIASTOLIC BLOOD PRESSURE: 67 MMHG | HEIGHT: 63 IN | OXYGEN SATURATION: 96 %

## 2024-12-29 LAB
ANION GAP SERPL CALC-SCNC: 10 MMOL/L (ref 10–20)
BUN SERPL-MCNC: 28 MG/DL (ref 6–23)
CALCIUM SERPL-MCNC: 8.8 MG/DL (ref 8.6–10.3)
CHLORIDE SERPL-SCNC: 106 MMOL/L (ref 98–107)
CO2 SERPL-SCNC: 28 MMOL/L (ref 21–32)
CREAT SERPL-MCNC: 0.87 MG/DL (ref 0.5–1.05)
EGFRCR SERPLBLD CKD-EPI 2021: 69 ML/MIN/1.73M*2
ERYTHROCYTE [DISTWIDTH] IN BLOOD BY AUTOMATED COUNT: 13.2 % (ref 11.5–14.5)
GLUCOSE BLD MANUAL STRIP-MCNC: 103 MG/DL (ref 74–99)
GLUCOSE BLD MANUAL STRIP-MCNC: 131 MG/DL (ref 74–99)
GLUCOSE SERPL-MCNC: 104 MG/DL (ref 74–99)
HCT VFR BLD AUTO: 28.2 % (ref 36–46)
HGB BLD-MCNC: 9.3 G/DL (ref 12–16)
HOLD SPECIMEN: NORMAL
MAGNESIUM SERPL-MCNC: 1.93 MG/DL (ref 1.6–2.4)
MCH RBC QN AUTO: 29.6 PG (ref 26–34)
MCHC RBC AUTO-ENTMCNC: 33 G/DL (ref 32–36)
MCV RBC AUTO: 90 FL (ref 80–100)
NRBC BLD-RTO: 0 /100 WBCS (ref 0–0)
PLATELET # BLD AUTO: 146 X10*3/UL (ref 150–450)
POTASSIUM SERPL-SCNC: 3.9 MMOL/L (ref 3.5–5.3)
RBC # BLD AUTO: 3.14 X10*6/UL (ref 4–5.2)
SODIUM SERPL-SCNC: 140 MMOL/L (ref 136–145)
WBC # BLD AUTO: 3.4 X10*3/UL (ref 4.4–11.3)

## 2024-12-29 PROCEDURE — 2500000001 HC RX 250 WO HCPCS SELF ADMINISTERED DRUGS (ALT 637 FOR MEDICARE OP): Performed by: HOSPITALIST

## 2024-12-29 PROCEDURE — 82947 ASSAY GLUCOSE BLOOD QUANT: CPT | Mod: 59

## 2024-12-29 PROCEDURE — G0378 HOSPITAL OBSERVATION PER HR: HCPCS

## 2024-12-29 PROCEDURE — 2500000002 HC RX 250 W HCPCS SELF ADMINISTERED DRUGS (ALT 637 FOR MEDICARE OP, ALT 636 FOR OP/ED): Performed by: HOSPITALIST

## 2024-12-29 PROCEDURE — 80048 BASIC METABOLIC PNL TOTAL CA: CPT | Performed by: HOSPITALIST

## 2024-12-29 PROCEDURE — 2500000004 HC RX 250 GENERAL PHARMACY W/ HCPCS (ALT 636 FOR OP/ED): Performed by: HOSPITALIST

## 2024-12-29 PROCEDURE — 83735 ASSAY OF MAGNESIUM: CPT | Performed by: HOSPITALIST

## 2024-12-29 PROCEDURE — 85027 COMPLETE CBC AUTOMATED: CPT | Performed by: HOSPITALIST

## 2024-12-29 PROCEDURE — 99239 HOSP IP/OBS DSCHRG MGMT >30: CPT | Performed by: HOSPITALIST

## 2024-12-29 PROCEDURE — 36415 COLL VENOUS BLD VENIPUNCTURE: CPT | Performed by: HOSPITALIST

## 2024-12-29 RX ORDER — LIDOCAINE 50 MG/G
1 PATCH TOPICAL DAILY
Qty: 30 PATCH | Refills: 1 | Status: ON HOLD | OUTPATIENT
Start: 2024-12-29 | End: 2025-01-28

## 2024-12-29 RX ADMIN — MAGNESIUM OXIDE 400 MG (241.3 MG MAGNESIUM) TABLET 400 MG: TABLET at 10:55

## 2024-12-29 RX ADMIN — FLUOXETINE HYDROCHLORIDE 20 MG: 20 CAPSULE ORAL at 10:53

## 2024-12-29 RX ADMIN — AMLODIPINE BESYLATE 2.5 MG: 5 TABLET ORAL at 10:54

## 2024-12-29 RX ADMIN — LEVOTHYROXINE SODIUM 75 MCG: 75 TABLET ORAL at 05:59

## 2024-12-29 RX ADMIN — ARIPIPRAZOLE 5 MG: 10 TABLET ORAL at 10:54

## 2024-12-29 RX ADMIN — PANTOPRAZOLE SODIUM 40 MG: 40 TABLET, DELAYED RELEASE ORAL at 06:00

## 2024-12-29 RX ADMIN — FUROSEMIDE 20 MG: 40 TABLET ORAL at 10:54

## 2024-12-29 ASSESSMENT — COGNITIVE AND FUNCTIONAL STATUS - GENERAL
HELP NEEDED FOR BATHING: A LITTLE
DRESSING REGULAR LOWER BODY CLOTHING: A LITTLE
DAILY ACTIVITIY SCORE: 20
TOILETING: A LITTLE
STANDING UP FROM CHAIR USING ARMS: A LITTLE
TURNING FROM BACK TO SIDE WHILE IN FLAT BAD: A LITTLE
MOVING TO AND FROM BED TO CHAIR: A LITTLE
WALKING IN HOSPITAL ROOM: A LOT
CLIMB 3 TO 5 STEPS WITH RAILING: A LOT
DRESSING REGULAR UPPER BODY CLOTHING: A LITTLE
MOBILITY SCORE: 17

## 2024-12-29 ASSESSMENT — PAIN - FUNCTIONAL ASSESSMENT: PAIN_FUNCTIONAL_ASSESSMENT: 0-10

## 2024-12-29 ASSESSMENT — PAIN SCALES - GENERAL: PAINLEVEL_OUTOF10: 0 - NO PAIN

## 2024-12-29 ASSESSMENT — ACTIVITIES OF DAILY LIVING (ADL): LACK_OF_TRANSPORTATION: NO

## 2024-12-29 NOTE — DISCHARGE SUMMARY
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 Apply to painful area 12 hours per day, remove for 12 hours.              CONTINUE taking these medications        Instructions Last Dose Given Next Dose Due   amLODIPine 2.5 mg tablet  Commonly known as: Norvasc           ARIPiprazole 5 mg tablet  Commonly known as: Abilify           cholecalciferol 10 MCG (400 UNIT) tablet  Commonly known as: Vitamin D-3           cyanocobalamin 1,000 mcg tablet  Commonly known as: Vitamin B-12           FISH OIL ORAL           FLUoxetine 20 mg tablet  Commonly known as: PROzac           furosemide 20 mg tablet  Commonly known as: Lasix           furosemide 20 mg tablet  Commonly known as: Lasix           levothyroxine 75 mcg tablet  Commonly known as: Synthroid, Levoxyl           magnesium oxide 400 mg tablet  Commonly known as: Mag-Ox           metFORMIN 500 mg tablet  Commonly known as: Glucophage           pantoprazole 40 mg EC tablet  Commonly known as: ProtoNix                     Where to Get Your Medications        These medications were sent to [id number] - [place], [place] - [address]  [address] , [facility] [address]      Phone: [phone]   lidocaine 5 % patch         Test Results Pending At Discharge  Pending Labs       No current pending labs.            Hospital Course  79 year old female with history of afib on eliquis admitted with back pain after mechanical fall and found to have compression fracture at L3     -pain controlled with lidocaine patch  -ortrho consulted, no concern findings on MRI, able to ambulate with PT/OT, patient prefers to go home with home care   -follow up with orthopedics and PCP outpatient      Hx  of afib: continue eliquis     DMII: SSI here     DVT ppx: eliquis     Discharged home with home care in stable condition. Greater than 30 minutes of clinical time spent caring for this patient.      Pertinent Physical Exam At Time of Discharge  Gen: NAD  HEENT: EOM, MMM  CV: RRR, no murmurs rubs or gallops  Resp: Clear to auscultation bilaterally  Abdomen: soft, NT,+BS  LE: No edema    Outpatient Follow-Up  Future Appointments   Date Time Provider Department Center   12/30/2024 To Be Determined Juan Pablo Olvera LPN [facility]   1/3/2025 To Be Determined Salas Courtney RN [facility]   1/8/2025  3:15 PM YARITZA Ritter-CNP [facility] MD

## 2024-12-29 NOTE — CARE PLAN
The patient's goals for the shift include NA    The clinical goals for the shift include Pt will refrain from injury/falls throughout shift

## 2024-12-29 NOTE — PROGRESS NOTES
12/29/24 1058   Discharge Planning   Living Arrangements Spouse/significant other   Support Systems Spouse/significant other   Assistance Needed wheeled walker, assistance with adls and iadls   Type of Residence Private residence   Number of Stairs to Enter Residence 0   Number of Stairs Within Residence 0   Do you have animals or pets at home? No   Home or Post Acute Services In home services   Type of Home Care Services Home OT;Home nursing visits;Home PT   Expected Discharge Disposition Home Health   Does the patient need discharge transport arranged? Yes   RoundTrip coordination needed? Yes   Financial Resource Strain   How hard is it for you to pay for the very basics like food, housing, medical care, and heating? Not hard   Housing Stability   In the last 12 months, was there a time when you were not able to pay the mortgage or rent on time? N   In the past 12 months, how many times have you moved where you were living? 1   Transportation Needs   In the past 12 months, has lack of transportation kept you from medical appointments or from getting medications? no   In the past 12 months, has lack of transportation kept you from meetings, work, or from getting things needed for daily living? No   Patient Choice   Patient / Family choosing to utilize agency / facility established prior to hospitalization Yes   Intensity of Service   Intensity of Service 0-30 min     Chart reviewed, writer notified by attending plan to discharge today with [facility]. Writer spoke with patient- introduced self and explained role. Patient sitting up in bed. She is alert and oriented x3. Prior to admission, lives with spouse. Prior level of functioning ambulated with a wheeled walker. She has assistance with adls and iadls. Her neighbor comes over to assist with groceries/ meals/ laundry. She reports calls the neighbor if needs any help. Her granddaughter was assisting with transportation to [facility] but has been more difficult recently.  Writer discussed discharge needs/ concerns. Therapy evaluated and recommended low intensity. Patient prefers to return home with [facility] to resume with [facility]. She was recently at [facility] and discharged to home. She reports checking to see if daughter Carla can transport home today. Attending updated, HHC orders placed in EP[facility] to [facility]. Patient was active with [facility] last visit 12/27/2024. Patient remains in observation status. CECIL Ansari

## 2025-01-01 ENCOUNTER — HOME CARE VISIT (OUTPATIENT)
Dept: HOME HEALTH SERVICES | Facility: HOME HEALTH | Age: 80
End: 2025-01-01
Payer: MEDICARE

## 2025-01-01 VITALS
SYSTOLIC BLOOD PRESSURE: 130 MMHG | OXYGEN SATURATION: 100 % | RESPIRATION RATE: 18 BRPM | HEART RATE: 67 BPM | TEMPERATURE: 97.7 F | DIASTOLIC BLOOD PRESSURE: 51 MMHG

## 2025-01-01 PROCEDURE — G0299 HHS/HOSPICE OF RN EA 15 MIN: HCPCS | Mod: HHH

## 2025-01-01 ASSESSMENT — ENCOUNTER SYMPTOMS
LIMITED RANGE OF MOTION: 1
CHANGE IN APPETITE: UNCHANGED
MUSCLE WEAKNESS: 1
LAST BOWEL MOVEMENT: 67206
HIGHEST PAIN SEVERITY IN PAST 24 HOURS: 10/10
APPETITE LEVEL: FAIR
PAIN LOCATION - PAIN SEVERITY: 10/10
PERSON REPORTING PAIN: PATIENT
STOOL FREQUENCY: DAILY
BOWEL PATTERN NORMAL: 1
LOWER EXTREMITY EDEMA: 1
PAIN LOCATION: BACK
PAIN: 1

## 2025-01-01 ASSESSMENT — ACTIVITIES OF DAILY LIVING (ADL)
CURRENT_FUNCTION: STAND BY ASSIST
AMBULATION ASSISTANCE: STAND BY ASSIST

## 2025-01-03 ENCOUNTER — HOME CARE VISIT (OUTPATIENT)
Dept: HOME HEALTH SERVICES | Facility: HOME HEALTH | Age: 80
End: 2025-01-03
Payer: MEDICARE

## 2025-01-03 VITALS
OXYGEN SATURATION: 99 % | RESPIRATION RATE: 16 BRPM | DIASTOLIC BLOOD PRESSURE: 62 MMHG | TEMPERATURE: 98.1 F | HEART RATE: 75 BPM | SYSTOLIC BLOOD PRESSURE: 143 MMHG

## 2025-01-03 PROCEDURE — G0299 HHS/HOSPICE OF RN EA 15 MIN: HCPCS | Mod: HHH

## 2025-01-03 ASSESSMENT — ACTIVITIES OF DAILY LIVING (ADL)
OASIS_M1830: 03
AMBULATION ASSISTANCE: STAND BY ASSIST
ENTERING_EXITING_HOME: STAND BY ASSIST
CURRENT_FUNCTION: STAND BY ASSIST

## 2025-01-03 ASSESSMENT — ENCOUNTER SYMPTOMS
MUSCLE WEAKNESS: 1
PAIN LOCATION - PAIN QUALITY: STABBING
LAST BOWEL MOVEMENT: 67207
DESCRIPTION OF MEMORY LOSS: SHORT TERM
HIGHEST PAIN SEVERITY IN PAST 24 HOURS: 10/10
LOWEST PAIN SEVERITY IN PAST 24 HOURS: 6/10
LIMITED RANGE OF MOTION: 1
PERSON REPORTING PAIN: PATIENT
PAIN LOCATION - RELIEVING FACTORS: '
PAIN LOCATION: BACK
BOWEL PATTERN NORMAL: 1
APPETITE LEVEL: FAIR
LOWER EXTREMITY EDEMA: 1
PAIN: 1
SUBJECTIVE PAIN PROGRESSION: UNCHANGED
PAIN LOCATION - PAIN SEVERITY: 10/10
STOOL FREQUENCY: DAILY
CHANGE IN APPETITE: UNCHANGED

## 2025-01-04 ENCOUNTER — APPOINTMENT (OUTPATIENT)
Dept: RADIOLOGY | Facility: HOSPITAL | Age: 80
End: 2025-01-04
Payer: MEDICARE

## 2025-01-04 ENCOUNTER — APPOINTMENT (OUTPATIENT)
Dept: CARDIOLOGY | Facility: HOSPITAL | Age: 80
End: 2025-01-04
Payer: MEDICARE

## 2025-01-04 ENCOUNTER — HOSPITAL ENCOUNTER (OUTPATIENT)
Facility: HOSPITAL | Age: 80
Setting detail: OBSERVATION
End: 2025-01-04
Attending: EMERGENCY MEDICINE | Admitting: STUDENT IN AN ORGANIZED HEALTH CARE EDUCATION/TRAINING PROGRAM
Payer: MEDICARE

## 2025-01-04 DIAGNOSIS — R29.6 FREQUENT FALLS: Primary | ICD-10-CM

## 2025-01-04 DIAGNOSIS — B96.89 UTI DUE TO KLEBSIELLA SPECIES: ICD-10-CM

## 2025-01-04 DIAGNOSIS — D64.9 ANEMIA, UNSPECIFIED TYPE: ICD-10-CM

## 2025-01-04 DIAGNOSIS — I48.19 PERSISTENT ATRIAL FIBRILLATION (MULTI): ICD-10-CM

## 2025-01-04 DIAGNOSIS — S32.009A CLOSED FRACTURE OF LUMBAR VERTEBRA, UNSPECIFIED FRACTURE MORPHOLOGY, UNSPECIFIED LUMBAR VERTEBRAL LEVEL, INITIAL ENCOUNTER (MULTI): ICD-10-CM

## 2025-01-04 DIAGNOSIS — N39.0 UTI DUE TO KLEBSIELLA SPECIES: ICD-10-CM
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
## 2025-01-04 LAB
ALBUMIN SERPL BCP-MCNC: 3.9 G/DL (ref 3.4–5)
ALP SERPL-CCNC: 119 U/L (ref 33–136)
ALT SERPL W P-5'-P-CCNC: 14 U/L (ref 7–45)
ANION GAP SERPL CALC-SCNC: 13 MMOL/L (ref 10–20)
APPEARANCE UR: CLEAR
AST SERPL W P-5'-P-CCNC: 24 U/L (ref 9–39)
ATRIAL RATE: 30 BPM
BACTERIA #/AREA URNS AUTO: ABNORMAL /HPF
BASOPHILS # BLD AUTO: 0.03 X10*3/UL (ref 0–0.1)
BASOPHILS NFR BLD AUTO: 0.5 %
BILIRUB SERPL-MCNC: 0.6 MG/DL (ref 0–1.2)
BILIRUB UR STRIP.AUTO-MCNC: NEGATIVE MG/DL
BUN SERPL-MCNC: 30 MG/DL (ref 6–23)
CALCIUM SERPL-MCNC: 9.4 MG/DL (ref 8.6–10.3)
CHLORIDE SERPL-SCNC: 103 MMOL/L (ref 98–107)
CO2 SERPL-SCNC: 27 MMOL/L (ref 21–32)
COLOR UR: ABNORMAL
CREAT SERPL-MCNC: 1.02 MG/DL (ref 0.5–1.05)
EGFRCR SERPLBLD CKD-EPI 2021: 57 ML/MIN/1.73M*2
EOSINOPHIL # BLD AUTO: 0.06 X10*3/UL (ref 0–0.4)
EOSINOPHIL NFR BLD AUTO: 1.1 %
ERYTHROCYTE [DISTWIDTH] IN BLOOD BY AUTOMATED COUNT: 13.2 % (ref 11.5–14.5)
GLUCOSE BLD MANUAL STRIP-MCNC: 96 MG/DL (ref 74–99)
GLUCOSE SERPL-MCNC: 111 MG/DL (ref 74–99)
GLUCOSE UR STRIP.AUTO-MCNC: NORMAL MG/DL
HCT VFR BLD AUTO: 31.5 % (ref 36–46)
HGB BLD-MCNC: 10.6 G/DL (ref 12–16)
IMM GRANULOCYTES # BLD AUTO: 0.03 X10*3/UL (ref 0–0.5)
IMM GRANULOCYTES NFR BLD AUTO: 0.5 % (ref 0–0.9)
INR PPP: 1.2 (ref 0.9–1.1)
KETONES UR STRIP.AUTO-MCNC: NEGATIVE MG/DL
LACTATE SERPL-SCNC: 0.9 MMOL/L (ref 0.4–2)
LEUKOCYTE ESTERASE UR QL STRIP.AUTO: ABNORMAL
LYMPHOCYTES # BLD AUTO: 0.57 X10*3/UL (ref 0.8–3)
LYMPHOCYTES NFR BLD AUTO: 10 %
MCH RBC QN AUTO: 30.3 PG (ref 26–34)
MCHC RBC AUTO-ENTMCNC: 33.7 G/DL (ref 32–36)
MCV RBC AUTO: 90 FL (ref 80–100)
MONOCYTES # BLD AUTO: 0.2 X10*3/UL (ref 0.05–0.8)
MONOCYTES NFR BLD AUTO: 3.5 %
MUCOUS THREADS #/AREA URNS AUTO: ABNORMAL /LPF
NEUTROPHILS # BLD AUTO: 4.8 X10*3/UL (ref 1.6–5.5)
NEUTROPHILS NFR BLD AUTO: 84.4 %
NITRITE UR QL STRIP.AUTO: NEGATIVE
NRBC BLD-RTO: 0 /100 WBCS (ref 0–0)
P AXIS: 88 DEGREES
P OFFSET: 134 MS
P ONSET: 114 MS
PH UR STRIP.AUTO: 6.5 [PH]
PLATELET # BLD AUTO: 192 X10*3/UL (ref 150–450)
POTASSIUM SERPL-SCNC: 4 MMOL/L (ref 3.5–5.3)
PROT SERPL-MCNC: 6.8 G/DL (ref 6.4–8.2)
PROT UR STRIP.AUTO-MCNC: NEGATIVE MG/DL
PROTHROMBIN TIME: 13.4 SECONDS (ref 9.8–12.8)
Q ONSET: 208 MS
QRS COUNT: 12 BEATS
QRS DURATION: 142 MS
QT INTERVAL: 442 MS
QTC CALCULATION(BAZETT): 480 MS
QTC FREDERICIA: 467 MS
R AXIS: -58 DEGREES
RBC # BLD AUTO: 3.5 X10*6/UL (ref 4–5.2)
RBC # UR STRIP.AUTO: NEGATIVE /UL
RBC #/AREA URNS AUTO: ABNORMAL /HPF
SODIUM SERPL-SCNC: 139 MMOL/L (ref 136–145)
SP GR UR STRIP.AUTO: 1.01
T AXIS: 16 DEGREES
T OFFSET: 429 MS
UROBILINOGEN UR STRIP.AUTO-MCNC: NORMAL MG/DL
VENTRICULAR RATE: 71 BPM
WBC # BLD AUTO: 5.7 X10*3/UL (ref 4.4–11.3)
WBC #/AREA URNS AUTO: ABNORMAL /HPF

## 2025-01-04 PROCEDURE — G0378 HOSPITAL OBSERVATION PER HR: HCPCS

## 2025-01-04 PROCEDURE — 84075 ASSAY ALKALINE PHOSPHATASE: CPT | Performed by: EMERGENCY MEDICINE

## 2025-01-04 PROCEDURE — 36415 COLL VENOUS BLD VENIPUNCTURE: CPT | Performed by: EMERGENCY MEDICINE

## 2025-01-04 PROCEDURE — 72125 CT NECK SPINE W/O DYE: CPT

## 2025-01-04 PROCEDURE — 70450 CT HEAD/BRAIN W/O DYE: CPT

## 2025-01-04 PROCEDURE — 99223 1ST HOSP IP/OBS HIGH 75: CPT | Performed by: STUDENT IN AN ORGANIZED HEALTH CARE EDUCATION/TRAINING PROGRAM

## 2025-01-04 PROCEDURE — 72131 CT LUMBAR SPINE W/O DYE: CPT | Performed by: RADIOLOGY

## 2025-01-04 PROCEDURE — 72131 CT LUMBAR SPINE W/O DYE: CPT

## 2025-01-04 PROCEDURE — 73502 X-RAY EXAM HIP UNI 2-3 VIEWS: CPT | Mod: LEFT SIDE | Performed by: RADIOLOGY

## 2025-01-04 PROCEDURE — 81001 URINALYSIS AUTO W/SCOPE: CPT | Performed by: EMERGENCY MEDICINE

## 2025-01-04 PROCEDURE — 72128 CT CHEST SPINE W/O DYE: CPT | Performed by: RADIOLOGY

## 2025-01-04 PROCEDURE — 72125 CT NECK SPINE W/O DYE: CPT | Performed by: RADIOLOGY

## 2025-01-04 PROCEDURE — 82947 ASSAY GLUCOSE BLOOD QUANT: CPT

## 2025-01-04 PROCEDURE — 72128 CT CHEST SPINE W/O DYE: CPT

## 2025-01-04 PROCEDURE — 2500000002 HC RX 250 W HCPCS SELF ADMINISTERED DRUGS (ALT 637 FOR MEDICARE OP, ALT 636 FOR OP/ED): Performed by: STUDENT IN AN ORGANIZED HEALTH CARE EDUCATION/TRAINING PROGRAM

## 2025-01-04 PROCEDURE — P9612 CATHETERIZE FOR URINE SPEC: HCPCS

## 2025-01-04 PROCEDURE — 93005 ELECTROCARDIOGRAM TRACING: CPT

## 2025-01-04 PROCEDURE — 85025 COMPLETE CBC W/AUTO DIFF WBC: CPT | Performed by: EMERGENCY MEDICINE

## 2025-01-04 PROCEDURE — 73502 X-RAY EXAM HIP UNI 2-3 VIEWS: CPT | Mod: LT

## 2025-01-04 PROCEDURE — 73030 X-RAY EXAM OF SHOULDER: CPT | Mod: LT

## 2025-01-04 PROCEDURE — 87086 URINE CULTURE/COLONY COUNT: CPT | Mod: ELYLAB | Performed by: EMERGENCY MEDICINE

## 2025-01-04 PROCEDURE — 99285 EMERGENCY DEPT VISIT HI MDM: CPT | Mod: 25 | Performed by: EMERGENCY MEDICINE

## 2025-01-04 PROCEDURE — 70450 CT HEAD/BRAIN W/O DYE: CPT | Performed by: RADIOLOGY

## 2025-01-04 PROCEDURE — 85610 PROTHROMBIN TIME: CPT | Performed by: EMERGENCY MEDICINE

## 2025-01-04 PROCEDURE — 73030 X-RAY EXAM OF SHOULDER: CPT | Mod: LEFT SIDE | Performed by: RADIOLOGY

## 2025-01-04 PROCEDURE — 83605 ASSAY OF LACTIC ACID: CPT | Performed by: EMERGENCY MEDICINE

## 2025-01-04 RX ORDER — LANOLIN ALCOHOL/MO/W.PET/CERES
400 CREAM (GRAM) TOPICAL DAILY
Status: DISCONTINUED | OUTPATIENT
Start: 2025-01-04 | End: 2025-01-10 | Stop reason: HOSPADM

## 2025-01-04 RX ORDER — LEVOTHYROXINE SODIUM 75 UG/1
75 TABLET ORAL DAILY
Status: DISCONTINUED | OUTPATIENT
Start: 2025-01-05 | End: 2025-01-10 | Stop reason: HOSPADM

## 2025-01-04 RX ORDER — FLUOXETINE HYDROCHLORIDE 20 MG/1
20 CAPSULE ORAL DAILY
Status: DISCONTINUED | OUTPATIENT
Start: 2025-01-04 | End: 2025-01-10 | Stop reason: HOSPADM

## 2025-01-04 RX ORDER — ARIPIPRAZOLE 10 MG/1
5 TABLET ORAL DAILY
Status: DISCONTINUED | OUTPATIENT
Start: 2025-01-04 | End: 2025-01-10 | Stop reason: HOSPADM

## 2025-01-04 RX ORDER — FUROSEMIDE 40 MG/1
40 TABLET ORAL
Status: DISCONTINUED | OUTPATIENT
Start: 2025-01-04 | End: 2025-01-06

## 2025-01-04 RX ORDER — CYANOCOBALAMIN (VITAMIN B-12) 500 MCG
400 TABLET ORAL DAILY
Status: DISCONTINUED | OUTPATIENT
Start: 2025-01-04 | End: 2025-01-10 | Stop reason: HOSPADM

## 2025-01-04 RX ORDER — ACETAMINOPHEN 325 MG/1
650 TABLET ORAL EVERY 4 HOURS PRN
Status: DISCONTINUED | OUTPATIENT
Start: 2025-01-04 | End: 2025-01-10 | Stop reason: HOSPADM

## 2025-01-04 RX ORDER — FUROSEMIDE 40 MG/1
20 TABLET ORAL EVERY MORNING
Status: DISCONTINUED | OUTPATIENT
Start: 2025-01-05 | End: 2025-01-06

## 2025-01-04 RX ORDER — AMLODIPINE BESYLATE 5 MG/1
2.5 TABLET ORAL DAILY
Status: DISCONTINUED | OUTPATIENT
Start: 2025-01-04 | End: 2025-01-10 | Stop reason: HOSPADM

## 2025-01-04 RX ORDER — VIT C/E/ZN/COPPR/LUTEIN/ZEAXAN 250MG-90MG
1000 CAPSULE ORAL DAILY
Status: DISCONTINUED | OUTPATIENT
Start: 2025-01-04 | End: 2025-01-10 | Stop reason: HOSPADM

## 2025-01-04 RX ORDER — METFORMIN HYDROCHLORIDE 500 MG/1
500 TABLET ORAL
Status: DISCONTINUED | OUTPATIENT
Start: 2025-01-04 | End: 2025-01-10 | Stop reason: HOSPADM

## 2025-01-04 RX ORDER — ACETAMINOPHEN 160 MG/5ML
650 SOLUTION ORAL EVERY 4 HOURS PRN
Status: DISCONTINUED | OUTPATIENT
Start: 2025-01-04 | End: 2025-01-10 | Stop reason: HOSPADM

## 2025-01-04 RX ORDER — LEVOTHYROXINE SODIUM 75 UG/1
75 TABLET ORAL DAILY
Status: DISCONTINUED | OUTPATIENT
Start: 2025-01-04 | End: 2025-01-04

## 2025-01-04 RX ORDER — PANTOPRAZOLE SODIUM 40 MG/1
40 TABLET, DELAYED RELEASE ORAL
Status: DISCONTINUED | OUTPATIENT
Start: 2025-01-05 | End: 2025-01-10 | Stop reason: HOSPADM

## 2025-01-04 RX ADMIN — METFORMIN HYDROCHLORIDE 500 MG: 500 TABLET, FILM COATED ORAL at 22:58

## 2025-01-04 SDOH — SOCIAL STABILITY: SOCIAL INSECURITY: DOES ANYONE TRY TO KEEP YOU FROM HAVING/CONTACTING OTHER FRIENDS OR DOING THINGS OUTSIDE YOUR HOME?: NO

## 2025-01-04 SDOH — ECONOMIC STABILITY: FOOD INSECURITY: WITHIN THE PAST 12 MONTHS, THE FOOD YOU BOUGHT JUST DIDN'T LAST AND YOU DIDN'T HAVE MONEY TO GET MORE.: NEVER TRUE

## 2025-01-04 SDOH — ECONOMIC STABILITY: FOOD INSECURITY: HOW HARD IS IT FOR YOU TO PAY FOR THE VERY BASICS LIKE FOOD, HOUSING, MEDICAL CARE, AND HEATING?: NOT HARD AT ALL

## 2025-01-04 SDOH — ECONOMIC STABILITY: HOUSING INSECURITY: AT ANY TIME IN THE PAST 12 MONTHS, WERE YOU HOMELESS OR LIVING IN A SHELTER (INCLUDING NOW)?: NO

## 2025-01-04 SDOH — ECONOMIC STABILITY: INCOME INSECURITY: IN THE PAST 12 MONTHS HAS THE ELECTRIC, GAS, OIL, OR WATER COMPANY THREATENED TO SHUT OFF SERVICES IN YOUR HOME?: NO

## 2025-01-04 SDOH — SOCIAL STABILITY: SOCIAL INSECURITY: WERE YOU ABLE TO COMPLETE ALL THE BEHAVIORAL HEALTH SCREENINGS?: YES

## 2025-01-04 SDOH — HEALTH STABILITY: MENTAL HEALTH: HOW MANY DRINKS CONTAINING ALCOHOL DO YOU HAVE ON A TYPICAL DAY WHEN YOU ARE DRINKING?: PATIENT DOES NOT DRINK

## 2025-01-04 SDOH — ECONOMIC STABILITY: FOOD INSECURITY: WITHIN THE PAST 12 MONTHS, YOU WORRIED THAT YOUR FOOD WOULD RUN OUT BEFORE YOU GOT THE MONEY TO BUY MORE.: NEVER TRUE

## 2025-01-04 SDOH — SOCIAL STABILITY: SOCIAL INSECURITY: HAS ANYONE EVER THREATENED TO HURT YOUR FAMILY OR YOUR PETS?: NO

## 2025-01-04 SDOH — HEALTH STABILITY: MENTAL HEALTH: HOW OFTEN DO YOU HAVE SIX OR MORE DRINKS ON ONE OCCASION?: NEVER

## 2025-01-04 SDOH — SOCIAL STABILITY: SOCIAL INSECURITY: WITHIN THE LAST YEAR, HAVE YOU BEEN AFRAID OF YOUR PARTNER OR EX-PARTNER?: NO

## 2025-01-04 SDOH — ECONOMIC STABILITY: TRANSPORTATION INSECURITY: IN THE PAST 12 MONTHS, HAS LACK OF TRANSPORTATION KEPT YOU FROM MEDICAL APPOINTMENTS OR FROM GETTING MEDICATIONS?: NO

## 2025-01-04 SDOH — SOCIAL STABILITY: SOCIAL INSECURITY: WITHIN THE LAST YEAR, HAVE YOU BEEN HUMILIATED OR EMOTIONALLY ABUSED IN OTHER WAYS BY YOUR PARTNER OR EX-PARTNER?: NO

## 2025-01-04 SDOH — ECONOMIC STABILITY: HOUSING INSECURITY: IN THE LAST 12 MONTHS, WAS THERE A TIME WHEN YOU WERE NOT ABLE TO PAY THE MORTGAGE OR RENT ON TIME?: NO

## 2025-01-04 SDOH — SOCIAL STABILITY: SOCIAL INSECURITY: HAVE YOU HAD ANY THOUGHTS OF HARMING ANYONE ELSE?: NO

## 2025-01-04 SDOH — SOCIAL STABILITY: SOCIAL INSECURITY: ABUSE: ADULT

## 2025-01-04 SDOH — SOCIAL STABILITY: SOCIAL INSECURITY: HAVE YOU HAD THOUGHTS OF HARMING ANYONE ELSE?: NO

## 2025-01-04 SDOH — SOCIAL STABILITY: SOCIAL INSECURITY: ARE THERE ANY APPARENT SIGNS OF INJURIES/BEHAVIORS THAT COULD BE RELATED TO ABUSE/NEGLECT?: NO

## 2025-01-04 SDOH — ECONOMIC STABILITY: HOUSING INSECURITY: IN THE PAST 12 MONTHS, HOW MANY TIMES HAVE YOU MOVED WHERE YOU WERE LIVING?: 1

## 2025-01-04 SDOH — SOCIAL STABILITY: SOCIAL INSECURITY: DO YOU FEEL UNSAFE GOING BACK TO THE PLACE WHERE YOU ARE LIVING?: NO

## 2025-01-04 SDOH — HEALTH STABILITY: MENTAL HEALTH: HOW OFTEN DO YOU HAVE A DRINK CONTAINING ALCOHOL?: NEVER

## 2025-01-04 SDOH — SOCIAL STABILITY: SOCIAL INSECURITY: DO YOU FEEL ANYONE HAS EXPLOITED OR TAKEN ADVANTAGE OF YOU FINANCIALLY OR OF YOUR PERSONAL PROPERTY?: NO

## 2025-01-04 SDOH — SOCIAL STABILITY: SOCIAL INSECURITY: ARE YOU OR HAVE YOU BEEN THREATENED OR ABUSED PHYSICALLY, EMOTIONALLY, OR SEXUALLY BY ANYONE?: NO

## 2025-01-04 ASSESSMENT — ACTIVITIES OF DAILY LIVING (ADL)
BATHING: NEEDS ASSISTANCE
HEARING - RIGHT EAR: FUNCTIONAL
TOILETING: NEEDS ASSISTANCE
LACK_OF_TRANSPORTATION: NO
DRESSING YOURSELF: NEEDS ASSISTANCE
ADEQUATE_TO_COMPLETE_ADL: YES
JUDGMENT_ADEQUATE_SAFELY_COMPLETE_DAILY_ACTIVITIES: YES
PATIENT'S MEMORY ADEQUATE TO SAFELY COMPLETE DAILY ACTIVITIES?: YES
WALKS IN HOME: NEEDS ASSISTANCE
HEARING - LEFT EAR: FUNCTIONAL
GROOMING: NEEDS ASSISTANCE
FEEDING YOURSELF: INDEPENDENT

## 2025-01-04 ASSESSMENT — COGNITIVE AND FUNCTIONAL STATUS - GENERAL
DAILY ACTIVITIY SCORE: 19
MOVING TO AND FROM BED TO CHAIR: A LITTLE
HELP NEEDED FOR BATHING: A LITTLE
MOBILITY SCORE: 16
PATIENT BASELINE BEDBOUND: NO
CLIMB 3 TO 5 STEPS WITH RAILING: TOTAL
DRESSING REGULAR UPPER BODY CLOTHING: A LITTLE
STANDING UP FROM CHAIR USING ARMS: A LITTLE
MOVING FROM LYING ON BACK TO SITTING ON SIDE OF FLAT BED WITH BEDRAILS: A LITTLE
WALKING IN HOSPITAL ROOM: A LITTLE
PERSONAL GROOMING: A LITTLE
TURNING FROM BACK TO SIDE WHILE IN FLAT BAD: A LITTLE
TOILETING: A LITTLE
DRESSING REGULAR LOWER BODY CLOTHING: A LITTLE

## 2025-01-04 ASSESSMENT — PATIENT HEALTH QUESTIONNAIRE - PHQ9
1. LITTLE INTEREST OR PLEASURE IN DOING THINGS: NOT AT ALL
SUM OF ALL RESPONSES TO PHQ9 QUESTIONS 1 & 2: 0
2. FEELING DOWN, DEPRESSED OR HOPELESS: NOT AT ALL

## 2025-01-04 ASSESSMENT — PAIN SCALES - GENERAL
PAINLEVEL_OUTOF10: 8
PAINLEVEL_OUTOF10: 0 - NO PAIN

## 2025-01-04 ASSESSMENT — PAIN DESCRIPTION - PROGRESSION: CLINICAL_PROGRESSION: NOT CHANGED

## 2025-01-04 ASSESSMENT — PAIN DESCRIPTION - PAIN TYPE: TYPE: ACUTE PAIN

## 2025-01-04 ASSESSMENT — PAIN - FUNCTIONAL ASSESSMENT
PAIN_FUNCTIONAL_ASSESSMENT: 0-10
PAIN_FUNCTIONAL_ASSESSMENT: 0-10

## 2025-01-04 ASSESSMENT — LIFESTYLE VARIABLES
AUDIT-C TOTAL SCORE: 0
SKIP TO QUESTIONS 9-10: 1

## 2025-01-04 ASSESSMENT — PAIN DESCRIPTION - LOCATION: LOCATION: BACK

## 2025-01-04 NOTE — H&P
History Of Present Illness  Harper Lara is a 79 y.o. female from home with a PMH of recurrent falls, recent hospital admission for fall, Afib, HTN, DM2, who presented after mechanical fall without head trauma.  In the morning her  fell and she was trying to help her  when she lost her balance and fell without loss of consciousness and head trauma.  She landed on her left side and developed pain afterward.  Recently she was in the hospital for the same issue and was supposed to go to rehab however she refused and wanted try staying home.  However due to recurrent falls she decided to come back to the hospital for possible rehab placement.    Patient chest pain, abdominal pain, nausea, vomiting, diarrhea, constipation, fever, chills, cough, bleeding, urinary symptom, headache, dizziness, tingling, numbness, LOC, SOB    ED course: /66, HR 70, RR 16, afebrile, O2 sat 100% on room air  Labs: Hgb 10.6, rest of the lab workup pretty much unremarkable  All imaging study negative for any acute fracture or hemorrhage         Past Medical History  She has a past medical history of Atrial fibrillation (Multi) and Diabetes mellitus (Multi).    Surgical History  She has a past surgical history that includes Cholecystectomy; Hiatal hernia repair; and Hysterectomy.     Social History  She reports that she has never smoked. She has never been exposed to tobacco smoke. She has never used smokeless tobacco. She reports that she does not drink alcohol and does not use drugs.    Family History  Family History   Problem Relation Name Age of Onset    Breast cancer Sister      Liver cancer Sister      Cancer Sister      Other (oral cancer) Maternal Grandmother          Allergies  Darvocet a500 [propoxyphene n-acetaminophen], Diphenhydramine hcl, Morphine, Pentazocine, Propoxyphene, Ranitidine, and [place]    Review of Systems  10 points ROS was negative except as mentioned per HPI     Physical Exam    General:  Well-developed frail elderly female, in no acute distress  HEENT: AT, NC, no JVD, no lymphadenopathy, neck supple  Lungs: Clear, no wheezing, no crackles  Cardiac: Tachycardic, no murmur, no gallop  Abdomen: Soft, nontender, no distention, positive bowel sound  Extremities: No deformity, no edema, pulses intact, ROM limited  Neurological: Alert awake oriented x3, sensation intact, clear speech       Last Recorded Vitals  /63 (Patient Position: Lying)   Pulse 83   Temp 37.1 °C (98.8 °F) (Temporal)   Resp 18   Wt 69.9 kg (154 lb)   SpO2 100%       Assessment & Plan  Fall (on) (from) unspecified stairs and steps, initial encounter        Harper Lara is a 79 y.o. female from home with a PMH of recurrent falls, recent hospital admission for fall, Afib, HTN, DM2, who presented status post mechanical fall and was admitted under observation for possible rehab placement      Fall precaution, pain management as needed  Continue with home meds  Regular diet  PT/OT consulted  VTE prophylaxis: SCDs, no AC due to high risk of fall  Disposition: Pending PT/OT evaluation  No need for a.m. labs  Family at bedside and all the questions were answered      Carlotta Andres MD

## 2025-01-04 NOTE — ED PROVIDER NOTES
HPI   Chief Complaint   Patient presents with    Fall         History provided by:  Patient and EMS personnel    Chief Complaint   Patient presents with    Fall       History of Present Illness:  Harper Lara is a 79 y.o. female presents with fall at home just prior to arrival.  The patient states she was trying to get out of the way of her grandson who was helping her  get lifted.  She lost her balance and fell.  She landed on her left shoulder and left hip.  She complains of pain to those areas.  She also complains of pain in her low back.  She was recently hospitalized for injuries to her back.  She does take Eliquis for known history of atrial fibrillation.  No loss of conscious.  She denies any symptoms prior to falling including no lightheadedness or dizziness.  No chest pain or shortness of breath.  No loss of motor or sensory function.  No loss of bladder or bowel function.  No headache.  No slurred speech or facial droop.  Symptoms are worse with movement and palpation.  No treatment prior to arrival except a c-collar was placed by EMS.      PMFSH:   As per HPI, otherwise nurses notes reviewed in EMR.    Past Medical History:   Past Medical History:   Diagnosis Date    Atrial fibrillation (Multi)     Diabetes mellitus (Multi)       Past Surgical History:   Past Surgical History:   Procedure Laterality Date    CHOLECYSTECTOMY      HIATAL HERNIA REPAIR      HYSTERECTOMY        Family History:   Family History   Problem Relation Name Age of Onset    Breast cancer Sister      Liver cancer Sister      Cancer Sister      Other (oral cancer) Maternal Grandmother        Social History:    Social History     Tobacco Use    Smoking status: Never     Passive exposure: Never    Smokeless tobacco: Never   Vaping Use    Vaping status: Never Used   Substance Use Topics    Alcohol use: Never    Drug use: Never     Allergies:   Allergies   Allergen Reactions    Darvocet A500 [Propoxyphene N-Acetaminophen] Unknown     Diphenhydramine Hcl Unknown    Morphine Unknown    Pentazocine Unknown    Propoxyphene Unknown    Ranitidine Unknown    Strawberry Hives     Current Outpatient Medications   Medication Instructions    amLODIPine (NORVASC) 2.5 mg, Daily    ARIPiprazole (ABILIFY) 5 mg, Daily    cholecalciferol (Vitamin D-3) 10 MCG (400 UNIT) tablet 1 tablet, Daily    cyanocobalamin (VITAMIN B-12) 1,000 mcg, Daily    docosahexaenoic acid/epa (FISH OIL ORAL) 1 capsule, Daily    FLUoxetine (PROzac) 20 mg tablet 1 tablet, Daily    furosemide (Lasix) 20 mg tablet 2 tablets, Every evening    furosemide (LASIX) 20 mg, Every morning    levothyroxine (Synthroid, Levoxyl) 75 mcg tablet 1 tablet, Daily    lidocaine (Lidoderm) 5 % patch 1 patch, transdermal, Daily, Apply to painful area 12 hours per day, remove for 12 hours.    magnesium oxide (MAG-OX) 400 mg, Daily    metFORMIN (GLUCOPHAGE) 500 mg, 2 times daily (morning and late afternoon)    pantoprazole (PROTONIX) 40 mg, Daily before breakfast            Patient History   Past Medical History:   Diagnosis Date    Atrial fibrillation (Multi)     Diabetes mellitus (Multi)      Past Surgical History:   Procedure Laterality Date    CHOLECYSTECTOMY      HIATAL HERNIA REPAIR      HYSTERECTOMY       Family History   Problem Relation Name Age of Onset    Breast cancer Sister      Liver cancer Sister      Cancer Sister      Other (oral cancer) Maternal Grandmother       Social History     Tobacco Use    Smoking status: Never     Passive exposure: Never    Smokeless tobacco: Never   Vaping Use    Vaping status: Never Used   Substance Use Topics    Alcohol use: Never    Drug use: Never       Physical Exam   ED Triage Vitals   Temp Pulse Resp BP   -- -- -- --      SpO2 Temp src Heart Rate Source Patient Position   -- -- -- --      BP Location FiO2 (%)     -- --       Physical Exam  Physical Exam:    ED Triage Vitals   Temp Pulse Resp BP   -- -- -- --      SpO2 Temp src Heart Rate Source Patient  "Position   -- -- -- --      BP Location FiO2 (%)     -- --         Patient Vitals for the past 24 hrs:   BP Temp Temp src Pulse Resp SpO2 Height Weight   01/04/25 1709 159/63 -- -- 83 18 100 % -- --   01/04/25 1605 165/67 -- -- 80 18 100 % -- --   01/04/25 1454 -- -- -- -- -- -- 1.6 m (5' 3\") 69.9 kg (154 lb)   01/04/25 1448 151/66 37.1 °C (98.8 °F) Temporal 70 16 100 % -- --       Constitutional: Vital signs per nursing notes.  Well developed, well nourished.  Mild acute distress.    Psychiatric: alert and oriented to person, place, and time; no abnormalities of mood or affect; memory intact  Eyes: PERRL; conjunctivae and lids normal; EOMI  ENT: otoscopic exam of external canal and TM´s normal; nasal mucosa, turbinates, and septum normal; mouth, tongue, and pharynx normal; pharynx without edema, exudate, or injection  Respiratory: normal respiratory effort and excursion; no rales, rhonchi, or wheezes; equal air entry  Cardiovascular: irregular rate and rhythm; no murmurs, rubs or gallops; symmetric pulses; no edema; normal capillary refill; distal pulses present  Neurological: normal speech; CN II-XII grossly intact; normal motor and sensory function; no nystagmus; no pronator drift  GI: no masses, tenderness, rebound or guarding; no palpable, pulsatile mass; no organomegaly; no hernia; normal bowel sounds; (-) Vasquez´s sign; (-) McBurney´s sign; (-) CVA tenderness  Musculoskeletal: normal digits and nails; no gross tendon or ligament injury; normal to palpation; normal strength/tone; neurovascular status intact; (-) Alyce´s sign; except low back with tenderness to palpation; there is also mild tenderness to palpation to the left shoulder and left hip  Skin: normal to inspection; normal to palpation; no rash  GCS: 15      ED Course & MDM   Diagnoses as of 01/04/25 1741   Frequent falls   Closed fracture of lumbar vertebra, unspecified fracture morphology, unspecified lumbar vertebral level, initial encounter " (Multi)   Anemia, unspecified type                 No data recorded                                 Medical Decision Making  Medical Decision Making:    EKG: My interpretation of EKG is sinus rhythm at 71 bpm with first-degree AV block, right bundle branch block, left anterior fascicular block and nonspecific ST-T changes.    Labs:   Labs Reviewed   CBC WITH AUTO DIFFERENTIAL - Abnormal       Result Value    WBC 5.7      nRBC 0.0      RBC 3.50 (*)     Hemoglobin 10.6 (*)     Hematocrit 31.5 (*)     MCV 90      MCH 30.3      MCHC 33.7      RDW 13.2      Platelets 192      Neutrophils % 84.4      Immature Granulocytes %, Automated 0.5      Lymphocytes % 10.0      Monocytes % 3.5      Eosinophils % 1.1      Basophils % 0.5      Neutrophils Absolute 4.80      Immature Granulocytes Absolute, Automated 0.03      Lymphocytes Absolute 0.57 (*)     Monocytes Absolute 0.20      Eosinophils Absolute 0.06      Basophils Absolute 0.03     COMPREHENSIVE METABOLIC PANEL - Abnormal    Glucose 111 (*)     Sodium 139      Potassium 4.0      Chloride 103      Bicarbonate 27      Anion Gap 13      Urea Nitrogen 30 (*)     Creatinine 1.02      eGFR 57 (*)     Calcium 9.4      Albumin 3.9      Alkaline Phosphatase 119      Total Protein 6.8      AST 24      Bilirubin, Total 0.6      ALT 14     PROTIME-INR - Abnormal    Protime 13.4 (*)     INR 1.2 (*)    URINALYSIS WITH REFLEX CULTURE AND MICROSCOPIC - Abnormal    Color, Urine Light-Yellow      Appearance, Urine Clear      Specific Gravity, Urine 1.012      pH, Urine 6.5      Protein, Urine NEGATIVE      Glucose, Urine Normal      Blood, Urine NEGATIVE      Ketones, Urine NEGATIVE      Bilirubin, Urine NEGATIVE      Urobilinogen, Urine Normal      Nitrite, Urine NEGATIVE      Leukocyte Esterase, Urine 25 Roopa/µL (*)    MICROSCOPIC ONLY, URINE - Abnormal    WBC, Urine 1-5      RBC, Urine 1-2      Bacteria, Urine 3+ (*)     Mucus, Urine 4+     LACTATE - Normal    Lactate 0.9      Narrative:      Venipuncture immediately after or during the administration of Metamizole may lead to falsely low results. Testing should be performed immediately prior to Metamizole dosing.   URINE CULTURE   URINALYSIS WITH REFLEX CULTURE AND MICROSCOPIC    Narrative:     The following orders were created for panel order Urinalysis with Reflex Culture and Microscopic.  Procedure                               Abnormality         Status                     ---------                               -----------         ------                     Urinalysis with Reflex C...[[id number]]  Abnormal            Final result               Extra Urine Gray Tube[[id number]]                            In process                   Please view results for these tests on the individual orders.   EXTRA URINE GRAY TUBE       Diagnostic Imaging:   CT lumbar spine wo IV contrast   Final Result   1. Fracture involving L2 appears similar to 12/27/2024. As before   there is mild anterior wedging, with the fracture line still   visualized just above the inferior endplate, extending into the   posterior elements bilaterally.   2. Slight increased widening along the previous fracture line   extending through the inferior portion of the L4 vertebral body.   Otherwise nondisplaced fractures extending into the posterior   elements along both fused L4-5 facets appear unchanged.   3. No new lumbar spine fracture is otherwise identified.             MACRO:   None        Signed by: Isaias Foss 1/4/2025 5:11 PM   Dictation workstation:   [id number]      CT thoracic spine wo IV contrast   Final Result   No acute thoracic spine fracture.   Degenerative changes with kyphosis.        MACRO:   None        Signed by: Isaias Foss 1/4/2025 5:11 PM   Dictation workstation:   [id number]      CT head wo IV contrast   Final Result   No acute intracranial pathology.             Signed by: Isaias Foss 1/4/2025 4:43 PM   Dictation workstation:   [id number]      CT cervical  spine wo IV contrast   Final Result   No evidence for an acute fracture or subluxation of the cervical   spine.        MACRO:   None        Signed by: Isaias Foss 1/4/2025 4:46 PM   Dictation workstation:   [id number]      XR hip left with pelvis when performed 2 or 3 views   Final Result   1. No acute fracture or malalignment.        MACRO:   None.        Signed by: Adri Martin 1/4/2025 4:01 PM   Dictation workstation:   [id number]      XR shoulder left 2+ views   Final Result   1. No acute fracture or malalignment of the left shoulder.        MACRO:        None.        Signed by: Adri Martin 1/4/2025 4:03 PM   Dictation workstation:   [id number]          ED Medication Administration: Medications - No data to display    ED Course:     Harper Lara is a 79 y.o. female presents with recurrent fall    .    Differential Diagnoses Considered:  Multisystem trauma      Diagnoses as of 01/04/25 1741   Frequent falls   Closed fracture of lumbar vertebra, unspecified fracture morphology, unspecified lumbar vertebral level, initial encounter (Multi)   Anemia, unspecified type       Abnormal Labs Reviewed   CBC WITH AUTO DIFFERENTIAL - Abnormal; Notable for the following components:       Result Value    RBC 3.50 (*)     Hemoglobin 10.6 (*)     Hematocrit 31.5 (*)     Lymphocytes Absolute 0.57 (*)     All other components within normal limits   COMPREHENSIVE METABOLIC PANEL - Abnormal; Notable for the following components:    Glucose 111 (*)     Urea Nitrogen 30 (*)     eGFR 57 (*)     All other components within normal limits   PROTIME-INR - Abnormal; Notable for the following components:    Protime 13.4 (*)     INR 1.2 (*)     All other components within normal limits   URINALYSIS WITH REFLEX CULTURE AND MICROSCOPIC - Abnormal; Notable for the following components:    Leukocyte Esterase, Urine 25 Roopa/µL (*)     All other components within normal limits   MICROSCOPIC ONLY, URINE - Abnormal; Notable for the  following components:    Bacteria, Urine 3+ (*)     All other components within normal limits       /63 (01/04/25 1709)    Temp      Pulse 83 (01/04/25 1709)   Resp 18 (01/04/25 1709)    SpO2 100 % (01/04/25 1709)      Diagnostic evaluation was completed.  Urinalysis showed negative nitrates and some leukocyte Estrace.  There are 1-5 white blood cells and 1-2 red blood cells with 3+ bacteria.  There is 4+ mucus.  It is unclear whether this is a contaminated specimen or does represent a possible urinary tract infection.  Will wait until the culture results.  Metabolic panel shows a slightly elevated glucose at 111.  Sodium potassium in the normal range.  BUN is slightly elevated at 30.  Creatinine is in the normal range.  Liver function is in the normal range.  Lactate is in the normal range.  INR is 1.2.  CBC shows a normal white blood cell count at 5.7.  There is mild anemia with a hemoglobin of 10.6.  Platelets are in the normal range.  CT lumbar spine shows fracture involving L2 appearing similar to 12/27/2024.  As before there is mild anterior wedging with a fracture line still visualized just above the inferior endplate, extending into the posterior elements bilaterally.  Slight increased widening along the previous fracture line extending to the inferior portion of the L4 vertebral body.  Otherwise nondisplaced fractures extending into the posterior elements along both fused L4-5 facets appear unchanged.  No new lumbar spine fracture is otherwise identified.  CT of the thoracic spine shows no acute thoracic spine fracture.  Degenerative changes with kyphosis.  CT of the head shows no acute intracranial pathology.  CT of the cervical spine shows no evidence for an acute fracture or subluxation of the cervical spine.  X-ray of the left hip shows no acute fracture or malalignment.  X-ray of the left shoulder shows no acute fracture or malalignment of the left shoulder.    The patient has been unable to care  for herself at home.  This is leading her to have increased falls.  Having looked at her recent hospitalization from the end of December, it looks like the patient was supposed to go to rehab but declined.  I did again asked the patient today and she believes that she would now benefit from rehab.    I discussed the results and plan for hospitalization with the patient and/or family/friend if present.  Questions were addressed.  Patient and/or family/friend expressed understanding.    The patient's condition requires ongoing treatment and evaluation necessitating hospital admission.  I have reviewed the patient's history, physical exam, and test information with the admitting physician,    Dr. Andres               , who agrees to hospitalize the patient.          Review of recent and relevant records: I reviewed the patient's most recent discharge summary from the hospitalist service at the end of December 2024.  The patient at that time was being treated for a lumbar fracture after a fall.  She is on Eliquis.  She was offered rehab but opted to go home.            Diagnosis:   1. Frequent falls    2. Closed fracture of lumbar vertebra, unspecified fracture morphology, unspecified lumbar vertebral level, initial encounter (Multi)    3. Anemia, unspecified type                    Procedure  Procedures     David SAWYER MD  01/04/25 0357

## 2025-01-05 VITALS
RESPIRATION RATE: 17 BRPM | BODY MASS INDEX: 26.22 KG/M2 | HEART RATE: 66 BPM | TEMPERATURE: 98.6 F | OXYGEN SATURATION: 98 % | DIASTOLIC BLOOD PRESSURE: 70 MMHG | WEIGHT: 148 LBS | HEIGHT: 63 IN | SYSTOLIC BLOOD PRESSURE: 165 MMHG

## 2025-01-05 LAB
GLUCOSE BLD MANUAL STRIP-MCNC: 93 MG/DL (ref 74–99)
HOLD SPECIMEN: NORMAL

## 2025-01-05 PROCEDURE — G0378 HOSPITAL OBSERVATION PER HR: HCPCS

## 2025-01-05 PROCEDURE — 2500000004 HC RX 250 GENERAL PHARMACY W/ HCPCS (ALT 636 FOR OP/ED): Performed by: STUDENT IN AN ORGANIZED HEALTH CARE EDUCATION/TRAINING PROGRAM

## 2025-01-05 PROCEDURE — 2500000001 HC RX 250 WO HCPCS SELF ADMINISTERED DRUGS (ALT 637 FOR MEDICARE OP): Performed by: STUDENT IN AN ORGANIZED HEALTH CARE EDUCATION/TRAINING PROGRAM

## 2025-01-05 PROCEDURE — 99232 SBSQ HOSP IP/OBS MODERATE 35: CPT

## 2025-01-05 PROCEDURE — 2500000002 HC RX 250 W HCPCS SELF ADMINISTERED DRUGS (ALT 637 FOR MEDICARE OP, ALT 636 FOR OP/ED): Performed by: PHARMACIST

## 2025-01-05 PROCEDURE — 82947 ASSAY GLUCOSE BLOOD QUANT: CPT

## 2025-01-05 PROCEDURE — 2500000002 HC RX 250 W HCPCS SELF ADMINISTERED DRUGS (ALT 637 FOR MEDICARE OP, ALT 636 FOR OP/ED): Performed by: STUDENT IN AN ORGANIZED HEALTH CARE EDUCATION/TRAINING PROGRAM

## 2025-01-05 PROCEDURE — 2500000004 HC RX 250 GENERAL PHARMACY W/ HCPCS (ALT 636 FOR OP/ED)

## 2025-01-05 PROCEDURE — 96372 THER/PROPH/DIAG INJ SC/IM: CPT

## 2025-01-05 RX ORDER — ENOXAPARIN SODIUM 100 MG/ML
40 INJECTION SUBCUTANEOUS EVERY 24 HOURS
Status: DISCONTINUED | OUTPATIENT
Start: 2025-01-05 | End: 2025-01-06

## 2025-01-05 RX ADMIN — ARIPIPRAZOLE 5 MG: 5 TABLET ORAL at 08:32

## 2025-01-05 RX ADMIN — ENOXAPARIN SODIUM 40 MG: 40 INJECTION SUBCUTANEOUS at 11:57

## 2025-01-05 RX ADMIN — PANTOPRAZOLE SODIUM 40 MG: 40 TABLET, DELAYED RELEASE ORAL at 05:37

## 2025-01-05 RX ADMIN — CHOLECALCIFEROL TAB 10 MCG (400 UNIT) 10 MCG: 10 TAB at 08:32

## 2025-01-05 RX ADMIN — LEVOTHYROXINE SODIUM 75 MCG: 75 TABLET ORAL at 05:37

## 2025-01-05 RX ADMIN — MAGNESIUM OXIDE 400 MG (241.3 MG MAGNESIUM) TABLET 400 MG: TABLET at 08:32

## 2025-01-05 RX ADMIN — ACETAMINOPHEN 650 MG: 325 TABLET ORAL at 20:16

## 2025-01-05 RX ADMIN — METFORMIN HYDROCHLORIDE 500 MG: 500 TABLET, FILM COATED ORAL at 16:53

## 2025-01-05 RX ADMIN — METFORMIN HYDROCHLORIDE 500 MG: 500 TABLET, FILM COATED ORAL at 08:32

## 2025-01-05 RX ADMIN — FLUOXETINE HYDROCHLORIDE 20 MG: 20 CAPSULE ORAL at 08:32

## 2025-01-05 RX ADMIN — FUROSEMIDE 20 MG: 40 TABLET ORAL at 08:32

## 2025-01-05 RX ADMIN — AMLODIPINE BESYLATE 2.5 MG: 5 TABLET ORAL at 08:32

## 2025-01-05 RX ADMIN — FUROSEMIDE 40 MG: 40 TABLET ORAL at 16:53

## 2025-01-05 RX ADMIN — Medication 1000 MCG: at 08:32

## 2025-01-05 ASSESSMENT — COGNITIVE AND FUNCTIONAL STATUS - GENERAL
DRESSING REGULAR UPPER BODY CLOTHING: A LITTLE
WALKING IN HOSPITAL ROOM: A LITTLE
MOVING FROM LYING ON BACK TO SITTING ON SIDE OF FLAT BED WITH BEDRAILS: A LITTLE
HELP NEEDED FOR BATHING: A LITTLE
CLIMB 3 TO 5 STEPS WITH RAILING: TOTAL
MOBILITY SCORE: 16
TURNING FROM BACK TO SIDE WHILE IN FLAT BAD: A LITTLE
DAILY ACTIVITIY SCORE: 19
TOILETING: A LITTLE
STANDING UP FROM CHAIR USING ARMS: A LITTLE
MOVING TO AND FROM BED TO CHAIR: A LITTLE
PERSONAL GROOMING: A LITTLE
DRESSING REGULAR LOWER BODY CLOTHING: A LITTLE

## 2025-01-05 ASSESSMENT — PAIN - FUNCTIONAL ASSESSMENT: PAIN_FUNCTIONAL_ASSESSMENT: 0-10

## 2025-01-05 ASSESSMENT — PAIN DESCRIPTION - LOCATION: LOCATION: BACK

## 2025-01-05 ASSESSMENT — PAIN SCALES - GENERAL
PAINLEVEL_OUTOF10: 0 - NO PAIN
PAINLEVEL_OUTOF10: 3

## 2025-01-05 NOTE — CARE PLAN
The patient's goals for the shift include for my  to behave himself and cooperate    The clinical goals for the shift include pt to remain free from injury      Problem: Diabetes  Goal: Achieve decreasing blood glucose levels by end of shift  Outcome: Progressing  Goal: Increase stability of blood glucose readings by end of shift  Outcome: Progressing  Goal: Decrease in ketones present in urine by end of shift  Outcome: Progressing  Goal: Maintain electrolyte levels within acceptable range throughout shift  Outcome: Progressing  Goal: Maintain glucose levels >70mg/dl to <250mg/dl throughout shift  Outcome: Progressing  Goal: No changes in neurological exam by end of shift  Outcome: Progressing  Goal: Learn about and adhere to nutrition recommendations by end of shift  Outcome: Progressing  Goal: Vital signs within normal range for age by end of shift  Outcome: Progressing  Goal: Increase self care and/or family involovement by end of shift  Outcome: Progressing  Goal: Receive DSME education by end of shift  Outcome: Progressing     Problem: Pain - Adult  Goal: Verbalizes/displays adequate comfort level or baseline comfort level  Outcome: Progressing     Problem: Safety - Adult  Goal: Free from fall injury  Outcome: Progressing     Problem: Discharge Planning  Goal: Discharge to home or other facility with appropriate resources  Outcome: Progressing     Problem: Chronic Conditions and Co-morbidities  Goal: Patient's chronic conditions and co-morbidity symptoms are monitored and maintained or improved  Outcome: Progressing     Problem: Skin  Goal: Decreased wound size/increased tissue granulation at next dressing change  Outcome: Progressing  Goal: Participates in plan/prevention/treatment measures  Outcome: Progressing  Goal: Prevent/manage excess moisture  Outcome: Progressing  Goal: Prevent/minimize sheer/friction injuries  Outcome: Progressing  Goal: Promote/optimize nutrition  Outcome: Progressing  Goal:  Promote skin healing  Outcome: Progressing

## 2025-01-05 NOTE — PROGRESS NOTES
"Daily Progress Note    Harper Lara is a 79 y.o. female on day 0 of admission presenting with Fall (on) (from) unspecified stairs and steps, initial encounter.    Subjective   Patient seen resting comfortably.  Patient denies chest pain or shortness of breath.  Patient denies head injury.  Patient will need placement.  Patient and  fell together and the  is currently hospitalized.       Objective     Physical Exam    Physical Exam  Constitutional:       Appearance: Normal appearance.      Comments: Frail and elderly   HENT:      Head: Normocephalic.      Mouth/Throat:      Mouth: Mucous membranes are moist.   Eyes:      Pupils: Pupils are equal, round, and reactive to light.   Cardiovascular:      Rate and Rhythm: Normal rate and regular rhythm.      Heart sounds: Normal heart sounds, S1 normal and S2 normal.   Pulmonary:      Effort: Pulmonary effort is normal.      Breath sounds: Normal breath sounds.   Abdominal:      General: Bowel sounds are normal.      Palpations: Abdomen is soft.   Musculoskeletal:         General: Normal range of motion.      Cervical back: Neck supple.   Skin:     General: Skin is warm.   Neurological:      Mental Status: She is alert and oriented to person, place, and time.      Motor: Weakness present.   Psychiatric:         Mood and Affect: Mood normal.         Behavior: Behavior normal.         Last Recorded Vitals  Blood pressure 134/62, pulse 64, temperature 36.5 °C (97.7 °F), resp. rate 17, height 1.6 m (5' 3\"), weight 67.1 kg (148 lb), SpO2 99%.  Intake/Output last 3 Shifts:  I/O last 3 completed shifts:  In: 300 (4.5 mL/kg) [P.O.:300]  Out: 0 (0 mL/kg)   Weight: 67.1 kg     Medications  Scheduled medications  amLODIPine, 2.5 mg, oral, Daily  ARIPiprazole, 5 mg, oral, Daily  cholecalciferol, 400 Units, oral, Daily  cyanocobalamin, 1,000 mcg, oral, Daily  FLUoxetine, 20 mg, oral, Daily  furosemide, 20 mg, oral, q AM  furosemide, 40 mg, oral, Daily with evening " meal  levothyroxine, 75 mcg, oral, Daily  magnesium oxide, 400 mg, oral, Daily  metFORMIN, 500 mg, oral, BID  pantoprazole, 40 mg, oral, Daily before breakfast      Continuous medications     PRN medications  PRN medications: acetaminophen **OR** acetaminophen    Labs  CBC:   Results from last 7 days   Lab Units 01/04/25  1524   WBC AUTO x10*3/uL 5.7   RBC AUTO x10*6/uL 3.50*   HEMOGLOBIN g/dL 10.6*   HEMATOCRIT % 31.5*   MCV fL 90   MCH pg 30.3   MCHC g/dL 33.7   RDW % 13.2   PLATELETS AUTO x10*3/uL 192     CMP:    Results from last 7 days   Lab Units 01/04/25  1524   SODIUM mmol/L 139   POTASSIUM mmol/L 4.0   CHLORIDE mmol/L 103   CO2 mmol/L 27   BUN mg/dL 30*   CREATININE mg/dL 1.02   GLUCOSE mg/dL 111*   PROTEIN TOTAL g/dL 6.8   CALCIUM mg/dL 9.4   BILIRUBIN TOTAL mg/dL 0.6   ALK PHOS U/L 119   AST U/L 24   ALT U/L 14     BMP:    Results from last 7 days   Lab Units 01/04/25  1524   SODIUM mmol/L 139   POTASSIUM mmol/L 4.0   CHLORIDE mmol/L 103   CO2 mmol/L 27   BUN mg/dL 30*   CREATININE mg/dL 1.02   CALCIUM mg/dL 9.4   GLUCOSE mg/dL 111*     Magnesium:    Troponin:      BNP:     Lipid Panel:  Results from last 7 days   Lab Units 01/04/25  1524   INR  1.2*   PROTIME seconds 13.4*        Nutrition             Relevant Results  Results from last 7 days   Lab Units 01/05/25  0621 01/04/25  2255 01/04/25  1524 12/29/24  1200   POCT GLUCOSE mg/dL 93 96  --  131*   GLUCOSE mg/dL  --   --  111*  --      Lab Results   Component Value Date    HGBA1C 5.6 06/25/2024        Assessment/Plan    Dementia  Generalized weakness  A-fib  Hypertension  T2DM    -Fall precaution  -Continue home meds  -Regular diet  -Daily labs  -PT/OT evaluation  -TCC for discharge planning patient will need placement  - also fell and is hospitalized    DVTp: Lovenox    PLAN: Placement    YARITZA Coleman-CNP    Plan of care was discussed extensively with patient.  Patient verbalized understanding through teach back method.  All  question and concerns addressed upon examination.    Of note, this documentation is completed using the Dragon Dictation system (voice recognition software). There may be spelling and/or grammatical errors that were not corrected prior to final submission.

## 2025-01-06 ENCOUNTER — HOME CARE VISIT (OUTPATIENT)
Dept: HOME HEALTH SERVICES | Facility: HOME HEALTH | Age: 80
End: 2025-01-06
Payer: MEDICARE

## 2025-01-06 PROBLEM — R29.6 FREQUENT FALLS: Status: ACTIVE | Noted: 2025-01-06

## 2025-01-06 LAB
ANION GAP SERPL CALC-SCNC: 10 MMOL/L (ref 10–20)
BUN SERPL-MCNC: 33 MG/DL (ref 6–23)
CALCIUM SERPL-MCNC: 8.9 MG/DL (ref 8.6–10.3)
CHLORIDE SERPL-SCNC: 103 MMOL/L (ref 98–107)
CO2 SERPL-SCNC: 28 MMOL/L (ref 21–32)
CREAT SERPL-MCNC: 1 MG/DL (ref 0.5–1.05)
EGFRCR SERPLBLD CKD-EPI 2021: 58 ML/MIN/1.73M*2
ERYTHROCYTE [DISTWIDTH] IN BLOOD BY AUTOMATED COUNT: 13.2 % (ref 11.5–14.5)
GLUCOSE SERPL-MCNC: 123 MG/DL (ref 74–99)
HCT VFR BLD AUTO: 30.6 % (ref 36–46)
HGB BLD-MCNC: 10 G/DL (ref 12–16)
HOLD SPECIMEN: NORMAL
MAGNESIUM SERPL-MCNC: 1.82 MG/DL (ref 1.6–2.4)
MCH RBC QN AUTO: 29.8 PG (ref 26–34)
MCHC RBC AUTO-ENTMCNC: 32.7 G/DL (ref 32–36)
MCV RBC AUTO: 91 FL (ref 80–100)
NRBC BLD-RTO: 0 /100 WBCS (ref 0–0)
PLATELET # BLD AUTO: 169 X10*3/UL (ref 150–450)
POTASSIUM SERPL-SCNC: 3.9 MMOL/L (ref 3.5–5.3)
RBC # BLD AUTO: 3.36 X10*6/UL (ref 4–5.2)
SODIUM SERPL-SCNC: 137 MMOL/L (ref 136–145)
WBC # BLD AUTO: 3.6 X10*3/UL (ref 4.4–11.3)

## 2025-01-06 PROCEDURE — 99232 SBSQ HOSP IP/OBS MODERATE 35: CPT

## 2025-01-06 PROCEDURE — 85027 COMPLETE CBC AUTOMATED: CPT

## 2025-01-06 PROCEDURE — 2500000001 HC RX 250 WO HCPCS SELF ADMINISTERED DRUGS (ALT 637 FOR MEDICARE OP): Performed by: STUDENT IN AN ORGANIZED HEALTH CARE EDUCATION/TRAINING PROGRAM

## 2025-01-06 PROCEDURE — 97161 PT EVAL LOW COMPLEX 20 MIN: CPT | Mod: GP | Performed by: PHYSICAL THERAPIST

## 2025-01-06 PROCEDURE — 2500000004 HC RX 250 GENERAL PHARMACY W/ HCPCS (ALT 636 FOR OP/ED)

## 2025-01-06 PROCEDURE — 80048 BASIC METABOLIC PNL TOTAL CA: CPT

## 2025-01-06 PROCEDURE — 83735 ASSAY OF MAGNESIUM: CPT

## 2025-01-06 PROCEDURE — 2500000002 HC RX 250 W HCPCS SELF ADMINISTERED DRUGS (ALT 637 FOR MEDICARE OP, ALT 636 FOR OP/ED): Performed by: STUDENT IN AN ORGANIZED HEALTH CARE EDUCATION/TRAINING PROGRAM

## 2025-01-06 PROCEDURE — 1210000001 HC SEMI-PRIVATE ROOM DAILY

## 2025-01-06 PROCEDURE — 97165 OT EVAL LOW COMPLEX 30 MIN: CPT | Mod: GO

## 2025-01-06 PROCEDURE — 2500000001 HC RX 250 WO HCPCS SELF ADMINISTERED DRUGS (ALT 637 FOR MEDICARE OP)

## 2025-01-06 PROCEDURE — 2500000002 HC RX 250 W HCPCS SELF ADMINISTERED DRUGS (ALT 637 FOR MEDICARE OP, ALT 636 FOR OP/ED): Performed by: PHARMACIST

## 2025-01-06 PROCEDURE — 36415 COLL VENOUS BLD VENIPUNCTURE: CPT

## 2025-01-06 PROCEDURE — 2500000004 HC RX 250 GENERAL PHARMACY W/ HCPCS (ALT 636 FOR OP/ED): Performed by: STUDENT IN AN ORGANIZED HEALTH CARE EDUCATION/TRAINING PROGRAM

## 2025-01-06 RX ORDER — FUROSEMIDE 40 MG/1
40 TABLET ORAL EVERY MORNING
Status: DISCONTINUED | OUTPATIENT
Start: 2025-01-07 | End: 2025-01-10 | Stop reason: HOSPADM

## 2025-01-06 RX ORDER — BENZTROPINE MESYLATE 1 MG/1
1 TABLET ORAL 2 TIMES DAILY
Status: ON HOLD | COMMUNITY
End: 2025-01-06 | Stop reason: ALTCHOICE

## 2025-01-06 RX ORDER — FUROSEMIDE 40 MG/1
20 TABLET ORAL
Status: DISCONTINUED | OUTPATIENT
Start: 2025-01-06 | End: 2025-01-10 | Stop reason: HOSPADM

## 2025-01-06 RX ORDER — CEFTRIAXONE 1 G/50ML
1 INJECTION, SOLUTION INTRAVENOUS EVERY 24 HOURS
Status: DISCONTINUED | OUTPATIENT
Start: 2025-01-06 | End: 2025-01-10 | Stop reason: HOSPADM

## 2025-01-06 RX ORDER — BENZTROPINE MESYLATE 1 MG/1
1 TABLET ORAL 2 TIMES DAILY
Status: ON HOLD | COMMUNITY
End: 2025-01-06 | Stop reason: ENTERED-IN-ERROR

## 2025-01-06 RX ORDER — FUROSEMIDE 20 MG/1
1 TABLET ORAL
COMMUNITY

## 2025-01-06 RX ORDER — ARIPIPRAZOLE 5 MG/1
1 TABLET ORAL DAILY
COMMUNITY

## 2025-01-06 RX ADMIN — ENOXAPARIN SODIUM 40 MG: 40 INJECTION SUBCUTANEOUS at 11:34

## 2025-01-06 RX ADMIN — CHOLECALCIFEROL TAB 10 MCG (400 UNIT) 10 MCG: 10 TAB at 08:40

## 2025-01-06 RX ADMIN — Medication 1000 MCG: at 08:39

## 2025-01-06 RX ADMIN — FUROSEMIDE 20 MG: 40 TABLET ORAL at 16:18

## 2025-01-06 RX ADMIN — ACETAMINOPHEN 650 MG: 325 TABLET ORAL at 11:35

## 2025-01-06 RX ADMIN — FLUOXETINE HYDROCHLORIDE 20 MG: 20 CAPSULE ORAL at 08:39

## 2025-01-06 RX ADMIN — AMLODIPINE BESYLATE 2.5 MG: 5 TABLET ORAL at 08:39

## 2025-01-06 RX ADMIN — CEFTRIAXONE SODIUM 1 G: 1 INJECTION, SOLUTION INTRAVENOUS at 11:34

## 2025-01-06 RX ADMIN — PANTOPRAZOLE SODIUM 40 MG: 40 TABLET, DELAYED RELEASE ORAL at 06:20

## 2025-01-06 RX ADMIN — METFORMIN HYDROCHLORIDE 500 MG: 500 TABLET, FILM COATED ORAL at 08:40

## 2025-01-06 RX ADMIN — LEVOTHYROXINE SODIUM 75 MCG: 75 TABLET ORAL at 06:20

## 2025-01-06 RX ADMIN — APIXABAN 5 MG: 5 TABLET, FILM COATED ORAL at 16:18

## 2025-01-06 RX ADMIN — ARIPIPRAZOLE 5 MG: 5 TABLET ORAL at 08:40

## 2025-01-06 RX ADMIN — MAGNESIUM OXIDE 400 MG (241.3 MG MAGNESIUM) TABLET 400 MG: TABLET at 08:40

## 2025-01-06 RX ADMIN — METFORMIN HYDROCHLORIDE 500 MG: 500 TABLET, FILM COATED ORAL at 16:18

## 2025-01-06 RX ADMIN — FUROSEMIDE 20 MG: 40 TABLET ORAL at 08:39

## 2025-01-06 ASSESSMENT — COGNITIVE AND FUNCTIONAL STATUS - GENERAL
MOBILITY SCORE: 15
TOILETING: TOTAL
MOBILITY SCORE: 15
DAILY ACTIVITIY SCORE: 13
MOVING TO AND FROM BED TO CHAIR: A LITTLE
MOVING TO AND FROM BED TO CHAIR: A LITTLE
MOVING FROM LYING ON BACK TO SITTING ON SIDE OF FLAT BED WITH BEDRAILS: A LITTLE
STANDING UP FROM CHAIR USING ARMS: A LITTLE
MOVING FROM LYING ON BACK TO SITTING ON SIDE OF FLAT BED WITH BEDRAILS: A LOT
CLIMB 3 TO 5 STEPS WITH RAILING: TOTAL
TURNING FROM BACK TO SIDE WHILE IN FLAT BAD: A LITTLE
DRESSING REGULAR UPPER BODY CLOTHING: A LITTLE
DRESSING REGULAR LOWER BODY CLOTHING: A LITTLE
MOVING TO AND FROM BED TO CHAIR: TOTAL
HELP NEEDED FOR BATHING: A LITTLE
TURNING FROM BACK TO SIDE WHILE IN FLAT BAD: A LITTLE
WALKING IN HOSPITAL ROOM: A LOT
TOILETING: A LITTLE
MOBILITY SCORE: 9
STANDING UP FROM CHAIR USING ARMS: A LOT
TOILETING: A LITTLE
CLIMB 3 TO 5 STEPS WITH RAILING: TOTAL
DAILY ACTIVITIY SCORE: 19
MOVING FROM LYING ON BACK TO SITTING ON SIDE OF FLAT BED WITH BEDRAILS: A LITTLE
WALKING IN HOSPITAL ROOM: TOTAL
DRESSING REGULAR LOWER BODY CLOTHING: A LITTLE
PERSONAL GROOMING: A LITTLE
HELP NEEDED FOR BATHING: A LITTLE
EATING MEALS: A LITTLE
DRESSING REGULAR LOWER BODY CLOTHING: TOTAL
HELP NEEDED FOR BATHING: A LOT
WALKING IN HOSPITAL ROOM: A LOT
DRESSING REGULAR UPPER BODY CLOTHING: A LITTLE
DRESSING REGULAR UPPER BODY CLOTHING: A LITTLE
DAILY ACTIVITIY SCORE: 19
PERSONAL GROOMING: A LITTLE
TURNING FROM BACK TO SIDE WHILE IN FLAT BAD: A LOT
CLIMB 3 TO 5 STEPS WITH RAILING: TOTAL
STANDING UP FROM CHAIR USING ARMS: A LITTLE
PERSONAL GROOMING: A LITTLE

## 2025-01-06 ASSESSMENT — PAIN SCALES - GENERAL
PAINLEVEL_OUTOF10: 0 - NO PAIN
PAINLEVEL_OUTOF10: 8
PAINLEVEL_OUTOF10: 8
PAINLEVEL_OUTOF10: 6
PAINLEVEL_OUTOF10: 0 - NO PAIN

## 2025-01-06 ASSESSMENT — PAIN - FUNCTIONAL ASSESSMENT
PAIN_FUNCTIONAL_ASSESSMENT: 0-10

## 2025-01-06 ASSESSMENT — PAIN DESCRIPTION - LOCATION: LOCATION: BACK

## 2025-01-06 ASSESSMENT — ACTIVITIES OF DAILY LIVING (ADL): BATHING_ASSISTANCE: MAXIMAL

## 2025-01-06 NOTE — PROGRESS NOTES
"Daily Progress Note    Harper Lara is a 79 y.o. female on day 0 of admission presenting with Fall (on) (from) unspecified stairs and steps, initial encounter.    Subjective   Resting comfortably.  Patient agreeable for SNF on discharge.  Patient's  currently in ICU.  Denies shortness of breath or chest pain.  Patient is urine cloudy with foul smell.  Will treat for UTI.       Objective     Physical Exam    Physical Exam  Constitutional:       Appearance: Normal appearance.   HENT:      Head: Normocephalic.      Mouth/Throat:      Mouth: Mucous membranes are moist.   Eyes:      Pupils: Pupils are equal, round, and reactive to light.   Cardiovascular:      Rate and Rhythm: Normal rate and regular rhythm.      Heart sounds: Normal heart sounds, S1 normal and S2 normal.   Pulmonary:      Effort: Pulmonary effort is normal.      Breath sounds: Normal breath sounds.   Abdominal:      General: Bowel sounds are normal.      Palpations: Abdomen is soft.   Musculoskeletal:         General: Normal range of motion.      Cervical back: Neck supple.   Skin:     General: Skin is warm.   Neurological:      Mental Status: She is alert and oriented to person, place, and time.      Motor: Weakness present.   Psychiatric:         Mood and Affect: Mood normal.         Behavior: Behavior normal.         Cognition and Memory: Cognition is impaired.         Last Recorded Vitals  Blood pressure 160/70, pulse 63, temperature 36.8 °C (98.2 °F), temperature source Temporal, resp. rate 18, height 1.6 m (5' 3\"), weight 67.1 kg (148 lb), SpO2 97%.  Intake/Output last 3 Shifts:  I/O last 3 completed shifts:  In: 300 (4.5 mL/kg) [P.O.:300]  Out: 700 (10.4 mL/kg) [Urine:700 (0.3 mL/kg/hr)]  Weight: 67.1 kg     Medications  Scheduled medications  amLODIPine, 2.5 mg, oral, Daily  ARIPiprazole, 5 mg, oral, Daily  cefTRIAXone, 1 g, intravenous, q24h  cholecalciferol, 400 Units, oral, Daily  cyanocobalamin, 1,000 mcg, oral, Daily  enoxaparin, " 40 mg, subcutaneous, q24h  FLUoxetine, 20 mg, oral, Daily  furosemide, 20 mg, oral, q AM  furosemide, 40 mg, oral, Daily with evening meal  levothyroxine, 75 mcg, oral, Daily  magnesium oxide, 400 mg, oral, Daily  metFORMIN, 500 mg, oral, BID  pantoprazole, 40 mg, oral, Daily before breakfast      Continuous medications     PRN medications  PRN medications: acetaminophen **OR** acetaminophen    Labs  CBC:   Results from last 7 days   Lab Units 01/06/25  0902 01/04/25  1524   WBC AUTO x10*3/uL 3.6* 5.7   RBC AUTO x10*6/uL 3.36* 3.50*   HEMOGLOBIN g/dL 10.0* 10.6*   HEMATOCRIT % 30.6* 31.5*   MCV fL 91 90   MCH pg 29.8 30.3   MCHC g/dL 32.7 33.7   RDW % 13.2 13.2   PLATELETS AUTO x10*3/uL 169 192     CMP:    Results from last 7 days   Lab Units 01/06/25  0902 01/04/25  1524   SODIUM mmol/L 137 139   POTASSIUM mmol/L 3.9 4.0   CHLORIDE mmol/L 103 103   CO2 mmol/L 28 27   BUN mg/dL 33* 30*   CREATININE mg/dL 1.00 1.02   GLUCOSE mg/dL 123* 111*   PROTEIN TOTAL g/dL  --  6.8   CALCIUM mg/dL 8.9 9.4   BILIRUBIN TOTAL mg/dL  --  0.6   ALK PHOS U/L  --  119   AST U/L  --  24   ALT U/L  --  14     BMP:    Results from last 7 days   Lab Units 01/06/25  0902 01/04/25  1524   SODIUM mmol/L 137 139   POTASSIUM mmol/L 3.9 4.0   CHLORIDE mmol/L 103 103   CO2 mmol/L 28 27   BUN mg/dL 33* 30*   CREATININE mg/dL 1.00 1.02   CALCIUM mg/dL 8.9 9.4   GLUCOSE mg/dL 123* 111*     Magnesium:  Results from last 7 days   Lab Units 01/06/25  0902   MAGNESIUM mg/dL 1.82     Troponin:      BNP:     Lipid Panel:  Results from last 7 days   Lab Units 01/04/25  1524   INR  1.2*   PROTIME seconds 13.4*        Nutrition             Relevant Results  Results from last 7 days   Lab Units 01/06/25  0902 01/05/25  0621 01/04/25  2255 01/04/25  1524   POCT GLUCOSE mg/dL  --  93 96  --    GLUCOSE mg/dL 123*  --   --  111*     Lab Results   Component Value Date    HGBA1C 5.6 06/25/2024        Assessment/Plan    Dementia  UTI  Generalized weakness  A-fib was  on Eliquis  Hypertension  T2DM    -Fall precaution  -Continue home meds  -Regular diet  -Daily labs  -UA positive for leukocytes  -Will start on Rocephin  -Follow urine culture  -PT/OT evaluation  -TCC for discharge planning patient will need placement  - also fell and is hospitalized    DVTp: Eliquis    PLAN: SNF placement    YARITZA Coleman-CNP    Plan of care was discussed extensively with patient.  Patient verbalized understanding through teach back method.  All question and concerns addressed upon examination.    Of note, this documentation is completed using the Dragon Dictation system (voice recognition software). There may be spelling and/or grammatical errors that were not corrected prior to final submission.

## 2025-01-06 NOTE — PROGRESS NOTES
Occupational Therapy    Evaluation    Patient Name: Harper Lara  MRN: [id number]  Today's Date: 1/6/2025  Time Calculation  Start Time: 0926  Stop Time: 0944  Time Calculation (min): 18 min  909/909-A    Assessment  IP OT Assessment  OT Assessment: Decreased strength, balance, endurance, standing tolerance, and increased pain limit pt's ability to complete ADLs and functional transfers IND.  Prognosis: Fair  Barriers to Discharge Home: Physical needs  Physical Needs: 24hr ADL assistance needed, Ambulating household distances limited by function/safety  Evaluation/Treatment Tolerance: Patient limited by pain  Medical Staff Made Aware: Yes  End of Session Communication: Bedside nurse  End of Session Patient Position: Bed, 3 rail up, Alarm on    Plan:  Treatment Interventions: ADL retraining, Functional transfer training, UE strengthening/ROM, Endurance training, Patient/family training  OT Frequency: 2 times per week  OT Discharge Recommendations: Moderate intensity level of continued care  Equipment Recommended upon Discharge:  (TBD)  OT Recommended Transfer Status: Maximum assist, Assist of 2  OT - OK to Discharge: Yes (ok to d/c to next level of care once medically cleared)    Subjective     Current Problem:  1. Frequent falls        2. Closed fracture of lumbar vertebra, unspecified fracture morphology, unspecified lumbar vertebral level, initial encounter (Multi)        3. Anemia, unspecified type            General:  General  Reason for Referral: ADL impairment  Referred By: Dr. Andres (PT/OT 1/4)  Past Medical History Relevant to Rehab: includes: recent fall with L1 compression fracture, HTN, falls, A-fib, DM  Family/Caregiver Present: No  Co-Treatment: PT  Co-Treatment Reason: maximize pt function and safety  Prior to Session Communication: Bedside nurse  Patient Position Received: Bed, 3 rail up, Alarm on  General Comment: Pt. is a 78yo who [place] [facility] [place] a fall. Pt.'s spouse (who  is currently in ICU) had a fall and Pt. fell attempting to assist her spouse.  Imaging 1/4/2025: L hip X-ray (-) fracture  L shoulder X-ray (-) fracture Head CT (-) acute findings  C-spine CT (-) acute findings  L-spine CT (-) new lumbar spine fracture, (+) fracture involving L2 appears similar to 12/27/2024. As before  there is mild anterior wedging, with the fracture line still  visualized just above the inferior endplate, extending into the  posterior elements bilaterally.  2. Slight increased widening along the previous fracture line  extending through the inferior portion of the L4 vertebral body.  Otherwise nondisplaced fractures extending into the posterior  elements along both fused L4-5 facets appear unchanged (-) new lumbar spine fracture.  Pt. was seen by ortho during previous hospitalizaton and no bracing was indicated by ortho at that time. Dx: Fall    Precautions:  Medical Precautions: Fall precautions, Spinal precautions    Pain:  Pain Assessment  Pain Assessment: 0-10  0-10 (Numeric) Pain Score: 8  Pain Type: Acute pain  Pain Location:  (R hip and R low back)  Pain Interventions: Repositioned (RN notified)    Objective     Cognition:  Overall Cognitive Status: Within Functional Limits             Home Living:  Home Living Comments: Pt lives with spouse (who is also currently hospitalized for fall) in a 1-level home with 1 CIRA. Bed/bath on 1st floor with walk in shower with seat/grab bars.     Prior Function:  Prior Function Comments: Pt reports requiring assist with bathing/dressing from neighbor since previous fall. Neighbor completed iADLs which included taking pt's laundry to her house to complete. Pt reports ambulating with FWW with seat but no brakes. Pt reports >5 falls in the past 3 months. Pt does not drive.    ADL:  Eating Assistance:  (set-up)  Grooming Assistance: Minimal  Bathing Assistance: Maximal  UE Dressing Assistance: Minimal  LE Dressing Assistance: Total  Toileting Assistance with  Device: Total    Activity Tolerance:  Endurance: Decreased tolerance for upright activites    Bed Mobility/Transfers:   Bed Mobility  Bed Mobility: Yes  Bed Mobility 1  Bed Mobility Comments 1: Supine to sit EOB with MAX A via log rolling, assist to manage BLE OOB and elevate trunk. Cues for technique.  Bed Mobility 2  Bed Mobility Comments 2: Sit EOB to supine with MAX A, assist managing BLE in bed, lowering trunk, and rolling to her back  Transfers  Transfer: Yes (x2 sit to/frrom stand using FWW with MAX Ax2. Cues for technique and hand placement. Limited by pain.)    Ambulation/Gait Training:  Functional Mobility  Functional Mobility Performed: Yes (Pt took side steps towards HOB using FWW with MOD A.)    Sitting Balance:  Static Sitting Balance  Static Sitting-Comment/Number of Minutes: fair  Dynamic Sitting Balance  Dynamic Sitting-Comments: fair    Standing Balance:  Static Standing Balance  Static Standing-Comment/Number of Minutes: poor +  Dynamic Standing Balance  Dynamic Standing-Comments: poor +    Strength:  Strength Comments: BUE strength grossly 4-/5    Hand Function:  Hand Function  Gross Grasp: Functional    Extremities: RUE   RUE : Within Functional Limits (AROM) and LUE   LUE: Within Functional Limits (AROM)    Outcome Measures: [facility] Daily Activity  Putting on and taking off regular lower body clothing: Total  Bathing (including washing, rinsing, drying): A lot  Putting on and taking off regular upper body clothing: A little  Toileting, which includes using toilet, bedpan or urinal: Total  Taking care of personal grooming such as brushing teeth: A little  Eating Meals: A little  Daily Activity - Total Score: 13                       EDUCATION:  Education  Individual(s) Educated: Patient  Education Provided: Fall precautons, Risk and benefits of OT discussed with patient or other, POC discussed and agreed upon  Education Documentation  Body Mechanics, taught by Magaly Youssef OT at 1/6/2025  1:27  PM.  Learner: Patient  Readiness: Acceptance  Method: Explanation, Demonstration  Response: Verbalizes Understanding, Demonstrated Understanding, Needs Reinforcement      Goals:   Encounter Problems       Encounter Problems (Active)       OT Goals       Pt will complete LB dressing with MOD A. (Progressing)       Start:  01/06/25    Expected End:  01/20/25            Pt will complete all functional transfers with MOD A. (Progressing)       Start:  01/06/25    Expected End:  01/20/25            Pt will tolerate 10 minutes of functional activities with 2 or fewer rest breaks. (Progressing)       Start:  01/06/25    Expected End:  01/20/25            Pt will improve dynamic standing balance to fair during functional tasks. (Progressing)       Start:  01/06/25    Expected End:  01/20/25

## 2025-01-06 NOTE — PROGRESS NOTES
Physical Therapy    Physical Therapy Evaluation    Patient Name: Harper Lara  MRN: [id number]  Today's Date: 1/6/2025   Time Calculation  Start Time: 0925  Stop Time: 0943  Time Calculation (min): 18 min  909/909-A    Assessment/Plan   PT Assessment  PT Assessment Results: Decreased strength, Decreased endurance, Impaired balance, Decreased mobility, Pain  Rehab Prognosis: Good  Barriers to Discharge Home: Caregiver assistance, Physical needs  Caregiver Assistance: Caregiver assistance needed per identified barriers - however, level of patient's required assistance exceeds assistance available at home (Spouse currently in hospital)  Physical Needs: Stair navigation into home limited by function/safety, Ambulating household distances limited by function/safety, High falls risk due to function or environment, 24hr ADL assistance needed  Evaluation/Treatment Tolerance: Patient limited by pain, Patient limited by fatigue  Medical Staff Made Aware: Yes  Strengths: Living arrangement secure  Barriers to Participation: Comorbidities, Support of Caregivers  End of Session Communication: Bedside nurse  Assessment Comment: Pt. presents with increased R hip and LBP and requires A for bed obility, transfers and amb, Recommend continued therapy at a moderate intensity level following D/C.  End of Session Patient Position: Bed, 3 rail up, Alarm on (Ang light within reach)  IP OR SWING BED PT PLAN  Inpatient or Swing Bed: Inpatient  PT Plan  Treatment/Interventions: Bed mobility, Transfer training, Gait training, Balance training, Strengthening, Endurance training, Therapeutic exercise  PT Plan: Ongoing PT  PT Frequency: 3 times per week  PT Discharge Recommendations: Moderate intensity level of continued care  PT Recommended Transfer Status: Assist x2, Assistive device  Physical Therapy eval completed per MD requisition. P.T. recommendations as outlined above. Recommend D/C from acute care when medically appropriate as deemed by  medical staff.    Subjective       General Visit Information:  General  Reason for Referral: impaired mobility  Referred By: Dr. Andres (PT/OT 1/4)  Past Medical History Relevant to Rehab: includes: recent fall with L1 compression fracture, HTN, falls, A-fib, DM  Family/Caregiver Present: No  Co-Treatment: OT  Co-Treatment Reason: Pt. seen with OT to maximize safety and function  Prior to Session Communication: Bedside nurse  Patient Position Received: Bed, 3 rail up, Alarm on  Preferred Learning Style: auditory, verbal  General Comment: Pt. is a 78yo who presented to [facility] [place] on 1/4/2025 following a fall. Pt.'s spouse (who is currently in ICU) had a fall and Pt. fell attempting to assist her spouse.    Imaging 1/4/2025:   L hip X-ray (-) fracture    L shoulder X-ray (-) fracture   Head CT (-) acute findings    C-spine CT (-) acute findings    L-spine CT (-) new lumbar spine fracture, (+) fracture involving L2 appears similar to 12/27/2024. As before  there is mild anterior wedging, with the fracture line still  visualized just above the inferior endplate, extending into the  posterior elements bilaterally; Slight increased widening along the previous fracture line  extending through the inferior portion of the L4 vertebral body.  Otherwise nondisplaced fractures extending into the posterior  elements along both fused L4-5 facets appear unchanged .  Pt. was seen by ortho during previous hospitalizaton and no bracing was indicated by ortho at that time.   Dx: Fall    Home Living:  Home Living  Home Living Comments: Pt. lives with spouse (who is also curently hospitalized for a fall) in a 1 level house with 1 CIRA.  Bed/bath on 1st floor with walkin shower with a seat and grab bars.    Prior Level of Function:  Prior Function Per Pt/Caregiver Report  Prior Function Comments: Pt. amb with FWW with a seat but no brakes PTA. Pt. required A from neighbor for dressing and bathing since previous fall. Neighbor completed IADLs  including taking Pt.'s laundry to her house PTA. Pt. reported > 5 falls in last 3 months. Pt. does not drive.    Precautions:  Precautions  Medical Precautions: Fall precautions, Spinal precautions (Activity order: No restrictions)  Precautions Comment: Per EMR: High fall risk         Objective     Pain:  Pain Assessment  Pain Assessment: 0-10  0-10 (Numeric) Pain Score: 8  Pain Type: Acute pain  Pain Location:  (R hip and R low back)  Pain Interventions: Repositioned (RN notified)    Cognition:  Cognition  Overall Cognitive Status: Within Functional Limits    General Assessments:  General Observation  General Observation: Boby, neto,   Activity Tolerance  Endurance: Decreased tolerance for upright activites                 Dynamic Sitting Balance  Dynamic Sitting-Comments: Fair+ static and dynamic sitting balance  Dynamic Standing Balance  Dynamic Standing-Comments: Poor static and dynamic standing balance    Functional Assessments:     Bed Mobility  Bed Mobility: Yes  Bed Mobility 1  Bed Mobility 1: Supine to sitting  Level of Assistance 1: Maximum assistance (via logrolling)  Bed Mobility Comments 1: A to bend up R LE, roll onto L side,  maneuver LEs over EOB and to elevate trunk from bed  Bed Mobility 2  Bed Mobility  2: Sitting to supine  Level of Assistance 2: Maximum assistance (via logroll)  Bed Mobility Comments 2: A to lower trunk onto L side, lift LEs onto bed and roll to her back  Transfers  Transfer: Yes  Transfer 1  Technique 1: Sit to stand  Transfer Device 1:  (FWW)  Transfer Level of Assistance 1: Maximum assistance, +2  Trials/Comments 1: A for lifting, transferring weight over feet, steadying. VC's for hand placement. Pt.'s ability to A limited by pain.  Transfers 2  Technique 2: Stand to sit  Transfer Device 2:  (FWW)  Transfer Level of Assistance 2: Moderate assistance  Trials/Comments 2: A to control descent. VC's for hand placement.  Ambulation/Gait Training  Ambulation/Gait Training  Performed: Yes  Ambulation/Gait Training 1  Surface 1: Level tile  Device 1: Rolling walker  Assistance 1: Moderate assistance  Quality of Gait 1: Narrow base of support (slow, small sidesteps to HOB. Distance limited by c/o pain)  Comments/Distance (ft) 1: 5' sidestepping; A for balance, safety, maneuvering FWW  Stairs  Stairs: No       Extremity/Trunk Assessments:  RUE   RUE : Within Functional Limits  LUE   LUE: Within Functional Limits  RLE   RLE :  (AROM WFL, strength not formally assessed due to pain but functionally at least 3+/5)  LLE   LLE : Within Functional Limits    Outcome Measures:     [facility] Basic Mobility  Turning from your back to your side while in a flat bed without using bedrails: A lot  Moving from lying on your back to sitting on the side of a flat bed without using bedrails: A lot  Moving to and from bed to chair (including a wheelchair): Total  Standing up from a chair using your arms (e.g. wheelchair or bedside chair): A lot  To walk in hospital room: Total  Climbing 3-5 steps with railing: Total  Basic Mobility - Total Score: 9                                                             Goals:  Encounter Problems       Encounter Problems (Active)       PT Problem       Pt. will transfer supine/sit with MOD A x 1 (Progressing)       Start:  01/06/25    Expected End:  01/20/25            Pt. will transfer sit/stand with FWW with MOD A x 1 (Progressing)       Start:  01/06/25    Expected End:  01/20/25            Pt.will ambulate 25' with FWW with MOD A x 1 (Progressing)       Start:  01/06/25    Expected End:  01/20/25            Pt. will perform 2 x 15 B LE AROM exercises  (Not Progressing)       Start:  01/06/25    Expected End:  01/20/25                 Education Documentation  Precautions, taught by Gelacio Vásquez, PT at 1/6/2025 11:33 AM.  Learner: Patient  Readiness: Acceptance  Method: Explanation  Response: Verbalizes Understanding, Needs Reinforcement  Comment: Role of PT, transfers,  amb, safety, PT POC, spinal precautions    Mobility Training, taught by Gelacio Vásquez PT at 1/6/2025 11:33 AM.  Learner: Patient  Readiness: Acceptance  Method: Explanation  Response: Verbalizes Understanding, Needs Reinforcement  Comment: Role of PT, transfers, amb, safety, PT POC, spinal precautions

## 2025-01-06 NOTE — PROGRESS NOTES
1/6/25 Patient now full admit, and family is awaiting Pt./Ot assessment. Patient desires inpt. Rehab/Snf options. She shared spouse is now down in ICU under care as well. Daughter is at bedside and is open to assisting patient in discharge choices and plan. Awaiting upd PT>OT and medical upd. Currently now trx UTI.

## 2025-01-06 NOTE — CONSULTS
Removed dressing from midline abdomen. Open wound noted with moderate amount of straw color drainage on old dressing. Wound bed is pink, no foul smell, no eschar tissue, surrounding tissue intact. Wound measures at 4.0cm x 3.0cm x 0.3cm. Applied absorbent dressing and secured with Mepilex dressing.  Wound Care Consult     Visit Date: 1/6/2025      Patient Name: Harper Lara         MRN: [id number]           YOB: 1945     Reason for Consult: Wound Care Abdomen        Wound History: Chronic     Pertinent Labs:   Albumin   Date Value Ref Range Status   01/04/2025 3.9 3.4 - 5.0 g/dL Final       Wound Assessment:  Wound 12/17/23 Incision Umbilicus Medial;Anterior;Lower (Active)   Wound Image   01/03/25 1107   Site Assessment Dry 01/06/25 0835   Brenda-Wound Assessment Unable to assess 01/06/25 0835   Non-staged Wound Description Partial thickness 01/03/25 1118   Wound Length (cm) 4.8 cm 01/03/25 1118   Wound Width (cm) 4.8 cm 01/03/25 1118   Wound Surface Area (cm^2) 23.04 cm^2 01/03/25 1118   Wound Depth (cm) 0.1 cm 01/03/25 1118   Wound Volume (cm^3) 2.304 cm^3 01/03/25 1118   Wound Healing % 34 01/03/25 1118   State of Healing Early/partial granulation 01/03/25 1118   Wound Bed Granulation (%) 50 % 01/03/25 1118   Wound Bed Epithelium (%) 50 % 01/03/25 1118   Margins Epibole (Rolled edges) 01/03/25 1118   Closure None 01/03/25 1118   Treatments Cleansed 01/03/25 1118   Drainage Description Tan 01/05/25 0838   Drainage Amount Scant 01/06/25 0835   Dressing Foam 01/06/25 0835   Dressing Changed New 01/03/25 1118   Dressing Status Dry;Clean;Occlusive 01/06/25 0835       Wound 12/27/24 Skin Tear Buttock Left (Active)       Wound Team Summary Assessment:       Wound Team Plan: Continue with current daily dressing changes.     Denis López LPN  1/6/2025  2:15 PM

## 2025-01-07 ENCOUNTER — APPOINTMENT (OUTPATIENT)
Dept: RADIOLOGY | Facility: HOSPITAL | Age: 80
End: 2025-01-07
Payer: MEDICARE

## 2025-01-07 LAB
ANION GAP SERPL CALC-SCNC: 12 MMOL/L (ref 10–20)
BACTERIA UR CULT: ABNORMAL
BUN SERPL-MCNC: 37 MG/DL (ref 6–23)
CALCIUM SERPL-MCNC: 9.2 MG/DL (ref 8.6–10.3)
CHLORIDE SERPL-SCNC: 101 MMOL/L (ref 98–107)
CO2 SERPL-SCNC: 30 MMOL/L (ref 21–32)
CREAT SERPL-MCNC: 1.14 MG/DL (ref 0.5–1.05)
EGFRCR SERPLBLD CKD-EPI 2021: 50 ML/MIN/1.73M*2
ERYTHROCYTE [DISTWIDTH] IN BLOOD BY AUTOMATED COUNT: 13.3 % (ref 11.5–14.5)
GLUCOSE SERPL-MCNC: 134 MG/DL (ref 74–99)
HCT VFR BLD AUTO: 32.3 % (ref 36–46)
HGB BLD-MCNC: 10.6 G/DL (ref 12–16)
HOLD SPECIMEN: NORMAL
MAGNESIUM SERPL-MCNC: 1.94 MG/DL (ref 1.6–2.4)
MCH RBC QN AUTO: 29.7 PG (ref 26–34)
MCHC RBC AUTO-ENTMCNC: 32.8 G/DL (ref 32–36)
MCV RBC AUTO: 91 FL (ref 80–100)
NRBC BLD-RTO: 0 /100 WBCS (ref 0–0)
PLATELET # BLD AUTO: 187 X10*3/UL (ref 150–450)
POTASSIUM SERPL-SCNC: 4 MMOL/L (ref 3.5–5.3)
RBC # BLD AUTO: 3.57 X10*6/UL (ref 4–5.2)
SODIUM SERPL-SCNC: 139 MMOL/L (ref 136–145)
WBC # BLD AUTO: 3.9 X10*3/UL (ref 4.4–11.3)

## 2025-01-07 PROCEDURE — 2500000004 HC RX 250 GENERAL PHARMACY W/ HCPCS (ALT 636 FOR OP/ED): Performed by: STUDENT IN AN ORGANIZED HEALTH CARE EDUCATION/TRAINING PROGRAM

## 2025-01-07 PROCEDURE — C1751 CATH, INF, PER/CENT/MIDLINE: HCPCS

## 2025-01-07 PROCEDURE — 2500000002 HC RX 250 W HCPCS SELF ADMINISTERED DRUGS (ALT 637 FOR MEDICARE OP, ALT 636 FOR OP/ED): Performed by: PHARMACIST

## 2025-01-07 PROCEDURE — 83735 ASSAY OF MAGNESIUM: CPT

## 2025-01-07 PROCEDURE — 2500000004 HC RX 250 GENERAL PHARMACY W/ HCPCS (ALT 636 FOR OP/ED)

## 2025-01-07 PROCEDURE — 36415 COLL VENOUS BLD VENIPUNCTURE: CPT

## 2025-01-07 PROCEDURE — 85027 COMPLETE CBC AUTOMATED: CPT

## 2025-01-07 PROCEDURE — 84520 ASSAY OF UREA NITROGEN: CPT

## 2025-01-07 PROCEDURE — 99232 SBSQ HOSP IP/OBS MODERATE 35: CPT

## 2025-01-07 PROCEDURE — 76937 US GUIDE VASCULAR ACCESS: CPT

## 2025-01-07 PROCEDURE — 97530 THERAPEUTIC ACTIVITIES: CPT | Mod: GP,CQ

## 2025-01-07 PROCEDURE — 2500000002 HC RX 250 W HCPCS SELF ADMINISTERED DRUGS (ALT 637 FOR MEDICARE OP, ALT 636 FOR OP/ED): Performed by: STUDENT IN AN ORGANIZED HEALTH CARE EDUCATION/TRAINING PROGRAM

## 2025-01-07 PROCEDURE — 2500000001 HC RX 250 WO HCPCS SELF ADMINISTERED DRUGS (ALT 637 FOR MEDICARE OP)

## 2025-01-07 PROCEDURE — 2500000001 HC RX 250 WO HCPCS SELF ADMINISTERED DRUGS (ALT 637 FOR MEDICARE OP): Performed by: STUDENT IN AN ORGANIZED HEALTH CARE EDUCATION/TRAINING PROGRAM

## 2025-01-07 PROCEDURE — 2720000007 HC OR 272 NO HCPCS

## 2025-01-07 PROCEDURE — 1210000001 HC SEMI-PRIVATE ROOM DAILY

## 2025-01-07 RX ADMIN — FUROSEMIDE 40 MG: 40 TABLET ORAL at 08:53

## 2025-01-07 RX ADMIN — APIXABAN 5 MG: 5 TABLET, FILM COATED ORAL at 08:55

## 2025-01-07 RX ADMIN — FLUOXETINE HYDROCHLORIDE 20 MG: 20 CAPSULE ORAL at 08:53

## 2025-01-07 RX ADMIN — CEFTRIAXONE SODIUM 1 G: 1 INJECTION, SOLUTION INTRAVENOUS at 12:09

## 2025-01-07 RX ADMIN — METFORMIN HYDROCHLORIDE 500 MG: 500 TABLET, FILM COATED ORAL at 17:39

## 2025-01-07 RX ADMIN — AMLODIPINE BESYLATE 2.5 MG: 5 TABLET ORAL at 08:54

## 2025-01-07 RX ADMIN — FUROSEMIDE 20 MG: 40 TABLET ORAL at 17:39

## 2025-01-07 RX ADMIN — PANTOPRAZOLE SODIUM 40 MG: 40 TABLET, DELAYED RELEASE ORAL at 06:08

## 2025-01-07 RX ADMIN — CHOLECALCIFEROL TAB 10 MCG (400 UNIT) 10 MCG: 10 TAB at 08:55

## 2025-01-07 RX ADMIN — LEVOTHYROXINE SODIUM 75 MCG: 75 TABLET ORAL at 06:08

## 2025-01-07 RX ADMIN — APIXABAN 5 MG: 5 TABLET, FILM COATED ORAL at 22:08

## 2025-01-07 RX ADMIN — METFORMIN HYDROCHLORIDE 500 MG: 500 TABLET, FILM COATED ORAL at 08:54

## 2025-01-07 RX ADMIN — MAGNESIUM OXIDE 400 MG (241.3 MG MAGNESIUM) TABLET 400 MG: TABLET at 08:54

## 2025-01-07 RX ADMIN — Medication 1000 MCG: at 08:53

## 2025-01-07 RX ADMIN — ARIPIPRAZOLE 5 MG: 5 TABLET ORAL at 08:54

## 2025-01-07 ASSESSMENT — COGNITIVE AND FUNCTIONAL STATUS - GENERAL
DRESSING REGULAR UPPER BODY CLOTHING: A LITTLE
MOVING TO AND FROM BED TO CHAIR: A LITTLE
CLIMB 3 TO 5 STEPS WITH RAILING: TOTAL
DRESSING REGULAR LOWER BODY CLOTHING: A LITTLE
MOVING TO AND FROM BED TO CHAIR: TOTAL
DRESSING REGULAR LOWER BODY CLOTHING: A LITTLE
TURNING FROM BACK TO SIDE WHILE IN FLAT BAD: A LITTLE
PERSONAL GROOMING: A LITTLE
MOBILITY SCORE: 10
TURNING FROM BACK TO SIDE WHILE IN FLAT BAD: A LITTLE
MOBILITY SCORE: 15
HELP NEEDED FOR BATHING: A LITTLE
MOVING TO AND FROM BED TO CHAIR: A LITTLE
DAILY ACTIVITIY SCORE: 19
WALKING IN HOSPITAL ROOM: A LOT
WALKING IN HOSPITAL ROOM: A LOT
TURNING FROM BACK TO SIDE WHILE IN FLAT BAD: A LOT
TOILETING: A LITTLE
MOBILITY SCORE: 15
MOVING FROM LYING ON BACK TO SITTING ON SIDE OF FLAT BED WITH BEDRAILS: A LITTLE
TOILETING: A LITTLE
PERSONAL GROOMING: A LITTLE
CLIMB 3 TO 5 STEPS WITH RAILING: TOTAL
MOVING FROM LYING ON BACK TO SITTING ON SIDE OF FLAT BED WITH BEDRAILS: A LITTLE
STANDING UP FROM CHAIR USING ARMS: A LOT
DRESSING REGULAR UPPER BODY CLOTHING: A LITTLE
MOVING FROM LYING ON BACK TO SITTING ON SIDE OF FLAT BED WITH BEDRAILS: A LOT
WALKING IN HOSPITAL ROOM: A LOT
CLIMB 3 TO 5 STEPS WITH RAILING: TOTAL
STANDING UP FROM CHAIR USING ARMS: A LITTLE
DAILY ACTIVITIY SCORE: 19
HELP NEEDED FOR BATHING: A LITTLE
STANDING UP FROM CHAIR USING ARMS: A LITTLE

## 2025-01-07 ASSESSMENT — PAIN - FUNCTIONAL ASSESSMENT
PAIN_FUNCTIONAL_ASSESSMENT: 0-10

## 2025-01-07 ASSESSMENT — PAIN SCALES - GENERAL
PAINLEVEL_OUTOF10: 0 - NO PAIN
PAINLEVEL_OUTOF10: 0 - NO PAIN

## 2025-01-07 ASSESSMENT — PAIN DESCRIPTION - DESCRIPTORS: DESCRIPTORS: ACHING

## 2025-01-07 NOTE — PROGRESS NOTES
,Physical Therapy    Physical Therapy Treatment    Patient Name: Harper Lara  MRN: [id number]  Today's Date: 1/7/2025  Time Calculation  Start Time: 1432  Stop Time: 1456  Time Calculation (min): 24 min     903/903-A    Assessment/Plan   PT Assessment  PT Assessment Results: Decreased mobility, Decreased strength, Pain, Orthopedic restrictions  Rehab Prognosis: Good  Barriers to Discharge Home: Caregiver assistance, Physical needs  Caregiver Assistance: Caregiver assistance needed per identified barriers - however, level of patient's required assistance exceeds assistance available at home (Spouse currently in hospital)  Physical Needs: Stair navigation into home limited by function/safety, Ambulating household distances limited by function/safety, High falls risk due to function or environment, 24hr ADL assistance needed  Evaluation/Treatment Tolerance: Patient limited by pain  Medical Staff Made Aware: Yes  Strengths: Living arrangement secure  Barriers to Participation: Comorbidities, Support of Caregivers  End of Session Communication: Bedside nurse  Assessment Comment: pt is motivated to participate, demonstrates good follow through ,would benefit from continued therapy to improve functional mobility and safety  End of Session Patient Position: Bed, 3 rail up, Alarm on     Treatment/Interventions: Bed mobility, Transfer training, Gait training, Balance training, Strengthening, Endurance training, Therapeutic exercise  PT Plan: Ongoing PT  PT Frequency: 3 times per week  PT Discharge Recommendations: Moderate intensity level of continued care  Equipment Recommended upon Discharge:  (TBD) PT Recommended Transfer Status: Assist x2, Assistive device    General Visit Information:   PT  Visit  PT Received On: 01/07/25  General  Reason for Referral: impaired mobility  Family/Caregiver Present: No  Prior to Session Communication: Bedside nurse (cleared to participate)  Patient Position Received: Alarm on, Bed, 2 rail  "up  General Comment: pt is pleasant and agreeable to particpate, reports increased pain with bed mobility , apoligizing for moaning , stating  \" I will  try my best \"    General Observations:   General Observation: neto Landis,    Subjective     Precautions:  Precautions  Medical Precautions: Spinal precautions, Fall precautions  Precautions Comment: reviewed spinal precautions and instructed in log rolling technique    Vital Signs:     Objective     Pain:  Pain Assessment  Pain Assessment: 0-10  0-10 (Numeric) Pain Score:  (pt yells  in discomfort during supine<--> , no numerical value given  , states it feels good to sit up once  sitting EOB)  Pain Type: Acute pain  Pain Location: Back  Pain Orientation: Lower  Pain Descriptors: Aching  Pain Interventions: Repositioned    Cognition:  Cognition  Overall Cognitive Status: Within Functional Limits              Activity Tolerance:  Activity Tolerance  Endurance: Decreased tolerance for upright activites    Treatments:           Bed Mobility  Bed Mobility: Yes  Bed Mobility 1  Bed Mobility Comments 1: transfers supine <--> sit with Mod A  and vc  to maintian log rolling technique (pt need Mod A to roll to R and  L secondary to pain)  Ambulation/Gait Training  Ambulation/Gait Training Performed: Yes  Ambulation/Gait Training 1  Surface 1: Level tile  Device 1: Rolling walker  Comments/Distance (ft) 1: pt sidestepping 3 ft at EOB with WW and Mod A  Transfers  Transfer: Yes  Transfer 1  Technique 1: Sit to stand, Stand to sit  Trials/Comments 1: transfers sit <--> stand with WW and   Mod A with  vc for technique,  hand placement and safety  Stairs  Stairs: No          Outcome Measures:     [facility] Basic Mobility  Turning from your back to your side while in a flat bed without using bedrails: A lot  Moving from lying on your back to sitting on the side of a flat bed without using bedrails: A lot  Moving to and from bed to chair (including a wheelchair): Total  Standing up " from a chair using your arms (e.g. wheelchair or bedside chair): A lot  To walk in hospital room: A lot  Climbing 3-5 steps with railing: Total  Basic Mobility - Total Score: 10         EDUCATION:  Outpatient Education  Individual(s) Educated: Patient  Education Provided: Fall Risk, Home Exercise Program, Home Safety  Patient Response to Education: Patient/Caregiver Verbalized Understanding of Information    Encounter Problems       Encounter Problems (Active)       PT Problem       Pt. will transfer supine/sit with MOD A x 1 (Progressing)       Start:  01/06/25    Expected End:  01/20/25            Pt. will transfer sit/stand with FWW with MOD A x 1 (Progressing)       Start:  01/06/25    Expected End:  01/20/25            Pt.will ambulate 25' with FWW with MOD A x 1 (Progressing)       Start:  01/06/25    Expected End:  01/20/25            Pt. will perform 2 x 15 B LE AROM exercises  (Progressing)       Start:  01/06/25    Expected End:  01/20/25

## 2025-01-07 NOTE — PROGRESS NOTES
"Daily Progress Note    Harper Lara is a 79 y.o. female on day 1 of admission presenting with Fall (on) (from) unspecified stairs and steps, initial encounter.    Subjective   Patient seen and examined, labs and vitals reviewed. Slight increase in Cr, encourage PO fluids. States she feels well today but continues to have pain along her hips and low back, R>L. Denies CP, SOB, abd pain, n/v/d. Patient's agreeable to SNF on discharge, daughter assisting in choice.        Objective   Physical Exam  Constitutional:       Appearance: She is overweight.   HENT:      Head: Normocephalic.      Mouth/Throat:      Mouth: Mucous membranes are moist.   Eyes:      Pupils: Pupils are equal, round, and reactive to light.   Cardiovascular:      Rate and Rhythm: Normal rate and regular rhythm.      Heart sounds: Normal heart sounds, S1 normal and S2 normal.   Pulmonary:      Effort: Pulmonary effort is normal.      Breath sounds: Normal breath sounds.   Abdominal:      General: Bowel sounds are normal.      Palpations: Abdomen is soft.      Tenderness: There is no abdominal tenderness.      Comments: Abd wound covered with dressing   Musculoskeletal:         General: Normal range of motion.      Cervical back: Neck supple.      Right lower leg: No edema.      Left lower leg: No edema.   Skin:     General: Skin is warm.   Neurological:      Mental Status: She is alert and oriented to person, place, and time.      Motor: Weakness present.   Psychiatric:         Mood and Affect: Mood normal.         Behavior: Behavior normal.         Cognition and Memory: Cognition is impaired.         Last Recorded Vitals  Blood pressure 165/69, pulse 56, temperature 36.3 °C (97.3 °F), temperature source Temporal, resp. rate 16, height 1.6 m (5' 3\"), weight 67.1 kg (148 lb), SpO2 98%.  Intake/Output last 3 Shifts:  I/O last 3 completed shifts:  In: 350 (5.2 mL/kg) [P.O.:300; IV Piggyback:50]  Out: 2200 (32.8 mL/kg) [Urine:2200 (0.9 " mL/kg/hr)]  Weight: 67.1 kg     Medications  Scheduled medications  amLODIPine, 2.5 mg, oral, Daily  apixaban, 5 mg, oral, BID  ARIPiprazole, 5 mg, oral, Daily  cefTRIAXone, 1 g, intravenous, q24h  cholecalciferol, 400 Units, oral, Daily  cyanocobalamin, 1,000 mcg, oral, Daily  FLUoxetine, 20 mg, oral, Daily  furosemide, 20 mg, oral, Daily with evening meal  furosemide, 40 mg, oral, q AM  levothyroxine, 75 mcg, oral, Daily  magnesium oxide, 400 mg, oral, Daily  metFORMIN, 500 mg, oral, BID  pantoprazole, 40 mg, oral, Daily before breakfast      Continuous medications     PRN medications  PRN medications: acetaminophen **OR** acetaminophen    Labs  CBC:   Results from last 7 days   Lab Units 01/07/25 0823 01/06/25 0902 01/04/25  1524   WBC AUTO x10*3/uL 3.9* 3.6* 5.7   RBC AUTO x10*6/uL 3.57* 3.36* 3.50*   HEMOGLOBIN g/dL 10.6* 10.0* 10.6*   HEMATOCRIT % 32.3* 30.6* 31.5*   MCV fL 91 91 90   MCH pg 29.7 29.8 30.3   MCHC g/dL 32.8 32.7 33.7   RDW % 13.3 13.2 13.2   PLATELETS AUTO x10*3/uL 187 169 192     CMP:    Results from last 7 days   Lab Units 01/07/25 0823 01/06/25 0902 01/04/25  1524   SODIUM mmol/L 139 137 139   POTASSIUM mmol/L 4.0 3.9 4.0   CHLORIDE mmol/L 101 103 103   CO2 mmol/L 30 28 27   BUN mg/dL 37* 33* 30*   CREATININE mg/dL 1.14* 1.00 1.02   GLUCOSE mg/dL 134* 123* 111*   PROTEIN TOTAL g/dL  --   --  6.8   CALCIUM mg/dL 9.2 8.9 9.4   BILIRUBIN TOTAL mg/dL  --   --  0.6   ALK PHOS U/L  --   --  119   AST U/L  --   --  24   ALT U/L  --   --  14     BMP:    Results from last 7 days   Lab Units 01/07/25 0823 01/06/25 0902 01/04/25  1524   SODIUM mmol/L 139 137 139   POTASSIUM mmol/L 4.0 3.9 4.0   CHLORIDE mmol/L 101 103 103   CO2 mmol/L 30 28 27   BUN mg/dL 37* 33* 30*   CREATININE mg/dL 1.14* 1.00 1.02   CALCIUM mg/dL 9.2 8.9 9.4   GLUCOSE mg/dL 134* 123* 111*     Magnesium:  Results from last 7 days   Lab Units 01/07/25  0823 01/06/25  0902   MAGNESIUM mg/dL 1.94 1.82     Troponin:      BNP:      Lipid Panel:  Results from last 7 days   Lab Units 01/04/25  1524   INR  1.2*   PROTIME seconds 13.4*        Nutrition             Relevant Results  Results from last 7 days   Lab Units 01/07/25  0823 01/06/25  0902 01/05/25  0621 01/04/25  2255 01/04/25  1524   POCT GLUCOSE mg/dL  --   --  93 96  --    GLUCOSE mg/dL 134* 123*  --   --  111*     Lab Results   Component Value Date    HGBA1C 5.6 06/25/2024        Assessment/Plan    UTI  -UA positive for leukocytes  -Continue Rocephin  -Follow urine culture, grew klebsiella pneumoniae/variicola  -Worsening Cr today, encourage po hydration     Generalized weakness  Dementia  -PT/OT evaluation  -TCC for discharge planning, patient will need placement  -Fall precaution  -Continue home meds    A-fib: continue Eliquis    T2DM  -Continue Metformin  -Regular diet  -Glucose stable, no need for SSI    HTN/CAD/HLD: continue home meds  CHF: Continue home meds, not in acute exacerbation  Hypothyroidism: continue home levothyroxine   GERD: protonix daily  Anxiety/depression: Continue home meds    DVTp: eliquis  PLAN: SNF    Rosi Kemp PA-C    Plan of care was discussed extensively with patient.  Patient verbalized understanding through teach back method.  All question and concerns addressed upon examination.    Of note, this documentation is completed using the Dragon Dictation system (voice recognition software). There may be spelling and/or grammatical errors that were not corrected prior to final submission.

## 2025-01-07 NOTE — CARE PLAN
The patient's goals for the shift include for my  to behave himself and cooperate    The clinical goals for the shift include no falls/work with PT/OT

## 2025-01-08 ENCOUNTER — APPOINTMENT (OUTPATIENT)
Dept: WOUND CARE | Facility: CLINIC | Age: 80
End: 2025-01-08
Payer: MEDICARE

## 2025-01-08 LAB
ANION GAP SERPL CALC-SCNC: 11 MMOL/L (ref 10–20)
BUN SERPL-MCNC: 42 MG/DL (ref 6–23)
CALCIUM SERPL-MCNC: 9 MG/DL (ref 8.6–10.3)
CHLORIDE SERPL-SCNC: 102 MMOL/L (ref 98–107)
CO2 SERPL-SCNC: 28 MMOL/L (ref 21–32)
CREAT SERPL-MCNC: 0.98 MG/DL (ref 0.5–1.05)
EGFRCR SERPLBLD CKD-EPI 2021: 60 ML/MIN/1.73M*2
ERYTHROCYTE [DISTWIDTH] IN BLOOD BY AUTOMATED COUNT: 13.6 % (ref 11.5–14.5)
GLUCOSE SERPL-MCNC: 110 MG/DL (ref 74–99)
HCT VFR BLD AUTO: 29.6 % (ref 36–46)
HGB BLD-MCNC: 9.9 G/DL (ref 12–16)
HOLD SPECIMEN: NORMAL
MAGNESIUM SERPL-MCNC: 1.87 MG/DL (ref 1.6–2.4)
MCH RBC QN AUTO: 30.1 PG (ref 26–34)
MCHC RBC AUTO-ENTMCNC: 33.4 G/DL (ref 32–36)
MCV RBC AUTO: 90 FL (ref 80–100)
NRBC BLD-RTO: 0 /100 WBCS (ref 0–0)
PLATELET # BLD AUTO: 184 X10*3/UL (ref 150–450)
POTASSIUM SERPL-SCNC: 4.1 MMOL/L (ref 3.5–5.3)
RBC # BLD AUTO: 3.29 X10*6/UL (ref 4–5.2)
SODIUM SERPL-SCNC: 137 MMOL/L (ref 136–145)
WBC # BLD AUTO: 3.4 X10*3/UL (ref 4.4–11.3)

## 2025-01-08 PROCEDURE — 97530 THERAPEUTIC ACTIVITIES: CPT | Mod: GO

## 2025-01-08 PROCEDURE — 2500000001 HC RX 250 WO HCPCS SELF ADMINISTERED DRUGS (ALT 637 FOR MEDICARE OP)

## 2025-01-08 PROCEDURE — 2500000004 HC RX 250 GENERAL PHARMACY W/ HCPCS (ALT 636 FOR OP/ED): Performed by: STUDENT IN AN ORGANIZED HEALTH CARE EDUCATION/TRAINING PROGRAM

## 2025-01-08 PROCEDURE — 99232 SBSQ HOSP IP/OBS MODERATE 35: CPT | Performed by: STUDENT IN AN ORGANIZED HEALTH CARE EDUCATION/TRAINING PROGRAM

## 2025-01-08 PROCEDURE — 2500000004 HC RX 250 GENERAL PHARMACY W/ HCPCS (ALT 636 FOR OP/ED)

## 2025-01-08 PROCEDURE — 85027 COMPLETE CBC AUTOMATED: CPT

## 2025-01-08 PROCEDURE — 2500000001 HC RX 250 WO HCPCS SELF ADMINISTERED DRUGS (ALT 637 FOR MEDICARE OP): Performed by: STUDENT IN AN ORGANIZED HEALTH CARE EDUCATION/TRAINING PROGRAM

## 2025-01-08 PROCEDURE — 83735 ASSAY OF MAGNESIUM: CPT

## 2025-01-08 PROCEDURE — 0HDNXZZ EXTRACTION OF LEFT FOOT SKIN, EXTERNAL APPROACH: ICD-10-PCS | Performed by: PODIATRIST

## 2025-01-08 PROCEDURE — 2500000002 HC RX 250 W HCPCS SELF ADMINISTERED DRUGS (ALT 637 FOR MEDICARE OP, ALT 636 FOR OP/ED): Performed by: STUDENT IN AN ORGANIZED HEALTH CARE EDUCATION/TRAINING PROGRAM

## 2025-01-08 PROCEDURE — 36415 COLL VENOUS BLD VENIPUNCTURE: CPT

## 2025-01-08 PROCEDURE — 2500000002 HC RX 250 W HCPCS SELF ADMINISTERED DRUGS (ALT 637 FOR MEDICARE OP, ALT 636 FOR OP/ED): Performed by: PHARMACIST

## 2025-01-08 PROCEDURE — 1210000001 HC SEMI-PRIVATE ROOM DAILY

## 2025-01-08 PROCEDURE — 0HDMXZZ EXTRACTION OF RIGHT FOOT SKIN, EXTERNAL APPROACH: ICD-10-PCS | Performed by: PODIATRIST

## 2025-01-08 PROCEDURE — 80048 BASIC METABOLIC PNL TOTAL CA: CPT

## 2025-01-08 RX ADMIN — ACETAMINOPHEN 650 MG: 325 TABLET ORAL at 18:29

## 2025-01-08 RX ADMIN — APIXABAN 5 MG: 5 TABLET, FILM COATED ORAL at 21:07

## 2025-01-08 RX ADMIN — METFORMIN HYDROCHLORIDE 500 MG: 500 TABLET, FILM COATED ORAL at 08:41

## 2025-01-08 RX ADMIN — FUROSEMIDE 40 MG: 40 TABLET ORAL at 08:41

## 2025-01-08 RX ADMIN — APIXABAN 5 MG: 5 TABLET, FILM COATED ORAL at 08:41

## 2025-01-08 RX ADMIN — FUROSEMIDE 20 MG: 40 TABLET ORAL at 17:17

## 2025-01-08 RX ADMIN — CEFTRIAXONE SODIUM 1 G: 1 INJECTION, SOLUTION INTRAVENOUS at 11:54

## 2025-01-08 RX ADMIN — MAGNESIUM OXIDE 400 MG (241.3 MG MAGNESIUM) TABLET 400 MG: TABLET at 08:41

## 2025-01-08 RX ADMIN — ARIPIPRAZOLE 5 MG: 5 TABLET ORAL at 08:40

## 2025-01-08 RX ADMIN — LEVOTHYROXINE SODIUM 75 MCG: 75 TABLET ORAL at 05:49

## 2025-01-08 RX ADMIN — AMLODIPINE BESYLATE 2.5 MG: 5 TABLET ORAL at 08:40

## 2025-01-08 RX ADMIN — CHOLECALCIFEROL TAB 10 MCG (400 UNIT) 10 MCG: 10 TAB at 08:41

## 2025-01-08 RX ADMIN — PANTOPRAZOLE SODIUM 40 MG: 40 TABLET, DELAYED RELEASE ORAL at 06:18

## 2025-01-08 RX ADMIN — Medication 1000 MCG: at 08:41

## 2025-01-08 RX ADMIN — METFORMIN HYDROCHLORIDE 500 MG: 500 TABLET, FILM COATED ORAL at 17:17

## 2025-01-08 RX ADMIN — FLUOXETINE HYDROCHLORIDE 20 MG: 20 CAPSULE ORAL at 08:40

## 2025-01-08 ASSESSMENT — COGNITIVE AND FUNCTIONAL STATUS - GENERAL
MOVING FROM LYING ON BACK TO SITTING ON SIDE OF FLAT BED WITH BEDRAILS: A LITTLE
MOVING TO AND FROM BED TO CHAIR: A LITTLE
TOILETING: A LOT
WALKING IN HOSPITAL ROOM: A LOT
STANDING UP FROM CHAIR USING ARMS: A LITTLE
HELP NEEDED FOR BATHING: A LOT
DRESSING REGULAR LOWER BODY CLOTHING: A LITTLE
HELP NEEDED FOR BATHING: A LOT
TOILETING: A LITTLE
STANDING UP FROM CHAIR USING ARMS: A LITTLE
PERSONAL GROOMING: A LITTLE
TOILETING: A LITTLE
TURNING FROM BACK TO SIDE WHILE IN FLAT BAD: A LITTLE
DRESSING REGULAR LOWER BODY CLOTHING: A LITTLE
HELP NEEDED FOR BATHING: A LOT
MOBILITY SCORE: 16
DAILY ACTIVITIY SCORE: 18
CLIMB 3 TO 5 STEPS WITH RAILING: A LOT
DRESSING REGULAR UPPER BODY CLOTHING: A LITTLE
CLIMB 3 TO 5 STEPS WITH RAILING: A LOT
DRESSING REGULAR UPPER BODY CLOTHING: A LITTLE
DRESSING REGULAR LOWER BODY CLOTHING: A LOT
EATING MEALS: A LITTLE
PERSONAL GROOMING: A LITTLE
DAILY ACTIVITIY SCORE: 18
MOBILITY SCORE: 16
DAILY ACTIVITIY SCORE: 15
MOVING FROM LYING ON BACK TO SITTING ON SIDE OF FLAT BED WITH BEDRAILS: A LITTLE
TURNING FROM BACK TO SIDE WHILE IN FLAT BAD: A LITTLE
DRESSING REGULAR UPPER BODY CLOTHING: A LITTLE
PERSONAL GROOMING: A LITTLE
MOVING TO AND FROM BED TO CHAIR: A LITTLE
WALKING IN HOSPITAL ROOM: A LOT

## 2025-01-08 ASSESSMENT — PAIN SCALES - GENERAL
PAINLEVEL_OUTOF10: 0 - NO PAIN
PAINLEVEL_OUTOF10: 0 - NO PAIN
PAINLEVEL_OUTOF10: 6

## 2025-01-08 ASSESSMENT — PAIN - FUNCTIONAL ASSESSMENT
PAIN_FUNCTIONAL_ASSESSMENT: 0-10

## 2025-01-08 NOTE — CARE PLAN
The patient's goals for the shift include for my  to behave himself and cooperate    The clinical goals for the shift include pt will remain hemodynamically stable

## 2025-01-08 NOTE — PROGRESS NOTES
Occupational Therapy    OT Treatment    Patient Name: Harper Lara  MRN: [id number]  Department: [facility]  Room: [facility]  Today's Date: 1/8/2025  Time Calculation  Start Time: 1419  Stop Time: 1442  Time Calculation (min): 23 min        Assessment:  OT Assessment: Session focused on increasing IND with functional mobility, functional transfers, and increasing overall endurance/strength. Pt tolerated session well.  Evaluation/Treatment Tolerance: Patient tolerated treatment well  Medical Staff Made Aware: Yes  End of Session Communication: Bedside nurse  End of Session Patient Position: Bed, 3 rail up, Alarm on  Evaluation/Treatment Tolerance: Patient tolerated treatment well  Medical Staff Made Aware: Yes  Plan:  Treatment Interventions: ADL retraining, Functional transfer training, UE strengthening/ROM, Endurance training, Patient/family training  OT Frequency: 2 times per week  OT Discharge Recommendations: Moderate intensity level of continued care  Equipment Recommended upon Discharge:  (TBD)  OT Recommended Transfer Status: Maximum assist, Assist of 1  OT - OK to Discharge: Yes (ok to d/c to next level of care once medically cleared)  Treatment Interventions: ADL retraining, Functional transfer training, UE strengthening/ROM, Endurance training, Patient/family training    Subjective   Previous Visit Info:  OT Last Visit  OT Received On: 01/08/25  General:  General  Reason for Referral: ADL impairment  Referred By: Dr. Andres (PT/OT 1/4)  Past Medical History Relevant to Rehab: includes: recent fall with L1 compression fracture, HTN, falls, A-fib, DM  Prior to Session Communication: Bedside nurse, PCT/NA/CTA  Patient Position Received: Bed, 3 rail up, Alarm on  General Comment: Pt pleasant and agreeable to treatment.  Precautions:  Medical Precautions: Spinal precautions, Fall precautions    Pain:  Pain Assessment  Pain Assessment: 0-10  0-10 (Numeric) Pain Score: 6  Pain Location: Back  Pain Orientation:  Lower    Objective    Cognition:  Cognition  Overall Cognitive Status: Within Functional Limits    Functional Standing Tolerance:  Functional Standing Tolerance Comments: Pt tolerated standing at FWW for ~2 minutes before requiring a seated rest break.  Bed Mobility/Transfers: Bed Mobility  Bed Mobility: Yes (Supine <> sit EOB with MOD A via log roll technique, cues for technique.)    Transfers  Transfer: Yes (Sit to/from stand using FWW with MAX A to elevate, MIN A for balance once in stance. Cues for hand placement and technique.)      Functional Mobility:  Functional Mobility  Functional Mobility Performed: Yes (Pt took side steps towards foot/head of bed using FWW with MIN A. Increased time to complete)  Sitting Balance:  Static Sitting Balance  Static Sitting-Comment/Number of Minutes: fair+  Standing Balance:  Static Standing Balance  Static Standing-Comment/Number of Minutes: fair-  Dynamic Standing Balance  Dynamic Standing-Comments: fair-      Outcome Measures:[facility] Daily Activity  Putting on and taking off regular lower body clothing: A lot  Bathing (including washing, rinsing, drying): A lot  Putting on and taking off regular upper body clothing: A little  Toileting, which includes using toilet, bedpan or urinal: A lot  Taking care of personal grooming such as brushing teeth: A little  Eating Meals: A little  Daily Activity - Total Score: 15        Education Documentation  Body Mechanics, taught by Magaly Youssef OT at 1/8/2025  4:29 PM.  Learner: Patient  Readiness: Acceptance  Method: Explanation, Demonstration  Response: Verbalizes Understanding, Demonstrated Understanding, Needs Reinforcement    Precautions, taught by Magaly Youssef OT at 1/8/2025  4:29 PM.  Learner: Patient  Readiness: Acceptance  Method: Explanation, Demonstration  Response: Verbalizes Understanding, Demonstrated Understanding, Needs Reinforcement    ADL Training, taught by Magaly Youssef OT at 1/8/2025  4:29 PM.  Learner:  Patient  Readiness: Acceptance  Method: Explanation, Demonstration  Response: Verbalizes Understanding, Demonstrated Understanding, Needs Reinforcement      IP EDUCATION:  Education  Individual(s) Educated: Patient  Education Comment: Spinal precautions, techniques during bed mobility and transfers    Goals:  Encounter Problems       Encounter Problems (Active)       OT Goals       Pt will complete LB dressing with MOD A. (Progressing)       Start:  01/06/25    Expected End:  01/20/25            Pt will complete all functional transfers with MOD A. (Progressing)       Start:  01/06/25    Expected End:  01/20/25            Pt will tolerate 10 minutes of functional activities with 2 or fewer rest breaks. (Progressing)       Start:  01/06/25    Expected End:  01/20/25            Pt will improve dynamic standing balance to fair during functional tasks. (Progressing)       Start:  01/06/25    Expected End:  01/20/25

## 2025-01-08 NOTE — PROGRESS NOTES
"Daily Progress Note    Harper Lara is a 79 y.o. female on day 2 of admission presenting with Fall (on) (from) unspecified stairs and steps, initial encounter.    Subjective   Patient states to feel better today but continues to worry about her husbands health.       Objective   Physical Exam  Constitutional:       Appearance: She is overweight.   HENT:      Head: Normocephalic.      Mouth/Throat:      Mouth: Mucous membranes are moist.   Eyes:      Pupils: Pupils are equal, round, and reactive to light.   Cardiovascular:      Rate and Rhythm: Normal rate and regular rhythm.      Heart sounds: Normal heart sounds, S1 normal and S2 normal.   Pulmonary:      Effort: Pulmonary effort is normal.      Breath sounds: Normal breath sounds.   Abdominal:      General: Bowel sounds are normal.      Palpations: Abdomen is soft.      Tenderness: There is no abdominal tenderness.      Comments: Abd wound covered with dressing   Musculoskeletal:         General: Normal range of motion.      Cervical back: Neck supple.      Right lower leg: No edema.      Left lower leg: No edema.   Skin:     General: Skin is warm.   Neurological:      Mental Status: She is alert and oriented to person, place, and time.      Motor: Weakness present.   Psychiatric:         Mood and Affect: Mood normal.         Behavior: Behavior normal.         Cognition and Memory: Cognition is impaired.         Last Recorded Vitals  Blood pressure 140/63, pulse 61, temperature 36.1 °C (97 °F), temperature source Temporal, resp. rate 16, height 1.6 m (5' 3\"), weight 67.1 kg (148 lb), SpO2 96%.  Intake/Output last 3 Shifts:  I/O last 3 completed shifts:  In: 350 (5.2 mL/kg) [P.O.:300; IV Piggyback:50]  Out: 2700 (40.2 mL/kg) [Urine:2700 (1.1 mL/kg/hr)]  Weight: 67.1 kg     Medications  Scheduled medications  amLODIPine, 2.5 mg, oral, Daily  apixaban, 5 mg, oral, BID  ARIPiprazole, 5 mg, oral, Daily  cefTRIAXone, 1 g, intravenous, q24h  cholecalciferol, 400 Units, " oral, Daily  cyanocobalamin, 1,000 mcg, oral, Daily  FLUoxetine, 20 mg, oral, Daily  furosemide, 20 mg, oral, Daily with evening meal  furosemide, 40 mg, oral, q AM  levothyroxine, 75 mcg, oral, Daily  magnesium oxide, 400 mg, oral, Daily  metFORMIN, 500 mg, oral, BID  pantoprazole, 40 mg, oral, Daily before breakfast      Continuous medications     PRN medications  PRN medications: acetaminophen **OR** acetaminophen    Labs  CBC:   Results from last 7 days   Lab Units 01/08/25  0534 01/07/25  0823 01/06/25  0902   WBC AUTO x10*3/uL 3.4* 3.9* 3.6*   RBC AUTO x10*6/uL 3.29* 3.57* 3.36*   HEMOGLOBIN g/dL 9.9* 10.6* 10.0*   HEMATOCRIT % 29.6* 32.3* 30.6*   MCV fL 90 91 91   MCH pg 30.1 29.7 29.8   MCHC g/dL 33.4 32.8 32.7   RDW % 13.6 13.3 13.2   PLATELETS AUTO x10*3/uL 184 187 169     CMP:    Results from last 7 days   Lab Units 01/08/25  0534 01/07/25  0823 01/06/25  0902 01/04/25  1524   SODIUM mmol/L 137 139 137 139   POTASSIUM mmol/L 4.1 4.0 3.9 4.0   CHLORIDE mmol/L 102 101 103 103   CO2 mmol/L 28 30 28 27   BUN mg/dL 42* 37* 33* 30*   CREATININE mg/dL 0.98 1.14* 1.00 1.02   GLUCOSE mg/dL 110* 134* 123* 111*   PROTEIN TOTAL g/dL  --   --   --  6.8   CALCIUM mg/dL 9.0 9.2 8.9 9.4   BILIRUBIN TOTAL mg/dL  --   --   --  0.6   ALK PHOS U/L  --   --   --  119   AST U/L  --   --   --  24   ALT U/L  --   --   --  14     BMP:    Results from last 7 days   Lab Units 01/08/25  0534 01/07/25  0823 01/06/25  0902   SODIUM mmol/L 137 139 137   POTASSIUM mmol/L 4.1 4.0 3.9   CHLORIDE mmol/L 102 101 103   CO2 mmol/L 28 30 28   BUN mg/dL 42* 37* 33*   CREATININE mg/dL 0.98 1.14* 1.00   CALCIUM mg/dL 9.0 9.2 8.9   GLUCOSE mg/dL 110* 134* 123*     Magnesium:  Results from last 7 days   Lab Units 01/08/25  0534 01/07/25  0823 01/06/25  0902   MAGNESIUM mg/dL 1.87 1.94 1.82     Troponin:      BNP:     Lipid Panel:  Results from last 7 days   Lab Units 01/04/25  1524   INR  1.2*   PROTIME seconds 13.4*        Nutrition              Relevant Results  Results from last 7 days   Lab Units 01/08/25  0534 01/07/25  0823 01/06/25  0902 01/05/25  0621 01/04/25  2255 01/04/25  1524   POCT GLUCOSE mg/dL  --   --   --  93 96  --    GLUCOSE mg/dL 110* 134* 123*  --   --  111*     Lab Results   Component Value Date    HGBA1C 5.6 06/25/2024        Assessment/Plan    UTI  -UA positive for leukocytes  -Continue Rocephin  -Follow urine culture, grew klebsiella pneumoniae/variicola sensitive to Rocephin    Generalized weakness  Dementia  -PT/OT evaluation  -TCC for discharge planning, patient will need placement  -Fall precaution  -Continue home meds    A-fib: continue Eliquis    T2DM  -Continue Metformin  -Regular diet  -Glucose stable, no need for SSI    HTN/CAD/HLD: continue home meds  CHF: Continue home meds, not in acute exacerbation  Hypothyroidism: continue home levothyroxine   GERD: protonix daily  Anxiety/depression: Continue home meds    DVTp: eliquis  PLAN: [facility]    Christopher Kline, DO    Moderate level of MDM based on above medical conditions

## 2025-01-08 NOTE — PROGRESS NOTES
01/08/25 1505   Discharge Planning   Home or Post Acute Services Post acute facilities (Rehab/SNF/etc)   Type of Post Acute Facility Services Skilled nursing   Expected Discharge Disposition SNF  ([facility])   Does the patient need discharge transport arranged? Yes   Has discharge transport been arranged? No   Patient Choice   Provider Choice list and CMS website (https://medicare.gov/care-compare#search) for post-acute Quality and Resource Measure Data were provided and reviewed with: Patient   Intensity of Service   Intensity of Service 0-30 min     Pt accepted to [facility]. Pt will meet Medicare required hospitalization for SNF admission on 1/9/25.

## 2025-01-08 NOTE — CONSULTS
PODIATRY INITIAL CONSULT NOTE    SERVICE DATE: 1/8/2025   SERVICE TIME:  11:30    REASON FOR CONSULT: Onychomycosis  REQUESTING PHYSICIAN: Erik Stockton MD   PRIMARY CARE PHYSICIAN: Justin Beauchamp PA-C    Subjective   Ms. Lara is a 79 y.o. female who presents for recent fall.  Patient is significant past medical history of A-fib, hypertension, diabetes type 2 who presented for mechanical fall without head trauma.  Podiatry team for evaluation of toenails. Patient states that they are unable to cut their toenails at this time and would like for them to be cut professionally. Patient states that when they are elongated/thickened the toenails provide them with discomfort. The patient denies constitutional symptoms. Patient denies having any other pedal complaints at this time.    ROS: 10-point review of systems was performed and is otherwise negative except as noted in HPI.  PMH: Reviewed/documented below.  PSH:  Noncontributory except per HPI   FH: Reviewed and noncontributory   SOCIAL:  Denies tobacco. Denies ETOH. Denies illicit drugs.  ALLERGIES: Reviewed/documented below.  MEDS: Reviewed/documented below.  VS: Reviewed/documented below.        Past Medical History:   Diagnosis Date    Atrial fibrillation (Multi)     Diabetes mellitus (Multi)      Past Surgical History:   Procedure Laterality Date    CHOLECYSTECTOMY      HIATAL HERNIA REPAIR      HYSTERECTOMY       Family History   Problem Relation Name Age of Onset    Breast cancer Sister      Liver cancer Sister      Cancer Sister      Other (oral cancer) Maternal Grandmother       Social History     Tobacco Use    Smoking status: Never     Passive exposure: Never    Smokeless tobacco: Never   Vaping Use    Vaping status: Never Used   Substance Use Topics    Alcohol use: Never    Drug use: Never      Medications Prior to Admission   Medication Sig Dispense Refill Last Dose/Taking    amLODIPine (Norvasc) 2.5 mg tablet Take 1 tablet (2.5 mg) by mouth once  daily.   1/4/2025    cholecalciferol (Vitamin D-3) 10 MCG (400 UNIT) tablet Take 1 tablet (10 mcg) by mouth once daily.   1/4/2025    cyanocobalamin (Vitamin B-12) 1,000 mcg tablet Take 1 tablet (1,000 mcg) by mouth once daily.   1/4/2025    docosahexaenoic acid/epa (FISH OIL ORAL) Take 1 capsule by mouth once daily.   1/4/2025    FLUoxetine (PROzac) 20 mg tablet Take 1 tablet (20 mg) by mouth once daily.   1/4/2025    furosemide (Lasix) 20 mg tablet Take 2 tablets (40 mg) by mouth once daily in the morning.   1/4/2025    lidocaine (Lidoderm) 5 % patch Place 1 patch over 12 hours on the skin once daily. Apply to painful area 12 hours per day, remove for 12 hours. 30 patch 1 1/4/2025    magnesium oxide (Mag-Ox) 400 mg tablet Take 1 tablet (400 mg) by mouth once daily.   1/4/2025    metFORMIN (Glucophage) 500 mg tablet Take 1 tablet (500 mg) by mouth 2 times daily (morning and late afternoon).   1/4/2025 Morning    pantoprazole (ProtoNix) 40 mg EC tablet Take 1 tablet (40 mg) by mouth once daily in the morning. Take before meals.   1/4/2025    ARIPiprazole (Abilify) 5 mg tablet Take 1 tablet (5 mg) by mouth once daily.   1/4/2025 Morning    furosemide (Lasix) 20 mg tablet Take 1 tablet (20 mg) by mouth once daily at noon. Take with meals. afternoon   1/4/2025    levothyroxine (Synthroid, Levoxyl) 75 mcg tablet Take 1 tablet (75 mcg) by mouth once daily.         [unfilled]  Allergies as of 01/04/2025 - Reviewed 01/04/2025   Allergen Reaction Noted    Darvocet a500 [propoxyphene n-acetaminophen] Unknown 10/10/2023    Diphenhydramine hcl Unknown 10/10/2023    Morphine Unknown 10/10/2023    Pentazocine Unknown 10/10/2023    Propoxyphene Unknown 10/10/2023    Ranitidine Unknown 10/10/2023    [place] Hives 12/13/2023          Objective     PHYSICAL EXAM:    Patient is AOx3 and in no acute distress. Patient is alert and cooperative. Sitting comfortably in bed.    Vascular: Palpable DP/PT pulses B/L. Non-pitting edema noted  B/L. No hair growth present B/L. CFT<5 to b/l hallux. Temperature is warm to cool from tibial tuberosity to distal digits.    Musculoskeletal: Gross active and passive ROM intact to age and activity level. Moves all extremities spontaneously. No pain to palpation at feet B/L other than tenderness at toenails.     Neurological: Light touch sensation intact B/L. Denies any numbness, burning or tingling.    Lymphatic: No lymphatic streaking noted B/L.    Dermatologic: Nails 1-5 are thickened, elongated, discolored, and dystrophic B/L. Skin appears diffusely xerotic B/L. Web spaces 1-4 B/L are clean, dry and intact. No rashes or nodules noted B/L. No hyperkeratotic tissue noted B/L.      DATA:      Impression     #Onychomycosis, toenails 1-5 B/L  #Xerosis, B/L feet    - Patient was seen and evaluated; all findings were discussed and all questions were answered to patient's satisfaction.  - Charts, labs, vitals and imaging all reviewed.       Plan:  - Debridement of nails 1-5 B/L in length and thickness without incident.  - Debridement of callus on medial 1st IPJ B/L.  - Discussed creams to keep skin supple.   - No indication for any surgical intervention at this time.  - Patient may follow up in office post discharge for any pedal concerns  -At this time podiatry will sign off on patient.  Please feel free to reach out with any questions or concerns thank for the consult      Yari Khan DPM    A total of 40 minutes was spent in formulation of this note, review of charts, labs,  imaging with a minimum of 50% of the time spent in face to face with with the patient.  All questions and concerns were answered to the patients satisfaction.     Off note, use of vocal Dragon dictation system was used to dictate this document.  All proper spelling and grammatical errors may not have been corrected prior to final submission.                 SIGNATURE: Yari Khan DPM PATIENT NAME: Harper Lara   DATE: January 8,  2025 MRN: [id number]   TIME: 2:15 PM CONTACT: [facility]

## 2025-01-09 LAB
ANION GAP SERPL CALC-SCNC: 12 MMOL/L (ref 10–20)
BUN SERPL-MCNC: 46 MG/DL (ref 6–23)
CALCIUM SERPL-MCNC: 8.9 MG/DL (ref 8.6–10.3)
CHLORIDE SERPL-SCNC: 103 MMOL/L (ref 98–107)
CO2 SERPL-SCNC: 28 MMOL/L (ref 21–32)
CREAT SERPL-MCNC: 1.05 MG/DL (ref 0.5–1.05)
EGFRCR SERPLBLD CKD-EPI 2021: 55 ML/MIN/1.73M*2
ERYTHROCYTE [DISTWIDTH] IN BLOOD BY AUTOMATED COUNT: 13.7 % (ref 11.5–14.5)
GLUCOSE SERPL-MCNC: 99 MG/DL (ref 74–99)
HCT VFR BLD AUTO: 31.6 % (ref 36–46)
HGB BLD-MCNC: 10.3 G/DL (ref 12–16)
HOLD SPECIMEN: NORMAL
MAGNESIUM SERPL-MCNC: 1.83 MG/DL (ref 1.6–2.4)
MCH RBC QN AUTO: 29.7 PG (ref 26–34)
MCHC RBC AUTO-ENTMCNC: 32.6 G/DL (ref 32–36)
MCV RBC AUTO: 91 FL (ref 80–100)
NRBC BLD-RTO: 0 /100 WBCS (ref 0–0)
PLATELET # BLD AUTO: 190 X10*3/UL (ref 150–450)
POTASSIUM SERPL-SCNC: 3.9 MMOL/L (ref 3.5–5.3)
RBC # BLD AUTO: 3.47 X10*6/UL (ref 4–5.2)
SODIUM SERPL-SCNC: 139 MMOL/L (ref 136–145)
WBC # BLD AUTO: 3.8 X10*3/UL (ref 4.4–11.3)

## 2025-01-09 PROCEDURE — 83735 ASSAY OF MAGNESIUM: CPT | Performed by: STUDENT IN AN ORGANIZED HEALTH CARE EDUCATION/TRAINING PROGRAM

## 2025-01-09 PROCEDURE — 2500000001 HC RX 250 WO HCPCS SELF ADMINISTERED DRUGS (ALT 637 FOR MEDICARE OP)

## 2025-01-09 PROCEDURE — 2500000004 HC RX 250 GENERAL PHARMACY W/ HCPCS (ALT 636 FOR OP/ED)

## 2025-01-09 PROCEDURE — 1210000001 HC SEMI-PRIVATE ROOM DAILY

## 2025-01-09 PROCEDURE — 2500000004 HC RX 250 GENERAL PHARMACY W/ HCPCS (ALT 636 FOR OP/ED): Performed by: STUDENT IN AN ORGANIZED HEALTH CARE EDUCATION/TRAINING PROGRAM

## 2025-01-09 PROCEDURE — 36415 COLL VENOUS BLD VENIPUNCTURE: CPT | Performed by: STUDENT IN AN ORGANIZED HEALTH CARE EDUCATION/TRAINING PROGRAM

## 2025-01-09 PROCEDURE — 2500000002 HC RX 250 W HCPCS SELF ADMINISTERED DRUGS (ALT 637 FOR MEDICARE OP, ALT 636 FOR OP/ED): Performed by: STUDENT IN AN ORGANIZED HEALTH CARE EDUCATION/TRAINING PROGRAM

## 2025-01-09 PROCEDURE — 80048 BASIC METABOLIC PNL TOTAL CA: CPT | Performed by: STUDENT IN AN ORGANIZED HEALTH CARE EDUCATION/TRAINING PROGRAM

## 2025-01-09 PROCEDURE — 2500000002 HC RX 250 W HCPCS SELF ADMINISTERED DRUGS (ALT 637 FOR MEDICARE OP, ALT 636 FOR OP/ED): Performed by: PHARMACIST

## 2025-01-09 PROCEDURE — 85027 COMPLETE CBC AUTOMATED: CPT | Performed by: STUDENT IN AN ORGANIZED HEALTH CARE EDUCATION/TRAINING PROGRAM

## 2025-01-09 PROCEDURE — 97530 THERAPEUTIC ACTIVITIES: CPT | Mod: GP,CQ

## 2025-01-09 PROCEDURE — 99232 SBSQ HOSP IP/OBS MODERATE 35: CPT | Performed by: STUDENT IN AN ORGANIZED HEALTH CARE EDUCATION/TRAINING PROGRAM

## 2025-01-09 PROCEDURE — 97116 GAIT TRAINING THERAPY: CPT | Mod: GP,CQ

## 2025-01-09 PROCEDURE — 2500000001 HC RX 250 WO HCPCS SELF ADMINISTERED DRUGS (ALT 637 FOR MEDICARE OP): Performed by: STUDENT IN AN ORGANIZED HEALTH CARE EDUCATION/TRAINING PROGRAM

## 2025-01-09 RX ORDER — CEPHALEXIN 500 MG/1
500 CAPSULE ORAL 4 TIMES DAILY
Qty: 12 CAPSULE | Refills: 0 | Status: SHIPPED | OUTPATIENT
Start: 2025-01-09 | End: 2025-01-12

## 2025-01-09 RX ORDER — ONDANSETRON 4 MG/1
4 TABLET, FILM COATED ORAL ONCE
Status: COMPLETED | OUTPATIENT
Start: 2025-01-09 | End: 2025-01-09

## 2025-01-09 RX ADMIN — FLUOXETINE HYDROCHLORIDE 20 MG: 20 CAPSULE ORAL at 08:40

## 2025-01-09 RX ADMIN — CHOLECALCIFEROL TAB 10 MCG (400 UNIT) 10 MCG: 10 TAB at 11:33

## 2025-01-09 RX ADMIN — FUROSEMIDE 40 MG: 40 TABLET ORAL at 08:39

## 2025-01-09 RX ADMIN — PANTOPRAZOLE SODIUM 40 MG: 40 TABLET, DELAYED RELEASE ORAL at 05:59

## 2025-01-09 RX ADMIN — METFORMIN HYDROCHLORIDE 500 MG: 500 TABLET, FILM COATED ORAL at 16:30

## 2025-01-09 RX ADMIN — APIXABAN 5 MG: 5 TABLET, FILM COATED ORAL at 21:25

## 2025-01-09 RX ADMIN — METFORMIN HYDROCHLORIDE 500 MG: 500 TABLET, FILM COATED ORAL at 08:40

## 2025-01-09 RX ADMIN — MAGNESIUM OXIDE 400 MG (241.3 MG MAGNESIUM) TABLET 400 MG: TABLET at 08:40

## 2025-01-09 RX ADMIN — CEFTRIAXONE SODIUM 1 G: 1 INJECTION, SOLUTION INTRAVENOUS at 11:33

## 2025-01-09 RX ADMIN — LEVOTHYROXINE SODIUM 75 MCG: 75 TABLET ORAL at 05:59

## 2025-01-09 RX ADMIN — Medication 1000 MCG: at 08:39

## 2025-01-09 RX ADMIN — APIXABAN 5 MG: 5 TABLET, FILM COATED ORAL at 08:40

## 2025-01-09 RX ADMIN — FUROSEMIDE 20 MG: 40 TABLET ORAL at 16:31

## 2025-01-09 RX ADMIN — ACETAMINOPHEN 650 MG: 325 TABLET ORAL at 14:38

## 2025-01-09 RX ADMIN — ARIPIPRAZOLE 5 MG: 5 TABLET ORAL at 08:39

## 2025-01-09 RX ADMIN — ONDANSETRON 4 MG: 4 TABLET, FILM COATED ORAL at 16:30

## 2025-01-09 RX ADMIN — AMLODIPINE BESYLATE 2.5 MG: 5 TABLET ORAL at 08:40

## 2025-01-09 ASSESSMENT — PAIN - FUNCTIONAL ASSESSMENT: PAIN_FUNCTIONAL_ASSESSMENT: 0-10

## 2025-01-09 ASSESSMENT — COGNITIVE AND FUNCTIONAL STATUS - GENERAL
PERSONAL GROOMING: A LITTLE
TURNING FROM BACK TO SIDE WHILE IN FLAT BAD: A LOT
MOBILITY SCORE: 13
TURNING FROM BACK TO SIDE WHILE IN FLAT BAD: A LOT
WALKING IN HOSPITAL ROOM: A LOT
DRESSING REGULAR UPPER BODY CLOTHING: A LITTLE
CLIMB 3 TO 5 STEPS WITH RAILING: A LOT
STANDING UP FROM CHAIR USING ARMS: A LOT
CLIMB 3 TO 5 STEPS WITH RAILING: A LOT
WALKING IN HOSPITAL ROOM: A LOT
MOVING TO AND FROM BED TO CHAIR: A LOT
STANDING UP FROM CHAIR USING ARMS: A LOT
MOVING TO AND FROM BED TO CHAIR: A LOT
MOBILITY SCORE: 10
TURNING FROM BACK TO SIDE WHILE IN FLAT BAD: A LOT
DRESSING REGULAR LOWER BODY CLOTHING: A LITTLE
DRESSING REGULAR UPPER BODY CLOTHING: A LITTLE
DRESSING REGULAR LOWER BODY CLOTHING: A LITTLE
HELP NEEDED FOR BATHING: A LOT
HELP NEEDED FOR BATHING: A LOT
DAILY ACTIVITIY SCORE: 18
WALKING IN HOSPITAL ROOM: A LOT
PERSONAL GROOMING: A LITTLE
TOILETING: A LITTLE
TOILETING: A LITTLE
STANDING UP FROM CHAIR USING ARMS: A LOT
MOBILITY SCORE: 13
MOVING FROM LYING ON BACK TO SITTING ON SIDE OF FLAT BED WITH BEDRAILS: A LOT
DAILY ACTIVITIY SCORE: 18
MOVING TO AND FROM BED TO CHAIR: TOTAL
MOVING FROM LYING ON BACK TO SITTING ON SIDE OF FLAT BED WITH BEDRAILS: A LITTLE
MOVING FROM LYING ON BACK TO SITTING ON SIDE OF FLAT BED WITH BEDRAILS: A LITTLE
CLIMB 3 TO 5 STEPS WITH RAILING: TOTAL

## 2025-01-09 ASSESSMENT — PAIN SCALES - GENERAL
PAINLEVEL_OUTOF10: 3
PAINLEVEL_OUTOF10: 0 - NO PAIN
PAINLEVEL_OUTOF10: 0 - NO PAIN

## 2025-01-09 NOTE — PROGRESS NOTES
01/09/25 1552   Discharge Planning   Home or Post Acute Services Post acute facilities (Rehab/SNF/etc)   Type of Post Acute Facility Services Skilled nursing   Expected Discharge Disposition SNF  ([facility] NR)   Does the patient need discharge transport arranged? Yes   RoundTrip coordination needed? Yes   Has discharge transport been arranged? Yes   What day is the transport expected? 01/10/25   What time is the transport expected? 0900     Pt medically cleared for discharge. Unable to secure transport at a reasonable time this evening. Discharge scheduled for tomorrow morning. Pt and daughter notified and in agreement with plan. Care team and facility notified.

## 2025-01-09 NOTE — PROGRESS NOTES
"Daily Progress Note    Harper Lara is a 79 y.o. female on day 3 of admission presenting with Fall (on) (from) unspecified stairs and steps, initial encounter.    Subjective   Patient states to feel somewhat nauseous this afternoon, thinks it may have been the hospital food not settling well. She is otherwise well.       Objective   Physical Exam  Constitutional:       Appearance: She is overweight.   HENT:      Head: Normocephalic.      Mouth/Throat:      Mouth: Mucous membranes are moist.   Eyes:      Pupils: Pupils are equal, round, and reactive to light.   Cardiovascular:      Rate and Rhythm: Normal rate and regular rhythm.      Heart sounds: Normal heart sounds, S1 normal and S2 normal.   Pulmonary:      Effort: Pulmonary effort is normal.      Breath sounds: Normal breath sounds.   Abdominal:      General: Bowel sounds are normal.      Palpations: Abdomen is soft.      Tenderness: There is no abdominal tenderness.      Comments: Abd wound covered with dressing   Musculoskeletal:         General: Normal range of motion.      Cervical back: Neck supple.      Right lower leg: No edema.      Left lower leg: No edema.   Skin:     General: Skin is warm.   Neurological:      Mental Status: She is alert and oriented to person, place, and time.      Motor: Weakness present.   Psychiatric:         Mood and Affect: Mood normal.         Behavior: Behavior normal.         Cognition and Memory: Cognition is impaired.         Last Recorded Vitals  Blood pressure 138/63, pulse 61, temperature 36.1 °C (97 °F), resp. rate 15, height 1.6 m (5' 3\"), weight 67.1 kg (148 lb), SpO2 98%.  Intake/Output last 3 Shifts:  I/O last 3 completed shifts:  In: 660 (9.8 mL/kg) [P.O.:660]  Out: 3000 (44.7 mL/kg) [Urine:3000 (1.2 mL/kg/hr)]  Weight: 67.1 kg     Medications  Scheduled medications  amLODIPine, 2.5 mg, oral, Daily  apixaban, 5 mg, oral, BID  ARIPiprazole, 5 mg, oral, Daily  cefTRIAXone, 1 g, intravenous, q24h  cholecalciferol, " 400 Units, oral, Daily  cyanocobalamin, 1,000 mcg, oral, Daily  FLUoxetine, 20 mg, oral, Daily  furosemide, 20 mg, oral, Daily with evening meal  furosemide, 40 mg, oral, q AM  levothyroxine, 75 mcg, oral, Daily  magnesium oxide, 400 mg, oral, Daily  metFORMIN, 500 mg, oral, BID  pantoprazole, 40 mg, oral, Daily before breakfast      Continuous medications     PRN medications  PRN medications: acetaminophen **OR** acetaminophen    Labs  CBC:   Results from last 7 days   Lab Units 01/09/25  0538 01/08/25  0534 01/07/25 0823   WBC AUTO x10*3/uL 3.8* 3.4* 3.9*   RBC AUTO x10*6/uL 3.47* 3.29* 3.57*   HEMOGLOBIN g/dL 10.3* 9.9* 10.6*   HEMATOCRIT % 31.6* 29.6* 32.3*   MCV fL 91 90 91   MCH pg 29.7 30.1 29.7   MCHC g/dL 32.6 33.4 32.8   RDW % 13.7 13.6 13.3   PLATELETS AUTO x10*3/uL 190 184 187     CMP:    Results from last 7 days   Lab Units 01/09/25  0538 01/08/25  0534 01/07/25  0823 01/06/25  0902 01/04/25  1524   SODIUM mmol/L 139 137 139   < > 139   POTASSIUM mmol/L 3.9 4.1 4.0   < > 4.0   CHLORIDE mmol/L 103 102 101   < > 103   CO2 mmol/L 28 28 30   < > 27   BUN mg/dL 46* 42* 37*   < > 30*   CREATININE mg/dL 1.05 0.98 1.14*   < > 1.02   GLUCOSE mg/dL 99 110* 134*   < > 111*   PROTEIN TOTAL g/dL  --   --   --   --  6.8   CALCIUM mg/dL 8.9 9.0 9.2   < > 9.4   BILIRUBIN TOTAL mg/dL  --   --   --   --  0.6   ALK PHOS U/L  --   --   --   --  119   AST U/L  --   --   --   --  24   ALT U/L  --   --   --   --  14    < > = values in this interval not displayed.     BMP:    Results from last 7 days   Lab Units 01/09/25  0538 01/08/25  0534 01/07/25  0823   SODIUM mmol/L 139 137 139   POTASSIUM mmol/L 3.9 4.1 4.0   CHLORIDE mmol/L 103 102 101   CO2 mmol/L 28 28 30   BUN mg/dL 46* 42* 37*   CREATININE mg/dL 1.05 0.98 1.14*   CALCIUM mg/dL 8.9 9.0 9.2   GLUCOSE mg/dL 99 110* 134*     Magnesium:  Results from last 7 days   Lab Units 01/09/25  0538 01/08/25  0534 01/07/25  0823   MAGNESIUM mg/dL 1.83 1.87 1.94     Troponin:       BNP:     Lipid Panel:  Results from last 7 days   Lab Units 01/04/25  1524   INR  1.2*   PROTIME seconds 13.4*        Nutrition             Relevant Results  Results from last 7 days   Lab Units 01/09/25  0538 01/08/25  0534 01/07/25  0823 01/06/25  0902 01/05/25  0621 01/04/25  2255 01/04/25  1524   POCT GLUCOSE mg/dL  --   --   --   --  93 96  --    GLUCOSE mg/dL 99 110* 134* 123*  --   --  111*     Lab Results   Component Value Date    HGBA1C 5.6 06/25/2024        Assessment/Plan    UTI  -UA positive for leukocytes  -Continue Rocephin  -Follow urine culture, grew klebsiella pneumoniae/variicola sensitive to Rocephin    Generalized weakness  Dementia  -PT/OT evaluation  -TCC for discharge planning, patient will need placement  -Fall precaution  -Continue home meds    A-fib: continue Eliquis    T2DM  -Continue Metformin  -Regular diet  -Glucose stable, no need for SSI    HTN/CAD/HLD: continue home meds  CHF: Continue home meds, not in acute exacerbation  Hypothyroidism: continue home levothyroxine   GERD: protonix daily  Anxiety/depression: Continue home meds    DVTp: eliquis  PLAN: SNF, discharge scheduled for tomorrow    Christopher Kline, DO    Moderate level of MDM based on above medical conditions

## 2025-01-09 NOTE — PROGRESS NOTES
Physical Therapy    Physical Therapy Treatment    Patient Name: Harper Lara  MRN: [id number]  Today's Date: 1/9/2025  Time Calculation  Start Time: 1033  Stop Time: 1058  Time Calculation (min): 25 min     903/903-A    Assessment/Plan   PT Assessment  PT Assessment Results: Decreased mobility, Decreased strength, Pain, Orthopedic restrictions  Rehab Prognosis: Good  Barriers to Discharge Home: Caregiver assistance, Physical needs  Caregiver Assistance: Caregiver assistance needed per identified barriers - however, level of patient's required assistance exceeds assistance available at home (Spouse currently in hospital)  Physical Needs: Stair navigation into home limited by function/safety, Ambulating household distances limited by function/safety, High falls risk due to function or environment, 24hr ADL assistance needed  Evaluation/Treatment Tolerance: Patient tolerated treatment well (with frequent rest breaks)  Medical Staff Made Aware: Yes  Strengths: Living arrangement secure  Barriers to Participation: Comorbidities, Support of Caregivers  End of Session Communication: Bedside nurse  Assessment Comment: pt is motivated to participate, demonstrates good follow through ,would benefit from continued therapy to improve functional mobility and safety  End of Session Patient Position: Bed, 3 rail up, Alarm on     Treatment/Interventions: Bed mobility, Transfer training, Gait training, Balance training, Strengthening, Endurance training, Therapeutic exercise  PT Plan: Ongoing PT  PT Frequency: 3 times per week  PT Discharge Recommendations: Moderate intensity level of continued care  Equipment Recommended upon Discharge:  (TBD) PT Recommended Transfer Status: Assist x2, Assistive device    General Visit Information:      General  Reason for Referral: impaired mobility  Family/Caregiver Present:  (spouse is patient in same room  903-B)  Prior to Session Communication: Bedside nurse (cleared to participate)  Patient  Position Received: Alarm on, Bed, 2 rail up  General Comment: pt is pleasant and agreeable to particpate, reports that she stood at edge of bed with nursing last night    General Observations:   General Observation: erik    Subjective     Precautions:  Precautions  Medical Precautions: Spinal precautions, Fall precautions  Precautions Comment: reviewed spinal precautions and instructed in log rolling technique    Vital Signs:     Objective     Pain:  Pain Assessment  Pain Assessment:  (no c/o pain)  0-10 (Numeric) Pain Score:  (pt yells  in discomfort during supine<--> , no numerical value given  , states it feels good to sit up once  sitting EOB)  Pain Type: Acute pain  Pain Location: Back  Pain Orientation: Lower  Pain Descriptors: Aching  Pain Interventions: Repositioned    Cognition:  Cognition  Overall Cognitive Status: Within Functional Limits      Activity Tolerance:  Activity Tolerance  Endurance: Decreased tolerance for upright activites    Treatments:  Therapeutic Exercise  Therapeutic Exercise Performed: Yes (instructed in and performed ther ex incuding AP, LAQ,hip flexion in pain freee ROM)    Bed Mobility  Bed Mobility: Yes  Bed Mobility 1  Bed Mobility Comments 1: transfers supine<--> sit with Mod A and vc to maintain  spinal precuations  Ambulation/Gait Training  Ambulation/Gait Training Performed: Yes  Ambulation/Gait Training 1  Surface 1: Level tile  Device 1: Rolling walker  Comments/Distance (ft) 1: pt sidestepped 5 ft at edge of bed with WW and Mod A progressing to Min A , vc for upright posture and sequencing  Transfers  Transfer: Yes  Transfer 1  Technique 1: Sit to stand, Stand to sit  Trials/Comments 1: transfers sit <--> stand with WW and Mod A and vc for hand placement  Stairs  Stairs: No          Outcome Measures:     [facility] Basic Mobility  Turning from your back to your side while in a flat bed without using bedrails: A lot  Moving from lying on your back to sitting on the side of a  flat bed without using bedrails: A lot  Moving to and from bed to chair (including a wheelchair): Total  Standing up from a chair using your arms (e.g. wheelchair or bedside chair): A lot  To walk in hospital room: A lot  Climbing 3-5 steps with railing: Total  Basic Mobility - Total Score: 10         EDUCATION:  Outpatient Education  Individual(s) Educated: Patient  Education Provided: Fall Risk, Home Exercise Program, Home Safety  Patient Response to Education: Patient/Caregiver Verbalized Understanding of Information    Encounter Problems       Encounter Problems (Active)       PT Problem       Pt. will transfer supine/sit with MOD A x 1 (Progressing)       Start:  01/06/25    Expected End:  01/20/25            Pt. will transfer sit/stand with FWW with MOD A x 1 (Progressing)       Start:  01/06/25    Expected End:  01/20/25            Pt.will ambulate 25' with FWW with MOD A x 1 (Progressing)       Start:  01/06/25    Expected End:  01/20/25            Pt. will perform 2 x 15 B LE AROM exercises  (Progressing)       Start:  01/06/25    Expected End:  01/20/25

## 2025-01-09 NOTE — DISCHARGE INSTRUCTIONS
Patient has a follow up wound care appointment that has been already scheduled at [facility] for January 15th at 9:15 am with nurse practitioner Alan Aldridge. Please arrive 20 minutes early for registration. If you can not make this appointment please call [phone] to reschedule!

## 2025-01-10 ENCOUNTER — HOME CARE VISIT (OUTPATIENT)
Dept: HOME HEALTH SERVICES | Facility: HOME HEALTH | Age: 80
End: 2025-01-10
Payer: MEDICARE

## 2025-01-10 VITALS
OXYGEN SATURATION: 98 % | BODY MASS INDEX: 26.22 KG/M2 | DIASTOLIC BLOOD PRESSURE: 60 MMHG | RESPIRATION RATE: 16 BRPM | HEART RATE: 60 BPM | SYSTOLIC BLOOD PRESSURE: 126 MMHG | HEIGHT: 63 IN | WEIGHT: 148 LBS | TEMPERATURE: 97.9 F

## 2025-01-10 PROBLEM — N39.0 UTI DUE TO KLEBSIELLA SPECIES: Status: ACTIVE | Noted: 2025-01-10

## 2025-01-10 PROBLEM — B96.89 UTI DUE TO KLEBSIELLA SPECIES: Status: ACTIVE | Noted: 2025-01-10

## 2025-01-10 LAB
ANION GAP SERPL CALC-SCNC: 13 MMOL/L (ref 10–20)
BUN SERPL-MCNC: 51 MG/DL (ref 6–23)
CALCIUM SERPL-MCNC: 8.8 MG/DL (ref 8.6–10.3)
CHLORIDE SERPL-SCNC: 101 MMOL/L (ref 98–107)
CO2 SERPL-SCNC: 29 MMOL/L (ref 21–32)
CREAT SERPL-MCNC: 1.08 MG/DL (ref 0.5–1.05)
EGFRCR SERPLBLD CKD-EPI 2021: 53 ML/MIN/1.73M*2
ERYTHROCYTE [DISTWIDTH] IN BLOOD BY AUTOMATED COUNT: 13.9 % (ref 11.5–14.5)
GLUCOSE SERPL-MCNC: 105 MG/DL (ref 74–99)
HCT VFR BLD AUTO: 31 % (ref 36–46)
HGB BLD-MCNC: 10 G/DL (ref 12–16)
MAGNESIUM SERPL-MCNC: 1.89 MG/DL (ref 1.6–2.4)
MCH RBC QN AUTO: 29.6 PG (ref 26–34)
MCHC RBC AUTO-ENTMCNC: 32.3 G/DL (ref 32–36)
MCV RBC AUTO: 92 FL (ref 80–100)
NRBC BLD-RTO: 0 /100 WBCS (ref 0–0)
PLATELET # BLD AUTO: 174 X10*3/UL (ref 150–450)
POTASSIUM SERPL-SCNC: 4.4 MMOL/L (ref 3.5–5.3)
RBC # BLD AUTO: 3.38 X10*6/UL (ref 4–5.2)
SODIUM SERPL-SCNC: 139 MMOL/L (ref 136–145)
WBC # BLD AUTO: 4.2 X10*3/UL (ref 4.4–11.3)

## 2025-01-10 PROCEDURE — 82374 ASSAY BLOOD CARBON DIOXIDE: CPT | Performed by: STUDENT IN AN ORGANIZED HEALTH CARE EDUCATION/TRAINING PROGRAM

## 2025-01-10 PROCEDURE — 2500000001 HC RX 250 WO HCPCS SELF ADMINISTERED DRUGS (ALT 637 FOR MEDICARE OP): Performed by: STUDENT IN AN ORGANIZED HEALTH CARE EDUCATION/TRAINING PROGRAM

## 2025-01-10 PROCEDURE — 36415 COLL VENOUS BLD VENIPUNCTURE: CPT | Performed by: STUDENT IN AN ORGANIZED HEALTH CARE EDUCATION/TRAINING PROGRAM

## 2025-01-10 PROCEDURE — 2500000001 HC RX 250 WO HCPCS SELF ADMINISTERED DRUGS (ALT 637 FOR MEDICARE OP)

## 2025-01-10 PROCEDURE — 85027 COMPLETE CBC AUTOMATED: CPT | Performed by: STUDENT IN AN ORGANIZED HEALTH CARE EDUCATION/TRAINING PROGRAM

## 2025-01-10 PROCEDURE — 83735 ASSAY OF MAGNESIUM: CPT | Performed by: STUDENT IN AN ORGANIZED HEALTH CARE EDUCATION/TRAINING PROGRAM

## 2025-01-10 PROCEDURE — 2500000004 HC RX 250 GENERAL PHARMACY W/ HCPCS (ALT 636 FOR OP/ED): Performed by: STUDENT IN AN ORGANIZED HEALTH CARE EDUCATION/TRAINING PROGRAM

## 2025-01-10 PROCEDURE — 99239 HOSP IP/OBS DSCHRG MGMT >30: CPT | Performed by: STUDENT IN AN ORGANIZED HEALTH CARE EDUCATION/TRAINING PROGRAM

## 2025-01-10 PROCEDURE — 2500000002 HC RX 250 W HCPCS SELF ADMINISTERED DRUGS (ALT 637 FOR MEDICARE OP, ALT 636 FOR OP/ED): Performed by: PHARMACIST

## 2025-01-10 PROCEDURE — 2500000002 HC RX 250 W HCPCS SELF ADMINISTERED DRUGS (ALT 637 FOR MEDICARE OP, ALT 636 FOR OP/ED): Performed by: STUDENT IN AN ORGANIZED HEALTH CARE EDUCATION/TRAINING PROGRAM

## 2025-01-10 RX ADMIN — PANTOPRAZOLE SODIUM 40 MG: 40 TABLET, DELAYED RELEASE ORAL at 05:50

## 2025-01-10 RX ADMIN — ARIPIPRAZOLE 5 MG: 5 TABLET ORAL at 08:24

## 2025-01-10 RX ADMIN — METFORMIN HYDROCHLORIDE 500 MG: 500 TABLET, FILM COATED ORAL at 08:25

## 2025-01-10 RX ADMIN — APIXABAN 5 MG: 5 TABLET, FILM COATED ORAL at 08:25

## 2025-01-10 RX ADMIN — AMLODIPINE BESYLATE 2.5 MG: 5 TABLET ORAL at 08:25

## 2025-01-10 RX ADMIN — MAGNESIUM OXIDE 400 MG (241.3 MG MAGNESIUM) TABLET 400 MG: TABLET at 08:24

## 2025-01-10 RX ADMIN — LEVOTHYROXINE SODIUM 75 MCG: 75 TABLET ORAL at 05:50

## 2025-01-10 RX ADMIN — FLUOXETINE HYDROCHLORIDE 20 MG: 20 CAPSULE ORAL at 08:24

## 2025-01-10 RX ADMIN — CHOLECALCIFEROL TAB 10 MCG (400 UNIT) 10 MCG: 10 TAB at 08:24

## 2025-01-10 RX ADMIN — FUROSEMIDE 40 MG: 40 TABLET ORAL at 08:24

## 2025-01-10 RX ADMIN — Medication 1000 MCG: at 08:24

## 2025-01-10 ASSESSMENT — COGNITIVE AND FUNCTIONAL STATUS - GENERAL
HELP NEEDED FOR BATHING: A LOT
MOBILITY SCORE: 13
MOVING TO AND FROM BED TO CHAIR: A LOT
TURNING FROM BACK TO SIDE WHILE IN FLAT BAD: A LOT
CLIMB 3 TO 5 STEPS WITH RAILING: A LOT
DRESSING REGULAR LOWER BODY CLOTHING: A LITTLE
DAILY ACTIVITIY SCORE: 18
WALKING IN HOSPITAL ROOM: A LOT
DRESSING REGULAR UPPER BODY CLOTHING: A LITTLE
STANDING UP FROM CHAIR USING ARMS: A LOT
PERSONAL GROOMING: A LITTLE
MOVING FROM LYING ON BACK TO SITTING ON SIDE OF FLAT BED WITH BEDRAILS: A LITTLE
TOILETING: A LITTLE

## 2025-01-10 ASSESSMENT — PAIN SCALES - GENERAL: PAINLEVEL_OUTOF10: 0 - NO PAIN

## 2025-01-10 NOTE — CONSULTS
Removed dressing from abdominal wound. Wound bed beefy red, small amount of thin light tan drainage seen, no eschar, no foul smell, irrigated with NSS. Applied vashe wet to dry dressing.  Wound Care Consult     Visit Date: 1/10/2025      Patient Name: Harper Lara         MRN: [id number]           YOB: 1945     Reason for Consult: Wound Care Abdomen        Wound History: Chronic     Pertinent Labs:   Albumin   Date Value Ref Range Status   01/04/2025 3.9 3.4 - 5.0 g/dL Final       Wound Assessment:  Wound 12/17/23 Incision Umbilicus Medial;Anterior;Lower (Active)   Wound Image   01/03/25 1107   Site Assessment Dry 01/06/25 0835   Brenda-Wound Assessment Unable to assess 01/06/25 0835   Non-staged Wound Description Partial thickness 01/03/25 1118   Wound Length (cm) 4.8 cm 01/03/25 1118   Wound Width (cm) 4.8 cm 01/03/25 1118   Wound Surface Area (cm^2) 23.04 cm^2 01/03/25 1118   Wound Depth (cm) 0.1 cm 01/03/25 1118   Wound Volume (cm^3) 2.304 cm^3 01/03/25 1118   Wound Healing % 34 01/03/25 1118   State of Healing Early/partial granulation 01/03/25 1118   Wound Bed Granulation (%) 50 % 01/03/25 1118   Wound Bed Epithelium (%) 50 % 01/03/25 1118   Margins Epibole (Rolled edges) 01/03/25 1118   Closure None 01/03/25 1118   Treatments Cleansed 01/03/25 1118   Drainage Description Tan 01/05/25 0838   Drainage Amount Scant 01/06/25 0835   Dressing Foam 01/06/25 0835   Dressing Changed New 01/03/25 1118   Dressing Status Dry;Clean;Occlusive 01/10/25 0815       Wound 12/27/24 Skin Tear Buttock Left (Active)       Wound Team Summary Assessment:       Wound Team Plan: Continue with daily current dressing changes.     Denis López LPN  1/10/2025  1:47 PM

## 2025-01-10 NOTE — DISCHARGE SUMMARY
Discharge Diagnosis  Fall (on) (from) unspecified stairs and steps, initial encounter  UTI  Generalized weakness    Issues Requiring Follow-Up  PT/OT at SNF    Discharge Meds     Medication List      START taking these medications     apixaban 5 mg tablet; Commonly known as: Eliquis; Take 1 tablet (5 mg)   by mouth 2 times a day.   cephalexin 500 mg capsule; Commonly known as: Keflex; Take 1 capsule   (500 mg) by mouth 4 times a day for 3 days.     CONTINUE taking these medications     amLODIPine 2.5 mg tablet; Commonly known as: Norvasc   ARIPiprazole 5 mg tablet; Commonly known as: Abilify   cholecalciferol 10 MCG (400 UNIT) tablet; Commonly known as: Vitamin D-3   cyanocobalamin 1,000 mcg tablet; Commonly known as: Vitamin B-12   FISH OIL ORAL   FLUoxetine 20 mg tablet; Commonly known as: PROzac   * furosemide 20 mg tablet; Commonly known as: Lasix   * furosemide 20 mg tablet; Commonly known as: Lasix   levothyroxine 75 mcg tablet; Commonly known as: Synthroid, Levoxyl   lidocaine 5 % patch; Commonly known as: Lidoderm; Place 1 patch over 12   hours on the skin once daily. Apply to painful area 12 hours per day,   remove for 12 hours.   magnesium oxide 400 mg tablet; Commonly known as: Mag-Ox   metFORMIN 500 mg tablet; Commonly known as: Glucophage   pantoprazole 40 mg EC tablet; Commonly known as: ProtoNix  * This list has 2 medication(s) that are the same as other medications   prescribed for you. Read the directions carefully, and ask your doctor or   other care provider to review them with you.       Test Results Pending At Discharge  Pending Labs       No current pending labs.            Hospital Course  Harper Lara is a 79 y.o. female from home with a PMH of recurrent falls, recent hospital admission for fall, Afib, HTN, DM2, who presented after mechanical fall without head trauma.     ED course: /66, HR 70, RR 16, afebrile, O2 sat 100% on room air  Labs: Hgb 10.6, rest of the lab workup pretty  much unremarkable  All imaging study negative for any acute fracture or hemorrhage    Patient was treated for UTI with IV Rocephin and then transitioned to PO Keflex to complete remaining course. She remained hemodynamically stable throughout hospitalization and was discharged to SNF for continued PT/OT.    > 30min in discharge coordination of care which includes: discharge planning, medication reconciliation, arranging appropriate follow up and discussion of discharge instructions with the patient.   Pertinent Physical Exam At Time of Discharge  Physical Exam  Constitutional:       Appearance: She is overweight.   HENT:      Head: Normocephalic.      Mouth/Throat:      Mouth: Mucous membranes are moist.   Eyes:      Pupils: Pupils are equal, round, and reactive to light.   Cardiovascular:      Rate and Rhythm: Normal rate and regular rhythm.      Heart sounds: Normal heart sounds, S1 normal and S2 normal.   Pulmonary:      Effort: Pulmonary effort is normal.      Breath sounds: Normal breath sounds.   Abdominal:      General: Bowel sounds are normal.      Palpations: Abdomen is soft.      Tenderness: There is no abdominal tenderness.      Comments: Abd wound covered with dressing   Musculoskeletal:         General: Normal range of motion.      Cervical back: Neck supple.      Right lower leg: No edema.      Left lower leg: No edema.   Skin:     General: Skin is warm.   Neurological:      Mental Status: She is alert and oriented to person, place, and time.      Motor: Weakness present.   Psychiatric:         Mood and Affect: Mood normal.         Behavior: Behavior normal.         Cognition and Memory: Cognition is impaired.     Outpatient Follow-Up  Future Appointments   Date Time Provider Department Center   1/15/2025  9:15 AM YARITZA Ritter-CNP [id number]         Christopher Kline DO

## 2025-01-12 ENCOUNTER — NURSING HOME VISIT (OUTPATIENT)
Dept: POST ACUTE CARE | Facility: EXTERNAL LOCATION | Age: 80
End: 2025-01-12
Payer: MEDICARE

## 2025-01-12 VITALS
SYSTOLIC BLOOD PRESSURE: 144 MMHG | HEART RATE: 74 BPM | BODY MASS INDEX: 25.51 KG/M2 | WEIGHT: 144 LBS | DIASTOLIC BLOOD PRESSURE: 74 MMHG

## 2025-01-12 DIAGNOSIS — E78.2 MIXED HYPERLIPIDEMIA: ICD-10-CM

## 2025-01-12 DIAGNOSIS — B96.89 UTI DUE TO KLEBSIELLA SPECIES: ICD-10-CM

## 2025-01-12 DIAGNOSIS — S32.030K COMPRESSION FRACTURE OF L3 LUMBAR VERTEBRA WITH NONUNION: ICD-10-CM

## 2025-01-12 DIAGNOSIS — N39.0 UTI DUE TO KLEBSIELLA SPECIES: ICD-10-CM

## 2025-01-12 DIAGNOSIS — R53.81 PHYSICAL DECONDITIONING: ICD-10-CM

## 2025-01-12 DIAGNOSIS — F41.9 ANXIETY AND DEPRESSION: ICD-10-CM

## 2025-01-12 DIAGNOSIS — R29.6 FREQUENT FALLS: Primary | ICD-10-CM

## 2025-01-12 DIAGNOSIS — I48.0 PAROXYSMAL ATRIAL FIBRILLATION (MULTI): ICD-10-CM

## 2025-01-12 DIAGNOSIS — S31.819D WOUND OF GLUTEAL CLEFT, RIGHT, SUBSEQUENT ENCOUNTER: ICD-10-CM

## 2025-01-12 DIAGNOSIS — F32.A ANXIETY AND DEPRESSION: ICD-10-CM

## 2025-01-12 PROCEDURE — 99306 1ST NF CARE HIGH MDM 50: CPT | Performed by: INTERNAL MEDICINE

## 2025-01-12 ASSESSMENT — ENCOUNTER SYMPTOMS
DIZZINESS: 1
WOUND: 1
SHORTNESS OF BREATH: 0
VOMITING: 0
NAUSEA: 1
WEAKNESS: 1
ARTHRALGIAS: 1
ABDOMINAL PAIN: 0
ACTIVITY CHANGE: 1
BRUISES/BLEEDS EASILY: 1
COUGH: 0
FATIGUE: 1
NERVOUS/ANXIOUS: 0
APNEA: 0

## 2025-01-12 NOTE — LETTER
Patient: Harper Lara  : 1945    Encounter Date: 2025    Subjective  Patient ID: Harper Lara is a 79 y.o. female who is acute skilled care and presents for initial visit for skilled nursing.    79-year-old female patient who has been in and out of the hospital, she was in the hospital in December again this time, she has a multiple falls, she has a multiple problems which include previous the wounds, peritoneal abscess, nonalcoholic fatty liver, intervertebral disc displacement, hearing impairment, fluctuating renal functions and also mood disturbance and anxiety disorder.  Previously she has a partial bowel obstruction also, this time patient was admitted because of fall, for last several months patient has been seen at the wound center for these chronic wounds on her gluteal region.  She was having surgery for umbilical hernia repair or ventral hernia repair it was complicated by bowel obstruction there is a wound which seems to be healing on the right side of abdominal wall.  Patient is seen and patient's  is also in the facility on the same room.  Patient was able to answer my questions, hospitalization related data and information were reviewed, she has history of atrial fibrillation, she has been on anticoagulation.  Her hemoglobin A1c has been less than 6 consistently, her hemoglobin was slightly low but her latest renal functions were normal.  This time patient is admitted here for skilled nursing and rehabilitation after having another fall and another hospitalization and it seems like patient cannot live independently most likely will require at least assisted living kind of set up with her  along with her.         Review of Systems   Constitutional:  Positive for activity change and fatigue.   HENT:  Negative for drooling.    Respiratory:  Negative for apnea, cough and shortness of breath.    Cardiovascular:  Negative for chest pain and leg swelling.   Gastrointestinal:   Positive for nausea. Negative for abdominal pain and vomiting.   Musculoskeletal:  Positive for arthralgias and gait problem.   Skin:  Positive for wound.   Neurological:  Positive for dizziness and weakness.   Hematological:  Bruises/bleeds easily.   Psychiatric/Behavioral:  Negative for behavioral problems. The patient is not nervous/anxious.        Objective  /74   Pulse 74   Wt 65.3 kg (144 lb)   BMI 25.51 kg/m²     Physical Exam  Constitutional:       Appearance: Normal appearance. She is normal weight. She is not ill-appearing or toxic-appearing.   HENT:      Head: Normocephalic.   Eyes:      Conjunctiva/sclera: Conjunctivae normal.   Cardiovascular:      Rate and Rhythm: Normal rate. Rhythm irregular.      Heart sounds: Normal heart sounds.   Pulmonary:      Breath sounds: Normal breath sounds. No rales.   Abdominal:      Palpations: Abdomen is soft.      Tenderness: There is abdominal tenderness.   Musculoskeletal:         General: Tenderness present.      Cervical back: Neck supple. Tenderness present.   Skin:     General: Skin is warm and dry.      Findings: Bruising, ecchymosis and wound present. No erythema.      Comments: Wound at umbilical area and pressure wounds at gluteals,   Neurological:      Mental Status: She is oriented to person, place, and time. Mental status is at baseline.   Psychiatric:         Behavior: Behavior normal.         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  Medications include Abilify 5  mg, Prozac, amlodipine 2.5 mg, Eliquis, cephalexin till 13th, vitamin D3, Lasix 20 mg, levothyroxine, Lidoderm patch, magnesium oxide, metformin, pantoprazole and moisturizing body external cream protectant.  Patient has been having wounds there is a order for veraseptic to clean and dry bilateral buttocks and sacrum, the abdominal open wound needs to be cleaned still with saline packed with normal saline moistened with a 2 x 2 gauze and covered them with a foam dressing.  Previously patient has a C. difficile colitis also.  Patient is very frail, physical therapy, Occupational Therapy evaluation are in process, other routine safety precautions has to be taken particularly wound care as per the facility protocol patient will require a lot of nursing care and loss of assist.  Wounds will take a long time to heal, she remains susceptible for fall and injuries.  Patient has a profound physical deconditioning, atrial fibrillation remains controlled medical rate, there was a UTI seen from Klebsiella on the culture, there is old compression at lumbar 3, so frail elderly female patient with frequent falls multiple hospitalization and now admitted here for skilled nursing and rehabilitation she will be staying here with her  for unknown duration of time.  Laboratories will be done as per our routine, periodic assessment and follow-up will be done, all the data and information were reviewed, wounds were checked and inspected, skin health is frail and fragile.  Make sure that patient's hydration is well-maintained and nutrition also needs to be consistent.     Goals    None           Electronically Signed By: Ha Thomason MD   1/12/25  4:41 PM

## 2025-01-12 NOTE — PROGRESS NOTES
Subjective   Patient ID: Harper Lara is a 79 y.o. female who is acute skilled care and presents for initial visit for skilled nursing.    79-year-old female patient who has been in and out of the hospital, she was in the hospital in December again this time, she has a multiple falls, she has a multiple problems which include previous the wounds, peritoneal abscess, nonalcoholic fatty liver, intervertebral disc displacement, hearing impairment, fluctuating renal functions and also mood disturbance and anxiety disorder.  Previously she has a partial bowel obstruction also, this time patient was admitted because of fall, for last several months patient has been seen at the wound center for these chronic wounds on her gluteal region.  She was having surgery for umbilical hernia repair or ventral hernia repair it was complicated by bowel obstruction there is a wound which seems to be healing on the right side of abdominal wall.  Patient is seen and patient's  is also in the facility on the same room.  Patient was able to answer my questions, hospitalization related data and information were reviewed, she has history of atrial fibrillation, she has been on anticoagulation.  Her hemoglobin A1c has been less than 6 consistently, her hemoglobin was slightly low but her latest renal functions were normal.  This time patient is admitted here for skilled nursing and rehabilitation after having another fall and another hospitalization and it seems like patient cannot live independently most likely will require at least assisted living kind of set up with her  along with her.         Review of Systems   Constitutional:  Positive for activity change and fatigue.   HENT:  Negative for drooling.    Respiratory:  Negative for apnea, cough and shortness of breath.    Cardiovascular:  Negative for chest pain and leg swelling.   Gastrointestinal:  Positive for nausea. Negative for abdominal pain and vomiting.    Musculoskeletal:  Positive for arthralgias and gait problem.   Skin:  Positive for wound.   Neurological:  Positive for dizziness and weakness.   Hematological:  Bruises/bleeds easily.   Psychiatric/Behavioral:  Negative for behavioral problems. The patient is not nervous/anxious.        Objective   /74   Pulse 74   Wt 65.3 kg (144 lb)   BMI 25.51 kg/m²     Physical Exam  Constitutional:       Appearance: Normal appearance. She is normal weight. She is not ill-appearing or toxic-appearing.   HENT:      Head: Normocephalic.   Eyes:      Conjunctiva/sclera: Conjunctivae normal.   Cardiovascular:      Rate and Rhythm: Normal rate. Rhythm irregular.      Heart sounds: Normal heart sounds.   Pulmonary:      Breath sounds: Normal breath sounds. No rales.   Abdominal:      Palpations: Abdomen is soft.      Tenderness: There is abdominal tenderness.   Musculoskeletal:         General: Tenderness present.      Cervical back: Neck supple. Tenderness present.   Skin:     General: Skin is warm and dry.      Findings: Bruising, ecchymosis and wound present. No erythema.      Comments: Wound at umbilical area and pressure wounds at gluteals,   Neurological:      Mental Status: She is oriented to person, place, and time. Mental status is at baseline.   Psychiatric:         Behavior: Behavior normal.         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  Medications include Abilify 5 mg, Prozac, amlodipine 2.5 mg, Eliquis, cephalexin till 13th,  vitamin D3, Lasix 20 mg, levothyroxine, Lidoderm patch, magnesium oxide, metformin, pantoprazole and moisturizing body external cream protectant.  Patient has been having wounds there is a order for veraseptic to clean and dry bilateral buttocks and sacrum, the abdominal open wound needs to be cleaned still with saline packed with normal saline moistened with a 2 x 2 gauze and covered them with a foam dressing.  Previously patient has a C. difficile colitis also.  Patient is very frail, physical therapy, Occupational Therapy evaluation are in process, other routine safety precautions has to be taken particularly wound care as per the facility protocol patient will require a lot of nursing care and loss of assist.  Wounds will take a long time to heal, she remains susceptible for fall and injuries.  Patient has a profound physical deconditioning, atrial fibrillation remains controlled medical rate, there was a UTI seen from Klebsiella on the culture, there is old compression at lumbar 3, so frail elderly female patient with frequent falls multiple hospitalization and now admitted here for skilled nursing and rehabilitation she will be staying here with her  for unknown duration of time.  Laboratories will be done as per our routine, periodic assessment and follow-up will be done, all the data and information were reviewed, wounds were checked and inspected, skin health is frail and fragile.  Make sure that patient's hydration is well-maintained and nutrition also needs to be consistent.     Goals    None

## 2025-01-13 LAB
ATRIAL RATE: 30 BPM
P AXIS: 88 DEGREES
P OFFSET: 134 MS
P ONSET: 114 MS
Q ONSET: 208 MS
QRS COUNT: 12 BEATS
QRS DURATION: 142 MS
QT INTERVAL: 442 MS
QTC CALCULATION(BAZETT): 480 MS
QTC FREDERICIA: 467 MS
R AXIS: -58 DEGREES
T AXIS: 16 DEGREES
T OFFSET: 429 MS
VENTRICULAR RATE: 71 BPM

## 2025-01-14 ENCOUNTER — NURSING HOME VISIT (OUTPATIENT)
Dept: POST ACUTE CARE | Facility: EXTERNAL LOCATION | Age: 80
End: 2025-01-14
Payer: MEDICARE

## 2025-01-14 VITALS
HEIGHT: 63 IN | WEIGHT: 144 LBS | DIASTOLIC BLOOD PRESSURE: 58 MMHG | HEART RATE: 57 BPM | OXYGEN SATURATION: 97 % | BODY MASS INDEX: 25.52 KG/M2 | SYSTOLIC BLOOD PRESSURE: 133 MMHG | TEMPERATURE: 98.2 F | RESPIRATION RATE: 16 BRPM

## 2025-01-14 DIAGNOSIS — E11.9 TYPE 2 DIABETES MELLITUS WITHOUT COMPLICATION, WITHOUT LONG-TERM CURRENT USE OF INSULIN (MULTI): ICD-10-CM

## 2025-01-14 DIAGNOSIS — F32.A ANXIETY AND DEPRESSION: ICD-10-CM

## 2025-01-14 DIAGNOSIS — R29.6 FREQUENT FALLS: Primary | ICD-10-CM

## 2025-01-14 DIAGNOSIS — I48.0 PAROXYSMAL ATRIAL FIBRILLATION (MULTI): ICD-10-CM

## 2025-01-14 DIAGNOSIS — B96.89 UTI DUE TO KLEBSIELLA SPECIES: ICD-10-CM

## 2025-01-14 DIAGNOSIS — F41.9 ANXIETY AND DEPRESSION: ICD-10-CM

## 2025-01-14 DIAGNOSIS — N39.0 UTI DUE TO KLEBSIELLA SPECIES: ICD-10-CM

## 2025-01-14 DIAGNOSIS — Z74.09 IMPAIRED FUNCTIONAL MOBILITY, BALANCE, GAIT, AND ENDURANCE: ICD-10-CM

## 2025-01-14 DIAGNOSIS — N18.30 CHRONIC RENAL IMPAIRMENT, STAGE 3 (MODERATE), UNSPECIFIED WHETHER STAGE 3A OR 3B CKD (MULTI): ICD-10-CM

## 2025-01-14 PROCEDURE — 99310 SBSQ NF CARE HIGH MDM 45: CPT | Performed by: PHYSICIAN ASSISTANT

## 2025-01-14 NOTE — LETTER
Patient: Harper Lara  : 1945    Encounter Date: 2025  Name: Harper Lara  YOB: 1945    Chief complaint: Frequent falls. Deconditioned.    HPI: This is a 79 year old  female who has a medical history remarkable for atrial flutter, atrial fibrillation, long term use of anticoagulants, anxiety disorder, MDD,diabetes, bilateral sensorineural hearing loss, venous insufficiency, dementia, hypothyroidism, and lower back pain. She presented to the ER after mechanical fall without head trauma. In the morning her  fell and she was trying to help her  when she lost her balance and fell without loss of consciousness and head trauma.  She landed on her left side and developed pain afterward. She has history of recurrent falls. CT scan of lumbar spine shows fracture involving L2 appearing similar to 2024. CT scan of the thoracic spine shows no acute thoracic spine fracture. CT scan of the cervical spine shows no evidence for an acute fracture or subluxation of the cervical spine. X-ray of the left hip shows no acute fracture or malalignment. X-ray of the left shoulder shows no acute fracture or malalignment of the left shoulder. Patient was supposed to go to rehab after fall in Dec. 2024 but declined and was discharged to home. Lab work up unremarkable with exception of urine which required treatment for UTI with Rocephin. She was discharged to SNF on Keflex for rehab.    Fall  The accident occurred More than 1 week ago. The fall occurred while standing. She fell from a height of 1 to 2 ft. She landed on Hard floor. There was no blood loss. The point of impact was the left shoulder. The pain is present in the left shoulder. The pain is at a severity of 4/10. The pain is moderate. Associated symptoms include hearing loss. Pertinent negatives include no abdominal pain, bowel incontinence, fever, hematuria, loss of consciousness, nausea, numbness or tingling.  "She has tried acetaminophen and rest for the symptoms. The treatment provided moderate relief.     Review of systems:   ROS negative except were noted in HPI.    Code Status: full code    /58   Pulse 57   Temp 36.8 °C (98.2 °F)   Resp 16   Ht 1.6 m (5' 3\")   Wt 65.3 kg (144 lb)   SpO2 97%   BMI 25.51 kg/m²      Physical Exam  Constitutional:       General: She is not in acute distress.  HENT:      Head: Normocephalic.      Nose: Nose normal.      Mouth/Throat:      Mouth: Mucous membranes are moist.   Eyes:      Extraocular Movements: Extraocular movements intact.      Pupils: Pupils are equal, round, and reactive to light.   Cardiovascular:      Rate and Rhythm: Normal rate. Rhythm irregular.   Pulmonary:      Effort: Pulmonary effort is normal.      Breath sounds: Normal breath sounds. No wheezing or rales.   Abdominal:      General: Bowel sounds are normal. There is no distension.      Palpations: Abdomen is soft.      Tenderness: There is no abdominal tenderness.   Genitourinary:     Comments: Voiding  Musculoskeletal:         General: Normal range of motion.      Cervical back: Normal range of motion.   Lymphadenopathy:      Cervical: No cervical adenopathy.   Skin:     General: Skin is warm and dry.      Capillary Refill: Capillary refill takes less than 2 seconds.      Comments: Abdominal wound with intact dressing.   Neurological:      General: No focal deficit present.      Mental Status: She is alert and oriented to person, place, and time.   Psychiatric:         Mood and Affect: Mood normal.         Behavior: Behavior normal.        Medications reviewed during visit at facility.  Ability 5 mg po daily  Apixaban Oral Tablet 5 mg po bid  Pantoprazole 40 mg po daily  Amlodipine 2.5 mg po daily  Lidocaine 5% apply to back on 12 hours off 12 hours prn  Fluoxetine 20 mg po daily  Tylenol 650 mg po q 4 hours prn  MOM 30 ml po daily prn  Omega 3 fish oil 1000 mg po daily  Magnesium 400 mg po " daily  Metformin 500 mg po bid  Vitamin D 3 400 UT po daily  Vitamin B 12 1000 mcg po daily  Levothyroxine 75 mcg po daily  Furosemide 40 mg po daily  Zofran 4 mg po q 6 hours prn  Labs reviewed at facility:    Laboratory: 2025 13:42 CBC and Differential / Comp Metabolic Panel  Latest Version (more available)  Reviewed by [email or web address] on 2025 08:17  Resident Information  Resident: Srinivas Melton ([id number])  Admit Date: 1/10/2025  Admitting Provider:   Attending Provider:   Copy to List:   Report Information  Collection Date: 2025 07:24  Received Date: 2025 07:24  Reported Date: 2025 13:42  Ord. Provider: Ha Thomason  Source Villanueva: [id number]  Lab Information  Status: Completed  Flag:    Reporting Lab:   [facility]  Order #:   [id number]  Vendor Order #:   [id number]  Category:   Chemistry, Hematology, Unknown Category  Order Notes  Result for:  SRINIVAS MELTON ( 1945, F)      Results   Show All Details Result Units Ref. Range Flag Status   CBC and Differential       White Blood Cell Count  3.69 k/uL 3.70-11.00 L Final           RBC  3.49 m/uL 3.90-5.20 L Final           Hemoglobin  10.2 g/dL 11.5-15.5 L Final           Hematocrit  32.2 % 36.0-46.0 L Final           MCV  92.3 fL 80.0-100.0  Final           MCH  29.2 pg 26.0-34.0  Final           MCHC  31.7 g/dL 30.5-36.0  Final           RDW-CV  13.8 % 11.5-15.0  Final           Platelet Count  167 k/uL 150-400  Final           MPV  10.1 fL 9.0-12.7  Final           Neut%  64.5 %   Final           Abs Neut  2.38 k/uL 1.45-7.50  Final           Lymph%  26.8 %   Final           Abs Lymph  0.99 k/uL 1.00-4.00 L Final           Mono%  5.4 %   Final           Abs Mono  0.20 k/uL <0.87  Final           Eosin%  2.7 %   Final           Abs Eosin  0.10 k/uL <0.46  Final           Baso%  0.3 %   Final           Abs Baso  <0.03 k/uL <0.11  Final           Immature Gran %%  0.3 %   Final           Abs Immature  Gran  <0.03 k/uL <0.10  Final           NRBC  0.0 /100 WBC   Final           Absolute nRBC  <0.01 k/uL <0.01  Final           Diff Type  Auto    Final      Scheduled: 1/13/2025 7:00 AM  Scheduled: 1/13/2025 7:00 AM   Comp Metabolic Panel       Protein, Total  6.0 g/dL 6.3-8.0 L Final           Albumin  3.4 g/dL 3.9-4.9 L Final           Calcium, Total  9.2 mg/dL 8.5-10.2  Final           Bilirubin, Total  0.3 mg/dL 0.2-1.3  Final           Alkaline Phosphatase  139 U/L  H Final           AST  27 U/L 13-35  Final           ALT  12 U/L 7-38  Final           Glucose  77 mg/dL 74-99  Final   BUN  33 mg/dL 7-21 H Final           Creatinine  1.03 mg/dL 0.58-0.96 H Final           Sodium  141 mmol/L 136-144  Final           Potassium  4.3 mmol/L 3.7-5.1  Final           Chloride  101 mmol/L   Final           CO2  25 mmol/L 22-30  Final           Anion Gap  15 mmol/L 8-15  Final           Estimated Glomerular Filtration Rate  56 mL/min/1.73 meters squared >=60 L   Assessment/Plan   Problem List Items Addressed This Visit       Anxiety and depression     Calm and cooperative. Continue with Fluoxetine 20 mg po daily, and Ability 5 mg po daily         Atrial fibrillation (Multi)     Controlled rate. Maintain Apixaban Oral Tablet 5 mg po bid         DM2 (diabetes mellitus, type 2) (Multi)     Review blood sugars. Blood sugar today 163. Continue with Metformin 500 mg po bid         Chronic renal insufficiency     Order CMP CBC with diff q Monday.         Frequent falls - Primary     Assist with all transfers.         UTI due to Klebsiella species     Completed antibiotic course yesterday.          Impaired functional mobility, balance, gait, and endurance     PT and OT to assess and treat.            Time:  I spent 45 minutes or greater with the patient. Greater than 50% of this time was spent in counseling and or coordination of care. The time includes prep time of reviewing vital signs, report from direct nursing  staff and or therapists, hospital documentation, reviewing labs, radiographs, diagnostic tests and or consultations, time directly spent with the patient interviewing, examining, and education regarding diagnosis, treatments, and medications, as well as documentation in the electronic medical record, and reviewing the plan of care and any new orders with the patient, nursing staff and other staff directly related to the patients care.      Salas Ulrich PA-C       Electronically Signed By: Salas Ulrich PA-C   1/15/25  7:57 PM

## 2025-01-15 ENCOUNTER — NURSING HOME VISIT (OUTPATIENT)
Dept: POST ACUTE CARE | Facility: EXTERNAL LOCATION | Age: 80
End: 2025-01-15
Payer: MEDICARE

## 2025-01-15 ENCOUNTER — APPOINTMENT (OUTPATIENT)
Dept: WOUND CARE | Facility: CLINIC | Age: 80
End: 2025-01-15
Payer: MEDICARE

## 2025-01-15 VITALS — DIASTOLIC BLOOD PRESSURE: 67 MMHG | SYSTOLIC BLOOD PRESSURE: 113 MMHG | HEART RATE: 89 BPM

## 2025-01-15 DIAGNOSIS — R29.6 FREQUENT FALLS: Primary | ICD-10-CM

## 2025-01-15 DIAGNOSIS — S32.030K COMPRESSION FRACTURE OF L3 LUMBAR VERTEBRA WITH NONUNION: ICD-10-CM

## 2025-01-15 DIAGNOSIS — I48.0 PAROXYSMAL ATRIAL FIBRILLATION (MULTI): ICD-10-CM

## 2025-01-15 DIAGNOSIS — F41.9 ANXIETY AND DEPRESSION: ICD-10-CM

## 2025-01-15 DIAGNOSIS — E11.9 TYPE 2 DIABETES MELLITUS WITHOUT COMPLICATION, WITHOUT LONG-TERM CURRENT USE OF INSULIN (MULTI): ICD-10-CM

## 2025-01-15 DIAGNOSIS — B96.89 UTI DUE TO KLEBSIELLA SPECIES: ICD-10-CM

## 2025-01-15 DIAGNOSIS — S31.819D WOUND OF GLUTEAL CLEFT, RIGHT, SUBSEQUENT ENCOUNTER: ICD-10-CM

## 2025-01-15 DIAGNOSIS — F32.A ANXIETY AND DEPRESSION: ICD-10-CM

## 2025-01-15 DIAGNOSIS — N39.0 UTI DUE TO KLEBSIELLA SPECIES: ICD-10-CM

## 2025-01-15 PROBLEM — Z74.09 IMPAIRED FUNCTIONAL MOBILITY, BALANCE, GAIT, AND ENDURANCE: Status: ACTIVE | Noted: 2025-01-15

## 2025-01-15 PROCEDURE — 99308 SBSQ NF CARE LOW MDM 20: CPT | Performed by: INTERNAL MEDICINE

## 2025-01-15 ASSESSMENT — ENCOUNTER SYMPTOMS
NAUSEA: 1
ARTHRALGIAS: 1
LOSS OF CONSCIOUSNESS: 0
DIARRHEA: 1
WOUND: 1
ACTIVITY CHANGE: 1
HEMATURIA: 0
APNEA: 0
VOMITING: 1
NAUSEA: 0
BOWEL INCONTINENCE: 0
FATIGUE: 1
ABDOMINAL PAIN: 0
ABDOMINAL PAIN: 0
TINGLING: 0
NERVOUS/ANXIOUS: 0
DIZZINESS: 0
FEVER: 0
WEAKNESS: 0
SHORTNESS OF BREATH: 1
COUGH: 0
ABDOMINAL DISTENTION: 0
NUMBNESS: 0

## 2025-01-15 NOTE — LETTER
Patient: Harper Lara  : 1945    Encounter Date: 01/15/2025    Subjective  Patient ID: Harper Lara is a 79 y.o. female who is acute skilled care being seen and evaluated for multiple medical problems.    Yesterday patient has some issues which include spells of loose stools nursing staff asked for stool for C. difficile.  Patient has a Klebsiella cystitis has been on Keflex.  Then patient was having couple of episodes of nausea vomiting.  Zofran was given, creatinine is 1.03, hemoglobin is 10.2.  Today patient was seen by me because of established problem, today patient is sitting upright somewhat calm, still there is a gluteal decubitus which I have not checked, her stomach is little bit comfortable and calm today, patient remains on Abilify and Prozac.  Patient remains on PPI treatment.  Absolutely not able to sustain standing, required 1-2 person assist for transfer activities, patient has history of atrial fibrillation.  Blood sugars are not out of ordinary, Eliquis to be continued.  Appetite still remains poor.  Skin is quite excoriated with the bruise and scratch marks.  Overall physical condition remains unchanged and frail.         Review of Systems   Constitutional:  Positive for activity change and fatigue.   HENT:  Negative for congestion.    Respiratory:  Positive for shortness of breath. Negative for apnea and cough.    Cardiovascular:  Negative for chest pain and leg swelling.   Gastrointestinal:  Positive for diarrhea, nausea and vomiting. Negative for abdominal distention and abdominal pain.   Musculoskeletal:  Positive for arthralgias.   Skin:  Positive for wound.   Neurological:  Negative for dizziness and weakness.   Psychiatric/Behavioral:  The patient is not nervous/anxious.        Objective  /67   Pulse 89     Physical Exam  Vitals reviewed.   Constitutional:       Appearance: Normal appearance. She is normal weight. She is not ill-appearing or toxic-appearing.   HENT:       Head: Normocephalic.   Eyes:      Conjunctiva/sclera: Conjunctivae normal.   Cardiovascular:      Rate and Rhythm: Normal rate. Rhythm irregular.   Pulmonary:      Effort: Pulmonary effort is normal.      Breath sounds: Normal breath sounds. No rales.   Abdominal:      Palpations: Abdomen is soft.   Musculoskeletal:         General: Tenderness present.      Cervical back: Neck supple.   Skin:     General: Skin is warm and dry.      Findings: Ecchymosis and erythema present.   Neurological:      Mental Status: Mental status is at baseline.   Psychiatric:         Behavior: Behavior normal.         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 difficile will be checked, symptomatic treatment with ondansetron for nausea vomiting much better, gluteal wound is being taken care by the wound care personal and nursing staff, it is a facility wound care team who has been monitoring patient's wound and providing proper treatment and dressings.  Metformin is alone treatment stable blood sugar, pain is not intractable has a lumbar 3 compression fracture longstanding from frequent falls, dual treatment for anxiety depression to be continued.  Susceptible for falls, admission risk is about 75%.  She will continue to receive skilled nursing and rehabilitation, long-term disposition will be unknown and too soon to think about it.  Any change or any new concerns or problem will be notified to us promptly, discussed with nursing staff, laboratories will be monitored and they are  not out of ordinary.     Goals    None           Electronically Signed By: Ha Thomason MD   1/15/25  9:26 PM

## 2025-01-15 NOTE — PROGRESS NOTES
1/14/2025  Name: Harper Lara  YOB: 1945    Chief complaint: Frequent falls. Deconditioned.    HPI: This is a 79 year old  female who has a medical history remarkable for atrial flutter, atrial fibrillation, long term use of anticoagulants, anxiety disorder, MDD,diabetes, bilateral sensorineural hearing loss, venous insufficiency, dementia, hypothyroidism, and lower back pain. She presented to the ER after mechanical fall without head trauma. In the morning her  fell and she was trying to help her  when she lost her balance and fell without loss of consciousness and head trauma.  She landed on her left side and developed pain afterward. She has history of recurrent falls. CT scan of lumbar spine shows fracture involving L2 appearing similar to 12/27/2024. CT scan of the thoracic spine shows no acute thoracic spine fracture. CT scan of the cervical spine shows no evidence for an acute fracture or subluxation of the cervical spine. X-ray of the left hip shows no acute fracture or malalignment. X-ray of the left shoulder shows no acute fracture or malalignment of the left shoulder. Patient was supposed to go to rehab after fall in Dec. 2024 but declined and was discharged to home. Lab work up unremarkable with exception of urine which required treatment for UTI with Rocephin. She was discharged to SNF on Keflex for rehab.    Fall  The accident occurred More than 1 week ago. The fall occurred while standing. She fell from a height of 1 to 2 ft. She landed on Hard floor. There was no blood loss. The point of impact was the left shoulder. The pain is present in the left shoulder. The pain is at a severity of 4/10. The pain is moderate. Associated symptoms include hearing loss. Pertinent negatives include no abdominal pain, bowel incontinence, fever, hematuria, loss of consciousness, nausea, numbness or tingling. She has tried acetaminophen and rest for the symptoms. The treatment  "provided moderate relief.     Review of systems:   ROS negative except were noted in HPI.    Code Status: full code    /58   Pulse 57   Temp 36.8 °C (98.2 °F)   Resp 16   Ht 1.6 m (5' 3\")   Wt 65.3 kg (144 lb)   SpO2 97%   BMI 25.51 kg/m²      Physical Exam  Constitutional:       General: She is not in acute distress.  HENT:      Head: Normocephalic.      Nose: Nose normal.      Mouth/Throat:      Mouth: Mucous membranes are moist.   Eyes:      Extraocular Movements: Extraocular movements intact.      Pupils: Pupils are equal, round, and reactive to light.   Cardiovascular:      Rate and Rhythm: Normal rate. Rhythm irregular.   Pulmonary:      Effort: Pulmonary effort is normal.      Breath sounds: Normal breath sounds. No wheezing or rales.   Abdominal:      General: Bowel sounds are normal. There is no distension.      Palpations: Abdomen is soft.      Tenderness: There is no abdominal tenderness.   Genitourinary:     Comments: Voiding  Musculoskeletal:         General: Normal range of motion.      Cervical back: Normal range of motion.   Lymphadenopathy:      Cervical: No cervical adenopathy.   Skin:     General: Skin is warm and dry.      Capillary Refill: Capillary refill takes less than 2 seconds.      Comments: Abdominal wound with intact dressing.   Neurological:      General: No focal deficit present.      Mental Status: She is alert and oriented to person, place, and time.   Psychiatric:         Mood and Affect: Mood normal.         Behavior: Behavior normal.        Medications reviewed during visit at facility.  Ability 5 mg po daily  Apixaban Oral Tablet 5 mg po bid  Pantoprazole 40 mg po daily  Amlodipine 2.5 mg po daily  Lidocaine 5% apply to back on 12 hours off 12 hours prn  Fluoxetine 20 mg po daily  Tylenol 650 mg po q 4 hours prn  MOM 30 ml po daily prn  Omega 3 fish oil 1000 mg po daily  Magnesium 400 mg po daily  Metformin 500 mg po bid  Vitamin D 3 400 UT po daily  Vitamin B 12 " 1000 mcg po daily  Levothyroxine 75 mcg po daily  Furosemide 40 mg po daily  Zofran 4 mg po q 6 hours prn  Labs reviewed at facility:    Laboratory: 2025 13:42 CBC and Differential / Comp Metabolic Panel  Latest Version (more available)  Reviewed by [email or web address] on 2025 08:17  Resident Information  Resident: Srinivas Melton ([id number])  Admit Date: 1/10/2025  Admitting Provider:   Attending Provider:   Copy to List:   Report Information  Collection Date: 2025 07:24  Received Date: 2025 07:24  Reported Date: 2025 13:42  Ord. Provider: Ha Thomason  Source Villanueva: [id number]  Lab Information  Status: Completed  Flag:    Reporting Lab:   [facility]  Order #:   [id number]  Vendor Order #:   [id number]  Category:   Chemistry, Hematology, Unknown Category  Order Notes  Result for:  SRINIVAS MELTON ( 1945, F)      Results   Show All Details Result Units Ref. Range Flag Status   CBC and Differential       White Blood Cell Count  3.69 k/uL 3.70-11.00 L Final           RBC  3.49 m/uL 3.90-5.20 L Final           Hemoglobin  10.2 g/dL 11.5-15.5 L Final           Hematocrit  32.2 % 36.0-46.0 L Final           MCV  92.3 fL 80.0-100.0  Final           MCH  29.2 pg 26.0-34.0  Final           MCHC  31.7 g/dL 30.5-36.0  Final           RDW-CV  13.8 % 11.5-15.0  Final           Platelet Count  167 k/uL 150-400  Final           MPV  10.1 fL 9.0-12.7  Final           Neut%  64.5 %   Final           Abs Neut  2.38 k/uL 1.45-7.50  Final           Lymph%  26.8 %   Final           Abs Lymph  0.99 k/uL 1.00-4.00 L Final           Mono%  5.4 %   Final           Abs Mono  0.20 k/uL <0.87  Final           Eosin%  2.7 %   Final           Abs Eosin  0.10 k/uL <0.46  Final           Baso%  0.3 %   Final           Abs Baso  <0.03 k/uL <0.11  Final           Immature Gran %%  0.3 %   Final           Abs Immature Gran  <0.03 k/uL <0.10  Final           NRBC  0.0 /100 WBC   Final           Absolute  nRBC  <0.01 k/uL <0.01  Final           Diff Type  Auto    Final      Scheduled: 1/13/2025 7:00 AM  Scheduled: 1/13/2025 7:00 AM   Comp Metabolic Panel       Protein, Total  6.0 g/dL 6.3-8.0 L Final           Albumin  3.4 g/dL 3.9-4.9 L Final           Calcium, Total  9.2 mg/dL 8.5-10.2  Final           Bilirubin, Total  0.3 mg/dL 0.2-1.3  Final           Alkaline Phosphatase  139 U/L  H Final           AST  27 U/L 13-35  Final           ALT  12 U/L 7-38  Final           Glucose  77 mg/dL 74-99  Final   BUN  33 mg/dL 7-21 H Final           Creatinine  1.03 mg/dL 0.58-0.96 H Final           Sodium  141 mmol/L 136-144  Final           Potassium  4.3 mmol/L 3.7-5.1  Final           Chloride  101 mmol/L   Final           CO2  25 mmol/L 22-30  Final           Anion Gap  15 mmol/L 8-15  Final           Estimated Glomerular Filtration Rate  56 mL/min/1.73 meters squared >=60 L   Assessment/Plan    Problem List Items Addressed This Visit       Anxiety and depression     Calm and cooperative. Continue with Fluoxetine 20 mg po daily, and Ability 5 mg po daily         Atrial fibrillation (Multi)     Controlled rate. Maintain Apixaban Oral Tablet 5 mg po bid         DM2 (diabetes mellitus, type 2) (Multi)     Review blood sugars. Blood sugar today 163. Continue with Metformin 500 mg po bid         Chronic renal insufficiency     Order CMP CBC with diff q Monday.         Frequent falls - Primary     Assist with all transfers.         UTI due to Klebsiella species     Completed antibiotic course yesterday.          Impaired functional mobility, balance, gait, and endurance     PT and OT to assess and treat.            Time:  I spent 45 minutes or greater with the patient. Greater than 50% of this time was spent in counseling and or coordination of care. The time includes prep time of reviewing vital signs, report from direct nursing staff and or therapists, hospital documentation, reviewing labs, radiographs,  diagnostic tests and or consultations, time directly spent with the patient interviewing, examining, and education regarding diagnosis, treatments, and medications, as well as documentation in the electronic medical record, and reviewing the plan of care and any new orders with the patient, nursing staff and other staff directly related to the patients care.      Salas Ulrich PA-C

## 2025-01-16 NOTE — PROGRESS NOTES
Subjective   Patient ID: Harper Lara is a 79 y.o. female who is acute skilled care being seen and evaluated for multiple medical problems.    Yesterday patient has some issues which include spells of loose stools nursing staff asked for stool for C. difficile.  Patient has a Klebsiella cystitis has been on Keflex.  Then patient was having couple of episodes of nausea vomiting.  Zofran was given, creatinine is 1.03, hemoglobin is 10.2.  Today patient was seen by me because of established problem, today patient is sitting upright somewhat calm, still there is a gluteal decubitus which I have not checked, her stomach is little bit comfortable and calm today, patient remains on Abilify and Prozac.  Patient remains on PPI treatment.  Absolutely not able to sustain standing, required 1-2 person assist for transfer activities, patient has history of atrial fibrillation.  Blood sugars are not out of ordinary, Eliquis to be continued.  Appetite still remains poor.  Skin is quite excoriated with the bruise and scratch marks.  Overall physical condition remains unchanged and frail.         Review of Systems   Constitutional:  Positive for activity change and fatigue.   HENT:  Negative for congestion.    Respiratory:  Positive for shortness of breath. Negative for apnea and cough.    Cardiovascular:  Negative for chest pain and leg swelling.   Gastrointestinal:  Positive for diarrhea, nausea and vomiting. Negative for abdominal distention and abdominal pain.   Musculoskeletal:  Positive for arthralgias.   Skin:  Positive for wound.   Neurological:  Negative for dizziness and weakness.   Psychiatric/Behavioral:  The patient is not nervous/anxious.        Objective   /67   Pulse 89     Physical Exam  Vitals reviewed.   Constitutional:       Appearance: Normal appearance. She is normal weight. She is not ill-appearing or toxic-appearing.   HENT:      Head: Normocephalic.   Eyes:      Conjunctiva/sclera: Conjunctivae  normal.   Cardiovascular:      Rate and Rhythm: Normal rate. Rhythm irregular.   Pulmonary:      Effort: Pulmonary effort is normal.      Breath sounds: Normal breath sounds. No rales.   Abdominal:      Palpations: Abdomen is soft.   Musculoskeletal:         General: Tenderness present.      Cervical back: Neck supple.   Skin:     General: Skin is warm and dry.      Findings: Ecchymosis and erythema present.   Neurological:      Mental Status: Mental status is at baseline.   Psychiatric:         Behavior: Behavior normal.         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 difficile will be checked, symptomatic treatment with ondansetron for nausea vomiting much better, gluteal wound is being taken care by the wound care personal and nursing staff, it is a facility wound care team who has been monitoring patient's wound and providing proper treatment and dressings.  Metformin is alone treatment stable blood sugar, pain is not intractable has a lumbar 3 compression fracture longstanding from frequent falls, dual treatment for anxiety depression to be continued.  Susceptible for falls, admission risk is about 75%.  She will continue to receive skilled nursing and rehabilitation, long-term disposition will be unknown and too soon to think about it.  Any change or any new concerns or problem will be notified to us promptly, discussed with nursing staff, laboratories will be monitored and they are not out of ordinary.     Goals    None

## 2025-01-21 ENCOUNTER — NURSING HOME VISIT (OUTPATIENT)
Dept: POST ACUTE CARE | Facility: EXTERNAL LOCATION | Age: 80
End: 2025-01-21
Payer: MEDICARE

## 2025-01-21 VITALS
SYSTOLIC BLOOD PRESSURE: 142 MMHG | OXYGEN SATURATION: 97 % | RESPIRATION RATE: 16 BRPM | HEIGHT: 63 IN | BODY MASS INDEX: 26.05 KG/M2 | TEMPERATURE: 98.7 F | DIASTOLIC BLOOD PRESSURE: 62 MMHG | WEIGHT: 147 LBS | HEART RATE: 56 BPM

## 2025-01-21 DIAGNOSIS — Z79.01 CURRENT USE OF ANTICOAGULANT THERAPY: ICD-10-CM

## 2025-01-21 DIAGNOSIS — F41.9 ANXIETY AND DEPRESSION: ICD-10-CM

## 2025-01-21 DIAGNOSIS — E11.9 TYPE 2 DIABETES MELLITUS WITHOUT COMPLICATION, WITHOUT LONG-TERM CURRENT USE OF INSULIN (MULTI): ICD-10-CM

## 2025-01-21 DIAGNOSIS — F32.A ANXIETY AND DEPRESSION: ICD-10-CM

## 2025-01-21 DIAGNOSIS — Z74.09 IMPAIRED FUNCTIONAL MOBILITY, BALANCE, GAIT, AND ENDURANCE: Primary | ICD-10-CM

## 2025-01-21 PROCEDURE — 99308 SBSQ NF CARE LOW MDM 20: CPT | Performed by: PHYSICIAN ASSISTANT

## 2025-01-21 NOTE — LETTER
"Patient: Harper Lara  : 1945    Encounter Date: 2025  Name: Harper Lara  YOB: 1945    Chief complaint: Impaired mobility.    HPI: Patient is alert and cooperative with exam. Review of the chart shows she is walking > 100 feet during supervised ambulation. Lab work shows stable electrolytes and renal function.     Extremity Weakness  The current episode started 1 to 4 weeks ago. The problem occurs daily. The problem has been gradually improving. Pertinent negatives include no abdominal pain, anorexia, arthralgias, change in bowel habit, chest pain, coughing, fever, headaches, joint swelling, numbness or urinary symptoms. Nothing aggravates the symptoms. She has tried ice, relaxation, rest and walking for the symptoms. The treatment provided moderate relief.     Review of systems:   ROS negative except were noted in HPI.    Code Status: full code    /62   Pulse 56   Temp 37.1 °C (98.7 °F)   Resp 16   Ht 1.6 m (5' 3\")   Wt 66.7 kg (147 lb)   SpO2 97%   BMI 26.04 kg/m²      Physical Exam  Constitutional:       General: She is not in acute distress.  HENT:      Head: Normocephalic.      Nose: Nose normal.      Mouth/Throat:      Mouth: Mucous membranes are moist.   Eyes:      Extraocular Movements: Extraocular movements intact.      Pupils: Pupils are equal, round, and reactive to light.   Cardiovascular:      Rate and Rhythm: Normal rate. Rhythm irregular.   Pulmonary:      Effort: Pulmonary effort is normal.      Breath sounds: Normal breath sounds. No wheezing or rales.   Abdominal:      General: Bowel sounds are normal. There is no distension.      Palpations: Abdomen is soft.      Tenderness: There is no abdominal tenderness.   Genitourinary:     Comments: Voiding  Musculoskeletal:         General: Normal range of motion.      Cervical back: Normal range of motion.   Lymphadenopathy:      Cervical: No cervical adenopathy.   Skin:     General: Skin is warm " and dry.      Capillary Refill: Capillary refill takes less than 2 seconds.      Comments: Abdominal wound with intact dressing.   Neurological:      General: No focal deficit present.      Mental Status: She is alert and oriented to person, place, and time.   Psychiatric:         Mood and Affect: Mood normal.         Behavior: Behavior normal.     Medications reviewed during visit at facility.  Ability 5 mg po daily  Apixaban Oral Tablet 5 mg po bid  Pantoprazole 40 mg po daily  Amlodipine 2.5 mg po daily  Lidocaine 5% apply to back on 12 hours off 12 hours prn  Fluoxetine 20 mg po daily  Tylenol 650 mg po q 4 hours prn  MOM 30 ml po daily prn  Omega 3 fish oil 1000 mg po daily  Magnesium 400 mg po daily  Metformin 500 mg po bid  Vitamin D 3 400 UT po daily  Vitamin B 12 1000 mcg po daily  Levothyroxine 75 mcg po daily  Furosemide 40 mg po daily  Zofran 4 mg po q 6 hours prn  Labs reviewed at facility:    Laboratory: 2025 13:28 CBC and Differential / Comp Metabolic Panel  Latest Version (more available)  Reviewed by [email or web address] on 2025 08:25  Resident Information  Resident: Harper Melton ([id number])  Admit Date: 1/10/2025  Admitting Provider:   Attending Provider:   Copy to List:   Report Information  Collection Date: 2025 07:52  Received Date: 2025 07:52  Reported Date: 2025 13:28  Ord. Provider: Ha Thomason  Source Villanueva: [id number]  Lab Information  Status: Completed  Flag:    Reporting Lab:   [facility]  Order #:   [id number]  Vendor Order #:   [id number]  Category:   Chemistry, Hematology, Unknown Category  Order Notes  Result for:  HARPER MELTON ( 1945, F)      Results   Show All Details Result Units Ref. Range Flag Status   CBC and Differential       White Blood Cell Count  3.01 k/uL 3.70-11.00 L Final           RBC  3.56 m/uL 3.90-5.20 L Final           Hemoglobin  10.5 g/dL 11.5-15.5 L Final           Hematocrit  32.4 % 36.0-46.0 L Final            MCV  91.0 fL 80.0-100.0  Final           MCH  29.5 pg 26.0-34.0  Final           MCHC  32.4 g/dL 30.5-36.0  Final           RDW-CV  13.9 % 11.5-15.0  Final           Platelet Count  151 k/uL 150-400  Final           MPV  10.3 fL 9.0-12.7  Final           Neut%  54.9 %   Final           Abs Neut  1.65 k/uL 1.45-7.50  Final           Lymph%  35.2 %   Final           Abs Lymph  1.06 k/uL 1.00-4.00  Final           Mono%  6.0 %   Final           Abs Mono  0.18 k/uL <0.87  Final           Eosin%  3.3 %   Final           Abs Eosin  0.10 k/uL <0.46  Final           Baso%  0.3 %   Final           Abs Baso  <0.03 k/uL <0.11  Final           Immature Gran %%  0.3 %   Final           Abs Immature Gran  <0.03 k/uL <0.10  Final           NRBC  0.0 /100 WBC   Final           Absolute nRBC  <0.01 k/uL <0.01  Final           Diff Type  Auto    Final      Scheduled: 1/20/2025 7:00 AM  Scheduled: 1/20/2025 7:00 AM   Comp Metabolic Panel       Protein, Total  6.4 g/dL 6.3-8.0  Final           Albumin  3.7 g/dL 3.9-4.9 L Final           Calcium, Total  9.4 mg/dL 8.5-10.2  Final           Bilirubin, Total  0.3 mg/dL 0.2-1.3  Final           Alkaline Phosphatase  145 U/L  H Final           AST  23 U/L 13-35  Final           ALT  11 U/L 7-38  Final           Glucose  93 mg/dL 74-99  Final      BUN  30 mg/dL 7-21 H Final           Creatinine  0.92 mg/dL 0.58-0.96  Final           Sodium  140 mmol/L 136-144  Final           Potassium  4.3 mmol/L 3.7-5.1  Final           Chloride  102 mmol/L   Final           CO2  30 mmol/L 22-30  Final           Anion Gap  8 mmol/L 8-15  Final           Estimated Glomerular Filtration Rate  64 mL/min/1.73 meters squared >=60   Assessment/Plan   Problem List Items Addressed This Visit       Anxiety and depression     Calm and cooperative. Offering no complaints. Continue with Fluoxetine 20 mg po daily         DM2 (diabetes mellitus, type 2) (Multi)     Review blood sugars. Blood sugar today is  107.  Continue with Metformin 500 mg po bid         Impaired functional mobility, balance, gait, and endurance - Primary     Review physical and occupational therapy notes. Now walking 100 feet.          Current use of anticoagulant therapy     Apixaban Oral Tablet 5 mg po bid            Time:    Salas Ulrich PA-C       Electronically Signed By: Salas Ulrich PA-C   1/24/25  8:50 PM

## 2025-01-22 NOTE — PROGRESS NOTES
"1/21/2025  Name: Harper Lara  YOB: 1945    Chief complaint: Impaired mobility.    HPI: Patient is alert and cooperative with exam. Review of the chart shows she is walking > 100 feet during supervised ambulation. Lab work shows stable electrolytes and renal function.     Extremity Weakness  The current episode started 1 to 4 weeks ago. The problem occurs daily. The problem has been gradually improving. Pertinent negatives include no abdominal pain, anorexia, arthralgias, change in bowel habit, chest pain, coughing, fever, headaches, joint swelling, numbness or urinary symptoms. Nothing aggravates the symptoms. She has tried ice, relaxation, rest and walking for the symptoms. The treatment provided moderate relief.     Review of systems:   ROS negative except were noted in HPI.    Code Status: full code    /62   Pulse 56   Temp 37.1 °C (98.7 °F)   Resp 16   Ht 1.6 m (5' 3\")   Wt 66.7 kg (147 lb)   SpO2 97%   BMI 26.04 kg/m²      Physical Exam  Constitutional:       General: She is not in acute distress.  HENT:      Head: Normocephalic.      Nose: Nose normal.      Mouth/Throat:      Mouth: Mucous membranes are moist.   Eyes:      Extraocular Movements: Extraocular movements intact.      Pupils: Pupils are equal, round, and reactive to light.   Cardiovascular:      Rate and Rhythm: Normal rate. Rhythm irregular.   Pulmonary:      Effort: Pulmonary effort is normal.      Breath sounds: Normal breath sounds. No wheezing or rales.   Abdominal:      General: Bowel sounds are normal. There is no distension.      Palpations: Abdomen is soft.      Tenderness: There is no abdominal tenderness.   Genitourinary:     Comments: Voiding  Musculoskeletal:         General: Normal range of motion.      Cervical back: Normal range of motion.   Lymphadenopathy:      Cervical: No cervical adenopathy.   Skin:     General: Skin is warm and dry.      Capillary Refill: Capillary refill takes less than 2 " seconds.      Comments: Abdominal wound with intact dressing.   Neurological:      General: No focal deficit present.      Mental Status: She is alert and oriented to person, place, and time.   Psychiatric:         Mood and Affect: Mood normal.         Behavior: Behavior normal.     Medications reviewed during visit at facility.  Ability 5 mg po daily  Apixaban Oral Tablet 5 mg po bid  Pantoprazole 40 mg po daily  Amlodipine 2.5 mg po daily  Lidocaine 5% apply to back on 12 hours off 12 hours prn  Fluoxetine 20 mg po daily  Tylenol 650 mg po q 4 hours prn  MOM 30 ml po daily prn  Omega 3 fish oil 1000 mg po daily  Magnesium 400 mg po daily  Metformin 500 mg po bid  Vitamin D 3 400 UT po daily  Vitamin B 12 1000 mcg po daily  Levothyroxine 75 mcg po daily  Furosemide 40 mg po daily  Zofran 4 mg po q 6 hours prn  Labs reviewed at facility:    Laboratory: 2025 13:28 CBC and Differential / Comp Metabolic Panel  Latest Version (more available)  Reviewed by temitope on 2025 08:25  Resident Information  Resident: Harper Melton ([id number])  Admit Date: 1/10/2025  Admitting Provider:   Attending Provider:   Copy to List:   Report Information  Collection Date: 2025 07:52  Received Date: 2025 07:52  Reported Date: 2025 13:28  Ord. Provider: Ha Thomason  Source Villanueva: [id number]  Lab Information  Status: Completed  Flag:    Reporting Lab:   [facility]  Order #:   [id number]  Vendor Order #:   [id number]  Category:   Chemistry, Hematology, Unknown Category  Order Notes  Result for:  HARPER MELTON ( 1945, F)      Results   Show All Details Result Units Ref. Range Flag Status   CBC and Differential       White Blood Cell Count  3.01 k/uL 3.70-11.00 L Final           RBC  3.56 m/uL 3.90-5.20 L Final           Hemoglobin  10.5 g/dL 11.5-15.5 L Final           Hematocrit  32.4 % 36.0-46.0 L Final           MCV  91.0 fL 80.0-100.0  Final           MCH  29.5 pg 26.0-34.0  Final            MCHC  32.4 g/dL 30.5-36.0  Final           RDW-CV  13.9 % 11.5-15.0  Final           Platelet Count  151 k/uL 150-400  Final           MPV  10.3 fL 9.0-12.7  Final           Neut%  54.9 %   Final           Abs Neut  1.65 k/uL 1.45-7.50  Final           Lymph%  35.2 %   Final           Abs Lymph  1.06 k/uL 1.00-4.00  Final           Mono%  6.0 %   Final           Abs Mono  0.18 k/uL <0.87  Final           Eosin%  3.3 %   Final           Abs Eosin  0.10 k/uL <0.46  Final           Baso%  0.3 %   Final           Abs Baso  <0.03 k/uL <0.11  Final           Immature Gran %%  0.3 %   Final           Abs Immature Gran  <0.03 k/uL <0.10  Final           NRBC  0.0 /100 WBC   Final           Absolute nRBC  <0.01 k/uL <0.01  Final           Diff Type  Auto    Final      Scheduled: 1/20/2025 7:00 AM  Scheduled: 1/20/2025 7:00 AM   Comp Metabolic Panel       Protein, Total  6.4 g/dL 6.3-8.0  Final           Albumin  3.7 g/dL 3.9-4.9 L Final           Calcium, Total  9.4 mg/dL 8.5-10.2  Final           Bilirubin, Total  0.3 mg/dL 0.2-1.3  Final           Alkaline Phosphatase  145 U/L  H Final           AST  23 U/L 13-35  Final           ALT  11 U/L 7-38  Final           Glucose  93 mg/dL 74-99  Final      BUN  30 mg/dL 7-21 H Final           Creatinine  0.92 mg/dL 0.58-0.96  Final           Sodium  140 mmol/L 136-144  Final           Potassium  4.3 mmol/L 3.7-5.1  Final           Chloride  102 mmol/L   Final           CO2  30 mmol/L 22-30  Final           Anion Gap  8 mmol/L 8-15  Final           Estimated Glomerular Filtration Rate  64 mL/min/1.73 meters squared >=60   Assessment/Plan    Problem List Items Addressed This Visit       Anxiety and depression     Calm and cooperative. Offering no complaints. Continue with Fluoxetine 20 mg po daily         DM2 (diabetes mellitus, type 2) (Multi)     Review blood sugars. Blood sugar today is 107.  Continue with Metformin 500 mg po bid         Impaired functional  mobility, balance, gait, and endurance - Primary     Review physical and occupational therapy notes. Now walking 100 feet.          Current use of anticoagulant therapy     Apixaban Oral Tablet 5 mg po bid            Time:    Salas Ulrich PA-C

## 2025-01-24 PROBLEM — Z79.01 CURRENT USE OF ANTICOAGULANT THERAPY: Status: ACTIVE | Noted: 2025-01-24

## 2025-01-24 ASSESSMENT — ENCOUNTER SYMPTOMS
CHANGE IN BOWEL HABIT: 0
FEVER: 0
NUMBNESS: 0
EXTREMITY WEAKNESS: 1
ARTHRALGIAS: 0
ANOREXIA: 0
COUGH: 0
ABDOMINAL PAIN: 0
JOINT SWELLING: 0
HEADACHES: 0

## 2025-01-28 ENCOUNTER — NURSING HOME VISIT (OUTPATIENT)
Dept: POST ACUTE CARE | Facility: EXTERNAL LOCATION | Age: 80
End: 2025-01-28
Payer: MEDICARE

## 2025-01-28 DIAGNOSIS — E11.9 TYPE 2 DIABETES MELLITUS WITHOUT COMPLICATION, WITHOUT LONG-TERM CURRENT USE OF INSULIN (MULTI): ICD-10-CM

## 2025-01-28 DIAGNOSIS — I10 HTN (HYPERTENSION), BENIGN: ICD-10-CM

## 2025-01-28 DIAGNOSIS — I50.22 CHRONIC SYSTOLIC CONGESTIVE HEART FAILURE: Primary | ICD-10-CM

## 2025-01-28 DIAGNOSIS — Z79.01 CURRENT USE OF ANTICOAGULANT THERAPY: ICD-10-CM

## 2025-01-28 PROCEDURE — 99308 SBSQ NF CARE LOW MDM 20: CPT | Performed by: PHYSICIAN ASSISTANT

## 2025-01-28 NOTE — LETTER
"Patient: Harper Lara  : 1945    Encounter Date: 2025  Name: Harper Lara  YOB: 1945    Chief complaint: Follow up for systolic heart failure    HPI: Patient seen and examined in her room. She describes her breathing as \" good \". Pulse oximetry 95% on room air. Echocardiogram in 2021 showed ejection fraction of 40%. She has been receiving Lasix . Review of lab work shows stable electrolytes and renal function. Her weight is unchanged at 145 lbs.     Congestive Heart Failure  Presents for follow-up visit. Pertinent negatives include no abdominal pain, chest pain, chest pressure, fatigue, palpitations, shortness of breath or unexpected weight change. The symptoms have been stable. Compliance with total regimen is %.     Review of systems:   ROS negative except were noted in HPI.    Code Status: full code    /60   Pulse 74   Temp 36.6 °C (97.8 °F)   Resp 18   Ht 1.6 m (5' 3\")   Wt 65.8 kg (145 lb)   SpO2 95%   BMI 25.69 kg/m²      Physical Exam  Constitutional:       General: She is not in acute distress.  HENT:      Head: Normocephalic.      Nose: Nose normal.      Mouth/Throat:      Mouth: Mucous membranes are moist.   Eyes:      Extraocular Movements: Extraocular movements intact.      Pupils: Pupils are equal, round, and reactive to light.   Cardiovascular:      Rate and Rhythm: Normal rate. Rhythm irregular.   Pulmonary:      Effort: Pulmonary effort is normal.      Breath sounds: Normal breath sounds. No wheezing or rales.   Abdominal:      General: Bowel sounds are normal. There is no distension.      Palpations: Abdomen is soft.      Tenderness: There is no abdominal tenderness.   Genitourinary:     Comments: Voiding  Musculoskeletal:         General: Normal range of motion.      Cervical back: Normal range of motion.   Lymphadenopathy:      Cervical: No cervical adenopathy.   Skin:     General: Skin is warm and dry.      Capillary Refill: " Capillary refill takes less than 2 seconds.      Comments:   Neurological:      General: No focal deficit present.      Mental Status: She is alert and oriented to person, place, and time.   Psychiatric:         Mood and Affect: Mood normal.         Behavior: Behavior normal.     Medications reviewed during visit at facility.  Ability 5 mg po daily  Apixaban Oral Tablet 5 mg po bid  Pantoprazole 40 mg po daily  Amlodipine 2.5 mg po daily  Lidocaine 5% apply to back on 12 hours off 12 hours prn  Fluoxetine 20 mg po daily  Tylenol 650 mg po q 4 hours prn  MOM 30 ml po daily prn  Omega 3 fish oil 1000 mg po daily  Magnesium 400 mg po daily  Metformin 500 mg po bid  Vitamin D 3 400 UT po daily  Vitamin B 12 1000 mcg po daily  Levothyroxine 75 mcg po daily  Furosemide 40 mg po daily at 0900  Furosemide 20 mg po daily at 1200  Zofran 4 mg po q 6 hours prn  Labs reviewed at facility:    Laboratory: 2025 14:30 CBC and Differential / Comp Metabolic Panel  Latest Version (more available)  Reviewed by temitope on 2025 16:10  Resident Information  Resident: Harper Melton ([id number])  Admit Date: 1/10/2025  Admitting Provider:   Attending Provider:   Copy to List:   Report Information  Collection Date: 2025 07:25  Received Date: 2025 07:25  Reported Date: 2025 14:30  Ord. Provider: Ha Thomason  Source Villanueva: [id number]  Lab Information  Status: Completed  Flag:    Reporting Lab:   [facility]  Order #:   [id number]  Vendor Order #:   [id number]  Category:   Chemistry, Hematology, Unknown Category  Order Notes  Result for:  HARPER MELTON ( 1945, F)      Results   Show All Details Result Units Ref. Range Flag Status   CBC and Differential       White Blood Cell Count  2.91 k/uL 3.70-11.00 L Final           RBC  3.35 m/uL 3.90-5.20 L Final           Hemoglobin  9.8 g/dL 11.5-15.5 L Final           Hematocrit  31.0 % 36.0-46.0 L Final           MCV  92.5 fL 80.0-100.0  Final            MCH  29.3 pg 26.0-34.0  Final           MCHC  31.6 g/dL 30.5-36.0  Final           RDW-CV  13.9 % 11.5-15.0  Final           Platelet Count  132 k/uL 150-400 L Final           MPV  10.5 fL 9.0-12.7  Final           Neut%  59.6 %   Final           Abs Neut  1.73 k/uL 1.45-7.50  Final           Lymph%  30.2 %   Final           Abs Lymph  0.88 k/uL 1.00-4.00 L Final           Mono%  6.5 %   Final           Abs Mono  0.19 k/uL <0.87  Final           Eosin%  2.7 %   Final           Abs Eosin  0.08 k/uL <0.46  Final           Baso%  0.7 %   Final           Abs Baso  <0.03 k/uL <0.11  Final           Immature Gran %%  0.3 %   Final           Abs Immature Gran  <0.03 k/uL <0.10  Final           NRBC  0.0 /100 WBC   Final           Absolute nRBC  <0.01 k/uL <0.01  Final           Diff Type  Auto    Final      Scheduled: 1/27/2025 7:00 AM  Scheduled: 1/27/2025 7:00 AM   Comp Metabolic Panel       Protein, Total  6.0 g/dL 6.3-8.0 L Final           Albumin  3.2 g/dL 3.9-4.9 L Final           Calcium, Total  9.1 mg/dL 8.5-10.2  Final           Bilirubin, Total  0.3 mg/dL 0.2-1.3  Final           Alkaline Phosphatase  120 U/L   Final           AST  19 U/L 13-35  Final           ALT  9 U/L 7-38  Final           Glucose  80 mg/dL 74-99  Final      BUN  27 mg/dL 7-21 H Final           Creatinine  0.93 mg/dL 0.58-0.96  Final           Sodium  141 mmol/L 136-144  Final           Potassium  3.9 mmol/L 3.7-5.1  Final           Chloride  103 mmol/L   Final           CO2  25 mmol/L 22-30  Final           Anion Gap  13 mmol/L 8-15  Final           Estimated Glomerular Filtration Rate  63 mL/min/1.73 meters squared >=60  Final  Assessment/Plan   Problem List Items Addressed This Visit       CHF (congestive heart failure) - Primary     Monitor weight. Review labs. CMP CBC with diff q Monday. Continue with Lasix.         DM2 (diabetes mellitus, type 2) (Multi)     Well controlled on Metformin.         HTN (hypertension),  benign     Review BP readings. Amlodipine 2.5 mg po daily.          Current use of anticoagulant therapy     Apixaban Oral Tablet 5 mg po bid. Monitor for signs of bleeding.            Time:    Salas Ulrich PA-C       Electronically Signed By: Salas Ulrich PA-C   1/31/25 10:24 AM

## 2025-01-31 VITALS
SYSTOLIC BLOOD PRESSURE: 122 MMHG | BODY MASS INDEX: 25.69 KG/M2 | TEMPERATURE: 97.8 F | WEIGHT: 145 LBS | RESPIRATION RATE: 18 BRPM | DIASTOLIC BLOOD PRESSURE: 60 MMHG | HEIGHT: 63 IN | OXYGEN SATURATION: 95 % | HEART RATE: 74 BPM

## 2025-01-31 ASSESSMENT — ENCOUNTER SYMPTOMS
ABDOMINAL PAIN: 0
SHORTNESS OF BREATH: 0
PALPITATIONS: 0
CHEST PRESSURE: 0
UNEXPECTED WEIGHT CHANGE: 0
FATIGUE: 0

## 2025-01-31 NOTE — PROGRESS NOTES
"1/28/2025  Name: Harper Lara  YOB: 1945    Chief complaint: Follow up for systolic heart failure    HPI: Patient seen and examined in her room. She describes her breathing as \" good \". Pulse oximetry 95% on room air. Echocardiogram in 12/13/2021 showed ejection fraction of 40%. She has been receiving Lasix . Review of lab work shows stable electrolytes and renal function. Her weight is unchanged at 145 lbs.     Congestive Heart Failure  Presents for follow-up visit. Pertinent negatives include no abdominal pain, chest pain, chest pressure, fatigue, palpitations, shortness of breath or unexpected weight change. The symptoms have been stable. Compliance with total regimen is %.     Review of systems:   ROS negative except were noted in HPI.    Code Status: full code    /60   Pulse 74   Temp 36.6 °C (97.8 °F)   Resp 18   Ht 1.6 m (5' 3\")   Wt 65.8 kg (145 lb)   SpO2 95%   BMI 25.69 kg/m²      Physical Exam  Constitutional:       General: She is not in acute distress.  HENT:      Head: Normocephalic.      Nose: Nose normal.      Mouth/Throat:      Mouth: Mucous membranes are moist.   Eyes:      Extraocular Movements: Extraocular movements intact.      Pupils: Pupils are equal, round, and reactive to light.   Cardiovascular:      Rate and Rhythm: Normal rate. Rhythm irregular.   Pulmonary:      Effort: Pulmonary effort is normal.      Breath sounds: Normal breath sounds. No wheezing or rales.   Abdominal:      General: Bowel sounds are normal. There is no distension.      Palpations: Abdomen is soft.      Tenderness: There is no abdominal tenderness.   Genitourinary:     Comments: Voiding  Musculoskeletal:         General: Normal range of motion.      Cervical back: Normal range of motion.   Lymphadenopathy:      Cervical: No cervical adenopathy.   Skin:     General: Skin is warm and dry.      Capillary Refill: Capillary refill takes less than 2 seconds.      Comments:   Neurological: "      General: No focal deficit present.      Mental Status: She is alert and oriented to person, place, and time.   Psychiatric:         Mood and Affect: Mood normal.         Behavior: Behavior normal.     Medications reviewed during visit at facility.  Ability 5 mg po daily  Apixaban Oral Tablet 5 mg po bid  Pantoprazole 40 mg po daily  Amlodipine 2.5 mg po daily  Lidocaine 5% apply to back on 12 hours off 12 hours prn  Fluoxetine 20 mg po daily  Tylenol 650 mg po q 4 hours prn  MOM 30 ml po daily prn  Omega 3 fish oil 1000 mg po daily  Magnesium 400 mg po daily  Metformin 500 mg po bid  Vitamin D 3 400 UT po daily  Vitamin B 12 1000 mcg po daily  Levothyroxine 75 mcg po daily  Furosemide 40 mg po daily at 0900  Furosemide 20 mg po daily at 1200  Zofran 4 mg po q 6 hours prn  Labs reviewed at facility:    Laboratory: 2025 14:30 CBC and Differential / Comp Metabolic Panel  Latest Version (more available)  Reviewed by temitope on 2025 16:10  Resident Information  Resident: Harper Melton ([id number])  Admit Date: 1/10/2025  Admitting Provider:   Attending Provider:   Copy to List:   Report Information  Collection Date: 2025 07:25  Received Date: 2025 07:25  Reported Date: 2025 14:30  Ord. Provider: Ha Thomason  Source Villanueva: [id number]  Lab Information  Status: Completed  Flag:    Reporting Lab:   [facility]  Order #:   [id number]  Vendor Order #:   [id number]  Category:   Chemistry, Hematology, Unknown Category  Order Notes  Result for:  HARPER MELTON ( 1945, F)      Results   Show All Details Result Units Ref. Range Flag Status   CBC and Differential       White Blood Cell Count  2.91 k/uL 3.70-11.00 L Final           RBC  3.35 m/uL 3.90-5.20 L Final           Hemoglobin  9.8 g/dL 11.5-15.5 L Final           Hematocrit  31.0 % 36.0-46.0 L Final           MCV  92.5 fL 80.0-100.0  Final           MCH  29.3 pg 26.0-34.0  Final            MCHC  31.6 g/dL 30.5-36.0  Final           RDW-CV  13.9 % 11.5-15.0  Final           Platelet Count  132 k/uL 150-400 L Final           MPV  10.5 fL 9.0-12.7  Final           Neut%  59.6 %   Final           Abs Neut  1.73 k/uL 1.45-7.50  Final           Lymph%  30.2 %   Final           Abs Lymph  0.88 k/uL 1.00-4.00 L Final           Mono%  6.5 %   Final           Abs Mono  0.19 k/uL <0.87  Final           Eosin%  2.7 %   Final           Abs Eosin  0.08 k/uL <0.46  Final           Baso%  0.7 %   Final           Abs Baso  <0.03 k/uL <0.11  Final           Immature Gran %%  0.3 %   Final           Abs Immature Gran  <0.03 k/uL <0.10  Final           NRBC  0.0 /100 WBC   Final           Absolute nRBC  <0.01 k/uL <0.01  Final           Diff Type  Auto    Final      Scheduled: 1/27/2025 7:00 AM  Scheduled: 1/27/2025 7:00 AM   Comp Metabolic Panel       Protein, Total  6.0 g/dL 6.3-8.0 L Final           Albumin  3.2 g/dL 3.9-4.9 L Final           Calcium, Total  9.1 mg/dL 8.5-10.2  Final           Bilirubin, Total  0.3 mg/dL 0.2-1.3  Final           Alkaline Phosphatase  120 U/L   Final           AST  19 U/L 13-35  Final           ALT  9 U/L 7-38  Final           Glucose  80 mg/dL 74-99  Final      BUN  27 mg/dL 7-21 H Final           Creatinine  0.93 mg/dL 0.58-0.96  Final           Sodium  141 mmol/L 136-144  Final           Potassium  3.9 mmol/L 3.7-5.1  Final           Chloride  103 mmol/L   Final           CO2  25 mmol/L 22-30  Final           Anion Gap  13 mmol/L 8-15  Final           Estimated Glomerular Filtration Rate  63 mL/min/1.73 meters squared >=60  Final  Assessment/Plan    Problem List Items Addressed This Visit       CHF (congestive heart failure) - Primary     Monitor weight. Review labs. CMP CBC with diff q Monday. Continue with Lasix.         DM2 (diabetes mellitus, type 2) (Multi)     Well controlled on Metformin.         HTN (hypertension), benign     Review BP readings. Amlodipine 2.5 mg  po daily.          Current use of anticoagulant therapy     Apixaban Oral Tablet 5 mg po bid. Monitor for signs of bleeding.            Time:    Salas Ulrich PA-C

## 2025-02-05 ENCOUNTER — NURSING HOME VISIT (OUTPATIENT)
Dept: POST ACUTE CARE | Facility: EXTERNAL LOCATION | Age: 80
End: 2025-02-05
Payer: MEDICARE

## 2025-02-05 DIAGNOSIS — E11.9 TYPE 2 DIABETES MELLITUS WITHOUT COMPLICATION, WITHOUT LONG-TERM CURRENT USE OF INSULIN (MULTI): ICD-10-CM

## 2025-02-05 DIAGNOSIS — S32.030K COMPRESSION FRACTURE OF L3 LUMBAR VERTEBRA WITH NONUNION: ICD-10-CM

## 2025-02-05 DIAGNOSIS — I50.22 CHRONIC SYSTOLIC CONGESTIVE HEART FAILURE: ICD-10-CM

## 2025-02-05 DIAGNOSIS — S31.819D WOUND OF GLUTEAL CLEFT, RIGHT, SUBSEQUENT ENCOUNTER: ICD-10-CM

## 2025-02-05 DIAGNOSIS — I48.0 PAROXYSMAL ATRIAL FIBRILLATION (MULTI): ICD-10-CM

## 2025-02-05 DIAGNOSIS — I10 HTN (HYPERTENSION), BENIGN: Primary | ICD-10-CM

## 2025-02-05 DIAGNOSIS — R29.6 FREQUENT FALLS: ICD-10-CM

## 2025-02-05 PROCEDURE — 99308 SBSQ NF CARE LOW MDM 20: CPT | Performed by: INTERNAL MEDICINE

## 2025-02-05 NOTE — LETTER
Patient: Harper Lraa  : 1945    Encounter Date: 2025    Subjective  Patient ID: Harper Lara is a 79 y.o. female who is acute skilled care being seen and evaluated for multiple medical problems.    79-year-old female patient was seen for follow-up.  Creatinine is 0.93, hemoglobin is 10.7.  Medications are amlodipine, Eliquis, Prozac, Lasix, metformin.  Reported blood sugar was 111.  Gluteal wound and abdominal wall wound has been dressed, nausea vomiting has been subsided, mild leukopenia is present, recent laboratories were reviewed, stool for C. difficile was negative, did not have any further diarrhea.  Overall she is doing better, still not able to sustain mobility and ambulation as good as she can be, fortunately no further falls, appetite has improved, does not have any abdominal pain, patient has umbilical hernia repair.         Review of Systems   Constitutional:  Positive for fatigue. Negative for activity change.   Respiratory:  Negative for apnea, cough and shortness of breath.    Cardiovascular:  Negative for chest pain and leg swelling.   Gastrointestinal:  Negative for abdominal pain, nausea and vomiting.   Musculoskeletal:  Positive for arthralgias.   Skin:  Positive for wound.   Neurological:  Positive for weakness.       Objective  /60   Pulse 70   Wt 64.9 kg (143 lb)   BMI 25.33 kg/m²     Physical Exam  Constitutional:       Appearance: Normal appearance. She is normal weight. She is not ill-appearing or toxic-appearing.   HENT:      Head: Normocephalic.   Eyes:      Conjunctiva/sclera: Conjunctivae normal.   Cardiovascular:      Rate and Rhythm: Normal rate. Rhythm irregular.   Pulmonary:      Effort: Pulmonary effort is normal.      Breath sounds: Normal breath sounds. No rales.   Abdominal:      Palpations: Abdomen is soft.   Musculoskeletal:         General: Tenderness present.      Cervical back: Neck supple.   Skin:     General: Skin is warm and dry.      Findings:  Ecchymosis and erythema present.   Neurological:      Mental Status: Mental status is at baseline.   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    Frequent falls R29.6     Other Visit Diagnoses         Codes    Wound of gluteal cleft, right, subsequent encounter     S31.819D        She looks better, interim events are reviewed, physician assistant's evaluation was reviewed, no falls, stability is achieved hopefully, long-term disposition is unclear, wound care follow-ups were reviewed, history of atrial fibrillation ongoing Eliquis to be continued, ventricular rate is not fluctuating, blood sugars are stable, wound on the gluteal cleft and also on the sacral region has been going to take some time, patient has a stable CHF, compression fracture is not causing intractable pain, otherwise frail female patient will take some time and effort, nursing staff did not have any questions or concerns and no nosocomial infections reported lately.     Goals    None           Electronically Signed By: Ha Thomason MD   2/8/25  4:34 PM

## 2025-02-07 ENCOUNTER — NURSING HOME VISIT (OUTPATIENT)
Dept: POST ACUTE CARE | Facility: EXTERNAL LOCATION | Age: 80
End: 2025-02-07
Payer: MEDICARE

## 2025-02-07 DIAGNOSIS — Z74.09 IMPAIRED FUNCTIONAL MOBILITY, BALANCE, GAIT, AND ENDURANCE: Primary | ICD-10-CM

## 2025-02-07 DIAGNOSIS — F41.9 ANXIETY AND DEPRESSION: ICD-10-CM

## 2025-02-07 DIAGNOSIS — E11.9 TYPE 2 DIABETES MELLITUS WITHOUT COMPLICATION, WITHOUT LONG-TERM CURRENT USE OF INSULIN (MULTI): ICD-10-CM

## 2025-02-07 DIAGNOSIS — F32.A ANXIETY AND DEPRESSION: ICD-10-CM

## 2025-02-07 PROCEDURE — 99308 SBSQ NF CARE LOW MDM 20: CPT | Performed by: PHYSICIAN ASSISTANT

## 2025-02-07 NOTE — LETTER
"Patient: Harper Lara  : 1945    Encounter Date: 2025  Name: Harper Lara  YOB: 1945    Chief complaint: Frequent falls. Impaired mobility.    HPI: Patient continues to make steady progress with physical and occupational therapy sessions. She is walking > 150 feet with walker. She is calm and cooperative with exam. She describes her \"mood \" as good. She is prescribed Ability and Fluoxitine. Nursing report no negative behaviors.    Extremity Weakness  This is a new problem. The current episode started 1 to 4 weeks ago. The problem has been resolved. Pertinent negatives include no abdominal pain, anorexia, arthralgias, change in bowel habit, chest pain, congestion, coughing, fever, headaches, joint swelling, nausea, numbness or urinary symptoms. Nothing aggravates the symptoms. She has tried relaxation, rest and walking for the symptoms. The treatment provided significant relief.     Review of systems:   ROS negative except were noted in HPI.    Code Status: full code    /54   Pulse 67   Temp 36.3 °C (97.4 °F)   Resp 18   Ht 1.6 m (5' 3\")   Wt 64.4 kg (142 lb)   SpO2 97%   BMI 25.15 kg/m²      Physical Exam  Constitutional:       General: She is not in acute distress.  HENT:      Head: Normocephalic.      Nose: Nose normal.      Mouth/Throat:      Mouth: Mucous membranes are moist.   Eyes:      Extraocular Movements: Extraocular movements intact.      Pupils: Pupils are equal, round, and reactive to light.   Cardiovascular:      Rate and Rhythm: Normal rate. Rhythm irregular.   Pulmonary:      Effort: Pulmonary effort is normal.      Breath sounds: Normal breath sounds. No wheezing or rales.   Abdominal:      General: Bowel sounds are normal. There is no distension.      Palpations: Abdomen is soft.      Tenderness: There is no abdominal tenderness.   Genitourinary:     Comments: Voiding  Musculoskeletal:         General: Normal range of motion.      Cervical " back: Normal range of motion.   Lymphadenopathy:      Cervical: No cervical adenopathy.   Skin:     General: Skin is warm and dry.      Capillary Refill: Capillary refill takes less than 2 seconds.      Comments:   Neurological:      General: No focal deficit present.      Mental Status: She is alert and oriented to person, place, and time.   Psychiatric:         Mood and Affect: Mood normal.         Behavior: Behavior normal.     Medications reviewed during visit at facility.  Ability 5 mg po daily  Apixaban Oral Tablet 5 mg po bid  Pantoprazole 40 mg po daily  Amlodipine 2.5 mg po daily  Lidocaine 5% apply to back on 12 hours off 12 hours prn  Fluoxetine 20 mg po daily  Tylenol 650 mg po q 4 hours prn  MOM 30 ml po daily prn  Omega 3 fish oil 1000 mg po daily  Magnesium 400 mg po daily  Metformin 500 mg po bid  Vitamin D 3 400 UT po daily  Vitamin B 12 1000 mcg po daily  Levothyroxine 75 mcg po daily  Furosemide 40 mg po daily at 0900  Furosemide 20 mg po daily at 1200  Zofran 4 mg po q 6 hours prn  Labs reviewed at facility:  3:01 CBC and Differential / Comp Metabolic Panel  Latest Version  Reviewed by marissa on 2/3/2025 18:12  Resident Information  Resident: Harper Melton ([id number])  Admit Date: 1/10/2025  Admitting Provider:   Attending Provider:   Copy to List:   Report Information  Collection Date: 2/3/2025 07:19  Received Date: 2/3/2025 07:19  Reported Date: 2/3/2025 13:01  Ord. Provider: Ha Thomason  Source Villanueva: [id number]  Lab Information  Status: Completed  Flag:    Reporting Lab:   [facility]  Order #:   [id number]  Vendor Order #:   [id number]  Category:   Chemistry, Hematology, Unknown Category  Order Notes  Result for:  HARPER MELTON ( 1945, F)      Results   Show All Details Result Units Ref. Range Flag Status   CBC and Differential       White Blood Cell Count  3.05 k/uL 3.70-11.00 L Final           RBC  3.66 m/uL 3.90-5.20 L Final            Hemoglobin  10.7 g/dL 11.5-15.5 L Final           Hematocrit  33.7 % 36.0-46.0 L Final           MCV  92.1 fL 80.0-100.0  Final           MCH  29.2 pg 26.0-34.0  Final           MCHC  31.8 g/dL 30.5-36.0  Final           RDW-CV  13.9 % 11.5-15.0  Final           Platelet Count  148 k/uL 150-400 L Final           MPV  10.1 fL 9.0-12.7  Final           Neut%  56.9 %   Final           Abs Neut  1.74 k/uL 1.45-7.50  Final           Lymph%  31.8 %   Final           Abs Lymph  0.97 k/uL 1.00-4.00 L Final           Mono%  6.6 %   Final           Abs Mono  0.20 k/uL <0.87  Final           Eosin%  3.3 %   Final           Abs Eosin  0.10 k/uL <0.46  Final           Baso%  0.7 %   Final           Abs Baso  <0.03 k/uL <0.11  Final           Immature Gran %%  0.7 %   Final           Abs Immature Gran  <0.03 k/uL <0.10  Final           NRBC  0.0 /100 WBC   Final           Absolute nRBC  <0.01 k/uL <0.01  Final           Diff Type  Auto    Final      Scheduled: 2/3/2025 7:00 AM  Scheduled: 2/3/2025 7:00 AM   Comp Metabolic Panel       Protein, Total  6.3 g/dL 6.3-8.0  Final           Albumin  3.4 g/dL 3.9-4.9 L Final           Calcium, Total  9.4 mg/dL 8.5-10.2  Final           Bilirubin, Total  0.3 mg/dL 0.2-1.3  Final           Alkaline Phosphatase  130 U/L  H Final           AST  25 U/L 13-35  Final           ALT  8 U/L 7-38  Final           Glucose  77 mg/dL 74-99  Final  BUN  46 mg/dL 7-21 H Final           Creatinine  1.33 mg/dL 0.58-0.96 H Final           Sodium  140 mmol/L 136-144  Final           Potassium  4.7 mmol/L 3.7-5.1  Final           Chloride  105 mmol/L   Final           CO2  24 mmol/L 22-30  Final           Anion Gap  11 mmol/L 8-15  Final            Estimated Glomerular Filtration Rate  40 mL/min/1.73 meters squared >=60 L   Assessment/Plan   Problem List Items Addressed This Visit       Anxiety and depression     Calm and cooperative. Continue with Fluoxetine 20 mg po daily, and Ability 5 mg po  daily            DM2 (diabetes mellitus, type 2) (Multi)     Review blood sugars. Blood sugar today is 102.  Continue with Metformin 500 mg po bid Review blood sugars.         Impaired functional mobility, balance, gait, and endurance - Primary     Discuss home going needs with social service. Review physical and occupational therapy notes.            Time:    Salas Ulrich PA-C       Electronically Signed By: Salas Ulrich PA-C   2/9/25  4:56 PM

## 2025-02-08 VITALS
HEART RATE: 70 BPM | DIASTOLIC BLOOD PRESSURE: 60 MMHG | BODY MASS INDEX: 25.33 KG/M2 | WEIGHT: 143 LBS | SYSTOLIC BLOOD PRESSURE: 130 MMHG

## 2025-02-08 ASSESSMENT — ENCOUNTER SYMPTOMS
ABDOMINAL PAIN: 0
APNEA: 0
COUGH: 0
ACTIVITY CHANGE: 0
NAUSEA: 0
WOUND: 1
ARTHRALGIAS: 1
VOMITING: 0
SHORTNESS OF BREATH: 0
WEAKNESS: 1
FATIGUE: 1

## 2025-02-08 NOTE — PROGRESS NOTES
Subjective   Patient ID: Harper Lara is a 79 y.o. female who is acute skilled care being seen and evaluated for multiple medical problems.    79-year-old female patient was seen for follow-up.  Creatinine is 0.93, hemoglobin is 10.7.  Medications are amlodipine, Eliquis, Prozac, Lasix, metformin.  Reported blood sugar was 111.  Gluteal wound and abdominal wall wound has been dressed, nausea vomiting has been subsided, mild leukopenia is present, recent laboratories were reviewed, stool for C. difficile was negative, did not have any further diarrhea.  Overall she is doing better, still not able to sustain mobility and ambulation as good as she can be, fortunately no further falls, appetite has improved, does not have any abdominal pain, patient has umbilical hernia repair.         Review of Systems   Constitutional:  Positive for fatigue. Negative for activity change.   Respiratory:  Negative for apnea, cough and shortness of breath.    Cardiovascular:  Negative for chest pain and leg swelling.   Gastrointestinal:  Negative for abdominal pain, nausea and vomiting.   Musculoskeletal:  Positive for arthralgias.   Skin:  Positive for wound.   Neurological:  Positive for weakness.       Objective   /60   Pulse 70   Wt 64.9 kg (143 lb)   BMI 25.33 kg/m²     Physical Exam  Constitutional:       Appearance: Normal appearance. She is normal weight. She is not ill-appearing or toxic-appearing.   HENT:      Head: Normocephalic.   Eyes:      Conjunctiva/sclera: Conjunctivae normal.   Cardiovascular:      Rate and Rhythm: Normal rate. Rhythm irregular.   Pulmonary:      Effort: Pulmonary effort is normal.      Breath sounds: Normal breath sounds. No rales.   Abdominal:      Palpations: Abdomen is soft.   Musculoskeletal:         General: Tenderness present.      Cervical back: Neck supple.   Skin:     General: Skin is warm and dry.      Findings: Ecchymosis and erythema present.   Neurological:      Mental Status:  Mental status is at baseline.   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    Frequent falls R29.6     Other Visit Diagnoses         Codes    Wound of gluteal cleft, right, subsequent encounter     S31.819D        She looks better, interim events are reviewed, physician assistant's evaluation was reviewed, no falls, stability is achieved hopefully, long-term disposition is unclear, wound care follow-ups were reviewed, history of atrial fibrillation ongoing Eliquis to be continued, ventricular rate is not fluctuating, blood sugars are stable, wound on the gluteal cleft and also on the sacral region has been going to take some time, patient has a stable CHF, compression fracture is not causing intractable pain, otherwise frail female patient will take some time and effort, nursing staff did not have any questions or concerns and no nosocomial infections reported lately.     Goals    None

## 2025-02-09 VITALS
SYSTOLIC BLOOD PRESSURE: 127 MMHG | RESPIRATION RATE: 18 BRPM | OXYGEN SATURATION: 97 % | WEIGHT: 142 LBS | TEMPERATURE: 97.4 F | BODY MASS INDEX: 25.16 KG/M2 | HEIGHT: 63 IN | HEART RATE: 67 BPM | DIASTOLIC BLOOD PRESSURE: 54 MMHG

## 2025-02-09 ASSESSMENT — ENCOUNTER SYMPTOMS
CHANGE IN BOWEL HABIT: 0
COUGH: 0
ABDOMINAL PAIN: 0
ARTHRALGIAS: 0
HEADACHES: 0
NUMBNESS: 0
FEVER: 0
ANOREXIA: 0
NAUSEA: 0
JOINT SWELLING: 0
EXTREMITY WEAKNESS: 1

## 2025-02-09 NOTE — ASSESSMENT & PLAN NOTE
Review blood sugars. Blood sugar today is 102.  Continue with Metformin 500 mg po bid Review blood sugars.

## 2025-02-09 NOTE — PROGRESS NOTES
"2/7/2025  Name: Harper Lara  YOB: 1945    Chief complaint: Frequent falls. Impaired mobility.    HPI: Patient continues to make steady progress with physical and occupational therapy sessions. She is walking > 150 feet with walker. She is calm and cooperative with exam. She describes her \"mood \" as good. She is prescribed Ability and Fluoxitine. Nursing report no negative behaviors.    Extremity Weakness  This is a new problem. The current episode started 1 to 4 weeks ago. The problem has been resolved. Pertinent negatives include no abdominal pain, anorexia, arthralgias, change in bowel habit, chest pain, congestion, coughing, fever, headaches, joint swelling, nausea, numbness or urinary symptoms. Nothing aggravates the symptoms. She has tried relaxation, rest and walking for the symptoms. The treatment provided significant relief.     Review of systems:   ROS negative except were noted in HPI.    Code Status: full code    /54   Pulse 67   Temp 36.3 °C (97.4 °F)   Resp 18   Ht 1.6 m (5' 3\")   Wt 64.4 kg (142 lb)   SpO2 97%   BMI 25.15 kg/m²      Physical Exam  Constitutional:       General: She is not in acute distress.  HENT:      Head: Normocephalic.      Nose: Nose normal.      Mouth/Throat:      Mouth: Mucous membranes are moist.   Eyes:      Extraocular Movements: Extraocular movements intact.      Pupils: Pupils are equal, round, and reactive to light.   Cardiovascular:      Rate and Rhythm: Normal rate. Rhythm irregular.   Pulmonary:      Effort: Pulmonary effort is normal.      Breath sounds: Normal breath sounds. No wheezing or rales.   Abdominal:      General: Bowel sounds are normal. There is no distension.      Palpations: Abdomen is soft.      Tenderness: There is no abdominal tenderness.   Genitourinary:     Comments: Voiding  Musculoskeletal:         General: Normal range of motion.      Cervical back: Normal range of motion.   Lymphadenopathy:      Cervical: No " cervical adenopathy.   Skin:     General: Skin is warm and dry.      Capillary Refill: Capillary refill takes less than 2 seconds.      Comments:   Neurological:      General: No focal deficit present.      Mental Status: She is alert and oriented to person, place, and time.   Psychiatric:         Mood and Affect: Mood normal.         Behavior: Behavior normal.     Medications reviewed during visit at facility.  Ability 5 mg po daily  Apixaban Oral Tablet 5 mg po bid  Pantoprazole 40 mg po daily  Amlodipine 2.5 mg po daily  Lidocaine 5% apply to back on 12 hours off 12 hours prn  Fluoxetine 20 mg po daily  Tylenol 650 mg po q 4 hours prn  MOM 30 ml po daily prn  Omega 3 fish oil 1000 mg po daily  Magnesium 400 mg po daily  Metformin 500 mg po bid  Vitamin D 3 400 UT po daily  Vitamin B 12 1000 mcg po daily  Levothyroxine 75 mcg po daily  Furosemide 40 mg po daily at 0900  Furosemide 20 mg po daily at 1200  Zofran 4 mg po q 6 hours prn  Labs reviewed at facility:  3:01 CBC and Differential / Comp Metabolic Panel  Latest Version  Reviewed by marissa on 2/3/2025 18:12  Resident Information  Resident: Harper Melton ([id number])  Admit Date: 1/10/2025  Admitting Provider:   Attending Provider:   Copy to List:   Report Information  Collection Date: 2/3/2025 07:19  Received Date: 2/3/2025 07:19  Reported Date: 2/3/2025 13:01  Ord. Provider: Ha Thomason  Source Villanueva: [id number]  Lab Information  Status: Completed  Flag:    Reporting Lab:   [facility]  Order #:   [id number]  Vendor Order #:   [id number]  Category:   Chemistry, Hematology, Unknown Category  Order Notes  Result for:  HARPER MELTON ( 1945, F)      Results   Show All Details Result Units Ref. Range Flag Status   CBC and Differential       White Blood Cell Count  3.05 k/uL 3.70-11.00 L Final           RBC  3.66 m/uL 3.90-5.20 L Final           Hemoglobin  10.7 g/dL 11.5-15.5 L Final           Hematocrit  33.7 % 36.0-46.0 L Final            MCV  92.1 fL 80.0-100.0  Final           MCH  29.2 pg 26.0-34.0  Final           MCHC  31.8 g/dL 30.5-36.0  Final           RDW-CV  13.9 % 11.5-15.0  Final           Platelet Count  148 k/uL 150-400 L Final           MPV  10.1 fL 9.0-12.7  Final           Neut%  56.9 %   Final           Abs Neut  1.74 k/uL 1.45-7.50  Final           Lymph%  31.8 %   Final           Abs Lymph  0.97 k/uL 1.00-4.00 L Final           Mono%  6.6 %   Final           Abs Mono  0.20 k/uL <0.87  Final           Eosin%  3.3 %   Final           Abs Eosin  0.10 k/uL <0.46  Final           Baso%  0.7 %   Final           Abs Baso  <0.03 k/uL <0.11  Final           Immature Gran %%  0.7 %   Final           Abs Immature Gran  <0.03 k/uL <0.10  Final           NRBC  0.0 /100 WBC   Final           Absolute nRBC  <0.01 k/uL <0.01  Final           Diff Type  Auto    Final      Scheduled: 2/3/2025 7:00 AM  Scheduled: 2/3/2025 7:00 AM   Comp Metabolic Panel       Protein, Total  6.3 g/dL 6.3-8.0  Final           Albumin  3.4 g/dL 3.9-4.9 L Final           Calcium, Total  9.4 mg/dL 8.5-10.2  Final           Bilirubin, Total  0.3 mg/dL 0.2-1.3  Final           Alkaline Phosphatase  130 U/L  H Final           AST  25 U/L 13-35  Final           ALT  8 U/L 7-38  Final           Glucose  77 mg/dL 74-99  Final  BUN  46 mg/dL 7-21 H Final           Creatinine  1.33 mg/dL 0.58-0.96 H Final           Sodium  140 mmol/L 136-144  Final           Potassium  4.7 mmol/L 3.7-5.1  Final           Chloride  105 mmol/L   Final           CO2  24 mmol/L 22-30  Final           Anion Gap  11 mmol/L 8-15  Final            Estimated Glomerular Filtration Rate  40 mL/min/1.73 meters squared >=60 L   Assessment/Plan    Problem List Items Addressed This Visit       Anxiety and depression     Calm and cooperative. Continue with Fluoxetine 20 mg po daily, and Ability 5 mg po daily            DM2 (diabetes mellitus, type 2) (Multi)     Review blood sugars. Blood sugar  today is 102.  Continue with Metformin 500 mg po bid Review blood sugars.         Impaired functional mobility, balance, gait, and endurance - Primary     Discuss home going needs with social service. Review physical and occupational therapy notes.            Time:    Salas Ulrich PA-C

## 2025-02-19 ENCOUNTER — OFFICE VISIT (OUTPATIENT)
Dept: WOUND CARE | Facility: CLINIC | Age: 80
End: 2025-02-19
Payer: MEDICARE

## 2025-02-19 PROCEDURE — 11042 DBRDMT SUBQ TIS 1ST 20SQCM/<: CPT

## 2025-02-19 PROCEDURE — 99213 OFFICE O/P EST LOW 20 MIN: CPT

## 2025-02-21 ENCOUNTER — OFFICE VISIT (OUTPATIENT)
Dept: PRIMARY CARE | Facility: CLINIC | Age: 80
End: 2025-02-21
Payer: MEDICARE

## 2025-02-21 VITALS
TEMPERATURE: 98 F | OXYGEN SATURATION: 98 % | HEIGHT: 60 IN | SYSTOLIC BLOOD PRESSURE: 132 MMHG | DIASTOLIC BLOOD PRESSURE: 64 MMHG | WEIGHT: 140 LBS | BODY MASS INDEX: 27.48 KG/M2 | HEART RATE: 74 BPM

## 2025-02-21 DIAGNOSIS — J30.9 ALLERGIC SINUSITIS: ICD-10-CM

## 2025-02-21 DIAGNOSIS — K21.9 GASTROESOPHAGEAL REFLUX DISEASE, UNSPECIFIED WHETHER ESOPHAGITIS PRESENT: ICD-10-CM

## 2025-02-21 DIAGNOSIS — I10 PRIMARY HYPERTENSION: Primary | ICD-10-CM

## 2025-02-21 DIAGNOSIS — I48.19 PERSISTENT ATRIAL FIBRILLATION (MULTI): ICD-10-CM

## 2025-02-21 DIAGNOSIS — R29.6 RECURRENT FALLS: ICD-10-CM

## 2025-02-21 DIAGNOSIS — F32.A DEPRESSION, UNSPECIFIED DEPRESSION TYPE: ICD-10-CM

## 2025-02-21 DIAGNOSIS — E11.9 TYPE 2 DIABETES MELLITUS WITHOUT COMPLICATION, WITHOUT LONG-TERM CURRENT USE OF INSULIN (MULTI): ICD-10-CM

## 2025-02-21 DIAGNOSIS — E03.9 HYPOTHYROIDISM, UNSPECIFIED TYPE: ICD-10-CM

## 2025-02-21 PROCEDURE — 1124F ACP DISCUSS-NO DSCNMKR DOCD: CPT

## 2025-02-21 PROCEDURE — 1036F TOBACCO NON-USER: CPT

## 2025-02-21 PROCEDURE — 3075F SYST BP GE 130 - 139MM HG: CPT

## 2025-02-21 PROCEDURE — 1159F MED LIST DOCD IN RCRD: CPT

## 2025-02-21 PROCEDURE — 99214 OFFICE O/P EST MOD 30 MIN: CPT

## 2025-02-21 PROCEDURE — 3078F DIAST BP <80 MM HG: CPT

## 2025-02-21 RX ORDER — LORATADINE 10 MG/1
10 TABLET ORAL DAILY
Qty: 30 TABLET | Refills: 2 | Status: SHIPPED | OUTPATIENT
Start: 2025-02-21 | End: 2025-05-22

## 2025-02-21 RX ORDER — FLUOXETINE 20 MG/1
20 TABLET ORAL DAILY
Qty: 30 TABLET | Refills: 2 | Status: SHIPPED | OUTPATIENT
Start: 2025-02-21

## 2025-02-21 RX ORDER — METFORMIN HYDROCHLORIDE 500 MG/1
500 TABLET ORAL
Qty: 60 TABLET | Refills: 2 | Status: SHIPPED | OUTPATIENT
Start: 2025-02-21

## 2025-02-21 RX ORDER — FUROSEMIDE 20 MG/1
20 TABLET ORAL EVERY MORNING
Qty: 30 TABLET | Refills: 2 | Status: SHIPPED | OUTPATIENT
Start: 2025-02-21

## 2025-02-21 RX ORDER — CALCIUM CARBONATE 300MG(750)
400 TABLET,CHEWABLE ORAL DAILY
Qty: 30 TABLET | Refills: 2 | Status: SHIPPED | OUTPATIENT
Start: 2025-02-21

## 2025-02-21 RX ORDER — LEVOTHYROXINE SODIUM 75 UG/1
75 TABLET ORAL DAILY
Qty: 30 TABLET | Refills: 2 | Status: SHIPPED | OUTPATIENT
Start: 2025-02-21

## 2025-02-21 RX ORDER — ARIPIPRAZOLE 5 MG/1
5 TABLET ORAL DAILY
Qty: 30 TABLET | Refills: 2 | Status: SHIPPED | OUTPATIENT
Start: 2025-02-21

## 2025-02-21 RX ORDER — PANTOPRAZOLE SODIUM 40 MG/1
40 TABLET, DELAYED RELEASE ORAL
Qty: 30 TABLET | Refills: 2 | Status: SHIPPED | OUTPATIENT
Start: 2025-02-21

## 2025-02-21 RX ORDER — AMLODIPINE BESYLATE 2.5 MG/1
2.5 TABLET ORAL DAILY
Qty: 30 TABLET | Refills: 2 | Status: SHIPPED | OUTPATIENT
Start: 2025-02-21

## 2025-02-21 ASSESSMENT — PATIENT HEALTH QUESTIONNAIRE - PHQ9
1. LITTLE INTEREST OR PLEASURE IN DOING THINGS: NOT AT ALL
SUM OF ALL RESPONSES TO PHQ9 QUESTIONS 1 AND 2: 0
2. FEELING DOWN, DEPRESSED OR HOPELESS: NOT AT ALL

## 2025-02-21 ASSESSMENT — ENCOUNTER SYMPTOMS
LOSS OF SENSATION IN FEET: 0
DEPRESSION: 0
OCCASIONAL FEELINGS OF UNSTEADINESS: 1

## 2025-02-21 NOTE — PROGRESS NOTES
Subjective   Patient ID: Harper Lara is a 79 y.o. female who presents for [facility].    HPI   79 y.o. female from home with a PMH of recurrent falls, recent hospital admission for fall, Afib, HTN, DM2, hypothyroidism, depression, presented today to [facility] patient does not have symptoms at today's visit.  She was discharged from nursing home last week.  Currently her lives with her, her granddaughter takes care of her medications.  She ambulates with a walker.  She denies cough, shortness of breath, she denies dizziness and lightheadedness  She is on Abilify and for depression, depression symptoms are currently.  She has been off anticoagulation, she was started on Eliquis but they could not afford it.  She has been taking Lasix 40 mg daily for lower limb swelling which has resolved.  We also requested medication refills.  She is following with wound care for abdominal wall woundPost hernia repair surgery   She has allergic sinusitis and requested a refill on Claritin     PSH: Hysterectomy, cholecystic hide, Hernia repair  Family history: Sister with breast cancer,  Social hx: never smoked, no Etoh.  Review of Systems  As above  Objective   /70 (BP Location: Left arm, Patient Position: Sitting, BP Cuff Size: Adult)   Pulse 74   Temp 36.7 °C (98 °F) (Oral)   Ht 1.524 m (5')   Wt 63.5 kg (140 lb)   SpO2 98% Comment: RA  BMI 27.34 kg/m²     Physical Exam  Constitutional:       Appearance: Normal appearance. She is not ill-appearing.   Cardiovascular:      Rate and Rhythm: Rhythm irregular.      Heart sounds: No murmur heard.  Pulmonary:      Effort: No respiratory distress.      Breath sounds: No stridor. No wheezing, rhonchi or rales.   Chest:      Chest wall: No tenderness.   Abdominal:      Comments: Abdominal wound , dressing in place. No tenderness or guarding   Musculoskeletal:      Comments: No lower limb swelling   Skin:     General: Skin is warm.   Neurological:      General: No  focal deficit present.      Mental Status: She is alert.   Psychiatric:         Mood and Affect: Mood normal.         Thought Content: Thought content normal.         Judgment: Judgment normal.         Assessment/Plan       - DM II:  -Currently on Metformin 500 mg BID, will order A1c, -140 at home    -HTN  -Lower limb swelling  -on amlodipine 2.5 mg daily, Lasix 40 mg daily  -/64. Refilled Amlodipine  -Change Lasix to 20 mg daily    -Hypothyroidism:  -ordered TSH  -Refilled levothyroxine    -Recurrent falls  -Abdominal wall wound  -Following with wound care      -Depression: controlled  -Refilled Abilify 5 mg daily, Prozac 20 mg daily  -Afib:  -Rate controlled, currently not on anticoagulation.  Advised her son to sign up for coupons, f this doesn't work out will start her back on coumadin, they verbalized understanding

## 2025-02-22 ENCOUNTER — PREP FOR PROCEDURE (OUTPATIENT)
Dept: SURGERY | Facility: HOSPITAL | Age: 80
End: 2025-02-22
Payer: MEDICARE

## 2025-02-22 LAB
ALBUMIN SERPL-MCNC: 4.2 G/DL (ref 3.6–5.1)
ALP SERPL-CCNC: 96 U/L (ref 37–153)
ALT SERPL-CCNC: 8 U/L (ref 6–29)
ANION GAP SERPL CALCULATED.4IONS-SCNC: 8 MMOL/L (CALC) (ref 7–17)
AST SERPL-CCNC: 20 U/L (ref 10–35)
BILIRUB SERPL-MCNC: 0.4 MG/DL (ref 0.2–1.2)
BUN SERPL-MCNC: 19 MG/DL (ref 7–25)
CALCIUM SERPL-MCNC: 9.3 MG/DL (ref 8.6–10.4)
CHLORIDE SERPL-SCNC: 102 MMOL/L (ref 98–110)
CO2 SERPL-SCNC: 30 MMOL/L (ref 20–32)
CREAT SERPL-MCNC: 0.91 MG/DL (ref 0.6–1)
EGFRCR SERPLBLD CKD-EPI 2021: 65 ML/MIN/1.73M2
EST. AVERAGE GLUCOSE BLD GHB EST-MCNC: 105 MG/DL
EST. AVERAGE GLUCOSE BLD GHB EST-SCNC: 5.8 MMOL/L
GLUCOSE SERPL-MCNC: 92 MG/DL (ref 65–139)
HBA1C MFR BLD: 5.3 % OF TOTAL HGB
POTASSIUM SERPL-SCNC: 3.9 MMOL/L (ref 3.5–5.3)
PROT SERPL-MCNC: 6.5 G/DL (ref 6.1–8.1)
SODIUM SERPL-SCNC: 140 MMOL/L (ref 135–146)
TSH SERPL-ACNC: 1.78 MIU/L (ref 0.4–4.5)

## 2025-02-26 ENCOUNTER — OFFICE VISIT (OUTPATIENT)
Dept: WOUND CARE | Facility: CLINIC | Age: 80
End: 2025-02-26
Payer: MEDICARE

## 2025-02-26 PROCEDURE — 11042 DBRDMT SUBQ TIS 1ST 20SQCM/<: CPT

## 2025-02-26 PROCEDURE — 11042 DBRDMT SUBQ TIS 1ST 20SQCM/<: CPT | Performed by: PLASTIC SURGERY

## 2025-02-28 DIAGNOSIS — E11.9 TYPE 2 DIABETES MELLITUS WITHOUT COMPLICATION, WITHOUT LONG-TERM CURRENT USE OF INSULIN (MULTI): ICD-10-CM

## 2025-02-28 NOTE — TELEPHONE ENCOUNTER
Patient also asking if she should have home health care come out and do her wound dressing. Looks like patient is seeing a wound specialist. I did advise patient that she will need to discuss that with her wound doctor.

## 2025-02-28 NOTE — TELEPHONE ENCOUNTER
Patient left message on clinical line requesting a prescription for her one touch ultra 2 test strips to be sent to [facility]. Patient did not leave directions on often she tests.     Spoke to patient to get clarification on testing and pended to Dr. Miller.

## 2025-03-03 ENCOUNTER — TELEPHONE (OUTPATIENT)
Dept: PRIMARY CARE | Facility: CLINIC | Age: 80
End: 2025-03-03
Payer: MEDICARE

## 2025-03-03 NOTE — TELEPHONE ENCOUNTER
I called them because I prescribed all of her other medicines and went through, they will call her inurance

## 2025-03-03 NOTE — TELEPHONE ENCOUNTER
[facility] called office advising that the test strips where marked as a bad prescriber ID with Medicare. Please advise.

## 2025-03-05 ENCOUNTER — OFFICE VISIT (OUTPATIENT)
Dept: WOUND CARE | Facility: CLINIC | Age: 80
End: 2025-03-05
Payer: MEDICARE

## 2025-03-05 PROCEDURE — 11042 DBRDMT SUBQ TIS 1ST 20SQCM/<: CPT

## 2025-03-12 ENCOUNTER — OFFICE VISIT (OUTPATIENT)
Dept: WOUND CARE | Facility: CLINIC | Age: 80
End: 2025-03-12
Payer: MEDICARE

## 2025-03-12 PROCEDURE — 11042 DBRDMT SUBQ TIS 1ST 20SQCM/<: CPT

## 2025-03-19 ENCOUNTER — PREP FOR PROCEDURE (OUTPATIENT)
Dept: SURGERY | Facility: HOSPITAL | Age: 80
End: 2025-03-19
Payer: MEDICARE

## 2025-03-20 ENCOUNTER — OFFICE VISIT (OUTPATIENT)
Dept: WOUND CARE | Facility: CLINIC | Age: 80
End: 2025-03-20
Payer: MEDICARE

## 2025-03-20 PROCEDURE — 11042 DBRDMT SUBQ TIS 1ST 20SQCM/<: CPT

## 2025-03-20 PROCEDURE — 11042 DBRDMT SUBQ TIS 1ST 20SQCM/<: CPT | Performed by: PLASTIC SURGERY

## 2025-03-26 ENCOUNTER — OFFICE VISIT (OUTPATIENT)
Dept: WOUND CARE | Facility: CLINIC | Age: 80
End: 2025-03-26
Payer: MEDICARE

## 2025-03-26 PROCEDURE — 11042 DBRDMT SUBQ TIS 1ST 20SQCM/<: CPT

## 2025-03-26 PROCEDURE — 11042 DBRDMT SUBQ TIS 1ST 20SQCM/<: CPT | Performed by: PLASTIC SURGERY

## 2025-04-02 ENCOUNTER — OFFICE VISIT (OUTPATIENT)
Dept: WOUND CARE | Facility: CLINIC | Age: 80
End: 2025-04-02
Payer: MEDICARE

## 2025-04-02 PROCEDURE — 11042 DBRDMT SUBQ TIS 1ST 20SQCM/<: CPT

## 2025-04-03 DIAGNOSIS — E55.9 VITAMIN D DEFICIENCY: Primary | ICD-10-CM

## 2025-04-03 DIAGNOSIS — E53.8 VITAMIN B12 DEFICIENCY: ICD-10-CM

## 2025-04-03 DIAGNOSIS — F32.A DEPRESSION, UNSPECIFIED DEPRESSION TYPE: ICD-10-CM

## 2025-04-03 RX ORDER — CALCIUM CARBONATE 300MG(750)
400 TABLET,CHEWABLE ORAL DAILY
Qty: 30 TABLET | Refills: 2 | Status: SHIPPED | OUTPATIENT
Start: 2025-04-03

## 2025-04-03 RX ORDER — CYANOCOBALAMIN (VITAMIN B-12) 500 MCG
400 TABLET ORAL DAILY
Qty: 30 TABLET | Refills: 2 | Status: SHIPPED | OUTPATIENT
Start: 2025-04-03

## 2025-04-03 RX ORDER — LANOLIN ALCOHOL/MO/W.PET/CERES
1000 CREAM (GRAM) TOPICAL DAILY
Qty: 30 TABLET | Refills: 1 | Status: SHIPPED | OUTPATIENT
Start: 2025-04-03

## 2025-04-09 ENCOUNTER — OFFICE VISIT (OUTPATIENT)
Dept: WOUND CARE | Facility: CLINIC | Age: 80
End: 2025-04-09
Payer: MEDICARE

## 2025-04-09 PROCEDURE — 11042 DBRDMT SUBQ TIS 1ST 20SQCM/<: CPT

## 2025-04-14 DIAGNOSIS — F32.A DEPRESSION, UNSPECIFIED DEPRESSION TYPE: ICD-10-CM

## 2025-04-14 RX ORDER — FLUOXETINE 20 MG/1
20 TABLET ORAL DAILY
Qty: 100 TABLET | Refills: 2 | Status: SHIPPED | OUTPATIENT
Start: 2025-04-14

## 2025-04-16 ENCOUNTER — OFFICE VISIT (OUTPATIENT)
Dept: WOUND CARE | Facility: CLINIC | Age: 80
End: 2025-04-16
Payer: MEDICARE

## 2025-04-16 PROCEDURE — 11042 DBRDMT SUBQ TIS 1ST 20SQCM/<: CPT

## 2025-04-23 ENCOUNTER — OFFICE VISIT (OUTPATIENT)
Dept: WOUND CARE | Facility: CLINIC | Age: 80
End: 2025-04-23
Payer: MEDICARE

## 2025-04-23 ENCOUNTER — PATIENT MESSAGE (OUTPATIENT)
Dept: PRIMARY CARE | Facility: CLINIC | Age: 80
End: 2025-04-23
Payer: MEDICARE

## 2025-04-23 PROCEDURE — 11042 DBRDMT SUBQ TIS 1ST 20SQCM/<: CPT

## 2025-04-24 ENCOUNTER — OFFICE VISIT (OUTPATIENT)
Dept: PRIMARY CARE | Facility: CLINIC | Age: 80
End: 2025-04-24
Payer: MEDICARE

## 2025-04-24 VITALS
HEART RATE: 71 BPM | SYSTOLIC BLOOD PRESSURE: 162 MMHG | WEIGHT: 148 LBS | OXYGEN SATURATION: 99 % | DIASTOLIC BLOOD PRESSURE: 66 MMHG | TEMPERATURE: 99.1 F | BODY MASS INDEX: 28.9 KG/M2

## 2025-04-24 DIAGNOSIS — I10 PRIMARY HYPERTENSION: Primary | ICD-10-CM

## 2025-04-24 PROCEDURE — 1123F ACP DISCUSS/DSCN MKR DOCD: CPT

## 2025-04-24 PROCEDURE — 3077F SYST BP >= 140 MM HG: CPT

## 2025-04-24 PROCEDURE — 1159F MED LIST DOCD IN RCRD: CPT

## 2025-04-24 PROCEDURE — 1036F TOBACCO NON-USER: CPT

## 2025-04-24 PROCEDURE — 3078F DIAST BP <80 MM HG: CPT

## 2025-04-24 PROCEDURE — 1158F ADVNC CARE PLAN TLK DOCD: CPT

## 2025-04-24 PROCEDURE — 99214 OFFICE O/P EST MOD 30 MIN: CPT

## 2025-04-24 RX ORDER — AMLODIPINE BESYLATE 5 MG/1
5 TABLET ORAL DAILY
Qty: 30 TABLET | Refills: 5 | Status: SHIPPED | OUTPATIENT
Start: 2025-04-24 | End: 2025-10-21

## 2025-04-24 ASSESSMENT — PATIENT HEALTH QUESTIONNAIRE - PHQ9
2. FEELING DOWN, DEPRESSED OR HOPELESS: NOT AT ALL
1. LITTLE INTEREST OR PLEASURE IN DOING THINGS: NOT AT ALL
SUM OF ALL RESPONSES TO PHQ9 QUESTIONS 1 AND 2: 0

## 2025-04-24 NOTE — PROGRESS NOTES
Subjective   Patient ID: Harper Lara is a 80 y.o. female who presents for Hypertension (212/100 son reports, bp was taken at wound clinic at on Wednesday. BP was taken 3 times at appointment ).    HPI   79 y.o. female from home with a PMH of recurrent falls, recent hospital admission for fall, Afib, HTN, DM2, hypothyroidism, depression, presented today for follow-up.  Patient was evaluated in the wound care yesterday and noted blood pressure to be high  Blood pressure was 212/105.  Patient stated that she does not have any symptoms.  No headache, no blurry vision, no chest pain, no weakness, no started speech, no dysphagia, no numbness.  She is compliant on her medications for blood pressure she takes amlodipine 2.5 mg daily.  Blood pressure was previously well-controlled.  Review of Systems  As above  Objective   /66   Pulse 71   Temp 37.3 °C (99.1 °F) (Temporal)   Wt 67.1 kg (148 lb)   SpO2 99% Comment: RA  BMI 28.90 kg/m²     Physical Exam  Physical Exam  Constitutional:       Appearance: Normal appearance. She is not ill-appearing.   Cardiovascular:      Rate and Rhythm: Rhythm irregular.      Heart sounds: No murmur heard.  Pulmonary:      Effort: No respiratory distress.      Breath sounds: No stridor. No wheezing, rhonchi or rales.   Chest:      Chest wall: No tenderness.   Abdominal:      Comments: Abdominal wound , dressing in place. No tenderness or guarding   Musculoskeletal:      Comments: +1 bilateral pitting edema  Skin:     General: Skin is warm.   Neurological:      General: No focal deficit present.      Mental Status: She is alert.   Psychiatric:         Mood and Affect: Mood normal.         Thought Content: Thought content normal.         Judgment: Judgment normal.     Assessment/Plan        - DM II:  -Currently on Metformin 500 mg BID, A1c was 5.3 she is advised to start metformin once daily    -HTN  -Lower limb swelling  -on amlodipine 2.5 mg daily, Lasix 20 mg daily  Blood  pressure is 162/66.  Will increase amlodipine to 5 mg daily.  She is also advised to take Lasix 20 mg twice daily every other day and 20 mg once every other day     -Hypothyroidism:  -TSH is 1.78, continue levothyroxine 75 mcg daily     -Recurrent falls  -Abdominal wall wound  -Following with wound care    Follow-up in 1 month

## 2025-04-24 NOTE — PATIENT INSTRUCTIONS
Increase Amlodipine to 5 mg daily   Start taking lasix 20 mg twice every other day, once every other day

## 2025-04-24 NOTE — TELEPHONE ENCOUNTER
Would you please call her again and check on her. She can be added to the schedule tomorrow if she has not gone to ED

## 2025-04-28 NOTE — CONSULTS
Addended by: DEVIN PEREIRA on: 4/28/2025 11:48 AM     Modules accepted: Orders     Consults        Primary MD: Justin Beauchamp PA-C    Reason For Consult      History Of Present Illness  Harper Lara is a 78 y.o. female   Diabetes atrial fibrillation hypothyroidism dementia Parkinson's history of small bowel obstruction nonischemic cardiomyopathy  Had a laparotomy 12/27/2023      Presenting with abdominal pain  CT scan shows presumed abscess thick-walled air-fluid collection in the pelvis    C. difficile test prior PCR is positive    Positive for RSV        Past Medical History  She has a past medical history of Atrial fibrillation (CMS/HCC) and Diabetes mellitus (CMS/HCC).    Surgical History  She has a past surgical history that includes Cholecystectomy; Hiatal hernia repair; and Hysterectomy.     Social History  She reports that she has never smoked. She has never been exposed to tobacco smoke. She has never used smokeless tobacco. She reports that she does not drink alcohol and does not use drugs.   Travel Screening        Family History  No family history on file.  Allergies  Diphenhydramine hcl, Morphine, Pentazocine, Propoxyphene, Propoxyphene n-acetaminophen, Ranitidine, and Strawberry     Immunization History   Administered Date(s) Administered    Flu vaccine (IIV4), preservative free *Check age/dose* 09/29/2018, 09/23/2019    Flu vaccine, quadrivalent, high-dose, preservative free, age 65y+ (FLUZONE) 09/30/2021, 09/07/2022, 11/10/2023    Influenza, High Dose Seasonal, Preservative Free 09/24/2016, 09/30/2017    Influenza, seasonal, injectable 10/16/2015    Moderna COVID-19 vaccine, bivalent, blue cap/gray label *Check age/dose* 09/07/2022, 06/27/2023    Moderna SARS-CoV-2 Vaccination 01/26/2021, 02/22/2021, 10/23/2021, 03/30/2022    Novel influenza-H1N1-09, preservative-free 02/23/2010    Pneumococcal conjugate vaccine, 13-valent (PREVNAR 13) 02/11/2016    Pneumococcal polysaccharide vaccine, 23-valent, age 2 years and older (PNEUMOVAX 23) 09/05/2017    Td vaccine, age 7 years and  "older (TENIVAC) 11/09/2002    Tdap vaccine, age 7 year and older (BOOSTRIX) 02/11/2016    Zoster, live 12/11/2014     Review of Systems   Constitutional: Negative.    HENT: Negative.     Eyes: Negative.    Respiratory: Negative.     Cardiovascular: Negative.    Gastrointestinal:  Positive for abdominal pain and diarrhea. Negative for nausea and vomiting.   Genitourinary: Negative.    Musculoskeletal: Negative.    Neurological: Negative.         Physical Exam  Constitutional:       Appearance: She is not ill-appearing or diaphoretic.   Eyes:      Extraocular Movements: Extraocular movements intact.      Pupils: Pupils are equal, round, and reactive to light.   Neck:      Vascular: No carotid bruit.   Cardiovascular:      Heart sounds: Normal heart sounds. No murmur heard.  Pulmonary:      Effort: Pulmonary effort is normal. No respiratory distress.      Breath sounds: Normal breath sounds. No stridor. No wheezing, rhonchi or rales.   Chest:      Chest wall: No tenderness.   Abdominal:      General: Abdomen is flat. Bowel sounds are normal. There is distension.      Palpations: Abdomen is soft. There is no mass.      Tenderness: There is abdominal tenderness. There is no right CVA tenderness, left CVA tenderness, guarding or rebound.      Hernia: No hernia is present.   Musculoskeletal:         General: No swelling, tenderness, deformity or signs of injury.      Cervical back: Normal range of motion and neck supple. No rigidity or tenderness.      Right lower leg: No edema.      Left lower leg: No edema.   Lymphadenopathy:      Cervical: No cervical adenopathy.   Skin:     General: Skin is warm.      Findings: No erythema.   Neurological:      General: No focal deficit present.          Range of Vitals (last 24 hours)  Heart Rate:  [64-68]   Temp:  [36.4 °C (97.5 °F)]   Resp:  [16-19]   BP: (102-134)/(49-64)   Height:  [160 cm (5' 2.99\")-160 cm (5' 3\")]   Weight:  [68.8 kg (151 lb 10.8 oz)-97.5 kg (215 lb)]   SpO2:  " [99 %-100 %]     Relevant Results  Results from last 72 hours   Lab Units 01/21/24  0414   WBC AUTO x10*3/uL 7.3   HEMOGLOBIN g/dL 8.4*   HEMATOCRIT % 27.0*   PLATELETS AUTO x10*3/uL 224   NEUTROS PCT AUTO % 71.8   LYMPHS PCT AUTO % 20.6   MONOS PCT AUTO % 5.9   EOS PCT AUTO % 1.0     Results from last 72 hours   Lab Units 01/21/24  0414   SODIUM mmol/L 135*   POTASSIUM mmol/L 4.3   CHLORIDE mmol/L 99   CO2 mmol/L 27   BUN mg/dL 33*   CREATININE mg/dL 0.97   GLUCOSE mg/dL 26*   CALCIUM mg/dL 8.2*   ANION GAP mmol/L 13   EGFR mL/min/1.73m*2 61             Component  Ref Range & Units    Coronavirus 2019, PCR  Not Detected Not Detected          1/21/2024 04:46       Status: Final result       Visible to patient: No (inaccessible in [facility])    Specimen Information: Stool   0 Result Notes      Component  Ref Range & Units    C. difficile, PCR  Not Detected Detected Abnormal    Resulting Agency [facility]                Component  Ref Range & Units    Flu A Result  Not Detected Not Detected   Flu B Result  Not Detected Not Detected   Resulting [facility]                      1/21/2024 04:35       Status: Final result       Visible to patient: No (inaccessible in [facility])    0 Result Notes      Component  Ref Range & Units    RSV PCR  Not Detected Detected Abnormal    Resulting Agency [facility]              Narrative                Status Exam Begun Exam Ended   Final 1/21/2024 05:19 1/21/2024 05:20     Study Result    Narrative & Impression   STUDY:  CT Abdomen and Pelvis without and with IV Contrast; 0/21/2024 at 5:21  AM  INDICATION:  Status post bowel obstruction, diarrhea.  COMPARISON:  12/29/2023 CT abdomen and pelvis.  ACCESSION NUMBER(S):  [id number]  ORDERING CLINICIAN:  CARLA SMILEY  TECHNIQUE:  CT of the abdomen and pelvis was performed without and with IV  contrast.  Contiguous axial images were obtained at 3 mm slice  thickness through the abdomen and pelvis.  Coronal and sagittal  reconstructions at 3 mm  slice thickness were performed.  Omnipaque 350  75 mL was administered intravenously; positive oral contrast was  given.    FINDINGS:  LOWER CHEST:  No cardiomegaly.  No pericardial effusion.  Lung bases demonstrate  trace bilateral pleural effusions with minimal bibasilar atelectasis.      ABDOMEN:     LIVER:  No hepatomegaly.  Smooth surface contour.  Normal attenuation.     BILE DUCTS:  No intrahepatic or extrahepatic biliary ductal dilatation.     GALLBLADDER:  Gallbladder is absent.    STOMACH:  No abnormalities identified.     PANCREAS:  Mild pancreatic atrophy again noted.  No masses or ductal dilatation.     SPLEEN:  No splenomegaly or focal splenic lesion.     ADRENAL GLANDS:  No thickening or nodules.     KIDNEYS AND URETERS:  Kidneys are normal in size and location.  No renal or ureteral  calculi.     PELVIS:     BLADDER:  Mildly distended appearance of the urinary bladder with small amount  of nondependent air.       REPRODUCTIVE ORGANS:  Uterus is absent.     BOWEL:  Postoperative changes of partial bowel resection are again  demonstrated within grossly intact surgical anastomosis in the right  lower abdomen.  Just posterior and medial to the anastomosis, there is  a thick-walled collection measuring up to 3.4 x 3.8 x 2.8 cm (AP,  transverse, craniocaudal on image 95 series 201).  Moderate adjacent  mesenteric stranding and phlegmonous reaction is noted.  Mild to  moderately distended fluid-filled small bowel loops are seen in the  left hemiabdomen with mildly distended, fluid-filled colonic bowel  loops.  Findings may represent a mild generalized ileus versus partial  obstruction.  Superimposed enterocolitis is not excluded.  VESSELS:  No abnormalities identified.  Abdominal aorta is normal in caliber.      PERITONEUM/RETROPERITONEUM/LYMPH NODES:  No free fluid.  No pneumoperitoneum.  No lymphadenopathy.     ABDOMINAL WALL:  Anterior abdominal wall postoperative changes are again seen with  open  surgical anastomosis/wound.  There is thickening along the open  anastomosis suggesting phlegmonous reaction.  No distinct drainable  anterior abdominal wall fluid collection is identified.  SOFT TISSUES:   No abnormalities identified.     BONES:  Stable thoracolumbar degenerative changes with persistent findings of  inferior L2 and superior L3 endplate defects.  No acute fracture or  aggressive osseous lesion.  IMPRESSION:  Postoperative changes of partial bowel resection are again  demonstrated with grossly intact appearance of the right lower  abdominal surgical anastomosis.  Along the posterior and medial aspect of the surgical anastomosis,  there is a thick-walled, mixed air-fluid collection as described  suggesting residual abscess.  There is moderate surrounding mesenteric  stranding/inflammatory response.  Mild to moderately distended small and large bowel loops are  demonstrated throughout the abdomen and pelvis as described suggesting  a mild diffuse ileus and/or partial obstruction.  Superimposed  enterocolitis cannot be excluded.  Small amount of nondependent air is seen within the urinary bladder.   Correlate for any recent intervention versus developing emphysematous  cystitis.  Anterior abdominal wall open surgical anastomosis is demonstrated as  described with adjacent soft tissue thickening suggesting phlegmonous  reaction.  No distinct drainable anterior abdominal wall fluid  collection identified.  NOTIFICATION:  The critical results of the study were discussed with,  and acknowledged by Dr. Tasha Gutierrez by telephone on 01/21/2024 at  0658.  Signed by Blaine Holt MD              Assessment/Plan   Abdominal abscess    Fluid should be drained    Place patient on Invanz    C. difficile colitis  Continue oral vancomycin      RSV  Isolation

## 2025-04-30 ENCOUNTER — OFFICE VISIT (OUTPATIENT)
Dept: WOUND CARE | Facility: CLINIC | Age: 80
End: 2025-04-30
Payer: MEDICARE

## 2025-04-30 PROCEDURE — 11042 DBRDMT SUBQ TIS 1ST 20SQCM/<: CPT

## 2025-05-07 ENCOUNTER — OFFICE VISIT (OUTPATIENT)
Dept: WOUND CARE | Facility: CLINIC | Age: 80
End: 2025-05-07
Payer: MEDICARE

## 2025-05-07 PROCEDURE — 11042 DBRDMT SUBQ TIS 1ST 20SQCM/<: CPT

## 2025-05-14 ENCOUNTER — APPOINTMENT (OUTPATIENT)
Dept: RADIOLOGY | Facility: HOSPITAL | Age: 80
End: 2025-05-14
Payer: MEDICARE

## 2025-05-14 ENCOUNTER — OFFICE VISIT (OUTPATIENT)
Dept: WOUND CARE | Facility: CLINIC | Age: 80
End: 2025-05-14
Payer: MEDICARE

## 2025-05-14 ENCOUNTER — APPOINTMENT (OUTPATIENT)
Dept: CARDIOLOGY | Facility: HOSPITAL | Age: 80
End: 2025-05-14
Payer: MEDICARE

## 2025-05-14 ENCOUNTER — HOSPITAL ENCOUNTER (EMERGENCY)
Facility: HOSPITAL | Age: 80
Discharge: HOME | End: 2025-05-14
Attending: EMERGENCY MEDICINE
Payer: MEDICARE

## 2025-05-14 VITALS
HEART RATE: 72 BPM | RESPIRATION RATE: 20 BRPM | OXYGEN SATURATION: 98 % | HEIGHT: 65 IN | SYSTOLIC BLOOD PRESSURE: 169 MMHG | DIASTOLIC BLOOD PRESSURE: 85 MMHG | BODY MASS INDEX: 24.66 KG/M2 | WEIGHT: 148 LBS | TEMPERATURE: 97.3 F

## 2025-05-14 DIAGNOSIS — T14.8XXA MUSCLE STRAIN: ICD-10-CM
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
## 2025-05-14 LAB
ANION GAP SERPL CALC-SCNC: 12 MMOL/L (ref 10–20)
APTT PPP: 31 SECONDS (ref 26–36)
BASOPHILS # BLD AUTO: 0.02 X10*3/UL (ref 0–0.1)
BASOPHILS NFR BLD AUTO: 0.4 %
BUN SERPL-MCNC: 27 MG/DL (ref 6–23)
CALCIUM SERPL-MCNC: 9.4 MG/DL (ref 8.6–10.3)
CHLORIDE SERPL-SCNC: 107 MMOL/L (ref 98–107)
CO2 SERPL-SCNC: 27 MMOL/L (ref 21–32)
CREAT SERPL-MCNC: 1.08 MG/DL (ref 0.5–1.05)
EGFRCR SERPLBLD CKD-EPI 2021: 53 ML/MIN/1.73M*2
EOSINOPHIL # BLD AUTO: 0.04 X10*3/UL (ref 0–0.4)
EOSINOPHIL NFR BLD AUTO: 0.8 %
ERYTHROCYTE [DISTWIDTH] IN BLOOD BY AUTOMATED COUNT: 13.6 % (ref 11.5–14.5)
GLUCOSE SERPL-MCNC: 88 MG/DL (ref 74–99)
HCT VFR BLD AUTO: 36.3 % (ref 36–46)
HGB BLD-MCNC: 12.1 G/DL (ref 12–16)
IMM GRANULOCYTES # BLD AUTO: 0.02 X10*3/UL (ref 0–0.5)
IMM GRANULOCYTES NFR BLD AUTO: 0.4 % (ref 0–0.9)
INR PPP: 1.4 (ref 0.9–1.1)
LYMPHOCYTES # BLD AUTO: 0.81 X10*3/UL (ref 0.8–3)
LYMPHOCYTES NFR BLD AUTO: 16.3 %
MCH RBC QN AUTO: 30.2 PG (ref 26–34)
MCHC RBC AUTO-ENTMCNC: 33.3 G/DL (ref 32–36)
MCV RBC AUTO: 91 FL (ref 80–100)
MONOCYTES # BLD AUTO: 0.2 X10*3/UL (ref 0.05–0.8)
MONOCYTES NFR BLD AUTO: 4 %
NEUTROPHILS # BLD AUTO: 3.87 X10*3/UL (ref 1.6–5.5)
NEUTROPHILS NFR BLD AUTO: 78.1 %
NRBC BLD-RTO: 0 /100 WBCS (ref 0–0)
PLATELET # BLD AUTO: 120 X10*3/UL (ref 150–450)
POTASSIUM SERPL-SCNC: 3.8 MMOL/L (ref 3.5–5.3)
PROTHROMBIN TIME: 15.9 SECONDS (ref 9.8–12.4)
RBC # BLD AUTO: 4.01 X10*6/UL (ref 4–5.2)
SODIUM SERPL-SCNC: 142 MMOL/L (ref 136–145)
WBC # BLD AUTO: 5 X10*3/UL (ref 4.4–11.3)

## 2025-05-14 PROCEDURE — 74177 CT ABD & PELVIS W/CONTRAST: CPT | Performed by: RADIOLOGY

## 2025-05-14 PROCEDURE — 70450 CT HEAD/BRAIN W/O DYE: CPT | Performed by: RADIOLOGY

## 2025-05-14 PROCEDURE — G0390 TRAUMA RESPONS W/HOSP CRITI: HCPCS

## 2025-05-14 PROCEDURE — 72125 CT NECK SPINE W/O DYE: CPT | Performed by: RADIOLOGY

## 2025-05-14 PROCEDURE — 72125 CT NECK SPINE W/O DYE: CPT

## 2025-05-14 PROCEDURE — 93005 ELECTROCARDIOGRAM TRACING: CPT

## 2025-05-14 PROCEDURE — 71045 X-RAY EXAM CHEST 1 VIEW: CPT | Performed by: RADIOLOGY

## 2025-05-14 PROCEDURE — 85730 THROMBOPLASTIN TIME PARTIAL: CPT | Performed by: EMERGENCY MEDICINE

## 2025-05-14 PROCEDURE — 70450 CT HEAD/BRAIN W/O DYE: CPT

## 2025-05-14 PROCEDURE — 71045 X-RAY EXAM CHEST 1 VIEW: CPT

## 2025-05-14 PROCEDURE — 74177 CT ABD & PELVIS W/CONTRAST: CPT

## 2025-05-14 PROCEDURE — 11042 DBRDMT SUBQ TIS 1ST 20SQCM/<: CPT

## 2025-05-14 PROCEDURE — 36415 COLL VENOUS BLD VENIPUNCTURE: CPT | Performed by: EMERGENCY MEDICINE

## 2025-05-14 PROCEDURE — 85025 COMPLETE CBC W/AUTO DIFF WBC: CPT | Performed by: EMERGENCY MEDICINE

## 2025-05-14 PROCEDURE — 99285 EMERGENCY DEPT VISIT HI MDM: CPT | Mod: 25 | Performed by: EMERGENCY MEDICINE

## 2025-05-14 PROCEDURE — 80048 BASIC METABOLIC PNL TOTAL CA: CPT | Performed by: EMERGENCY MEDICINE

## 2025-05-14 PROCEDURE — 2550000001 HC RX 255 CONTRASTS: Performed by: EMERGENCY MEDICINE

## 2025-05-14 PROCEDURE — 85610 PROTHROMBIN TIME: CPT | Performed by: EMERGENCY MEDICINE

## 2025-05-14 RX ADMIN — IOHEXOL 75 ML: 350 INJECTION, SOLUTION INTRAVENOUS at 12:45

## 2025-05-14 ASSESSMENT — COLUMBIA-SUICIDE SEVERITY RATING SCALE - C-SSRS
6. HAVE YOU EVER DONE ANYTHING, STARTED TO DO ANYTHING, OR PREPARED TO DO ANYTHING TO END YOUR LIFE?: NO
1. IN THE PAST MONTH, HAVE YOU WISHED YOU WERE DEAD OR WISHED YOU COULD GO TO SLEEP AND NOT WAKE UP?: NO
2. HAVE YOU ACTUALLY HAD ANY THOUGHTS OF KILLING YOURSELF?: NO

## 2025-05-14 ASSESSMENT — PAIN DESCRIPTION - LOCATION: LOCATION: HEAD

## 2025-05-14 ASSESSMENT — PAIN - FUNCTIONAL ASSESSMENT: PAIN_FUNCTIONAL_ASSESSMENT: 0-10

## 2025-05-14 ASSESSMENT — LIFESTYLE VARIABLES
HAVE YOU EVER FELT YOU SHOULD CUT DOWN ON YOUR DRINKING: NO
EVER FELT BAD OR GUILTY ABOUT YOUR DRINKING: NO
EVER HAD A DRINK FIRST THING IN THE MORNING TO STEADY YOUR NERVES TO GET RID OF A HANGOVER: NO
TOTAL SCORE: 0
HAVE PEOPLE ANNOYED YOU BY CRITICIZING YOUR DRINKING: NO

## 2025-05-14 ASSESSMENT — PAIN DESCRIPTION - PROGRESSION: CLINICAL_PROGRESSION: NOT CHANGED

## 2025-05-14 ASSESSMENT — PAIN DESCRIPTION - DESCRIPTORS: DESCRIPTORS: ACHING

## 2025-05-14 ASSESSMENT — PAIN SCALES - GENERAL: PAINLEVEL_OUTOF10: 4

## 2025-05-14 ASSESSMENT — PAIN DESCRIPTION - FREQUENCY: FREQUENCY: CONSTANT/CONTINUOUS

## 2025-05-14 ASSESSMENT — PAIN DESCRIPTION - ONSET: ONSET: SUDDEN

## 2025-05-14 NOTE — DISCHARGE INSTRUCTIONS
Try to encourage some light walking and stretching.  May use ice 20 minutes at a time on sore areas.  May use a lidocaine patch on sore areas.  Tylenol is appropriate for pain management.  Return for any concerning worsening or development of severe pain or confusion.
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
## 2025-05-15 LAB
ATRIAL RATE: 76 BPM
P AXIS: 85 DEGREES
P OFFSET: 139 MS
P ONSET: 112 MS
PR INTERVAL: 216 MS
Q ONSET: 220 MS
QRS COUNT: 13 BEATS
QRS DURATION: 120 MS
QT INTERVAL: 408 MS
QTC CALCULATION(BAZETT): 459 MS
QTC FREDERICIA: 441 MS
R AXIS: -54 DEGREES
T AXIS: 30 DEGREES
T OFFSET: 424 MS
VENTRICULAR RATE: 76 BPM

## 2025-05-16 ENCOUNTER — OFFICE VISIT (OUTPATIENT)
Dept: PRIMARY CARE | Facility: CLINIC | Age: 80
End: 2025-05-16
Payer: MEDICARE

## 2025-05-16 VITALS
WEIGHT: 146 LBS | BODY MASS INDEX: 24.32 KG/M2 | SYSTOLIC BLOOD PRESSURE: 162 MMHG | TEMPERATURE: 98.3 F | HEART RATE: 69 BPM | DIASTOLIC BLOOD PRESSURE: 82 MMHG | HEIGHT: 65 IN

## 2025-05-16 DIAGNOSIS — I48.19 PERSISTENT ATRIAL FIBRILLATION (MULTI): ICD-10-CM

## 2025-05-16 DIAGNOSIS — E55.9 VITAMIN D DEFICIENCY: ICD-10-CM

## 2025-05-16 DIAGNOSIS — F32.A DEPRESSION, UNSPECIFIED DEPRESSION TYPE: ICD-10-CM
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
## 2025-05-16 DIAGNOSIS — Z76.0 MEDICATION REFILL: ICD-10-CM

## 2025-05-16 DIAGNOSIS — E03.9 HYPOTHYROIDISM, UNSPECIFIED TYPE: ICD-10-CM

## 2025-05-16 RX ORDER — CALCIUM CARBONATE 300MG(750)
400 TABLET,CHEWABLE ORAL DAILY
Qty: 30 TABLET | Refills: 2 | Status: SHIPPED | OUTPATIENT
Start: 2025-05-16

## 2025-05-16 RX ORDER — CYANOCOBALAMIN (VITAMIN B-12) 500 MCG
10 TABLET ORAL DAILY
Qty: 30 TABLET | Refills: 2 | Status: SHIPPED | OUTPATIENT
Start: 2025-05-16

## 2025-05-16 RX ORDER — LIDOCAINE 50 MG/G
1 PATCH TOPICAL DAILY
Qty: 30 PATCH | Refills: 2 | Status: SHIPPED | OUTPATIENT
Start: 2025-05-16 | End: 2025-06-15

## 2025-05-16 RX ORDER — DICLOFENAC SODIUM 10 MG/G
4 GEL TOPICAL 4 TIMES DAILY
Qty: 100 G | Refills: 1 | Status: SHIPPED | OUTPATIENT
Start: 2025-05-16

## 2025-05-16 RX ORDER — LEVOTHYROXINE SODIUM 75 UG/1
75 TABLET ORAL DAILY
Qty: 30 TABLET | Refills: 2 | Status: SHIPPED | OUTPATIENT
Start: 2025-05-16

## 2025-05-16 ASSESSMENT — PATIENT HEALTH QUESTIONNAIRE - PHQ9
SUM OF ALL RESPONSES TO PHQ9 QUESTIONS 1 AND 2: 0
1. LITTLE INTEREST OR PLEASURE IN DOING THINGS: NOT AT ALL
2. FEELING DOWN, DEPRESSED OR HOPELESS: NOT AT ALL
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
## 2025-05-21 ENCOUNTER — OFFICE VISIT (OUTPATIENT)
Dept: WOUND CARE | Facility: CLINIC | Age: 80
End: 2025-05-21
Payer: MEDICARE

## 2025-05-21 PROCEDURE — 99212 OFFICE O/P EST SF 10 MIN: CPT

## 2025-05-22 ENCOUNTER — APPOINTMENT (OUTPATIENT)
Dept: PRIMARY CARE | Facility: CLINIC | Age: 80
End: 2025-05-22
Payer: MEDICARE

## 2025-05-30 ENCOUNTER — APPOINTMENT (OUTPATIENT)
Dept: PRIMARY CARE | Facility: CLINIC | Age: 80
End: 2025-05-30
Payer: MEDICARE

## 2025-06-04 ENCOUNTER — OFFICE VISIT (OUTPATIENT)
Dept: WOUND CARE | Facility: CLINIC | Age: 80
End: 2025-06-04
Payer: MEDICARE

## 2025-06-04 PROCEDURE — 99213 OFFICE O/P EST LOW 20 MIN: CPT

## 2025-06-10 ENCOUNTER — APPOINTMENT (OUTPATIENT)
Dept: PRIMARY CARE | Facility: CLINIC | Age: 80
End: 2025-06-10
Payer: MEDICARE

## 2025-06-10 VITALS
TEMPERATURE: 97.2 F | DIASTOLIC BLOOD PRESSURE: 62 MMHG | OXYGEN SATURATION: 97 % | SYSTOLIC BLOOD PRESSURE: 134 MMHG | WEIGHT: 143 LBS | HEART RATE: 65 BPM | HEIGHT: 65 IN | BODY MASS INDEX: 23.82 KG/M2

## 2025-06-10 DIAGNOSIS — Z12.31 ENCOUNTER FOR SCREENING MAMMOGRAM FOR MALIGNANT NEOPLASM OF BREAST: ICD-10-CM

## 2025-06-10 DIAGNOSIS — I10 PRIMARY HYPERTENSION: ICD-10-CM

## 2025-06-10 DIAGNOSIS — F32.A DEPRESSION, UNSPECIFIED DEPRESSION TYPE: ICD-10-CM

## 2025-06-10 DIAGNOSIS — E55.9 VITAMIN D DEFICIENCY: ICD-10-CM

## 2025-06-10 DIAGNOSIS — Z12.11 ENCOUNTER FOR SCREENING FOR MALIGNANT NEOPLASM OF COLON: ICD-10-CM

## 2025-06-10 DIAGNOSIS — Z86.39 HX OF NON ANEMIC VITAMIN B12 DEFICIENCY: ICD-10-CM

## 2025-06-10 DIAGNOSIS — R29.6 RECURRENT FALLS: ICD-10-CM

## 2025-06-10 DIAGNOSIS — I48.19 PERSISTENT ATRIAL FIBRILLATION (MULTI): ICD-10-CM

## 2025-06-10 DIAGNOSIS — E03.9 HYPOTHYROIDISM, UNSPECIFIED TYPE: Primary | ICD-10-CM

## 2025-06-10 DIAGNOSIS — M79.89 SWELLING OF LOWER LIMB: ICD-10-CM

## 2025-06-10 DIAGNOSIS — K21.9 GASTROESOPHAGEAL REFLUX DISEASE, UNSPECIFIED WHETHER ESOPHAGITIS PRESENT: ICD-10-CM

## 2025-06-10 DIAGNOSIS — E11.9 TYPE 2 DIABETES MELLITUS WITHOUT COMPLICATION, WITHOUT LONG-TERM CURRENT USE OF INSULIN: ICD-10-CM

## 2025-06-10 PROCEDURE — 1170F FXNL STATUS ASSESSED: CPT

## 2025-06-10 PROCEDURE — 3075F SYST BP GE 130 - 139MM HG: CPT

## 2025-06-10 PROCEDURE — 3078F DIAST BP <80 MM HG: CPT

## 2025-06-10 PROCEDURE — G0439 PPPS, SUBSEQ VISIT: HCPCS

## 2025-06-10 PROCEDURE — 1036F TOBACCO NON-USER: CPT

## 2025-06-10 PROCEDURE — 1159F MED LIST DOCD IN RCRD: CPT

## 2025-06-10 ASSESSMENT — ACTIVITIES OF DAILY LIVING (ADL)
TAKING_MEDICATION: NEEDS ASSISTANCE
DOING_HOUSEWORK: NEEDS ASSISTANCE
MANAGING_FINANCES: NEEDS ASSISTANCE
BATHING: INDEPENDENT
GROCERY_SHOPPING: NEEDS ASSISTANCE
DRESSING: INDEPENDENT
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
## 2025-06-13 ENCOUNTER — HOSPITAL ENCOUNTER (OUTPATIENT)
Dept: RADIOLOGY | Facility: EXTERNAL LOCATION | Age: 80
Discharge: HOME | End: 2025-06-13

## 2025-06-13 ENCOUNTER — HOSPITAL ENCOUNTER (OUTPATIENT)
Dept: RADIOLOGY | Facility: HOSPITAL | Age: 80
Discharge: HOME | End: 2025-06-13
Payer: MEDICARE

## 2025-06-13 DIAGNOSIS — Z12.31 ENCOUNTER FOR SCREENING MAMMOGRAM FOR MALIGNANT NEOPLASM OF BREAST: ICD-10-CM

## 2025-06-13 PROCEDURE — 77067 SCR MAMMO BI INCL CAD: CPT

## 2025-06-21 LAB — NONINV COLON CA DNA+OCC BLD SCRN STL QL: POSITIVE

## 2025-06-23 DIAGNOSIS — F32.A DEPRESSION, UNSPECIFIED DEPRESSION TYPE: ICD-10-CM

## 2025-06-23 DIAGNOSIS — K21.9 GASTROESOPHAGEAL REFLUX DISEASE, UNSPECIFIED WHETHER ESOPHAGITIS PRESENT: ICD-10-CM

## 2025-06-23 DIAGNOSIS — R19.5 POSITIVE COLORECTAL CANCER SCREENING USING COLOGUARD TEST: Primary | ICD-10-CM

## 2025-06-23 RX ORDER — PANTOPRAZOLE SODIUM 40 MG/1
40 TABLET, DELAYED RELEASE ORAL
Qty: 30 TABLET | Refills: 2 | Status: SHIPPED | OUTPATIENT
Start: 2025-06-23

## 2025-06-23 RX ORDER — ARIPIPRAZOLE 5 MG/1
5 TABLET ORAL DAILY
Qty: 30 TABLET | Refills: 2 | Status: SHIPPED | OUTPATIENT
Start: 2025-06-23

## 2025-07-08 ENCOUNTER — TELEPHONE (OUTPATIENT)
Dept: GENETICS | Facility: CLINIC | Age: 80
End: 2025-07-08
Payer: MEDICARE

## 2025-07-08 DIAGNOSIS — I10 PRIMARY HYPERTENSION: ICD-10-CM

## 2025-07-08 DIAGNOSIS — Z01.89 PATIENT REQUEST FOR DIAGNOSTIC TESTING: Primary | ICD-10-CM

## 2025-07-08 DIAGNOSIS — F32.A DEPRESSION, UNSPECIFIED DEPRESSION TYPE: Primary | ICD-10-CM

## 2025-07-09 RX ORDER — FUROSEMIDE 20 MG/1
20 TABLET ORAL
Qty: 30 TABLET | Refills: 0 | Status: SHIPPED | OUTPATIENT
Start: 2025-07-09

## 2025-07-22 DIAGNOSIS — K21.9 GASTROESOPHAGEAL REFLUX DISEASE, UNSPECIFIED WHETHER ESOPHAGITIS PRESENT: ICD-10-CM

## 2025-07-22 DIAGNOSIS — F32.A DEPRESSION, UNSPECIFIED DEPRESSION TYPE: ICD-10-CM

## 2025-07-22 DIAGNOSIS — I10 PRIMARY HYPERTENSION: ICD-10-CM

## 2025-07-22 DIAGNOSIS — E03.9 HYPOTHYROIDISM, UNSPECIFIED TYPE: ICD-10-CM

## 2025-07-22 RX ORDER — FUROSEMIDE 20 MG/1
20 TABLET ORAL
Qty: 30 TABLET | Refills: 0 | Status: SHIPPED | OUTPATIENT
Start: 2025-07-22

## 2025-07-22 RX ORDER — LEVOTHYROXINE SODIUM 75 UG/1
75 TABLET ORAL DAILY
Qty: 90 TABLET | Refills: 2 | Status: SHIPPED | OUTPATIENT
Start: 2025-07-22

## 2025-07-22 RX ORDER — PANTOPRAZOLE SODIUM 40 MG/1
40 TABLET, DELAYED RELEASE ORAL
Qty: 90 TABLET | Refills: 2 | Status: SHIPPED | OUTPATIENT
Start: 2025-07-22

## 2025-07-22 RX ORDER — ARIPIPRAZOLE 5 MG/1
5 TABLET ORAL DAILY
Qty: 90 TABLET | Refills: 2 | Status: SHIPPED | OUTPATIENT
Start: 2025-07-22

## 2025-07-22 RX ORDER — CALCIUM CARBONATE 300MG(750)
400 TABLET,CHEWABLE ORAL DAILY
Qty: 90 TABLET | Refills: 2 | Status: SHIPPED | OUTPATIENT
Start: 2025-07-22

## 2025-07-29 ENCOUNTER — APPOINTMENT (OUTPATIENT)
Dept: OPHTHALMOLOGY | Facility: CLINIC | Age: 80
End: 2025-07-29
Payer: MEDICARE

## 2025-07-29 DIAGNOSIS — H52.4 PRESBYOPIA: ICD-10-CM

## 2025-07-29 DIAGNOSIS — E11.9 TYPE 2 DIABETES MELLITUS WITHOUT COMPLICATION, WITHOUT LONG-TERM CURRENT USE OF INSULIN: Primary | ICD-10-CM

## 2025-07-29 DIAGNOSIS — H02.533 LID RETRACTION OF RIGHT EYE: ICD-10-CM

## 2025-07-29 DIAGNOSIS — H52.03 HYPEROPIA OF BOTH EYES: ICD-10-CM

## 2025-07-29 DIAGNOSIS — H01.02B SQUAMOUS BLEPHARITIS OF UPPER AND LOWER EYELIDS OF BOTH EYES: ICD-10-CM

## 2025-07-29 DIAGNOSIS — H01.02A SQUAMOUS BLEPHARITIS OF UPPER AND LOWER EYELIDS OF BOTH EYES: ICD-10-CM

## 2025-07-29 DIAGNOSIS — H02.536 LID RETRACTION OF LEFT EYE: ICD-10-CM

## 2025-07-29 DIAGNOSIS — H52.223 REGULAR ASTIGMATISM OF BOTH EYES: ICD-10-CM

## 2025-07-29 DIAGNOSIS — Z96.1 PSEUDOPHAKIA: ICD-10-CM

## 2025-07-29 PROCEDURE — 92015 DETERMINE REFRACTIVE STATE: CPT | Performed by: OPHTHALMOLOGY

## 2025-07-29 PROCEDURE — 92004 COMPRE OPH EXAM NEW PT 1/>: CPT | Performed by: OPHTHALMOLOGY

## 2025-07-29 ASSESSMENT — REFRACTION_WEARINGRX
OD_ADD: +3.00
OS_ADD: +3.00

## 2025-07-29 ASSESSMENT — VISUAL ACUITY
OS_PH_SC: 20/20
OS_SC: 20/40
METHOD: SNELLEN - LINEAR
OD_SC: 20/20-1

## 2025-07-29 ASSESSMENT — CUP TO DISC RATIO
OS_RATIO: 0.4
OD_RATIO: 0.4

## 2025-07-29 ASSESSMENT — TONOMETRY
IOP_METHOD: GOLDMANN APPLANATION
OD_IOP_MMHG: 11
OS_IOP_MMHG: 10

## 2025-07-29 ASSESSMENT — REFRACTION_MANIFEST
OD_SPHERE: +0.75
OD_CYLINDER: -0.50
OS_ADD: +3.25
OD_AXIS: 022
OS_SPHERE: +0.75
OS_AXIS: 022
OD_ADD: +3.25
OS_CYLINDER: -0.75

## 2025-07-29 ASSESSMENT — EXTERNAL EXAM - LEFT EYE: OS_EXAM: NORMAL

## 2025-07-29 ASSESSMENT — ENCOUNTER SYMPTOMS: EYES NEGATIVE: 1

## 2025-07-29 ASSESSMENT — EXTERNAL EXAM - RIGHT EYE: OD_EXAM: NORMAL

## 2025-07-29 NOTE — PROGRESS NOTES
Assessment/Plan   Diagnoses and all orders for this visit:  Type 2 diabetes mellitus without complication, without long-term current use of insulin  -no evidence of diabetic retinopathy at the present time  -pt was advised of the importance of good diabetes control and the importance of a yearly dilated diabetic exam    Pseudophakia  continue to monitor    Squamous blepharitis of upper and lower eyelids of both eyes  Start warm compresses both eyes 2 times a day for 30 minutes followed by lid scrubs    Hyperopia of both eyes  Regular astigmatism of both eyes  Presbyopia  Refractive error  -give Rx for new glasses    Lid retraction of right eye  Lid retraction of left eye  continue to monitor    Return for a dilated exam in   12  months or sooner if having any problems

## 2025-08-02 LAB
25(OH)D3+25(OH)D2 SERPL-MCNC: 49 NG/ML (ref 30–100)
EST. AVERAGE GLUCOSE BLD GHB EST-MCNC: 105 MG/DL
EST. AVERAGE GLUCOSE BLD GHB EST-SCNC: 5.8 MMOL/L
HBA1C MFR BLD: 5.3 %
VIT B12 SERPL-MCNC: >2000 PG/ML (ref 200–1100)

## 2025-08-06 ENCOUNTER — APPOINTMENT (OUTPATIENT)
Dept: RADIOLOGY | Facility: HOSPITAL | Age: 80
End: 2025-08-06
Payer: MEDICARE

## 2025-08-06 ENCOUNTER — HOSPITAL ENCOUNTER (EMERGENCY)
Facility: HOSPITAL | Age: 80
Discharge: HOME | End: 2025-08-06
Attending: STUDENT IN AN ORGANIZED HEALTH CARE EDUCATION/TRAINING PROGRAM
Payer: MEDICARE

## 2025-08-06 VITALS
TEMPERATURE: 97.3 F | OXYGEN SATURATION: 98 % | SYSTOLIC BLOOD PRESSURE: 145 MMHG | RESPIRATION RATE: 18 BRPM | BODY MASS INDEX: 23.82 KG/M2 | HEIGHT: 65 IN | HEART RATE: 66 BPM | WEIGHT: 143 LBS | DIASTOLIC BLOOD PRESSURE: 72 MMHG

## 2025-08-06 DIAGNOSIS — M79.642 HAND PAIN, LEFT: Primary | ICD-10-CM

## 2025-08-06 DIAGNOSIS — S06.9X0A MILD TRAUMATIC BRAIN INJURY, WITHOUT LOSS OF CONSCIOUSNESS, INITIAL ENCOUNTER (MULTI): ICD-10-CM
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
## 2025-08-06 PROCEDURE — 73080 X-RAY EXAM OF ELBOW: CPT | Mod: LEFT SIDE | Performed by: RADIOLOGY

## 2025-08-06 PROCEDURE — 73130 X-RAY EXAM OF HAND: CPT | Mod: LT

## 2025-08-06 PROCEDURE — 71046 X-RAY EXAM CHEST 2 VIEWS: CPT | Mod: FOREIGN READ | Performed by: RADIOLOGY

## 2025-08-06 PROCEDURE — 73110 X-RAY EXAM OF WRIST: CPT | Mod: LEFT SIDE | Performed by: RADIOLOGY

## 2025-08-06 PROCEDURE — 73110 X-RAY EXAM OF WRIST: CPT | Mod: LT

## 2025-08-06 PROCEDURE — 73130 X-RAY EXAM OF HAND: CPT | Mod: LEFT SIDE | Performed by: RADIOLOGY

## 2025-08-06 PROCEDURE — 71046 X-RAY EXAM CHEST 2 VIEWS: CPT

## 2025-08-06 PROCEDURE — 73080 X-RAY EXAM OF ELBOW: CPT | Mod: LT

## 2025-08-06 PROCEDURE — 70450 CT HEAD/BRAIN W/O DYE: CPT | Performed by: STUDENT IN AN ORGANIZED HEALTH CARE EDUCATION/TRAINING PROGRAM

## 2025-08-06 PROCEDURE — 70450 CT HEAD/BRAIN W/O DYE: CPT

## 2025-08-06 PROCEDURE — 99284 EMERGENCY DEPT VISIT MOD MDM: CPT | Mod: 25 | Performed by: STUDENT IN AN ORGANIZED HEALTH CARE EDUCATION/TRAINING PROGRAM

## 2025-08-06 ASSESSMENT — PAIN SCALES - GENERAL: PAINLEVEL_OUTOF10: 7

## 2025-08-06 ASSESSMENT — LIFESTYLE VARIABLES
HAVE PEOPLE ANNOYED YOU BY CRITICIZING YOUR DRINKING: NO
HAVE YOU EVER FELT YOU SHOULD CUT DOWN ON YOUR DRINKING: NO
EVER FELT BAD OR GUILTY ABOUT YOUR DRINKING: NO
EVER HAD A DRINK FIRST THING IN THE MORNING TO STEADY YOUR NERVES TO GET RID OF A HANGOVER: NO
TOTAL SCORE: 0

## 2025-08-06 ASSESSMENT — PAIN - FUNCTIONAL ASSESSMENT: PAIN_FUNCTIONAL_ASSESSMENT: 0-10

## 2025-08-06 NOTE — ED PROVIDER NOTES
Emergency Department Provider Note       History of Present Illness     History provided by: Patient and Family Member  Limitations to History: Dementia  External Records Reviewed with Brief Summary: None    HPI:  Harper Lara is a 80 y.o. female presents after a fall in her gravel driveway.  This occurred approximately 2 days ago.  Been behaving normally since the fall, no nausea, no vomiting, eating and drinking okay.  She has had abrasion to her left arm and left hand.  She has been able to use her hand, was cleaned out by family members with hydrogen peroxide to both sites.  Had some ongoing bleeding, which they attributed to her use of Eliquis.    Physical Exam   Triage vitals:  T 36.3 °C (97.3 °F)  HR 68  /70  RR 18  O2 99 % None (Room air)    Physical Exam  Vitals reviewed.   Constitutional:       General: She is awake. She is not in acute distress.     Appearance: Normal appearance. She is normal weight. She is not ill-appearing, toxic-appearing or diaphoretic.   HENT:      Head: Normocephalic and atraumatic.      Nose: Nose normal.      Mouth/Throat:      Mouth: Mucous membranes are moist.     Eyes:      Extraocular Movements: Extraocular movements intact.      Pupils: Pupils are equal, round, and reactive to light.     Neck:      Thyroid: No thyroid mass.      Trachea: Phonation normal.     Cardiovascular:      Rate and Rhythm: Normal rate and regular rhythm.   Pulmonary:      Effort: Pulmonary effort is normal. No respiratory distress.      Breath sounds: Normal air entry. No decreased breath sounds, wheezing, rhonchi or rales.   Abdominal:      General: Bowel sounds are normal. There is no distension.      Palpations: Abdomen is soft.      Tenderness: There is no abdominal tenderness.     Musculoskeletal:      Cervical back: Neck supple.      Right lower leg: No edema.      Left lower leg: No edema.     Skin:     General: Skin is warm and dry.      Capillary Refill: Capillary refill takes  less than 2 seconds.      Comments: Bruising to left hand, and the abrasion of left forearm, no active bleeding of either side, but bandage was replaced.     Neurological:      General: No focal deficit present.      Mental Status: She is alert. Mental status is at baseline.      Cranial Nerves: Cranial nerves 2-12 are intact.      Motor: Motor function is intact.     Psychiatric:         Attention and Perception: Attention and perception normal.         Mood and Affect: Mood normal.         Speech: Speech normal.         Behavior: Behavior normal.           Medical Decision Making & ED Course   Medical Decision Makin y.o. female who presents after a fall while on blood thinners.  She struck her head, and left arm.  She has abrasions of her left arm and hand.  As well as forehead.  Will scan her head to make sure there is no intracranial bleeding, and hand to make sure there is no small fracture, although pretest probability is low given that she has full functionality of her hand; exam is somewhat limited given her dementia, although she does not cry out in pain with palpation of the hand.  ----      Differential diagnoses considered include but are not limited to: Hand, wrist, radial or ulnar fracture.  Intracranial hemorrhage.    Social Determinants of Health which Significantly Impact Care: Social Determinants of Health which Significantly Impact Care: None identified     EKG Independent Interpretation: EKG not obtained    Independent Result Review and Interpretation: Relevant laboratory and radiographic results were reviewed and independently interpreted by myself.  As necessary, they are commented on in the ED Course.    Chronic conditions affecting the patient's care: As documented above in [facility]    The patient was discussed with the following consultants/services: None    Care Considerations: As documented above in [facility]    ED Course:  Diagnoses as of 25 2227   Hand pain, left   Mild traumatic brain  injury, without loss of consciousness, initial encounter (Multi)   Fall, initial encounter       Disposition   As a result of the work-up, the patient was discharged home.  she was informed of her diagnosis and instructed to come back with any concerns or worsening of condition.  she and was agreeable to the plan as discussed above.  she was given the opportunity to ask questions.  All of the patient's questions were answered.    Procedures   Procedures    Patient was seen independently    Lavelle Bermudez MD  Emergency Medicine                                                       Lavelle Bermudez MD  08/06/25 2015       Lavelle Bermudez MD  08/06/25 0068

## 2025-08-07 ENCOUNTER — OFFICE VISIT (OUTPATIENT)
Dept: PRIMARY CARE | Facility: CLINIC | Age: 80
End: 2025-08-07
Payer: MEDICARE

## 2025-08-07 ENCOUNTER — HOME HEALTH ADMISSION (OUTPATIENT)
Dept: HOME HEALTH SERVICES | Facility: HOME HEALTH | Age: 80
End: 2025-08-07
Payer: MEDICARE

## 2025-08-07 ENCOUNTER — DOCUMENTATION (OUTPATIENT)
Dept: HOME HEALTH SERVICES | Facility: HOME HEALTH | Age: 80
End: 2025-08-07

## 2025-08-07 VITALS
TEMPERATURE: 98.3 F | WEIGHT: 140.2 LBS | SYSTOLIC BLOOD PRESSURE: 130 MMHG | OXYGEN SATURATION: 98 % | HEART RATE: 62 BPM | BODY MASS INDEX: 23.36 KG/M2 | HEIGHT: 65 IN | DIASTOLIC BLOOD PRESSURE: 52 MMHG

## 2025-08-07 DIAGNOSIS — E55.9 VITAMIN D DEFICIENCY: ICD-10-CM

## 2025-08-07 DIAGNOSIS — S06.9X9A UNSPECIFIED INTRACRANIAL INJURY WITH LOSS OF CONSCIOUSNESS OF UNSPECIFIED DURATION, INITIAL ENCOUNTER (MULTI): ICD-10-CM

## 2025-08-07 DIAGNOSIS — E11.9 TYPE 2 DIABETES MELLITUS WITHOUT COMPLICATION, WITHOUT LONG-TERM CURRENT USE OF INSULIN: ICD-10-CM

## 2025-08-07 DIAGNOSIS — G20.A1 MODERATE DEMENTIA DUE TO PARKINSON'S DISEASE, WITH MOOD DISTURBANCE (MULTI): ICD-10-CM

## 2025-08-07 DIAGNOSIS — R29.6 RECURRENT FALLS: ICD-10-CM

## 2025-08-07 DIAGNOSIS — F02.B3 MODERATE DEMENTIA DUE TO PARKINSON'S DISEASE, WITH MOOD DISTURBANCE (MULTI): ICD-10-CM

## 2025-08-07 DIAGNOSIS — E03.9 HYPOTHYROIDISM, UNSPECIFIED TYPE: Primary | ICD-10-CM

## 2025-08-07 DIAGNOSIS — M79.89 SWELLING OF LOWER LIMB: ICD-10-CM

## 2025-08-07 DIAGNOSIS — I10 PRIMARY HYPERTENSION: ICD-10-CM

## 2025-08-07 PROCEDURE — 1157F ADVNC CARE PLAN IN RCRD: CPT

## 2025-08-07 PROCEDURE — 1159F MED LIST DOCD IN RCRD: CPT

## 2025-08-07 PROCEDURE — G8433 SCR FOR DEP NOT CPT DOC RSN: HCPCS

## 2025-08-07 PROCEDURE — 1036F TOBACCO NON-USER: CPT

## 2025-08-07 PROCEDURE — 3075F SYST BP GE 130 - 139MM HG: CPT

## 2025-08-07 PROCEDURE — G2211 COMPLEX E/M VISIT ADD ON: HCPCS

## 2025-08-07 PROCEDURE — 3078F DIAST BP <80 MM HG: CPT

## 2025-08-07 PROCEDURE — 99215 OFFICE O/P EST HI 40 MIN: CPT

## 2025-08-07 ASSESSMENT — ENCOUNTER SYMPTOMS
LOSS OF SENSATION IN FEET: 1
OCCASIONAL FEELINGS OF UNSTEADINESS: 0
DEPRESSION: 1

## 2025-08-07 NOTE — HH CARE COORDINATION
Home Care received a Referral for Physical Therapy and Occupational Therapy. We have processed the referral for a Start of Care within 48 hours of 08/09/2025.     If you have any questions or concerns, please feel free to contact us at [phone]. Follow the prompts, enter your five digit zip code, and you will be directed to your care team on [facility].

## 2025-08-07 NOTE — PROGRESS NOTES
"Subjective   Patient ID: Harper Lara is a 80 y.o. female who presents for ER Follow-up (discuss health issues/ ED fall fuv).    HPI   80-year-old female with past medical history of recurrent falls, A-fib, hypertension, diabetes mellitus type 2, hypothyroidism, depression presented today for  follow up   She was evaluated on 8/6/2025 for a fall, wrist x-ray was done and was negative for acute fractures, brain CAT scan was negative for acute intracranial pathologies.  At today's visit she is accompanied by his son with whom she lives.  Son stated that few days ago went without she was trying to use the cane when she fell.  She denies any dizziness or lightheadedness prior to fall, she denies loss of consciousness, she is on Eliquis for A-fib.    Review of Systems    Objective   /52 (BP Location: Right arm, Patient Position: Sitting, BP Cuff Size: Large adult)   Pulse 62   Temp 36.8 °C (98.3 °F) (Temporal)   Ht 1.651 m (5' 5\")   Wt 63.6 kg (140 lb 3.2 oz)   SpO2 98%   BMI 23.33 kg/m²     Physical Exam  Constitutional:       Appearance: Normal appearance. She is not ill-appearing.     Cardiovascular:      Rate and Rhythm: Rhythm irregular.      Heart sounds: No murmur heard.  Pulmonary:      Effort: No respiratory distress.      Breath sounds: No wheezing or rales.   Abdominal:      General: There is no distension.      Tenderness: There is no abdominal tenderness. There is no rebound.     Musculoskeletal:      Right lower leg: Edema present.      Left lower leg: Edema present.     Skin:     Comments: Bruises and lacerations over the left left medial fingers, and forehead.     Neurological:      General: No focal deficit present.      Mental Status: She is alert.     Psychiatric:         Mood and Affect: Mood normal.         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  - Mini-Mental test is 27  - Refer to home health care  - Discussed recent labs with the patient, continue current medications.     Patient was identified as a fall risk. Risk prevention instructions provided.

## 2025-08-08 ENCOUNTER — APPOINTMENT (OUTPATIENT)
Dept: PRIMARY CARE | Facility: CLINIC | Age: 80
End: 2025-08-08
Payer: MEDICARE

## 2025-08-11 ENCOUNTER — HOME CARE VISIT (OUTPATIENT)
Dept: HOME HEALTH SERVICES | Facility: HOME HEALTH | Age: 80
End: 2025-08-11
Payer: MEDICARE

## 2025-08-11 VITALS
OXYGEN SATURATION: 96 % | SYSTOLIC BLOOD PRESSURE: 128 MMHG | TEMPERATURE: 98.5 F | DIASTOLIC BLOOD PRESSURE: 70 MMHG | RESPIRATION RATE: 14 BRPM | HEART RATE: 80 BPM

## 2025-08-11 PROCEDURE — G0151 HHCP-SERV OF PT,EA 15 MIN: HCPCS | Mod: HHH

## 2025-08-11 PROCEDURE — 169592 NO-PAY CLAIM PROCEDURE

## 2025-08-11 SDOH — HEALTH STABILITY: PHYSICAL HEALTH: EXERCISE ACTIVITY: LAQ

## 2025-08-11 SDOH — HEALTH STABILITY: PHYSICAL HEALTH: PHYSICAL EXERCISE: 15

## 2025-08-11 SDOH — HEALTH STABILITY: PHYSICAL HEALTH: EXERCISE ACTIVITIES SETS: 1

## 2025-08-11 SDOH — HEALTH STABILITY: PHYSICAL HEALTH: PHYSICAL EXERCISE: SEATED

## 2025-08-11 SDOH — HEALTH STABILITY: PHYSICAL HEALTH: EXERCISE ACTIVITY: ANKLE DF/PF

## 2025-08-11 SDOH — HEALTH STABILITY: PHYSICAL HEALTH: EXERCISE TYPE: LE EXERCISES

## 2025-08-11 SDOH — HEALTH STABILITY: PHYSICAL HEALTH: EXERCISE ACTIVITY: MARCHING

## 2025-08-11 ASSESSMENT — ENCOUNTER SYMPTOMS
OCCASIONAL FEELINGS OF UNSTEADINESS: 1
SLEEP QUALITY: ADEQUATE
PERSON REPORTING PAIN: PATIENT
AGGRESSION WITHIN DEFINED LIMITS: 1
MUSCLE WEAKNESS: 1
DENIES PAIN: 1
ANGER WITHIN DEFINED LIMITS: 1
HYPERTENSION: 1

## 2025-08-11 ASSESSMENT — BALANCE ASSESSMENTS
ATTEMPTS TO ARISE: 1 - ABLE, REQUIRES MORE THAN ONE ATTEMPT
ARISES: 1 - ABLE, USES ARMS TO HELP
BALANCE SCORE: 7
STANDING BALANCE: 1 - STEADY BUT WIDE STANCE AND USES CANE OR OTHER SUPPORT
IMMEDIATE STANDING BALANCE FIRST 5 SECONDS: 1 - STEADY BUT USES WALKER OR OTHER SUPPORT
NUDGED: 0 - BEGINS TO FALL
SITTING BALANCE: 1 - STEADY, SAFE
SITTING DOWN: 1 - USES ARMS OR NOT SMOOTH MOTION
EYES CLOSED AT MAXIMUM POSITION NUDGED: 0 - UNSTEADY
NUDGED SCORE: 0
ARISING SCORE: 1
TURNING 360 DEGREES STEPS: 0 - DISCONTINUOUS STEPS

## 2025-08-11 ASSESSMENT — GAIT ASSESSMENTS
GAIT SCORE: 8
STEP SYMMETRY: 1 - RIGHT AND LEFT STEP LENGTH APPEAR EQUAL
TRUNK: 0 - MARKED SWAY OR USES WALKING AID
TRUNK SCORE: 0
PATH: 0 - MARKED DEVIATION
PATH SCORE: 0
STEP CONTINUITY: 1 - STEPS APPEAR CONTINUOUS
BALANCE AND GAIT SCORE: 15
WALKING STANCE: 1 - HEELS ALMOST TOUCHING WHILE WALKING
INITIATION OF GAIT IMMEDIATELY AFTER GO: 1 - NO HESITANCY

## 2025-08-11 ASSESSMENT — ACTIVITIES OF DAILY LIVING (ADL)
AMBULATION ASSISTANCE: STAND BY ASSIST
ENTERING_EXITING_HOME: STAND BY ASSIST
AMBULATION ASSISTANCE ON FLAT SURFACES: 1
OASIS_M1830: 03
AMBULATION_DISTANCE/DURATION_TOLERATED: 50 FT
CURRENT_FUNCTION: STAND BY ASSIST

## 2025-08-14 ENCOUNTER — OFFICE VISIT (OUTPATIENT)
Dept: GASTROENTEROLOGY | Facility: HOSPITAL | Age: 80
End: 2025-08-14
Payer: MEDICARE

## 2025-08-14 VITALS
SYSTOLIC BLOOD PRESSURE: 136 MMHG | HEART RATE: 65 BPM | TEMPERATURE: 98 F | DIASTOLIC BLOOD PRESSURE: 68 MMHG | OXYGEN SATURATION: 98 % | WEIGHT: 140 LBS | BODY MASS INDEX: 23.3 KG/M2

## 2025-08-14 DIAGNOSIS — R19.5 OCCULT BLOOD POSITIVE STOOL: Primary | ICD-10-CM

## 2025-08-14 PROCEDURE — 99202 OFFICE O/P NEW SF 15 MIN: CPT | Performed by: STUDENT IN AN ORGANIZED HEALTH CARE EDUCATION/TRAINING PROGRAM

## 2025-08-14 RX ORDER — LIDOCAINE 4 G/100G
PATCH TOPICAL AS NEEDED
COMMUNITY
Start: 2025-02-13

## 2025-08-14 SDOH — ECONOMIC STABILITY: FOOD INSECURITY: WITHIN THE PAST 12 MONTHS, THE FOOD YOU BOUGHT JUST DIDN'T LAST AND YOU DIDN'T HAVE MONEY TO GET MORE.: NEVER TRUE

## 2025-08-14 SDOH — ECONOMIC STABILITY: FOOD INSECURITY: WITHIN THE PAST 12 MONTHS, YOU WORRIED THAT YOUR FOOD WOULD RUN OUT BEFORE YOU GOT MONEY TO BUY MORE.: NEVER TRUE

## 2025-08-14 ASSESSMENT — LIFESTYLE VARIABLES
HOW OFTEN DO YOU HAVE A DRINK CONTAINING ALCOHOL: NEVER
SKIP TO QUESTIONS 9-10: 1
HOW OFTEN DO YOU HAVE SIX OR MORE DRINKS ON ONE OCCASION: NEVER
HOW MANY STANDARD DRINKS CONTAINING ALCOHOL DO YOU HAVE ON A TYPICAL DAY: PATIENT DOES NOT DRINK
AUDIT-C TOTAL SCORE: 0

## 2025-08-14 ASSESSMENT — PATIENT HEALTH QUESTIONNAIRE - PHQ9
1. LITTLE INTEREST OR PLEASURE IN DOING THINGS: SEVERAL DAYS
2. FEELING DOWN, DEPRESSED OR HOPELESS: SEVERAL DAYS
SUM OF ALL RESPONSES TO PHQ9 QUESTIONS 1 & 2: 2

## 2025-08-14 ASSESSMENT — PAIN SCALES - GENERAL: PAINLEVEL_OUTOF10: 0-NO PAIN

## 2025-08-15 ENCOUNTER — HOME CARE VISIT (OUTPATIENT)
Dept: HOME HEALTH SERVICES | Facility: HOME HEALTH | Age: 80
End: 2025-08-15
Payer: MEDICARE

## 2025-08-15 PROCEDURE — G0157 HHC PT ASSISTANT EA 15: HCPCS | Mod: CQ,HHH

## 2025-08-15 ASSESSMENT — ENCOUNTER SYMPTOMS: DENIES PAIN: 1

## 2025-08-18 ENCOUNTER — HOME CARE VISIT (OUTPATIENT)
Dept: HOME HEALTH SERVICES | Facility: HOME HEALTH | Age: 80
End: 2025-08-18
Payer: MEDICARE

## 2025-08-18 VITALS
DIASTOLIC BLOOD PRESSURE: 72 MMHG | HEART RATE: 76 BPM | SYSTOLIC BLOOD PRESSURE: 126 MMHG | TEMPERATURE: 98.1 F | RESPIRATION RATE: 18 BRPM | OXYGEN SATURATION: 98 %

## 2025-08-18 PROCEDURE — G0157 HHC PT ASSISTANT EA 15: HCPCS | Mod: CQ,HHH

## 2025-08-18 ASSESSMENT — ENCOUNTER SYMPTOMS
DENIES PAIN: 1
PERSON REPORTING PAIN: PATIENT

## 2025-08-20 ENCOUNTER — HOME CARE VISIT (OUTPATIENT)
Dept: HOME HEALTH SERVICES | Facility: HOME HEALTH | Age: 80
End: 2025-08-20
Payer: MEDICARE

## 2025-08-20 VITALS
HEART RATE: 76 BPM | DIASTOLIC BLOOD PRESSURE: 72 MMHG | TEMPERATURE: 98.1 F | SYSTOLIC BLOOD PRESSURE: 124 MMHG | RESPIRATION RATE: 18 BRPM | OXYGEN SATURATION: 98 %

## 2025-08-20 PROCEDURE — G0157 HHC PT ASSISTANT EA 15: HCPCS | Mod: CQ,HHH

## 2025-08-20 ASSESSMENT — ENCOUNTER SYMPTOMS
PERSON REPORTING PAIN: PATIENT
DENIES PAIN: 1

## 2025-08-25 ENCOUNTER — HOME CARE VISIT (OUTPATIENT)
Dept: HOME HEALTH SERVICES | Facility: HOME HEALTH | Age: 80
End: 2025-08-25
Payer: MEDICARE

## 2025-08-25 VITALS
OXYGEN SATURATION: 98 % | HEART RATE: 78 BPM | SYSTOLIC BLOOD PRESSURE: 126 MMHG | TEMPERATURE: 97.8 F | RESPIRATION RATE: 18 BRPM | DIASTOLIC BLOOD PRESSURE: 74 MMHG

## 2025-08-25 PROCEDURE — G0157 HHC PT ASSISTANT EA 15: HCPCS | Mod: CQ,HHH

## 2025-08-25 ASSESSMENT — ENCOUNTER SYMPTOMS
DENIES PAIN: 1
PERSON REPORTING PAIN: PATIENT

## 2025-08-27 ENCOUNTER — HOME CARE VISIT (OUTPATIENT)
Dept: HOME HEALTH SERVICES | Facility: HOME HEALTH | Age: 80
End: 2025-08-27
Payer: MEDICARE

## 2025-08-27 VITALS
OXYGEN SATURATION: 98 % | DIASTOLIC BLOOD PRESSURE: 74 MMHG | HEART RATE: 78 BPM | RESPIRATION RATE: 18 BRPM | TEMPERATURE: 97.4 F | SYSTOLIC BLOOD PRESSURE: 126 MMHG

## 2025-08-27 PROCEDURE — G0157 HHC PT ASSISTANT EA 15: HCPCS | Mod: CQ,HHH

## 2025-08-27 ASSESSMENT — ENCOUNTER SYMPTOMS
DENIES PAIN: 1
PERSON REPORTING PAIN: PATIENT

## 2025-09-01 DIAGNOSIS — I10 PRIMARY HYPERTENSION: ICD-10-CM

## 2025-09-02 ENCOUNTER — PATIENT MESSAGE (OUTPATIENT)
Dept: PRIMARY CARE | Facility: CLINIC | Age: 80
End: 2025-09-02
Payer: MEDICARE

## 2025-09-02 ENCOUNTER — HOME CARE VISIT (OUTPATIENT)
Dept: HOME HEALTH SERVICES | Facility: HOME HEALTH | Age: 80
End: 2025-09-02
Payer: MEDICARE

## 2025-09-02 VITALS
OXYGEN SATURATION: 98 % | DIASTOLIC BLOOD PRESSURE: 76 MMHG | HEART RATE: 78 BPM | RESPIRATION RATE: 18 BRPM | SYSTOLIC BLOOD PRESSURE: 124 MMHG | TEMPERATURE: 98.1 F

## 2025-09-02 DIAGNOSIS — I48.19 PERSISTENT ATRIAL FIBRILLATION (MULTI): ICD-10-CM

## 2025-09-02 DIAGNOSIS — K21.9 GASTROESOPHAGEAL REFLUX DISEASE, UNSPECIFIED WHETHER ESOPHAGITIS PRESENT: ICD-10-CM

## 2025-09-02 DIAGNOSIS — F32.A DEPRESSION, UNSPECIFIED DEPRESSION TYPE: ICD-10-CM

## 2025-09-02 DIAGNOSIS — E55.9 VITAMIN D DEFICIENCY: ICD-10-CM

## 2025-09-02 PROCEDURE — G0157 HHC PT ASSISTANT EA 15: HCPCS | Mod: CQ,HHH

## 2025-09-02 RX ORDER — FUROSEMIDE 20 MG/1
20 TABLET ORAL
Qty: 30 TABLET | Refills: 0 | Status: SHIPPED | OUTPATIENT
Start: 2025-09-02

## 2025-09-02 ASSESSMENT — ENCOUNTER SYMPTOMS
PERSON REPORTING PAIN: PATIENT
DENIES PAIN: 1

## 2025-09-04 ENCOUNTER — HOME CARE VISIT (OUTPATIENT)
Dept: HOME HEALTH SERVICES | Facility: HOME HEALTH | Age: 80
End: 2025-09-04
Payer: MEDICARE

## 2025-09-04 VITALS
SYSTOLIC BLOOD PRESSURE: 142 MMHG | HEART RATE: 58 BPM | OXYGEN SATURATION: 98 % | DIASTOLIC BLOOD PRESSURE: 74 MMHG | RESPIRATION RATE: 14 BRPM | TEMPERATURE: 98.4 F

## 2025-09-04 PROCEDURE — G0151 HHCP-SERV OF PT,EA 15 MIN: HCPCS | Mod: HHH

## 2025-09-04 RX ORDER — ARIPIPRAZOLE 5 MG/1
5 TABLET ORAL DAILY
Qty: 90 TABLET | Refills: 1 | Status: SHIPPED | OUTPATIENT
Start: 2025-09-04

## 2025-09-04 RX ORDER — CYANOCOBALAMIN (VITAMIN B-12) 500 MCG
10 TABLET ORAL DAILY
Qty: 90 TABLET | Refills: 1 | Status: SHIPPED | OUTPATIENT
Start: 2025-09-04

## 2025-09-04 RX ORDER — PANTOPRAZOLE SODIUM 40 MG/1
40 TABLET, DELAYED RELEASE ORAL
Qty: 90 TABLET | Refills: 1 | Status: SHIPPED | OUTPATIENT
Start: 2025-09-04

## 2025-09-04 SDOH — HEALTH STABILITY: PHYSICAL HEALTH: EXERCISE TYPE: LE EXERCISES

## 2025-09-04 SDOH — HEALTH STABILITY: PHYSICAL HEALTH: EXERCISE ACTIVITIES SETS: 1

## 2025-09-04 SDOH — HEALTH STABILITY: PHYSICAL HEALTH: PHYSICAL EXERCISE: 15

## 2025-09-04 SDOH — HEALTH STABILITY: PHYSICAL HEALTH: PHYSICAL EXERCISE: STANDING

## 2025-09-04 SDOH — HEALTH STABILITY: PHYSICAL HEALTH: EXERCISE ACTIVITY: MARCHING

## 2025-09-04 SDOH — HEALTH STABILITY: PHYSICAL HEALTH: EXERCISE ACTIVITY: HIP ABDUCTION

## 2025-09-04 SDOH — HEALTH STABILITY: PHYSICAL HEALTH: EXERCISE ACTIVITY: HS CURLS

## 2025-09-04 SDOH — HEALTH STABILITY: PHYSICAL HEALTH: EXERCISE ACTIVITY: HEEL RAISES/TOE RAISES

## 2025-09-04 SDOH — HEALTH STABILITY: PHYSICAL HEALTH: EXERCISE ACTIVITY: HIP EXTENSION

## 2025-09-04 SDOH — HEALTH STABILITY: PHYSICAL HEALTH: EXERCISE ACTIVITY: MINI SQUATS

## 2025-09-04 ASSESSMENT — ACTIVITIES OF DAILY LIVING (ADL)
AMBULATION_DISTANCE/DURATION_TOLERATED: 150 FT
HOME_HEALTH_OASIS: 00
OASIS_M1830: 01
AMBULATION ASSISTANCE ON FLAT SURFACES: 1

## 2025-09-04 ASSESSMENT — GAIT ASSESSMENTS
STEP CONTINUITY: 1 - STEPS APPEAR CONTINUOUS
GAIT SCORE: 10
PATH SCORE: 1
PATH: 1 - MILD/MODERATE DEVIATION OR USES WALKING AID
BALANCE AND GAIT SCORE: 23
STEP SYMMETRY: 1 - RIGHT AND LEFT STEP LENGTH APPEAR EQUAL
WALKING STANCE: 1 - HEELS ALMOST TOUCHING WHILE WALKING
TRUNK: 1 - NO SWAY BUT FLEXION OF KNEES OR BACK OR SPREADS ARMS WHILE WALKING
INITIATION OF GAIT IMMEDIATELY AFTER GO: 1 - NO HESITANCY
TRUNK SCORE: 1

## 2025-09-04 ASSESSMENT — BALANCE ASSESSMENTS
ARISES: 1 - ABLE, USES ARMS TO HELP
BALANCE SCORE: 13
ATTEMPTS TO ARISE: 2 - ABLE TO RISE, ONE ATTEMPT
NUDGED: 2 - STEADY
TURNING 360 DEGREES STEPS: 1 - CONTINUOUS STEPS
SITTING DOWN: 1 - USES ARMS OR NOT SMOOTH MOTION
ARISING SCORE: 1
EYES CLOSED AT MAXIMUM POSITION NUDGED: 1 - STEADY
NUDGED SCORE: 2
STANDING BALANCE: 1 - STEADY BUT WIDE STANCE AND USES CANE OR OTHER SUPPORT
IMMEDIATE STANDING BALANCE FIRST 5 SECONDS: 2 - STEADY WITHOUT WALKER OR OTHER SUPPORT
SITTING BALANCE: 1 - STEADY, SAFE

## 2025-09-04 ASSESSMENT — ENCOUNTER SYMPTOMS
OCCASIONAL FEELINGS OF UNSTEADINESS: 1
PERSON REPORTING PAIN: PATIENT
MUSCLE WEAKNESS: 1
DENIES PAIN: 1

## 2025-12-09 ENCOUNTER — APPOINTMENT (OUTPATIENT)
Dept: PRIMARY CARE | Facility: CLINIC | Age: 80
End: 2025-12-09
Payer: MEDICARE

## 2026-07-30 ENCOUNTER — APPOINTMENT (OUTPATIENT)
Dept: OPHTHALMOLOGY | Facility: CLINIC | Age: 81
End: 2026-07-30
Payer: MEDICARE

## (undated) DEVICE — SUTURE, VICRYL 3-0, TAPER POINT, SH-1 VIOLET 8-18 INCH

## (undated) DEVICE — SYSTEM, SMOKE EVAC, SEECLEAR XCL, 8.0 LITER, LF

## (undated) DEVICE — CUTTER, PROXIMATE LINEAR RELOAD, 75MM, BLUE

## (undated) DEVICE — TUBING, INSUFFLATION, LAPAROSCOPIC, FILTER, 10 FT

## (undated) DEVICE — SUTURE, VICRYL 0, TAPER POINT, CT-1 VIOLET 27 INCH

## (undated) DEVICE — Device

## (undated) DEVICE — DRAPE, SHEET, ENDOSCOPY, GENERAL, FENESTRATED, ARMBOARD COVER, 98 X 123.5 IN, DISPOSABLE, LF, STERILE

## (undated) DEVICE — ADHESIVE, SKIN, LIQUIBAND EXCEED

## (undated) DEVICE — LIGASURE, V SEALER/DIVIDER  5MM BLUNT TIP

## (undated) DEVICE — UNDERPAD, LIGHT ABSORB, 23 X 36

## (undated) DEVICE — GOWN, SURGICAL, ROYAL SILK, LG, STERILE

## (undated) DEVICE — SOLUTION KIT, ANTIFOG, 6CC, LF

## (undated) DEVICE — CORD, MONOPOLARD, 10FT, DISP

## (undated) DEVICE — GLOVE, SURGICAL, PROTEXIS PI , 7.5, PF, LF

## (undated) DEVICE — SUTURE, SILK, 3-0, 18 IN SH/CR, BLACK

## (undated) DEVICE — DRAPE, INSTRUMENT, W/POUCH, STERI DRAPE, 7 X 11 IN, DISPOSABLE, STERILE

## (undated) DEVICE — SUTURE, VICRYL, 2-0, 27 IN, BR/SH 27, VIOLET

## (undated) DEVICE — STRAP, ARM BOARD, 32 X 1.5

## (undated) DEVICE — SUTURE, PDSII, 1, TP-1, VIL, MONO, 48LP

## (undated) DEVICE — DRAPE, FLUID WARMING

## (undated) DEVICE — SCISSOR TIP, ENDOCUT, LAPAROSCOPIC

## (undated) DEVICE — STATLOCK, FOLEY SWIVEL, SILICONE TRICOT

## (undated) DEVICE — GLOVE, SURGICAL, PROTEXIS PI BLUE W/NEUTHERA, 8.0, PF, LF

## (undated) DEVICE — TROCAR, OPTICAL BLADELESS 5MM X 10 W/ADVANCED FIXATION

## (undated) DEVICE — SUTURE, ETHIBOND XTRA, 1, 30 IN, CTX, LF

## (undated) DEVICE — SUTURE, PDS II, 2-0, 27 IN, SH, VIOLET

## (undated) DEVICE — TOWEL PACK, STERILE, 16X24, XRAY DETECTABLE, BLUE, 4/PK

## (undated) DEVICE — TOWEL PACK 10-PK

## (undated) DEVICE — TIP, SUCTION, POOLEHANDPIECE, POOLE SUCTION, W/VENT, 14-28FR

## (undated) DEVICE — GOWN, SURGICAL, ROYAL SILK, XL, STERILE

## (undated) DEVICE — BOWL, BASIN, 32 OZ, STERILE

## (undated) DEVICE — SUTURE, MONOCRYL, 4-0, 27 IN, PS-2, UNDYED

## (undated) DEVICE — APPLICATOR, CHLORAPREP, W/ORANGE TINT, 26ML

## (undated) DEVICE — PUMP, STRYKERFLOW 2 & HANDPIECE W/10FT. IRRIGATION TUBING

## (undated) DEVICE — SUTURE, SILK, 2-0, 18 IN, BR PS, BLACK

## (undated) DEVICE — SUTURE, VICRYL, 3-0, 27 IN, SH

## (undated) DEVICE — STAPLER, SKIN, MULTIFIRE, PREMIUM, WIDE, 35 W

## (undated) DEVICE — NEEDLE, SAFETY, 22 G X 1.5 IN

## (undated) DEVICE — CUTTER, PROX LINEAR, 75MM, REG TISSUE, W/ SAFETY LOCK OUT

## (undated) DEVICE — SYRINGE, 10 CC, LUER LOCK

## (undated) DEVICE — CUP, MEDICINE, GRADUATED, 2 OZ, PLASTIC, DISP, LF

## (undated) DEVICE — HOLSTER, JET SAFETY

## (undated) DEVICE — APPLICATOR, COTTON TIP, 6 IN, 2PK, STERILE

## (undated) DEVICE — MANIFOLD, 4 PORT NEPTUNE STANDARD

## (undated) DEVICE — DRAPE, INCISE, ANTIMICROBIAL, IOBAN 2, LARGE, 17 X 23 IN, DISPOSABLE, STERILE

## (undated) DEVICE — TRAY, SKIN SCRUB, WET PREP, WITH 4 COMPARMENT

## (undated) DEVICE — SUTURE, VICRYL, 3-0, 27 IN, CT-1, UNDYED